# Patient Record
Sex: FEMALE | Race: WHITE | NOT HISPANIC OR LATINO
[De-identification: names, ages, dates, MRNs, and addresses within clinical notes are randomized per-mention and may not be internally consistent; named-entity substitution may affect disease eponyms.]

---

## 2017-01-10 ENCOUNTER — APPOINTMENT (OUTPATIENT)
Dept: PULMONOLOGY | Facility: CLINIC | Age: 77
End: 2017-01-10

## 2017-02-16 ENCOUNTER — APPOINTMENT (OUTPATIENT)
Dept: UROGYNECOLOGY | Facility: CLINIC | Age: 77
End: 2017-02-16

## 2017-02-16 DIAGNOSIS — R30.0 DYSURIA: ICD-10-CM

## 2017-03-15 ENCOUNTER — APPOINTMENT (OUTPATIENT)
Dept: UROGYNECOLOGY | Facility: CLINIC | Age: 77
End: 2017-03-15

## 2017-04-07 DIAGNOSIS — L29.2 PRURITUS VULVAE: ICD-10-CM

## 2017-05-03 ENCOUNTER — APPOINTMENT (OUTPATIENT)
Dept: UROGYNECOLOGY | Facility: CLINIC | Age: 77
End: 2017-05-03

## 2017-05-11 ENCOUNTER — APPOINTMENT (OUTPATIENT)
Dept: GASTROENTEROLOGY | Facility: CLINIC | Age: 77
End: 2017-05-11

## 2017-05-11 VITALS
WEIGHT: 133 LBS | OXYGEN SATURATION: 94 % | TEMPERATURE: 98 F | HEART RATE: 86 BPM | HEIGHT: 60 IN | BODY MASS INDEX: 26.11 KG/M2 | RESPIRATION RATE: 12 BRPM

## 2017-05-11 VITALS — BODY MASS INDEX: 25.98 KG/M2 | WEIGHT: 133 LBS | DIASTOLIC BLOOD PRESSURE: 98 MMHG | SYSTOLIC BLOOD PRESSURE: 160 MMHG

## 2017-05-11 DIAGNOSIS — Z83.79 FAMILY HISTORY OF OTHER DISEASES OF THE DIGESTIVE SYSTEM: ICD-10-CM

## 2017-05-11 DIAGNOSIS — E55.9 VITAMIN D DEFICIENCY, UNSPECIFIED: ICD-10-CM

## 2017-05-11 DIAGNOSIS — Z80.6 FAMILY HISTORY OF LEUKEMIA: ICD-10-CM

## 2017-05-11 DIAGNOSIS — Z80.0 FAMILY HISTORY OF MALIGNANT NEOPLASM OF DIGESTIVE ORGANS: ICD-10-CM

## 2017-05-12 ENCOUNTER — APPOINTMENT (OUTPATIENT)
Dept: UROGYNECOLOGY | Facility: CLINIC | Age: 77
End: 2017-05-12
Payer: MEDICARE

## 2017-05-12 PROCEDURE — 99214 OFFICE O/P EST MOD 30 MIN: CPT

## 2017-05-12 PROCEDURE — A4562: CPT

## 2017-05-16 ENCOUNTER — MESSAGE (OUTPATIENT)
Age: 77
End: 2017-05-16

## 2017-05-16 LAB
ALBUMIN SERPL ELPH-MCNC: 4.1 G/DL
ALP BLD-CCNC: 92 U/L
ALT SERPL-CCNC: 37 U/L
ANA SER IF-ACNC: NEGATIVE
AST SERPL-CCNC: 31 U/L
BILIRUB DIRECT SERPL-MCNC: 0.1 MG/DL
BILIRUB INDIRECT SERPL-MCNC: 0.4 MG/DL
BILIRUB SERPL-MCNC: 0.5 MG/DL
CRP SERPL-MCNC: 0.6 MG/DL
FERRITIN SERPL-MCNC: 133 NG/ML
GGT SERPL-CCNC: 56 U/L
HBV CORE IGG+IGM SER QL: NONREACTIVE
HBV SURFACE AG SER QL: NONREACTIVE
HCV AB SER QL: NONREACTIVE
HCV S/CO RATIO: 0.15 S/CO
PROT SERPL-MCNC: 8 G/DL
SMOOTH MUSCLE AB SER QL IF: NORMAL
TTG IGA SER IA-ACNC: 6.4 UNITS
TTG IGA SER-ACNC: NEGATIVE
TTG IGG SER IA-ACNC: 8.5 UNITS
TTG IGG SER IA-ACNC: NEGATIVE

## 2017-05-17 ENCOUNTER — MESSAGE (OUTPATIENT)
Age: 77
End: 2017-05-17

## 2017-05-17 LAB — HEV AB SER QL: NEGATIVE

## 2017-05-19 ENCOUNTER — MESSAGE (OUTPATIENT)
Age: 77
End: 2017-05-19

## 2017-05-23 ENCOUNTER — MESSAGE (OUTPATIENT)
Age: 77
End: 2017-05-23

## 2017-05-25 ENCOUNTER — APPOINTMENT (OUTPATIENT)
Dept: UROGYNECOLOGY | Facility: CLINIC | Age: 77
End: 2017-05-25

## 2017-06-06 ENCOUNTER — APPOINTMENT (OUTPATIENT)
Dept: UROGYNECOLOGY | Facility: CLINIC | Age: 77
End: 2017-06-06

## 2017-06-07 ENCOUNTER — APPOINTMENT (OUTPATIENT)
Dept: GASTROENTEROLOGY | Facility: HOSPITAL | Age: 77
End: 2017-06-07

## 2017-06-15 ENCOUNTER — APPOINTMENT (OUTPATIENT)
Dept: ULTRASOUND IMAGING | Facility: IMAGING CENTER | Age: 77
End: 2017-06-15

## 2017-06-15 ENCOUNTER — APPOINTMENT (OUTPATIENT)
Dept: MAMMOGRAPHY | Facility: IMAGING CENTER | Age: 77
End: 2017-06-15

## 2017-06-15 ENCOUNTER — OUTPATIENT (OUTPATIENT)
Dept: OUTPATIENT SERVICES | Facility: HOSPITAL | Age: 77
LOS: 1 days | End: 2017-06-15
Payer: MEDICARE

## 2017-06-15 DIAGNOSIS — Z00.8 ENCOUNTER FOR OTHER GENERAL EXAMINATION: ICD-10-CM

## 2017-06-15 DIAGNOSIS — N60.19 DIFFUSE CYSTIC MASTOPATHY OF UNSPECIFIED BREAST: ICD-10-CM

## 2017-06-15 DIAGNOSIS — Z12.31 ENCOUNTER FOR SCREENING MAMMOGRAM FOR MALIGNANT NEOPLASM OF BREAST: ICD-10-CM

## 2017-06-15 PROCEDURE — 77063 BREAST TOMOSYNTHESIS BI: CPT

## 2017-06-15 PROCEDURE — 77067 SCR MAMMO BI INCL CAD: CPT

## 2017-06-15 PROCEDURE — 76641 ULTRASOUND BREAST COMPLETE: CPT

## 2017-06-20 ENCOUNTER — APPOINTMENT (OUTPATIENT)
Dept: UROGYNECOLOGY | Facility: CLINIC | Age: 77
End: 2017-06-20

## 2017-06-26 ENCOUNTER — APPOINTMENT (OUTPATIENT)
Dept: UROGYNECOLOGY | Facility: CLINIC | Age: 77
End: 2017-06-26

## 2017-06-28 ENCOUNTER — MESSAGE (OUTPATIENT)
Age: 77
End: 2017-06-28

## 2017-07-19 ENCOUNTER — RESULT REVIEW (OUTPATIENT)
Age: 77
End: 2017-07-19

## 2017-08-30 ENCOUNTER — APPOINTMENT (OUTPATIENT)
Dept: UROGYNECOLOGY | Facility: CLINIC | Age: 77
End: 2017-08-30
Payer: MEDICARE

## 2017-08-30 PROCEDURE — A4562: CPT

## 2017-08-30 PROCEDURE — 99214 OFFICE O/P EST MOD 30 MIN: CPT

## 2017-09-13 ENCOUNTER — APPOINTMENT (OUTPATIENT)
Dept: UROGYNECOLOGY | Facility: CLINIC | Age: 77
End: 2017-09-13

## 2017-09-27 ENCOUNTER — APPOINTMENT (OUTPATIENT)
Dept: UROGYNECOLOGY | Facility: CLINIC | Age: 77
End: 2017-09-27
Payer: MEDICARE

## 2017-09-27 PROCEDURE — 99214 OFFICE O/P EST MOD 30 MIN: CPT

## 2017-10-25 ENCOUNTER — APPOINTMENT (OUTPATIENT)
Dept: UROGYNECOLOGY | Facility: CLINIC | Age: 77
End: 2017-10-25

## 2017-11-22 ENCOUNTER — RX RENEWAL (OUTPATIENT)
Age: 77
End: 2017-11-22

## 2017-11-22 ENCOUNTER — MEDICATION RENEWAL (OUTPATIENT)
Age: 77
End: 2017-11-22

## 2017-12-05 ENCOUNTER — APPOINTMENT (OUTPATIENT)
Dept: ENDOCRINOLOGY | Facility: CLINIC | Age: 77
End: 2017-12-05
Payer: MEDICARE

## 2017-12-05 ENCOUNTER — MEDICATION RENEWAL (OUTPATIENT)
Age: 77
End: 2017-12-05

## 2017-12-05 VITALS
WEIGHT: 135 LBS | DIASTOLIC BLOOD PRESSURE: 80 MMHG | SYSTOLIC BLOOD PRESSURE: 120 MMHG | BODY MASS INDEX: 26.5 KG/M2 | OXYGEN SATURATION: 98 % | HEIGHT: 60 IN | HEART RATE: 66 BPM

## 2017-12-05 PROCEDURE — 99214 OFFICE O/P EST MOD 30 MIN: CPT

## 2017-12-06 LAB
25(OH)D3 SERPL-MCNC: 68.5 NG/ML
ALBUMIN SERPL ELPH-MCNC: 4.2 G/DL
ALP BLD-CCNC: 99 U/L
ALT SERPL-CCNC: 60 U/L
ANION GAP SERPL CALC-SCNC: 14 MMOL/L
AST SERPL-CCNC: 63 U/L
BILIRUB SERPL-MCNC: 0.3 MG/DL
BUN SERPL-MCNC: 15 MG/DL
CALCIUM SERPL-MCNC: 9.4 MG/DL
CHLORIDE SERPL-SCNC: 90 MMOL/L
CO2 SERPL-SCNC: 27 MMOL/L
CREAT SERPL-MCNC: 0.84 MG/DL
GLUCOSE SERPL-MCNC: 99 MG/DL
HBA1C MFR BLD HPLC: 5.5 %
POTASSIUM SERPL-SCNC: 4.3 MMOL/L
PROT SERPL-MCNC: 7.4 G/DL
SODIUM SERPL-SCNC: 131 MMOL/L
T4 FREE SERPL-MCNC: 2 NG/DL
TSH SERPL-ACNC: 1.61 UIU/ML

## 2017-12-11 ENCOUNTER — LABORATORY RESULT (OUTPATIENT)
Age: 77
End: 2017-12-11

## 2017-12-11 ENCOUNTER — APPOINTMENT (OUTPATIENT)
Dept: GASTROENTEROLOGY | Facility: CLINIC | Age: 77
End: 2017-12-11
Payer: MEDICARE

## 2017-12-11 VITALS
RESPIRATION RATE: 12 BRPM | WEIGHT: 130 LBS | SYSTOLIC BLOOD PRESSURE: 112 MMHG | HEART RATE: 65 BPM | TEMPERATURE: 98.4 F | HEIGHT: 60 IN | OXYGEN SATURATION: 95 % | DIASTOLIC BLOOD PRESSURE: 62 MMHG | BODY MASS INDEX: 25.52 KG/M2

## 2017-12-11 DIAGNOSIS — Z80.0 FAMILY HISTORY OF MALIGNANT NEOPLASM OF DIGESTIVE ORGANS: ICD-10-CM

## 2017-12-11 PROCEDURE — 36415 COLL VENOUS BLD VENIPUNCTURE: CPT

## 2017-12-11 PROCEDURE — 99214 OFFICE O/P EST MOD 30 MIN: CPT | Mod: 25

## 2017-12-11 RX ORDER — LINACLOTIDE 145 UG/1
145 CAPSULE, GELATIN COATED ORAL
Qty: 30 | Refills: 5 | Status: DISCONTINUED | COMMUNITY
Start: 2017-05-11 | End: 2017-12-11

## 2017-12-11 RX ORDER — CLOTRIMAZOLE AND BETAMETHASONE DIPROPIONATE 10; .5 MG/G; MG/G
1-0.05 CREAM TOPICAL TWICE DAILY
Qty: 45 | Refills: 0 | Status: DISCONTINUED | COMMUNITY
Start: 2017-04-07 | End: 2017-12-11

## 2017-12-12 ENCOUNTER — MESSAGE (OUTPATIENT)
Age: 77
End: 2017-12-12

## 2017-12-12 LAB
ALBUMIN SERPL ELPH-MCNC: 4.5 G/DL
ALP BLD-CCNC: 91 U/L
ALT SERPL-CCNC: 20 U/L
AST SERPL-CCNC: 30 U/L
BASOPHILS # BLD AUTO: 0.09 K/UL
BASOPHILS NFR BLD AUTO: 1.4 %
BILIRUB DIRECT SERPL-MCNC: 0.1 MG/DL
BILIRUB INDIRECT SERPL-MCNC: 0.3 MG/DL
BILIRUB SERPL-MCNC: 0.4 MG/DL
EOSINOPHIL # BLD AUTO: 0.09 K/UL
EOSINOPHIL NFR BLD AUTO: 1.4 %
GGT SERPL-CCNC: 58 U/L
HCT VFR BLD CALC: 41.4 %
HGB BLD-MCNC: 13.8 G/DL
IMM GRANULOCYTES NFR BLD AUTO: 0.2 %
LYMPHOCYTES # BLD AUTO: 1.93 K/UL
LYMPHOCYTES NFR BLD AUTO: 30.2 %
MAN DIFF?: NORMAL
MCHC RBC-ENTMCNC: 32 PG
MCHC RBC-ENTMCNC: 33.3 GM/DL
MCV RBC AUTO: 96.1 FL
MONOCYTES # BLD AUTO: 0.58 K/UL
MONOCYTES NFR BLD AUTO: 9.1 %
NEUTROPHILS # BLD AUTO: 3.69 K/UL
NEUTROPHILS NFR BLD AUTO: 57.7 %
PLATELET # BLD AUTO: 313 K/UL
PROT SERPL-MCNC: 8.3 G/DL
RBC # BLD: 4.31 M/UL
RBC # FLD: 13.6 %
WBC # FLD AUTO: 6.39 K/UL

## 2017-12-18 ENCOUNTER — FORM ENCOUNTER (OUTPATIENT)
Age: 77
End: 2017-12-18

## 2017-12-19 ENCOUNTER — OUTPATIENT (OUTPATIENT)
Dept: OUTPATIENT SERVICES | Facility: HOSPITAL | Age: 77
LOS: 1 days | End: 2017-12-19
Payer: MEDICARE

## 2017-12-19 ENCOUNTER — APPOINTMENT (OUTPATIENT)
Dept: ULTRASOUND IMAGING | Facility: CLINIC | Age: 77
End: 2017-12-19
Payer: MEDICARE

## 2017-12-19 DIAGNOSIS — Z00.8 ENCOUNTER FOR OTHER GENERAL EXAMINATION: ICD-10-CM

## 2017-12-19 PROCEDURE — 76536 US EXAM OF HEAD AND NECK: CPT | Mod: 26

## 2017-12-19 PROCEDURE — 76700 US EXAM ABDOM COMPLETE: CPT

## 2017-12-19 PROCEDURE — 76700 US EXAM ABDOM COMPLETE: CPT | Mod: 26

## 2017-12-19 PROCEDURE — 76536 US EXAM OF HEAD AND NECK: CPT

## 2017-12-27 ENCOUNTER — MESSAGE (OUTPATIENT)
Age: 77
End: 2017-12-27

## 2018-01-03 ENCOUNTER — APPOINTMENT (OUTPATIENT)
Dept: GASTROENTEROLOGY | Facility: HOSPITAL | Age: 78
End: 2018-01-03

## 2018-02-13 ENCOUNTER — APPOINTMENT (OUTPATIENT)
Dept: UROGYNECOLOGY | Facility: CLINIC | Age: 78
End: 2018-02-13
Payer: MEDICARE

## 2018-02-13 DIAGNOSIS — R33.9 RETENTION OF URINE, UNSPECIFIED: ICD-10-CM

## 2018-02-13 PROCEDURE — 99214 OFFICE O/P EST MOD 30 MIN: CPT | Mod: 25

## 2018-02-13 PROCEDURE — 51798 US URINE CAPACITY MEASURE: CPT

## 2018-02-19 ENCOUNTER — FORM ENCOUNTER (OUTPATIENT)
Age: 78
End: 2018-02-19

## 2018-02-20 ENCOUNTER — OUTPATIENT (OUTPATIENT)
Dept: OUTPATIENT SERVICES | Facility: HOSPITAL | Age: 78
LOS: 1 days | End: 2018-02-20
Payer: MEDICARE

## 2018-02-20 ENCOUNTER — APPOINTMENT (OUTPATIENT)
Dept: RADIOLOGY | Facility: IMAGING CENTER | Age: 78
End: 2018-02-20
Payer: MEDICARE

## 2018-02-20 DIAGNOSIS — M85.80 OTHER SPECIFIED DISORDERS OF BONE DENSITY AND STRUCTURE, UNSPECIFIED SITE: ICD-10-CM

## 2018-02-20 PROCEDURE — 77080 DXA BONE DENSITY AXIAL: CPT

## 2018-02-20 PROCEDURE — 77080 DXA BONE DENSITY AXIAL: CPT | Mod: 26

## 2018-03-06 ENCOUNTER — APPOINTMENT (OUTPATIENT)
Dept: UROGYNECOLOGY | Facility: CLINIC | Age: 78
End: 2018-03-06

## 2018-03-13 ENCOUNTER — MESSAGE (OUTPATIENT)
Age: 78
End: 2018-03-13

## 2018-03-13 RX ORDER — POLYETHYLENE GLYCOL 3350 AND ELECTROLYTES WITH LEMON FLAVOR 236; 22.74; 6.74; 5.86; 2.97 G/4L; G/4L; G/4L; G/4L; G/4L
236 POWDER, FOR SOLUTION ORAL
Qty: 1 | Refills: 0 | Status: COMPLETED | COMMUNITY
Start: 2017-12-11 | End: 2018-03-13

## 2018-05-17 ENCOUNTER — APPOINTMENT (OUTPATIENT)
Dept: GASTROENTEROLOGY | Facility: CLINIC | Age: 78
End: 2018-05-17

## 2018-05-30 ENCOUNTER — APPOINTMENT (OUTPATIENT)
Dept: UROGYNECOLOGY | Facility: CLINIC | Age: 78
End: 2018-05-30

## 2018-06-20 ENCOUNTER — APPOINTMENT (OUTPATIENT)
Dept: ORTHOPEDIC SURGERY | Facility: CLINIC | Age: 78
End: 2018-06-20
Payer: MEDICARE

## 2018-06-20 VITALS
BODY MASS INDEX: 26.5 KG/M2 | WEIGHT: 135 LBS | SYSTOLIC BLOOD PRESSURE: 133 MMHG | DIASTOLIC BLOOD PRESSURE: 84 MMHG | HEART RATE: 74 BPM | HEIGHT: 60 IN

## 2018-06-20 DIAGNOSIS — Z86.39 PERSONAL HISTORY OF OTHER ENDOCRINE, NUTRITIONAL AND METABOLIC DISEASE: ICD-10-CM

## 2018-06-20 DIAGNOSIS — Z85.850 PERSONAL HISTORY OF MALIGNANT NEOPLASM OF THYROID: ICD-10-CM

## 2018-06-20 PROCEDURE — 72100 X-RAY EXAM L-S SPINE 2/3 VWS: CPT

## 2018-06-20 PROCEDURE — 99214 OFFICE O/P EST MOD 30 MIN: CPT

## 2018-06-27 ENCOUNTER — APPOINTMENT (OUTPATIENT)
Dept: UROGYNECOLOGY | Facility: CLINIC | Age: 78
End: 2018-06-27
Payer: MEDICARE

## 2018-06-27 PROCEDURE — 99214 OFFICE O/P EST MOD 30 MIN: CPT

## 2018-06-28 ENCOUNTER — MESSAGE (OUTPATIENT)
Age: 78
End: 2018-06-28

## 2018-07-03 ENCOUNTER — APPOINTMENT (OUTPATIENT)
Dept: ORTHOPEDIC SURGERY | Facility: CLINIC | Age: 78
End: 2018-07-03

## 2018-07-12 ENCOUNTER — APPOINTMENT (OUTPATIENT)
Dept: MAMMOGRAPHY | Facility: IMAGING CENTER | Age: 78
End: 2018-07-12
Payer: MEDICARE

## 2018-07-12 ENCOUNTER — OUTPATIENT (OUTPATIENT)
Dept: OUTPATIENT SERVICES | Facility: HOSPITAL | Age: 78
LOS: 1 days | End: 2018-07-12
Payer: MEDICARE

## 2018-07-12 ENCOUNTER — APPOINTMENT (OUTPATIENT)
Dept: ULTRASOUND IMAGING | Facility: IMAGING CENTER | Age: 78
End: 2018-07-12
Payer: MEDICARE

## 2018-07-12 DIAGNOSIS — Z00.00 ENCOUNTER FOR GENERAL ADULT MEDICAL EXAMINATION WITHOUT ABNORMAL FINDINGS: ICD-10-CM

## 2018-07-12 PROCEDURE — 77063 BREAST TOMOSYNTHESIS BI: CPT | Mod: 26

## 2018-07-12 PROCEDURE — 77067 SCR MAMMO BI INCL CAD: CPT

## 2018-07-12 PROCEDURE — 77063 BREAST TOMOSYNTHESIS BI: CPT

## 2018-07-12 PROCEDURE — 77067 SCR MAMMO BI INCL CAD: CPT | Mod: 26

## 2018-07-12 PROCEDURE — 76641 ULTRASOUND BREAST COMPLETE: CPT | Mod: 26,50

## 2018-07-12 PROCEDURE — 76641 ULTRASOUND BREAST COMPLETE: CPT

## 2018-07-23 ENCOUNTER — FORM ENCOUNTER (OUTPATIENT)
Age: 78
End: 2018-07-23

## 2018-07-23 ENCOUNTER — MESSAGE (OUTPATIENT)
Age: 78
End: 2018-07-23

## 2018-07-24 ENCOUNTER — APPOINTMENT (OUTPATIENT)
Dept: ULTRASOUND IMAGING | Facility: IMAGING CENTER | Age: 78
End: 2018-07-24
Payer: MEDICARE

## 2018-07-24 ENCOUNTER — OUTPATIENT (OUTPATIENT)
Dept: OUTPATIENT SERVICES | Facility: HOSPITAL | Age: 78
LOS: 1 days | End: 2018-07-24
Payer: MEDICARE

## 2018-07-24 DIAGNOSIS — Z00.8 ENCOUNTER FOR OTHER GENERAL EXAMINATION: ICD-10-CM

## 2018-07-24 PROBLEM — Z80.0 FAMILY HISTORY OF COLON CANCER: Status: INACTIVE | Noted: 2017-05-11

## 2018-07-24 PROCEDURE — 76700 US EXAM ABDOM COMPLETE: CPT

## 2018-07-24 PROCEDURE — 76700 US EXAM ABDOM COMPLETE: CPT | Mod: 26

## 2018-07-26 ENCOUNTER — APPOINTMENT (OUTPATIENT)
Dept: GASTROENTEROLOGY | Facility: CLINIC | Age: 78
End: 2018-07-26
Payer: MEDICARE

## 2018-07-26 VITALS
SYSTOLIC BLOOD PRESSURE: 120 MMHG | DIASTOLIC BLOOD PRESSURE: 80 MMHG | RESPIRATION RATE: 64 BRPM | WEIGHT: 136.38 LBS | TEMPERATURE: 98.2 F | BODY MASS INDEX: 26.77 KG/M2 | HEIGHT: 60 IN | OXYGEN SATURATION: 90 %

## 2018-07-26 DIAGNOSIS — F41.9 ANXIETY DISORDER, UNSPECIFIED: ICD-10-CM

## 2018-07-26 PROCEDURE — 36415 COLL VENOUS BLD VENIPUNCTURE: CPT

## 2018-07-26 PROCEDURE — 99214 OFFICE O/P EST MOD 30 MIN: CPT | Mod: 25

## 2018-07-26 RX ORDER — ALPRAZOLAM 2 MG/1
2 TABLET ORAL
Refills: 0 | Status: ACTIVE | COMMUNITY
Start: 2018-07-26

## 2018-07-27 LAB
ALBUMIN SERPL ELPH-MCNC: 4.3 G/DL
ALP BLD-CCNC: 98 U/L
ALT SERPL-CCNC: 21 U/L
AST SERPL-CCNC: 27 U/L
BILIRUB DIRECT SERPL-MCNC: 0.1 MG/DL
BILIRUB INDIRECT SERPL-MCNC: 0.2 MG/DL
BILIRUB SERPL-MCNC: 0.4 MG/DL
HAV IGM SER QL: NONREACTIVE
HBV CORE IGG+IGM SER QL: NONREACTIVE
HBV CORE IGM SER QL: NONREACTIVE
HBV SURFACE AG SER QL: NONREACTIVE
HCV AB SER QL: NONREACTIVE
HCV S/CO RATIO: 0.24 S/CO
PROT SERPL-MCNC: 8.4 G/DL

## 2018-08-06 LAB — HEV AB SER QL: NEGATIVE

## 2018-08-08 ENCOUNTER — APPOINTMENT (OUTPATIENT)
Dept: UROGYNECOLOGY | Facility: CLINIC | Age: 78
End: 2018-08-08

## 2018-08-15 ENCOUNTER — RESULT REVIEW (OUTPATIENT)
Age: 78
End: 2018-08-15

## 2018-09-06 ENCOUNTER — APPOINTMENT (OUTPATIENT)
Dept: GASTROENTEROLOGY | Facility: CLINIC | Age: 78
End: 2018-09-06

## 2018-09-17 ENCOUNTER — APPOINTMENT (OUTPATIENT)
Dept: NEPHROLOGY | Facility: CLINIC | Age: 78
End: 2018-09-17

## 2018-09-17 ENCOUNTER — LABORATORY RESULT (OUTPATIENT)
Age: 78
End: 2018-09-17

## 2018-09-17 ENCOUNTER — APPOINTMENT (OUTPATIENT)
Dept: NEPHROLOGY | Facility: CLINIC | Age: 78
End: 2018-09-17
Payer: MEDICARE

## 2018-09-17 VITALS
BODY MASS INDEX: 25.91 KG/M2 | HEART RATE: 68 BPM | WEIGHT: 132 LBS | HEIGHT: 60 IN | OXYGEN SATURATION: 98 % | SYSTOLIC BLOOD PRESSURE: 159 MMHG | DIASTOLIC BLOOD PRESSURE: 98 MMHG

## 2018-09-17 PROCEDURE — 99204 OFFICE O/P NEW MOD 45 MIN: CPT

## 2018-09-17 RX ORDER — FUROSEMIDE 20 MG/1
20 TABLET ORAL
Qty: 30 | Refills: 5 | Status: DISCONTINUED | COMMUNITY
Start: 2018-07-26 | End: 2018-09-17

## 2018-09-18 ENCOUNTER — CHART COPY (OUTPATIENT)
Age: 78
End: 2018-09-18

## 2018-09-18 ENCOUNTER — MESSAGE (OUTPATIENT)
Age: 78
End: 2018-09-18

## 2018-09-18 RX ORDER — HYDROCHLOROTHIAZIDE 25 MG/1
25 TABLET ORAL DAILY
Qty: 90 | Refills: 3 | Status: DISCONTINUED | COMMUNITY
Start: 2018-09-17 | End: 2018-09-18

## 2018-09-20 ENCOUNTER — APPOINTMENT (OUTPATIENT)
Dept: UROGYNECOLOGY | Facility: CLINIC | Age: 78
End: 2018-09-20
Payer: MEDICARE

## 2018-09-20 DIAGNOSIS — N81.11 CYSTOCELE, MIDLINE: ICD-10-CM

## 2018-09-20 DIAGNOSIS — N95.2 POSTMENOPAUSAL ATROPHIC VAGINITIS: ICD-10-CM

## 2018-09-20 DIAGNOSIS — R33.9 RETENTION OF URINE, UNSPECIFIED: ICD-10-CM

## 2018-09-20 PROCEDURE — 99214 OFFICE O/P EST MOD 30 MIN: CPT

## 2018-09-20 PROCEDURE — A4562: CPT

## 2018-09-21 ENCOUNTER — EMERGENCY (EMERGENCY)
Facility: HOSPITAL | Age: 78
LOS: 1 days | Discharge: HOME | End: 2018-09-21
Attending: EMERGENCY MEDICINE
Payer: MEDICARE

## 2018-09-21 VITALS
RESPIRATION RATE: 20 BRPM | OXYGEN SATURATION: 98 % | DIASTOLIC BLOOD PRESSURE: 114 MMHG | TEMPERATURE: 98 F | SYSTOLIC BLOOD PRESSURE: 188 MMHG | HEART RATE: 64 BPM

## 2018-09-21 PROCEDURE — 99282 EMERGENCY DEPT VISIT SF MDM: CPT

## 2018-09-21 PROCEDURE — 99282 EMERGENCY DEPT VISIT SF MDM: CPT | Mod: 25

## 2018-09-21 NOTE — ED ADULT TRIAGE NOTE - CHIEF COMPLAINT QUOTE
HTN HTN, on losartan, Metoprolol, HCTZ - took all meds as directed. Patient also states she took "one xanex" tonight and had been told in the past to take this when her pressure is high.

## 2018-09-22 VITALS
RESPIRATION RATE: 18 BRPM | HEART RATE: 61 BPM | DIASTOLIC BLOOD PRESSURE: 92 MMHG | OXYGEN SATURATION: 98 % | SYSTOLIC BLOOD PRESSURE: 180 MMHG | TEMPERATURE: 98 F

## 2018-09-22 NOTE — ED PROVIDER NOTE - MUSCULOSKELETAL, MLM
Spine appears normal, range of motion is not limited, no muscle or joint tenderness **ATTENDING ADDENDUM (Dr. Jason Self): NO cords, soft-tissue swelling, or calf tenderness bilaterally.

## 2018-09-22 NOTE — ED PROVIDER NOTE - RESPIRATORY, MLM
Breath sounds clear and equal bilaterally. **ATTENDING ADDENDUM (Dr. Jason Self): NO wheezing, rales, rhonchi, crackles, stridor, drooling, retractions, nasal flaring, or tripoding.

## 2018-09-22 NOTE — ED PROVIDER NOTE - PHYSICAL EXAMINATION
General: Alert and Orientated x 3. No apparent distress.  Head: Normocephalic and atraumatic.  Eyes: PERRL with EOMI.  Neck: Supple. Trachea midline.   Cardiac: Normal S1 and S2 w/ RRR. No murmurs appreciated. No JVD appreciated.  Pulmonary: Vesicular breath sounds bilaterally. No increased WOB. No wheezes or crackles.  Abdominal: Soft, non-tender. (+) bowel sounds appreciated in all 4 quadrants. No hepatosplenomegaly.   Neuro: Awake, alert and fully oriented x 4. No evidence of cognitive or language dysfunction.  Cranial nerves: Visual fields are full. Extraocular movements full. Pupils equal, round, react to light. No nystagmus noted. Fifth nerve function is normal. There is no facial asymmetry noted. CN XII intact.   Motor exam: good tone and bulk throughout. No pronator drift. Muscle strength is 5/5 throughout. Sensory examination is intact. Normal gait and tandem walk. Finger-to-nose and heel-to-shin moves are normal. Romberg sign negative.  Javier Lama, PGY-1   Musculoskeletal: Strength appropriate in all 4 extremities for age with no limited ROM.  Skin: Color appropriate for race. Intact, warm, and well-perfused.  Lymphatic: No cervical or inguinal adenopathy appreciated.  Psychiatric: Appropriate mood and affect. No apparent risk to self or others.  Javier Lama, PGY-1 General: Alert and Orientated x 3. No apparent distress.  Head: Normocephalic and atraumatic.  Eyes: PERRL with EOMI.  Neck: Supple. Trachea midline.   Cardiac: Normal S1 and S2 w/ RRR. No murmurs appreciated. No JVD appreciated.  Pulmonary: Vesicular breath sounds bilaterally. No increased WOB. No wheezes or crackles.  Abdominal: Soft, non-tender. (+) bowel sounds appreciated in all 4 quadrants. No hepatosplenomegaly.   Neuro: Awake, alert and fully oriented x 4. No evidence of cognitive or language dysfunction.  Cranial nerves: Visual fields are full. Extraocular movements full. Pupils equal, round, react to light. No nystagmus noted. Fifth nerve function is normal. There is no facial asymmetry noted. CN XII intact.   Motor exam: good tone and bulk throughout. No pronator drift. Muscle strength is 5/5 throughout. Sensory examination is intact. Normal gait and tandem walk. Finger-to-nose and heel-to-shin moves are normal. Romberg sign negative.  Javier Lama, PGY-1   Musculoskeletal: Strength appropriate in all 4 extremities for age with no limited ROM.  Skin: Color appropriate for race. Intact, warm, and well-perfused.  Lymphatic: No cervical adenopathy appreciated.  Psychiatric: Appropriate mood and affect. No apparent risk to self or others.  Javier Lama, PGY-1 General: Alert and Orientated x 3. No apparent distress.  Head: Normocephalic and atraumatic.  Eyes: PERRL with EOMI.  Neck: Supple. Trachea midline.   Cardiac: Normal S1 and S2 w/ RRR. No murmurs appreciated. No JVD appreciated.  Pulmonary: Vesicular breath sounds bilaterally. No increased WOB. No wheezes or crackles.  Abdominal: Soft, non-tender. (+) bowel sounds appreciated in all 4 quadrants. No hepatosplenomegaly.   Neuro: Awake, alert and fully oriented x 4. No evidence of cognitive or language dysfunction.  Cranial nerves: Visual fields are full. Extraocular movements full. Pupils equal, round, react to light. No nystagmus noted. Fifth nerve function is normal. There is no facial asymmetry noted. CN XII intact.   Motor exam: good tone and bulk throughout. No pronator drift. Muscle strength is 5/5 throughout. Sensory examination is intact. Normal gait and tandem walk. Finger-to-nose and heel-to-shin moves are normal. Romberg sign negative.  Javier Lama, PGY-1   Musculoskeletal: Strength appropriate in all 4 extremities for age with no limited ROM.  Skin: Color appropriate for race. Intact, warm, and well-perfused.  Lymphatic: No cervical adenopathy appreciated.  Psychiatric: Appropriate mood and affect. No apparent risk to self or others.  Javier Lama, PGY-1  **ATTENDING ADDENDUM (Dr. Jason Self): I have reviewed and substantially contributed to the elements of the PE as documented above. I have directly performed an examination of this patient in conjunction with the other members (EM resident/PA/NP) of the patient care team.

## 2018-09-22 NOTE — ED PROVIDER NOTE - OBJECTIVE STATEMENT
78 F PMH HTN presenting after measuring BP at home to 200s systolic. Pt is in the midst of changing BP meds because her norvasc was giving her too much leg swelling. She has been advised to follow up with renal by her PCP for better BP control, but hasn't been able to make an appointment yet due to insurance problems. She took her medications after her BP was high and came to ER. No headaches, weakness, numbness, nausea, vomiting. back pain, CP or SOB 78 F PMH HTN presenting after measuring BP at home to 200s systolic. Pt is in the midst of changing BP meds because her norvasc was giving her too much leg swelling. She has been advised to follow up with renal by her PCP for better BP control, but hasn't been able to make an appointment yet due to insurance problems. She took her medications after her BP was high and came to ER. No headaches, weakness, numbness, nausea, vomiting. back pain, CP or shortness of breath.  **ATTENDING ADDENDUM (Dr. Jason Self): I attest that I have directly and personally interviewed and examined this patient and elicited a comparable history of present illness and review of systems as documented, along with my EM resident. I attest that I have made significant contributions to the documentation where necessary and as noted in the EMR.

## 2018-09-22 NOTE — ED PROVIDER NOTE - MEDICAL DECISION MAKING DETAILS
78F with HTN presenting after BP was elevated at home. Took home medications, now BP is 170/88. Benign neuro exam. Dispo likely home. 78F with HTN presenting after BP was elevated at home. Took home medications, now BP is 170/88. Benign neuro exam, no s/s of other end organ damage. Dispo likely home. 78F with HTN presenting after BP was elevated at home. Took home medications, now BP is 170/88. Benign neuro exam, no s/s of other end organ damage. Dispo likely home.  **ATTENDING MEDICAL DECISION MAKING/SYNTHESIS (Dr. Jason Self): I have reviewed the Chief Complaint, the HPI, the ROS, and have directly performed and confirmed the findings on the Physical Examination. I have reviewed the medical decision making with all providers, as applicable. The PROBLEM REPRESENTATION at this time is: 77-year-old woman with history of Hypertension now presenting with asymptomatic hypertension without headache, chest pain, shortness of breath, dyspnea on exertion, palpitations, nausea, vomiting, facial droop, numbness, tingling, weakness, paresthesias, syncope, near-syncope, focal or generalized weakness, abdominal pain, back pain, or visual or speech changes. The MOST LIKELY DIAGNOSIS, and the LIST OF DIFFERENTIAL DIAGNOSES, includes (but is not limited to) the following: asymptomatic Hypertension, malignant Hypertension (considered), hypertensive crisis e.g. encephalopathy, dissection, congestive heart failure, acute coronary syndrome, cerebrovascular accident, transient ischemic attack, or equivalent (NO evidence), pain (NO evidence), new onset kidney disease (NO evidence), medication non-adherence (considered, evidence), dehydration (NO evidence), electrolyte-metabolic-endocrine derangements (NO evidence), toxicant exposure (NO evidence). The likelihood of each of these diagnoses has been appropriately considered in the context of this patient's presentation and my evaluation. PLAN: as described in EMR, including diagnostics, therapeutics and consultation as clinically warranted. I will continue to reevaluate the patient, including the results of all testing, and monitor response to therapy throughout the patient's course in the ED.

## 2018-09-22 NOTE — ED ADULT NURSE NOTE - NSIMPLEMENTINTERV_GEN_ALL_ED
Implemented All Universal Safety Interventions:  [place] to call system. Call bell, personal items and telephone within reach. Instruct patient to call for assistance. Room bathroom lighting operational. Non-slip footwear when patient is off stretcher. Physically safe environment: no spills, clutter or unnecessary equipment. Stretcher in lowest position, wheels locked, appropriate side rails in place.

## 2018-09-22 NOTE — ED PROVIDER NOTE - CHPI ED SYMPTOMS NEG
no tingling/no weakness/no vomiting/no chills/no nausea/no fever/no decreased eating/drinking/no dizziness/no numbness/no pain

## 2018-09-22 NOTE — ED PROVIDER NOTE - PROGRESS NOTE DETAILS
**ATTENDING ADDENDUM (Dr. Jason Self): patient serially evaluated throughout ED course. NO acute deterioration up to this time in the ED. NO evidence of hypertensive crisis, malignant Hypertension, encephalopathy, or other worrisome sequela e.g. acute coronary syndrome, cerebrovascular accident, or equivalent, at this time. Regression to the mean noted. Patient without symptoms. Agree with additional BP check in ED. Anticipatory guidance provided. Agree with discharge home with close outpatient followup with primary care physician/provider and with medications (if appropriate, if clinically indicated, and as prescribed, as noted in EMR).

## 2018-09-22 NOTE — ED PROVIDER NOTE - GASTROINTESTINAL, MLM
Abdomen soft, non-tender **ATTENDING ADDENDUM (Dr. Jason Self): non-distended. NO guarding, rebound, or rigidity. NO pulsatile or non-pulsatile masses. NO hernias. NO obvious hepatosplenomegaly. NO costovertebral angle tenderness.

## 2018-09-22 NOTE — ED ADULT NURSE NOTE - OBJECTIVE STATEMENT
77y/o female presented to the ED from home with complaint of HTN. A&Ox3, ambulatory, PMH HTN, taking losartan, metoprolol and hydrochlorothiazide. Patient states she took her BP around 9pm before taking medications and it was 210/109. Patient called PCP who instructed patient to take 1 extra pill of Xanax. Patient took 2 Xanax in total tonight. Patient states she felt headache, heavy breathing, with intermittent nausea which has since resolved upon arrival to ED. /93, HR 58, RR 18, SPO2 100% room air. Denies chest pain, SOB, difficulty breathing, N/V/D, fever, chills. No pedal edema noted. Lung sounds equal/clear bilaterally.

## 2018-09-22 NOTE — ED PROVIDER NOTE - PLAN OF CARE
ATTENDING ADDENDUM (Dr. Jason Self): Goals of care include resolution of emergent/urgent symptoms and concerns, and restoration to baseline level of homeostasis.

## 2018-09-22 NOTE — ED PROVIDER NOTE - AGGRAVATING FACTORS
**ATTENDING ADDENDUM (Dr. Jason Self): NO movement-associated, pleuritic, reproducible, positional, or exertional component to the symptoms.

## 2018-09-22 NOTE — ED PROVIDER NOTE - CARE PLAN
Principal Discharge DX:	Hypertension Principal Discharge DX:	Hypertension  Goal:	ATTENDING ADDENDUM (Dr. Jason Self): Goals of care include resolution of emergent/urgent symptoms and concerns, and restoration to baseline level of homeostasis.

## 2018-09-22 NOTE — ED PROVIDER NOTE - EYES, MLM
**ATTENDING ADDENDUM (Dr. Jason Self): Extraocular muscle movements intact. Clear corneas bilaterally, pupils equal and round. NO nystagmus.

## 2018-09-22 NOTE — ED PROVIDER NOTE - ATTENDING CONTRIBUTION TO CARE
**ATTENDING ADDENDUM (Dr. Jason Self): I attest that I have directly examined this patient and reviewed and formulated the diagnostic and therapeutic management plan in collaboration with the EM resident. Please see MDM note and remainder of EMR for findings from CC, HPI, ROS, and PE. (Lama)

## 2018-09-22 NOTE — ED PROVIDER NOTE - NS ED ROS FT
Constitution: No Fever or chills  Eyes: No visual changes  HEENT: No URI symptoms  Cardio: No Chest pain  Resp: No SOB  GI: No abdominal pain  : No dysuria  MSK: No Back pain  Neuro: No Headache  Skin: No rashes  Javier Lama, PGY-1

## 2018-09-22 NOTE — ED ADULT NURSE NOTE - CHIEF COMPLAINT QUOTE
HTN, on losartan, Metoprolol, HCTZ - took all meds as directed. Patient also states she took "one xanex" tonight and had been told in the past to take this when her pressure is high.

## 2018-09-22 NOTE — ED PROVIDER NOTE - SIGNIFICANT NEGATIVE FINDINGS
**ATTENDING ADDENDUM (Dr. Jason Self): NO chest pain, palpitations, visual changes, speech changes, headache, neck pain, abdominal pain, back pain, numbness, tingling, weakness, paresthesias, focal or generalized weakness, droop, drift, difficulty walking, increased edema in the bilateral lower extremities.

## 2018-09-22 NOTE — ED ADULT NURSE REASSESSMENT NOTE - NS ED NURSE REASSESS COMMENT FT1
Patient resting comfortably in bed, no complaints at this time. Awaiting MD evaluation. VSS. Will continue to monitor.

## 2018-09-22 NOTE — ED ADULT NURSE REASSESSMENT NOTE - NS ED NURSE REASSESS COMMENT FT1
Pt received d/c instructions and verbalizes d/c instructions. AAOX3. Ambulating well. /92.  MD Lama OK to d/c. Pt states " I am ready to go home now".

## 2018-09-24 PROBLEM — I10 ESSENTIAL (PRIMARY) HYPERTENSION: Chronic | Status: ACTIVE | Noted: 2018-09-22

## 2018-09-26 LAB
ALBUMIN SERPL ELPH-MCNC: 4.2 G/DL
ANION GAP SERPL CALC-SCNC: 13 MMOL/L
BASOPHILS # BLD AUTO: 0.05 K/UL
BASOPHILS NFR BLD AUTO: 0.9 %
BUN SERPL-MCNC: 8 MG/DL
CALCIUM SERPL-MCNC: 9.4 MG/DL
CHLORIDE SERPL-SCNC: 85 MMOL/L
CO2 SERPL-SCNC: 28 MMOL/L
CREAT SERPL-MCNC: 0.73 MG/DL
EOSINOPHIL # BLD AUTO: 0.08 K/UL
EOSINOPHIL NFR BLD AUTO: 1.4 %
GLUCOSE SERPL-MCNC: 105 MG/DL
HCT VFR BLD CALC: 38.2 %
HGB BLD-MCNC: 12.7 G/DL
IMM GRANULOCYTES NFR BLD AUTO: 0.2 %
LYMPHOCYTES # BLD AUTO: 1.15 K/UL
LYMPHOCYTES NFR BLD AUTO: 20 %
MAN DIFF?: NORMAL
MCHC RBC-ENTMCNC: 30.2 PG
MCHC RBC-ENTMCNC: 33.2 GM/DL
MCV RBC AUTO: 91 FL
MONOCYTES # BLD AUTO: 0.52 K/UL
MONOCYTES NFR BLD AUTO: 9 %
NEUTROPHILS # BLD AUTO: 3.95 K/UL
NEUTROPHILS NFR BLD AUTO: 68.5 %
PHOSPHATE SERPL-MCNC: 3.3 MG/DL
PLATELET # BLD AUTO: 299 K/UL
POTASSIUM SERPL-SCNC: 3.7 MMOL/L
RBC # BLD: 4.2 M/UL
RBC # FLD: 13.7 %
SODIUM SERPL-SCNC: 126 MMOL/L
WBC # FLD AUTO: 5.76 K/UL

## 2018-09-27 ENCOUNTER — APPOINTMENT (OUTPATIENT)
Dept: NEPHROLOGY | Facility: CLINIC | Age: 78
End: 2018-09-27
Payer: MEDICARE

## 2018-09-27 VITALS
OXYGEN SATURATION: 98 % | SYSTOLIC BLOOD PRESSURE: 163 MMHG | HEART RATE: 75 BPM | DIASTOLIC BLOOD PRESSURE: 101 MMHG | HEIGHT: 60 IN | BODY MASS INDEX: 26.31 KG/M2 | WEIGHT: 134 LBS

## 2018-09-27 PROCEDURE — 99214 OFFICE O/P EST MOD 30 MIN: CPT

## 2018-10-02 LAB
ALBUMIN SERPL ELPH-MCNC: 4.3 G/DL
ANION GAP SERPL CALC-SCNC: 12 MMOL/L
BUN SERPL-MCNC: 12 MG/DL
CALCIUM SERPL-MCNC: 9.3 MG/DL
CHLORIDE SERPL-SCNC: 93 MMOL/L
CO2 SERPL-SCNC: 29 MMOL/L
CREAT SERPL-MCNC: 0.79 MG/DL
GLUCOSE SERPL-MCNC: 107 MG/DL
PHOSPHATE SERPL-MCNC: 3.9 MG/DL
POTASSIUM SERPL-SCNC: 4 MMOL/L
SODIUM SERPL-SCNC: 134 MMOL/L

## 2018-10-05 ENCOUNTER — APPOINTMENT (OUTPATIENT)
Dept: UROGYNECOLOGY | Facility: CLINIC | Age: 78
End: 2018-10-05

## 2018-10-09 ENCOUNTER — APPOINTMENT (OUTPATIENT)
Dept: PULMONOLOGY | Facility: CLINIC | Age: 78
End: 2018-10-09

## 2018-10-16 ENCOUNTER — APPOINTMENT (OUTPATIENT)
Dept: NEPHROLOGY | Facility: CLINIC | Age: 78
End: 2018-10-16

## 2018-10-16 ENCOUNTER — APPOINTMENT (OUTPATIENT)
Dept: NEPHROLOGY | Facility: CLINIC | Age: 78
End: 2018-10-16
Payer: MEDICARE

## 2018-10-16 VITALS
DIASTOLIC BLOOD PRESSURE: 106 MMHG | OXYGEN SATURATION: 97 % | WEIGHT: 132 LBS | BODY MASS INDEX: 25.78 KG/M2 | SYSTOLIC BLOOD PRESSURE: 179 MMHG | HEART RATE: 71 BPM

## 2018-10-16 DIAGNOSIS — N18.2 CHRONIC KIDNEY DISEASE, STAGE 2 (MILD): ICD-10-CM

## 2018-10-16 DIAGNOSIS — I10 ESSENTIAL (PRIMARY) HYPERTENSION: ICD-10-CM

## 2018-10-16 PROCEDURE — 99214 OFFICE O/P EST MOD 30 MIN: CPT

## 2018-10-16 RX ORDER — DILTIAZEM HYDROCHLORIDE 180 MG/1
180 CAPSULE, EXTENDED RELEASE ORAL
Qty: 30 | Refills: 3 | Status: DISCONTINUED | COMMUNITY
Start: 2018-09-27 | End: 2018-10-16

## 2018-10-17 ENCOUNTER — INPATIENT (INPATIENT)
Facility: HOSPITAL | Age: 78
LOS: 1 days | Discharge: HOME | DRG: 305 | End: 2018-10-19
Attending: INTERNAL MEDICINE | Admitting: INTERNAL MEDICINE
Payer: MEDICARE

## 2018-10-17 VITALS
WEIGHT: 132.06 LBS | SYSTOLIC BLOOD PRESSURE: 205 MMHG | DIASTOLIC BLOOD PRESSURE: 106 MMHG | RESPIRATION RATE: 16 BRPM | HEIGHT: 60 IN | OXYGEN SATURATION: 96 % | TEMPERATURE: 98 F | HEART RATE: 77 BPM

## 2018-10-17 DIAGNOSIS — I10 ESSENTIAL (PRIMARY) HYPERTENSION: ICD-10-CM

## 2018-10-17 DIAGNOSIS — E89.0 POSTPROCEDURAL HYPOTHYROIDISM: Chronic | ICD-10-CM

## 2018-10-17 LAB
ALBUMIN SERPL ELPH-MCNC: 3.8 G/DL — SIGNIFICANT CHANGE UP (ref 3.3–5)
ALP SERPL-CCNC: 95 U/L — SIGNIFICANT CHANGE UP (ref 40–120)
ALT FLD-CCNC: 38 U/L — SIGNIFICANT CHANGE UP (ref 10–45)
ANION GAP SERPL CALC-SCNC: 17 MMOL/L — SIGNIFICANT CHANGE UP (ref 5–17)
APPEARANCE UR: CLEAR — SIGNIFICANT CHANGE UP
AST SERPL-CCNC: 35 U/L — SIGNIFICANT CHANGE UP (ref 10–40)
BACTERIA # UR AUTO: NEGATIVE — SIGNIFICANT CHANGE UP
BASOPHILS # BLD AUTO: 0.1 K/UL — SIGNIFICANT CHANGE UP (ref 0–0.2)
BASOPHILS NFR BLD AUTO: 0.7 % — SIGNIFICANT CHANGE UP (ref 0–2)
BILIRUB SERPL-MCNC: 0.3 MG/DL — SIGNIFICANT CHANGE UP (ref 0.2–1.2)
BILIRUB UR-MCNC: NEGATIVE — SIGNIFICANT CHANGE UP
BUN SERPL-MCNC: 13 MG/DL — SIGNIFICANT CHANGE UP (ref 7–23)
CALCIUM SERPL-MCNC: 9.4 MG/DL — SIGNIFICANT CHANGE UP (ref 8.4–10.5)
CHLORIDE SERPL-SCNC: 99 MMOL/L — SIGNIFICANT CHANGE UP (ref 96–108)
CK MB CFR SERPL CALC: <1 NG/ML — SIGNIFICANT CHANGE UP (ref 0–3.8)
CK SERPL-CCNC: 141 U/L — SIGNIFICANT CHANGE UP (ref 25–170)
CO2 SERPL-SCNC: 23 MMOL/L — SIGNIFICANT CHANGE UP (ref 22–31)
COLOR SPEC: COLORLESS — SIGNIFICANT CHANGE UP
CREAT SERPL-MCNC: 0.88 MG/DL — SIGNIFICANT CHANGE UP (ref 0.5–1.3)
DIFF PNL FLD: NEGATIVE — SIGNIFICANT CHANGE UP
EOSINOPHIL # BLD AUTO: 0.1 K/UL — SIGNIFICANT CHANGE UP (ref 0–0.5)
EOSINOPHIL NFR BLD AUTO: 1.4 % — SIGNIFICANT CHANGE UP (ref 0–6)
EPI CELLS # UR: 5 /HPF — SIGNIFICANT CHANGE UP
GLUCOSE SERPL-MCNC: 110 MG/DL — HIGH (ref 70–99)
GLUCOSE UR QL: NEGATIVE — SIGNIFICANT CHANGE UP
HCT VFR BLD CALC: 41.4 % — SIGNIFICANT CHANGE UP (ref 34.5–45)
HGB BLD-MCNC: 13.7 G/DL — SIGNIFICANT CHANGE UP (ref 11.5–15.5)
HYALINE CASTS # UR AUTO: 0 /LPF — SIGNIFICANT CHANGE UP (ref 0–2)
KETONES UR-MCNC: SIGNIFICANT CHANGE UP
LEUKOCYTE ESTERASE UR-ACNC: ABNORMAL
LYMPHOCYTES # BLD AUTO: 1.8 K/UL — SIGNIFICANT CHANGE UP (ref 1–3.3)
LYMPHOCYTES # BLD AUTO: 20.6 % — SIGNIFICANT CHANGE UP (ref 13–44)
MCHC RBC-ENTMCNC: 30.5 PG — SIGNIFICANT CHANGE UP (ref 27–34)
MCHC RBC-ENTMCNC: 33.1 GM/DL — SIGNIFICANT CHANGE UP (ref 32–36)
MCV RBC AUTO: 92.4 FL — SIGNIFICANT CHANGE UP (ref 80–100)
MONOCYTES # BLD AUTO: 0.8 K/UL — SIGNIFICANT CHANGE UP (ref 0–0.9)
MONOCYTES NFR BLD AUTO: 9.6 % — SIGNIFICANT CHANGE UP (ref 2–14)
NEUTROPHILS # BLD AUTO: 5.9 K/UL — SIGNIFICANT CHANGE UP (ref 1.8–7.4)
NEUTROPHILS NFR BLD AUTO: 67.6 % — SIGNIFICANT CHANGE UP (ref 43–77)
NITRITE UR-MCNC: NEGATIVE — SIGNIFICANT CHANGE UP
PH UR: 7.5 — SIGNIFICANT CHANGE UP (ref 5–8)
PLATELET # BLD AUTO: 303 K/UL — SIGNIFICANT CHANGE UP (ref 150–400)
POTASSIUM SERPL-MCNC: 3.4 MMOL/L — LOW (ref 3.5–5.3)
POTASSIUM SERPL-SCNC: 3.4 MMOL/L — LOW (ref 3.5–5.3)
PROT SERPL-MCNC: 7.8 G/DL — SIGNIFICANT CHANGE UP (ref 6–8.3)
PROT UR-MCNC: NEGATIVE — SIGNIFICANT CHANGE UP
RBC # BLD: 4.49 M/UL — SIGNIFICANT CHANGE UP (ref 3.8–5.2)
RBC # FLD: 12.6 % — SIGNIFICANT CHANGE UP (ref 10.3–14.5)
RBC CASTS # UR COMP ASSIST: 371 /HPF — HIGH (ref 0–4)
SODIUM SERPL-SCNC: 139 MMOL/L — SIGNIFICANT CHANGE UP (ref 135–145)
SP GR SPEC: 1.01 — LOW (ref 1.01–1.02)
TROPONIN T, HIGH SENSITIVITY RESULT: 10 NG/L — SIGNIFICANT CHANGE UP (ref 0–51)
TROPONIN T, HIGH SENSITIVITY RESULT: 7 NG/L — SIGNIFICANT CHANGE UP (ref 0–51)
UROBILINOGEN FLD QL: NEGATIVE — SIGNIFICANT CHANGE UP
WBC # BLD: 8.7 K/UL — SIGNIFICANT CHANGE UP (ref 3.8–10.5)
WBC # FLD AUTO: 8.7 K/UL — SIGNIFICANT CHANGE UP (ref 3.8–10.5)
WBC UR QL: 52 /HPF — HIGH (ref 0–5)

## 2018-10-17 PROCEDURE — 93010 ELECTROCARDIOGRAM REPORT: CPT

## 2018-10-17 PROCEDURE — 93306 TTE W/DOPPLER COMPLETE: CPT | Mod: 26

## 2018-10-17 PROCEDURE — 99285 EMERGENCY DEPT VISIT HI MDM: CPT | Mod: 25

## 2018-10-17 PROCEDURE — 99222 1ST HOSP IP/OBS MODERATE 55: CPT

## 2018-10-17 RX ORDER — CEFTRIAXONE 500 MG/1
INJECTION, POWDER, FOR SOLUTION INTRAMUSCULAR; INTRAVENOUS
Qty: 0 | Refills: 0 | Status: DISCONTINUED | OUTPATIENT
Start: 2018-10-17 | End: 2018-10-19

## 2018-10-17 RX ORDER — LABETALOL HCL 100 MG
5 TABLET ORAL ONCE
Qty: 0 | Refills: 0 | Status: COMPLETED | OUTPATIENT
Start: 2018-10-17 | End: 2018-10-17

## 2018-10-17 RX ORDER — LEVOTHYROXINE SODIUM 125 MCG
75 TABLET ORAL DAILY
Qty: 0 | Refills: 0 | Status: DISCONTINUED | OUTPATIENT
Start: 2018-10-17 | End: 2018-10-19

## 2018-10-17 RX ORDER — AMITRIPTYLINE HCL 25 MG
25 TABLET ORAL DAILY
Qty: 0 | Refills: 0 | Status: DISCONTINUED | OUTPATIENT
Start: 2018-10-17 | End: 2018-10-18

## 2018-10-17 RX ORDER — BUPROPION HYDROCHLORIDE 150 MG/1
300 TABLET, EXTENDED RELEASE ORAL DAILY
Qty: 0 | Refills: 0 | Status: DISCONTINUED | OUTPATIENT
Start: 2018-10-17 | End: 2018-10-18

## 2018-10-17 RX ORDER — HYDRALAZINE HCL 50 MG
5 TABLET ORAL ONCE
Qty: 0 | Refills: 0 | Status: COMPLETED | OUTPATIENT
Start: 2018-10-17 | End: 2018-10-17

## 2018-10-17 RX ORDER — SPIRONOLACTONE 25 MG/1
25 TABLET, FILM COATED ORAL DAILY
Qty: 0 | Refills: 0 | Status: DISCONTINUED | OUTPATIENT
Start: 2018-10-17 | End: 2018-10-19

## 2018-10-17 RX ORDER — SODIUM CHLORIDE 9 MG/ML
1000 INJECTION INTRAMUSCULAR; INTRAVENOUS; SUBCUTANEOUS ONCE
Qty: 0 | Refills: 0 | Status: COMPLETED | OUTPATIENT
Start: 2018-10-17 | End: 2018-10-17

## 2018-10-17 RX ORDER — METOPROLOL TARTRATE 50 MG
50 TABLET ORAL
Qty: 0 | Refills: 0 | Status: DISCONTINUED | OUTPATIENT
Start: 2018-10-17 | End: 2018-10-17

## 2018-10-17 RX ORDER — ASPIRIN/CALCIUM CARB/MAGNESIUM 324 MG
81 TABLET ORAL DAILY
Qty: 0 | Refills: 0 | Status: DISCONTINUED | OUTPATIENT
Start: 2018-10-17 | End: 2018-10-17

## 2018-10-17 RX ORDER — METOPROLOL TARTRATE 50 MG
50 TABLET ORAL AT BEDTIME
Qty: 0 | Refills: 0 | Status: DISCONTINUED | OUTPATIENT
Start: 2018-10-18 | End: 2018-10-18

## 2018-10-17 RX ORDER — HEPARIN SODIUM 5000 [USP'U]/ML
5000 INJECTION INTRAVENOUS; SUBCUTANEOUS EVERY 12 HOURS
Qty: 0 | Refills: 0 | Status: DISCONTINUED | OUTPATIENT
Start: 2018-10-17 | End: 2018-10-17

## 2018-10-17 RX ORDER — METOPROLOL TARTRATE 50 MG
50 TABLET ORAL ONCE
Qty: 0 | Refills: 0 | Status: DISCONTINUED | OUTPATIENT
Start: 2018-10-17 | End: 2018-10-17

## 2018-10-17 RX ORDER — METOPROLOL TARTRATE 50 MG
50 TABLET ORAL ONCE
Qty: 0 | Refills: 0 | Status: COMPLETED | OUTPATIENT
Start: 2018-10-17 | End: 2018-10-17

## 2018-10-17 RX ORDER — CARVEDILOL PHOSPHATE 80 MG/1
3.12 CAPSULE, EXTENDED RELEASE ORAL EVERY 12 HOURS
Qty: 0 | Refills: 0 | Status: DISCONTINUED | OUTPATIENT
Start: 2018-10-17 | End: 2018-10-17

## 2018-10-17 RX ORDER — FUROSEMIDE 40 MG
20 TABLET ORAL DAILY
Qty: 0 | Refills: 0 | Status: DISCONTINUED | OUTPATIENT
Start: 2018-10-17 | End: 2018-10-17

## 2018-10-17 RX ORDER — METOPROLOL TARTRATE 50 MG
50 TABLET ORAL AT BEDTIME
Qty: 0 | Refills: 0 | Status: DISCONTINUED | OUTPATIENT
Start: 2018-10-17 | End: 2018-10-17

## 2018-10-17 RX ORDER — ALPRAZOLAM 0.25 MG
2 TABLET ORAL THREE TIMES A DAY
Qty: 0 | Refills: 0 | Status: DISCONTINUED | OUTPATIENT
Start: 2018-10-17 | End: 2018-10-18

## 2018-10-17 RX ORDER — CEFTRIAXONE 500 MG/1
1 INJECTION, POWDER, FOR SOLUTION INTRAMUSCULAR; INTRAVENOUS EVERY 24 HOURS
Qty: 0 | Refills: 0 | Status: DISCONTINUED | OUTPATIENT
Start: 2018-10-18 | End: 2018-10-19

## 2018-10-17 RX ORDER — POTASSIUM BICARBONATE 978 MG/1
25 TABLET, EFFERVESCENT ORAL ONCE
Qty: 0 | Refills: 0 | Status: COMPLETED | OUTPATIENT
Start: 2018-10-17 | End: 2018-10-17

## 2018-10-17 RX ORDER — ATORVASTATIN CALCIUM 80 MG/1
10 TABLET, FILM COATED ORAL AT BEDTIME
Qty: 0 | Refills: 0 | Status: DISCONTINUED | OUTPATIENT
Start: 2018-10-17 | End: 2018-10-19

## 2018-10-17 RX ORDER — CEFTRIAXONE 500 MG/1
1 INJECTION, POWDER, FOR SOLUTION INTRAMUSCULAR; INTRAVENOUS ONCE
Qty: 0 | Refills: 0 | Status: COMPLETED | OUTPATIENT
Start: 2018-10-17 | End: 2018-10-17

## 2018-10-17 RX ORDER — ASPIRIN/CALCIUM CARB/MAGNESIUM 324 MG
81 TABLET ORAL DAILY
Qty: 0 | Refills: 0 | Status: DISCONTINUED | OUTPATIENT
Start: 2018-10-17 | End: 2018-10-19

## 2018-10-17 RX ORDER — METOPROLOL TARTRATE 50 MG
100 TABLET ORAL DAILY
Qty: 0 | Refills: 0 | Status: DISCONTINUED | OUTPATIENT
Start: 2018-10-17 | End: 2018-10-17

## 2018-10-17 RX ORDER — METOPROLOL TARTRATE 50 MG
100 TABLET ORAL DAILY
Qty: 0 | Refills: 0 | Status: DISCONTINUED | OUTPATIENT
Start: 2018-10-17 | End: 2018-10-18

## 2018-10-17 RX ORDER — ONDANSETRON 8 MG/1
4 TABLET, FILM COATED ORAL ONCE
Qty: 0 | Refills: 0 | Status: COMPLETED | OUTPATIENT
Start: 2018-10-17 | End: 2018-10-17

## 2018-10-17 RX ORDER — LOSARTAN POTASSIUM 100 MG/1
50 TABLET, FILM COATED ORAL DAILY
Qty: 0 | Refills: 0 | Status: DISCONTINUED | OUTPATIENT
Start: 2018-10-17 | End: 2018-10-17

## 2018-10-17 RX ADMIN — POTASSIUM BICARBONATE 25 MILLIEQUIVALENT(S): 978 TABLET, EFFERVESCENT ORAL at 11:49

## 2018-10-17 RX ADMIN — Medication 2 MILLIGRAM(S): at 22:15

## 2018-10-17 RX ADMIN — Medication 25 MILLIGRAM(S): at 22:14

## 2018-10-17 RX ADMIN — Medication 50 MILLIGRAM(S): at 10:14

## 2018-10-17 RX ADMIN — Medication 5 MILLIGRAM(S): at 02:02

## 2018-10-17 RX ADMIN — SODIUM CHLORIDE 1000 MILLILITER(S): 9 INJECTION INTRAMUSCULAR; INTRAVENOUS; SUBCUTANEOUS at 03:02

## 2018-10-17 RX ADMIN — Medication 50 MILLIGRAM(S): at 22:15

## 2018-10-17 RX ADMIN — Medication 50 MILLIGRAM(S): at 13:42

## 2018-10-17 RX ADMIN — CEFTRIAXONE 100 GRAM(S): 500 INJECTION, POWDER, FOR SOLUTION INTRAMUSCULAR; INTRAVENOUS at 10:31

## 2018-10-17 RX ADMIN — SPIRONOLACTONE 25 MILLIGRAM(S): 25 TABLET, FILM COATED ORAL at 16:20

## 2018-10-17 RX ADMIN — SODIUM CHLORIDE 1000 MILLILITER(S): 9 INJECTION INTRAMUSCULAR; INTRAVENOUS; SUBCUTANEOUS at 02:02

## 2018-10-17 RX ADMIN — ATORVASTATIN CALCIUM 10 MILLIGRAM(S): 80 TABLET, FILM COATED ORAL at 18:08

## 2018-10-17 RX ADMIN — CARVEDILOL PHOSPHATE 3.12 MILLIGRAM(S): 80 CAPSULE, EXTENDED RELEASE ORAL at 18:08

## 2018-10-17 RX ADMIN — Medication 20 MILLIGRAM(S): at 11:49

## 2018-10-17 RX ADMIN — Medication 81 MILLIGRAM(S): at 22:14

## 2018-10-17 RX ADMIN — Medication 5 MILLIGRAM(S): at 02:55

## 2018-10-17 NOTE — H&P ADULT - NSHPPHYSICALEXAM_GEN_ALL_CORE
PHYSICAL EXAMINATION:  Vital Signs Last 24 Hrs  T(C): 36.5 (17 Oct 2018 06:40), Max: 37.1 (17 Oct 2018 01:39)  T(F): 97.7 (17 Oct 2018 06:40), Max: 98.7 (17 Oct 2018 01:39)  HR: 78 (17 Oct 2018 06:40) (65 - 78)  BP: 145/94 (17 Oct 2018 06:40) (145/94 - 205/106)  BP(mean): --  RR: 18 (17 Oct 2018 06:40) (16 - 18)  SpO2: 100% (17 Oct 2018 06:40) (96% - 100%)  CAPILLARY BLOOD GLUCOSE          GENERAL: NAD, well-groomed, well-developed  HEAD:  atraumatic, normocephalic  EYES: sclera anicteric  ENMT: mucous membranes moist  NECK: supple, No JVD  CHEST/LUNG: clear to auscultation bilaterally; no rales, rhonchi, or wheezing b/l  HEART: normal S1, S2  ABDOMEN: BS+, soft, ND, NT   EXTREMITIES:  1+ edema b/l LEs  NEURO: awake, alert, interactive; moves all extremities  SKIN: no rashes or lesions

## 2018-10-17 NOTE — CONSULT NOTE ADULT - SUBJECTIVE AND OBJECTIVE BOX
[facility] DIVISION OF KIDNEY DISEASES AND HYPERTENSION -- [phone]  -- INITIAL CONSULT NOTE  --------------------------------------------------------------------------------  HPI: 78 female with medical history of HTN, Anxiety, HLP, hypothyroidism (s/p thyroidectomy) admitted for uncontrolled HTN hence nephrology was consulted. Reports having episode of dizziness with /125 mmHg at home 6 hrs before admission. Nephrologist Dr. Gunn. Pt with history long standing HTN Currently on metoprolo 150 mg (50 mg AM and 100 mg PM), candesartan 16 mg and lasix 20 mg however yesterday increase lasix to 40mg.   At admission /106 mmHg received hydralazine 5 mg and labetalol 10 mg IV with BP improved.     Pt reports feeling pounding sensation on neck, denies SOB, chest pain, headache.       PAST HISTORY  --------------------------------------------------------------------------------  PAST MEDICAL & SURGICAL HISTORY:  Macular degeneration  Osteoarthritis  Diverticulitis  GERD (gastroesophageal reflux disease)  Hypertension  H/O thyroidectomy    FAMILY HISTORY:    PAST SOCIAL HISTORY:    ALLERGIES & MEDICATIONS  --------------------------------------------------------------------------------  Allergies    No Known Allergies    Intolerances      Standing Inpatient Medications  ALPRAZolam 2 milliGRAM(s) Oral three times a day  amitriptyline 25 milliGRAM(s) Oral daily  aspirin enteric coated 81 milliGRAM(s) Oral daily  atorvastatin 10 milliGRAM(s) Oral at bedtime  buPROPion XL . 300 milliGRAM(s) Oral daily  carvedilol 3.125 milliGRAM(s) Oral every 12 hours  cefTRIAXone   IVPB      furosemide    Tablet 20 milliGRAM(s) Oral daily  levothyroxine 75 MICROGram(s) Oral daily  metoprolol succinate ER 50 milliGRAM(s) Oral two times a day  spironolactone 25 milliGRAM(s) Oral daily    PRN Inpatient Medications      REVIEW OF SYSTEMS  --------------------------------------------------------------------------------  Gen: No fevers  Skin: No rashes  Respiratory: No dyspnea  CV: No chest pain  GI: No abdominal pain  : No dysuria, hematuria  MSK: No  edema    All other systems were reviewed and are negative, except as noted.    VITALS/PHYSICAL EXAM  --------------------------------------------------------------------------------  T(C): 36.5 (10-17-18 @ 06:40), Max: 37.1 (10-17-18 @ 01:39)  HR: 80 (10-17-18 @ 13:14) (65 - 80)  BP: 166/87 (10-17-18 @ 13:14) (145/94 - 205/106)  RR: 18 (10-17-18 @ 10:06) (16 - 18)  SpO2: 100% (10-17-18 @ 10:06) (96% - 100%)  Wt(kg): --  Height (cm): 152.4 (10-17-18 @ 06:40)  Weight (kg): 59.9 (10-17-18 @ 06:40)  BMI (kg/m2): 25.8 (10-17-18 @ 06:40)  BSA (m2): 1.56 (10-17-18 @ 06:40)      10-17-18 @ 07:01  -  10-17-18 @ 17:13  --------------------------------------------------------  IN: 240 mL / OUT: 0 mL / NET: 240 mL      Physical Exam:  	Gen: NAD, elderly  	HEENT: MMM  	Pulm: CTA B/L  	CV: S1S2  	Abd: Soft, +BS   	Ext: 1+ LE edema B/L  	Neuro: Awake  	Skin: Warm and dry    LABS/STUDIES  --------------------------------------------------------------------------------              13.7   8.7   >-----------<  303      [10-17-18 @ 01:52]              41.4     139  |  99  |  13  ----------------------------<  110      [10-17-18 @ 01:52]  3.4   |  23  |  0.88        Ca     9.4     [10-17-18 @ 01:52]    TPro  7.8  /  Alb  3.8  /  TBili  0.3  /  DBili  x   /  AST  35  /  ALT  38  /  AlkPhos  95  [10-17-18 @ 01:52]              [10-17-18 @ 11:00]    Creatinine Trend:  SCr 0.88 [10-17 @ 01:52]    Urinalysis - [10-17-18 @ 05:13]      Color Colorless / Appearance Clear / SG 1.006 / pH 7.5      Gluc Negative / Ketone Trace  / Bili Negative / Urobili Negative       Blood Negative / Protein Negative / Leuk Est Large / Nitrite Negative       / WBC 52 / Hyaline 0 / Gran  / Sq Epi  / Non Sq Epi 5 / Bacteria Negative

## 2018-10-17 NOTE — ED ADULT NURSE NOTE - OBJECTIVE STATEMENT
79 yo F arrived to the ed hx of htn; pt arrived c/o HTN, denies ha/cp/sob; pt takes Metoprolol 50mg BIG; denies fevers/chills; pt is A&Ox4, ambulatory, gross neuro intact; pt also took 1mg of Xanax PTA; family @ bedside, IV placed, will continue to monitor BP

## 2018-10-17 NOTE — ED ADULT TRIAGE NOTE - CHIEF COMPLAINT QUOTE
high bp at home 190s/120s, nausea, dizziness, whole body tingling  pt took 1mg Xanax and 1g Tylenol PTA

## 2018-10-17 NOTE — ED PROVIDER NOTE - PROGRESS NOTE DETAILS
Elizabeth Goldberger PGY-2: spoke to pmd who requested adm to [facility] Elizabeth Goldberger PGY-2: pt continues to be symptomatic in spite of 2 pushes labetalol, and bp not sign improved. Lowest systolic 160s but now increasing to 190s/110s, so will adm for symptomatic htn   spoke to pmd who requested adm to [facility]

## 2018-10-17 NOTE — CONSULT NOTE ADULT - SUBJECTIVE AND OBJECTIVE BOX
CARDIOLOGY CONSULT - Dr. Calle     CHIEF COMPLAINT: dizziness, HTN     HPI:  78f w hx HTN here c/o dizziness. Pt states that her systolic bp was over 200 at home. Denies headache or visual changes, no cp or SOB, no urinary sx. Pt. bp is controlled by her nephrologist Dr. Gunn. Went to her nephrologist yesterday (whom she has for poorly controlled htn-no primary renal pathology) and was noted to be hypertensive but was told ok to go home and c/w home meds. Current meds include metop 150 (50 and 100), candesartan 16, and lasix 20, however yersterday she was told to increase her lasix to 40mg which she did not do. She states her medications have recently been changed bc she has had some intolerances to multiple medications. She states she cant take norvasc bc it causes her to retain fluid and she was recently taken off of cardizem bc it causes her to be dizzy. BP mildly improved however pt. was refusing bp medications this am. denies cp/sob/cooper, palpitations.       PAST MEDICAL & SURGICAL HISTORY:  Macular degeneration  Osteoarthritis  Diverticulitis  GERD (gastroesophageal reflux disease)  Hypertension  H/O thyroidectomy          PREVIOUS DIAGNOSTIC TESTING:    [ ] Echocardiogram:   [ ]  Catheterization:  [ ] Stress Test:  	    MEDICATIONS:  MEDICATIONS  (STANDING):  ALPRAZolam 2 milliGRAM(s) Oral three times a day  amitriptyline 25 milliGRAM(s) Oral daily  aspirin enteric coated 81 milliGRAM(s) Oral daily  atorvastatin 10 milliGRAM(s) Oral at bedtime  cefTRIAXone   IVPB      furosemide    Tablet 20 milliGRAM(s) Oral daily  heparin  Injectable 5000 Unit(s) SubCutaneous every 12 hours  levothyroxine 75 MICROGram(s) Oral daily  losartan 50 milliGRAM(s) Oral daily  metoprolol succinate ER 50 milliGRAM(s) Oral two times a day      FAMILY HISTORY:      SOCIAL HISTORY:    [x] Non-smoker  [ ] Smoker  [ ] Alcohol    Allergies    No Known Allergies    Intolerances    	    REVIEW OF SYSTEMS:  CONSTITUTIONAL: No fever, weight loss, or fatigue  EYES: No eye pain, visual disturbances, or discharge  ENMT:  No difficulty hearing, tinnitus, vertigo; No sinus or throat pain  NECK: No pain or stiffness  RESPIRATORY: No cough, wheezing, chills or hemoptysis; No Shortness of Breath  CARDIOVASCULAR: No chest pain, palpitations, passing out, +dizziness, or leg swelling  GASTROINTESTINAL: No abdominal or epigastric pain. No nausea, vomiting, or hematemesis; No diarrhea or constipation. No melena or hematochezia.  GENITOURINARY: No dysuria, frequency, hematuria, or incontinence  NEUROLOGICAL: No headaches, memory loss, loss of strength, numbness, or tremors  SKIN: No itching, burning, rashes, or lesions   	    [x] All others negative	  [ ] Unable to obtain    PHYSICAL EXAM:  T(C): 36.5 (10-17-18 @ 06:40), Max: 37.1 (10-17-18 @ 01:39)  HR: 80 (10-17-18 @ 11:53) (65 - 80)  BP: 175/87 (10-17-18 @ 11:53) (145/94 - 205/106)  RR: 18 (10-17-18 @ 10:06) (16 - 18)  SpO2: 100% (10-17-18 @ 10:06) (96% - 100%)  Wt(kg): --  I&O's Summary    17 Oct 2018 07:01  -  17 Oct 2018 12:04  --------------------------------------------------------  IN: 240 mL / OUT: 0 mL / NET: 240 mL        Appearance: Normal	  Psychiatry: A & O x 3, Mood & affect appropriate  HEENT:   Normal oral mucosa, PERRL, EOMI	  Lymphatic: No lymphadenopathy  Cardiovascular: Normal S1 S2,RRR, No JVD, No murmurs  Respiratory: Lungs clear to auscultation	  Gastrointestinal:  Soft, Non-tender, + BS	  Skin: No rashes, No ecchymoses, No cyanosis	  Neurologic: Non-focal  Extremities: Normal range of motion, No clubbing, cyanosis or edema  Vascular: Peripheral pulses palpable 2+ bilaterally    TELEMETRY: nsr, pacs 	    ECG:  NSR 75, pacs no evidence of acute ischemia   RADIOLOGY:   OTHER: 	  	  LABS:	 	    CARDIAC MARKERS:                                  13.7   8.7   )-----------( 303      ( 17 Oct 2018 01:52 )             41.4     10-17    139  |  99  |  13  ----------------------------<  110<H>  3.4<L>   |  23  |  0.88    Ca    9.4      17 Oct 2018 01:52    TPro  7.8  /  Alb  3.8  /  TBili  0.3  /  DBili  x   /  AST  35  /  ALT  38  /  AlkPhos  95  10-17      proBNP:   Lipid Profile:   HgA1c:   TSH:

## 2018-10-17 NOTE — CONSULT NOTE ADULT - ASSESSMENT
78f w hx HTN presenting with dizziness secondary to uncontrolled HTN.     1. Uncontrolled HTN   bp overall improved   cv stable no chest pain or sob. no evidence of acute ischemia/ACS   no evidence of fluid overload on exam   change metoprolol to coreg 25mg BID   continue losartan 50mg daily, lasix 20mg daily   can check echo to evaluate LV function, valvular disease   renal consult with dr. Melton     dvt ppx 78f w hx HTN presenting with dizziness secondary to uncontrolled HTN.     1. Dizziness   likely in setting of uncontrolled HTN   improved s/p IV labetalol and hydralazine   cv stable no chest pain or sob, no evidence of acute ischemia/ACS   no evidence of fluid overload on exam   check echo to evaluate LV function, valvular disease     1. Uncontrolled HTN   Pt given IV labetalol and hydralazine in the ED with improvement  BP currently in the 170s systolic  Recommend DC metoprolol  start carvedilol 12.5mg PO Q12h to improve BP control   continue losartan 50mg daily, continue lasix 20mg daily   echo pending   renal eval with dr. Melton     dvt ppx

## 2018-10-17 NOTE — CONSULT NOTE ADULT - ATTENDING COMMENTS
Patient seen and examined, agree with the above assessment and plan by NGHIA Silva.  78 yr old woman with PMH of labile HTN in the setting of an anxiety presenting with dizziness and uncontrolled hypertension  Pt given IV labetalol and hydralazine in the ED with improvement  BP currently in the 170s systolic  Recommend DC metoprolol  start carvedilol 12.5mg PO Q12h to improve BP control  cont low dose lasix, no gross fluid overload appreciated  ECHO  Renal eval: Dr Melton
I have seen this patient with the fellow and agree with their assessment and plan. 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     For now, can start aldactone and continue beta blockers given her anxiety component  Would check renin,monique and metanephrines and renal dopplers to rule out TEJA  Part of her HTN is related to her chronic psych disease and might benefit from yvonne cavazos, her pyschiatrst just retired.   ECHO To see end organ damage- LVH and so forth    She has 8.8cm kidneys suggesting possible CKD at baseline- unclear cause, has some RBCs in urine but no protein  She has had some workup in past of light chains, and JERE and dsDNA  would repeat SIFE and Free light chains, LDH, haptoglobin and complement levels  check spot urine ratio as well to rule out intrinsic renal disease causing HTN    Cornel Chau MD  Cell   Pager   Office

## 2018-10-17 NOTE — H&P ADULT - NSHPSOCIALHISTORY_GEN_ALL_CORE
Social History:    Marital Status:  (   )    (   ) Single    (   )    ( x )   Occupation:   Lives with: (  ) alone  ( x ) children   (  ) spouse   (  ) parents  (  ) other    Substance Use (street drugs): (x  ) never used  (  ) other:  Tobacco Usage:  (  x ) never smoked   (   ) former smoker   (   ) current smoker  (     ) pack years  (        ) last cigarette date  Alcohol Usage: denies    (     ) Advanced Directives: (     ) None    (      ) DNR    (     ) DNI    (     ) Health Care Proxy:

## 2018-10-17 NOTE — ED ADULT NURSE NOTE - NSIMPLEMENTINTERV_GEN_ALL_ED
Implemented All Universal Safety Interventions:  [place] to call system. Call bell, personal items and telephone within reach. Instruct patient to call for assistance. Room bathroom lighting operational. Non-slip footwear when patient is off stretcher. Physically safe environment: no spills, clutter or unnecessary equipment. Stretcher in lowest position, wheels locked, appropriate side rails in place.

## 2018-10-17 NOTE — H&P ADULT - NSHPLABSRESULTS_GEN_ALL_CORE
13.7   8.7   )-----------( 303      ( 17 Oct 2018 01:52 )             41.4       10    139  |  99  |  13  ----------------------------<  110<H>  3.4<L>   |  23  |  0.88    Ca    9.4      17 Oct 2018 01:52    TPro  7.8  /  Alb  3.8  /  TBili  0.3  /  DBili  x   /  AST  35  /  ALT  38  /  AlkPhos  95  10-17              Urinalysis Basic - ( 17 Oct 2018 05:13 )    Color: Colorless / Appearance: Clear / S.006 / pH: x  Gluc: x / Ketone: Trace  / Bili: Negative / Urobili: Negative   Blood: x / Protein: Negative / Nitrite: Negative   Leuk Esterase: Large / RBC: 371 /hpf / WBC 52 /hpf   Sq Epi: x / Non Sq Epi: 5 /hpf / Bacteria: Negative          EKG SR NSST/T    CAPILLARY BLOOD GLUCOSE

## 2018-10-17 NOTE — ED PROVIDER NOTE - OBJECTIVE STATEMENT
78f w hx HTN here c/o dizziness. 78f w hx HTN here c/o dizziness. Pt has been feeling generally unwell for past 1 day, w "whole body tingling" and intermittent dizziness. Denies headache or visual changes, no cp or SOB, no urinary sx. Went to her nephrologist (whom she has for poorly controlled htn-no primary renal pathology) and was noted to be hypertensive but was told ok to go home and c/w home meds. Current meds include metop 150 (50 and 100), candesartan 16, and lasix 20; no other anti-htn meds currently. States compliant w meds and denies any recent changes.    pmd- Kem Bear

## 2018-10-17 NOTE — CONSULT NOTE ADULT - ASSESSMENT
78 female with medical history of HTN, Anxiety, HLP, hypothyroidism (s/p thyroidectomy) admitted for uncontrolled HTN

## 2018-10-17 NOTE — ED PROVIDER NOTE - ATTENDING CONTRIBUTION TO CARE
78F PMH of HTN p/w high BP. No headache, CP, or SOB. Metoprolol 50 BID, Lasix, Candesartan, stopped Diltiazem. Afebrile. Awake and Alert. Lungs CTA. Heart RRR. Abdomen soft NTND. CN II-XII grossly intact. Moves all extremities without lateralization. Will check CBC/CMP, EKG. Will give additional dose of Metoprolol. 78F PMH of HTN p/w high BP. No headache, CP, or SOB. +palpitations and lightheadedness. Metoprolol 50 BID, Lasix, Candesartan, stopped Diltiazem due to side effects a few weeks ago. Afebrile. Awake and Alert. Lungs CTA. Heart RRR. Abdomen soft NTND. CN II-XII grossly intact. Moves all extremities without lateralization. Will check CBC/CMP, EKG. Will give additional dose of Metoprolol and evaluate for signs of end-organ damage and improvement of symptoms and BP.

## 2018-10-17 NOTE — H&P ADULT - ASSESSMENT
78 f with  HTN- control  Nephrology evaluation called  Cardiac enzymes, Telemetry, Cardiology evaluation called Dr. Calle  Hypothyroidism- follow TSH  UTI-urine culture, Ceftriaxone.  Anxiety control  Further action as per clinical course   Paulie Moore MD pager [phone]

## 2018-10-17 NOTE — CONSULT NOTE ADULT - PROBLEM SELECTOR RECOMMENDATION 9
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 On admission /106 mmHg controlled after IV meds, now /94 mmHg. Recommend start spironolactone 25 mg, continue metoprolol and discontinue lasix and candesartan, if BP still uncontrolled consider adding hydralazine or doxazocin. Check aldosterone and renin level, metanephrine and US renal doppler. Monitor BP.

## 2018-10-17 NOTE — H&P ADULT - NSHPREVIEWOFSYSTEMS_GEN_ALL_CORE
REVIEW OF SYSTEMS:    CONSTITUTIONAL: No weakness, fevers or chills  EYES/ENT: No visual changes;  No vertigo or throat pain   NECK: No pain or stiffness  RESPIRATORY: No cough, wheezing, hemoptysis; No shortness of breath  CARDIOVASCULAR: No chest pain or palpitations  GASTROINTESTINAL: No abdominal or epigastric pain. No nausea, vomiting, or hematemesis; No diarrhea or constipation. No melena or hematochezia.  GENITOURINARY: + dysuria, + frequency no hematuria  NEUROLOGICAL: No numbness or weakness +Dizziness  SKIN: No itching, burning, rashes, or lesions   All other review of systems is negative unless indicated above.

## 2018-10-17 NOTE — H&P ADULT - PMH
Diverticulitis    GERD (gastroesophageal reflux disease)    Hypertension    Macular degeneration    Osteoarthritis

## 2018-10-17 NOTE — H&P ADULT - HISTORY OF PRESENT ILLNESS
78f w hx HTN here c/o dizziness. Pt has been feeling generally unwell for past 1 day, w "whole body tingling" and intermittent dizziness. Denies headache or visual changes, no cp or SOB, no urinary sx. Went to her nephrologist (whom she has for poorly controlled htn-no primary renal pathology) and was noted to be hypertensive but was told ok to go home and c/w home meds. Current meds include metop 150 (50 and 100), candesartan 16, and lasix 20; no other anti-htn meds currently. States compliant w meds and denies any recent changes.

## 2018-10-17 NOTE — CHART NOTE - NSCHARTNOTEFT_GEN_A_CORE
Pt seen and examined with Dr Maher  Pt has severe HTN likely from hyperaldo state with perhaps a pseudopheo component  For now start Aldactone 25mg po qd and metoprolol and add either hydralazine 25mg TID or doxazosin 1mg if needed after that  No real role for lasix here. would dc that and ARB  Checking renin, monique and metanephrines    FULL note to follow    Cornel Chau MD  Cell   Pager   Office

## 2018-10-17 NOTE — ED PROVIDER NOTE - MEDICAL DECISION MAKING DETAILS
78f w htn here c/o dizziness. Hypertensive to 205/106, otherwise benign exam. Labs, ua, bp meds if does not self-improve, reassess

## 2018-10-18 ENCOUNTER — APPOINTMENT (OUTPATIENT)
Dept: UROGYNECOLOGY | Facility: CLINIC | Age: 78
End: 2018-10-18

## 2018-10-18 DIAGNOSIS — F43.20 ADJUSTMENT DISORDER, UNSPECIFIED: ICD-10-CM

## 2018-10-18 LAB
% ALBUMIN: 50 % — SIGNIFICANT CHANGE UP
% ALPHA 1: 4.8 % — SIGNIFICANT CHANGE UP
% ALPHA 2: 11.5 % — SIGNIFICANT CHANGE UP
% BETA: 13.4 % — SIGNIFICANT CHANGE UP
% GAMMA: 20.3 % — SIGNIFICANT CHANGE UP
ALBUMIN SERPL ELPH-MCNC: 3.9 G/DL — SIGNIFICANT CHANGE UP (ref 3.6–5.5)
ALBUMIN/GLOB SERPL ELPH: 1 RATIO — SIGNIFICANT CHANGE UP
ALDOST SERPL-MCNC: 8.1 NG/DL — SIGNIFICANT CHANGE UP
ALPHA1 GLOB SERPL ELPH-MCNC: 0.4 G/DL — SIGNIFICANT CHANGE UP (ref 0.1–0.4)
ALPHA2 GLOB SERPL ELPH-MCNC: 0.9 G/DL — SIGNIFICANT CHANGE UP (ref 0.5–1)
ANION GAP SERPL CALC-SCNC: 16 MMOL/L — SIGNIFICANT CHANGE UP (ref 5–17)
B-GLOBULIN SERPL ELPH-MCNC: 1 G/DL — SIGNIFICANT CHANGE UP (ref 0.5–1)
BUN SERPL-MCNC: 15 MG/DL — SIGNIFICANT CHANGE UP (ref 7–23)
CALCIUM SERPL-MCNC: 8.8 MG/DL — SIGNIFICANT CHANGE UP (ref 8.4–10.5)
CHLORIDE SERPL-SCNC: 100 MMOL/L — SIGNIFICANT CHANGE UP (ref 96–108)
CO2 SERPL-SCNC: 23 MMOL/L — SIGNIFICANT CHANGE UP (ref 22–31)
CREAT ?TM UR-MCNC: 100 MG/DL — SIGNIFICANT CHANGE UP
CREAT SERPL-MCNC: 1 MG/DL — SIGNIFICANT CHANGE UP (ref 0.5–1.3)
CULTURE RESULTS: NO GROWTH — SIGNIFICANT CHANGE UP
GAMMA GLOBULIN: 1.6 G/DL — SIGNIFICANT CHANGE UP (ref 0.6–1.6)
GLUCOSE SERPL-MCNC: 101 MG/DL — HIGH (ref 70–99)
HAPTOGLOB SERPL-MCNC: 204 MG/DL — HIGH (ref 34–200)
HCT VFR BLD CALC: 39.4 % — SIGNIFICANT CHANGE UP (ref 34.5–45)
HGB BLD-MCNC: 13.4 G/DL — SIGNIFICANT CHANGE UP (ref 11.5–15.5)
INTERPRETATION SERPL IFE-IMP: SIGNIFICANT CHANGE UP
KAPPA LC SER QL IFE: 4.77 MG/DL — HIGH (ref 0.33–1.94)
KAPPA/LAMBDA FREE LIGHT CHAIN RATIO, SERUM: 1.66 RATIO — HIGH (ref 0.26–1.65)
LAMBDA LC SER QL IFE: 2.88 MG/DL — HIGH (ref 0.57–2.63)
LDH SERPL L TO P-CCNC: 176 U/L — SIGNIFICANT CHANGE UP (ref 50–242)
MAGNESIUM SERPL-MCNC: 2 MG/DL — SIGNIFICANT CHANGE UP (ref 1.6–2.6)
MCHC RBC-ENTMCNC: 31.5 PG — SIGNIFICANT CHANGE UP (ref 27–34)
MCHC RBC-ENTMCNC: 34.1 GM/DL — SIGNIFICANT CHANGE UP (ref 32–36)
MCV RBC AUTO: 92.5 FL — SIGNIFICANT CHANGE UP (ref 80–100)
PLATELET # BLD AUTO: 257 K/UL — SIGNIFICANT CHANGE UP (ref 150–400)
POTASSIUM SERPL-MCNC: 3 MMOL/L — LOW (ref 3.5–5.3)
POTASSIUM SERPL-MCNC: 4.2 MMOL/L — SIGNIFICANT CHANGE UP (ref 3.5–5.3)
POTASSIUM SERPL-SCNC: 3 MMOL/L — LOW (ref 3.5–5.3)
POTASSIUM SERPL-SCNC: 4.2 MMOL/L — SIGNIFICANT CHANGE UP (ref 3.5–5.3)
PROT ?TM UR-MCNC: 14 MG/DL — HIGH (ref 0–12)
PROT PATTERN SERPL ELPH-IMP: SIGNIFICANT CHANGE UP
PROT SERPL-MCNC: 7.8 G/DL — SIGNIFICANT CHANGE UP (ref 6–8.3)
PROT/CREAT UR-RTO: 0.1 RATIO — SIGNIFICANT CHANGE UP (ref 0–0.2)
RBC # BLD: 4.26 M/UL — SIGNIFICANT CHANGE UP (ref 3.8–5.2)
RBC # FLD: 12.8 % — SIGNIFICANT CHANGE UP (ref 10.3–14.5)
RENIN DIRECT, PLASMA: <2.1 PG/ML — SIGNIFICANT CHANGE UP
SODIUM SERPL-SCNC: 139 MMOL/L — SIGNIFICANT CHANGE UP (ref 135–145)
SPECIMEN SOURCE: SIGNIFICANT CHANGE UP
TSH SERPL-MCNC: 4.13 UIU/ML — SIGNIFICANT CHANGE UP (ref 0.27–4.2)
WBC # BLD: 8.6 K/UL — SIGNIFICANT CHANGE UP (ref 3.8–10.5)
WBC # FLD AUTO: 8.6 K/UL — SIGNIFICANT CHANGE UP (ref 3.8–10.5)

## 2018-10-18 PROCEDURE — 93975 VASCULAR STUDY: CPT | Mod: 26

## 2018-10-18 PROCEDURE — 99223 1ST HOSP IP/OBS HIGH 75: CPT

## 2018-10-18 RX ORDER — AMITRIPTYLINE HCL 25 MG
20 TABLET ORAL DAILY
Qty: 0 | Refills: 0 | Status: DISCONTINUED | OUTPATIENT
Start: 2018-10-18 | End: 2018-10-19

## 2018-10-18 RX ORDER — ALPRAZOLAM 0.25 MG
1 TABLET ORAL AT BEDTIME
Qty: 0 | Refills: 0 | Status: DISCONTINUED | OUTPATIENT
Start: 2018-10-18 | End: 2018-10-19

## 2018-10-18 RX ORDER — CARVEDILOL PHOSPHATE 80 MG/1
12.5 CAPSULE, EXTENDED RELEASE ORAL EVERY 12 HOURS
Qty: 0 | Refills: 0 | Status: DISCONTINUED | OUTPATIENT
Start: 2018-10-18 | End: 2018-10-19

## 2018-10-18 RX ORDER — BUPROPION HYDROCHLORIDE 150 MG/1
200 TABLET, EXTENDED RELEASE ORAL DAILY
Qty: 0 | Refills: 0 | Status: DISCONTINUED | OUTPATIENT
Start: 2018-10-18 | End: 2018-10-18

## 2018-10-18 RX ORDER — POTASSIUM BICARBONATE 978 MG/1
25 TABLET, EFFERVESCENT ORAL
Qty: 0 | Refills: 0 | Status: COMPLETED | OUTPATIENT
Start: 2018-10-18 | End: 2018-10-18

## 2018-10-18 RX ORDER — PSYLLIUM SEED (WITH DEXTROSE)
1 POWDER (GRAM) ORAL DAILY
Qty: 0 | Refills: 0 | Status: DISCONTINUED | OUTPATIENT
Start: 2018-10-18 | End: 2018-10-19

## 2018-10-18 RX ORDER — BUPROPION HYDROCHLORIDE 150 MG/1
200 TABLET, EXTENDED RELEASE ORAL DAILY
Qty: 0 | Refills: 0 | Status: DISCONTINUED | OUTPATIENT
Start: 2018-10-18 | End: 2018-10-19

## 2018-10-18 RX ADMIN — Medication 1 PACKET(S): at 16:21

## 2018-10-18 RX ADMIN — CEFTRIAXONE 100 GRAM(S): 500 INJECTION, POWDER, FOR SOLUTION INTRAMUSCULAR; INTRAVENOUS at 08:12

## 2018-10-18 RX ADMIN — POTASSIUM BICARBONATE 25 MILLIEQUIVALENT(S): 978 TABLET, EFFERVESCENT ORAL at 08:10

## 2018-10-18 RX ADMIN — CARVEDILOL PHOSPHATE 12.5 MILLIGRAM(S): 80 CAPSULE, EXTENDED RELEASE ORAL at 22:09

## 2018-10-18 RX ADMIN — ATORVASTATIN CALCIUM 10 MILLIGRAM(S): 80 TABLET, FILM COATED ORAL at 22:09

## 2018-10-18 RX ADMIN — POTASSIUM BICARBONATE 25 MILLIEQUIVALENT(S): 978 TABLET, EFFERVESCENT ORAL at 06:48

## 2018-10-18 RX ADMIN — Medication 1 MILLIGRAM(S): at 22:09

## 2018-10-18 RX ADMIN — CARVEDILOL PHOSPHATE 12.5 MILLIGRAM(S): 80 CAPSULE, EXTENDED RELEASE ORAL at 12:04

## 2018-10-18 RX ADMIN — Medication 75 MICROGRAM(S): at 05:35

## 2018-10-18 RX ADMIN — Medication 81 MILLIGRAM(S): at 17:36

## 2018-10-18 RX ADMIN — Medication 100 MILLIGRAM(S): at 05:35

## 2018-10-18 RX ADMIN — SPIRONOLACTONE 25 MILLIGRAM(S): 25 TABLET, FILM COATED ORAL at 05:35

## 2018-10-18 RX ADMIN — BUPROPION HYDROCHLORIDE 300 MILLIGRAM(S): 150 TABLET, EXTENDED RELEASE ORAL at 12:04

## 2018-10-18 NOTE — BEHAVIORAL HEALTH ASSESSMENT NOTE - NSBHCHARTREVIEWVS_PSY_A_CORE FT
Vital Signs Last 24 Hrs  T(C): 36.6 (18 Oct 2018 11:23), Max: 36.8 (17 Oct 2018 21:00)  T(F): 97.9 (18 Oct 2018 11:23), Max: 98.2 (17 Oct 2018 21:00)  HR: 71 (18 Oct 2018 11:23) (71 - 112)  BP: 160/67 (18 Oct 2018 11:23) (117/68 - 172/108)  BP(mean): --  RR: 17 (18 Oct 2018 11:23) (17 - 17)  SpO2: 100% (18 Oct 2018 11:23) (96% - 100%)

## 2018-10-18 NOTE — BEHAVIORAL HEALTH ASSESSMENT NOTE - HPI (INCLUDE ILLNESS QUALITY, SEVERITY, DURATION, TIMING, CONTEXT, MODIFYING FACTORS, ASSOCIATED SIGNS AND SYMPTOMS)
Humaira Yost is a 79 yo  female, domiciled, with her son, with a history of MDD and anxiety, no psychiatric hospitalizations, no suicide attempts, with a medical history significant for HTN who presented to the hospital yesterday with uncontrolled HTN. Psychiatry was consulted for medication management.    Ms. Yost states that she's a little concerned about her HTN and how it will be managed in the future. She denies any current anxiety or panic symptoms. She denies feeling depressed. She states that she is able to eat adequately and that her sleep is adequate with 2 mg of Xanax qHS. She does not take Xanax at any other time during the day and has been taking it for "quite a few" years. She denies current suicidal ideation, intent, or plan or a history of suicidality/self harm. She denies a current or past manic symptoms. She denies current or past psychotic symptoms.     Ms. Yost has a long standing history of depression and anxiety with recurrent episodes during times of stress such as when she moved with her late  and when her son and daughter were sick with leukemia and thyroid cancer respectively. She had been seeing a psychiatrist for many     Patient also discussed that she has been taking amitriptyline 25 mg daily and Wellbutrin 300 mg daily. She feels that the Wellbutrin has been helpful for her and is the only thing that has worked after multiple medication trials. This patient states that she feels that the Amitriptyline has been useful for sleep but not for any of her other symptoms. She is reluctant to change any of her medications because she is worried that she'll become depressed again.     The writer attempted to call the patient's daughter, Shae, for collateral who confirmed the above information. Per her daughter the patient hasn't had any worsening anxiety symptoms or depression. She denied that the patient appeared to have any psychotic symptoms. Humaira Yost is a 79 yo  female, domiciled, with her son, with a history of MDD and anxiety, no psychiatric hospitalizations, no suicide attempts, with a medical history significant for HTN who presented to the hospital yesterday with uncontrolled HTN. Psychiatry was consulted for medication management.    Ms. Yost states that she's a little concerned about her HTN and how it will be managed in the future. She denies any current anxiety or panic symptoms. She denies feeling depressed. She states that she is able to eat adequately and that her sleep is adequate with 2 mg of Xanax qHS. She does not take Xanax at any other time during the day and has been taking it for "quite a few" years. She denies current suicidal ideation, intent, or plan or a history of suicidality/self harm. She denies a current or past manic symptoms. She denies current or past psychotic symptoms.     Ms. Yost has a long standing history of depression and anxiety with recurrent episodes during times of stress such as when she moved with her late  and when her son and daughter were sick with leukemia and thyroid cancer respectively. She had been seeing a primary doctor, who prescribed her psychiatric medications, for many years until he passed away. She then transferred her care to his wife who is a psychiatrist.     Patient also discussed that she has been taking amitriptyline 25 mg daily and Wellbutrin 300 mg daily. She feels that the Wellbutrin has been helpful for her and is the only thing that has worked after multiple medication trials. This patient states that she feels that the Amitriptyline has been useful for sleep but not for any of her other symptoms. She is reluctant to change any of her medications because she is worried that she'll become depressed again.     The writer attempted to call the patient's daughter, Shae, for collateral who confirmed the above information. Per her daughter the patient hasn't had any worsening anxiety symptoms or depression. She denied that the patient appeared to have any psychotic symptoms. Humaira Yost is a 77 yo  female, domiciled, with her son, with a history of MDD and anxiety, no psychiatric hospitalizations, no suicide attempts, with a medical history significant for HTN who presented to the hospital yesterday with uncontrolled HTN. Psychiatry was consulted for medication management.    Ms. Yost states that she's a little concerned about her HTN and how it will be managed in the future. She denies any current anxiety or panic symptoms. She denies feeling depressed. She states that she is able to eat adequately and that her sleep is adequate with 2 mg of Xanax qHS. She does not take Xanax at any other time during the day and has been taking it for "quite a few" years. She denies current suicidal ideation, intent, or plan or a history of suicidality/self harm. She denies a current or past manic symptoms. She denies current or past psychotic symptoms.     Ms. Yost has a long standing history of depression and anxiety with recurrent episodes during times of stress such as when she moved with her late  and when her son and daughter were sick with leukemia and thyroid cancer respectively. She had been seeing a primary doctor, who prescribed her psychiatric medications, for many years until he passed away. She then transferred her care to his wife who is a psychiatrist.     Patient also discussed that she has been taking amitriptyline 25 mg daily and Wellbutrin 200 mg daily. She feels that the Wellbutrin has been helpful for her and is the only thing that has worked after multiple medication trials. This patient states that she feels that the Amitriptyline has been useful for sleep but not for any of her other symptoms. She is reluctant to change any of her medications because she is worried that she'll become depressed again.     The writer attempted to call the patient's daughter, Shae, for collateral who confirmed the above information. Per her daughter the patient hasn't had any worsening anxiety symptoms or depression. She denied that the patient appeared to have any psychotic symptoms.

## 2018-10-18 NOTE — BEHAVIORAL HEALTH ASSESSMENT NOTE - NSBHCHARTREVIEWINVESTIGATE_PSY_A_CORE FT
< from: 12 Lead ECG (10.17.18 @ 00:36) >      Ventricular Rate 75 BPM    Atrial Rate 91 BPM    P-R Interval 172 ms    QRS Duration 90 ms    Q-T Interval 434 ms    QTC Calculation(Bezet) 484 ms    P Axis 48 degrees    R Axis 45 degrees    T Axis 45 degrees    Diagnosis Line SINUS RHYTHM WITH MARKED SINUS ARRHYTHMIA WITH PREMATURE SUPRAVENTRICULAR COMPLEXES  OTHERWISE NORMAL ECG    Confirmed by ATTENDING, ED ([id number]),  NATHANIEL HESS ([id number]) on 10/17/2018 7:59:07 AM    < end of copied text >

## 2018-10-18 NOTE — PROGRESS NOTE ADULT - ASSESSMENT
78 f with  HTN- control  Nephrology evaluation noted  Telemetry, Cardiology follow  Hypothyroidism- follow TSH  UTI-urine culture, Ceftriaxone.  Anxiety control  Paulie Moore MD pager [phone]

## 2018-10-18 NOTE — BEHAVIORAL HEALTH ASSESSMENT NOTE - SUMMARY
Humaira Yost is a 79 yo  female, domiciled, with her son, with a history of MDD and anxiety, no psychiatric hospitalizations, no suicide attempts, with a medical history significant for HTN who presented to the hospital yesterday with uncontrolled HTN. Psychiatry was consulted for medication management. Ms. Yost had been taking amitriptyline and Wellbutrin which together have the potential to increased blood pressure. Ms. Yost is reluctant to change the Wellbutrin because she has been psychiatrically stable on in for so long and she is worried about triggering another depressive episode. We would recommend that she taper the amitriptyline.       We have counseled the patient about the potential for HTN with Amitriptyline, particularly  when combined with Wellbutrin. She was agreeable to tapering the Amitriptyline while she was in the hospital. We also discussed this plan with Jacy Patterson, the patient's NP.

## 2018-10-18 NOTE — PROGRESS NOTE ADULT - ASSESSMENT
78f w hx HTN presenting with dizziness secondary to uncontrolled HTN.     1. Dizziness    likely in setting of uncontrolled HTN , now resolved   cv stable no chest pain or sob, no evidence of acute ischemia/ACS   no evidence of fluid overload on exam   pending echo to evaluate LV function, valvular disease     1. Uncontrolled HTN   overall BP improved   continue carvedilol 12.5mg PO Q12h  lasix changed to spironolactone   echo pending   anxiety management   renal f/u     dvt ppx 78f w hx HTN presenting with dizziness secondary to uncontrolled HTN.     1. Dizziness    likely in setting of uncontrolled HTN , now resolved   cv stable no chest pain or sob, no evidence of acute ischemia/ACS   no evidence of fluid overload on exam   pending echo to evaluate LV function, valvular disease     2. Uncontrolled HTN   overall BP improved   continue carvedilol 12.5mg PO Q12h  lasix changed to spironolactone   echo pending   anxiety management   renal f/u     dvt ppx

## 2018-10-18 NOTE — BEHAVIORAL HEALTH ASSESSMENT NOTE - RISK ASSESSMENT
Ms. Yost is at an overall low risk level for self harm. Risk factors include her history of depression, age, acute medical illness, and  status. Protective factors include being domiciled, strong social supports, no history of psychiatric hospitalizations or suicide attempts, no present suicidal ideation, intent or plan.

## 2018-10-18 NOTE — BEHAVIORAL HEALTH ASSESSMENT NOTE - NSBHCONSULTFOLLOWAFTERCARE_PSY_A_CORE FT
Patient should follow up with her outpatient psychiatrist or she may follow up in the [facility] at [phone]

## 2018-10-18 NOTE — BEHAVIORAL HEALTH ASSESSMENT NOTE - NSBHCHARTREVIEWLAB_PSY_A_CORE FT
13.4   8.6   )-----------( 257      ( 18 Oct 2018 05:27 )             39.4   10-18    x   |  x   |  x   ----------------------------<  x   4.2   |  x   |  x     Ca    8.8      18 Oct 2018 05:27  Mg     2.0     10-18    TPro  7.8  /  Alb  3.8  /  TBili  0.3  /  DBili  x   /  AST  35  /  ALT  38  /  AlkPhos  95  10-17

## 2018-10-18 NOTE — BEHAVIORAL HEALTH ASSESSMENT NOTE - NSBHSUICPROTECTFACT_PSY_A_CORE
Positive therapeutic relationships/Responsibility to family and others/Supportive social network or family/Fear of death or dying due to pain/suffering

## 2018-10-19 ENCOUNTER — TRANSCRIPTION ENCOUNTER (OUTPATIENT)
Age: 78
End: 2018-10-19

## 2018-10-19 VITALS
HEART RATE: 78 BPM | RESPIRATION RATE: 18 BRPM | SYSTOLIC BLOOD PRESSURE: 141 MMHG | DIASTOLIC BLOOD PRESSURE: 76 MMHG | OXYGEN SATURATION: 95 % | TEMPERATURE: 98 F

## 2018-10-19 DIAGNOSIS — F43.20 ADJUSTMENT DISORDER, UNSPECIFIED: ICD-10-CM

## 2018-10-19 DIAGNOSIS — F41.9 ANXIETY DISORDER, UNSPECIFIED: ICD-10-CM

## 2018-10-19 DIAGNOSIS — F33.42 MAJOR DEPRESSIVE DISORDER, RECURRENT, IN FULL REMISSION: ICD-10-CM

## 2018-10-19 LAB
ANA TITR SER: NEGATIVE — SIGNIFICANT CHANGE UP
ANION GAP SERPL CALC-SCNC: 12 MMOL/L — SIGNIFICANT CHANGE UP (ref 5–17)
BUN SERPL-MCNC: 23 MG/DL — SIGNIFICANT CHANGE UP (ref 7–23)
C3 SERPL-MCNC: 134 MG/DL — SIGNIFICANT CHANGE UP (ref 81–157)
C3 SERPL-MCNC: 164 MG/DL — HIGH (ref 81–157)
C4 SERPL-MCNC: 28 MG/DL — SIGNIFICANT CHANGE UP (ref 13–39)
C4 SERPL-MCNC: 31 MG/DL — SIGNIFICANT CHANGE UP (ref 13–39)
CALCIUM SERPL-MCNC: 8.8 MG/DL — SIGNIFICANT CHANGE UP (ref 8.4–10.5)
CHLORIDE SERPL-SCNC: 97 MMOL/L — SIGNIFICANT CHANGE UP (ref 96–108)
CO2 SERPL-SCNC: 24 MMOL/L — SIGNIFICANT CHANGE UP (ref 22–31)
CREAT SERPL-MCNC: 1.25 MG/DL — SIGNIFICANT CHANGE UP (ref 0.5–1.3)
GLUCOSE SERPL-MCNC: 114 MG/DL — HIGH (ref 70–99)
HCT VFR BLD CALC: 37.8 % — SIGNIFICANT CHANGE UP (ref 34.5–45)
HGB BLD-MCNC: 12.6 G/DL — SIGNIFICANT CHANGE UP (ref 11.5–15.5)
IGA FLD-MCNC: 407 MG/DL — SIGNIFICANT CHANGE UP (ref 84–499)
IGG FLD-MCNC: 1385 MG/DL — SIGNIFICANT CHANGE UP (ref 610–1660)
IGM SERPL-MCNC: 184 MG/DL — SIGNIFICANT CHANGE UP (ref 35–242)
INTERPRETATION SERPL IFE-IMP: SIGNIFICANT CHANGE UP
KAPPA LC SER QL IFE: 4.98 MG/DL — HIGH (ref 0.33–1.94)
KAPPA/LAMBDA FREE LIGHT CHAIN RATIO, SERUM: 1.79 RATIO — HIGH (ref 0.26–1.65)
LAMBDA LC SER QL IFE: 2.78 MG/DL — HIGH (ref 0.57–2.63)
MCHC RBC-ENTMCNC: 30.9 PG — SIGNIFICANT CHANGE UP (ref 27–34)
MCHC RBC-ENTMCNC: 33.3 GM/DL — SIGNIFICANT CHANGE UP (ref 32–36)
MCV RBC AUTO: 92.7 FL — SIGNIFICANT CHANGE UP (ref 80–100)
PLATELET # BLD AUTO: 276 K/UL — SIGNIFICANT CHANGE UP (ref 150–400)
POTASSIUM SERPL-MCNC: 3.9 MMOL/L — SIGNIFICANT CHANGE UP (ref 3.5–5.3)
POTASSIUM SERPL-SCNC: 3.9 MMOL/L — SIGNIFICANT CHANGE UP (ref 3.5–5.3)
PROT SERPL-MCNC: 7.3 G/DL — SIGNIFICANT CHANGE UP (ref 6–8.3)
RBC # BLD: 4.08 M/UL — SIGNIFICANT CHANGE UP (ref 3.8–5.2)
RBC # FLD: 12.8 % — SIGNIFICANT CHANGE UP (ref 10.3–14.5)
SODIUM SERPL-SCNC: 133 MMOL/L — LOW (ref 135–145)
WBC # BLD: 8.6 K/UL — SIGNIFICANT CHANGE UP (ref 3.8–10.5)
WBC # FLD AUTO: 8.6 K/UL — SIGNIFICANT CHANGE UP (ref 3.8–10.5)

## 2018-10-19 PROCEDURE — 99232 SBSQ HOSP IP/OBS MODERATE 35: CPT | Mod: GC

## 2018-10-19 RX ORDER — METOPROLOL TARTRATE 50 MG
2 TABLET ORAL
Qty: 0 | Refills: 0 | COMMUNITY

## 2018-10-19 RX ORDER — CEFUROXIME AXETIL 250 MG
1 TABLET ORAL
Qty: 10 | Refills: 0 | OUTPATIENT
Start: 2018-10-19 | End: 2018-10-23

## 2018-10-19 RX ORDER — FUROSEMIDE 40 MG
1 TABLET ORAL
Qty: 0 | Refills: 0 | COMMUNITY

## 2018-10-19 RX ORDER — SPIRONOLACTONE 25 MG/1
1 TABLET, FILM COATED ORAL
Qty: 0 | Refills: 0 | COMMUNITY
Start: 2018-10-19

## 2018-10-19 RX ORDER — SPIRONOLACTONE 25 MG/1
1 TABLET, FILM COATED ORAL
Qty: 30 | Refills: 3 | OUTPATIENT
Start: 2018-10-19 | End: 2019-02-15

## 2018-10-19 RX ORDER — CARVEDILOL PHOSPHATE 80 MG/1
1 CAPSULE, EXTENDED RELEASE ORAL
Qty: 0 | Refills: 0 | COMMUNITY
Start: 2018-10-19

## 2018-10-19 RX ORDER — CARVEDILOL PHOSPHATE 80 MG/1
1 CAPSULE, EXTENDED RELEASE ORAL
Qty: 60 | Refills: 3 | OUTPATIENT
Start: 2018-10-19 | End: 2019-02-15

## 2018-10-19 RX ORDER — CEFUROXIME AXETIL 250 MG
250 TABLET ORAL EVERY 12 HOURS
Qty: 0 | Refills: 0 | Status: DISCONTINUED | OUTPATIENT
Start: 2018-10-19 | End: 2018-10-19

## 2018-10-19 RX ORDER — CANDESARTAN CILEXETIL 8 MG/1
1 TABLET ORAL
Qty: 0 | Refills: 0 | COMMUNITY

## 2018-10-19 RX ADMIN — CEFTRIAXONE 100 GRAM(S): 500 INJECTION, POWDER, FOR SOLUTION INTRAMUSCULAR; INTRAVENOUS at 09:22

## 2018-10-19 RX ADMIN — Medication 1 PACKET(S): at 15:32

## 2018-10-19 RX ADMIN — SPIRONOLACTONE 25 MILLIGRAM(S): 25 TABLET, FILM COATED ORAL at 06:27

## 2018-10-19 RX ADMIN — Medication 75 MICROGRAM(S): at 05:44

## 2018-10-19 RX ADMIN — CARVEDILOL PHOSPHATE 12.5 MILLIGRAM(S): 80 CAPSULE, EXTENDED RELEASE ORAL at 17:50

## 2018-10-19 RX ADMIN — CARVEDILOL PHOSPHATE 12.5 MILLIGRAM(S): 80 CAPSULE, EXTENDED RELEASE ORAL at 09:22

## 2018-10-19 RX ADMIN — BUPROPION HYDROCHLORIDE 200 MILLIGRAM(S): 150 TABLET, EXTENDED RELEASE ORAL at 15:32

## 2018-10-19 RX ADMIN — Medication 250 MILLIGRAM(S): at 17:50

## 2018-10-19 NOTE — PROGRESS NOTE ADULT - ATTENDING COMMENTS
Agree with above NP note.  cv stable  d/c planning  cont currentt x
Agree with above NP note.  cv stable  bp improved  cont meds as ordered  await echo
HTN:   BP much improved with aldactone/coreg  Would maintain on the current regimen  Slight bump in the creatinine noted: likely secondary to better BP control  Would follow  Renal doppler with ?FMD: however renin is suppressed  This can be further worked up  as an outpatient  Told patient to f/u with Dr Melton after her discharge ( [phone])    For weekend coverage, please Renal service with the following physicians on call   Either Dr Jarett Nogueira( fellow) or/and Dr. Cornel Chau( Attending)

## 2018-10-19 NOTE — DISCHARGE NOTE ADULT - CARE PROVIDER_API CALL
Kem Bear (MD), Cardiovascular Disease  [address] [place] [address]  Phone: [phone]  Fax: [phone] Kem Bear), Cardiovascular Disease  [address] [place] [address] [address]  [place] [address]  Phone: [phone]  Fax: [phone]    Harry Melton (MD), Internal Medicine; Nephrology  [address] [place] [address]  [address] [place] [address]  Phone: [phone]  Fax: [phone]

## 2018-10-19 NOTE — PROGRESS NOTE BEHAVIORAL HEALTH - NSBHCHARTREVIEWINVESTIGATE_PSY_A_CORE FT
< from: 12 Lead ECG (10.17.18 @ 09:18) >    Ventricular Rate 74 BPM    Atrial Rate 74 BPM    P-R Interval 170 ms    QRS Duration 86 ms    Q-T Interval 438 ms    QTC Calculation(Bezet) 486 ms    P Axis 27 degrees    R Axis 22 degrees    T Axis 23 degrees    Diagnosis Line SINUS RHYTHM WITH PREMATURE ATRIAL COMPLEXES  NONSPECIFIC ST AND T WAVE ABNORMALITY  PROLONGED QT  ABNORMAL ECG    < end of copied text >

## 2018-10-19 NOTE — PROGRESS NOTE ADULT - ASSESSMENT
78f w hx HTN presenting with dizziness secondary to uncontrolled HTN.     1. Dizziness    likely in setting of uncontrolled HTN , now resolved   cv stable no chest pain or sob, no evidence of acute ischemia/ACS   no evidence of fluid overload on exam   echo with normal LV function, EF 60-65%    2. Uncontrolled HTN   overall BP improved   continue carvedilol 12.5mg PO Q12h, sprinolactone   echo with normal LV function, EF 60-65%  anxiety management   renal f/u     dvt ppx   d/c planning per primary team, f/u with Dr. Bera

## 2018-10-19 NOTE — DISCHARGE NOTE ADULT - MEDICATION SUMMARY - MEDICATIONS TO STOP TAKING
I will STOP taking the medications listed below when I get home from the hospital:    Metoprolol Tartrate 50 mg oral tablet  -- 2 tab(s) by mouth once a day in the mornig  1 tablet by mouth once in the evening    Lasix 20 mg oral tablet  -- 1 tab(s) by mouth once a day

## 2018-10-19 NOTE — DISCHARGE NOTE ADULT - CARE PROVIDERS DIRECT ADDRESSES
,DirectAddress_Unknown ,DirectAddress_Unknown,[email or web address]

## 2018-10-19 NOTE — PROGRESS NOTE ADULT - SUBJECTIVE AND OBJECTIVE BOX
CARDIOLOGY FOLLOW UP - Dr. Calle    CC no cp/sob       PHYSICAL EXAM:  T(C): 36.6 (10-18-18 @ 11:23), Max: 36.8 (10-17-18 @ 21:00)  HR: 71 (10-18-18 @ 11:23) (71 - 112)  BP: 160/67 (10-18-18 @ 11:23) (117/68 - 172/108)  RR: 17 (10-18-18 @ 11:23) (17 - 17)  SpO2: 100% (10-18-18 @ 11:23) (96% - 100%)  Wt(kg): --  I&O's Summary    17 Oct 2018 07:01  -  18 Oct 2018 07:00  --------------------------------------------------------  IN: 710 mL / OUT: 0 mL / NET: 710 mL    18 Oct 2018 07:01  -  18 Oct 2018 13:00  --------------------------------------------------------  IN: 120 mL / OUT: 400 mL / NET: -280 mL        Appearance: Normal	  Cardiovascular: Normal S1 S2,RRR, No JVD, No murmurs  Respiratory: Lungs clear to auscultation	  Gastrointestinal:  Soft, Non-tender, + BS	  Extremities: Normal range of motion, No clubbing, cyanosis or edema        MEDICATIONS  (STANDING):  ALPRAZolam 2 milliGRAM(s) Oral three times a day  amitriptyline 25 milliGRAM(s) Oral daily  aspirin enteric coated 81 milliGRAM(s) Oral daily  atorvastatin 10 milliGRAM(s) Oral at bedtime  buPROPion XL . 300 milliGRAM(s) Oral daily  carvedilol 12.5 milliGRAM(s) Oral every 12 hours  cefTRIAXone   IVPB      cefTRIAXone   IVPB 1 Gram(s) IV Intermittent every 24 hours  levothyroxine 75 MICROGram(s) Oral daily  psyllium Powder 1 Packet(s) Oral daily  spironolactone 25 milliGRAM(s) Oral daily      TELEMETRY: 	    ECG:  	  RADIOLOGY:   DIAGNOSTIC TESTING:  [ ] Echocardiogram:  [ ]  Catheterization:  [ ] Stress Test:    OTHER: 	  < from: US Duplex Kidneys (10.18.18 @ 10:33) >  IMPRESSION:   Mild narrowing along the midportion of the right renal artery with   increased flow velocity may represent fibromuscular dysplasia. The   finding does not represent a significant stenosis.    There is no evidence of significant stenosis of either renal artery.    No evidence of hydronephrosis or mass.    < end of copied text >    LABS:	 	                                13.4   8.6   )-----------( 257      ( 18 Oct 2018 05:27 )             39.4     10-18    139  |  100  |  15  ----------------------------<  101<H>  3.0<L>   |  23  |  1.00    Ca    8.8      18 Oct 2018 05:27  Mg     2.0     10-18    TPro  7.8  /  Alb  3.8  /  TBili  0.3  /  DBili  x   /  AST  35  /  ALT  38  /  AlkPhos  95  10-17
CARDIOLOGY FOLLOW UP - Dr. Calle    CC no cp/sob       PHYSICAL EXAM:  T(C): 37.1 (10-19-18 @ 13:41), Max: 37.1 (10-19-18 @ 13:41)  HR: 85 (10-19-18 @ 13:41) (75 - 101)  BP: 112/75 (10-19-18 @ 13:41) (112/75 - 140/56)  RR: 17 (10-19-18 @ 13:41) (16 - 17)  SpO2: 94% (10-19-18 @ 13:41) (94% - 99%)  Wt(kg): --  I&O's Summary    18 Oct 2018 07:01  -  19 Oct 2018 07:00  --------------------------------------------------------  IN: 740 mL / OUT: 400 mL / NET: 340 mL    19 Oct 2018 07:01  -  19 Oct 2018 15:04  --------------------------------------------------------  IN: 420 mL / OUT: 0 mL / NET: 420 mL        Appearance: Normal	  Cardiovascular: Normal S1 S2,RRR, No JVD, No murmurs  Respiratory: Lungs clear to auscultation	  Gastrointestinal:  Soft, Non-tender, + BS	  Extremities: Normal range of motion, No clubbing, cyanosis or edema        MEDICATIONS  (STANDING):  ALPRAZolam 1 milliGRAM(s) Oral at bedtime  amitriptyline 20 milliGRAM(s) Oral daily  aspirin enteric coated 81 milliGRAM(s) Oral daily  atorvastatin 10 milliGRAM(s) Oral at bedtime  buPROPion  milliGRAM(s) Oral daily  carvedilol 12.5 milliGRAM(s) Oral every 12 hours  cefTRIAXone   IVPB      cefTRIAXone   IVPB 1 Gram(s) IV Intermittent every 24 hours  levothyroxine 75 MICROGram(s) Oral daily  psyllium Powder 1 Packet(s) Oral daily  spironolactone 25 milliGRAM(s) Oral daily      TELEMETRY: NSR 	    ECG:  	  RADIOLOGY:   DIAGNOSTIC TESTING:  [ ] Echocardiogram: < from: Transthoracic Echocardiogram (10.17.18 @ 16:24) >  Conclusions:  1. Normal trileaflet aortic valve. Mild aortic  regurgitation.  2. Increased relative wall thickness with normal left  ventricular mass index, consistent with concentric left  ventricular remodeling.  3. Normal left ventricular systolic function. No segmental  wall motion abnormalities.  4. Normal right ventricular size and function.  5. Estimated right ventricular systolic pressure equals 21  mm Hg, assuming right atrial pressure equals 8 mm Hg,  consistent with normal pulmonary pressures.  *** No previous Echo exam.    < end of copied text >    [ ]  Catheterization:  [ ] Stress Test:    OTHER: 	    LABS:	 	                                12.6   8.6   )-----------( 276      ( 19 Oct 2018 01:14 )             37.8     10-19    133<L>  |  97  |  23  ----------------------------<  114<H>  3.9   |  24  |  1.25    Ca    8.8      19 Oct 2018 01:14  Mg     2.0     10-18    TPro  7.3  /  Alb  3.9  /  TBili  x   /  DBili  x   /  AST  x   /  ALT  x   /  AlkPhos  x   10-18
[facility] DIVISION OF KIDNEY DISEASES AND HYPERTENSION -- FOLLOW UP NOTE  --------------------------------------------------------------------------------  Chief Complaint: HTN    24 hour events/subjective: feels well however mild ?cough.     PAST HISTORY  --------------------------------------------------------------------------------  No significant changes to PMH, PSH, FHx, SHx, unless otherwise noted    ALLERGIES & MEDICATIONS  --------------------------------------------------------------------------------  Allergies    No Known Allergies    Intolerances      Standing Inpatient Medications  ALPRAZolam 1 milliGRAM(s) Oral at bedtime  amitriptyline 20 milliGRAM(s) Oral daily  aspirin enteric coated 81 milliGRAM(s) Oral daily  atorvastatin 10 milliGRAM(s) Oral at bedtime  buPROPion  milliGRAM(s) Oral daily  carvedilol 12.5 milliGRAM(s) Oral every 12 hours  cefTRIAXone   IVPB      cefTRIAXone   IVPB 1 Gram(s) IV Intermittent every 24 hours  levothyroxine 75 MICROGram(s) Oral daily  psyllium Powder 1 Packet(s) Oral daily  spironolactone 25 milliGRAM(s) Oral daily    PRN Inpatient Medications      REVIEW OF SYSTEMS  --------------------------------------------------------------------------------  Gen: No fevers  Skin: No rashes  Respiratory: No dyspnea  CV: No chest pain  GI: No abdominal pain  : No dysuria, hematuria  MSK: No  edema    All other systems were reviewed and are negative, except as noted.    VITALS/PHYSICAL EXAM  --------------------------------------------------------------------------------  T(C): 36.7 (10-19-18 @ 09:50), Max: 37 (10-18-18 @ 21:46)  HR: 88 (10-19-18 @ 09:50) (75 - 101)  BP: 121/77 (10-19-18 @ 09:50) (121/77 - 140/56)  RR: 17 (10-19-18 @ 09:50) (16 - 17)  SpO2: 94% (10-19-18 @ 09:50) (94% - 99%)  Wt(kg): --        10-18-18 @ 07:01  -  10-19-18 @ 07:00  --------------------------------------------------------  IN: 740 mL / OUT: 400 mL / NET: 340 mL    10-19-18 @ 07:01  -  10-19-18 @ 12:55  --------------------------------------------------------  IN: 160 mL / OUT: 0 mL / NET: 160 mL      Physical Exam:  	Gen: NAD, elderly  	HEENT: MMM  	Pulm: CTA B/L  	CV: S1S2  	Abd: Soft, +BS   	Ext: No pedal edema  	Neuro: Awake  	Skin: Warm and dry    LABS/STUDIES  --------------------------------------------------------------------------------              12.6   8.6   >-----------<  276      [10-19-18 @ 01:14]              37.8     133  |  97  |  23  ----------------------------<  114      [10-19-18 @ 01:14]  3.9   |  24  |  1.25        Ca     8.8     [10-19-18 @ 01:14]      Mg     2.0     [10-18-18 @ 05:27]    TPro  7.3  /  Alb  3.9  /  TBili  x   /  DBili  x   /  AST  x   /  ALT  x   /  AlkPhos  x   [10-18-18 @ 15:58]          [10-18-18 @ 13:44]    Creatinine Trend:  SCr 1.25 [10-19 @ 01:14]  SCr 1.00 [10-18 @ 05:27]  SCr 0.88 [10-17 @ 01:52]
Patient is a 78y old  Female who presents with a chief complaint of dizziness (18 Oct 2018 12:59)      SUBJECTIVE / OVERNIGHT EVENTS: Comfortable without new complaints.   Review of Systems  chest pain no  palpitations no  sob no  nausea no  headache no    MEDICATIONS  (STANDING):  ALPRAZolam 1 milliGRAM(s) Oral at bedtime  amitriptyline 20 milliGRAM(s) Oral daily  aspirin enteric coated 81 milliGRAM(s) Oral daily  atorvastatin 10 milliGRAM(s) Oral at bedtime  buPROPion  milliGRAM(s) Oral daily  carvedilol 12.5 milliGRAM(s) Oral every 12 hours  cefTRIAXone   IVPB      cefTRIAXone   IVPB 1 Gram(s) IV Intermittent every 24 hours  levothyroxine 75 MICROGram(s) Oral daily  psyllium Powder 1 Packet(s) Oral daily  spironolactone 25 milliGRAM(s) Oral daily    MEDICATIONS  (PRN):      Vital Signs Last 24 Hrs  T(C): 36.6 (18 Oct 2018 11:23), Max: 36.6 (18 Oct 2018 11:23)  T(F): 97.9 (18 Oct 2018 11:23), Max: 97.9 (18 Oct 2018 11:23)  HR: 101 (18 Oct 2018 16:27) (71 - 101)  BP: 140/56 (18 Oct 2018 16:27) (117/68 - 160/67)  BP(mean): --  RR: 16 (18 Oct 2018 16:27) (16 - 17)  SpO2: 99% (18 Oct 2018 16:27) (96% - 100%)    PHYSICAL EXAM:  GENERAL: NAD  HEAD:  Atraumatic, Normocephalic  EYES: EOMI, PERRLA, conjunctiva and sclera clear  NECK: Supple, No JVD  CHEST/LUNG: Clear to auscultation bilaterally; No wheeze  HEART: Regular rate and rhythm; No murmurs, rubs, or gallops  ABDOMEN: Soft, Nontender, Nondistended; Bowel sounds present  EXTREMITIES:  2+ Peripheral Pulses, No clubbing, cyanosis, or edema  PSYCH: Anxious  NEUROLOGY: non-focal  SKIN: No rashes or lesions    LABS:                        13.4   8.6   )-----------( 257      ( 18 Oct 2018 05:27 )             39.4     10-18    x   |  x   |  x   ----------------------------<  x   4.2   |  x   |  x     Ca    8.8      18 Oct 2018 05:27  Mg     2.0     10-18    TPro  x   /  Alb  3.9  /  TBili  x   /  DBili  x   /  AST  x   /  ALT  x   /  AlkPhos  x   10-18      CARDIAC MARKERS ( 17 Oct 2018 11:00 )  x     / x     / 141 U/L / x     / <1.0 ng/mL      Urinalysis Basic - ( 17 Oct 2018 05:13 )    Color: Colorless / Appearance: Clear / S.006 / pH: x  Gluc: x / Ketone: Trace  / Bili: Negative / Urobili: Negative   Blood: x / Protein: Negative / Nitrite: Negative   Leuk Esterase: Large / RBC: 371 /hpf / WBC 52 /hpf   Sq Epi: x / Non Sq Epi: 5 /hpf / Bacteria: Negative        Culture - Urine (collected 17 Oct 2018 14:12)  Source: .Urine Clean Catch (Midstream)  Final Report (18 Oct 2018 13:55):    No growth        RADIOLOGY & ADDITIONAL TESTS:    Imaging Personally Reviewed:    Consultant(s) Notes Reviewed:      Care Discussed with Consultants/Other Providers:
Patient is a 78y old  Female who presents with a chief complaint of dizziness (19 Oct 2018 15:04)      SUBJECTIVE / OVERNIGHT EVENTS: Comfortable without new complaints. Son at bedside. Wants to go home.  Review of Systems  chest pain no  palpitations no  sob no  nausea no  headache no    MEDICATIONS  (STANDING):  ALPRAZolam 1 milliGRAM(s) Oral at bedtime  amitriptyline 20 milliGRAM(s) Oral daily  aspirin enteric coated 81 milliGRAM(s) Oral daily  atorvastatin 10 milliGRAM(s) Oral at bedtime  buPROPion  milliGRAM(s) Oral daily  carvedilol 12.5 milliGRAM(s) Oral every 12 hours  cefTRIAXone   IVPB      cefTRIAXone   IVPB 1 Gram(s) IV Intermittent every 24 hours  cefuroxime   Tablet 250 milliGRAM(s) Oral every 12 hours  levothyroxine 75 MICROGram(s) Oral daily  psyllium Powder 1 Packet(s) Oral daily  spironolactone 25 milliGRAM(s) Oral daily    MEDICATIONS  (PRN):      Vital Signs Last 24 Hrs  T(C): 37.1 (19 Oct 2018 13:41), Max: 37.1 (19 Oct 2018 13:41)  T(F): 98.7 (19 Oct 2018 13:41), Max: 98.7 (19 Oct 2018 13:41)  HR: 85 (19 Oct 2018 13:41) (75 - 101)  BP: 112/75 (19 Oct 2018 13:41) (112/75 - 140/56)  BP(mean): --  RR: 17 (19 Oct 2018 13:41) (16 - 17)  SpO2: 94% (19 Oct 2018 13:41) (94% - 99%)    PHYSICAL EXAM:  GENERAL: NAD, well-developed  HEAD:  Atraumatic, Normocephalic  EYES: EOMI, PERRLA, conjunctiva and sclera clear  NECK: Supple, No JVD  CHEST/LUNG: Clear to auscultation bilaterally; No wheeze  HEART: Regular rate and rhythm; No murmurs, rubs, or gallops  ABDOMEN: Soft, Nontender, Nondistended; Bowel sounds present  EXTREMITIES:  2+ Peripheral Pulses, No clubbing, cyanosis, or edema  PSYCH: AAOx3  NEUROLOGY: non-focal  SKIN: No rashes or lesions    LABS:                        12.6   8.6   )-----------( 276      ( 19 Oct 2018 01:14 )             37.8     10-19    133<L>  |  97  |  23  ----------------------------<  114<H>  3.9   |  24  |  1.25    Ca    8.8      19 Oct 2018 01:14  Mg     2.0     10-18    TPro  7.3  /  Alb  3.9  /  TBili  x   /  DBili  x   /  AST  x   /  ALT  x   /  AlkPhos  x   10-18              Culture - Urine (collected 17 Oct 2018 14:12)  Source: .Urine Clean Catch (Midstream)  Final Report (18 Oct 2018 13:55):    No growth        RADIOLOGY & ADDITIONAL TESTS:    Imaging Personally Reviewed:    Consultant(s) Notes Reviewed:      Care Discussed with Consultants/Other Providers:

## 2018-10-19 NOTE — DISCHARGE NOTE ADULT - HOSPITAL COURSE
1. Dizziness    likely in setting of uncontrolled HTN , now resolved   cv stable no chest pain or sob, no evidence of acute ischemia/ACS   no evidence of fluid overload on exam    echo normal  LV function, no segmental wall  motion abnormalities     2. Uncontrolled HTN   overall BP improved   continue carvedilol 12.5mg PO Q12h  lasix changed to spironolactone   echo pending   anxiety management   renal f/u     dvt ppx

## 2018-10-19 NOTE — PROGRESS NOTE BEHAVIORAL HEALTH - NSBHCONSULTFOLLOWAFTERCARE_PSY_A_CORE FT
Patient should follow up with her outpatient psychiatrist or she may follow up in the [facility] at [phone]

## 2018-10-19 NOTE — PROGRESS NOTE BEHAVIORAL HEALTH - NSBHCONSULTMEDS_PSY_A_CORE FT
Consider Wellbutrin  mg BID   Consider Xanax 1 mg PO qHS  Amitriptyline 20 mg PO daily Change Wellbutrin SR  [id number] PO to BID dosing  C/w Xanax 1 mg PO qHS  Reduce Amitriptyline to 20 mg PO daily

## 2018-10-19 NOTE — PROGRESS NOTE BEHAVIORAL HEALTH - NSBHCHARTREVIEWLAB_PSY_A_CORE FT
12.6   8.6   )-----------( 276      ( 19 Oct 2018 01:14 )             37.8   10-19    133<L>  |  97  |  23  ----------------------------<  114<H>  3.9   |  24  |  1.25    Ca    8.8      19 Oct 2018 01:14  Mg     2.0     10-18    TPro  7.3  /  Alb  3.9  /  TBili  x   /  DBili  x   /  AST  x   /  ALT  x   /  AlkPhos  x   10-18

## 2018-10-19 NOTE — PROGRESS NOTE BEHAVIORAL HEALTH - NSBHCHARTREVIEWVS_PSY_A_CORE FT
Vital Signs Last 24 Hrs  T(C): 36.7 (19 Oct 2018 09:50), Max: 37 (18 Oct 2018 21:46)  T(F): 98 (19 Oct 2018 09:50), Max: 98.6 (18 Oct 2018 21:46)  HR: 88 (19 Oct 2018 09:50) (71 - 101)  BP: 121/77 (19 Oct 2018 09:50) (121/77 - 160/67)  BP(mean): --  RR: 17 (19 Oct 2018 09:50) (16 - 17)  SpO2: 94% (19 Oct 2018 09:50) (94% - 100%)

## 2018-10-19 NOTE — DISCHARGE NOTE ADULT - PLAN OF CARE
pt is free from harm continue current meds Your blood pressure will be controlled. Continue with your blood pressure medications; eat a heart healthy diet with low salt diet; exercise regularly (consult with your physician or cardiologist first); maintain a heart healthy weight; if you smoke - quit (A resource to help you stop smoking is the [facility] – phone number [phone].); include healthy ways to manage stress. Continue to follow with your primary care physician or cardiologist.

## 2018-10-19 NOTE — DISCHARGE NOTE ADULT - MEDICATION SUMMARY - MEDICATIONS TO TAKE
I will START or STAY ON the medications listed below when I get home from the hospital:    spironolactone 25 mg oral tablet  -- 1 tab(s) by mouth once a day  -- Indication: For Diuretics     Aspirin Enteric Coated 81 mg oral delayed release tablet  -- 1 tab(s) by mouth once a day  -- Indication: For cad    amitriptyline 25 mg oral tablet  -- 1 tab(s) by mouth once a day (at bedtime)  -- Indication: For Antidepressants     lovastatin 40 mg oral tablet  -- 1 tab(s) by mouth once a day  -- Indication: For cholestreol     Xanax 2 mg oral tablet  -- 1 tab(s) by mouth 3 times a day  -- Indication: For Anxiety disorder, unspecified type    carvedilol 12.5 mg oral tablet  -- 1 tab(s) by mouth every 12 hours  -- Indication: For car    cefuroxime 250 mg oral tablet  -- 1 tab(s) by mouth every 12 hours  -- Indication: For uti    buPROPion 100 mg/12 hours (SR) oral tablet, extended release  -- 1 tab(s) by mouth 2 times a day  -- Indication: For Anti depressants     Synthroid 75 mcg (0.075 mg) oral tablet  -- 1 tab(s) by mouth once a day  -- Indication: For Hypothyroidsim

## 2018-10-19 NOTE — PROGRESS NOTE BEHAVIORAL HEALTH - SUMMARY
Humaira Yost is a 77 yo  female, domiciled, with her son, with a history of MDD and anxiety, no psychiatric hospitalizations, no suicide attempts, with a medical history significant for HTN who presented to the hospital yesterday with uncontrolled HTN. Psychiatry was consulted for medication management. Ms. Yost had been taking amitriptyline and Wellbutrin which together have the potential to increased blood pressure. Ms. Yost is reluctant to change the Wellbutrin because she has been psychiatrically stable on in for so long and she is worried about triggering another depressive episode. We would recommend that she taper the amitriptyline.       We have counseled the patient about the potential for HTN with Amitriptyline, particularly  when combined with Wellbutrin.

## 2018-10-19 NOTE — DISCHARGE NOTE ADULT - CARE PLAN
Principal Discharge DX:	Dizziness  Goal:	pt is free from harm  Assessment and plan of treatment:	continue current meds  Secondary Diagnosis:	Hypertension  Goal:	Your blood pressure will be controlled.  Assessment and plan of treatment:	Continue with your blood pressure medications; eat a heart healthy diet with low salt diet; exercise regularly (consult with your physician or cardiologist first); maintain a heart healthy weight; if you smoke - quit (A resource to help you stop smoking is the [facility] – phone number [phone].); include healthy ways to manage stress. Continue to follow with your primary care physician or cardiologist.

## 2018-10-19 NOTE — PROGRESS NOTE BEHAVIORAL HEALTH - NSBHFUPINTERVALHXFT_PSY_A_CORE
Ms. Yost expressed her continued reluctance to taper the amitriptyline from 25 mg to 20 mg. She states that she wants to take just one pill (25 mg) rather than two pills (10 mg and 10 mg). She was also worried that if she decrease the dosage of the medication, she was have trouble falling asleep. The interviewer counseled the patient on the potential that her amitriptyline may be contributing to HTN. The interviewer also did some psychoeducation with the patient regarding the importance of taking her 100 mg Wellbutrin SR pills (if that's what she is indeed taking at home) BID and the possible increased risk of seizure if she takes them at the same time. Patient denied worsening symptoms of depression or anxiety. She denied panic symptoms. She denied suicidal ideation, intent or plan.

## 2018-10-19 NOTE — PROGRESS NOTE BEHAVIORAL HEALTH - CASE SUMMARY
79 yo  female, domiciled, with her son, with a history of MDD and anxiety, no psychiatric hospitalizations, no suicide attempts, with a medical history significant for HTN who presented to the hospital yesterday with uncontrolled HTN. Psychiatry was consulted for medication management. Ms. Yost had been taking amitriptyline and Wellbutrin which together have the potential to increased blood pressure. Ms. Yost is reluctant to change the Wellbutrin because she has been psychiatrically stable on in for so long and she is worried about triggering another depressive episode.  Today calm and cooperative, denies depressive sx and has been stable on meds, reluctant to make changes.  Counseled on risks for HTN, pt plans to f/u as outpatient as she is being d/c today.  Dx: MDD in remission, anxiety d/o NOS.  Recs: 1. Change Wellbutrin SR to 100mg PO bid.  2. Decrease Elavil to 20mg PO qhS.  3. C/w Xanax 1mg PO qhS.  [phone]

## 2018-10-20 LAB
METANEPHRINE, PL: 37 PG/ML — SIGNIFICANT CHANGE UP (ref 0–62)
NORMETANEPHRINE, PL: 227 PG/ML — HIGH (ref 0–145)
RENIN PLAS-CCNC: <0.167 NG/ML/HR — LOW (ref 0.17–5.38)

## 2018-10-22 ENCOUNTER — INBOUND DOCUMENT (OUTPATIENT)
Age: 78
End: 2018-10-22

## 2018-10-30 PROBLEM — K21.9 GASTRO-ESOPHAGEAL REFLUX DISEASE WITHOUT ESOPHAGITIS: Chronic | Status: ACTIVE | Noted: 2018-10-17

## 2018-10-30 PROBLEM — H35.30 UNSPECIFIED MACULAR DEGENERATION: Chronic | Status: ACTIVE | Noted: 2018-10-17

## 2018-10-30 PROBLEM — M19.90 UNSPECIFIED OSTEOARTHRITIS, UNSPECIFIED SITE: Chronic | Status: ACTIVE | Noted: 2018-10-17

## 2018-11-02 ENCOUNTER — APPOINTMENT (OUTPATIENT)
Dept: OTOLARYNGOLOGY | Facility: CLINIC | Age: 78
End: 2018-11-02

## 2018-11-09 ENCOUNTER — APPOINTMENT (OUTPATIENT)
Dept: UROGYNECOLOGY | Facility: CLINIC | Age: 78
End: 2018-11-09
Payer: MEDICARE

## 2018-11-09 DIAGNOSIS — N89.8 OTHER SPECIFIED NONINFLAMMATORY DISORDERS OF VAGINA: ICD-10-CM

## 2018-11-09 DIAGNOSIS — N89.9 NONINFLAMMATORY DISORDER OF VAGINA, UNSPECIFIED: ICD-10-CM

## 2018-11-09 PROCEDURE — 99213 OFFICE O/P EST LOW 20 MIN: CPT

## 2018-11-11 PROCEDURE — 83835 ASSAY OF METANEPHRINES: CPT

## 2018-11-11 PROCEDURE — 80048 BASIC METABOLIC PNL TOTAL CA: CPT

## 2018-11-11 PROCEDURE — 82550 ASSAY OF CK (CPK): CPT

## 2018-11-11 PROCEDURE — 86160 COMPLEMENT ANTIGEN: CPT

## 2018-11-11 PROCEDURE — 84156 ASSAY OF PROTEIN URINE: CPT

## 2018-11-11 PROCEDURE — 93005 ELECTROCARDIOGRAM TRACING: CPT

## 2018-11-11 PROCEDURE — 84244 ASSAY OF RENIN: CPT

## 2018-11-11 PROCEDURE — 83010 ASSAY OF HAPTOGLOBIN QUANT: CPT

## 2018-11-11 PROCEDURE — 86334 IMMUNOFIX E-PHORESIS SERUM: CPT

## 2018-11-11 PROCEDURE — 82553 CREATINE MB FRACTION: CPT

## 2018-11-11 PROCEDURE — 93975 VASCULAR STUDY: CPT

## 2018-11-11 PROCEDURE — 85027 COMPLETE CBC AUTOMATED: CPT

## 2018-11-11 PROCEDURE — 81001 URINALYSIS AUTO W/SCOPE: CPT

## 2018-11-11 PROCEDURE — 96376 TX/PRO/DX INJ SAME DRUG ADON: CPT

## 2018-11-11 PROCEDURE — 82088 ASSAY OF ALDOSTERONE: CPT

## 2018-11-11 PROCEDURE — 96374 THER/PROPH/DIAG INJ IV PUSH: CPT

## 2018-11-11 PROCEDURE — 83615 LACTATE (LD) (LDH) ENZYME: CPT

## 2018-11-11 PROCEDURE — 99285 EMERGENCY DEPT VISIT HI MDM: CPT | Mod: 25

## 2018-11-11 PROCEDURE — 84165 PROTEIN E-PHORESIS SERUM: CPT

## 2018-11-11 PROCEDURE — 93306 TTE W/DOPPLER COMPLETE: CPT

## 2018-11-11 PROCEDURE — 84443 ASSAY THYROID STIM HORMONE: CPT

## 2018-11-11 PROCEDURE — 83521 IG LIGHT CHAINS FREE EACH: CPT

## 2018-11-11 PROCEDURE — 83735 ASSAY OF MAGNESIUM: CPT

## 2018-11-11 PROCEDURE — 84484 ASSAY OF TROPONIN QUANT: CPT

## 2018-11-11 PROCEDURE — 86038 ANTINUCLEAR ANTIBODIES: CPT

## 2018-11-11 PROCEDURE — 80053 COMPREHEN METABOLIC PANEL: CPT

## 2018-11-11 PROCEDURE — 96361 HYDRATE IV INFUSION ADD-ON: CPT

## 2018-11-11 PROCEDURE — 84132 ASSAY OF SERUM POTASSIUM: CPT

## 2018-11-11 PROCEDURE — 87086 URINE CULTURE/COLONY COUNT: CPT

## 2018-11-12 ENCOUNTER — TRANSCRIPTION ENCOUNTER (OUTPATIENT)
Age: 78
End: 2018-11-12

## 2018-11-12 ENCOUNTER — INPATIENT (INPATIENT)
Facility: HOSPITAL | Age: 78
LOS: 6 days | Discharge: HOME | DRG: 481 | End: 2018-11-19
Attending: ORTHOPAEDIC SURGERY | Admitting: ORTHOPAEDIC SURGERY
Payer: MEDICARE

## 2018-11-12 VITALS
SYSTOLIC BLOOD PRESSURE: 168 MMHG | TEMPERATURE: 98 F | DIASTOLIC BLOOD PRESSURE: 107 MMHG | WEIGHT: 132.06 LBS | HEART RATE: 84 BPM | HEIGHT: 60 IN | OXYGEN SATURATION: 98 % | RESPIRATION RATE: 18 BRPM

## 2018-11-12 DIAGNOSIS — S72.009A FRACTURE OF UNSPECIFIED PART OF NECK OF UNSPECIFIED FEMUR, INITIAL ENCOUNTER FOR CLOSED FRACTURE: ICD-10-CM

## 2018-11-12 DIAGNOSIS — E89.0 POSTPROCEDURAL HYPOTHYROIDISM: Chronic | ICD-10-CM

## 2018-11-12 LAB
ALBUMIN SERPL ELPH-MCNC: 4.2 G/DL — SIGNIFICANT CHANGE UP (ref 3.3–5)
ALP SERPL-CCNC: 99 U/L — SIGNIFICANT CHANGE UP (ref 40–120)
ALT FLD-CCNC: 26 U/L — SIGNIFICANT CHANGE UP (ref 10–45)
ANION GAP SERPL CALC-SCNC: 15 MMOL/L — SIGNIFICANT CHANGE UP (ref 5–17)
APPEARANCE UR: CLEAR — SIGNIFICANT CHANGE UP
APTT BLD: 32.5 SEC — SIGNIFICANT CHANGE UP (ref 27.5–36.3)
AST SERPL-CCNC: 32 U/L — SIGNIFICANT CHANGE UP (ref 10–40)
BASOPHILS # BLD AUTO: 0.1 K/UL — SIGNIFICANT CHANGE UP (ref 0–0.2)
BASOPHILS NFR BLD AUTO: 0.6 % — SIGNIFICANT CHANGE UP (ref 0–2)
BILIRUB SERPL-MCNC: 0.4 MG/DL — SIGNIFICANT CHANGE UP (ref 0.2–1.2)
BILIRUB UR-MCNC: NEGATIVE — SIGNIFICANT CHANGE UP
BLD GP AB SCN SERPL QL: NEGATIVE — SIGNIFICANT CHANGE UP
BUN SERPL-MCNC: 9 MG/DL — SIGNIFICANT CHANGE UP (ref 7–23)
CALCIUM SERPL-MCNC: 10 MG/DL — SIGNIFICANT CHANGE UP (ref 8.4–10.5)
CHLORIDE SERPL-SCNC: 89 MMOL/L — LOW (ref 96–108)
CO2 SERPL-SCNC: 23 MMOL/L — SIGNIFICANT CHANGE UP (ref 22–31)
COLOR SPEC: SIGNIFICANT CHANGE UP
CREAT SERPL-MCNC: 0.7 MG/DL — SIGNIFICANT CHANGE UP (ref 0.5–1.3)
DIFF PNL FLD: ABNORMAL
EOSINOPHIL # BLD AUTO: 0.2 K/UL — SIGNIFICANT CHANGE UP (ref 0–0.5)
EOSINOPHIL NFR BLD AUTO: 1.7 % — SIGNIFICANT CHANGE UP (ref 0–6)
GLUCOSE SERPL-MCNC: 95 MG/DL — SIGNIFICANT CHANGE UP (ref 70–99)
GLUCOSE UR QL: NEGATIVE — SIGNIFICANT CHANGE UP
HCT VFR BLD CALC: 37.7 % — SIGNIFICANT CHANGE UP (ref 34.5–45)
HGB BLD-MCNC: 13.5 G/DL — SIGNIFICANT CHANGE UP (ref 11.5–15.5)
INR BLD: 1.17 RATIO — HIGH (ref 0.88–1.16)
KETONES UR-MCNC: NEGATIVE — SIGNIFICANT CHANGE UP
LEUKOCYTE ESTERASE UR-ACNC: ABNORMAL
LYMPHOCYTES # BLD AUTO: 1.2 K/UL — SIGNIFICANT CHANGE UP (ref 1–3.3)
LYMPHOCYTES # BLD AUTO: 11.6 % — LOW (ref 13–44)
MCHC RBC-ENTMCNC: 32.5 PG — SIGNIFICANT CHANGE UP (ref 27–34)
MCHC RBC-ENTMCNC: 35.8 GM/DL — SIGNIFICANT CHANGE UP (ref 32–36)
MCV RBC AUTO: 90.9 FL — SIGNIFICANT CHANGE UP (ref 80–100)
MONOCYTES # BLD AUTO: 0.6 K/UL — SIGNIFICANT CHANGE UP (ref 0–0.9)
MONOCYTES NFR BLD AUTO: 5.8 % — SIGNIFICANT CHANGE UP (ref 2–14)
NEUTROPHILS # BLD AUTO: 8.3 K/UL — HIGH (ref 1.8–7.4)
NEUTROPHILS NFR BLD AUTO: 80.4 % — HIGH (ref 43–77)
NITRITE UR-MCNC: NEGATIVE — SIGNIFICANT CHANGE UP
PH UR: 7 — SIGNIFICANT CHANGE UP (ref 5–8)
PLATELET # BLD AUTO: 263 K/UL — SIGNIFICANT CHANGE UP (ref 150–400)
POTASSIUM SERPL-MCNC: 4.4 MMOL/L — SIGNIFICANT CHANGE UP (ref 3.5–5.3)
POTASSIUM SERPL-SCNC: 4.4 MMOL/L — SIGNIFICANT CHANGE UP (ref 3.5–5.3)
PROT SERPL-MCNC: 7.8 G/DL — SIGNIFICANT CHANGE UP (ref 6–8.3)
PROT UR-MCNC: NEGATIVE — SIGNIFICANT CHANGE UP
PROTHROM AB SERPL-ACNC: 13.5 SEC — HIGH (ref 10–12.9)
RBC # BLD: 4.15 M/UL — SIGNIFICANT CHANGE UP (ref 3.8–5.2)
RBC # FLD: 11.9 % — SIGNIFICANT CHANGE UP (ref 10.3–14.5)
RH IG SCN BLD-IMP: POSITIVE — SIGNIFICANT CHANGE UP
SODIUM SERPL-SCNC: 127 MMOL/L — LOW (ref 135–145)
SP GR SPEC: 1.01 — LOW (ref 1.01–1.02)
UROBILINOGEN FLD QL: NEGATIVE — SIGNIFICANT CHANGE UP
WBC # BLD: 10.3 K/UL — SIGNIFICANT CHANGE UP (ref 3.8–10.5)
WBC # FLD AUTO: 10.3 K/UL — SIGNIFICANT CHANGE UP (ref 3.8–10.5)

## 2018-11-12 PROCEDURE — 99285 EMERGENCY DEPT VISIT HI MDM: CPT | Mod: 25

## 2018-11-12 PROCEDURE — 93010 ELECTROCARDIOGRAM REPORT: CPT

## 2018-11-12 PROCEDURE — 73502 X-RAY EXAM HIP UNI 2-3 VIEWS: CPT | Mod: 26,RT

## 2018-11-12 PROCEDURE — 71045 X-RAY EXAM CHEST 1 VIEW: CPT | Mod: 26

## 2018-11-12 PROCEDURE — 73552 X-RAY EXAM OF FEMUR 2/>: CPT | Mod: 26,RT

## 2018-11-12 RX ORDER — ENOXAPARIN SODIUM 100 MG/ML
40 INJECTION SUBCUTANEOUS ONCE
Qty: 0 | Refills: 0 | Status: COMPLETED | OUTPATIENT
Start: 2018-11-12 | End: 2018-11-12

## 2018-11-12 RX ORDER — CEFTRIAXONE 500 MG/1
1 INJECTION, POWDER, FOR SOLUTION INTRAMUSCULAR; INTRAVENOUS ONCE
Qty: 0 | Refills: 0 | Status: COMPLETED | OUTPATIENT
Start: 2018-11-12 | End: 2018-11-12

## 2018-11-12 RX ORDER — ACETAMINOPHEN 500 MG
650 TABLET ORAL ONCE
Qty: 0 | Refills: 0 | Status: COMPLETED | OUTPATIENT
Start: 2018-11-12 | End: 2018-11-12

## 2018-11-12 RX ORDER — SODIUM CHLORIDE 9 MG/ML
1000 INJECTION, SOLUTION INTRAVENOUS
Qty: 0 | Refills: 0 | Status: DISCONTINUED | OUTPATIENT
Start: 2018-11-12 | End: 2018-11-13

## 2018-11-12 RX ADMIN — Medication 650 MILLIGRAM(S): at 18:18

## 2018-11-12 RX ADMIN — Medication 650 MILLIGRAM(S): at 19:30

## 2018-11-12 RX ADMIN — CEFTRIAXONE 100 GRAM(S): 500 INJECTION, POWDER, FOR SOLUTION INTRAMUSCULAR; INTRAVENOUS at 20:53

## 2018-11-12 NOTE — ED PROVIDER NOTE - ATTENDING CONTRIBUTION TO CARE
78y F on ASA here sp trip and fall yesterday unable to ambulate or weight bear since due to severe pain to R hip/groin. No other reported injuries. No neck pain, back pain, head strike or LOC.   Gen: WNWD NAD  HEENT: NCAT PERRL EOMI normal pharynx  Neck: supple  Back: no midline TTP   CV: RRR, no murmur  Lung: CTA BL  Abd: +BS soft NTND  Ext: wwp, palp pulses, dec ROM R hip 2/2 pain, FROM all other joints   Neuro: A&Ox3, CN grossly intact, sensation intact, motor 5/5 throughout  AP: 78y F on ASA here with R hip/groin pain sp trip and fall yesterday unable to ambulate, suspect fracture. Will check Xrays R hip , pelvis, femur, analgesics, labs, EKG. Will require admission as cant ambulate.

## 2018-11-12 NOTE — ED PROVIDER NOTE - CARE PLAN
Principal Discharge DX:	Hip fracture requiring operative repair  Secondary Diagnosis:	Fall  Secondary Diagnosis:	Osteoarthritis Principal Discharge DX:	Closed fracture of neck of right femur, initial encounter  Secondary Diagnosis:	Fall  Secondary Diagnosis:	Osteoarthritis

## 2018-11-12 NOTE — ED PROVIDER NOTE - OBJECTIVE STATEMENT
79 y/o female with PMH of HTN, macular degeneration, diverticulitis, osteoarthritis, on enteric coated aspirin, presents to the ED BIB son by car c/o right hip and leg pain s/p fall last night. Pt states it was a mechanical fall, has unsteady gait at baseline and fell while son was helping her to her car after shopping. Since the fall, pt hasn't been only able to ambulate with assistance due to the pain. Pt's son has been helping her ambulate since the fall. Pt tried Tylenol for the pain with minimal relief. States she can only take Tylenol because pt has problems with her stomach. Patient was recently put on losartan but pt hasn't been feeling more lightheaded since the change. Notes she was admitted to the hospital about 3 weeks ago for HTN. Denies vomiting, nausea, numbness/tingling, dizziness, LOC, head injury or any other complaints. Current medications: losartan, enteric coated aspirin. 79 y/o female with PMH of HTN, macular degeneration, diverticulitis, osteoarthritis, on enteric coated aspirin, presents to the ED BIB son by car c/o right hip and leg pain s/p fall last night. Pt states it was a mechanical fall, has unsteady gait at baseline and fell while son was helping her to her car after shopping. Since the fall, pt hasn't been only able to ambulate with assistance due to the pain. Pt's son has been helping her ambulate since the fall. Pt tried Tylenol for the pain with minimal relief. States she can only take Tylenol because pt has problems with her stomach. Patient was recently put on losartan but pt hasn't been feeling more lightheaded since the change. Notes she was admitted to the hospital about 3 weeks ago for HTN. Denies vomiting, nausea, numbness/tingling, dizziness, LOC, head injury or any other complaints. Current medications: losartan, enteric coated aspirin.    PMD: Dr. Marline Bear

## 2018-11-12 NOTE — ED ADULT NURSE NOTE - INTERVENTIONS DEFINITIONS
[place] to call system/Instruct patient to call for assistance/Call bell, personal items and telephone within reach/Stretcher in lowest position, wheels locked, appropriate side rails in place/Provide visual cue, wrist band, yellow gown, etc./Monitor gait and stability

## 2018-11-12 NOTE — CONSULT NOTE ADULT - PROBLEM SELECTOR RECOMMENDATION 2
Patient with a hx of labile HTN. Currently controlled. if needed maxime consider adding catapres or hydralizine

## 2018-11-12 NOTE — ED STATDOCS - NS_ ATTENDINGSCRIBEDETAILS _ED_A_ED_FT
The scribe's documentation has been prepared under my direction and personally reviewed by me in its entirety. I confirm that the note above accurately reflects all work, treatment, procedures, and medical decision making performed by me (Dr. Parra).

## 2018-11-12 NOTE — ED ADULT NURSE NOTE - OBJECTIVE STATEMENT
77 yo F presents to ED A+OX3 accompanied by her son c/o right hip pain. Pt. reports mechanical fall yesterday. Son witnessed fall, denies patient hitting head, pt. denies LOC. Pt. reports pain to right hip/groin, states she has a "shooting pain" that radiates up her leg when standing on leg. Pt. states she has been unable to ambulate due to pain. PT. denies numbness, tingling, weakness. Pt able to move toes and ankle. Able to move left leg without difficulty. Pedal pulse present, cap refill less than 2 seconds. No redness or bruising noted to right hip. No external rotation or shortening noted. Pt. denies any other obvious injuries, no other obvious injuries noted. Son at bedside.

## 2018-11-12 NOTE — ED STATDOCS - OBJECTIVE STATEMENT
77 y/o F pt with PMHx of HTN c/o right leg pain s/p fall last night. Pt hasn't been able to ambulate due to the pain. Pt's son has been helping her ambulate since the fall. Tried Tylenol for the pain with no relief. Can only take Tylenol because pt has problems with her stomach. Did not take any pain medication today. Wasn't entirely sure why she fell. Pt was getting into the car and she fell. Pt thinks she might have tripped because she's always off balance. Pt has frequent falls. Recently put on losartan but pt hasn't been feeling more lightheaded since the change. Denies vomiting, nausea, numbness/tingling, dizziness, LOC, head injury or any other complaints. Current medications: losartan. Allergic to non-inflammatory steroids.

## 2018-11-12 NOTE — ED PROVIDER NOTE - PHYSICAL EXAMINATION
MSK: right hip mildly tender to palpation but significant tenderness with rotation or compression of RLE; 2+ DP and PT pulses b/l, capillary refill less than 2 seconds b/l LE, sensation grossly intact b/l; no external rotation or shortening of RLE; no knee or ankle effusions appreciated, skin WNL

## 2018-11-13 DIAGNOSIS — I10 ESSENTIAL (PRIMARY) HYPERTENSION: ICD-10-CM

## 2018-11-13 DIAGNOSIS — M19.90 UNSPECIFIED OSTEOARTHRITIS, UNSPECIFIED SITE: ICD-10-CM

## 2018-11-13 DIAGNOSIS — K21.9 GASTRO-ESOPHAGEAL REFLUX DISEASE WITHOUT ESOPHAGITIS: ICD-10-CM

## 2018-11-13 DIAGNOSIS — S72.009A FRACTURE OF UNSPECIFIED PART OF NECK OF UNSPECIFIED FEMUR, INITIAL ENCOUNTER FOR CLOSED FRACTURE: ICD-10-CM

## 2018-11-13 LAB
ANION GAP SERPL CALC-SCNC: 13 MMOL/L — SIGNIFICANT CHANGE UP (ref 5–17)
APTT BLD: 35.4 SEC — SIGNIFICANT CHANGE UP (ref 27.5–36.3)
BLD GP AB SCN SERPL QL: NEGATIVE — SIGNIFICANT CHANGE UP
BUN SERPL-MCNC: 8 MG/DL — SIGNIFICANT CHANGE UP (ref 7–23)
CALCIUM SERPL-MCNC: 9.4 MG/DL — SIGNIFICANT CHANGE UP (ref 8.4–10.5)
CHLORIDE SERPL-SCNC: 96 MMOL/L — SIGNIFICANT CHANGE UP (ref 96–108)
CO2 SERPL-SCNC: 24 MMOL/L — SIGNIFICANT CHANGE UP (ref 22–31)
CREAT SERPL-MCNC: 0.71 MG/DL — SIGNIFICANT CHANGE UP (ref 0.5–1.3)
GLUCOSE SERPL-MCNC: 99 MG/DL — SIGNIFICANT CHANGE UP (ref 70–99)
HCT VFR BLD CALC: 36.3 % — SIGNIFICANT CHANGE UP (ref 34.5–45)
HGB BLD-MCNC: 12.3 G/DL — SIGNIFICANT CHANGE UP (ref 11.5–15.5)
INR BLD: 1.25 RATIO — HIGH (ref 0.88–1.16)
MCHC RBC-ENTMCNC: 31.1 PG — SIGNIFICANT CHANGE UP (ref 27–34)
MCHC RBC-ENTMCNC: 33.9 GM/DL — SIGNIFICANT CHANGE UP (ref 32–36)
MCV RBC AUTO: 91.7 FL — SIGNIFICANT CHANGE UP (ref 80–100)
PLATELET # BLD AUTO: 244 K/UL — SIGNIFICANT CHANGE UP (ref 150–400)
POTASSIUM SERPL-MCNC: 3.9 MMOL/L — SIGNIFICANT CHANGE UP (ref 3.5–5.3)
POTASSIUM SERPL-SCNC: 3.9 MMOL/L — SIGNIFICANT CHANGE UP (ref 3.5–5.3)
PROTHROM AB SERPL-ACNC: 14.3 SEC — HIGH (ref 10–13.1)
RBC # BLD: 3.96 M/UL — SIGNIFICANT CHANGE UP (ref 3.8–5.2)
RBC # FLD: 14 % — SIGNIFICANT CHANGE UP (ref 10.3–14.5)
RH IG SCN BLD-IMP: POSITIVE — SIGNIFICANT CHANGE UP
SODIUM SERPL-SCNC: 133 MMOL/L — LOW (ref 135–145)
WBC # BLD: 9.2 K/UL — SIGNIFICANT CHANGE UP (ref 3.8–10.5)
WBC # FLD AUTO: 9.2 K/UL — SIGNIFICANT CHANGE UP (ref 3.8–10.5)

## 2018-11-13 RX ORDER — HYDROMORPHONE HYDROCHLORIDE 2 MG/ML
0.5 INJECTION INTRAMUSCULAR; INTRAVENOUS; SUBCUTANEOUS EVERY 4 HOURS
Qty: 0 | Refills: 0 | Status: DISCONTINUED | OUTPATIENT
Start: 2018-11-13 | End: 2018-11-13

## 2018-11-13 RX ORDER — SPIRONOLACTONE 25 MG/1
25 TABLET, FILM COATED ORAL DAILY
Qty: 0 | Refills: 0 | Status: DISCONTINUED | OUTPATIENT
Start: 2018-11-14 | End: 2018-11-17

## 2018-11-13 RX ORDER — OXYCODONE HYDROCHLORIDE 5 MG/1
10 TABLET ORAL EVERY 4 HOURS
Qty: 0 | Refills: 0 | Status: DISCONTINUED | OUTPATIENT
Start: 2018-11-14 | End: 2018-11-19

## 2018-11-13 RX ORDER — POLYETHYLENE GLYCOL 3350 17 G/17G
17 POWDER, FOR SOLUTION ORAL DAILY
Qty: 0 | Refills: 0 | Status: DISCONTINUED | OUTPATIENT
Start: 2018-11-14 | End: 2018-11-14

## 2018-11-13 RX ORDER — ALPRAZOLAM 0.25 MG
2 TABLET ORAL THREE TIMES A DAY
Qty: 0 | Refills: 0 | Status: DISCONTINUED | OUTPATIENT
Start: 2018-11-13 | End: 2018-11-13

## 2018-11-13 RX ORDER — ASPIRIN/CALCIUM CARB/MAGNESIUM 324 MG
81 TABLET ORAL DAILY
Qty: 0 | Refills: 0 | Status: DISCONTINUED | OUTPATIENT
Start: 2018-11-13 | End: 2018-11-13

## 2018-11-13 RX ORDER — OXYCODONE HYDROCHLORIDE 5 MG/1
5 TABLET ORAL EVERY 4 HOURS
Qty: 0 | Refills: 0 | Status: DISCONTINUED | OUTPATIENT
Start: 2018-11-14 | End: 2018-11-19

## 2018-11-13 RX ORDER — LEVOTHYROXINE SODIUM 125 MCG
75 TABLET ORAL DAILY
Qty: 0 | Refills: 0 | Status: DISCONTINUED | OUTPATIENT
Start: 2018-11-13 | End: 2018-11-13

## 2018-11-13 RX ORDER — BUPROPION HYDROCHLORIDE 150 MG/1
200 TABLET, EXTENDED RELEASE ORAL DAILY
Qty: 0 | Refills: 0 | Status: DISCONTINUED | OUTPATIENT
Start: 2018-11-13 | End: 2018-11-13

## 2018-11-13 RX ORDER — BUPROPION HYDROCHLORIDE 150 MG/1
150 TABLET, EXTENDED RELEASE ORAL DAILY
Qty: 0 | Refills: 0 | Status: DISCONTINUED | OUTPATIENT
Start: 2018-11-13 | End: 2018-11-13

## 2018-11-13 RX ORDER — RIVAROXABAN 15 MG-20MG
10 KIT ORAL DAILY
Qty: 0 | Refills: 0 | Status: DISCONTINUED | OUTPATIENT
Start: 2018-11-14 | End: 2018-11-19

## 2018-11-13 RX ORDER — CARVEDILOL PHOSPHATE 80 MG/1
12.5 CAPSULE, EXTENDED RELEASE ORAL EVERY 12 HOURS
Qty: 0 | Refills: 0 | Status: DISCONTINUED | OUTPATIENT
Start: 2018-11-13 | End: 2018-11-13

## 2018-11-13 RX ORDER — BUPROPION HYDROCHLORIDE 150 MG/1
100 TABLET, EXTENDED RELEASE ORAL
Qty: 0 | Refills: 0 | Status: DISCONTINUED | OUTPATIENT
Start: 2018-11-13 | End: 2018-11-13

## 2018-11-13 RX ORDER — BUPROPION HYDROCHLORIDE 150 MG/1
150 TABLET, EXTENDED RELEASE ORAL DAILY
Qty: 0 | Refills: 0 | Status: DISCONTINUED | OUTPATIENT
Start: 2018-11-14 | End: 2018-11-15

## 2018-11-13 RX ORDER — SPIRONOLACTONE 25 MG/1
25 TABLET, FILM COATED ORAL DAILY
Qty: 0 | Refills: 0 | Status: DISCONTINUED | OUTPATIENT
Start: 2018-11-13 | End: 2018-11-13

## 2018-11-13 RX ORDER — ACETAMINOPHEN 500 MG
975 TABLET ORAL EVERY 6 HOURS
Qty: 0 | Refills: 0 | Status: DISCONTINUED | OUTPATIENT
Start: 2018-11-13 | End: 2018-11-13

## 2018-11-13 RX ORDER — CEFUROXIME AXETIL 250 MG
250 TABLET ORAL EVERY 12 HOURS
Qty: 0 | Refills: 0 | Status: DISCONTINUED | OUTPATIENT
Start: 2018-11-13 | End: 2018-11-13

## 2018-11-13 RX ORDER — SODIUM CHLORIDE 9 MG/ML
1000 INJECTION, SOLUTION INTRAVENOUS
Qty: 0 | Refills: 0 | Status: DISCONTINUED | OUTPATIENT
Start: 2018-11-13 | End: 2018-11-13

## 2018-11-13 RX ORDER — AMITRIPTYLINE HCL 25 MG
25 TABLET ORAL AT BEDTIME
Qty: 0 | Refills: 0 | Status: DISCONTINUED | OUTPATIENT
Start: 2018-11-13 | End: 2018-11-13

## 2018-11-13 RX ORDER — LEVOTHYROXINE SODIUM 125 MCG
75 TABLET ORAL DAILY
Qty: 0 | Refills: 0 | Status: DISCONTINUED | OUTPATIENT
Start: 2018-11-14 | End: 2018-11-19

## 2018-11-13 RX ORDER — SODIUM CHLORIDE 9 MG/ML
1000 INJECTION, SOLUTION INTRAVENOUS
Qty: 0 | Refills: 0 | Status: DISCONTINUED | OUTPATIENT
Start: 2018-11-14 | End: 2018-11-19

## 2018-11-13 RX ORDER — ONDANSETRON 8 MG/1
4 TABLET, FILM COATED ORAL
Qty: 0 | Refills: 0 | Status: DISCONTINUED | OUTPATIENT
Start: 2018-11-14 | End: 2018-11-14

## 2018-11-13 RX ORDER — DOCUSATE SODIUM 100 MG
100 CAPSULE ORAL THREE TIMES A DAY
Qty: 0 | Refills: 0 | Status: DISCONTINUED | OUTPATIENT
Start: 2018-11-14 | End: 2018-11-14

## 2018-11-13 RX ORDER — HYDROMORPHONE HYDROCHLORIDE 2 MG/ML
0.5 INJECTION INTRAMUSCULAR; INTRAVENOUS; SUBCUTANEOUS
Qty: 0 | Refills: 0 | Status: DISCONTINUED | OUTPATIENT
Start: 2018-11-14 | End: 2018-11-14

## 2018-11-13 RX ORDER — ONDANSETRON 8 MG/1
4 TABLET, FILM COATED ORAL EVERY 6 HOURS
Qty: 0 | Refills: 0 | Status: DISCONTINUED | OUTPATIENT
Start: 2018-11-14 | End: 2018-11-19

## 2018-11-13 RX ORDER — TRAMADOL HYDROCHLORIDE 50 MG/1
50 TABLET ORAL EVERY 4 HOURS
Qty: 0 | Refills: 0 | Status: DISCONTINUED | OUTPATIENT
Start: 2018-11-13 | End: 2018-11-13

## 2018-11-13 RX ORDER — LOSARTAN POTASSIUM 100 MG/1
50 TABLET, FILM COATED ORAL DAILY
Qty: 0 | Refills: 0 | Status: DISCONTINUED | OUTPATIENT
Start: 2018-11-14 | End: 2018-11-17

## 2018-11-13 RX ORDER — LOSARTAN POTASSIUM 100 MG/1
50 TABLET, FILM COATED ORAL DAILY
Qty: 0 | Refills: 0 | Status: DISCONTINUED | OUTPATIENT
Start: 2018-11-13 | End: 2018-11-13

## 2018-11-13 RX ORDER — OXYCODONE HYDROCHLORIDE 5 MG/1
5 TABLET ORAL EVERY 4 HOURS
Qty: 0 | Refills: 0 | Status: DISCONTINUED | OUTPATIENT
Start: 2018-11-13 | End: 2018-11-13

## 2018-11-13 RX ORDER — ACETAMINOPHEN 500 MG
650 TABLET ORAL EVERY 6 HOURS
Qty: 0 | Refills: 0 | Status: DISCONTINUED | OUTPATIENT
Start: 2018-11-13 | End: 2018-11-13

## 2018-11-13 RX ORDER — CHLORHEXIDINE GLUCONATE 213 G/1000ML
1 SOLUTION TOPICAL ONCE
Qty: 0 | Refills: 0 | Status: COMPLETED | OUTPATIENT
Start: 2018-11-13 | End: 2018-11-13

## 2018-11-13 RX ORDER — CARVEDILOL PHOSPHATE 80 MG/1
25 CAPSULE, EXTENDED RELEASE ORAL EVERY 12 HOURS
Qty: 0 | Refills: 0 | Status: DISCONTINUED | OUTPATIENT
Start: 2018-11-13 | End: 2018-11-13

## 2018-11-13 RX ORDER — ALPRAZOLAM 0.25 MG
1 TABLET ORAL ONCE
Qty: 0 | Refills: 0 | Status: DISCONTINUED | OUTPATIENT
Start: 2018-11-13 | End: 2018-11-13

## 2018-11-13 RX ORDER — OXYCODONE HYDROCHLORIDE 5 MG/1
10 TABLET ORAL EVERY 4 HOURS
Qty: 0 | Refills: 0 | Status: DISCONTINUED | OUTPATIENT
Start: 2018-11-13 | End: 2018-11-13

## 2018-11-13 RX ORDER — ALPRAZOLAM 0.25 MG
2 TABLET ORAL THREE TIMES A DAY
Qty: 0 | Refills: 0 | Status: DISCONTINUED | OUTPATIENT
Start: 2018-11-14 | End: 2018-11-19

## 2018-11-13 RX ORDER — ACETAMINOPHEN 500 MG
650 TABLET ORAL EVERY 6 HOURS
Qty: 0 | Refills: 0 | Status: DISCONTINUED | OUTPATIENT
Start: 2018-11-14 | End: 2018-11-19

## 2018-11-13 RX ORDER — SENNA PLUS 8.6 MG/1
2 TABLET ORAL AT BEDTIME
Qty: 0 | Refills: 0 | Status: DISCONTINUED | OUTPATIENT
Start: 2018-11-14 | End: 2018-11-19

## 2018-11-13 RX ORDER — CEFUROXIME AXETIL 250 MG
250 TABLET ORAL EVERY 12 HOURS
Qty: 0 | Refills: 0 | Status: DISCONTINUED | OUTPATIENT
Start: 2018-11-14 | End: 2018-11-14

## 2018-11-13 RX ADMIN — Medication 2 MILLIGRAM(S): at 03:06

## 2018-11-13 RX ADMIN — Medication 650 MILLIGRAM(S): at 02:33

## 2018-11-13 RX ADMIN — CARVEDILOL PHOSPHATE 12.5 MILLIGRAM(S): 80 CAPSULE, EXTENDED RELEASE ORAL at 09:11

## 2018-11-13 RX ADMIN — Medication 650 MILLIGRAM(S): at 02:03

## 2018-11-13 RX ADMIN — SPIRONOLACTONE 25 MILLIGRAM(S): 25 TABLET, FILM COATED ORAL at 14:18

## 2018-11-13 RX ADMIN — CHLORHEXIDINE GLUCONATE 1 APPLICATION(S): 213 SOLUTION TOPICAL at 05:20

## 2018-11-13 RX ADMIN — Medication 1 MILLIGRAM(S): at 17:59

## 2018-11-13 RX ADMIN — Medication 75 MICROGRAM(S): at 08:03

## 2018-11-13 RX ADMIN — SODIUM CHLORIDE 75 MILLILITER(S): 9 INJECTION, SOLUTION INTRAVENOUS at 01:24

## 2018-11-13 RX ADMIN — ENOXAPARIN SODIUM 40 MILLIGRAM(S): 100 INJECTION SUBCUTANEOUS at 00:54

## 2018-11-13 RX ADMIN — LOSARTAN POTASSIUM 50 MILLIGRAM(S): 100 TABLET, FILM COATED ORAL at 18:15

## 2018-11-13 RX ADMIN — Medication 650 MILLIGRAM(S): at 12:06

## 2018-11-13 RX ADMIN — CARVEDILOL PHOSPHATE 25 MILLIGRAM(S): 80 CAPSULE, EXTENDED RELEASE ORAL at 18:16

## 2018-11-13 RX ADMIN — Medication 250 MILLIGRAM(S): at 14:17

## 2018-11-13 RX ADMIN — Medication 650 MILLIGRAM(S): at 12:36

## 2018-11-13 NOTE — H&P ADULT - ASSESSMENT
78F w/ right femoral neck fracture  NPO, IVF  Analgesia  DVT ppx  NWB  d/w patient need for surgical intervention for ambulation  F/U labs  F/U Medicine regarding clearance for OR  Plan for definitive surgical fixation  Will d/w attending any further recommendations

## 2018-11-13 NOTE — PROGRESS NOTE ADULT - ASSESSMENT
Patient is a 78y old  Female who presents with a chief complaint of R hip fracture. Patient underwent CRPP right femur ORIF on 11/13/18. s/p Hip surgery now with . All other VSS, in no acute distress.   The tachycardia began post surgery in the recovery room. She denies any shortness of breath or chest pain. No nausea, vomiting or diaphoresis. She c/o mild pain at surgical site.   On EKG: atrial tachycardia with ST changes in multiple leads.   Her hx includes atrial fibrillation for which she is followed by cardiology. The patient reports a recent negative stress test. Patient to be seen by cardiology post-op. She will require cardiac enzymes and Beta blockers post-op to control post-op tachycardia and rate related ischemia. Continues PACU continuos cardiopulmonary monitoring.

## 2018-11-13 NOTE — PROGRESS NOTE ADULT - ATTENDING COMMENTS
No medical complications post-op to date and to proceed with physical therapy, as tolerated. Continues pulmonary toilet to lessen atelectasis, leg exercises as taught to lessen the risk of DVT and supervised pain medications for post-op pain control.Patient is a 78y old  Female who presents with a chief complaint of R hip fracture. Patient underwent CRPP right femur ORIF on 11/13/18. s/p Hip surgery now with . All other VSS, in no acute distress.   The tachycardia began post surgery in the recovery room. She denies any shortness of breath or chest pain. No nausea, vomiting or diaphoresis. She c/o mild pain at surgical site.   On EKG: atrial tachycardia with ST changes in multiple leads.   Her hx includes atrial fibrillation for which she is followed by cardiology. The patient reports a recent negative stress test. Patient to be seen by cardiology post-op. She will require cardiac enzymes and Beta blockers post-op to control post-op tachycardia and rate related ischemia. Continues PACU continuos cardiopulmonary monitoring. The patient continues to require around the clock cardiopulmonary and neurologic monitoring for critical illness, as cited above.

## 2018-11-13 NOTE — BRIEF OPERATIVE NOTE - PROCEDURE
<<-----Click on this checkbox to enter Procedure Closed reduction and percutaneous pinning (CRPP) of fracture of left femur  11/13/2018    Active  CARMELINA

## 2018-11-13 NOTE — PROGRESS NOTE ADULT - SUBJECTIVE AND OBJECTIVE BOX
Patient is a 78y old  Female who presents with a chief complaint of R hip fracture. Patient underwent CRPP right femur ORIF on 18.         MEDICATIONS  (STANDING):  buPROPion XL . 150 milliGRAM(s) Oral daily  carvedilol 25 milliGRAM(s) Oral every 12 hours  ceFAZolin   IVPB 2000 milliGRAM(s) IV Intermittent every 8 hours  cefuroxime   Tablet 250 milliGRAM(s) Oral every 12 hours  docusate sodium 100 milliGRAM(s) Oral three times a day  lactated ringers. 1000 milliLiter(s) (75 mL/Hr) IV Continuous <Continuous>  levothyroxine 75 MICROGram(s) Oral daily  losartan 50 milliGRAM(s) Oral daily  metoprolol tartrate Injectable 5 milliGRAM(s) IV Push once  metoprolol tartrate Injectable 5 milliGRAM(s) IV Push once  polyethylene glycol 3350 17 Gram(s) Oral daily  rivaroxaban 10 milliGRAM(s) Oral daily  spironolactone 25 milliGRAM(s) Oral daily    MEDICATIONS  (PRN):  acetaminophen   Tablet .. 650 milliGRAM(s) Oral every 6 hours PRN Temp greater or equal to 38C (100.4F), Mild Pain (1 - 3)  ALPRAZolam 2 milliGRAM(s) Oral three times a day PRN anxiety  aluminum hydroxide/magnesium hydroxide/simethicone Suspension 30 milliLiter(s) Oral four times a day PRN Indigestion  HYDROmorphone  Injectable 0.5 milliGRAM(s) IV Push every 10 minutes PRN Moderate Pain (4 - 6)  HYDROmorphone  Injectable 0.25 milliGRAM(s) IV Push every 10 minutes PRN Moderate Pain (4 - 6)  metoprolol tartrate IVPB 5 milliGRAM(s) IV Intermittent every 30 minutes PRN HR greater than 110  ondansetron Injectable 4 milliGRAM(s) IV Push once PRN Nausea and/or Vomiting  ondansetron Injectable 4 milliGRAM(s) IV Push every 6 hours PRN Nausea and/or Vomiting  ondansetron Injectable 4 milliGRAM(s) IV Push every 30 minutes PRN Nausea and/or Vomiting  oxyCODONE    IR 5 milliGRAM(s) Oral every 4 hours PRN Moderate Pain (4 - 6)  oxyCODONE    IR 10 milliGRAM(s) Oral every 4 hours PRN Severe Pain (7 - 10)  senna 2 Tablet(s) Oral at bedtime PRN Constipation      Allergies    No Known Allergies    Intolerances      VITALS:   T(C): 36.1 (18 @ 04:00), Max: 37.1 (18 @ 20:47)  HR: 110 (18 @ 04:00) (82 - 121)  BP: 140/86 (18 @ 04:00) (115/66 - 165/95)  RR: 15 (18 @ 04:00) (15 - 19)  SpO2: 100% (18 @ 04:00) (94% - 100%)  Wt(kg): --    PHYSICAL EXAM:  GENERAL: NAD, well nourished and conversant  HEAD:  Atraumatic  EYES: EOM, PERRLA, conjunctiva pink and sclera white  ENT: No tonsillar erythema, exudates, or enlargement, moist mucous membranes, good dentition, no lesions  NECK: Supple, No JVD, normal thyroid, carotids with normal upstrokes and no bruits  CHEST/LUNG: Clear to auscultation bilaterally, No rales, rhonchi, wheezing, or rubs  HEART: Regular rate and rhythm, No murmurs, rubs, or gallops  ABDOMEN: Soft, nondistended, no masses, guarding, tenderness or rebound, bowel sounds present  EXTREMITIES:  2+ Peripheral Pulses, No clubbing, cyanosis, or edema. No arthritis of shoulders, elbows, hands, hips, knees, ankles, or feet. No DJD C spine, T spine, or L/S spine  LYMPH: No lymphadenopathy noted  SKIN: No rashes or lesions  NERVOUS SYSTEM:  Alert & Oriented X3, normal cognitive function. Motor Strength 5/5 right upper and right lower.  5/5 left upper and left lower extremities, DTRs 2+ intact and symmetric    LABS:    CARDIAC MARKERS ( 2018 01:16 )  x     / x     / 58 U/L / x     / 1.0 ng/mL      CBC Full  -  ( 2018 01:16 )  WBC Count : 20.9 K/uL  Hemoglobin : 13.8 g/dL  Hematocrit : 38.7 %  Platelet Count - Automated : 228 K/uL  Mean Cell Volume : 92.0 fl  Mean Cell Hemoglobin : 32.8 pg  Mean Cell Hemoglobin Concentration : 35.6 gm/dL  Auto Neutrophil # : x  Auto Lymphocyte # : x  Auto Monocyte # : x  Auto Eosinophil # : x  Auto Basophil # : x  Auto Neutrophil % : x  Auto Lymphocyte % : x  Auto Monocyte % : x  Auto Eosinophil % : x  Auto Basophil % : x    11-13    132<L>  |  95<L>  |  8   ----------------------------<  122<H>  4.3   |  23  |  0.69    Ca    9.4      2018 01:16    TPro  7.8  /  Alb  4.2  /  TBili  0.4  /  DBili  x   /  AST  32  /  ALT  26  /  AlkPhos  99  11-12    LIVER FUNCTIONS - ( 2018 19:02 )  Alb: 4.2 g/dL / Pro: 7.8 g/dL / ALK PHOS: 99 U/L / ALT: 26 U/L / AST: 32 U/L / GGT: x           PT/INR - ( 2018 07:57 )   PT: 14.3 sec;   INR: 1.25 ratio         PTT - ( 2018 07:57 )  PTT:35.4 sec  Urinalysis Basic - ( 2018 19:02 )    Color: Light Yellow / Appearance: Clear / S.009 / pH: x  Gluc: x / Ketone: Negative  / Bili: Negative / Urobili: Negative   Blood: x / Protein: Negative / Nitrite: Negative   Leuk Esterase: Large / RBC: 4 /hpf / WBC 98 /hpf   Sq Epi: x / Non Sq Epi: 0 /hpf / Bacteria: Negative      CAPILLARY BLOOD GLUCOSE          RADIOLOGY & ADDITIONAL TESTS:      Consultant(s):    Care Discussed with Consultants/Other Providers [ ] YES  [ ] NO Patient is a 78y old  Female who presents with a chief complaint of R hip fracture. Patient underwent CRPP right femur ORIF on 18. s/p Hip surgery now with . All other VSS, in no acute distress.   The tachycardia began post surgery in the recovery room. She denies any shortness of breath or chest pain. No nausea, vomiting or diaphoresis. She c/o mild pain at surgical site.   On EKG: atrial tachycardia with ST changes in multiple leads.   Her hx includes atrial fibrillation for which she is followed by cardiology. The patient reports a recent negative stress test. Patient to be seen by cardiology post-op. She will require cardiac enzymes and Beta blockers post-op to control post-op tachycardia and rate related ischemia. Continues PACU continuos cardiopulmonary monitoring.        MEDICATIONS  (STANDING):  buPROPion XL . 150 milliGRAM(s) Oral daily  carvedilol 25 milliGRAM(s) Oral every 12 hours  ceFAZolin   IVPB 2000 milliGRAM(s) IV Intermittent every 8 hours  cefuroxime   Tablet 250 milliGRAM(s) Oral every 12 hours  docusate sodium 100 milliGRAM(s) Oral three times a day  lactated ringers. 1000 milliLiter(s) (75 mL/Hr) IV Continuous <Continuous>  levothyroxine 75 MICROGram(s) Oral daily  losartan 50 milliGRAM(s) Oral daily  metoprolol tartrate Injectable 5 milliGRAM(s) IV Push once  metoprolol tartrate Injectable 5 milliGRAM(s) IV Push once  polyethylene glycol 3350 17 Gram(s) Oral daily  rivaroxaban 10 milliGRAM(s) Oral daily  spironolactone 25 milliGRAM(s) Oral daily    MEDICATIONS  (PRN):  acetaminophen   Tablet .. 650 milliGRAM(s) Oral every 6 hours PRN Temp greater or equal to 38C (100.4F), Mild Pain (1 - 3)  ALPRAZolam 2 milliGRAM(s) Oral three times a day PRN anxiety  aluminum hydroxide/magnesium hydroxide/simethicone Suspension 30 milliLiter(s) Oral four times a day PRN Indigestion  HYDROmorphone  Injectable 0.5 milliGRAM(s) IV Push every 10 minutes PRN Moderate Pain (4 - 6)  HYDROmorphone  Injectable 0.25 milliGRAM(s) IV Push every 10 minutes PRN Moderate Pain (4 - 6)  metoprolol tartrate IVPB 5 milliGRAM(s) IV Intermittent every 30 minutes PRN HR greater than 110  ondansetron Injectable 4 milliGRAM(s) IV Push once PRN Nausea and/or Vomiting  ondansetron Injectable 4 milliGRAM(s) IV Push every 6 hours PRN Nausea and/or Vomiting  ondansetron Injectable 4 milliGRAM(s) IV Push every 30 minutes PRN Nausea and/or Vomiting  oxyCODONE    IR 5 milliGRAM(s) Oral every 4 hours PRN Moderate Pain (4 - 6)  oxyCODONE    IR 10 milliGRAM(s) Oral every 4 hours PRN Severe Pain (7 - 10)  senna 2 Tablet(s) Oral at bedtime PRN Constipation      Allergies    No Known Allergies    Intolerances      VITALS:   T(C): 36.1 (18 @ 04:00), Max: 37.1 (18 @ 20:47)  HR: 110 (18 @ 04:00) (82 - 121)  BP: 140/86 (18 @ 04:00) (115/66 - 165/95)  RR: 15 (18 @ 04:00) (15 - 19)  SpO2: 100% (18 @ 04:00) (94% - 100%)  Wt(kg): --    PHYSICAL EXAM:  GENERAL: NAD, well nourished and conversant  HEAD:  Atraumatic  EYES: EOM, PERRLA, conjunctiva pink and sclera white  ENT: No tonsillar erythema, exudates, or enlargement, moist mucous membranes, good dentition, no lesions  NECK: Supple, No JVD, normal thyroid, carotids with normal upstrokes and no bruits  CHEST/LUNG: Clear to auscultation bilaterally, No rales, rhonchi, wheezing, or rubs  HEART: Atrial fibrillation with heart rate 0f110. No murmurs, rubs, or gallops  ABDOMEN: Soft, nondistended, no masses, guarding, tenderness or rebound, bowel sounds present  EXTREMITIES:  4+ R hip swelling and 3+ tenderness to touch. No erythema or incisional drainage.  LYMPH: No lymphadenopathy noted  SKIN: No rashes or lesions  NERVOUS SYSTEM:  Alert & Oriented X3, normal cognitive function. Motor Strength 5/5 right upper and right lower.  5/5 left upper and left lower extremities, DTRs 2+ intact and symmetric    LABS:    CARDIAC MARKERS ( 2018 01:16 )  x     / x     / 58 U/L / x     / 1.0 ng/mL      CBC Full  -  ( 2018 01:16 )  WBC Count : 20.9 K/uL  Hemoglobin : 13.8 g/dL  Hematocrit : 38.7 %  Platelet Count - Automated : 228 K/uL  Mean Cell Volume : 92.0 fl  Mean Cell Hemoglobin : 32.8 pg  Mean Cell Hemoglobin Concentration : 35.6 gm/dL  Auto Neutrophil # : x  Auto Lymphocyte # : x  Auto Monocyte # : x  Auto Eosinophil # : x  Auto Basophil # : x  Auto Neutrophil % : x  Auto Lymphocyte % : x  Auto Monocyte % : x  Auto Eosinophil % : x  Auto Basophil % : x    11-13    132<L>  |  95<L>  |  8   ----------------------------<  122<H>  4.3   |  23  |  0.69    Ca    9.4      2018 01:16    TPro  7.8  /  Alb  4.2  /  TBili  0.4  /  DBili  x   /  AST  32  /  ALT  26  /  AlkPhos  99  11-12    LIVER FUNCTIONS - ( 2018 19:02 )  Alb: 4.2 g/dL / Pro: 7.8 g/dL / ALK PHOS: 99 U/L / ALT: 26 U/L / AST: 32 U/L / GGT: x           PT/INR - ( 2018 07:57 )   PT: 14.3 sec;   INR: 1.25 ratio         PTT - ( 2018 07:57 )  PTT:35.4 sec  Urinalysis Basic - ( 2018 19:02 )    Color: Light Yellow / Appearance: Clear / S.009 / pH: x  Gluc: x / Ketone: Negative  / Bili: Negative / Urobili: Negative   Blood: x / Protein: Negative / Nitrite: Negative   Leuk Esterase: Large / RBC: 4 /hpf / WBC 98 /hpf   Sq Epi: x / Non Sq Epi: 0 /hpf / Bacteria: Negative

## 2018-11-13 NOTE — PROGRESS NOTE ADULT - SUBJECTIVE AND OBJECTIVE BOX
Orthopedic Surgery Progress Note  No acute events overnight.  Pain well controlled.  No chest pain, shortness of breath, light-headedness.    O:  Vital Signs Last 24 Hrs  T(C): 36.3 (13 Nov 2018 04:47), Max: 36.7 (12 Nov 2018 23:26)  T(F): 97.4 (13 Nov 2018 04:47), Max: 98 (12 Nov 2018 23:26)  HR: 65 (13 Nov 2018 04:47) (65 - 84)  BP: 114/55 (13 Nov 2018 04:47) (114/55 - 168/107)  BP(mean): --  RR: 16 (13 Nov 2018 04:47) (16 - 18)  SpO2: 95% (13 Nov 2018 04:47) (95% - 98%)    Gen: NAD  RLE  EHL/FHL/TA/GS intact  SILT DP/SP/MONTOYA/Sa  WWP distally    Labs:                        13.5   10.3  )-----------( 263      ( 12 Nov 2018 19:02 )             37.7       11-12    127<L>  |  89<L>  |  9   ----------------------------<  95  4.4   |  23  |  0.70        PT/INR - ( 12 Nov 2018 19:02 )   PT: 13.5 sec;   INR: 1.17 ratio         PTT - ( 12 Nov 2018 19:02 )  PTT:32.5 sec    A/P 78y year old female s/p   Pain Control  Hold  NPO IVFDVT PPX  PT/OOB  NWB RLE   Strict bedrest    Ashley HEBERT [id number]

## 2018-11-13 NOTE — CHART NOTE - NSCHARTNOTEFT_GEN_A_CORE
Spoke with patient's cardiologist (who she has yet to see in office) per daughter's request. Will continue home medications including losartan 50mg daily and carvedilol 25 mg BID, as well as spironolactone. If patient post-op has uncontrolled blood pressures will start furosemide.

## 2018-11-13 NOTE — H&P ADULT - HISTORY OF PRESENT ILLNESS
78yFemale c/o R hip pain s/p mechanical fall. Patient denies head hit or LOC. Patient denies numbness or tingling in the RLE. Patient denies any other injuries.    PMH:  Macular degeneration  Osteoarthritis  Diverticulitis  GERD (gastroesophageal reflux disease)  Hypertension    PSH:  H/O thyroidectomy  No significant past surgical history    AH:    Meds: See med rec    T(C): 36.6 (11-12-18 @ 23:50)  HR: 75 (11-12-18 @ 23:50)  BP: 146/85 (11-12-18 @ 23:50)  RR: 16 (11-12-18 @ 23:50)  SpO2: 95% (11-12-18 @ 23:50)  Wt(kg): --    PE RLE:  Skin intact, no ecchymosis, SILT L2-S1, +EHL/FHL/TA/Gastroc, +Knee/ankle ROM, hip ROM limited 2/2 pain, DP+, soft compartments, no calf ttp, +log roll.    Secondary:  No TTP over bony landmarks, SILT BL, ROM intact BL, distal pulses palpable.    Imaging:  XR demonstrating R hip fracture

## 2018-11-14 LAB
ANION GAP SERPL CALC-SCNC: 14 MMOL/L — SIGNIFICANT CHANGE UP (ref 5–17)
BUN SERPL-MCNC: 8 MG/DL — SIGNIFICANT CHANGE UP (ref 7–23)
CALCIUM SERPL-MCNC: 9.4 MG/DL — SIGNIFICANT CHANGE UP (ref 8.4–10.5)
CHLORIDE SERPL-SCNC: 95 MMOL/L — LOW (ref 96–108)
CK MB CFR SERPL CALC: 1 NG/ML — SIGNIFICANT CHANGE UP (ref 0–3.8)
CK SERPL-CCNC: 58 U/L — SIGNIFICANT CHANGE UP (ref 25–170)
CO2 SERPL-SCNC: 23 MMOL/L — SIGNIFICANT CHANGE UP (ref 22–31)
CREAT SERPL-MCNC: 0.69 MG/DL — SIGNIFICANT CHANGE UP (ref 0.5–1.3)
CULTURE RESULTS: NO GROWTH — SIGNIFICANT CHANGE UP
GLUCOSE SERPL-MCNC: 122 MG/DL — HIGH (ref 70–99)
HCT VFR BLD CALC: 38.7 % — SIGNIFICANT CHANGE UP (ref 34.5–45)
HGB BLD-MCNC: 13.8 G/DL — SIGNIFICANT CHANGE UP (ref 11.5–15.5)
MCHC RBC-ENTMCNC: 32.8 PG — SIGNIFICANT CHANGE UP (ref 27–34)
MCHC RBC-ENTMCNC: 35.6 GM/DL — SIGNIFICANT CHANGE UP (ref 32–36)
MCV RBC AUTO: 92 FL — SIGNIFICANT CHANGE UP (ref 80–100)
NT-PROBNP SERPL-SCNC: 542 PG/ML — HIGH (ref 0–300)
PLATELET # BLD AUTO: 228 K/UL — SIGNIFICANT CHANGE UP (ref 150–400)
POTASSIUM SERPL-MCNC: 4.3 MMOL/L — SIGNIFICANT CHANGE UP (ref 3.5–5.3)
POTASSIUM SERPL-SCNC: 4.3 MMOL/L — SIGNIFICANT CHANGE UP (ref 3.5–5.3)
RBC # BLD: 4.21 M/UL — SIGNIFICANT CHANGE UP (ref 3.8–5.2)
RBC # FLD: 12.2 % — SIGNIFICANT CHANGE UP (ref 10.3–14.5)
SODIUM SERPL-SCNC: 132 MMOL/L — LOW (ref 135–145)
SPECIMEN SOURCE: SIGNIFICANT CHANGE UP
TROPONIN T, HIGH SENSITIVITY RESULT: 9 NG/L — SIGNIFICANT CHANGE UP (ref 0–51)
WBC # BLD: 20.9 K/UL — HIGH (ref 3.8–10.5)
WBC # FLD AUTO: 20.9 K/UL — HIGH (ref 3.8–10.5)

## 2018-11-14 PROCEDURE — 73502 X-RAY EXAM HIP UNI 2-3 VIEWS: CPT | Mod: 26,RT

## 2018-11-14 PROCEDURE — 93010 ELECTROCARDIOGRAM REPORT: CPT

## 2018-11-14 RX ORDER — CEFAZOLIN SODIUM 1 G
2000 VIAL (EA) INJECTION EVERY 8 HOURS
Qty: 0 | Refills: 0 | Status: COMPLETED | OUTPATIENT
Start: 2018-11-14 | End: 2018-11-14

## 2018-11-14 RX ORDER — METOPROLOL TARTRATE 50 MG
5 TABLET ORAL
Qty: 0 | Refills: 0 | Status: DISCONTINUED | OUTPATIENT
Start: 2018-11-14 | End: 2018-11-18

## 2018-11-14 RX ORDER — PSYLLIUM SEED (WITH DEXTROSE)
1 POWDER (GRAM) ORAL
Qty: 0 | Refills: 0 | Status: DISCONTINUED | OUTPATIENT
Start: 2018-11-14 | End: 2018-11-19

## 2018-11-14 RX ORDER — METOPROLOL TARTRATE 50 MG
5 TABLET ORAL ONCE
Qty: 0 | Refills: 0 | Status: COMPLETED | OUTPATIENT
Start: 2018-11-14 | End: 2018-11-14

## 2018-11-14 RX ORDER — CARVEDILOL PHOSPHATE 80 MG/1
25 CAPSULE, EXTENDED RELEASE ORAL EVERY 12 HOURS
Qty: 0 | Refills: 0 | Status: DISCONTINUED | OUTPATIENT
Start: 2018-11-14 | End: 2018-11-17

## 2018-11-14 RX ORDER — HYDROMORPHONE HYDROCHLORIDE 2 MG/ML
0.25 INJECTION INTRAMUSCULAR; INTRAVENOUS; SUBCUTANEOUS
Qty: 0 | Refills: 0 | Status: DISCONTINUED | OUTPATIENT
Start: 2018-11-14 | End: 2018-11-14

## 2018-11-14 RX ORDER — SODIUM CHLORIDE 9 MG/ML
500 INJECTION, SOLUTION INTRAVENOUS ONCE
Qty: 0 | Refills: 0 | Status: COMPLETED | OUTPATIENT
Start: 2018-11-14 | End: 2018-11-14

## 2018-11-14 RX ORDER — SODIUM CHLORIDE 9 MG/ML
500 INJECTION INTRAMUSCULAR; INTRAVENOUS; SUBCUTANEOUS ONCE
Qty: 0 | Refills: 0 | Status: COMPLETED | OUTPATIENT
Start: 2018-11-14 | End: 2018-11-14

## 2018-11-14 RX ORDER — PANTOPRAZOLE SODIUM 20 MG/1
40 TABLET, DELAYED RELEASE ORAL
Qty: 0 | Refills: 0 | Status: DISCONTINUED | OUTPATIENT
Start: 2018-11-14 | End: 2018-11-19

## 2018-11-14 RX ORDER — AMITRIPTYLINE HCL 25 MG
25 TABLET ORAL AT BEDTIME
Qty: 0 | Refills: 0 | Status: DISCONTINUED | OUTPATIENT
Start: 2018-11-14 | End: 2018-11-19

## 2018-11-14 RX ORDER — ONDANSETRON 8 MG/1
4 TABLET, FILM COATED ORAL ONCE
Qty: 0 | Refills: 0 | Status: DISCONTINUED | OUTPATIENT
Start: 2018-11-14 | End: 2018-11-14

## 2018-11-14 RX ADMIN — HYDROMORPHONE HYDROCHLORIDE 0.5 MILLIGRAM(S): 2 INJECTION INTRAMUSCULAR; INTRAVENOUS; SUBCUTANEOUS at 02:10

## 2018-11-14 RX ADMIN — LOSARTAN POTASSIUM 50 MILLIGRAM(S): 100 TABLET, FILM COATED ORAL at 10:33

## 2018-11-14 RX ADMIN — HYDROMORPHONE HYDROCHLORIDE 0.5 MILLIGRAM(S): 2 INJECTION INTRAMUSCULAR; INTRAVENOUS; SUBCUTANEOUS at 02:25

## 2018-11-14 RX ADMIN — Medication 110 MILLIGRAM(S): at 03:30

## 2018-11-14 RX ADMIN — RIVAROXABAN 10 MILLIGRAM(S): KIT at 12:21

## 2018-11-14 RX ADMIN — Medication 250 MILLIGRAM(S): at 18:37

## 2018-11-14 RX ADMIN — BUPROPION HYDROCHLORIDE 150 MILLIGRAM(S): 150 TABLET, EXTENDED RELEASE ORAL at 13:12

## 2018-11-14 RX ADMIN — Medication 100 MILLIGRAM(S): at 14:25

## 2018-11-14 RX ADMIN — Medication 250 MILLIGRAM(S): at 06:53

## 2018-11-14 RX ADMIN — SPIRONOLACTONE 25 MILLIGRAM(S): 25 TABLET, FILM COATED ORAL at 06:52

## 2018-11-14 RX ADMIN — CARVEDILOL PHOSPHATE 25 MILLIGRAM(S): 80 CAPSULE, EXTENDED RELEASE ORAL at 06:52

## 2018-11-14 RX ADMIN — Medication 2 MILLIGRAM(S): at 04:40

## 2018-11-14 RX ADMIN — CARVEDILOL PHOSPHATE 25 MILLIGRAM(S): 80 CAPSULE, EXTENDED RELEASE ORAL at 18:36

## 2018-11-14 RX ADMIN — Medication 110 MILLIGRAM(S): at 04:00

## 2018-11-14 RX ADMIN — SODIUM CHLORIDE 1000 MILLILITER(S): 9 INJECTION INTRAMUSCULAR; INTRAVENOUS; SUBCUTANEOUS at 04:00

## 2018-11-14 RX ADMIN — SODIUM CHLORIDE 75 MILLILITER(S): 9 INJECTION, SOLUTION INTRAVENOUS at 01:11

## 2018-11-14 RX ADMIN — Medication 100 MILLIGRAM(S): at 06:13

## 2018-11-14 RX ADMIN — Medication 75 MICROGRAM(S): at 06:52

## 2018-11-14 RX ADMIN — Medication 100 MILLIGRAM(S): at 06:14

## 2018-11-14 RX ADMIN — SODIUM CHLORIDE 1000 MILLILITER(S): 9 INJECTION, SOLUTION INTRAVENOUS at 01:35

## 2018-11-14 RX ADMIN — SODIUM CHLORIDE 75 MILLILITER(S): 9 INJECTION, SOLUTION INTRAVENOUS at 06:58

## 2018-11-14 RX ADMIN — Medication 110 MILLIGRAM(S): at 03:00

## 2018-11-14 NOTE — CONSULT NOTE ADULT - ATTENDING COMMENTS
Patient seen and examined, agree with the above assessment and plan by NGHIA Castro.  Patient with PMH as above p/w fall s/p surgical repair of L femoral FX w postop SVT likely atach  Cont  BB  could add cardizem if needed for rate control  pain control  IVF  Recent ECHO revealed normal LVEF  DVT ppx

## 2018-11-14 NOTE — PHYSICAL THERAPY INITIAL EVALUATION ADULT - GAIT DEVIATIONS NOTED, PT EVAL
increased time in double stance/decreased step length/decreased javier/decreased weight-shifting ability

## 2018-11-14 NOTE — PROGRESS NOTE ADULT - SUBJECTIVE AND OBJECTIVE BOX
Pt seen & examined. Pain controlled. Denies CP/SOB/N/V.    Vital Signs Last 24 Hrs  T(C): 36.1 (14 Nov 2018 04:00), Max: 37.1 (13 Nov 2018 20:47)  T(F): 97 (14 Nov 2018 04:00), Max: 98.8 (13 Nov 2018 20:47)  HR: 92 (14 Nov 2018 06:00) (82 - 121)  BP: 107/61 (14 Nov 2018 06:00) (107/61 - 172/99)  BP(mean): 79 (14 Nov 2018 06:00) (79 - 130)  RR: 18 (14 Nov 2018 06:00) (14 - 19)  SpO2: 100% (14 Nov 2018 06:00) (94% - 100%)    Gen: NAD  RLE:  Dressing clean D&I  +sensation L2-S1  +dorsiflexion/plantarflexion of ankle/hallux  +dorsalis pedis pulse  Soft compartments, - calf tenderness

## 2018-11-14 NOTE — CONSULT NOTE ADULT - SUBJECTIVE AND OBJECTIVE BOX
CARDIOLOGY CONSULT - Dr. Calle     CHIEF COMPLAINT:    HPI:  78y Female with Pmed hx as mentioned below presents with c/o R hip pain s/p mechanical fall. Patient is now s/p closed reduction and percutaneous pinning (CRPP) of fracture of left femur. She is being evaluated today  post op for c.o palpitations, possible SVT vs Atach on tele. Patient known from prior admission. Recent echo from 10/17/2018 revealed normal lv fx, EF 60-65%, minimal MR. No hx of chf, mi, cad, or arrhythmias. Denies cp, sob, fever, chills, dizziness, syncope, or exertional symptoms. ROS otherwise negative.         PAST MEDICAL & SURGICAL HISTORY:  Macular degeneration  Osteoarthritis  Diverticulitis  GERD (gastroesophageal reflux disease)  Hypertension  H/O thyroidectomy          PREVIOUS DIAGNOSTIC TESTING:    [ ] Echocardiogram:  < from: Transthoracic Echocardiogram (10.17.18 @ 16:24) >  EF (Visual Estimate): 60-65 %  Doppler Peak Velocity (m/sec): AoV=1.3 TV=1.8  ------------------------------------------------------------------------  Observations:  Mitral Valve: Normal mitral valve. Minimal mitral  regurgitation.  Aortic Valve/Aorta: Normal trileaflet aortic valve. Peak  transaortic valve gradient equals 7 mm Hg. Mild aortic  regurgitation.  Peak left ventricular outflow tract  gradient equals 3 mm Hg.  Aortic Root: 3 cm.  Left Atrium: Normal left atrium.  LA volume index = 31  cc/m2.  Left Ventricle: Normal left ventricular systolic function.  No segmental wall motion abnormalities. Increased relative  wall thickness with normal left ventricular mass index,  consistent with concentric left ventricular remodeling.  Normal diastolic function  Right Heart: Normal right atrium. Normalright ventricular  size and function. Normal tricuspid valve. Minimal  tricuspid regurgitation. Pulmonic valve not well  visualized. Minimal pulmonic regurgitation.  Pericardium/Pleura: Normal pericardium with no pericardial  effusion.  Hemodynamic: Estimated right atrial pressure is 8 mm Hg.  Estimated right ventricular systolic pressure equals 21 mm  Hg, assuming right atrial pressure equals 8 mm Hg,  consistent with normal pulmonary pressures.  ------------------------------------------------------------------------  Conclusions:  1. Normal trileaflet aortic valve. Mild aortic  regurgitation.  2. Increased relative wall thickness with normal left  ventricular mass index, consistent with concentric left  ventricular remodeling.  3. Normal left ventricular systolic function. No segmental  wall motion abnormalities.  4. Normal right ventricular size and function.  5. Estimated right ventricular systolic pressure equals 21  mm Hg, assuming right atrial pressure equals 8 mm Hg,  consistent with normal pulmonary pressures.  *** No previous Echo exam.  ------------------------------------------------------------------------  Confirmed on  10/17/2018 - 19:31:48 by Wesley Bartholomew M.D.  ------------------------------------------------------------------------    < end of copied text >    [ ]  Catheterization:    [ ] Stress Test:  	    MEDICATIONS:  MEDICATIONS  (STANDING):  buPROPion XL . 150 milliGRAM(s) Oral daily  carvedilol 25 milliGRAM(s) Oral every 12 hours  ceFAZolin   IVPB 2000 milliGRAM(s) IV Intermittent every 8 hours  cefuroxime   Tablet 250 milliGRAM(s) Oral every 12 hours  docusate sodium 100 milliGRAM(s) Oral three times a day  lactated ringers. 1000 milliLiter(s) (75 mL/Hr) IV Continuous <Continuous>  levothyroxine 75 MICROGram(s) Oral daily  losartan 50 milliGRAM(s) Oral daily  polyethylene glycol 3350 17 Gram(s) Oral daily  rivaroxaban 10 milliGRAM(s) Oral daily  spironolactone 25 milliGRAM(s) Oral daily      FAMILY HISTORY:  No pertinent family history in first degree relatives      SOCIAL HISTORY:    [x ] Non-smoker  [ ] Smoker  [ ] Alcohol    Allergies    No Known Allergies    Intolerances    	    REVIEW OF SYSTEMS:  CONSTITUTIONAL: No fever, weight loss, or fatigue  EYES: No eye pain, visual disturbances, or discharge  ENMT:  No difficulty hearing, tinnitus, vertigo; No sinus or throat pain  NECK: No pain or stiffness  RESPIRATORY: No cough, wheezing, chills or hemoptysis; No Shortness of Breath  CARDIOVASCULAR: No chest pain, + palpitations, No passing out, dizziness, or leg swelling  GASTROINTESTINAL: No abdominal or epigastric pain. No nausea, vomiting, or hematemesis; No diarrhea or constipation. No melena or hematochezia.  GENITOURINARY: No dysuria, frequency, hematuria, or incontinence  NEUROLOGICAL: No headaches, memory loss, loss of strength, numbness, or tremors  SKIN: No itching, burning, rashes, or lesions   	    [ x] All others negative	  [ ] Unable to obtain    PHYSICAL EXAM:  T(C): 36.4 (11-14-18 @ 09:00), Max: 37.1 (11-13-18 @ 20:47)  HR: 92 (11-14-18 @ 11:44) (83 - 121)  BP: 147/89 (11-14-18 @ 11:44) (107/61 - 172/99)  RR: 16 (11-14-18 @ 09:00) (14 - 19)  SpO2: 99% (11-14-18 @ 11:44) (94% - 100%)  Wt(kg): --  I&O's Summary    13 Nov 2018 07:01  -  14 Nov 2018 07:00  --------------------------------------------------------  IN: 1755 mL / OUT: 1300 mL / NET: 455 mL    14 Nov 2018 07:01  -  14 Nov 2018 11:55  --------------------------------------------------------  IN: 150 mL / OUT: 650 mL / NET: -500 mL        Appearance: Normal	  Psychiatry: A & O x 3, Mood & affect appropriate  HEENT:   Normal oral mucosa, PERRL, EOMI	  Lymphatic: No lymphadenopathy  Cardiovascular: Normal S1 S2,RRR, No JVD, No murmurs  Respiratory: Lungs clear to auscultation	  Gastrointestinal:  Soft, Non-tender, + BS	  Skin: No rashes, No ecchymoses, No cyanosis	  Neurologic: Non-focal  Extremities: Normal range of motion, No clubbing, cyanosis or edema  Vascular: Peripheral pulses palpable 2+ bilaterally    TELEMETRY: 	    ECG:  	  RADIOLOGY:  OTHER: 	  	  LABS:	 	    CARDIAC MARKERS:                                  13.8   20.9  )-----------( 228      ( 14 Nov 2018 01:16 )             38.7     11-14    132<L>  |  95<L>  |  8   ----------------------------<  122<H>  4.3   |  23  |  0.69    Ca    9.4      14 Nov 2018 01:16    TPro  7.8  /  Alb  4.2  /  TBili  0.4  /  DBili  x   /  AST  32  /  ALT  26  /  AlkPhos  99  11-12    PT/INR - ( 13 Nov 2018 07:57 )   PT: 14.3 sec;   INR: 1.25 ratio         PTT - ( 13 Nov 2018 07:57 )  PTT:35.4 sec  proBNP: Serum Pro-Brain Natriuretic Peptide: 542 pg/mL (11-14 @ 01:16)    Lipid Profile:   HgA1c:   TSH:
79 y/o female with PMH of HTN, macular degeneration, diverticulitis, osteoarthritis, on enteric coated aspirin, presents to the ED BIB son by car c/o right hip and leg pain s/p fall last night. Pt states it was a mechanical fall, has unsteady gait at baseline and fell while son was helping her to her car after shopping. Since the fall, pt hasn't been only able to ambulate with assistance due to the pain. Pt's son has been helping her ambulate since the fall. Pt tried Tylenol for the pain with minimal relief. States she can only take Tylenol because pt has problems with her stomach. Patient was recently put on losartan but pt hasn't been feeling more lightheaded since the change. Notes she was admitted to the hospital about 3 weeks ago for HTN. Denies vomiting, nausea, numbness/tingling, dizziness, LOC, head injury or any other complaints. Current medications: losartan, enteric coated aspirin. Patient forund to have a right hip fx and is scheduled for ORIF of the right hip. As per the Patient and her son Patient has a hx of labile HT.      Patient is a 78y old  Female who presents with a chief complaint of R hip fracture (2018 00:28)      PAST MEDICAL & SURGICAL HISTORY:  Macular degeneration  Osteoarthritis  Diverticulitis  GERD (gastroesophageal reflux disease)  Hypertension  H/O thyroidectomy        MEDICATIONS  (STANDING):  amitriptyline 25 milliGRAM(s) Oral at bedtime  aspirin enteric coated 81 milliGRAM(s) Oral daily  buPROPion  milliGRAM(s) Oral two times a day  carvedilol 12.5 milliGRAM(s) Oral every 12 hours  cefuroxime   Tablet 250 milliGRAM(s) Oral every 12 hours  enoxaparin Injectable 40 milliGRAM(s) SubCutaneous Once  lactated ringers. 1000 milliLiter(s) (75 mL/Hr) IV Continuous <Continuous>  levothyroxine 75 MICROGram(s) Oral daily  spironolactone 25 milliGRAM(s) Oral daily    MEDICATIONS  (PRN):  ALPRAZolam 2 milliGRAM(s) Oral three times a day PRN anxiety  HYDROmorphone  Injectable 0.5 milliGRAM(s) IV Push every 4 hours PRN Severe Pain (7 - 10)  oxyCODONE    IR 5 milliGRAM(s) Oral every 4 hours PRN Mild Pain (1 - 3)  oxyCODONE    IR 10 milliGRAM(s) Oral every 4 hours PRN Moderate Pain (4 - 6)      Soc Hx:   Tobacco: neg   ETOH: rarely  Drugs Neg    CONSTITUTIONAL: No weakness, fevers or chills  EYES/ENT: No visual changes;  No vertigo or throat pain   NECK: No pain or stiffness  RESPIRATORY: No cough, wheezing, hemoptysis; No shortness of breath  CARDIOVASCULAR: No chest pain or palpitations  GASTROINTESTINAL: No abdominal or epigastric pain. No nausea, vomiting, or hematemesis; No diarrhea or constipation. No melena or hematochezia.  GENITOURINARY: No dysuria, frequency or hematuria  NEUROLOGICAL: No numbness or weakness  SKIN: No itching, burning, rashes, or lesions   Musculoskeletal right leg pain      INTERVAL HPI/OVERNIGHT EVENTS:  T(C): 36.6 (18 @ 23:50), Max: 36.7 (18 @ 23:26)  HR: 75 (18 @ 23:50) (75 - 84)  BP: 146/85 (18 @ 23:50) (141/88 - 168/107)  RR: 16 (18 @ 23:50) (16 - 18)  SpO2: 95% (18 @ 23:50) (95% - 98%)  Wt(kg): --  I&O's Summary      PHYSICAL EXAM:  GENERAL: NAD, well-groomed, well-developed  HEAD:  Atraumatic, Normocephalic  EYES: EOMI, PERRLA, conjunctiva and sclera clear  ENMT: No tonsillar erythema, exudates, or enlargement; Moist mucous membranes, Good dentition, No lesions  NECK: Supple, No JVD, Normal thyroid  NERVOUS SYSTEM:  Alert & Oriented X3, Good concentration; Motor Strength 5/5 B/L upper and lower extremities; DTRs 2+ intact and symmetric  CHEST/LUNG: Clear to percussion bilaterally; No rales, rhonchi, wheezing, or rubs  HEART: Regular rate and rhythm; No murmurs, rubs, or gallops  ABDOMEN: Soft, Nontender, Nondistended; Bowel sounds present  EXTREMITIES:  2+ Peripheral Pulses, No clubbing, cyanosis, or edema  LYMPH: No lymphadenopathy noted  SKIN: No rashes or lesions        LABS:                        13.5   10.3  )-----------( 263      ( 2018 19:02 )             37.7     11-12    127<L>  |  89<L>  |  9   ----------------------------<  95  4.4   |  23  |  0.70    Ca    10.0      2018 19:02    TPro  7.8  /  Alb  4.2  /  TBili  0.4  /  DBili  x   /  AST  32  /  ALT  26  /  AlkPhos  99  11-12    PT/INR - ( 2018 19:02 )   PT: 13.5 sec;   INR: 1.17 ratio         PTT - ( 2018 19:02 )  PTT:32.5 sec  Urinalysis Basic - ( 2018 19:02 )    Color: Light Yellow / Appearance: Clear / S.009 / pH: x  Gluc: x / Ketone: Negative  / Bili: Negative / Urobili: Negative   Blood: x / Protein: Negative / Nitrite: Negative   Leuk Esterase: Large / RBC: 4 /hpf / WBC 98 /hpf   Sq Epi: x / Non Sq Epi: 0 /hpf / Bacteria: Negative      CAPILLARY BLOOD GLUCOSE            Urinalysis Basic - ( 2018 19:02 )    Color: Light Yellow / Appearance: Clear / S.009 / pH: x  Gluc: x / Ketone: Negative  / Bili: Negative / Urobili: Negative   Blood: x / Protein: Negative / Nitrite: Negative   Leuk Esterase: Large / RBC: 4 /hpf / WBC 98 /hpf   Sq Epi: x / Non Sq Epi: 0 /hpf / Bacteria: Negative        EKG  NSR @ 85 frequent APCs

## 2018-11-14 NOTE — CHART NOTE - NSCHARTNOTEFT_GEN_A_CORE
Patient to be followed by Dr. Calle   Cardiology fellow called for tachycardia in PACU  Telemetry showed graphic trend from 90s to 120 instantly, indicative of an onset of SVT (looking at EKG, p-waves seem abnormal) - likely ATACH  Advised giving fluids and IV metop, patient then continued to spontaneous convert between sinus and Atach. (BPs were elevated and lungs without rales)  Further cardiology care by Dr. Calle. Patient to be followed by Dr. Calle   Cardiology fellow called for tachycardia in PACU  Patient mildly symptomatic (palpitations), but comfortable. Hemodynamically stable.  Telemetry showed graphic trend from 90s to 120 instantly, indicative of an onset of SVT (looking at EKG, p-waves seem abnormal) - likely ATACH  Advised giving fluids and IV metop, patient then continued to spontaneous convert between sinus and Atach. (BPs were elevated and lungs without rales)  Further cardiology care by Dr. Calle.

## 2018-11-14 NOTE — CONSULT NOTE ADULT - ASSESSMENT
78 year old female with htn, macular degeneration, GERD, Diverticulosis admitted with s/p mechanical fall now s/p closed reduction and percutaneous pinning (CRPP) of fracture of left femur, Postop c/o palpiations     1. Atrial tachycardia   likely in the setting of dehydration, post op pain   s/p IVP lopressor   intermittently in NSR with PAC and atach  EKG revealing no evidence of ACS/ HS trop negative   continue with IVF   cv stable with no cp or decomp chf on exam    recent echo revealing normal LV fx   monitor tele   continue with coreg as ordered  if tachycardia persists will consider adding cardizem     2. HTN   continue with current anti-htn meds     3. s/p closed reduction and percutaneous pinning (CRPP) of fracture of left femur  ortho f/u  on rivaroxaban ppx     dvt ppx
77 yo woman with a multiple medical issue presents with right hip fx scheduled for ORIF of the right hip

## 2018-11-14 NOTE — PACU DISCHARGE NOTE - COMMENTS
Hemodynamically stable with no complaints. HR stable at about 100bpm  Denies CP or SOB  CE negative x 1  Cardiology following, case discussed with cards fellow, Dr. Melissa.   Pt to be tx to telemetry floor

## 2018-11-14 NOTE — CHART NOTE - NSCHARTNOTEFT_GEN_A_CORE
as per cardiology:  Pt may be transferred to floor (7-T)  PENDING BED AVAILABILITY  No need for telemetry bed post-op @  this time  Daily EKG's (ordered)      Pt asymptomatic @ this time  128/76- 114R-16-O2 sat: 995 (R/A)    Plan:  Monitor  PT: WBAT  daily labs  daily EKG's (per cards)

## 2018-11-14 NOTE — CHART NOTE - NSCHARTNOTEFT_GEN_A_CORE
ORTHO POST OP CHECK    S: Pt seen and examined. Patient has palpitations and is anxious. No SOB, no chest pain. No n/v.       O:   PE:  Gen: NAD, laying comfortably in bed  Resp: Unlabored breathing  MSK: Dressing c/d/i          +EHL/FHL/TA/Gas/SOl          +DP/SP/Jeannie/Saph           2+DP                          13.8   20.9  )-----------( 228      ( 14 Nov 2018 01:16 )             38.7     11-14    132<L>  |  95<L>  |  8   ----------------------------<  122<H>  4.3   |  23  |  0.69    Ca    9.4      14 Nov 2018 01:16    TPro  7.8  /  Alb  4.2  /  TBili  0.4  /  DBili  x   /  AST  32  /  ALT  26  /  AlkPhos  99  11-12      Vital Signs Last 24 Hrs  T(C): 36.6 (14 Nov 2018 00:55), Max: 37.1 (13 Nov 2018 20:47)  T(F): 97.9 (14 Nov 2018 00:55), Max: 98.8 (13 Nov 2018 20:47)  HR: 121 (14 Nov 2018 03:00) (65 - 121)  BP: 116/68 (14 Nov 2018 03:00) (114/55 - 165/95)  BP(mean): 88 (14 Nov 2018 03:00) (85 - 111)  RR: 16 (14 Nov 2018 03:00) (15 - 19)  SpO2: 94% (14 Nov 2018 03:00) (94% - 100%)  I&O's Summary    12 Nov 2018 07:01  -  13 Nov 2018 07:00  --------------------------------------------------------  IN: 600 mL / OUT: 1200 mL / NET: -600 mL    13 Nov 2018 07:01  -  14 Nov 2018 03:16  --------------------------------------------------------  IN: 725 mL / OUT: 950 mL / NET: -225 mL        A/P: 78F s/p L Hip CRPP POD 1  - Cardiology eval for possible abnormal EKG and tachycardia  - FU Cardiac enzymes and BNP  -Neuro: Multimodal pain control  -Resp: IS  -GI: reg diet  -MSK: OOB, WBAT, PT/OT  -Heme: DVT PPx    Ortho ORTHO POST OP CHECK    S: Pt seen and examined. Patient has palpitations and is anxious. No SOB, no chest pain. No n/v. Cardiology consulted, will see.       O:   PE:  Gen: NAD, laying comfortably in bed  Resp: Unlabored breathing  MSK: Dressing c/d/i          +EHL/FHL/TA/Gas/SOl          +DP/SP/Jeannie/Saph           2+DP                          13.8   20.9  )-----------( 228      ( 14 Nov 2018 01:16 )             38.7     11-14    132<L>  |  95<L>  |  8   ----------------------------<  122<H>  4.3   |  23  |  0.69    Ca    9.4      14 Nov 2018 01:16    TPro  7.8  /  Alb  4.2  /  TBili  0.4  /  DBili  x   /  AST  32  /  ALT  26  /  AlkPhos  99  11-12      Vital Signs Last 24 Hrs  T(C): 36.6 (14 Nov 2018 00:55), Max: 37.1 (13 Nov 2018 20:47)  T(F): 97.9 (14 Nov 2018 00:55), Max: 98.8 (13 Nov 2018 20:47)  HR: 121 (14 Nov 2018 03:00) (65 - 121)  BP: 116/68 (14 Nov 2018 03:00) (114/55 - 165/95)  BP(mean): 88 (14 Nov 2018 03:00) (85 - 111)  RR: 16 (14 Nov 2018 03:00) (15 - 19)  SpO2: 94% (14 Nov 2018 03:00) (94% - 100%)  I&O's Summary    12 Nov 2018 07:01  -  13 Nov 2018 07:00  --------------------------------------------------------  IN: 600 mL / OUT: 1200 mL / NET: -600 mL    13 Nov 2018 07:01  -  14 Nov 2018 03:16  --------------------------------------------------------  IN: 725 mL / OUT: 950 mL / NET: -225 mL        A/P: 78F s/p L Hip CRPP POD 1  - Cardiology eval for possible abnormal EKG and tachycardia  - FU Cardiac enzymes and BNP  -Neuro: Multimodal pain control  -Resp: IS  -GI: reg diet  -MSK: OOB, WBAT, PT/OT  -Heme: DVT PPx    Ortho

## 2018-11-14 NOTE — PHYSICAL THERAPY INITIAL EVALUATION ADULT - PERTINENT HX OF CURRENT PROBLEM, REHAB EVAL
Pt is a 78yoF, presents with R hip fx, now s/p CRPP. Pt with atrial tachycardia with ST changes post surgery.

## 2018-11-14 NOTE — PHYSICAL THERAPY INITIAL EVALUATION ADULT - STRENGTHENING, PT EVAL
GOAL: Pt will improve strength to at least a 4+/5 in order to improve safety with functional mobility in 2 weeks

## 2018-11-14 NOTE — PROGRESS NOTE ADULT - SUBJECTIVE AND OBJECTIVE BOX
Called to evaluate pt with HR of 115-125 and st changes on ekg.    s/p Hip surgery now with . All other VSS, in no acute distress.   The tachycardia began post surgery in the recovery room. She denies any shortness of breath or chest pain. No nausea, vomiting or diaphoresis. She c/o mild pain at surgical site.   On EKG: atrial tachycardia with ST changes in multiple leads.   Her hx includes atrial fibrillation for which she is followed by cardiology. The patient reports a recent negative stress test.     Plan:  Concern for ACS vs. Demand Ischemia in the setting of atrial tachycardia  Cardiac enzymes sent  Metoprolol 5mg IV x 3 given  IV hydration  Pt is currently hemodynamically stable  Will continue tele monitoring and will reassess once cardiac enzymes return.   Cardiology called- case discussed with cardiology fellow who agrees with the above plan

## 2018-11-14 NOTE — PROGRESS NOTE ADULT - SUBJECTIVE AND OBJECTIVE BOX
Patient is a 78y old  Female who presents with a chief complaint of R hip fracture. Patient underwent CRPP right femur ORIF on 11/13/18. s/p Hip surgery now with . All other VSS, in no acute distress.   The tachycardia began post surgery in the recovery room. She denies any shortness of breath or chest pain. No nausea, vomiting or diaphoresis. She c/o mild pain at surgical site.   On EKG: atrial tachycardia with ST changes in multiple leads. Her hx includes atrial fibrillation for which she is followed by cardiology. The patient reports a recent negative stress test. Patient to be seen by cardiology post-op. has been stable. No cardiac intervention at this time      MEDICATIONS  (STANDING):  amitriptyline 25 milliGRAM(s) Oral at bedtime  buPROPion XL . 150 milliGRAM(s) Oral daily  carvedilol 25 milliGRAM(s) Oral every 12 hours  lactated ringers. 1000 milliLiter(s) (75 mL/Hr) IV Continuous <Continuous>  levothyroxine 75 MICROGram(s) Oral daily  losartan 50 milliGRAM(s) Oral daily  multivitamin 1 Tablet(s) Oral daily  pantoprazole    Tablet 40 milliGRAM(s) Oral before breakfast  psyllium Powder 1 Packet(s) Oral two times a day  rivaroxaban 10 milliGRAM(s) Oral daily  spironolactone 25 milliGRAM(s) Oral daily    MEDICATIONS  (PRN):  acetaminophen   Tablet .. 650 milliGRAM(s) Oral every 6 hours PRN Temp greater or equal to 38C (100.4F), Mild Pain (1 - 3)  ALPRAZolam 2 milliGRAM(s) Oral three times a day PRN anxiety  aluminum hydroxide/magnesium hydroxide/simethicone Suspension 30 milliLiter(s) Oral four times a day PRN Indigestion  metoprolol tartrate IVPB 5 milliGRAM(s) IV Intermittent every 30 minutes PRN HR greater than 110  ondansetron Injectable 4 milliGRAM(s) IV Push every 6 hours PRN Nausea and/or Vomiting  oxyCODONE    IR 5 milliGRAM(s) Oral every 4 hours PRN Moderate Pain (4 - 6)  oxyCODONE    IR 10 milliGRAM(s) Oral every 4 hours PRN Severe Pain (7 - 10)  senna 2 Tablet(s) Oral at bedtime PRN Constipation          VITALS:   T(C): 36.6 (11-15-18 @ 00:08), Max: 36.8 (11-14-18 @ 21:10)  HR: 104 (11-15-18 @ 00:08) (74 - 121)  BP: 103/63 (11-15-18 @ 00:08) (101/63 - 172/99)  RR: 18 (11-15-18 @ 00:08) (14 - 19)  SpO2: 95% (11-15-18 @ 00:08) (94% - 100%)  Wt(kg): --    PHYSICAL EXAM:  GENERAL: NAD, well nourished and conversant  HEAD:  Atraumatic  EYES: EOM, PERRLA, conjunctiva pink and sclera white  ENT: No tonsillar erythema, exudates, or enlargement, moist mucous membranes, good dentition, no lesions  NECK: Supple, No JVD, normal thyroid, carotids with normal upstrokes and no bruits  CHEST/LUNG: Clear to auscultation bilaterally, No rales, rhonchi, wheezing, or rubs  HEART: Atrial fibrillation with heart rate 0f110. No murmurs, rubs, or gallops  ABDOMEN: Soft, nondistended, no masses, guarding, tenderness or rebound, bowel sounds present  EXTREMITIES:  4+ R hip swelling and 3+ tenderness to touch. No erythema or incisional drainage.  LYMPH: No lymphadenopathy noted  SKIN: No rashes or lesions  NERVOUS SYSTEM:  Alert & Oriented X3, normal cognitive function. Motor Strength 5/5 right upper and right lower.  5/5 left upper and left lower extremities, DTRs 2+ intact and symmetric      LABS:    CARDIAC MARKERS ( 14 Nov 2018 01:16 )  x     / x     / 58 U/L / x     / 1.0 ng/mL      CBC Full  -  ( 14 Nov 2018 01:16 )  WBC Count : 20.9 K/uL  Hemoglobin : 13.8 g/dL  Hematocrit : 38.7 %  Platelet Count - Automated : 228 K/uL  Mean Cell Volume : 92.0 fl  Mean Cell Hemoglobin : 32.8 pg  Mean Cell Hemoglobin Concentration : 35.6 gm/dL  Auto Neutrophil # : x  Auto Lymphocyte # : x  Auto Monocyte # : x  Auto Eosinophil # : x  Auto Basophil # : x  Auto Neutrophil % : x  Auto Lymphocyte % : x  Auto Monocyte % : x  Auto Eosinophil % : x  Auto Basophil % : x    11-14    132<L>  |  95<L>  |  8   ----------------------------<  122<H>  4.3   |  23  |  0.69    Ca    9.4      14 Nov 2018 01:16        PT/INR - ( 13 Nov 2018 07:57 )   PT: 14.3 sec;   INR: 1.25 ratio         PTT - ( 13 Nov 2018 07:57 )  PTT:35.4 sec    CAPILLARY BLOOD GLUCOSE          RADIOLOGY & ADDITIONAL TESTS:

## 2018-11-14 NOTE — PHYSICAL THERAPY INITIAL EVALUATION ADULT - ADDITIONAL COMMENTS
PTA pt was independent with all ADL's and amb without AD. Pt lives in  with son 1 step to enter, pt does not need to do steps once inside. Pt lives with supportive son.

## 2018-11-15 ENCOUNTER — TRANSCRIPTION ENCOUNTER (OUTPATIENT)
Age: 78
End: 2018-11-15

## 2018-11-15 DIAGNOSIS — S72.001A FRACTURE OF UNSPECIFIED PART OF NECK OF RIGHT FEMUR, INITIAL ENCOUNTER FOR CLOSED FRACTURE: ICD-10-CM

## 2018-11-15 LAB
ANION GAP SERPL CALC-SCNC: 13 MMOL/L — SIGNIFICANT CHANGE UP (ref 5–17)
BUN SERPL-MCNC: 15 MG/DL — SIGNIFICANT CHANGE UP (ref 7–23)
CALCIUM SERPL-MCNC: 9 MG/DL — SIGNIFICANT CHANGE UP (ref 8.4–10.5)
CHLORIDE SERPL-SCNC: 95 MMOL/L — LOW (ref 96–108)
CO2 SERPL-SCNC: 23 MMOL/L — SIGNIFICANT CHANGE UP (ref 22–31)
CREAT SERPL-MCNC: 0.72 MG/DL — SIGNIFICANT CHANGE UP (ref 0.5–1.3)
GLUCOSE SERPL-MCNC: 118 MG/DL — HIGH (ref 70–99)
HCT VFR BLD CALC: 37.7 % — SIGNIFICANT CHANGE UP (ref 34.5–45)
HGB BLD-MCNC: 12.6 G/DL — SIGNIFICANT CHANGE UP (ref 11.5–15.5)
MCHC RBC-ENTMCNC: 31.1 PG — SIGNIFICANT CHANGE UP (ref 27–34)
MCHC RBC-ENTMCNC: 33.4 GM/DL — SIGNIFICANT CHANGE UP (ref 32–36)
MCV RBC AUTO: 93.1 FL — SIGNIFICANT CHANGE UP (ref 80–100)
PLATELET # BLD AUTO: 260 K/UL — SIGNIFICANT CHANGE UP (ref 150–400)
POTASSIUM SERPL-MCNC: 4.9 MMOL/L — SIGNIFICANT CHANGE UP (ref 3.5–5.3)
POTASSIUM SERPL-SCNC: 4.9 MMOL/L — SIGNIFICANT CHANGE UP (ref 3.5–5.3)
RBC # BLD: 4.05 M/UL — SIGNIFICANT CHANGE UP (ref 3.8–5.2)
RBC # FLD: 14.4 % — SIGNIFICANT CHANGE UP (ref 10.3–14.5)
SODIUM SERPL-SCNC: 131 MMOL/L — LOW (ref 135–145)
WBC # BLD: 10.31 K/UL — SIGNIFICANT CHANGE UP (ref 3.8–10.5)
WBC # FLD AUTO: 10.31 K/UL — SIGNIFICANT CHANGE UP (ref 3.8–10.5)

## 2018-11-15 PROCEDURE — 93010 ELECTROCARDIOGRAM REPORT: CPT | Mod: 76

## 2018-11-15 RX ORDER — BUPROPION HYDROCHLORIDE 150 MG/1
200 TABLET, EXTENDED RELEASE ORAL DAILY
Qty: 0 | Refills: 0 | Status: DISCONTINUED | OUTPATIENT
Start: 2018-11-16 | End: 2018-11-19

## 2018-11-15 RX ADMIN — Medication 1 TABLET(S): at 11:36

## 2018-11-15 RX ADMIN — PANTOPRAZOLE SODIUM 40 MILLIGRAM(S): 20 TABLET, DELAYED RELEASE ORAL at 05:41

## 2018-11-15 RX ADMIN — Medication 75 MICROGRAM(S): at 05:40

## 2018-11-15 RX ADMIN — Medication 2 MILLIGRAM(S): at 22:56

## 2018-11-15 RX ADMIN — Medication 1 PACKET(S): at 17:52

## 2018-11-15 RX ADMIN — SPIRONOLACTONE 25 MILLIGRAM(S): 25 TABLET, FILM COATED ORAL at 05:36

## 2018-11-15 RX ADMIN — Medication 1 PACKET(S): at 05:38

## 2018-11-15 RX ADMIN — Medication 25 MILLIGRAM(S): at 22:56

## 2018-11-15 RX ADMIN — CARVEDILOL PHOSPHATE 25 MILLIGRAM(S): 80 CAPSULE, EXTENDED RELEASE ORAL at 05:36

## 2018-11-15 RX ADMIN — LOSARTAN POTASSIUM 50 MILLIGRAM(S): 100 TABLET, FILM COATED ORAL at 05:36

## 2018-11-15 RX ADMIN — CARVEDILOL PHOSPHATE 25 MILLIGRAM(S): 80 CAPSULE, EXTENDED RELEASE ORAL at 17:52

## 2018-11-15 RX ADMIN — Medication 25 MILLIGRAM(S): at 00:02

## 2018-11-15 RX ADMIN — Medication 2 MILLIGRAM(S): at 00:02

## 2018-11-15 RX ADMIN — RIVAROXABAN 10 MILLIGRAM(S): KIT at 11:36

## 2018-11-15 NOTE — DISCHARGE NOTE ADULT - ADDITIONAL INSTRUCTIONS
Please follow up with Dr. Aleman on discharge. Please follow up with Dr. Aleman on discharge.  Please see your PCP within 1 week for UTI.  Urine culture obtained on hospital visit, have Physician obtain records so they may ensure coverage.

## 2018-11-15 NOTE — DISCHARGE NOTE ADULT - CARE PROVIDER_API CALL
Ryan Aleman (MD), Orthopaedic Surgery  [address] [place] [address]  Phone: [phone]  Fax: [phone] Ryan Aleman), Orthopaedic Surgery  [address] [facility]  [address]  [place] [address]  Phone: [phone]  Fax: [phone]    Harley Calle), Cardiology; Internal Medicine  [address]  [address]  [place] [address]  Phone: [phone]  Fax: [phone]

## 2018-11-15 NOTE — DISCHARGE NOTE ADULT - PATIENT PORTAL LINK FT
You can access the [facility] Portal, offered by [facility], by registering with the following website: [email or web address]

## 2018-11-15 NOTE — OCCUPATIONAL THERAPY INITIAL EVALUATION ADULT - ADDITIONAL COMMENTS
right hip xray 11/14- The patient is now status post closed reduction and percutaneous pinning of the previously seen impacted right subcapital femoral neck fracture. There is placement of four partially threaded cancellous screws, which are intact. Postoperative soft tissue changes, including subcutaneous emphysema are noted. There are no acute fractures or dislocations. There are mild degenerative changes of the bilateral hips. 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 Evaluation of the sacrum and coccyx limited secondary to overlying bowel.  Chest xray 11/12- Clear lungs. Age-indeterminate fracture of the right fifth lateral rib. Correlate clinically. No pneumothorax.

## 2018-11-15 NOTE — DISCHARGE NOTE ADULT - PLAN OF CARE
improved ADL's, pain control Please follow up with Dr. Aleman 7-10 days after your discharge from the hospital.  PT-weight bearing as tolerated.  Xarelto daily x 6 weeks total for dvt prevention.  Keep dressing clean, dry, intact, have doctor remove staples post op day 14 and apply steristrips if applicable. Rate control Continue Cardizem, Losartan, and Carvedilol.  Please follow up with cardiology on discharge from the hospital within 2 weeks. Please follow up with Dr. Aleman 7-10 days after your discharge from the hospital.  PT-weight bearing as tolerated.  Xarelto daily x 6 weeks total for dvt prevention, then resume baby aspirin.  Keep dressing clean, dry, intact, have doctor remove staples post op day 14 and apply steristrips if applicable. Continue Cardizem and Carvedilol.  Please follow up with cardiology on discharge from the hospital within 2 weeks.

## 2018-11-15 NOTE — PROGRESS NOTE ADULT - SUBJECTIVE AND OBJECTIVE BOX
Post op Day [ 2]    Patient resting without complaints.  No chest pain, SOB, N/V.  Per RN, HR still labile    T(C): 36.3 (11-15-18 @ 04:12), Max: 36.8 (11-14-18 @ 21:10)  HR: 82 (11-15-18 @ 05:45) (66 - 117)  BP: 121/68 (11-15-18 @ 05:45) (101/63 - 147/89)  RR: 18 (11-15-18 @ 04:12) (15 - 18)  SpO2: 94% (11-15-18 @ 04:12) (94% - 100%)  Wt(kg): --    Exam:  Alert and Oriented, No Acute Distress  R hip dsg c/d/i with soft/compressible compartments  Calves Soft, Non-tender bilaterally  +PF/DF/EHL/FHL  SILT  +DP Pulse                        13.8   20.9  )-----------( 228      ( 14 Nov 2018 01:16 )             38.7    11-14    132<L>  |  95<L>  |  8   ----------------------------<  122<H>  4.3   |  23  |  0.69    Ca    9.4      14 Nov 2018 01:16

## 2018-11-15 NOTE — DISCHARGE NOTE ADULT - CARE PLAN
Principal Discharge DX:	Closed fracture of neck of right femur, initial encounter  Goal:	improved ADL's, pain control  Assessment and plan of treatment:	Please follow up with Dr. Aleman 7-10 days after your discharge from the hospital.  PT-weight bearing as tolerated.  Xarelto daily x 6 weeks total for dvt prevention.  Keep dressing clean, dry, intact, have doctor remove staples post op day 14 and apply steristrips if applicable.  Secondary Diagnosis:	Multifocal atrial tachycardia determined by electrocardiography  Goal:	Rate control  Assessment and plan of treatment:	Continue Cardizem, Losartan, and Carvedilol.  Please follow up with cardiology on discharge from the hospital within 2 weeks. Principal Discharge DX:	Closed fracture of neck of right femur, initial encounter  Goal:	improved ADL's, pain control  Assessment and plan of treatment:	Please follow up with Dr. Aleman 7-10 days after your discharge from the hospital.  PT-weight bearing as tolerated.  Xarelto daily x 6 weeks total for dvt prevention, then resume baby aspirin.  Keep dressing clean, dry, intact, have doctor remove staples post op day 14 and apply steristrips if applicable.  Secondary Diagnosis:	Multifocal atrial tachycardia determined by electrocardiography  Goal:	Rate control  Assessment and plan of treatment:	Continue Cardizem and Carvedilol.  Please follow up with cardiology on discharge from the hospital within 2 weeks.

## 2018-11-15 NOTE — PROGRESS NOTE ADULT - ASSESSMENT
78 year old female with htn, macular degeneration, GERD, Diverticulosis admitted with s/p mechanical fall now s/p closed reduction and percutaneous pinning (CRPP) of fracture of left femur, Postop c/o palpiations     1. Atrial tachycardia   likely in the setting of dehydration, post op sx/ pain   rate better controlled today   EKG revealing no evidence of ACS/ HS trop negative, frequent PAC noted   cv stable with no cp or decomp chf on exam    recent echo revealing normal LV fx   continue with coreg as ordered  if tachycardia persists will consider adding cardizem     2. HTN   continue with current anti-htn meds     3. s/p closed reduction and percutaneous pinning (CRPP) of fracture of left femur  ortho f/u  on rivaroxaban ppx     dvt ppx 78 year old female with htn, macular degeneration, GERD, Diverticulosis admitted with s/p mechanical fall now s/p closed reduction and percutaneous pinning (CRPP) of fracture of left femur, Postop c/o palpiations     1. Atrial tachycardia   likely in the setting of dehydration, post op sx/ pain   rate better controlled today   repeat EKG revealing no evidence of ACS, in NSR, frequent PAC with PAT noted   cv stable with no cp or decomp chf on exam    recent echo revealing normal LV fx   continue with coreg as ordered  if tachycardia persists will consider adding cardizem   daily ekg     2. HTN   continue with current anti-htn meds     3. s/p closed reduction and percutaneous pinning (CRPP) of fracture of left femur  ortho f/u  on rivaroxaban ppx     dvt ppx

## 2018-11-15 NOTE — OCCUPATIONAL THERAPY INITIAL EVALUATION ADULT - PERTINENT HX OF CURRENT PROBLEM, REHAB EVAL
78yFemale c/o R hip pain s/p mechanical fall. Patient denies head hit or LOC. Patient denies numbness or tingling in the RLE. Patient denies any other injuries.

## 2018-11-15 NOTE — OCCUPATIONAL THERAPY INITIAL EVALUATION ADULT - LIVES WITH, PROFILE
children/Pt lives with son in a pvt home with no steps to enter. +Chair lift to 2nd floor. +Jacuzzi tub. No DME

## 2018-11-15 NOTE — DISCHARGE NOTE ADULT - MEDICATION SUMMARY - MEDICATIONS TO TAKE
I will START or STAY ON the medications listed below when I get home from the hospital:    3:1 commode  -- Dx; S/p R hip CRPP  -- Indication: For Commode    Rolling walker  -- Dx; S/p R Hip CRPP  -- Indication: For walker    acetaminophen 325 mg oral tablet  -- 2 tab(s) by mouth every 6 hours, As needed, Temp greater or equal to 38C (100.4F), Mild Pain (1 - 3)  -- Indication: For pain/fever    traMADol 50 mg oral tablet  -- 1 tab(s) by mouth every 6-8 hours, As Needed  for moderate to severe pain. MDD:4  -- Caution federal law prohibits the transfer of this drug to any person other  than the person for whom it was prescribed.  May cause drowsiness.  Alcohol may intensify this effect.  Use care when operating dangerous machinery.  Obtain medical advice before taking any non-prescription drugs as some may affect the action of this medication.    -- Indication: For pain    dilTIAZem 120 mg/24 hours oral capsule, extended release  -- 1 cap(s) by mouth once a day MDD:1  -- Indication: For blood pressure    rivaroxaban 10 mg oral tablet  -- 1 tab(s) by mouth once a day x 6 weeks total for dvt prevention  -- Indication: For dvt prevention    amitriptyline 25 mg oral tablet  -- 1 tab(s) by mouth once a day (at bedtime)  -- Indication: For antidepressant    lovastatin 40 mg oral tablet  -- 1 tab(s) by mouth once a day  -- Indication: For Cholesterol    Xanax 2 mg oral tablet  -- 1 tab(s) by mouth 3 times a day  -- Indication: For anxiety    carvedilol 25 mg oral tablet  -- 1 tab(s) by mouth every 12 hours MDD:2  -- Indication: For blood pressure    senna oral tablet  -- 2 tab(s) by mouth once a day (at bedtime), As needed, Constipation  -- Indication: For laxative    buPROPion 100 mg/12 hours (SR) oral tablet, extended release  -- 1 tab(s) by mouth 2 times a day  -- Indication: For depression    Synthroid 75 mcg (0.075 mg) oral tablet  -- 1 tab(s) by mouth once a day  -- Indication: For Hypothyroidism    Multiple Vitamins oral tablet  -- 1 tab(s) by mouth once a day  -- Indication: For supplement I will START or STAY ON the medications listed below when I get home from the hospital:    3:1 commode  -- Dx; S/p R hip CRPP  -- Indication: For Commode    Rolling walker  -- Dx; S/p R Hip CRPP  -- Indication: For walker    acetaminophen 325 mg oral tablet  -- 2 tab(s) by mouth every 6 hours, As needed, Temp greater or equal to 38C (100.4F), Mild Pain (1 - 3)  -- Indication: For pain/fever    traMADol 50 mg oral tablet  -- 1 tab(s) by mouth every 6-8 hours, As Needed  for moderate to severe pain. MDD:4  -- Caution federal law prohibits the transfer of this drug to any person other  than the person for whom it was prescribed.  May cause drowsiness.  Alcohol may intensify this effect.  Use care when operating dangerous machinery.  Obtain medical advice before taking any non-prescription drugs as some may affect the action of this medication.    -- Indication: For pain    dilTIAZem 120 mg/24 hours oral capsule, extended release  -- 1 cap(s) by mouth once a day MDD:1  -- Indication: For blood pressure    rivaroxaban 10 mg oral tablet  -- 1 tab(s) by mouth once a day x 6 weeks total for dvt prevention  -- Indication: For dvt prevention    amitriptyline 25 mg oral tablet  -- 1 tab(s) by mouth once a day (at bedtime)  -- Indication: For antidepressant    lovastatin 40 mg oral tablet  -- 1 tab(s) by mouth once a day  -- Indication: For Cholesterol    Xanax 2 mg oral tablet  -- 1 tab(s) by mouth 3 times a day  -- Indication: For anxiety    carvedilol 25 mg oral tablet  -- 1 tab(s) by mouth every 12 hours MDD:2  -- Indication: For blood pressure    senna oral tablet  -- 2 tab(s) by mouth once a day (at bedtime), As needed, Constipation  -- Indication: For laxative    ciprofloxacin 500 mg oral tablet  -- 1 tab(s) by mouth every 12 hours MDD:2  -- Indication: For UTI    buPROPion 100 mg/12 hours (SR) oral tablet, extended release  -- 1 tab(s) by mouth 2 times a day  -- Indication: For depression    Synthroid 75 mcg (0.075 mg) oral tablet  -- 1 tab(s) by mouth once a day  -- Indication: For Hypothyroidism    Multiple Vitamins oral tablet  -- 1 tab(s) by mouth once a day  -- Indication: For supplement

## 2018-11-15 NOTE — OCCUPATIONAL THERAPY INITIAL EVALUATION ADULT - PRECAUTIONS/LIMITATIONS, REHAB EVAL
s/pr CRPP right femur 11/13/fall precautions/surgical precautions cardiac precautions/s/pr CRPP right femur 11/13/fall precautions/surgical precautions

## 2018-11-15 NOTE — DISCHARGE NOTE ADULT - NS AS ACTIVITY OBS
Showering allowed/Walking-Indoors allowed/Walking-Outdoors allowed/Stairs allowed/No Heavy lifting/straining/stairs/shower with assistance/Do not drive or operate machinery/Do not make important decisions

## 2018-11-15 NOTE — OCCUPATIONAL THERAPY INITIAL EVALUATION ADULT - PHYSICAL ASSIST/NONPHYSICAL ASSIST:DRESS LOWER BODY, OT EVAL
1 person assist/denies hip kit, states spouse will assist/nonverbal cues (demo/gestures)/verbal cues

## 2018-11-15 NOTE — PROGRESS NOTE ADULT - SUBJECTIVE AND OBJECTIVE BOX
CARDIOLOGY FOLLOW UP - Dr. Calle    CC c.o palpitations this am   no cp or sob       PHYSICAL EXAM:  T(C): 36.3 (11-15-18 @ 04:12), Max: 36.8 (11-14-18 @ 21:10)  HR: 70 (11-15-18 @ 10:04) (66 - 117)  BP: 117/71 (11-15-18 @ 10:04) (101/63 - 147/89)  RR: 18 (11-15-18 @ 04:12) (15 - 18)  SpO2: 99% (11-15-18 @ 10:04) (94% - 99%)  Wt(kg): --  I&O's Summary    14 Nov 2018 07:01  -  15 Nov 2018 07:00  --------------------------------------------------------  IN: 3275 mL / OUT: 1650 mL / NET: 1625 mL        Appearance: Normal	  Cardiovascular: Normal S1 S2,rate reg, irregular   Respiratory: Lungs clear to auscultation	  Gastrointestinal:  Soft, Non-tender, + BS	  Extremities: Normal range of motion, No clubbing, cyanosis or edema        MEDICATIONS  (STANDING):  amitriptyline 25 milliGRAM(s) Oral at bedtime  buPROPion XL . 150 milliGRAM(s) Oral daily  carvedilol 25 milliGRAM(s) Oral every 12 hours  lactated ringers. 1000 milliLiter(s) (75 mL/Hr) IV Continuous <Continuous>  levothyroxine 75 MICROGram(s) Oral daily  losartan 50 milliGRAM(s) Oral daily  multivitamin 1 Tablet(s) Oral daily  pantoprazole    Tablet 40 milliGRAM(s) Oral before breakfast  psyllium Powder 1 Packet(s) Oral two times a day  rivaroxaban 10 milliGRAM(s) Oral daily  spironolactone 25 milliGRAM(s) Oral daily      TELEMETRY: 	    ECG:  sinus tachycardia , PAC/ block PAC   NSR @ 91 with PAC	  RADIOLOGY:   DIAGNOSTIC TESTING:  [ ] Echocardiogram:  [ ]  Catheterization:  [ ] Stress Test:    OTHER: 	    LABS:	 	                                12.6   10.31 )-----------( 260      ( 15 Nov 2018 07:52 )             37.7     11-15    131<L>  |  95<L>  |  15  ----------------------------<  118<H>  4.9   |  23  |  0.72    Ca    9.0      15 Nov 2018 06:51

## 2018-11-15 NOTE — DISCHARGE NOTE ADULT - MEDICATION SUMMARY - MEDICATIONS TO CHANGE
I will SWITCH the dose or number of times a day I take the medications listed below when I get home from the hospital:    carvedilol 12.5 mg oral tablet  -- 1 tab(s) by mouth every 12 hours

## 2018-11-15 NOTE — DISCHARGE NOTE ADULT - HOSPITAL COURSE
This is a 78 year old female admitted to [facility] on 11/12/18 with a right non-displaced hip fracture.  Evaluated and cleared by Medicine for operative procedure.  Surgery performed 11/13 was uncomplicated, postoperatively, patient developed tachycardia and ST changes.  Cardiology was consulted who evaluated and treated the patient.  Evaluated and treated by PT, who recommended home with home physical therapy.  Remain of hospital stay unremarkable and patient discharged home.

## 2018-11-15 NOTE — DISCHARGE NOTE ADULT - DURABLE MEDICAL EQUIPMENT AGENCY
[facility] for RW and 3:1 commode for delivery to patient's bedside.  [phone]

## 2018-11-15 NOTE — DISCHARGE NOTE ADULT - MEDICATION SUMMARY - MEDICATIONS TO STOP TAKING
I will STOP taking the medications listed below when I get home from the hospital:    Aspirin Enteric Coated 81 mg oral delayed release tablet  -- 1 tab(s) by mouth once a day    cefuroxime 250 mg oral tablet  -- 1 tab(s) by mouth every 12 hours    spironolactone 25 mg oral tablet  -- 1 tab(s) by mouth once a day

## 2018-11-16 LAB
ANION GAP SERPL CALC-SCNC: 13 MMOL/L — SIGNIFICANT CHANGE UP (ref 5–17)
BUN SERPL-MCNC: 20 MG/DL — SIGNIFICANT CHANGE UP (ref 7–23)
CALCIUM SERPL-MCNC: 9.4 MG/DL — SIGNIFICANT CHANGE UP (ref 8.4–10.5)
CHLORIDE SERPL-SCNC: 97 MMOL/L — SIGNIFICANT CHANGE UP (ref 96–108)
CO2 SERPL-SCNC: 25 MMOL/L — SIGNIFICANT CHANGE UP (ref 22–31)
CREAT SERPL-MCNC: 0.78 MG/DL — SIGNIFICANT CHANGE UP (ref 0.5–1.3)
GLUCOSE SERPL-MCNC: 117 MG/DL — HIGH (ref 70–99)
HCT VFR BLD CALC: 38.5 % — SIGNIFICANT CHANGE UP (ref 34.5–45)
HGB BLD-MCNC: 12.4 G/DL — SIGNIFICANT CHANGE UP (ref 11.5–15.5)
MCHC RBC-ENTMCNC: 30 PG — SIGNIFICANT CHANGE UP (ref 27–34)
MCHC RBC-ENTMCNC: 32.1 GM/DL — SIGNIFICANT CHANGE UP (ref 32–36)
MCV RBC AUTO: 93.4 FL — SIGNIFICANT CHANGE UP (ref 80–100)
PLATELET # BLD AUTO: 300 K/UL — SIGNIFICANT CHANGE UP (ref 150–400)
POTASSIUM SERPL-MCNC: 4.3 MMOL/L — SIGNIFICANT CHANGE UP (ref 3.5–5.3)
POTASSIUM SERPL-SCNC: 4.3 MMOL/L — SIGNIFICANT CHANGE UP (ref 3.5–5.3)
RBC # BLD: 4.12 M/UL — SIGNIFICANT CHANGE UP (ref 3.8–5.2)
RBC # FLD: 13.1 % — SIGNIFICANT CHANGE UP (ref 10.3–14.5)
SODIUM SERPL-SCNC: 135 MMOL/L — SIGNIFICANT CHANGE UP (ref 135–145)
WBC # BLD: 8.7 K/UL — SIGNIFICANT CHANGE UP (ref 3.8–10.5)
WBC # FLD AUTO: 8.7 K/UL — SIGNIFICANT CHANGE UP (ref 3.8–10.5)

## 2018-11-16 PROCEDURE — 93010 ELECTROCARDIOGRAM REPORT: CPT

## 2018-11-16 RX ORDER — ACETAMINOPHEN 500 MG
2 TABLET ORAL
Qty: 0 | Refills: 0 | DISCHARGE
Start: 2018-11-16

## 2018-11-16 RX ORDER — ASPIRIN/CALCIUM CARB/MAGNESIUM 324 MG
1 TABLET ORAL
Qty: 0 | Refills: 0 | COMMUNITY

## 2018-11-16 RX ORDER — SENNA PLUS 8.6 MG/1
2 TABLET ORAL
Qty: 0 | Refills: 0 | DISCHARGE
Start: 2018-11-16

## 2018-11-16 RX ORDER — DILTIAZEM HCL 120 MG
120 CAPSULE, EXT RELEASE 24 HR ORAL DAILY
Qty: 0 | Refills: 0 | Status: DISCONTINUED | OUTPATIENT
Start: 2018-11-16 | End: 2018-11-17

## 2018-11-16 RX ADMIN — LOSARTAN POTASSIUM 50 MILLIGRAM(S): 100 TABLET, FILM COATED ORAL at 06:45

## 2018-11-16 RX ADMIN — Medication 1 PACKET(S): at 11:17

## 2018-11-16 RX ADMIN — BUPROPION HYDROCHLORIDE 200 MILLIGRAM(S): 150 TABLET, EXTENDED RELEASE ORAL at 12:18

## 2018-11-16 RX ADMIN — CARVEDILOL PHOSPHATE 25 MILLIGRAM(S): 80 CAPSULE, EXTENDED RELEASE ORAL at 17:52

## 2018-11-16 RX ADMIN — Medication 120 MILLIGRAM(S): at 13:01

## 2018-11-16 RX ADMIN — CARVEDILOL PHOSPHATE 25 MILLIGRAM(S): 80 CAPSULE, EXTENDED RELEASE ORAL at 06:45

## 2018-11-16 RX ADMIN — PANTOPRAZOLE SODIUM 40 MILLIGRAM(S): 20 TABLET, DELAYED RELEASE ORAL at 06:45

## 2018-11-16 RX ADMIN — Medication 1 TABLET(S): at 12:18

## 2018-11-16 RX ADMIN — OXYCODONE HYDROCHLORIDE 5 MILLIGRAM(S): 5 TABLET ORAL at 02:50

## 2018-11-16 RX ADMIN — Medication 25 MILLIGRAM(S): at 23:14

## 2018-11-16 RX ADMIN — Medication 75 MICROGRAM(S): at 05:24

## 2018-11-16 RX ADMIN — Medication 2 MILLIGRAM(S): at 23:14

## 2018-11-16 RX ADMIN — OXYCODONE HYDROCHLORIDE 5 MILLIGRAM(S): 5 TABLET ORAL at 02:19

## 2018-11-16 RX ADMIN — RIVAROXABAN 10 MILLIGRAM(S): KIT at 12:18

## 2018-11-16 RX ADMIN — SPIRONOLACTONE 25 MILLIGRAM(S): 25 TABLET, FILM COATED ORAL at 06:45

## 2018-11-16 NOTE — PROGRESS NOTE ADULT - ASSESSMENT
Patient is a 78y old  Female who presents with a chief complaint of R hip fracture. Patient underwent CRPP right femur ORIF on 11/13/18. s/p Hip surgery now with . All other VSS, in no acute distress.   The tachycardia began post surgery in the recovery room. She denies any shortness of breath or chest pain. No nausea, vomiting or diaphoresis. She c/o mild pain at surgical site.   On EKG: atrial tachycardia with ST changes in multiple leads.   Her hx includes atrial fibrillation for which she is followed by cardiology. The patient reports a recent negative stress test. Patient to be seen by cardiology post-op. She will require cardiac enzymes and Beta blockers post-op to control post-op tachycardia and rate related ischemia. Continues PACU continuos cardiopulmonary monitoring.  Now stable and can go to regular floor

## 2018-11-16 NOTE — PROGRESS NOTE ADULT - SUBJECTIVE AND OBJECTIVE BOX
Patient is a 78y old  Female who presents with a chief complaint of R hip fracture. Patient underwent CRPP right femur ORIF on 11/13/18. s/p Hip surgery now with . All other VSS, in no acute distress.   The tachycardia began post surgery in the recovery room. She denies any shortness of breath or chest pain. No nausea, vomiting or diaphoresis. She c/o mild pain at surgical site.   On EKG: atrial tachycardia with ST changes in multiple leads. Her hx includes atrial fibrillation for which she is followed by cardiology. The patient reports a recent negative stress test. Patient to be seen by cardiology post-op. has been stable. No cardiac intervention at this time.  Patien to have surgery ORIF of hip later today.      MEDICATIONS  (STANDING):  amitriptyline 25 milliGRAM(s) Oral at bedtime  buPROPion XL . 150 milliGRAM(s) Oral daily  carvedilol 25 milliGRAM(s) Oral every 12 hours  lactated ringers. 1000 milliLiter(s) (75 mL/Hr) IV Continuous <Continuous>  levothyroxine 75 MICROGram(s) Oral daily  losartan 50 milliGRAM(s) Oral daily  multivitamin 1 Tablet(s) Oral daily  pantoprazole    Tablet 40 milliGRAM(s) Oral before breakfast  psyllium Powder 1 Packet(s) Oral two times a day  rivaroxaban 10 milliGRAM(s) Oral daily  spironolactone 25 milliGRAM(s) Oral daily    MEDICATIONS  (PRN):  acetaminophen   Tablet .. 650 milliGRAM(s) Oral every 6 hours PRN Temp greater or equal to 38C (100.4F), Mild Pain (1 - 3)  ALPRAZolam 2 milliGRAM(s) Oral three times a day PRN anxiety  aluminum hydroxide/magnesium hydroxide/simethicone Suspension 30 milliLiter(s) Oral four times a day PRN Indigestion  metoprolol tartrate IVPB 5 milliGRAM(s) IV Intermittent every 30 minutes PRN HR greater than 110  ondansetron Injectable 4 milliGRAM(s) IV Push every 6 hours PRN Nausea and/or Vomiting  oxyCODONE    IR 5 milliGRAM(s) Oral every 4 hours PRN Moderate Pain (4 - 6)  oxyCODONE    IR 10 milliGRAM(s) Oral every 4 hours PRN Severe Pain (7 - 10)  senna 2 Tablet(s) Oral at bedti      Vital signs stabel    PHYSICAL EXAM:  GENERAL: NAD, well nourished and conversant  HEAD:  Atraumatic  EYES: EOM, PERRLA, conjunctiva pink and sclera white  ENT: No tonsillar erythema, exudates, or enlargement, moist mucous membranes, good dentition, no lesions  NECK: Supple, No JVD, normal thyroid, carotids with normal upstrokes and no bruits  CHEST/LUNG: Clear to auscultation bilaterally, No rales, rhonchi, wheezing, or rubs  HEART: Atrial fibrillation with heart rate 0f110. No murmurs, rubs, or gallops  ABDOMEN: Soft, nondistended, no masses, guarding, tenderness or rebound, bowel sounds present  EXTREMITIES:  4+ R hip swelling and 3+ tenderness to touch. No erythema or incisional drainage.  LYMPH: No lymphadenopathy noted  SKIN: No rashes or lesions  NERVOUS SYSTEM:  Alert & Oriented X3, normal cognitive function. Motor Strength 5/5 right upper and right lower.  5/5 left upper and left lower extremities, DTRs 2+ intact and symmetric      LABS:    CARDIAC MARKERS ( 14 Nov 2018 01:16 )  x     / x     / 58 U/L / x     / 1.0 ng/mL      CBC Full  -  ( 14 Nov 2018 01:16 )  WBC Count : 20.9 K/uL  Hemoglobin : 13.8 g/dL  Hematocrit : 38.7 %  Platelet Count - Automated : 228 K/uL  Mean Cell Volume : 92.0 fl  Mean Cell Hemoglobin : 32.8 pg  Mean Cell Hemoglobin Concentration : 35.6 gm/dL  Auto Neutrophil # : x  Auto Lymphocyte # : x  Auto Monocyte # : x  Auto Eosinophil # : x  Auto Basophil # : x  Auto Neutrophil % : x  Auto Lymphocyte % : x  Auto Monocyte % : x  Auto Eosinophil % : x  Auto Basophil % : x    11-14    132<L>  |  95<L>  |  8   ----------------------------<  122<H>  4.3   |  23  |  0.69    Ca    9.4      14 Nov 2018 01:16        PT/INR - ( 13 Nov 2018 07:57 )   PT: 14.3 sec;   INR: 1.25 ratio         PTT - ( 13 Nov 2018 07:57 )  PTT:35.4 sec    CAPILLARY BLOOD GLUCOSE          RADIOLOGY & ADDITIONAL TESTS:

## 2018-11-16 NOTE — PROGRESS NOTE ADULT - SUBJECTIVE AND OBJECTIVE BOX
CARDIOLOGY FOLLOW UP - Dr. Calle    CC  no cp/sob c/o palpitaitons   EKG reviewed - no AFIB noted,  MAT       PHYSICAL EXAM:  T(C): 36.8 (11-16-18 @ 07:52), Max: 36.9 (11-15-18 @ 21:18)  HR: 89 (11-16-18 @ 09:38) (68 - 89)  BP: 142/87 (11-16-18 @ 09:38) (108/65 - 147/84)  RR: 18 (11-16-18 @ 07:52) (18 - 18)  SpO2: 96% (11-16-18 @ 09:38) (94% - 98%)  Wt(kg): --  I&O's Summary    15 Nov 2018 07:01  -  16 Nov 2018 07:00  --------------------------------------------------------  IN: 1720 mL / OUT: 2100 mL / NET: -380 mL        Appearance: Normal	  Cardiovascular: Normal S1 S2,reg rate, irregular   Respiratory: Lungs clear to auscultation	  Gastrointestinal:  Soft, Non-tender, + BS	  Extremities: Normal range of motion, No clubbing, cyanosis or edema        MEDICATIONS  (STANDING):  amitriptyline 25 milliGRAM(s) Oral at bedtime  buPROPion  milliGRAM(s) Oral daily  carvedilol 25 milliGRAM(s) Oral every 12 hours  lactated ringers. 1000 milliLiter(s) (75 mL/Hr) IV Continuous <Continuous>  levothyroxine 75 MICROGram(s) Oral daily  losartan 50 milliGRAM(s) Oral daily  multivitamin 1 Tablet(s) Oral daily  pantoprazole    Tablet 40 milliGRAM(s) Oral before breakfast  psyllium Powder 1 Packet(s) Oral two times a day  rivaroxaban 10 milliGRAM(s) Oral daily  spironolactone 25 milliGRAM(s) Oral daily      TELEMETRY: 	    ECG:  MAT 	  RADIOLOGY:   DIAGNOSTIC TESTING:  [ ] Echocardiogram:  [ ]  Catheterization:  [ ] Stress Test:    OTHER: 	    LABS:	 	                                12.4   8.7   )-----------( 300      ( 16 Nov 2018 06:46 )             38.5     11-16    135  |  97  |  20  ----------------------------<  117<H>  4.3   |  25  |  0.78    Ca    9.4      16 Nov 2018 06:45

## 2018-11-16 NOTE — PROGRESS NOTE ADULT - SUBJECTIVE AND OBJECTIVE BOX
Patient is a 78y old  Female who presents with a chief complaint of R hip fracture. Patient underwent CRPP right femur ORIF on 11/13/18. s/p Hip surgery now with . All other VSS, in no acute distress.   The tachycardia began post surgery in the recovery room. She denies any shortness of breath or chest pain. No nausea, vomiting or diaphoresis. She c/o mild pain at surgical site.   On EKG: atrial tachycardia with ST changes in multiple leads. Her hx includes atrial fibrillation for which she is followed by cardiology. The patient reports a recent negative stress test. Patient to be seen by cardiology post-op. has been stable. No cardiac intervention at this time      MEDICATIONS  (STANDING):  amitriptyline 25 milliGRAM(s) Oral at bedtime  buPROPion  milliGRAM(s) Oral daily  carvedilol 25 milliGRAM(s) Oral every 12 hours  lactated ringers. 1000 milliLiter(s) (75 mL/Hr) IV Continuous <Continuous>  levothyroxine 75 MICROGram(s) Oral daily  losartan 50 milliGRAM(s) Oral daily  multivitamin 1 Tablet(s) Oral daily  pantoprazole    Tablet 40 milliGRAM(s) Oral before breakfast  psyllium Powder 1 Packet(s) Oral two times a day  rivaroxaban 10 milliGRAM(s) Oral daily  spironolactone 25 milliGRAM(s) Oral daily    MEDICATIONS  (PRN):  acetaminophen   Tablet .. 650 milliGRAM(s) Oral every 6 hours PRN Temp greater or equal to 38C (100.4F), Mild Pain (1 - 3)  ALPRAZolam 2 milliGRAM(s) Oral three times a day PRN anxiety  aluminum hydroxide/magnesium hydroxide/simethicone Suspension 30 milliLiter(s) Oral four times a day PRN Indigestion  bisacodyl Suppository 10 milliGRAM(s) Rectal daily PRN If no bowel movement by POD#2  metoprolol tartrate IVPB 5 milliGRAM(s) IV Intermittent every 30 minutes PRN HR greater than 110  ondansetron Injectable 4 milliGRAM(s) IV Push every 6 hours PRN Nausea and/or Vomiting  oxyCODONE    IR 5 milliGRAM(s) Oral every 4 hours PRN Moderate Pain (4 - 6)  oxyCODONE    IR 10 milliGRAM(s) Oral every 4 hours PRN Severe Pain (7 - 10)  senna 2 Tablet(s) Oral at bedtime PRN Constipation          VITALS:   T(C): 36.8 (11-15-18 @ 23:57), Max: 36.9 (11-15-18 @ 09:00)  HR: 86 (11-15-18 @ 23:57) (66 - 86)  BP: 108/65 (11-15-18 @ 23:57) (108/65 - 136/83)  RR: 18 (11-15-18 @ 23:57) (18 - 18)  SpO2: 95% (11-15-18 @ 23:57) (94% - 99%)  Wt(kg): --      PHYSICAL EXAM:  GENERAL: NAD, well nourished and conversant  HEAD:  Atraumatic  EYES: EOM, PERRLA, conjunctiva pink and sclera white  ENT: No tonsillar erythema, exudates, or enlargement, moist mucous membranes, good dentition, no lesions  NECK: Supple, No JVD, normal thyroid, carotids with normal upstrokes and no bruits  CHEST/LUNG: Clear to auscultation bilaterally, No rales, rhonchi, wheezing, or rubs  HEART: Atrial fibrillation with heart rate 0f110. No murmurs, rubs, or gallops  ABDOMEN: Soft, nondistended, no masses, guarding, tenderness or rebound, bowel sounds present  EXTREMITIES:  4+ R hip swelling and 3+ tenderness to touch. No erythema or incisional drainage.  LYMPH: No lymphadenopathy noted  SKIN: No rashes or lesions  NERVOUS SYSTEM:  Alert & Oriented X3, normal cognitive function. Motor Strength 5/5 right upper and right lower.  5/5 left upper and left lower extremities, DTRs 2+ intact and symmetric  LABS:    CARDIAC MARKERS ( 14 Nov 2018 01:16 )  x     / x     / 58 U/L / x     / 1.0 ng/mL      CBC Full  -  ( 15 Nov 2018 07:52 )  WBC Count : 10.31 K/uL  Hemoglobin : 12.6 g/dL  Hematocrit : 37.7 %  Platelet Count - Automated : 260 K/uL  Mean Cell Volume : 93.1 fl  Mean Cell Hemoglobin : 31.1 pg  Mean Cell Hemoglobin Concentration : 33.4 gm/dL  Auto Neutrophil # : x  Auto Lymphocyte # : x  Auto Monocyte # : x  Auto Eosinophil # : x  Auto Basophil # : x  Auto Neutrophil % : x  Auto Lymphocyte % : x  Auto Monocyte % : x  Auto Eosinophil % : x  Auto Basophil % : x    11-15    131<L>  |  95<L>  |  15  ----------------------------<  118<H>  4.9   |  23  |  0.72    Ca    9.0      15 Nov 2018 06:51            CAPILLARY BLOOD GLUCOSE          RADIOLOGY & ADDITIONAL TESTS:

## 2018-11-16 NOTE — PROGRESS NOTE ADULT - ASSESSMENT
A/p: 78yFemale s/p R hip CRPP.  Daily EKGs requested per cards shows afib.  Currently on Xarelto for dvt ppx.  VSS.  NAD

## 2018-11-16 NOTE — PROGRESS NOTE ADULT - SUBJECTIVE AND OBJECTIVE BOX
Post op Day [3 ]    Patient resting without complaints.  No chest pain, SOB, N/V.    T(C): 36.9 (11-16-18 @ 04:11), Max: 36.9 (11-15-18 @ 09:00)  HR: 83 (11-16-18 @ 04:11) (68 - 86)  BP: 147/84 (11-16-18 @ 04:11) (108/65 - 147/84)  RR: 18 (11-16-18 @ 04:11) (18 - 18)  SpO2: 94% (11-16-18 @ 04:11) (94% - 99%)  Wt(kg): --    Exam:  Alert and Oriented, No Acute Distress  R hip dsg c/d/i, soft/compressible compartments  Calves Soft, Non-tender bilaterally  +PF/DF/EHL/FHL  SILT  +DP Pulse    EKG: Afib w/ RVR 103bpm                           12.6   10.31 )-----------( 260      ( 15 Nov 2018 07:52 )             37.7    11-15    131<L>  |  95<L>  |  15  ----------------------------<  118<H>  4.9   |  23  |  0.72    Ca    9.0      15 Nov 2018 06:51

## 2018-11-16 NOTE — PROGRESS NOTE ADULT - ASSESSMENT
78 year old female with htn, macular degeneration, GERD, Diverticulosis admitted with s/p mechanical fall now s/p closed reduction and percutaneous pinning (CRPP) of fracture of left femur, Postop c/o palpiations     1. Atrial tachycardia   likely in the setting of dehydration, post op sx/ pain   rate better controlled today   repeat EKG revealing no evidence of ACS, in NSR, frequent PAC with PAT noted   pt. c/o palpitations, repeat EKG reviewed, no evidence of AFIB, MAT ntoed  cv stable with no cp or decomp chf on exam    recent echo revealing normal LV fx   continue with coreg as ordered  add cardizem 120mg daily , no indication to increase xarelto     2. HTN   continue with current anti-htn meds     3. s/p closed reduction and percutaneous pinning (CRPP) of fracture of left femur  ortho f/u  on rivaroxaban ppx     dvt ppx

## 2018-11-16 NOTE — PROGRESS NOTE ADULT - ATTENDING COMMENTS
No medical complications post-op to date and to proceed with physical therapy, as tolerated. Continues pulmonary toilet to lessen atelectasis, leg exercises as taught to lessen the risk of DVT and supervised pain medications for post-op pain control. Patient is a 78y old  Female who presents with a chief complaint of R hip fracture. Patient underwent CRPP right femur ORIF on 11/13/18. s/p Hip surgery now with . All other VSS, in no acute distress.   The tachycardia began post surgery in the recovery room. She denies any shortness of breath or chest pain. No nausea, vomiting or diaphoresis. She c/o mild pain at surgical site.   On EKG: atrial tachycardia with ST changes in multiple leads.   Her hx includes atrial fibrillation for which she is followed by cardiology. The patient reports a recent negative stress test. Patient to be seen by cardiology post-op. She will require cardiac enzymes and Beta blockers post-op to control post-op tachycardia and rate related ischemia. Continues PACU continuos cardiopulmonary monitoring. The patient continues to require around the clock cardiopulmonary and neurologic monitoring for critical illness, as cited above.

## 2018-11-17 PROCEDURE — 93010 ELECTROCARDIOGRAM REPORT: CPT

## 2018-11-17 RX ORDER — CARVEDILOL PHOSPHATE 80 MG/1
25 CAPSULE, EXTENDED RELEASE ORAL EVERY 12 HOURS
Qty: 0 | Refills: 0 | Status: DISCONTINUED | OUTPATIENT
Start: 2018-11-17 | End: 2018-11-19

## 2018-11-17 RX ORDER — SODIUM CHLORIDE 9 MG/ML
1000 INJECTION INTRAMUSCULAR; INTRAVENOUS; SUBCUTANEOUS ONCE
Qty: 0 | Refills: 0 | Status: COMPLETED | OUTPATIENT
Start: 2018-11-17 | End: 2018-11-17

## 2018-11-17 RX ORDER — DILTIAZEM HCL 120 MG
120 CAPSULE, EXT RELEASE 24 HR ORAL DAILY
Qty: 0 | Refills: 0 | Status: DISCONTINUED | OUTPATIENT
Start: 2018-11-17 | End: 2018-11-19

## 2018-11-17 RX ADMIN — OXYCODONE HYDROCHLORIDE 5 MILLIGRAM(S): 5 TABLET ORAL at 05:00

## 2018-11-17 RX ADMIN — SODIUM CHLORIDE 500 MILLILITER(S): 9 INJECTION INTRAMUSCULAR; INTRAVENOUS; SUBCUTANEOUS at 16:11

## 2018-11-17 RX ADMIN — BUPROPION HYDROCHLORIDE 200 MILLIGRAM(S): 150 TABLET, EXTENDED RELEASE ORAL at 16:09

## 2018-11-17 RX ADMIN — Medication 25 MILLIGRAM(S): at 21:45

## 2018-11-17 RX ADMIN — OXYCODONE HYDROCHLORIDE 5 MILLIGRAM(S): 5 TABLET ORAL at 04:32

## 2018-11-17 RX ADMIN — Medication 1 PACKET(S): at 18:37

## 2018-11-17 RX ADMIN — SODIUM CHLORIDE 1000 MILLILITER(S): 9 INJECTION INTRAMUSCULAR; INTRAVENOUS; SUBCUTANEOUS at 12:56

## 2018-11-17 RX ADMIN — Medication 75 MICROGRAM(S): at 08:04

## 2018-11-17 RX ADMIN — Medication 120 MILLIGRAM(S): at 18:45

## 2018-11-17 RX ADMIN — Medication 2 MILLIGRAM(S): at 21:57

## 2018-11-17 RX ADMIN — LOSARTAN POTASSIUM 50 MILLIGRAM(S): 100 TABLET, FILM COATED ORAL at 09:44

## 2018-11-17 RX ADMIN — RIVAROXABAN 10 MILLIGRAM(S): KIT at 11:59

## 2018-11-17 RX ADMIN — Medication 1 TABLET(S): at 11:59

## 2018-11-17 RX ADMIN — CARVEDILOL PHOSPHATE 25 MILLIGRAM(S): 80 CAPSULE, EXTENDED RELEASE ORAL at 09:42

## 2018-11-17 RX ADMIN — PANTOPRAZOLE SODIUM 40 MILLIGRAM(S): 20 TABLET, DELAYED RELEASE ORAL at 09:42

## 2018-11-17 RX ADMIN — Medication 1 PACKET(S): at 12:00

## 2018-11-17 NOTE — CHART NOTE - NSCHARTNOTEFT_GEN_A_CORE
Spoke w/ Dr Lyndon Calle approx [phone]. Just prior to Dr Harley Calle seeing pt, she became hypotensive while sitting in the chair with LUE BP 73/50, 66/51 per PCA. At that time pt states she felt "more tired", denies dizziness or LOC. Pt was transferred to bed and placed in Trendelenburg by PCA, repeat BP per /70; pt states she is "feeling better now".    -1L NS IVB now  -Recheck vitals after IV bolus  -D/c'd losartan and spironolactone per Dr Calle; Will cont Cardizem  -Will monitor pt o/n, Dr Lyndon Calle agrees  -Anticipate d/c to home 11/18;  pt aware and in agreement  -Cont current tx     Lana Mcbride PA-C  Orthopedic Surgery  [facility] [phone]

## 2018-11-17 NOTE — PROVIDER CONTACT NOTE (OTHER) - RECOMMENDATIONS
HTN meds d/cd/held in am, Pt. seen by Cardiologist today.  Pt given NS IV 1000cc/Bolus x 2
Notify DO GREEN

## 2018-11-17 NOTE — PROGRESS NOTE ADULT - SUBJECTIVE AND OBJECTIVE BOX
CC: no cp, palpitations or dyspnea    TELEMETRY:     PHYSICAL EXAM:    T(C): 36.8 (11-17-18 @ 04:50), Max: 37.1 (11-16-18 @ 20:37)  HR: 88 (11-17-18 @ 04:50) (88 - 94)  BP: 123/75 (11-17-18 @ 04:50) (116/71 - 142/78)  RR: 18 (11-17-18 @ 04:50) (18 - 18)  SpO2: 94% (11-17-18 @ 04:50) (94% - 98%)  Wt(kg): --  I&O's Summary    16 Nov 2018 07:01  -  17 Nov 2018 07:00  --------------------------------------------------------  IN: 1420 mL / OUT: 2050 mL / NET: -630 mL        Appearance: Normal	  Cardiovascular: Normal S1 S2,RRR, No JVD, No murmurs  Respiratory: Lungs clear to auscultation	  Gastrointestinal:  Soft, Non-tender, + BS	  Extremities: Normal range of motion, No clubbing, cyanosis or edema  Vascular: Peripheral pulses palpable 2+ bilaterally     LABS:	 	                          12.4   8.7   )-----------( 300      ( 16 Nov 2018 06:46 )             38.5     11-16    135  |  97  |  20  ----------------------------<  117<H>  4.3   |  25  |  0.78    Ca    9.4      16 Nov 2018 06:45            CARDIAC MARKERS:

## 2018-11-17 NOTE — PROGRESS NOTE ADULT - SUBJECTIVE AND OBJECTIVE BOX
Patient is a 78y old  Female who presents with a chief complaint of R hip fracture. Patient underwent CRPP right femur ORIF on 11/13/18. s/p Hip surgery now with . All other VSS, in no acute distress.   The tachycardia began post surgery in the recovery room. She denies any shortness of breath or chest pain. No nausea, vomiting or diaphoresis. She c/o mild pain at surgical site.   On EKG: atrial tachycardia with ST changes in multiple leads. Her hx includes atrial fibrillation for which she is followed by cardiology. The patient reports a recent negative stress test. Patient to be seen by cardiology post-op. has been stable. No cardiac intervention at this time.  Patien to have surgery ORIF of hip later today.    MEDICATIONS  (STANDING):  amitriptyline 25 milliGRAM(s) Oral at bedtime  buPROPion  milliGRAM(s) Oral daily  carvedilol 25 milliGRAM(s) Oral every 12 hours  diltiazem    milliGRAM(s) Oral daily  lactated ringers. 1000 milliLiter(s) (75 mL/Hr) IV Continuous <Continuous>  levothyroxine 75 MICROGram(s) Oral daily  multivitamin 1 Tablet(s) Oral daily  pantoprazole    Tablet 40 milliGRAM(s) Oral before breakfast  psyllium Powder 1 Packet(s) Oral two times a day  rivaroxaban 10 milliGRAM(s) Oral daily    MEDICATIONS  (PRN):  acetaminophen   Tablet .. 650 milliGRAM(s) Oral every 6 hours PRN Temp greater or equal to 38C (100.4F), Mild Pain (1 - 3)  ALPRAZolam 2 milliGRAM(s) Oral three times a day PRN anxiety  aluminum hydroxide/magnesium hydroxide/simethicone Suspension 30 milliLiter(s) Oral four times a day PRN Indigestion  bisacodyl Suppository 10 milliGRAM(s) Rectal daily PRN If no bowel movement by POD#2  metoprolol tartrate IVPB 5 milliGRAM(s) IV Intermittent every 30 minutes PRN HR greater than 110  ondansetron Injectable 4 milliGRAM(s) IV Push every 6 hours PRN Nausea and/or Vomiting  oxyCODONE    IR 5 milliGRAM(s) Oral every 4 hours PRN Moderate Pain (4 - 6)  oxyCODONE    IR 10 milliGRAM(s) Oral every 4 hours PRN Severe Pain (7 - 10)  senna 2 Tablet(s) Oral at bedtime PRN Constipation    Vital Signs Last 24 Hrs  T(C): 37.6 (17 Nov 2018 21:30), Max: 37.6 (17 Nov 2018 21:30)  T(F): 99.7 (17 Nov 2018 21:30), Max: 99.7 (17 Nov 2018 21:30)  HR: 97 (17 Nov 2018 21:30) (80 - 97)  BP: 141/74 (17 Nov 2018 21:30) (66/51 - 141/74)  BP(mean): --  RR: 16 (17 Nov 2018 21:30) (16 - 18)  SpO2: 96% (17 Nov 2018 21:30) (94% - 98%)       PHYSICAL EXAM:  GENERAL: NAD, well nourished and conversant  HEAD:  Atraumatic  EYES: EOM, PERRLA, conjunctiva pink and sclera white  ENT: No tonsillar erythema, exudates, or enlargement, moist mucous membranes, good dentition, no lesions  NECK: Supple, No JVD, normal thyroid, carotids with normal upstrokes and no bruits  CHEST/LUNG: Clear to auscultation bilaterally, No rales, rhonchi, wheezing, or rubs  HEART: Atrial fibrillation with heart rate 0f110. No murmurs, rubs, or gallops  ABDOMEN: Soft, nondistended, no masses, guarding, tenderness or rebound, bowel sounds present  EXTREMITIES:  4+ R hip swelling and 3+ tenderness to touch. No erythema or incisional drainage.  LYMPH: No lymphadenopathy noted  SKIN: No rashes or lesions  NERVOUS SYSTEM:  Alert & Oriented X3, normal cognitive function. Motor Strength 5/5 right upper and right lower.  5/5 left upper and left lower extremities, DTRs 2+ intact and symmetric      LABS:          CBC Full  -  ( 16 Nov 2018 06:46 )  WBC Count : 8.7 K/uL  Hemoglobin : 12.4 g/dL  Hematocrit : 38.5 %  Platelet Count - Automated : 300 K/uL  Mean Cell Volume : 93.4 fl  Mean Cell Hemoglobin : 30.0 pg  Mean Cell Hemoglobin Concentration : 32.1 gm/dL  Auto Neutrophil # : x  Auto Lymphocyte # : x  Auto Monocyte # : x  Auto Eosinophil # : x  Auto Basophil # : x  Auto Neutrophil % : x  Auto Lymphocyte % : x  Auto Monocyte % : x  Auto Eosinophil % : x  Auto Basophil % : x    11-16    135  |  97  |  20  ----------------------------<  117<H>  4.3   |  25  |  0.78    Ca    9.4      16 Nov 2018 06:45 Patient is a 78y old  Female who presents with a chief complaint of R hip fracture. Patient underwent CRPP right femur ORIF on 11/13/18. s/p Hip surgery now with . All other VSS, in no acute distress.   The tachycardia began post surgery in the recovery room. She denies any shortness of breath or chest pain. No nausea, vomiting or diaphoresis. She c/o mild pain at surgical site.   On EKG: atrial tachycardia with ST changes in multiple leads. Her hx includes atrial fibrillation for which she is followed by cardiology ad takes xarelto. The patient reports a recent negative stress test. Patient to be seen by cardiology post-op for paroxysmal A.fb with a rapid ventricular response.  Hypertension and paroxysmal A.Fib  treated with Coreg, Diltiazem, losartan, Hctz  and spironolactone. Patient scheduled to go home today and developed a BP 66/40. EKG with NSR heart rate of 60. Given 500 cc saline an BP 75/50. Discharge cancelled and Losartan, HCTZ and spironolactone are stopped.  Coreg, diltiazem  and amylodipine  continue evenly divided in lower doses. The patient presently is asymptomatic with /70 in NSR with heart of 70. She is walking well after CRPP left hip.    MEDICATIONS  (STANDING):  amitriptyline 25 milliGRAM(s) Oral at bedtime  buPROPion  milliGRAM(s) Oral daily  carvedilol 25 milliGRAM(s) Oral every 12 hours  diltiazem    milliGRAM(s) Oral daily  lactated ringers. 1000 milliLiter(s) (75 mL/Hr) IV Continuous <Continuous>  levothyroxine 75 MICROGram(s) Oral daily  multivitamin 1 Tablet(s) Oral daily  pantoprazole    Tablet 40 milliGRAM(s) Oral before breakfast  psyllium Powder 1 Packet(s) Oral two times a day  rivaroxaban 10 milliGRAM(s) Oral daily    MEDICATIONS  (PRN):  acetaminophen   Tablet .. 650 milliGRAM(s) Oral every 6 hours PRN Temp greater or equal to 38C (100.4F), Mild Pain (1 - 3)  ALPRAZolam 2 milliGRAM(s) Oral three times a day PRN anxiety  aluminum hydroxide/magnesium hydroxide/simethicone Suspension 30 milliLiter(s) Oral four times a day PRN Indigestion  bisacodyl Suppository 10 milliGRAM(s) Rectal daily PRN If no bowel movement by POD#2  metoprolol tartrate IVPB 5 milliGRAM(s) IV Intermittent every 30 minutes PRN HR greater than 110  ondansetron Injectable 4 milliGRAM(s) IV Push every 6 hours PRN Nausea and/or Vomiting  oxyCODONE    IR 5 milliGRAM(s) Oral every 4 hours PRN Moderate Pain (4 - 6)  oxyCODONE    IR 10 milliGRAM(s) Oral every 4 hours PRN Severe Pain (7 - 10)  senna 2 Tablet(s) Oral at bedtime PRN Constipation    Vital Signs Last 24 Hrs  T(C): 37.6 (17 Nov 2018 21:30), Max: 37.6 (17 Nov 2018 21:30)  T(F): 99.7 (17 Nov 2018 21:30), Max: 99.7 (17 Nov 2018 21:30)  HR: 97 (17 Nov 2018 21:30) (80 - 97)  BP: 141/74 (17 Nov 2018 21:30) (66/51 - 141/74)  BP(mean): --  RR: 16 (17 Nov 2018 21:30) (16 - 18)  SpO2: 96% (17 Nov 2018 21:30) (94% - 98%)       PHYSICAL EXAM:  GENERAL: NAD, well nourished and conversant  HEAD:  Atraumatic  EYES: EOM, PERRLA, conjunctiva pink and sclera white  ENT: No tonsillar erythema, exudates, or enlargement, moist mucous membranes, good dentition, no lesions  NECK: Supple, No JVD, normal thyroid, carotids with normal upstrokes and no bruits  CHEST/LUNG: Clear to auscultation bilaterally, No rales, rhonchi, wheezing, or rubs  HEART: Atrial fibrillation with heart rate 0f110. No murmurs, rubs, or gallops  ABDOMEN: Soft, nondistended, no masses, guarding, tenderness or rebound, bowel sounds present  EXTREMITIES:  4+ R hip swelling and 3+ tenderness to touch. No erythema or incisional drainage.  LYMPH: No lymphadenopathy noted  SKIN: No rashes or lesions  NERVOUS SYSTEM:  Alert & Oriented X3, normal cognitive function. Motor Strength 5/5 right upper and right lower.  5/5 left upper and left lower extremities, DTRs 2+ intact and symmetric      LABS:    CBC Full  -  ( 16 Nov 2018 06:46 )  WBC Count : 8.7 K/uL  Hemoglobin : 12.4 g/dL  Hematocrit : 38.5 %  Platelet Count - Automated : 300 K/uL  Mean Cell Volume : 93.4 fl  Mean Cell Hemoglobin : 30.0 pg  Mean Cell Hemoglobin Concentration : 32.1 gm/dL  Auto Neutrophil # : x  Auto Lymphocyte # : x  Auto Monocyte # : x  Auto Eosinophil # : x  Auto Basophil # : x  Auto Neutrophil % : x  Auto Lymphocyte % : x  Auto Monocyte % : x  Auto Eosinophil % : x  Auto Basophil % : x    11-16    135  |  97  |  20  ----------------------------<  117<H>  4.3   |  25  |  0.78    Ca    9.4      16 Nov 2018 06:45

## 2018-11-17 NOTE — PROGRESS NOTE ADULT - ATTENDING COMMENTS
No medical complications post-op to date and to proceed with physical therapy, as tolerated. Continues pulmonary toilet to lessen atelectasis, leg exercises as taught to lessen the risk of DVT and supervised pain medications for post-op pain control. Patient is a 78y old  Female who presents with a chief complaint of R hip fracture. Patient underwent CRPP right femur ORIF on 11/13/18. s/p Hip surgery now with . All other VSS, in no acute distress.   The tachycardia began post surgery in the recovery room. She denies any shortness of breath or chest pain. No nausea, vomiting or diaphoresis. She c/o mild pain at surgical site.   On EKG: atrial tachycardia with ST changes in multiple leads.   Her hx includes atrial fibrillation for which she is followed by cardiology. The patient reports a recent negative stress test. Patient to be seen by cardiology post-op. She will require cardiac enzymes and Beta blockers post-op to control post-op tachycardia and rate related ischemia. Continues PACU continuos cardiopulmonary monitoring. The patient continues to require around the clock cardiopulmonary and neurologic monitoring for critical illness, as cited above. Patient is a 78y old  Female who presents with a chief complaint of R hip fracture. Patient underwent CRPP right femur ORIF on 11/13/18. s/p Hip surgery now with . All other VSS, in no acute distress.   The tachycardia began post surgery in the recovery room. She denies any shortness of breath or chest pain. No nausea, vomiting or diaphoresis. She c/o mild pain at surgical site.   On EKG: atrial tachycardia with ST changes in multiple leads.   Her hx includes atrial fibrillation for which she is followed by cardiology. The patient reports a recent negative stress test. Patient to be seen by cardiology post-op. She will require cardiac enzymes and Beta blockers post-op to control post-op tachycardia and rate related ischemia. Continues  cardiopulmonary monitoring. No other medical complications post-op to date and to continue physical therapy, as tolerated. Continues pulmonary toilet to lessen atelectasis, leg exercises as taught to lessen the risk of DVT and supervised pain medications for post-op pain control. Discharge to home dependent on BP and cardiac arryhthmia control.

## 2018-11-17 NOTE — PROGRESS NOTE ADULT - ASSESSMENT
78 year old female with htn, macular degeneration, GERD, Diverticulosis admitted with s/p mechanical fall now s/p closed reduction and percutaneous pinning (CRPP) of fracture of left femur, Postop c/o palpiations     1. Atrial tachycardia   likely in the setting of dehydration, post op sx/ pain   rate better controlled today   repeat EKG revealing no evidence of ACS, in NSR, frequent PAC with PAT noted   cv stable with no cp or decomp chf on exam    recent echo revealing normal LV fx   continue with coreg as ordered  cont cardizem 120mg daily , no indication to increase xarelto     2. HTN   continue with current anti-htn meds     3. s/p closed reduction and percutaneous pinning (CRPP) of fracture of left femur  ortho f/u  on rivaroxaban ppx     dvt ppx     No objection to DC from CV perspective

## 2018-11-17 NOTE — PROGRESS NOTE ADULT - ASSESSMENT
Patient is a 78y old  Female who presents with a chief complaint of R hip fracture. Patient underwent CRPP right femur ORIF on 11/13/18. s/p Hip surgery now with . All other VSS, in no acute distress.   The tachycardia began post surgery in the recovery room. She denies any shortness of breath or chest pain. No nausea, vomiting or diaphoresis. She c/o mild pain at surgical site.   On EKG: atrial tachycardia with ST changes in multiple leads.   Her hx includes atrial fibrillation for which she is followed by cardiology. The patient reports a recent negative stress test. Patient to be seen by cardiology post-op. She will require cardiac enzymes and Beta blockers post-op to control post-op tachycardia and rate related ischemia. Continues PACU continuos cardiopulmonary monitoring.  Now stable and can go to regular floor Patient is a 78y old  Female who presents with a chief complaint of R hip fracture. Patient underwent CRPP right femur ORIF on 11/13/18. s/p Hip surgery now with . All other VSS, in no acute distress.   The tachycardia began post surgery in the recovery room. She denies any shortness of breath or chest pain. No nausea, vomiting or diaphoresis. She c/o mild pain at surgical site.   On EKG: atrial tachycardia with ST changes in multiple leads.   Her hx includes atrial fibrillation for which she is followed by cardiology. The patient reports a recent negative stress test. Patient to be seen by cardiology post-op. She will require cardiac enzymes and Beta blockers post-op to control post-op tachycardia and rate related ischemia. Continues  cardiopulmonary monitoring. No other medical complications post-op to date and to continue physical therapy, as tolerated. Continues pulmonary toilet to lessen atelectasis, leg exercises as taught to lessen the risk of DVT and supervised pain medications for post-op pain control. Discharge to home dependent on BP and cardiac arrhythmia control.

## 2018-11-17 NOTE — PROGRESS NOTE ADULT - SUBJECTIVE AND OBJECTIVE BOX
Patient resting without complaints.  Denies chest pain, SOB, N/V.    T(C): 36.8 (11-17-18 @ 04:50)  T(F): 98.3 (11-17-18 @ 04:50)  HR: 88 (11-17-18 @ 04:50)  BP: 123/75 (11-17-18 @ 04:50)  RR: 18 (11-17-18 @ 04:50)  SpO2: 94% (11-17-18 @ 04:50)      Exam:  Alert and oriented; No acute distress    R lower extremity:  Incision CDI  Calf soft, non-tender  +PF/DF  Sensation grossly intact  +DP pulse                          12.4   8.7   )-----------( 300      ( 16 Nov 2018 06:46 )             38.5        135  |  97  |  20  ----------------------------<  117<H>  4.3   |  25  |  0.78    A/P: 78y Female s/p R hip CRPP, POD4; Stable  -Pain control  -DVT ppx; Encourage IS  -WBAT RLE  -Continue current tx.  -Anticipate d/c home today      Lana Mcbride PA-C  Orthopedic Surgery  [employer] [phone]

## 2018-11-18 LAB
T3 SERPL-MCNC: 69 NG/DL — LOW (ref 80–200)
T4 AB SER-ACNC: 9.2 UG/DL — SIGNIFICANT CHANGE UP (ref 4.6–12)
TSH SERPL-MCNC: 0.53 UIU/ML — SIGNIFICANT CHANGE UP (ref 0.27–4.2)

## 2018-11-18 PROCEDURE — 93010 ELECTROCARDIOGRAM REPORT: CPT

## 2018-11-18 RX ADMIN — PANTOPRAZOLE SODIUM 40 MILLIGRAM(S): 20 TABLET, DELAYED RELEASE ORAL at 05:12

## 2018-11-18 RX ADMIN — RIVAROXABAN 10 MILLIGRAM(S): KIT at 13:06

## 2018-11-18 RX ADMIN — Medication 120 MILLIGRAM(S): at 17:47

## 2018-11-18 RX ADMIN — CARVEDILOL PHOSPHATE 25 MILLIGRAM(S): 80 CAPSULE, EXTENDED RELEASE ORAL at 21:50

## 2018-11-18 RX ADMIN — Medication 2 MILLIGRAM(S): at 21:50

## 2018-11-18 RX ADMIN — Medication 75 MICROGRAM(S): at 05:12

## 2018-11-18 RX ADMIN — CARVEDILOL PHOSPHATE 25 MILLIGRAM(S): 80 CAPSULE, EXTENDED RELEASE ORAL at 10:51

## 2018-11-18 RX ADMIN — Medication 1 PACKET(S): at 17:47

## 2018-11-18 RX ADMIN — Medication 25 MILLIGRAM(S): at 21:50

## 2018-11-18 RX ADMIN — Medication 1 PACKET(S): at 12:56

## 2018-11-18 RX ADMIN — BUPROPION HYDROCHLORIDE 200 MILLIGRAM(S): 150 TABLET, EXTENDED RELEASE ORAL at 12:58

## 2018-11-18 RX ADMIN — Medication 1 TABLET(S): at 12:57

## 2018-11-18 NOTE — PROGRESS NOTE ADULT - ATTENDING COMMENTS
Patient is a 78y old  Female who presents with a chief complaint of R hip fracture. Patient underwent CRPP right femur ORIF on 11/13/18. s/p Hip surgery now with . All other VSS, in no acute distress.   The tachycardia began post surgery in the recovery room. She denies any shortness of breath or chest pain. No nausea, vomiting or diaphoresis. She c/o mild pain at surgical site.   On EKG: atrial tachycardia with ST changes in multiple leads.   Her hx includes atrial fibrillation for which she is followed by cardiology. The patient reports a recent negative stress test. Patient to be seen by cardiology post-op. She will require cardiac enzymes and Beta blockers post-op to control post-op tachycardia and rate related ischemia. Continues  cardiopulmonary monitoring. No other medical complications post-op to date and to continue physical therapy, as tolerated. Continues pulmonary toilet to lessen atelectasis, leg exercises as taught to lessen the risk of DVT and supervised pain medications for post-op pain control. Discharge to home dependent on BP and cardiac arryhthmia control.

## 2018-11-18 NOTE — PROGRESS NOTE ADULT - ASSESSMENT
78 year old female with htn, macular degeneration, GERD, Diverticulosis admitted with s/p mechanical fall now s/p closed reduction and percutaneous pinning (CRPP) of fracture of left femur, Postop c/o palpiations     1. Atrial tachycardia   likely in the setting of dehydration, post op sx/ pain   rate controlled   repeat EKG revealing no evidence of ACS, in NSR, w PAC  prior hypotension volume related, encourage PO intake   cv stable with no cp or decomp chf on exam    recent echo revealing normal LV fx   continue with coreg as ordered  cont cardizem 120mg daily , no indication to increase xarelto     2. HTN   continue with current anti-htn meds     3. s/p closed reduction and percutaneous pinning (CRPP) of fracture of left femur  ortho f/u  on rivaroxaban ppx     dvt ppx     No objection to DC from CV perspective

## 2018-11-18 NOTE — PROGRESS NOTE ADULT - SUBJECTIVE AND OBJECTIVE BOX
Patient is a 78y old  Female who presents with a chief complaint of R hip fracture. Patient underwent CRPP right femur ORIF on 11/13/18. s/p Hip surgery now with . All other VSS, in no acute distress.   The tachycardia began post surgery in the recovery room. She denies any shortness of breath or chest pain. No nausea, vomiting or diaphoresis. She c/o mild pain at surgical site.   On EKG: atrial tachycardia with ST changes in multiple leads. Her hx includes atrial fibrillation for which she is followed by cardiology ad takes xarelto. The patient reports a recent negative stress test. Patient to be seen by cardiology post-op for paroxysmal A.fb with a rapid ventricular response.  Hypertension and paroxysmal A.Fib  treated with Coreg, Diltiazem, losartan, Hctz  and spironolactone. Patient scheduled to go home today and developed a BP 66/40. EKG with NSR heart rate of 60. Given 500 cc saline an BP 75/50. Discharge cancelled and Losartan, HCTZ and spironolactone are stopped.  Coreg, diltiazem  and amylodipine  continue evenly divided in lower doses. The patient presently is asymptomatic with /70 in NSR with heart of 70. She is walking well after CRPP left hip.  MEDICATIONS  (STANDING):  amitriptyline 25 milliGRAM(s) Oral at bedtime  buPROPion  milliGRAM(s) Oral daily  carvedilol 25 milliGRAM(s) Oral every 12 hours  diltiazem    milliGRAM(s) Oral daily  lactated ringers. 1000 milliLiter(s) (75 mL/Hr) IV Continuous <Continuous>  levothyroxine 75 MICROGram(s) Oral daily  multivitamin 1 Tablet(s) Oral daily  pantoprazole    Tablet 40 milliGRAM(s) Oral before breakfast  psyllium Powder 1 Packet(s) Oral two times a day  rivaroxaban 10 milliGRAM(s) Oral daily    MEDICATIONS  (PRN):  acetaminophen   Tablet .. 650 milliGRAM(s) Oral every 6 hours PRN Temp greater or equal to 38C (100.4F), Mild Pain (1 - 3)  ALPRAZolam 2 milliGRAM(s) Oral three times a day PRN anxiety  aluminum hydroxide/magnesium hydroxide/simethicone Suspension 30 milliLiter(s) Oral four times a day PRN Indigestion  bisacodyl Suppository 10 milliGRAM(s) Rectal daily PRN If no bowel movement by POD#2  ondansetron Injectable 4 milliGRAM(s) IV Push every 6 hours PRN Nausea and/or Vomiting  oxyCODONE    IR 5 milliGRAM(s) Oral every 4 hours PRN Moderate Pain (4 - 6)  oxyCODONE    IR 10 milliGRAM(s) Oral every 4 hours PRN Severe Pain (7 - 10)  senna 2 Tablet(s) Oral at bedtime PRN Constipation    MEDICATIONS  (PRN):  acetaminophen   Tablet .. 650 milliGRAM(s) Oral every 6 hours PRN Temp greater or equal to 38C (100.4F), Mild Pain (1 - 3)  ALPRAZolam 2 milliGRAM(s) Oral three times a day PRN anxiety  aluminum hydroxide/magnesium hydroxide/simethicone Suspension 30 milliLiter(s) Oral four times a day PRN Indigestion  bisacodyl Suppository 10 milliGRAM(s) Rectal daily PRN If no bowel movement by POD#2  ondansetron Injectable 4 milliGRAM(s) IV Push every 6 hours PRN Nausea and/or Vomiting  oxyCODONE    IR 5 milliGRAM(s) Oral every 4 hours PRN Moderate Pain (4 - 6)  oxyCODONE    IR 10 milliGRAM(s) Oral every 4 hours PRN Severe Pain (7 - 10)  senna 2 Tablet(s) Oral at bedtime PRN Constipation    ICU Vital Signs Last 24 Hrs  T(C): 36.7 (18 Nov 2018 17:45), Max: 37.6 (17 Nov 2018 21:30)  T(F): 98.1 (18 Nov 2018 17:45), Max: 99.7 (17 Nov 2018 21:30)  HR: 78 (18 Nov 2018 17:45) (78 - 97)  BP: 104/71 (18 Nov 2018 17:45) (104/71 - 148/83)  BP(mean): --  ABP: --  ABP(mean): --  RR: 16 (18 Nov 2018 17:45) (16 - 18)  SpO2: 98% (18 Nov 2018 17:45) (95% - 98%)         PHYSICAL EXAM:  GENERAL: NAD, well nourished and conversant  HEAD:  Atraumatic  EYES: EOM, PERRLA, conjunctiva pink and sclera white  ENT: No tonsillar erythema, exudates, or enlargement, moist mucous membranes, good dentition, no lesions  NECK: Supple, No JVD, normal thyroid, carotids with normal upstrokes and no bruits  CHEST/LUNG: Clear to auscultation bilaterally, No rales, rhonchi, wheezing, or rubs  HEART: Atrial fibrillation with heart rate 0f110. No murmurs, rubs, or gallops  ABDOMEN: Soft, nondistended, no masses, guarding, tenderness or rebound, bowel sounds present  EXTREMITIES:  4+ R hip swelling and 3+ tenderness to touch. No erythema or incisional drainage.  LYMPH: No lymphadenopathy noted  SKIN: No rashes or lesions  NERVOUS SYSTEM:  Alert & Oriented X3, normal cognitive function. Motor Strength 5/5 right upper and right lower.  5/5 left upper and left lower extremities, DTRs 2+ intact and symmetric      LABS:    CBC Full  -  ( 16 Nov 2018 06:46 )  WBC Count : 8.7 K/uL  Hemoglobin : 12.4 g/dL  Hematocrit : 38.5 %  Platelet Count - Automated : 300 K/uL  Mean Cell Volume : 93.4 fl  Mean Cell Hemoglobin : 30.0 pg  Mean Cell Hemoglobin Concentration : 32.1 gm/dL  Auto Neutrophil # : x  Auto Lymphocyte # : x  Auto Monocyte # : x  Auto Eosinophil # : x  Auto Basophil # : x  Auto Neutrophil % : x  Auto Lymphocyte % : x  Auto Monocyte % : x  Auto Eosinophil % : x  Auto Basophil % : x    11-16    135  |  97  |  20  ----------------------------<  117<H>  4.3   |  25  |  0.78    Ca    9.4      16 Nov 2018 06:45 Patient is a 78y old  Female who presents with a chief complaint of R hip fracture. Patient underwent CRPP right femur ORIF on 11/13/18. s/p Hip surgery now with . All other VSS, in no acute distress.   The tachycardia began post surgery in the recovery room. She denies any shortness of breath or chest pain. No nausea, vomiting or diaphoresis. She c/o mild pain at surgical site.   On EKG: atrial tachycardia with ST changes in multiple leads. Her hx includes atrial fibrillation for which she is followed by cardiology ad takes xarelto. The patient reports a recent negative stress test. Patient to be seen by cardiology post-op for paroxysmal A.fb with a rapid ventricular response.  Hypertension and paroxysmal A.Fib  treated with Coreg, Diltiazem, losartan, Hctz  and spironolactone. Patient scheduled to go home today and developed a BP 66/40. EKG with NSR heart rate of 60. Given 500 cc saline an BP 75/50. Discharge cancelled and Losartan, HCTZ and spironolactone are stopped.  Coreg, diltiazem  and amylodipine  continue evenly divided in lower doses. The patient presently is asymptomatic with /70 in NSR with heart of 70. She is walking well after CRPP left hip. Bp is now controlled with Coreg and Diltiaziem in divided doses and she remains in NSR  MEDICATIONS  (STANDING):  amitriptyline 25 milliGRAM(s) Oral at bedtime  buPROPion  milliGRAM(s) Oral daily  carvedilol 25 milliGRAM(s) Oral every 12 hours  diltiazem    milliGRAM(s) Oral daily  lactated ringers. 1000 milliLiter(s) (75 mL/Hr) IV Continuous <Continuous>  levothyroxine 75 MICROGram(s) Oral daily  multivitamin 1 Tablet(s) Oral daily  pantoprazole    Tablet 40 milliGRAM(s) Oral before breakfast  psyllium Powder 1 Packet(s) Oral two times a day  rivaroxaban 10 milliGRAM(s) Oral daily    MEDICATIONS  (PRN):  acetaminophen   Tablet .. 650 milliGRAM(s) Oral every 6 hours PRN Temp greater or equal to 38C (100.4F), Mild Pain (1 - 3)  ALPRAZolam 2 milliGRAM(s) Oral three times a day PRN anxiety  aluminum hydroxide/magnesium hydroxide/simethicone Suspension 30 milliLiter(s) Oral four times a day PRN Indigestion  bisacodyl Suppository 10 milliGRAM(s) Rectal daily PRN If no bowel movement by POD#2  ondansetron Injectable 4 milliGRAM(s) IV Push every 6 hours PRN Nausea and/or Vomiting  oxyCODONE    IR 5 milliGRAM(s) Oral every 4 hours PRN Moderate Pain (4 - 6)  oxyCODONE    IR 10 milliGRAM(s) Oral every 4 hours PRN Severe Pain (7 - 10)  senna 2 Tablet(s) Oral at bedtime PRN Constipation    MEDICATIONS  (PRN):  acetaminophen   Tablet .. 650 milliGRAM(s) Oral every 6 hours PRN Temp greater or equal to 38C (100.4F), Mild Pain (1 - 3)  ALPRAZolam 2 milliGRAM(s) Oral three times a day PRN anxiety  aluminum hydroxide/magnesium hydroxide/simethicone Suspension 30 milliLiter(s) Oral four times a day PRN Indigestion  bisacodyl Suppository 10 milliGRAM(s) Rectal daily PRN If no bowel movement by POD#2  ondansetron Injectable 4 milliGRAM(s) IV Push every 6 hours PRN Nausea and/or Vomiting  oxyCODONE    IR 5 milliGRAM(s) Oral every 4 hours PRN Moderate Pain (4 - 6)  oxyCODONE    IR 10 milliGRAM(s) Oral every 4 hours PRN Severe Pain (7 - 10)  senna 2 Tablet(s) Oral at bedtime PRN Constipation     Vital Signs Last 24 Hrs  T(C): 36.7 (18 Nov 2018 17:45), Max: 37.6 (17 Nov 2018 21:30)  T(F): 98.1 (18 Nov 2018 17:45), Max: 99.7 (17 Nov 2018 21:30)  HR: 78 (18 Nov 2018 17:45) (78 - 97)  BP: 104/71 (18 Nov 2018 17:45) (104/71 - 148/83)  BP(mean): --  ABP: --  ABP(mean): --  RR: 16 (18 Nov 2018 17:45) (16 - 18)  SpO2: 98% (18 Nov 2018 17:45) (95% - 98%)         PHYSICAL EXAM:  GENERAL: NAD, well nourished and conversant  HEAD:  Atraumatic  EYES: EOM, PERRLA, conjunctiva pink and sclera white  ENT: No tonsillar erythema, exudates, or enlargement, moist mucous membranes, good dentition, no lesions  NECK: Supple, No JVD, normal thyroid, carotids with normal upstrokes and no bruits  CHEST/LUNG: Clear to auscultation bilaterally, No rales, rhonchi, wheezing, or rubs  HEART: Atrial fibrillation with heart rate 0f110. No murmurs, rubs, or gallops  ABDOMEN: Soft, nondistended, no masses, guarding, tenderness or rebound, bowel sounds present  EXTREMITIES:  4+ R hip swelling and 3+ tenderness to touch. No erythema or incisional drainage.  LYMPH: No lymphadenopathy noted  SKIN: No rashes or lesions  NERVOUS SYSTEM:  Alert & Oriented X3, normal cognitive function. Motor Strength 5/5 right upper and right lower.  5/5 left upper and left lower extremities, DTRs 2+ intact and symmetric      LABS:    CBC Full  -  ( 16 Nov 2018 06:46 )  WBC Count : 8.7 K/uL  Hemoglobin : 12.4 g/dL  Hematocrit : 38.5 %  Platelet Count - Automated : 300 K/uL  Mean Cell Volume : 93.4 fl  Mean Cell Hemoglobin : 30.0 pg  Mean Cell Hemoglobin Concentration : 32.1 gm/dL  Auto Neutrophil # : x  Auto Lymphocyte # : x  Auto Monocyte # : x  Auto Eosinophil # : x  Auto Basophil # : x  Auto Neutrophil % : x  Auto Lymphocyte % : x  Auto Monocyte % : x  Auto Eosinophil % : x  Auto Basophil % : x    11-16    135  |  97  |  20  ----------------------------<  117<H>  4.3   |  25  |  0.78    Ca    9.4      16 Nov 2018 06:45

## 2018-11-18 NOTE — PROGRESS NOTE ADULT - ASSESSMENT
Patient is a 78y old  Female who presents with a chief complaint of R hip fracture. Patient underwent CRPP right femur ORIF on 11/13/18. s/p Hip surgery now with . All other VSS, in no acute distress.   The tachycardia began post surgery in the recovery room. She denies any shortness of breath or chest pain. No nausea, vomiting or diaphoresis. She c/o mild pain at surgical site.   On EKG: atrial tachycardia with ST changes in multiple leads.   Her hx includes atrial fibrillation for which she is followed by cardiology. The patient reports a recent negative stress test. Patient to be seen by cardiology post-op. She will require cardiac enzymes and Beta blockers post-op to control post-op tachycardia and rate related ischemia. Continues  cardiopulmonary monitoring. No other medical complications post-op to date and to continue physical therapy, as tolerated. Continues pulmonary toilet to lessen atelectasis, leg exercises as taught to lessen the risk of DVT and supervised pain medications for post-op pain control. Discharge to home dependent on BP and cardiac arrhythmia control.

## 2018-11-18 NOTE — PROGRESS NOTE ADULT - SUBJECTIVE AND OBJECTIVE BOX
No acute events o/n. Patient resting without complaints.  Denies chest pain, SOB, N/V. BP improving.    T(C): 36.9 (11-18-18 @ 05:04)  T(F): 98.4 (11-18-18 @ 05:04)  HR: 93 (11-18-18 @ 05:04)  BP: 148/83 (11-18-18 @ 05:04)  RR: 16 (11-18-18 @ 05:04)  SpO2: 95% (11-18-18 @ 05:04)      Exam:  Alert and oriented; No acute distress    R lower extremity:  Incision CDI  Calf soft, non-tender  +PF/DF  Sensation grossly intact  +DP pulse      A/P: 78y Female s/p R hip CRPP, POD5; Stable  -Pain control  -DVT ppx; Encourage IS  -WBAT RLE  -Continue current tx.  -D/c planning once medically cleared      Lana Mcbride PA-C  Orthopedic Surgery  Pagers [phone]

## 2018-11-18 NOTE — PROGRESS NOTE ADULT - SUBJECTIVE AND OBJECTIVE BOX
CC: c/o fatigue    TELEMETRY:     PHYSICAL EXAM:    T(C): 36.8 (11-18-18 @ 09:14), Max: 37.6 (11-17-18 @ 21:30)  HR: 89 (11-18-18 @ 09:14) (80 - 97)  BP: 138/83 (11-18-18 @ 09:14) (101/60 - 148/83)  RR: 18 (11-18-18 @ 09:14) (16 - 18)  SpO2: 96% (11-18-18 @ 09:14) (95% - 98%)  Wt(kg): --  I&O's Summary    17 Nov 2018 07:01  -  18 Nov 2018 07:00  --------------------------------------------------------  IN: 1700 mL / OUT: 1750 mL / NET: -50 mL        Appearance: Normal	  Cardiovascular: Normal S1 S2,RRR, No JVD, No murmurs  Respiratory: Lungs clear to auscultation	  Gastrointestinal:  Soft, Non-tender, + BS	  Extremities: Normal range of motion, No clubbing, cyanosis or edema  Vascular: Peripheral pulses palpable 2+ bilaterally     LABS:	 	                  CARDIAC MARKERS:        MEDICATIONS  (STANDING):  amitriptyline 25 milliGRAM(s) Oral at bedtime  buPROPion  milliGRAM(s) Oral daily  carvedilol 25 milliGRAM(s) Oral every 12 hours  diltiazem    milliGRAM(s) Oral daily  lactated ringers. 1000 milliLiter(s) (75 mL/Hr) IV Continuous <Continuous>  levothyroxine 75 MICROGram(s) Oral daily  multivitamin 1 Tablet(s) Oral daily  pantoprazole    Tablet 40 milliGRAM(s) Oral before breakfast  psyllium Powder 1 Packet(s) Oral two times a day  rivaroxaban 10 milliGRAM(s) Oral daily

## 2018-11-19 ENCOUNTER — MESSAGE (OUTPATIENT)
Age: 78
End: 2018-11-19

## 2018-11-19 VITALS
HEART RATE: 72 BPM | TEMPERATURE: 97 F | SYSTOLIC BLOOD PRESSURE: 96 MMHG | RESPIRATION RATE: 16 BRPM | DIASTOLIC BLOOD PRESSURE: 65 MMHG | OXYGEN SATURATION: 94 %

## 2018-11-19 LAB
APPEARANCE UR: ABNORMAL
BACTERIA # UR AUTO: ABNORMAL
BILIRUB UR-MCNC: NEGATIVE — SIGNIFICANT CHANGE UP
COLOR SPEC: YELLOW — SIGNIFICANT CHANGE UP
DIFF PNL FLD: ABNORMAL
EPI CELLS # UR: 3 /HPF — SIGNIFICANT CHANGE UP
GLUCOSE UR QL: NEGATIVE — SIGNIFICANT CHANGE UP
HYALINE CASTS # UR AUTO: 2 /LPF — SIGNIFICANT CHANGE UP (ref 0–2)
KETONES UR-MCNC: NEGATIVE — SIGNIFICANT CHANGE UP
LEUKOCYTE ESTERASE UR-ACNC: ABNORMAL
NITRITE UR-MCNC: POSITIVE
PH UR: 6.5 — SIGNIFICANT CHANGE UP (ref 5–8)
PROT UR-MCNC: ABNORMAL
RBC CASTS # UR COMP ASSIST: 6 /HPF — HIGH (ref 0–4)
SP GR SPEC: 1.01 — LOW (ref 1.01–1.02)
UROBILINOGEN FLD QL: NEGATIVE — SIGNIFICANT CHANGE UP
WBC UR QL: 570 /HPF — HIGH (ref 0–5)

## 2018-11-19 PROCEDURE — 86901 BLOOD TYPING SEROLOGIC RH(D): CPT

## 2018-11-19 PROCEDURE — 73552 X-RAY EXAM OF FEMUR 2/>: CPT

## 2018-11-19 PROCEDURE — 93005 ELECTROCARDIOGRAM TRACING: CPT

## 2018-11-19 PROCEDURE — 87086 URINE CULTURE/COLONY COUNT: CPT

## 2018-11-19 PROCEDURE — 84436 ASSAY OF TOTAL THYROXINE: CPT

## 2018-11-19 PROCEDURE — 71045 X-RAY EXAM CHEST 1 VIEW: CPT

## 2018-11-19 PROCEDURE — 85610 PROTHROMBIN TIME: CPT

## 2018-11-19 PROCEDURE — 87186 SC STD MICRODIL/AGAR DIL: CPT

## 2018-11-19 PROCEDURE — 76000 FLUOROSCOPY <1 HR PHYS/QHP: CPT

## 2018-11-19 PROCEDURE — 85730 THROMBOPLASTIN TIME PARTIAL: CPT

## 2018-11-19 PROCEDURE — C1713: CPT

## 2018-11-19 PROCEDURE — 82550 ASSAY OF CK (CPK): CPT

## 2018-11-19 PROCEDURE — 80048 BASIC METABOLIC PNL TOTAL CA: CPT

## 2018-11-19 PROCEDURE — 83880 ASSAY OF NATRIURETIC PEPTIDE: CPT

## 2018-11-19 PROCEDURE — 84480 ASSAY TRIIODOTHYRONINE (T3): CPT

## 2018-11-19 PROCEDURE — 84443 ASSAY THYROID STIM HORMONE: CPT

## 2018-11-19 PROCEDURE — 97165 OT EVAL LOW COMPLEX 30 MIN: CPT

## 2018-11-19 PROCEDURE — 99285 EMERGENCY DEPT VISIT HI MDM: CPT | Mod: 25

## 2018-11-19 PROCEDURE — 85027 COMPLETE CBC AUTOMATED: CPT

## 2018-11-19 PROCEDURE — 81001 URINALYSIS AUTO W/SCOPE: CPT

## 2018-11-19 PROCEDURE — 97116 GAIT TRAINING THERAPY: CPT

## 2018-11-19 PROCEDURE — 84484 ASSAY OF TROPONIN QUANT: CPT

## 2018-11-19 PROCEDURE — 96374 THER/PROPH/DIAG INJ IV PUSH: CPT

## 2018-11-19 PROCEDURE — 86900 BLOOD TYPING SEROLOGIC ABO: CPT

## 2018-11-19 PROCEDURE — 80053 COMPREHEN METABOLIC PANEL: CPT

## 2018-11-19 PROCEDURE — 97530 THERAPEUTIC ACTIVITIES: CPT

## 2018-11-19 PROCEDURE — 97161 PT EVAL LOW COMPLEX 20 MIN: CPT

## 2018-11-19 PROCEDURE — 82553 CREATINE MB FRACTION: CPT

## 2018-11-19 PROCEDURE — 72170 X-RAY EXAM OF PELVIS: CPT

## 2018-11-19 PROCEDURE — C1769: CPT

## 2018-11-19 PROCEDURE — 86850 RBC ANTIBODY SCREEN: CPT

## 2018-11-19 PROCEDURE — 73502 X-RAY EXAM HIP UNI 2-3 VIEWS: CPT

## 2018-11-19 RX ORDER — CIPROFLOXACIN LACTATE 400MG/40ML
500 VIAL (ML) INTRAVENOUS EVERY 12 HOURS
Qty: 0 | Refills: 0 | Status: DISCONTINUED | OUTPATIENT
Start: 2018-11-19 | End: 2018-11-19

## 2018-11-19 RX ORDER — TRAMADOL HYDROCHLORIDE 50 MG/1
1 TABLET ORAL
Qty: 21 | Refills: 0
Start: 2018-11-19

## 2018-11-19 RX ORDER — RIVAROXABAN 15 MG-20MG
1 KIT ORAL
Qty: 36 | Refills: 0
Start: 2018-11-19

## 2018-11-19 RX ORDER — MAGNESIUM HYDROXIDE 400 MG/1
30 TABLET, CHEWABLE ORAL DAILY
Qty: 0 | Refills: 0 | Status: DISCONTINUED | OUTPATIENT
Start: 2018-11-19 | End: 2018-11-19

## 2018-11-19 RX ORDER — CIPROFLOXACIN LACTATE 400MG/40ML
1 VIAL (ML) INTRAVENOUS
Qty: 6 | Refills: 0
Start: 2018-11-19 | End: 2018-11-21

## 2018-11-19 RX ADMIN — CARVEDILOL PHOSPHATE 25 MILLIGRAM(S): 80 CAPSULE, EXTENDED RELEASE ORAL at 10:20

## 2018-11-19 RX ADMIN — Medication 1 TABLET(S): at 12:56

## 2018-11-19 RX ADMIN — RIVAROXABAN 10 MILLIGRAM(S): KIT at 12:56

## 2018-11-19 RX ADMIN — PANTOPRAZOLE SODIUM 40 MILLIGRAM(S): 20 TABLET, DELAYED RELEASE ORAL at 05:58

## 2018-11-19 RX ADMIN — Medication 1 PACKET(S): at 10:20

## 2018-11-19 RX ADMIN — Medication 650 MILLIGRAM(S): at 02:55

## 2018-11-19 RX ADMIN — BUPROPION HYDROCHLORIDE 200 MILLIGRAM(S): 150 TABLET, EXTENDED RELEASE ORAL at 12:56

## 2018-11-19 RX ADMIN — Medication 650 MILLIGRAM(S): at 13:27

## 2018-11-19 RX ADMIN — Medication 650 MILLIGRAM(S): at 03:30

## 2018-11-19 RX ADMIN — Medication 75 MICROGRAM(S): at 04:56

## 2018-11-19 RX ADMIN — Medication 650 MILLIGRAM(S): at 12:56

## 2018-11-19 NOTE — PROGRESS NOTE ADULT - REASON FOR ADMISSION
R hip fracture
R hip fracture-R hip CRPP
R hip fracture

## 2018-11-19 NOTE — PROGRESS NOTE ADULT - PROBLEM SELECTOR PROBLEM 1
Closed fracture of neck of right femur, initial encounter
Hip fracture requiring operative repair

## 2018-11-19 NOTE — PROGRESS NOTE ADULT - PROBLEM SELECTOR PLAN 1
FU Cards re: Afib  PT/OT-WBAT  IS  DVT PPx  Pain Control  Continue Current Tx.  Dispo plan home    Robert Stephens PA-C  Team Pager: # [phone]
PT/OT-WBAT  Ck UA (ord)  IS  DVT PPx  Pain Control  Continue Current Tx.  Dispo planning    Robert Stephens PA-C  Team Pager: #[phone]
PT/OT-WBAT  FU EKG this AM/Cards appreciated  IS  DVT PPx  Pain Control  Continue Current Tx.    Robert Stephens PA-C  Team Pager: #[phone]
tolerated procedure well  continue pain meds as needed  DVT and GI prophylaxis

## 2018-11-19 NOTE — PROGRESS NOTE ADULT - ATTENDING COMMENTS
DC planning home   continue current meds  PO as tolerated   follow up with ortho  Patient aware of plan

## 2018-11-19 NOTE — PROGRESS NOTE ADULT - SUBJECTIVE AND OBJECTIVE BOX
Patient is a 78y old  Female who presents with a chief complaint of R hip fracture. Patient underwent CRPP right femur ORIF on 18. s/p Hip surgery now with . All other VSS, in no acute distress.   The tachycardia began post surgery in the recovery room. She denies any shortness of breath or chest pain. No nausea, vomiting or diaphoresis. She c/o mild pain at surgical site.   On EKG: atrial tachycardia with ST changes in multiple leads. Her hx includes atrial fibrillation for which she is followed by cardiology ad takes xarelto. The patient reports a recent negative stress test. Patient to be seen by cardiology post-op for paroxysmal A.fb with a rapid ventricular response.  Hypertension and paroxysmal A.Fib  treated with Coreg, Diltiazem, losartan, Hctz  and spironolactone. Patient scheduled to go home today and developed a BP 66/40. EKG with NSR heart rate of 60. Given 500 cc saline an BP 75/50. Discharge cancelled and Losartan, HCTZ and spironolactone are stopped.  Coreg, diltiazem  and amylodipine  continue evenly divided in lower doses. The patient presently is asymptomatic with /70 in NSR with heart of 70. She is walking well after CRPP left hip. Bp is now controlled with Coreg and Diltiaziem in divided doses and she remains in NSR      MEDICATIONS  (STANDING):  amitriptyline 25 milliGRAM(s) Oral at bedtime  buPROPion  milliGRAM(s) Oral daily  carvedilol 25 milliGRAM(s) Oral every 12 hours  ciprofloxacin     Tablet 500 milliGRAM(s) Oral every 12 hours  diltiazem    milliGRAM(s) Oral daily  lactated ringers. 1000 milliLiter(s) (75 mL/Hr) IV Continuous <Continuous>  levothyroxine 75 MICROGram(s) Oral daily  multivitamin 1 Tablet(s) Oral daily  pantoprazole    Tablet 40 milliGRAM(s) Oral before breakfast  psyllium Powder 1 Packet(s) Oral two times a day  rivaroxaban 10 milliGRAM(s) Oral daily    MEDICATIONS  (PRN):  acetaminophen   Tablet .. 650 milliGRAM(s) Oral every 6 hours PRN Temp greater or equal to 38C (100.4F), Mild Pain (1 - 3)  ALPRAZolam 2 milliGRAM(s) Oral three times a day PRN anxiety  aluminum hydroxide/magnesium hydroxide/simethicone Suspension 30 milliLiter(s) Oral four times a day PRN Indigestion  bisacodyl Suppository 10 milliGRAM(s) Rectal daily PRN If no bowel movement by POD#2  magnesium hydroxide Suspension 30 milliLiter(s) Oral daily PRN Constipation  ondansetron Injectable 4 milliGRAM(s) IV Push every 6 hours PRN Nausea and/or Vomiting  oxyCODONE    IR 5 milliGRAM(s) Oral every 4 hours PRN Moderate Pain (4 - 6)  oxyCODONE    IR 10 milliGRAM(s) Oral every 4 hours PRN Severe Pain (7 - 10)  senna 2 Tablet(s) Oral at bedtime PRN Constipation          VITALS:   T(C): 36.4 (18 @ 08:15), Max: 37 (18 @ 00:22)  HR: 95 (18 @ 08:15) (78 - 95)  BP: 111/73 (18 @ 08:15) (97/58 - 116/74)  RR: 16 (18 @ 08:15) (16 - 16)  SpO2: 95% (18 @ 08:15) (94% - 98%)  Wt(kg): --    PHYSICAL EXAM:  GENERAL: NAD, well nourished and conversant  HEAD:  Atraumatic  EYES: EOM, PERRLA, conjunctiva pink and sclera white  ENT: No tonsillar erythema, exudates, or enlargement, moist mucous membranes, good dentition, no lesions  NECK: Supple, No JVD, normal thyroid, carotids with normal upstrokes and no bruits  CHEST/LUNG: Clear to auscultation bilaterally, No rales, rhonchi, wheezing, or rubs  HEART: Atrial fibrillation with heart rate 0f110. No murmurs, rubs, or gallops  ABDOMEN: Soft, nondistended, no masses, guarding, tenderness or rebound, bowel sounds present  EXTREMITIES:  4+ R hip swelling and 3+ tenderness to touch. No erythema or incisional drainage.  LYMPH: No lymphadenopathy noted  SKIN: No rashes or lesions  NERVOUS SYSTEM:  Alert & Oriented X3, normal cognitive function. Motor Strength 5/5 right upper and right lower.  5/5 left upper and left lower extremities, DTRs 2+ intact and symmetric    LABS:                    Urinalysis Basic - ( 2018 07:19 )    Color: Yellow / Appearance: Slightly Turbid / S.008 / pH: x  Gluc: x / Ketone: Negative  / Bili: Negative / Urobili: Negative   Blood: x / Protein: Trace / Nitrite: Positive   Leuk Esterase: Large / RBC: 6 /hpf /  /hpf   Sq Epi: x / Non Sq Epi: 3 /hpf / Bacteria: Moderate      CAPILLARY BLOOD GLUCOSE          RADIOLOGY & ADDITIONAL TESTS:

## 2018-11-19 NOTE — PROGRESS NOTE ADULT - PROBLEM SELECTOR PLAN 3
PPI as needed  PO as tolerated

## 2018-11-19 NOTE — PROGRESS NOTE ADULT - PROBLEM SELECTOR PLAN 4
pain meds as needed  follow up with pain  meds

## 2018-11-19 NOTE — PROGRESS NOTE ADULT - PROVIDER SPECIALTY LIST ADULT
Anesthesia
Cardiology
Internal Medicine
Orthopedics
Internal Medicine

## 2018-11-19 NOTE — PROGRESS NOTE ADULT - SUBJECTIVE AND OBJECTIVE BOX
Post op Day [6 ]    Patient resting, per RN, patient c/o dysuria.  No chest pain, SOB, N/V.    T(C): 36.9 (11-19-18 @ 04:29), Max: 37 (11-19-18 @ 00:22)  HR: 78 (11-19-18 @ 04:29) (78 - 91)  BP: 108/67 (11-19-18 @ 04:29) (97/58 - 138/83)  RR: 16 (11-19-18 @ 04:29) (16 - 18)  SpO2: 95% (11-19-18 @ 04:29) (94% - 98%)  Wt(kg): --    Exam:  Alert and Oriented, No Acute Distress  R hip site intact, no erythema/drainage, soft/compressible compartments  Calves Soft, Non-tender bilaterally  +PF/DF/EHL/FHL  SILT  +DP Pulse    NNL

## 2018-11-19 NOTE — PROGRESS NOTE ADULT - PROBLEM SELECTOR PROBLEM 3
GERD (gastroesophageal reflux disease)

## 2018-11-19 NOTE — PROGRESS NOTE ADULT - SUBJECTIVE AND OBJECTIVE BOX
CARDIOLOGY FOLLOW UP - Dr. Calle    CC no cp/sob , awaiting discharge       PHYSICAL EXAM:  T(C): 36.4 (11-19-18 @ 08:15), Max: 37 (11-19-18 @ 00:22)  HR: 95 (11-19-18 @ 08:15) (78 - 95)  BP: 111/73 (11-19-18 @ 08:15) (97/58 - 116/74)  RR: 16 (11-19-18 @ 08:15) (16 - 16)  SpO2: 95% (11-19-18 @ 08:15) (94% - 98%)  Wt(kg): --  I&O's Summary    18 Nov 2018 07:01  -  19 Nov 2018 07:00  --------------------------------------------------------  IN: 1020 mL / OUT: 900 mL / NET: 120 mL    19 Nov 2018 07:01  -  19 Nov 2018 11:59  --------------------------------------------------------  IN: 140 mL / OUT: 0 mL / NET: 140 mL        Appearance: Normal	  Cardiovascular: Normal S1 S2,RRR, No JVD, No murmurs  Respiratory: Lungs clear to auscultation	  Gastrointestinal:  Soft, Non-tender, + BS	  Extremities: Normal range of motion, No clubbing, cyanosis or edema        MEDICATIONS  (STANDING):  amitriptyline 25 milliGRAM(s) Oral at bedtime  buPROPion  milliGRAM(s) Oral daily  carvedilol 25 milliGRAM(s) Oral every 12 hours  ciprofloxacin     Tablet 500 milliGRAM(s) Oral every 12 hours  diltiazem    milliGRAM(s) Oral daily  lactated ringers. 1000 milliLiter(s) (75 mL/Hr) IV Continuous <Continuous>  levothyroxine 75 MICROGram(s) Oral daily  multivitamin 1 Tablet(s) Oral daily  pantoprazole    Tablet 40 milliGRAM(s) Oral before breakfast  psyllium Powder 1 Packet(s) Oral two times a day  rivaroxaban 10 milliGRAM(s) Oral daily      TELEMETRY: 	    ECG:  	  RADIOLOGY:   DIAGNOSTIC TESTING:  [ ] Echocardiogram:  [ ]  Catheterization:  [ ] Stress Test:    OTHER: 	    LABS:

## 2018-11-19 NOTE — PROGRESS NOTE ADULT - PROBLEM SELECTOR PLAN 2
currently controlled  will continue current meds
currently controlled  will continue current meds  continue to follow daily EKGS as per cardiology  consider a halter monitor of moving to tele to better evaluate rhythm

## 2018-11-21 LAB
-  AMIKACIN: SIGNIFICANT CHANGE UP
-  AZTREONAM: SIGNIFICANT CHANGE UP
-  CEFEPIME: SIGNIFICANT CHANGE UP
-  CEFTAZIDIME: SIGNIFICANT CHANGE UP
-  CIPROFLOXACIN: SIGNIFICANT CHANGE UP
-  GENTAMICIN: SIGNIFICANT CHANGE UP
-  IMIPENEM: SIGNIFICANT CHANGE UP
-  LEVOFLOXACIN: SIGNIFICANT CHANGE UP
-  MEROPENEM: SIGNIFICANT CHANGE UP
-  PIPERACILLIN/TAZOBACTAM: SIGNIFICANT CHANGE UP
-  TOBRAMYCIN: SIGNIFICANT CHANGE UP
CULTURE RESULTS: SIGNIFICANT CHANGE UP
METHOD TYPE: SIGNIFICANT CHANGE UP
ORGANISM # SPEC MICROSCOPIC CNT: SIGNIFICANT CHANGE UP
ORGANISM # SPEC MICROSCOPIC CNT: SIGNIFICANT CHANGE UP
SPECIMEN SOURCE: SIGNIFICANT CHANGE UP

## 2018-11-29 ENCOUNTER — APPOINTMENT (OUTPATIENT)
Dept: UROGYNECOLOGY | Facility: CLINIC | Age: 78
End: 2018-11-29

## 2018-12-26 ENCOUNTER — MESSAGE (OUTPATIENT)
Age: 78
End: 2018-12-26

## 2018-12-30 ENCOUNTER — EMERGENCY (EMERGENCY)
Facility: HOSPITAL | Age: 78
LOS: 1 days | End: 2018-12-30
Attending: EMERGENCY MEDICINE
Payer: MEDICARE

## 2018-12-30 VITALS
DIASTOLIC BLOOD PRESSURE: 89 MMHG | OXYGEN SATURATION: 100 % | TEMPERATURE: 98 F | HEIGHT: 68 IN | HEART RATE: 88 BPM | WEIGHT: 130.07 LBS | RESPIRATION RATE: 18 BRPM | SYSTOLIC BLOOD PRESSURE: 171 MMHG

## 2018-12-30 DIAGNOSIS — E89.0 POSTPROCEDURAL HYPOTHYROIDISM: Chronic | ICD-10-CM

## 2018-12-30 DIAGNOSIS — S72.001A FRACTURE OF UNSPECIFIED PART OF NECK OF RIGHT FEMUR, INITIAL ENCOUNTER FOR CLOSED FRACTURE: Chronic | ICD-10-CM

## 2018-12-30 LAB
ALBUMIN SERPL ELPH-MCNC: 4.1 G/DL — SIGNIFICANT CHANGE UP (ref 3.3–5)
ALP SERPL-CCNC: 94 U/L — SIGNIFICANT CHANGE UP (ref 40–120)
ALT FLD-CCNC: 18 U/L — SIGNIFICANT CHANGE UP (ref 10–45)
ANION GAP SERPL CALC-SCNC: 15 MMOL/L — SIGNIFICANT CHANGE UP (ref 5–17)
AST SERPL-CCNC: 33 U/L — SIGNIFICANT CHANGE UP (ref 10–40)
BASOPHILS # BLD AUTO: 0 K/UL — SIGNIFICANT CHANGE UP (ref 0–0.2)
BASOPHILS NFR BLD AUTO: 0.5 % — SIGNIFICANT CHANGE UP (ref 0–2)
BILIRUB SERPL-MCNC: 0.4 MG/DL — SIGNIFICANT CHANGE UP (ref 0.2–1.2)
BUN SERPL-MCNC: 9 MG/DL — SIGNIFICANT CHANGE UP (ref 7–23)
CALCIUM SERPL-MCNC: 9.6 MG/DL — SIGNIFICANT CHANGE UP (ref 8.4–10.5)
CHLORIDE SERPL-SCNC: 96 MMOL/L — SIGNIFICANT CHANGE UP (ref 96–108)
CO2 SERPL-SCNC: 21 MMOL/L — LOW (ref 22–31)
CREAT SERPL-MCNC: 0.74 MG/DL — SIGNIFICANT CHANGE UP (ref 0.5–1.3)
EOSINOPHIL # BLD AUTO: 0 K/UL — SIGNIFICANT CHANGE UP (ref 0–0.5)
EOSINOPHIL NFR BLD AUTO: 0.6 % — SIGNIFICANT CHANGE UP (ref 0–6)
GLUCOSE SERPL-MCNC: 111 MG/DL — HIGH (ref 70–99)
HCT VFR BLD CALC: 40 % — SIGNIFICANT CHANGE UP (ref 34.5–45)
HGB BLD-MCNC: 13.9 G/DL — SIGNIFICANT CHANGE UP (ref 11.5–15.5)
LYMPHOCYTES # BLD AUTO: 1.5 K/UL — SIGNIFICANT CHANGE UP (ref 1–3.3)
LYMPHOCYTES # BLD AUTO: 19.1 % — SIGNIFICANT CHANGE UP (ref 13–44)
MCHC RBC-ENTMCNC: 32 PG — SIGNIFICANT CHANGE UP (ref 27–34)
MCHC RBC-ENTMCNC: 34.7 GM/DL — SIGNIFICANT CHANGE UP (ref 32–36)
MCV RBC AUTO: 92.4 FL — SIGNIFICANT CHANGE UP (ref 80–100)
MONOCYTES # BLD AUTO: 0.6 K/UL — SIGNIFICANT CHANGE UP (ref 0–0.9)
MONOCYTES NFR BLD AUTO: 7.2 % — SIGNIFICANT CHANGE UP (ref 2–14)
NEUTROPHILS # BLD AUTO: 5.8 K/UL — SIGNIFICANT CHANGE UP (ref 1.8–7.4)
NEUTROPHILS NFR BLD AUTO: 72.7 % — SIGNIFICANT CHANGE UP (ref 43–77)
PLATELET # BLD AUTO: 303 K/UL — SIGNIFICANT CHANGE UP (ref 150–400)
POTASSIUM SERPL-MCNC: 4.1 MMOL/L — SIGNIFICANT CHANGE UP (ref 3.5–5.3)
POTASSIUM SERPL-SCNC: 4.1 MMOL/L — SIGNIFICANT CHANGE UP (ref 3.5–5.3)
PROT SERPL-MCNC: 8.1 G/DL — SIGNIFICANT CHANGE UP (ref 6–8.3)
RBC # BLD: 4.33 M/UL — SIGNIFICANT CHANGE UP (ref 3.8–5.2)
RBC # FLD: 12.3 % — SIGNIFICANT CHANGE UP (ref 10.3–14.5)
SODIUM SERPL-SCNC: 132 MMOL/L — LOW (ref 135–145)
WBC # BLD: 8 K/UL — SIGNIFICANT CHANGE UP (ref 3.8–10.5)
WBC # FLD AUTO: 8 K/UL — SIGNIFICANT CHANGE UP (ref 3.8–10.5)

## 2018-12-30 PROCEDURE — 99285 EMERGENCY DEPT VISIT HI MDM: CPT

## 2018-12-30 PROCEDURE — 93005 ELECTROCARDIOGRAM TRACING: CPT

## 2018-12-30 PROCEDURE — 80053 COMPREHEN METABOLIC PANEL: CPT

## 2018-12-30 PROCEDURE — 93010 ELECTROCARDIOGRAM REPORT: CPT

## 2018-12-30 PROCEDURE — 99284 EMERGENCY DEPT VISIT MOD MDM: CPT | Mod: 25

## 2018-12-30 PROCEDURE — 80307 DRUG TEST PRSMV CHEM ANLYZR: CPT

## 2018-12-30 PROCEDURE — 90792 PSYCH DIAG EVAL W/MED SRVCS: CPT | Mod: GT

## 2018-12-30 PROCEDURE — 85027 COMPLETE CBC AUTOMATED: CPT

## 2018-12-30 RX ORDER — SODIUM CHLORIDE 9 MG/ML
1000 INJECTION INTRAMUSCULAR; INTRAVENOUS; SUBCUTANEOUS ONCE
Qty: 0 | Refills: 0 | Status: COMPLETED | OUTPATIENT
Start: 2018-12-30 | End: 2018-12-30

## 2018-12-30 RX ORDER — CARVEDILOL PHOSPHATE 80 MG/1
25 CAPSULE, EXTENDED RELEASE ORAL ONCE
Qty: 0 | Refills: 0 | Status: COMPLETED | OUTPATIENT
Start: 2018-12-30 | End: 2018-12-30

## 2018-12-30 RX ORDER — ACETAMINOPHEN 500 MG
975 TABLET ORAL ONCE
Qty: 0 | Refills: 0 | Status: COMPLETED | OUTPATIENT
Start: 2018-12-30 | End: 2018-12-30

## 2018-12-30 RX ORDER — MECLIZINE HCL 12.5 MG
25 TABLET ORAL ONCE
Qty: 0 | Refills: 0 | Status: COMPLETED | OUTPATIENT
Start: 2018-12-30 | End: 2018-12-30

## 2018-12-30 RX ADMIN — SODIUM CHLORIDE 1000 MILLILITER(S): 9 INJECTION INTRAMUSCULAR; INTRAVENOUS; SUBCUTANEOUS at 18:19

## 2018-12-30 RX ADMIN — Medication 975 MILLIGRAM(S): at 18:19

## 2018-12-30 RX ADMIN — Medication 975 MILLIGRAM(S): at 20:00

## 2018-12-30 RX ADMIN — CARVEDILOL PHOSPHATE 25 MILLIGRAM(S): 80 CAPSULE, EXTENDED RELEASE ORAL at 21:18

## 2018-12-30 RX ADMIN — Medication 25 MILLIGRAM(S): at 18:20

## 2018-12-30 NOTE — ED ADULT TRIAGE NOTE - CHIEF COMPLAINT QUOTE
dizziness and nausea starting today. stopped xanax medication 4 days ago, c/o memory loss and unable to sleep

## 2018-12-30 NOTE — ED PROVIDER NOTE - ATTENDING CONTRIBUTION TO CARE
Private Physician Marline Bear PCP/[place]  78y female pmh Diverticulitis, Gerd, HTN,Hiatial Hernia,Osteoarthitis sp rt hip fx on xanax for sleep aid, Had run out of same recently now comes to ed complains of nausea and diff sleeping. No cp/sob/palps/nvdc/cough/loc/SI/HI, PE WDWN female awake alert normocephalic atraumatic neck supple chst clear anterior & posterior abd soft bs neuro no focal defects  Bran Granados MD, Facep

## 2018-12-30 NOTE — ED PROVIDER NOTE - PROGRESS NOTE DETAILS
Pt received at signout, discussed with psychiatry who notes she is second on their list to see. BLANCHE Castelan, EM PGY2 Endorsed to Dr Tessie Granados MD, Facep Telepsych returns phone call, went to speak with patient / her son but they are no longer at [facility] which is where they are listed, asked staff unclear where patient is at this time, will call back tele psych if they return to bed. BLANCHE Castelan, EM PGY2 Telepsych consulted with patient, is cleared for d/c to Follow up their resources, will discharge at this time. BRIDGER Ackerman PGY2

## 2018-12-30 NOTE — ED PROVIDER NOTE - OBJECTIVE STATEMENT
78F p/w dizziness for 1 day. Associated w/ headache, nausea, and difficulty sleeping. Dizziness is worse when she stands up, no hx similar. No vision changes/weakness/numbness/chest pain/difficulty breathing/difficulty ambulating. Patient previously took xanax nightly for difficulty sleeping, last used 4 days. She saw her primary doctor but was referred to psychiatry for a refill.    PMH: HTN, HLD, MVP, GERD, Colitis, diverticulitis  PCP: Marline Lowry

## 2018-12-30 NOTE — ED PROVIDER NOTE - NSFOLLOWUPINSTRUCTIONS_ED_ALL_ED_FT
Call the psychiatry services to arrange follow up as per the telemetry psychiatrist. Follow up with your primary doctor as needed for repeat evaluation. Return here to the emergency department for any new symptoms of acute concern including severe pain, intractable vomiting, or other new worries.

## 2018-12-30 NOTE — ED ADULT NURSE NOTE - OBJECTIVE STATEMENT
79 y/o female patient presents ambulatory to ED with family at the bedside complaining of dizziness associated with headache, nausea and difficulty sleeping progressively worsening over the last 3 day. Patient states her PMD would not refill her prescription for xanax - advised she needs to go see a psychiatrist (last took xanax 4 days ago). Patient states she has been taking xanax to help her relax and sleep for "30 years". Patient denies SOB and CP, V/D ; afebrile in ED.

## 2018-12-30 NOTE — ED PROVIDER NOTE - MEDICAL DECISION MAKING DETAILS
Pt 73 y female with chronically on xanax, now dc past 4d with insomnia requesting psych eval. Check labs consult psych.  Bran Granados MD, Facep

## 2018-12-30 NOTE — ED PROVIDER NOTE - NSFOLLOWUPCLINICS_GEN_ALL_ED_FT
[facility]  Psychiatry  [address] [place] [address]  Phone: [phone]  Fax:   Follow Up Time: Routine

## 2018-12-31 ENCOUNTER — EMERGENCY (EMERGENCY)
Facility: HOSPITAL | Age: 78
LOS: 1 days | Discharge: HOME | End: 2018-12-31
Attending: EMERGENCY MEDICINE
Payer: MEDICARE

## 2018-12-31 VITALS
DIASTOLIC BLOOD PRESSURE: 98 MMHG | HEART RATE: 73 BPM | TEMPERATURE: 99 F | OXYGEN SATURATION: 98 % | RESPIRATION RATE: 18 BRPM | SYSTOLIC BLOOD PRESSURE: 169 MMHG

## 2018-12-31 VITALS
SYSTOLIC BLOOD PRESSURE: 198 MMHG | DIASTOLIC BLOOD PRESSURE: 107 MMHG | OXYGEN SATURATION: 100 % | RESPIRATION RATE: 16 BRPM | TEMPERATURE: 98 F | HEART RATE: 87 BPM

## 2018-12-31 VITALS
OXYGEN SATURATION: 100 % | SYSTOLIC BLOOD PRESSURE: 159 MMHG | RESPIRATION RATE: 16 BRPM | HEART RATE: 84 BPM | DIASTOLIC BLOOD PRESSURE: 79 MMHG

## 2018-12-31 DIAGNOSIS — E89.0 POSTPROCEDURAL HYPOTHYROIDISM: Chronic | ICD-10-CM

## 2018-12-31 DIAGNOSIS — F41.9 ANXIETY DISORDER, UNSPECIFIED: ICD-10-CM

## 2018-12-31 DIAGNOSIS — F51.02 ADJUSTMENT INSOMNIA: ICD-10-CM

## 2018-12-31 DIAGNOSIS — F33.42 MAJOR DEPRESSIVE DISORDER, RECURRENT, IN FULL REMISSION: ICD-10-CM

## 2018-12-31 DIAGNOSIS — S72.001A FRACTURE OF UNSPECIFIED PART OF NECK OF RIGHT FEMUR, INITIAL ENCOUNTER FOR CLOSED FRACTURE: Chronic | ICD-10-CM

## 2018-12-31 PROCEDURE — 93010 ELECTROCARDIOGRAM REPORT: CPT

## 2018-12-31 PROCEDURE — 93005 ELECTROCARDIOGRAM TRACING: CPT

## 2018-12-31 PROCEDURE — 99284 EMERGENCY DEPT VISIT MOD MDM: CPT | Mod: 25

## 2018-12-31 PROCEDURE — 99283 EMERGENCY DEPT VISIT LOW MDM: CPT

## 2018-12-31 NOTE — ED PROVIDER NOTE - OBJECTIVE STATEMENT
78 year old female w Diverticulitis, GERD, HTN, OA, Macular Degeneration taking chronic Xanax 2 mg at bedtime. She came to the ED yesterday and was evaluated by Telepsych and cleared for D/C home for the same reason. Labs were sent less than 24 hours ago and all relatively normal including Utox screen. She explains that she is not sleeping well without the Xanax and her Primary Doctor Marline Bear does not want to refill her medications. She has been given resources for Psychiatry follow up . Cardiology was consulted for MAT on EKG and cleared for D/C for her to continue Cardizem and Carvedilol as previously prescribed.

## 2018-12-31 NOTE — CONSULT NOTE ADULT - ASSESSMENT
78 year old female with htn, macular degeneration, GERD, Diverticulosis, hip fracture s/p closed reduction and percutaneous pinning presenting with     1. 78 year old female with htn, macular degeneration, GERD, Diverticulosis, hip fracture s/p closed reduction and percutaneous pinning presenting with headache, weakness, difficulty sleeping secondary to missed xanax dose, requesting medication refill for xanax.     1. Headache, weakness   likely secondary to insomnia, not taking xanax in 4 days  labs, w/u negative < 24hrs ago, cleared for d/c per telepsych yesterday   no further w/u per ED, pt. given resources for psych f/u     2. Atrial Tachycardia, hx   rate controlled   EKG reviewed - WAP noted, no evidence of ACS or AFIB  cv stable with no cp or decomp chf on exam    recent echo revealing normal LV fx   continue with coreg as ordered  cont cardizem 120mg daily , xarelto     3. HTN   continue with current anti-htn meds     dvt ppx  D/C planning per ED

## 2018-12-31 NOTE — ED PROVIDER NOTE - ATTENDING CONTRIBUTION TO CARE
78F with h/o anxiety/insomnia on xanax which was recently discontinued by her pcp pending her referral (per patient) to outpatient psychiatric provider; presenting today for anxiety and feeling of palpitations a/w anxiety; multiple prior w/u for palpitations and prior evidence of wandering atrial pacemaker vs MAT on ECGs with prior visits to this hospital and evaluations by cardiology for same;  on exam patient is well appearing in NAD, afebrile, not tachycardic or hypotensive.  ECG obtained, initial computer read suggesting AFib but repeat (immediately after) and with tracing showing evidence of p waves, though some variation in p wave morphology with time and lead, suggesting WAP; cardiology consultation was appreciated, patient is medically stable for discharge.  Patient denies SI/HI and declines evaluation by psych; not apparent danger to self or others and is stable for outpatient f/u.

## 2018-12-31 NOTE — ED BEHAVIORAL HEALTH ASSESSMENT NOTE - DIFFERENTIAL
Substance-induced insomnia Substance-induced insomnia  Insomnia 2/2 general medical condition  Anxiety disorder

## 2018-12-31 NOTE — ED BEHAVIORAL HEALTH ASSESSMENT NOTE - HPI (INCLUDE ILLNESS QUALITY, SEVERITY, DURATION, TIMING, CONTEXT, MODIFYING FACTORS, ASSOCIATED SIGNS AND SYMPTOMS)
Alberto has been struggling to get an appointment with a psychiatrist. Denies having a psychiatrist since 1980s who was giving him medication, saw him once every two weeks then once a month and then passed away and then went to his wife who is also a psychiatrist. She was seeing her monthly but eventually said you really don't belong here and did not set her up with another psychiatrist. They moved and just went to a physical to another doctor and asked for medication and she said would it. Last saw the wife 12 years ago Dr. Moon. Was being prescribed wellbutrin 100mg po bid, elavil 25mg po qhs and xanax 2mg po qhs for sleep. Reports has high blood pressure and can't find anything to control it and that if 150/90 above should take 1/2 xanax and two tylenol and relax, so was using it for now too. Was taking three anti hypertensives, one started giving her water in legs, stopped and took another and throat started closing up and went back to him and said didn't know what to do for the pressure so going to a nephrologist this week.    Earlier today came in because saw PCP and asked her for a refill and she didn't want to give a prescription and had been giving it to her for last 12 years. Gave one for a month so had a chance to find a psychiatrist. But could not find an appointment soon enough.     Now complaining that she can't sleep and is having reactions- nauseous and headache and dizziness, was trying to bear it but was starting to get scared, vision was getting darker. Gave her two tylenol and now feeling worse. Headache bad, dizziness and nausea, thinks could have vomited.     Has not taken xanax in four days, prior once a day at bedtime. Has been taking it daily but can't remember for how long.    Denies depressed mood lately, reports the antidepressants. Has not run out of those. Denies hopelessness or thoughts of hurting herself. Never tried to hurt herself in the past. Denies anxiety that past few days. Denies panic attacks. This is a 78 year old  female domiciled with her son with past medical history of hypothyroid, poor controlled HTN, macular degeneration, diverticulitis, osteoarthritis, HLD, recent admission for hip fracture s/p mechanical fall November 2018, prior psychiatric history of anxiety disorder and major depressive disorder, in remission many years treated with amitriptyline, wellbutrin and xanax by her primary care doctor, no hospitalizations, no suicide attempts or violence, no substance abuse, who walked in complaining of dizziness in context of running out of xanax a few days ago, having difficulty     Reports has been struggling to get an appointment with a psychiatrist. Denies having a psychiatrist since 1980s who was giving him medication, saw him once every two weeks then once a month and then passed away and then went to his wife who is also a psychiatrist. She was seeing her monthly but eventually said you really don't belong here and did not set her up with another psychiatrist. They moved and just went to a physical to another doctor and asked for medication and she said would it. Last saw the wife 12 years ago Dr. Moon. Was being prescribed wellbutrin 100mg po bid, elavil 25mg po qhs and xanax 2mg po qhs for sleep. Reports has high blood pressure and can't find anything to control it and that if 150/90 above should take 1/2 xanax and two tylenol and relax, so was using it for now too. Was taking three anti hypertensives, one started giving her water in legs, stopped and took another and throat started closing up and went back to him and said didn't know what to do for the pressure so going to a nephrologist this week.    Earlier today came in because saw PCP and asked her for a refill and she didn't want to give a prescription and had been giving it to her for last 12 years. Gave one for a month so had a chance to find a psychiatrist. But could not find an appointment soon enough.     Now complaining that she can't sleep and is having reactions- nauseous and headache and dizziness, was trying to bear it but was starting to get scared, vision was getting darker. Gave her two tylenol and now feeling worse. Headache bad, dizziness and nausea, thinks could have vomited.     Has not taken xanax in four days, prior once a day at bedtime. Has been taking it daily but can't remember for how long.    Denies depressed mood lately, reports the antidepressants. Has not run out of those. Denies hopelessness or thoughts of hurting herself. Never tried to hurt herself in the past. Denies anxiety that past few days. Denies panic attacks. This is a 78 year old  female domiciled with her son with past medical history of hypothyroid, poor controlled HTN, macular degeneration, diverticulitis, osteoarthritis, HLD, recent admission for hip fracture s/p mechanical fall 2018, prior psychiatric history of anxiety disorder and major depressive disorder, in remission many years treated with amitriptyline, wellbutrin and xanax by her primary care doctor, no hospitalizations, no suicide attempts or violence, no substance abuse, who walked in complaining of dizziness in context of running out of xanax a few days ago and having difficulty finding a new psychiatrist.    Reports came in because she ran out of xanax a few days ago and has not been able to sleep. Today she started to feel  nausea, headache and dizziness. Also reports her vision was getting darker. Son gave her two Tylenol and now feeling worse and decided to come in. She has been struggling to get an appointment with a psychiatrist. Used to see a psychiatrist for many years who  a few years ago and since then her PMD has been giving her medications. After suffering the fall and hip fracture last month, her PMD told her she could no longer prescribe her the psychiatric medications and that she needs to get them from a psychiatrist. She gave her a month's prescription but has not been able to find a psychiatrist who could see her until next month. She reports she ran out of the xanax a few days ago. She uses it for sleep and also if her blood pressure if high. States she was told that if 150/90 above should take 1/2 xanax and two Tylenol and relax, so was using it for that too. Reports there were changes in past few months to her anti-hypertensives and still struggling to control the BP so is going to see a nephrologist this week.    Denies depressed mood lately, reports the antidepressants have kept her stable. Denies hopelessness or thoughts of hurting herself. Never tried to hurt herself in the past. Denies anxiety that past few days. Denies panic attacks. No AVH or homicidal ideation reported. Appetite has been fine. Denies all other psychiatric symptoms on review of systems.    Collateral from son: Son states the HPI begins 3 days ago. At baseline patient has a 40 year history of depression and anxiety that has been managed on an outpatient basis, at baseline patient has low mood and a “negative outlook” generally, minimal social support outside of son. Patient has been seeing psychiatrist for many years until hers  a few years ago, her PMD Dr. Bear has been prescribing psych meds since including Xanax 2mg at night to help her sleep. Patient has no prior suicidality, no self-harm, no homicidal thoughts or violence, no substance abuse issues, no episodes of psychosis or umer, no past admissions. Son states patient ran out of the Xanax about a month ago and they have not been able to find a psychiatrist. Son states for past 3 days patient has been complaining of feeling anxious with her heart racing and headaches, she is having difficulty staying asleep at night waking up frequently. Son states patient has not previously had withdrawal or seizures. Son denies patient has been more depressed, denies patient has been suicidal, no homicidal thoughts or violence, no psychosis, denies any other recent changes with mental health. Patient is at baseline functioning with appetite, ADLs, and self-care. Son does not have any safety concerns with patient being discharged from a mental health standpoint, concerned for potential withdrawal complications from stopping the Xanax.

## 2018-12-31 NOTE — CONSULT NOTE ADULT - ATTENDING COMMENTS
Patient seen and examined, agree with the above assessment and plan by NGHIA Silva  cont medical mgmt as above  dispo per ED

## 2018-12-31 NOTE — ED ADULT NURSE NOTE - OBJECTIVE STATEMENT
77 y/o female BIBA for irregular HR. Pt state she was seen yesterday after she stopped her xanax 3/4 days ago. States she has been taking xanax for the past 15 years and her PCP will no longer prescribe it to her. States she went to see her PCP today and was in Afib and sent in for eval. Pt presets with Afib. Denies chest pain, sob, ha, n/v/d, abdominal pain, f/c, urinary symptoms, hematuria. A&Ox4, vss, skin warm dry and intact, MAEx4, lungs CTA, abd soft nondistended. States she does not feel okay to walk and feels like her gate is altered. Afib on EGK, placed on CM. Denies SI or HI ideation. Pt resting comfortably with VSS, no complaints at this time. Patient's bed in the lowest position, explained plan of care to patient and family members. Will continue to reassess.

## 2018-12-31 NOTE — CONSULT NOTE ADULT - CONSULT REASON
weakness, irreg HR     hx htn, macular degeneration, GERD, Diverticulosis, hip fracture s/p closed reduction and percutaneous pinning

## 2018-12-31 NOTE — ED BEHAVIORAL HEALTH ASSESSMENT NOTE - OTHER PAST PSYCHIATRIC HISTORY (INCLUDE DETAILS REGARDING ONSET, COURSE OF ILLNESS, INPATIENT/OUTPATIENT TREATMENT)
Psychiatric history began with panic attacks when moved to [address] Psychiatric history began with panic attacks when moved to [place] 1970s  Has history of major depressive disorder, no psychiatric hospitalizations or suicide attempts. Recurrent episodes during times of stress such as when moved with her late  and when her son and daughter were sick with leukemia and thyroid cancer respectively. Has been getting her medication from her primary care doctor but after her hospitalization where she had the hip fracture s/p fall, her primary care doctor said she needed to get the medication from a psychiatrist.

## 2018-12-31 NOTE — ED BEHAVIORAL HEALTH ASSESSMENT NOTE - CURRENT MEDICATION
amitryptiline, xanax, wellbutrin amitriptyline 25mg po qhs, xanax 2mg po qhs, wellbutrin SR 100mg po BID, synthroid 75mcg daily, carvedilol 12.5mg po q12h, lovastatin 40mg po daily, asa 81mg po daily, spironolactone 25mg po daily

## 2018-12-31 NOTE — ED BEHAVIORAL HEALTH ASSESSMENT NOTE - SUMMARY
78 year old  female domiciled with her son with past medical history of hypothyroid, poor controlled HTN, macular degeneration, diverticulitis, osteoarthritis, HLD, recent admission for hip fracture s/p mechanical fall November 2018, prior psychiatric history of anxiety disorder and major depressive disorder, in remission many years treated with amitriptyline, wellbutrin and xanax by her primary care doctor, no hospitalizations, no suicide attempts or violence, no substance abuse, who walked in complaining of dizziness in context of running out of xanax a few days ago and having difficulty finding a new psychiatrist.    Patient came in with primary complaint of insomnia, dizziness, headache and nausea in context of poorly controlled HTN and recently running out of xanax. She requests refill for her xanax however unable to provide prescription for xanax in the ED especially to a patient who is so high risk given her recent history of fall and her age. Unclear whether her physical complaints are secondary to withdrawal from xanax or to her poorly controlled HTN for which she has a known history. No other psychiatric symptoms reported at this time and patient's mental status examination is unremarkable. No indication for inpatient hospitalization at this time.

## 2018-12-31 NOTE — ED BEHAVIORAL HEALTH ASSESSMENT NOTE - DESCRIPTION
constipation from anti-depressants Vital Signs Last 24 Hrs  T(C): 37.1 (31 Dec 2018 00:15), Max: 37.1 (31 Dec 2018 00:15)  T(F): 98.8 (31 Dec 2018 00:15), Max: 98.8 (31 Dec 2018 00:15)  HR: 73 (31 Dec 2018 00:15) (73 - 96)  BP: 169/98 (31 Dec 2018 00:15) (157/98 - 175/81)  BP(mean): --  RR: 18 (31 Dec 2018 00:15) (17 - 18)  SpO2: 98% (31 Dec 2018 00:15) (97% - 100%) hypothyroid, HTN, macular degeneration, diverticulitis, osteoarthritis, HLD, recent admission for hip fxr s/p mechanical fall Nov 2018 resides with son,  Patient arrived to the ED approximately 9 hours prior to consultation. Per ED RN and documentation patient arrived alert and oriented x3, good grooming and hygiene, she is cooperative with ED medical and safety protocols, no items of note in property. Patient has euthymic mood and affect, she has linear thought process, normal speech, denies suicidal or homicidal ideation, does not appear psychotic, manic, paranoid or delusional. Patient did not require PRN psychiatric medication in ED at time of consult. Patient had something to eat, not observed to sleep in ED at time of contact. Patient’s son is at bedside and he appears to be supportive.    Vital Signs Last 24 Hrs  T(C): 37.1 (31 Dec 2018 00:15), Max: 37.1 (31 Dec 2018 00:15)  T(F): 98.8 (31 Dec 2018 00:15), Max: 98.8 (31 Dec 2018 00:15)  HR: 73 (31 Dec 2018 00:15) (73 - 96)  BP: 169/98 (31 Dec 2018 00:15) (157/98 - 175/81)  BP(mean): --  RR: 18 (31 Dec 2018 00:15) (17 - 18)  SpO2: 98% (31 Dec 2018 00:15) (97% - 100%)

## 2018-12-31 NOTE — ED BEHAVIORAL HEALTH ASSESSMENT NOTE - AXIS III
hypothyroid, HTN, macular degeneration, diverticulitis, osteoarthritis, HLD, recent admission for hip fxr s/p mechanical fall Nov 2018

## 2018-12-31 NOTE — ED BEHAVIORAL HEALTH ASSESSMENT NOTE - RISK ASSESSMENT
Not an imminent danger to self or others at this time.  Risk factors: medical comorbidities, difficulty accessing care, age, prior psychiatric history, insomnia  Protective: future oriented, help-seeking, not suicidal or homicidal, no hx suicide attempts, no hx violence, no substance abuse, social support, responsibility to family

## 2018-12-31 NOTE — ED PROVIDER NOTE - OTHER FINDINGS
some sinus arrhythmia, similar in appearance to mutliple other prior ECGs in EMR; some changing morphology of p waves suggesting possible wandering atrial pacemaker vs MAT

## 2018-12-31 NOTE — ED BEHAVIORAL HEALTH ASSESSMENT NOTE - SUICIDE PROTECTIVE FACTORS
Responsibility to family and others/Future oriented/Fear of death or dying due to pain/suffering/Supportive social network or family/Identifies reasons for living

## 2018-12-31 NOTE — ED BEHAVIORAL HEALTH ASSESSMENT NOTE - DETAILS
dizziness nausea headache constipation from anti-depressants daughter with borderline personality disorder son and daughter with leukemia and thyroid cancer respectively self-referred

## 2019-01-01 ENCOUNTER — EMERGENCY (EMERGENCY)
Facility: HOSPITAL | Age: 79
LOS: 1 days | Discharge: HOME | End: 2019-01-01
Attending: EMERGENCY MEDICINE
Payer: COMMERCIAL

## 2019-01-01 VITALS
HEART RATE: 77 BPM | TEMPERATURE: 98 F | SYSTOLIC BLOOD PRESSURE: 130 MMHG | OXYGEN SATURATION: 99 % | RESPIRATION RATE: 16 BRPM | DIASTOLIC BLOOD PRESSURE: 80 MMHG

## 2019-01-01 VITALS
SYSTOLIC BLOOD PRESSURE: 136 MMHG | RESPIRATION RATE: 16 BRPM | OXYGEN SATURATION: 100 % | TEMPERATURE: 98 F | DIASTOLIC BLOOD PRESSURE: 65 MMHG | HEIGHT: 60 IN | HEART RATE: 67 BPM | WEIGHT: 126.1 LBS

## 2019-01-01 DIAGNOSIS — S72.001A FRACTURE OF UNSPECIFIED PART OF NECK OF RIGHT FEMUR, INITIAL ENCOUNTER FOR CLOSED FRACTURE: Chronic | ICD-10-CM

## 2019-01-01 DIAGNOSIS — E89.0 POSTPROCEDURAL HYPOTHYROIDISM: Chronic | ICD-10-CM

## 2019-01-01 PROCEDURE — 99284 EMERGENCY DEPT VISIT MOD MDM: CPT

## 2019-01-01 PROCEDURE — 99283 EMERGENCY DEPT VISIT LOW MDM: CPT

## 2019-01-01 NOTE — ED PROVIDER NOTE - OBJECTIVE STATEMENT
79 yo female bib family for concerns for elevated BP and chest pain at home. Pt has been seen in ED multiple times for similar symptoms, follows with cardiologist. Seen yesterday in ED for same complaint. As per son, pt has anxiety and was prescribed benzo with improvement of symptoms, took dose today and currently without any symptoms. Son states that his sister "gets mom worried about a stroke" and calls ambulance to have her brought to ED but in his opinion "she does not need to be here". Currently asymptomatic.

## 2019-01-01 NOTE — ED ADULT NURSE NOTE - OBJECTIVE STATEMENT
78 year old female BIB EMS after daughter called 911 twice for patient. Pt was evaluated yesterday and was discharged at  [facility] ER. Pt denied any suicidal and homicidal ideations, pt claimed to be on xanax for a long time and was discontinued recently and is now requesting to  have it refilled.

## 2019-01-01 NOTE — ED PROVIDER NOTE - MEDICAL DECISION MAKING DETAILS
Pt here in ED multiple visits for same complaint, brought in by daughter for elevated BP and chest pain that resolved after benzo medication. Asymptomatic at this time. Follows closely with cardiologist. Unlikely ACS given multiple works up in the past that were negative. PE unremarkable. Will dc home.

## 2019-01-01 NOTE — ED PROVIDER NOTE - ATTENDING CONTRIBUTION TO CARE
I performed a history and physical exam of the patient and discussed their management with the resident. I reviewed the resident's note and agree with the documented findings and plan of care, except if noted below. My medical decision making and observations can be found be found below.    see MDM

## 2019-01-01 NOTE — ED ADULT NURSE NOTE - NSIMPLEMENTINTERV_GEN_ALL_ED
Implemented All Universal Safety Interventions:  [place] to call system. Call bell, personal items and telephone within reach. Instruct patient to call for assistance. Room bathroom lighting operational. Non-slip footwear when patient is off stretcher. Physically safe environment: no spills, clutter or unnecessary equipment. Stretcher in lowest position, wheels locked, appropriate side rails in place.

## 2019-01-01 NOTE — ED PROVIDER NOTE - CARE PLAN
Principal Discharge DX:	Anxiety Principal Discharge DX:	Anxiety  Secondary Diagnosis:	Chest pain, unspecified type

## 2019-01-01 NOTE — ED PROVIDER NOTE - NSFOLLOWUPINSTRUCTIONS_ED_ALL_ED_FT
Please follow up with your primary doctor. Return for chest pain, persistent neurological symptoms or deficits.

## 2019-01-02 ENCOUNTER — OUTPATIENT (OUTPATIENT)
Dept: OUTPATIENT SERVICES | Facility: HOSPITAL | Age: 79
LOS: 1 days | Discharge: STILL ADMITTED OR ADMINISTRATIVE | End: 2019-01-02

## 2019-01-02 DIAGNOSIS — E89.0 POSTPROCEDURAL HYPOTHYROIDISM: Chronic | ICD-10-CM

## 2019-01-02 DIAGNOSIS — S72.001A FRACTURE OF UNSPECIFIED PART OF NECK OF RIGHT FEMUR, INITIAL ENCOUNTER FOR CLOSED FRACTURE: Chronic | ICD-10-CM

## 2019-01-03 DIAGNOSIS — F41.9 ANXIETY DISORDER, UNSPECIFIED: ICD-10-CM

## 2019-01-03 DIAGNOSIS — F33.9 MAJOR DEPRESSIVE DISORDER, RECURRENT, UNSPECIFIED: ICD-10-CM

## 2019-01-08 ENCOUNTER — MEDICATION RENEWAL (OUTPATIENT)
Age: 79
End: 2019-01-08

## 2019-01-14 ENCOUNTER — OUTPATIENT (OUTPATIENT)
Dept: OUTPATIENT SERVICES | Facility: HOSPITAL | Age: 79
LOS: 1 days | Discharge: HOME | End: 2019-01-14
Payer: COMMERCIAL

## 2019-01-14 DIAGNOSIS — S72.001A FRACTURE OF UNSPECIFIED PART OF NECK OF RIGHT FEMUR, INITIAL ENCOUNTER FOR CLOSED FRACTURE: Chronic | ICD-10-CM

## 2019-01-14 DIAGNOSIS — E89.0 POSTPROCEDURAL HYPOTHYROIDISM: Chronic | ICD-10-CM

## 2019-01-14 PROCEDURE — 90792 PSYCH DIAG EVAL W/MED SRVCS: CPT

## 2019-01-15 DIAGNOSIS — F41.9 ANXIETY DISORDER, UNSPECIFIED: ICD-10-CM

## 2019-01-15 DIAGNOSIS — F33.9 MAJOR DEPRESSIVE DISORDER, RECURRENT, UNSPECIFIED: ICD-10-CM

## 2019-02-13 ENCOUNTER — EMERGENCY (EMERGENCY)
Facility: HOSPITAL | Age: 79
LOS: 1 days | Discharge: HOME | End: 2019-02-13
Attending: EMERGENCY MEDICINE
Payer: COMMERCIAL

## 2019-02-13 ENCOUNTER — APPOINTMENT (OUTPATIENT)
Dept: UROGYNECOLOGY | Facility: CLINIC | Age: 79
End: 2019-02-13

## 2019-02-13 VITALS
RESPIRATION RATE: 18 BRPM | OXYGEN SATURATION: 98 % | TEMPERATURE: 98 F | HEIGHT: 68 IN | SYSTOLIC BLOOD PRESSURE: 165 MMHG | HEART RATE: 78 BPM | DIASTOLIC BLOOD PRESSURE: 94 MMHG | WEIGHT: 128.09 LBS

## 2019-02-13 DIAGNOSIS — E89.0 POSTPROCEDURAL HYPOTHYROIDISM: Chronic | ICD-10-CM

## 2019-02-13 DIAGNOSIS — S72.001A FRACTURE OF UNSPECIFIED PART OF NECK OF RIGHT FEMUR, INITIAL ENCOUNTER FOR CLOSED FRACTURE: Chronic | ICD-10-CM

## 2019-02-13 LAB
ALBUMIN SERPL ELPH-MCNC: 4.3 G/DL — SIGNIFICANT CHANGE UP (ref 3.3–5)
ALP SERPL-CCNC: 100 U/L — SIGNIFICANT CHANGE UP (ref 40–120)
ALT FLD-CCNC: 32 U/L — SIGNIFICANT CHANGE UP (ref 10–45)
ANION GAP SERPL CALC-SCNC: 15 MMOL/L — SIGNIFICANT CHANGE UP (ref 5–17)
AST SERPL-CCNC: 35 U/L — SIGNIFICANT CHANGE UP (ref 10–40)
BASOPHILS # BLD AUTO: 0.1 K/UL — SIGNIFICANT CHANGE UP (ref 0–0.2)
BASOPHILS NFR BLD AUTO: 0.7 % — SIGNIFICANT CHANGE UP (ref 0–2)
BILIRUB SERPL-MCNC: 0.3 MG/DL — SIGNIFICANT CHANGE UP (ref 0.2–1.2)
BUN SERPL-MCNC: 12 MG/DL — SIGNIFICANT CHANGE UP (ref 7–23)
CALCIUM SERPL-MCNC: 9.9 MG/DL — SIGNIFICANT CHANGE UP (ref 8.4–10.5)
CHLORIDE SERPL-SCNC: 100 MMOL/L — SIGNIFICANT CHANGE UP (ref 96–108)
CO2 SERPL-SCNC: 23 MMOL/L — SIGNIFICANT CHANGE UP (ref 22–31)
CREAT SERPL-MCNC: 0.72 MG/DL — SIGNIFICANT CHANGE UP (ref 0.5–1.3)
EOSINOPHIL # BLD AUTO: 0.1 K/UL — SIGNIFICANT CHANGE UP (ref 0–0.5)
EOSINOPHIL NFR BLD AUTO: 1.4 % — SIGNIFICANT CHANGE UP (ref 0–6)
GLUCOSE SERPL-MCNC: 152 MG/DL — HIGH (ref 70–99)
HCT VFR BLD CALC: 40.5 % — SIGNIFICANT CHANGE UP (ref 34.5–45)
HGB BLD-MCNC: 14 G/DL — SIGNIFICANT CHANGE UP (ref 11.5–15.5)
LYMPHOCYTES # BLD AUTO: 2.1 K/UL — SIGNIFICANT CHANGE UP (ref 1–3.3)
LYMPHOCYTES # BLD AUTO: 21.5 % — SIGNIFICANT CHANGE UP (ref 13–44)
MCHC RBC-ENTMCNC: 31.8 PG — SIGNIFICANT CHANGE UP (ref 27–34)
MCHC RBC-ENTMCNC: 34.5 GM/DL — SIGNIFICANT CHANGE UP (ref 32–36)
MCV RBC AUTO: 92 FL — SIGNIFICANT CHANGE UP (ref 80–100)
MONOCYTES # BLD AUTO: 0.8 K/UL — SIGNIFICANT CHANGE UP (ref 0–0.9)
MONOCYTES NFR BLD AUTO: 8.2 % — SIGNIFICANT CHANGE UP (ref 2–14)
NEUTROPHILS # BLD AUTO: 6.7 K/UL — SIGNIFICANT CHANGE UP (ref 1.8–7.4)
NEUTROPHILS NFR BLD AUTO: 68.1 % — SIGNIFICANT CHANGE UP (ref 43–77)
PLATELET # BLD AUTO: 272 K/UL — SIGNIFICANT CHANGE UP (ref 150–400)
POTASSIUM SERPL-MCNC: 3.6 MMOL/L — SIGNIFICANT CHANGE UP (ref 3.5–5.3)
POTASSIUM SERPL-SCNC: 3.6 MMOL/L — SIGNIFICANT CHANGE UP (ref 3.5–5.3)
PROT SERPL-MCNC: 8.2 G/DL — SIGNIFICANT CHANGE UP (ref 6–8.3)
RBC # BLD: 4.4 M/UL — SIGNIFICANT CHANGE UP (ref 3.8–5.2)
RBC # FLD: 12.4 % — SIGNIFICANT CHANGE UP (ref 10.3–14.5)
SODIUM SERPL-SCNC: 138 MMOL/L — SIGNIFICANT CHANGE UP (ref 135–145)
WBC # BLD: 9.8 K/UL — SIGNIFICANT CHANGE UP (ref 3.8–10.5)
WBC # FLD AUTO: 9.8 K/UL — SIGNIFICANT CHANGE UP (ref 3.8–10.5)

## 2019-02-13 PROCEDURE — 72170 X-RAY EXAM OF PELVIS: CPT | Mod: 26

## 2019-02-13 PROCEDURE — 72125 CT NECK SPINE W/O DYE: CPT | Mod: 26

## 2019-02-13 PROCEDURE — 80053 COMPREHEN METABOLIC PANEL: CPT

## 2019-02-13 PROCEDURE — 70450 CT HEAD/BRAIN W/O DYE: CPT | Mod: 26

## 2019-02-13 PROCEDURE — 71045 X-RAY EXAM CHEST 1 VIEW: CPT | Mod: 26

## 2019-02-13 PROCEDURE — 99284 EMERGENCY DEPT VISIT MOD MDM: CPT

## 2019-02-13 PROCEDURE — 99284 EMERGENCY DEPT VISIT MOD MDM: CPT | Mod: 25

## 2019-02-13 PROCEDURE — 70486 CT MAXILLOFACIAL W/O DYE: CPT

## 2019-02-13 PROCEDURE — 70486 CT MAXILLOFACIAL W/O DYE: CPT | Mod: 26

## 2019-02-13 PROCEDURE — 76377 3D RENDER W/INTRP POSTPROCES: CPT | Mod: 26

## 2019-02-13 PROCEDURE — 76377 3D RENDER W/INTRP POSTPROCES: CPT

## 2019-02-13 PROCEDURE — 71045 X-RAY EXAM CHEST 1 VIEW: CPT

## 2019-02-13 PROCEDURE — 85027 COMPLETE CBC AUTOMATED: CPT

## 2019-02-13 PROCEDURE — 73110 X-RAY EXAM OF WRIST: CPT

## 2019-02-13 PROCEDURE — 73110 X-RAY EXAM OF WRIST: CPT | Mod: 26,LT

## 2019-02-13 PROCEDURE — 72125 CT NECK SPINE W/O DYE: CPT

## 2019-02-13 PROCEDURE — 70450 CT HEAD/BRAIN W/O DYE: CPT

## 2019-02-13 PROCEDURE — 72170 X-RAY EXAM OF PELVIS: CPT

## 2019-02-13 RX ORDER — ACETAMINOPHEN 500 MG
650 TABLET ORAL ONCE
Qty: 0 | Refills: 0 | Status: COMPLETED | OUTPATIENT
Start: 2019-02-13 | End: 2019-02-13

## 2019-02-13 RX ORDER — TETANUS TOXOID, REDUCED DIPHTHERIA TOXOID AND ACELLULAR PERTUSSIS VACCINE, ADSORBED 5; 2.5; 8; 8; 2.5 [IU]/.5ML; [IU]/.5ML; UG/.5ML; UG/.5ML; UG/.5ML
0.5 SUSPENSION INTRAMUSCULAR ONCE
Qty: 0 | Refills: 0 | Status: COMPLETED | OUTPATIENT
Start: 2019-02-13 | End: 2019-02-13

## 2019-02-13 RX ADMIN — Medication 650 MILLIGRAM(S): at 20:08

## 2019-02-13 NOTE — ED PROVIDER NOTE - CARE PLAN
Principal Discharge DX:	Fall, initial encounter  Assessment and plan of treatment:	1. Follow up with your PMD within 1-2 days.  2. Rest, Take Tylenol 650mg 1 tab every 4-6 hours as needed for pain. Continue all your current home medications as previously prescribed.  3. If you develop any nausea, vomiting, worsening pain, dizziness, weakness, chest pain, shortness of breath, inability to walk, changes in vision or any other concerning symptoms please return to ER immediately. Principal Discharge DX:	Fall, initial encounter  Assessment and plan of treatment:	1. Follow up with your PMD within 1-2 days. Keep your previously scheduled appointment with your orthopedist Dr. Aleman tomorrow. Please bring a copy of all your results with you to your appointments.  2. Rest, Take Tylenol 650mg 1 tab every 4-6 hours as needed for pain. Continue all your current home medications as previously prescribed.  3. If you develop any nausea, vomiting, worsening pain, dizziness, weakness, chest pain, shortness of breath, inability to walk, changes in vision or any other concerning symptoms please return to ER immediately. Principal Discharge DX:	Fall, initial encounter  Assessment and plan of treatment:	1. Follow up with your PMD within 1-2 days. Keep your previously scheduled appointment with your orthopedist Dr. Aleman tomorrow. Please bring a copy of all your results with you to your appointments.  2. Rest, Take Tylenol 650mg 1 tab every 4-6 hours as needed for pain. Continue all your current home medications as previously prescribed.  3. If you develop any nausea, vomiting, worsening pain, dizziness, weakness, chest pain, shortness of breath, inability to walk, changes in vision or any other concerning symptoms please return to ER immediately.  Secondary Diagnosis:	Nasal fracture Principal Discharge DX:	Fall, initial encounter  Assessment and plan of treatment:	1. Follow up with your PMD within 1-2 days. Keep your previously scheduled appointment with your orthopedist Dr. Aleman tomorrow. Please bring a copy of all your results with you to your appointments.  2. Rest, Take Tylenol 650mg 1 tab every 4-6 hours as needed for pain. Continue all your current home medications as previously prescribed.  3. If you develop any nausea, vomiting, worsening pain, dizziness, weakness, chest pain, shortness of breath, inability to walk, changes in vision or any other concerning symptoms please return to ER immediately.  Secondary Diagnosis:	Nasal fracture  Secondary Diagnosis:	Weakness

## 2019-02-13 NOTE — ED PROVIDER NOTE - PROGRESS NOTE DETAILS
CT showed bilateral nasal bone fracture, otherwise no acute/emergent findings. Pt reported feeling dizzy in ED, attempted to feed since patient hadn't eaten all day and reassess however pt still w/ mild dizziness. although is ambulatory in ED. Further chart review showed hx mild hyponatremia in the past. After discussion w/ patient and attending, will draw CBC/CMP to assess blood work and electrolytes now that pt endorsing dizziness. If non-actionable will d/c. - Thomas Merrill PA-C Pt signed out to attending Dr. Pimentel pending lab results. - Thomas Merrill PA-C Labs stable, pt ambulatory in ED. Discharge w daughter. RGUJRAL

## 2019-02-13 NOTE — ED PROVIDER NOTE - MOUTH
+mild superficial abrasion noted to inner upper lip just anterior to tooth #9, no visible gaping or opening.

## 2019-02-13 NOTE — ED PROVIDER NOTE - UPPER EXTREMITY EXAM, LEFT
+mild ecchymosis noted to thenar eminence of  left hand with +overlying ttp. Pt w/ limited flexion of L hand at wrist 2/2 pain. No underlying crepitus. No scaphoid tenderness.

## 2019-02-13 NOTE — ED PROVIDER NOTE - ATTENDING CONTRIBUTION TO CARE
****ATTENDING**** 77yo f hx listed BIB daughter s/p mechanical fall. As per patient she was using a cane for the first time after her hip surgery and the rubber got stuck and she went forward and hit her face. + trauma to the face and head. No loc. Now co pain over her face. Denies any weakness, lightheadedness. No recent illness. Pt has not walked since fall.  On exam, Patient is awake, alert x 3. GCS15. + nasal swelling and tenderness. no hematoma. PERRL. No Posterior midline cervical spine tenderness. Full ROM and neuro intact. Chest is cleart to auscultation. +S1S2. Abdomen is soft nondistended/nontender +BS. No rebound or guarding.  Pelvis is stable. + mild r hip tenderness. + ROM. Back non tender midline T/L spine.  Check CT and xray to eval for injury, pain control.

## 2019-02-13 NOTE — ED ADULT TRIAGE NOTE - CHIEF COMPLAINT QUOTE
nasal redness and laceration behind the upper lip with swelling s/p fall. No LOC. On Ecotrin 1 tab daily.

## 2019-02-13 NOTE — ED ADULT NURSE NOTE - INTERVENTIONS DEFINITIONS
Monitor for mental status changes and reorient to person, place, and time/Reinforce activity limits and safety measures with patient and family/Call bell, personal items and telephone within reach/Provide visual cue, wrist band, yellow gown, etc./Monitor gait and stability/Instruct patient to call for assistance/Stretcher in lowest position, wheels locked, appropriate side rails in place/[place] to call system/Physically safe environment: no spills, clutter or unnecessary equipment

## 2019-02-13 NOTE — ED PROVIDER NOTE - NOSE FINDINGS
+ diffuse ecchymosis noted to nose with +ttp to b/l nasal bones and septal cartilage and slight deformity of septum. No obvious septal hematoma. +clotted blood on L nare, otherwise nares patent b/l.

## 2019-02-13 NOTE — ED PROVIDER NOTE - PLAN OF CARE
1. Follow up with your PMD within 1-2 days.  2. Rest, Take Tylenol 650mg 1 tab every 4-6 hours as needed for pain. Continue all your current home medications as previously prescribed.  3. If you develop any nausea, vomiting, worsening pain, dizziness, weakness, chest pain, shortness of breath, inability to walk, changes in vision or any other concerning symptoms please return to ER immediately. 1. Follow up with your PMD within 1-2 days. Keep your previously scheduled appointment with your orthopedist Dr. Aleman tomorrow. Please bring a copy of all your results with you to your appointments.  2. Rest, Take Tylenol 650mg 1 tab every 4-6 hours as needed for pain. Continue all your current home medications as previously prescribed.  3. If you develop any nausea, vomiting, worsening pain, dizziness, weakness, chest pain, shortness of breath, inability to walk, changes in vision or any other concerning symptoms please return to ER immediately.

## 2019-02-13 NOTE — ED PROVIDER NOTE - RESPIRATORY, MLM
Breath sounds clear and equal bilaterally. No wheezing, rales or rhonchi noted. No chest wall tenderness. No retractions, no accessory muscle usage.

## 2019-02-13 NOTE — ED PROVIDER NOTE - LOWER EXTREMITY EXAM, RIGHT
No obvious deformity of right leg or hip. No ER/IR or shortening of leg compared to left. +very mild left lateral hip tenderness near greater trochanter. Full AROM with 5/5 strength at all joints of RLE. Sensation intact.

## 2019-02-13 NOTE — ED PROVIDER NOTE - CROS ED CARDIOVAS ALL NEG
Telephone Encounter by Sanjay Barba MD at 02/02/17 04:04 PM     Author:  Sanjay Barba MD Service:  (none) Author Type:  Physician     Filed:  02/02/17 04:05 PM Encounter Date:  2/2/2017 Status:  Signed     :  Sanjay Barba MD (Physician)            Usually should be seen first but ok for augmentin 875-125 1 tab po bid x 10 days     If not improving , should be seen[AD1.1M]       Revision History        User Key Date/Time User Provider Type Action    > AD1.1 02/02/17 04:05 PM Sanjay Barba MD Physician Sign    M - Manual             negative...

## 2019-02-13 NOTE — ED ADULT NURSE NOTE - OBJECTIVE STATEMENT
78 y.o F presents to the ED from MD office s/p fall. Patient is awake, alert and speaking coherently. As per patient "I walk with a cane because I had a right hip fracture surgery in November 2018. I was walking across the street and when the light changed I tried to speed up and the rubber on my cane got caught on the ground and I fell onto my L side and I hit my face." Patient endorses face pain, L head pain, neck pain, L thumb pain. Denies LOC, hip/pelvic pain and after being helped up was ambulatory at the scene. Patient reports "I went to my doctor's office but I was sent to the hospital." Patient presents A&ox3 and afebrile; has L sided head pain but denies dizziness, + perrl 3 mm, denies numbness and tingling, denies chest pain and SOB- denies n/v. Nose swollen with bruising and abrasion, bruising to L lip and L thumb swollen with mild bruising.

## 2019-02-13 NOTE — ED PROVIDER NOTE - NSFOLLOWUPINSTRUCTIONS_ED_ALL_ED_FT
1. Follow up with your PMD within 1-2 days. Keep your previously scheduled appointment with your orthopedist Dr. Aleman tomorrow. Please bring a copy of all your results with you to your appointments.  2. Rest, Take Tylenol 650mg 1 tab every 4-6 hours as needed for pain. Continue all your current home medications as previously prescribed.  3. If you develop any nausea, vomiting, worsening pain, dizziness, weakness, chest pain, shortness of breath, inability to walk, changes in vision or any other concerning symptoms please return to ER immediately.

## 2019-02-13 NOTE — ED PROVIDER NOTE - OBJECTIVE STATEMENT
79 yo female PMHx HTN, HLD, GERD, prolapsed bladder, mitral valve prolapse, osteoarthritis, right hip fracture presents to the ED s/p mechanical fall c/o nasal pain and bruising and L sided head pain. Pt states was ambulating with cane (baseline) across street to her doctors office when bottom of the cane got stuck in pavement and she fell forward onto cement, striking her face and left side of her head. Was down for about 5 min awaiting someone to help her up. Denies any preceding dizziness, weakness, cp, palpitations. No LOC. Additionally endorsing L thumb pain. Feels breathing thru L nare is mildly worse than previous. Denies cp, sob, numbness/tingling, fatigue, fever/chills, speech/visual changes, n/v, abdominal pain, hip pain. On daily ecotrin.

## 2019-02-14 VITALS
DIASTOLIC BLOOD PRESSURE: 89 MMHG | OXYGEN SATURATION: 98 % | SYSTOLIC BLOOD PRESSURE: 162 MMHG | HEART RATE: 86 BPM | RESPIRATION RATE: 16 BRPM

## 2019-02-15 ENCOUNTER — APPOINTMENT (OUTPATIENT)
Dept: UROGYNECOLOGY | Facility: CLINIC | Age: 79
End: 2019-02-15
Payer: MEDICARE

## 2019-02-15 DIAGNOSIS — N81.4 UTEROVAGINAL PROLAPSE, UNSPECIFIED: ICD-10-CM

## 2019-02-15 PROCEDURE — 99214 OFFICE O/P EST MOD 30 MIN: CPT

## 2019-02-17 NOTE — DISCUSSION/SUMMARY
[Discuss Alternatives/Risks/Benefits w/Patient] : All alternatives, risks, and benefits were discussed with the patient/family and all questions were answered.  Patient expressed good understanding and appreciates the importance of follow up as recommended. [Face to Face Time___ Minutes] : with [unfilled] minutes in face to face consultation. [FreeTextEntry1] : 78yo with Stage 3 POP.\par \par Patient concerned today that she had a "hole" forming from reducing her prolapse with a scopette to urinate. On exam, there were no vaginal defects, and the area of concern for the patient was the patient's cervix. Diagrams were shown to the patient and her daughter to explain the anatomy of the cervix and how the cervical os was now visible 2/2 the prolapse. Reassurance was provided to both the patient and her daughter. They both continued to express concern regarding the cervix being visible. I offered placement of a Donut #3 pessary as previously planned. The patient expressed she did not want anything placed that could result in another vaginal erosion. I explained in detail the follow up that would be necessary with a prolapse and that we would monitor for lesions/excoriations. She wishes to defer pessary placement at this time. I recommended applying Aquaphor as a barrier to the prolapsed areas in order to prevent rubbing and erosions. The patient will try this and return if she desires a pessary to be placed. In the meantime, she will continue to manually reduce her prolapse in order to fully empty her bladder. She knows to call if she has any issue emptying her bladder or if she has further issues with bleeding 2/2 the prolapse.

## 2019-02-17 NOTE — HISTORY OF PRESENT ILLNESS
[Cystocele (Obstetric)] : daily [Uterine Prolapse] : daily [Vaginal Wall Prolapse] : frequent [Rectal Prolapse] : daily [Urge Incontinence Of Urine] : none [Unable To Restrain Bowel Movement] : none [Urinary Tract Infection] : rare [FreeTextEntry4] : irritation from prolapse - wears pad with occasional staining  [de-identified] : Reduces prolapse to void and defecate  [FreeTextEntry1] : \par 77yo with known POP, previously managed with pessaries. She has failed multiple pessaries including ring, Gellhorn, and Cube pessaries in the past. Most recently, she was fitted for a Donut #4 pessary in 9/2018. She followed up in 11/2018 for a pessary check and was found to have a bleeding erosion to the right vaginal wall that resolved with silver nitrate. The plan was for the patient to be on "pessary rest" for 2-3 weeks with subsequent f/u at which time a Donut #3 would be tried. \par \par Today, pt presents with her daughter due to concern for a "hole" created by reducing the prolapse with a scopette. She reports occasional staining of a pad due to irritation on her prolapse. She denies continuous leakage of urine or insensible loss of urine. She is reluctant to try another pessary today as she fears erosions. \par \par Of note, patient had recent hip surgery. Today she was examined in a Urodynamics chair. She was not placed in stirrups and exam was done with her sitting with her legs comfortably apart.

## 2019-02-17 NOTE — PHYSICAL EXAM
[No Acute Distress] : in no acute distress [Well developed] : well developed [Well Nourished] : ~L well nourished [Normal Memory] : ~T memory was ~L unimpaired [Normal Mood/Affect] : mood and affect are normal [Warm and Dry] : was warm and dry to touch [Normal Gait] : gait was normal [Vulvar Atrophy] : vulvar atrophy [Labia Majora] : were normal [Labia Minora] : were normal [Atrophy] : atrophy [No Bleeding] : there was no active vaginal bleeding [2] : 2 [Aa ____] : Aa [unfilled] [Ba ____] : Ba [unfilled] [C ____] : C [unfilled] [Ap ____] : Ap [unfilled] [Bp ____] : Bp [unfilled] [D ____] : D [unfilled] [Normal] : normal [Exam Deferred] : was deferred [Anxiety] : patient is not anxious [Tenderness] : ~T no ~M abdominal tenderness observed [Distended] : not distended [H/Smegaly] : no hepatosplenomegaly [Inguinal LAD] : no adenopathy was noted in the inguinal lymph nodes [FreeTextEntry3] : urethral caruncle  [FreeTextEntry4] : small 1x1cm excoriation superior to the cervix, no active bleeding or lesions [de-identified] : p

## 2019-03-07 ENCOUNTER — APPOINTMENT (OUTPATIENT)
Dept: OTOLARYNGOLOGY | Facility: CLINIC | Age: 79
End: 2019-03-07

## 2019-03-19 PROCEDURE — 99214 OFFICE O/P EST MOD 30 MIN: CPT

## 2019-04-02 ENCOUNTER — APPOINTMENT (OUTPATIENT)
Dept: ENDOCRINOLOGY | Facility: CLINIC | Age: 79
End: 2019-04-02
Payer: MEDICARE

## 2019-04-02 VITALS
OXYGEN SATURATION: 97 % | HEART RATE: 81 BPM | BODY MASS INDEX: 26.9 KG/M2 | HEIGHT: 60 IN | SYSTOLIC BLOOD PRESSURE: 130 MMHG | DIASTOLIC BLOOD PRESSURE: 80 MMHG | WEIGHT: 137 LBS

## 2019-04-02 VITALS — WEIGHT: 127 LBS | BODY MASS INDEX: 24.94 KG/M2 | HEIGHT: 59.9 IN

## 2019-04-02 PROCEDURE — 99214 OFFICE O/P EST MOD 30 MIN: CPT | Mod: 25

## 2019-04-02 PROCEDURE — 77080 DXA BONE DENSITY AXIAL: CPT

## 2019-04-02 RX ORDER — CANDESARTAN CILEXETIL 16 MG/1
16 TABLET ORAL DAILY
Qty: 30 | Refills: 3 | Status: DISCONTINUED | COMMUNITY
Start: 2018-09-27 | End: 2019-04-02

## 2019-04-02 RX ORDER — DILTIAZEM HYDROCHLORIDE 120 MG/1
120 CAPSULE, EXTENDED RELEASE ORAL DAILY
Refills: 0 | Status: ACTIVE | COMMUNITY
Start: 2019-04-02

## 2019-04-02 RX ORDER — FUROSEMIDE 20 MG/1
20 TABLET ORAL DAILY
Qty: 30 | Refills: 5 | Status: DISCONTINUED | COMMUNITY
Start: 2018-09-18 | End: 2019-04-02

## 2019-04-02 RX ORDER — HYDRALAZINE HYDROCHLORIDE 50 MG/1
50 TABLET ORAL TWICE DAILY
Refills: 0 | Status: ACTIVE | COMMUNITY
Start: 2019-04-02

## 2019-04-02 RX ORDER — METOPROLOL SUCCINATE 50 MG/1
50 TABLET, EXTENDED RELEASE ORAL DAILY
Qty: 1 | Refills: 3 | Status: DISCONTINUED | COMMUNITY
Start: 2018-09-27 | End: 2019-04-02

## 2019-04-03 ENCOUNTER — MEDICATION RENEWAL (OUTPATIENT)
Age: 79
End: 2019-04-03

## 2019-04-03 LAB
25(OH)D3 SERPL-MCNC: 69.6 NG/ML
ALBUMIN SERPL ELPH-MCNC: 4.4 G/DL
ALP BLD-CCNC: 91 U/L
ALT SERPL-CCNC: 17 U/L
ANION GAP SERPL CALC-SCNC: 12 MMOL/L
AST SERPL-CCNC: 22 U/L
BASOPHILS # BLD AUTO: 0.08 K/UL
BASOPHILS NFR BLD AUTO: 0.7 %
BILIRUB SERPL-MCNC: 0.4 MG/DL
BUN SERPL-MCNC: 12 MG/DL
CALCIUM SERPL-MCNC: 9.7 MG/DL
CALCIUM SERPL-MCNC: 9.7 MG/DL
CHLORIDE SERPL-SCNC: 94 MMOL/L
CO2 SERPL-SCNC: 29 MMOL/L
CREAT SERPL-MCNC: 0.82 MG/DL
EOSINOPHIL # BLD AUTO: 0.07 K/UL
EOSINOPHIL NFR BLD AUTO: 0.7 %
ESTIMATED AVERAGE GLUCOSE: 120 MG/DL
GLUCOSE SERPL-MCNC: 91 MG/DL
HBA1C MFR BLD HPLC: 5.8 %
HCT VFR BLD CALC: 39.4 %
HGB BLD-MCNC: 12.7 G/DL
IMM GRANULOCYTES NFR BLD AUTO: 0.2 %
LYMPHOCYTES # BLD AUTO: 1.57 K/UL
LYMPHOCYTES NFR BLD AUTO: 14.6 %
MAN DIFF?: NORMAL
MCHC RBC-ENTMCNC: 30.5 PG
MCHC RBC-ENTMCNC: 32.2 GM/DL
MCV RBC AUTO: 94.7 FL
MONOCYTES # BLD AUTO: 1.19 K/UL
MONOCYTES NFR BLD AUTO: 11.1 %
NEUTROPHILS # BLD AUTO: 7.79 K/UL
NEUTROPHILS NFR BLD AUTO: 72.7 %
PARATHYROID HORMONE INTACT: 21 PG/ML
PHOSPHATE SERPL-MCNC: 4.1 MG/DL
PLATELET # BLD AUTO: 279 K/UL
POTASSIUM SERPL-SCNC: 4 MMOL/L
PROT SERPL-MCNC: 7.5 G/DL
RBC # BLD: 4.16 M/UL
RBC # FLD: 13.5 %
SODIUM SERPL-SCNC: 135 MMOL/L
T4 FREE SERPL-MCNC: 1.9 NG/DL
TSH SERPL-ACNC: 1.59 UIU/ML
WBC # FLD AUTO: 10.72 K/UL

## 2019-04-03 NOTE — HISTORY OF PRESENT ILLNESS
[FreeTextEntry1] : 78 y.o. female with h/o retrosternal goiter s/p surgery in 9/2003 with hypothyroidism presents for follow up visit. Surgery revealed 4 mm microfocus of papillary thyroid cancer in the setting of Hashimoto's disease. Did have residual right neck tissue and being followed with yearly sonograms. Never had FNA of right neck tissue. No neck complaints. Feeling good. BP has been labile and seeing cardiology and now planning to see new nephrologist. Following with urogyn for uterovaginal prolapse. Dealing with GERD/hiatal hernia and difficulty with swallowing. Seeing GI. Taking Synthroid 75 mcg daily and extra 1/2 tab once weekly.  Sonogram in 2/2017 showed atrophic residual left lobe tissue and right lobe appears heterogenous without discrete nodules. There are small LNs nonspecific. Last sonogram in December 2017 shows residual right thyroid lobe tissue measuring 2.7 cm and left neck bed looks unremarkable. Always constipated. \par \par Also h/o prediabetes, reports weight gain. Reports a big dinner. No exercise. \par \par Also h/o osteopenia, DEXA scan in 2/2016 showed spine -0.5 and femoral neck -1.7. Had right hip fracture in 11/2018 and required surgery. Reports frequents falls. No other fractures. Takes vitamin D 2,500 Iu weekly.  Most recent DEXA scan is 2/20/18 shows left femoral neck -2.0 and total hip -0.8 with spine -0.1 with 1/3 radius -1.7. Reports being treated many years ago with ? oral bisphosphonate briefly but not clear what type reaction she experienced.

## 2019-04-03 NOTE — ASSESSMENT
[FreeTextEntry1] : 78 y.o. female with h/o hypothyroidism s/p surgery with residual thyroid tissue, prediabetes and osteopenia.\par 1. Hypothyroidism s/p surgery with microfocus of papillary thyroid cancer- Patient appears euthyroid. Will check TFTs and adjust Synthroid accordingly. No need for TSH suppression. Goal TSH is below 2.5. Will check thyroid ultrasound now.\par 2. Prediabetes- Encouraged carbohydrate consistent diet and exercise. Will check CMP and Hba1c. Will monitor for now.\par 3. Osteopenia- DEXA scan performed today shows 1/3 radius -1.5, left femoral neck -1.9 and total hip -1.4, spine -0.1 (not accurate given arthritis). GIven h/o recent right hip fracture, patient is at increased risk of fracture. Recommend medical therapy. Given GI history, not a good candidate for oral bisphosphonates. Recommend IV Reclast. Reviewed risks and benefits. Patient will consider. Encouraged weight bearing activity. Discussed appropriate calcium and vitamin D intake.  Will check 25 vitamin D level and adjust supplement if needed. \par \par Follow up in 6 months [Bisphosphonate Therapy] : Risks  and benefits of bisphosphonate therapy were  discussed with the patient including gastroesophageal irritation, osteonecrosis of the jaw, and atypical femur fractures, and acute phase reaction

## 2019-04-03 NOTE — PHYSICAL EXAM
[Alert] : alert [No Acute Distress] : no acute distress [Normal Sclera/Conjunctiva] : normal sclera/conjunctiva [EOMI] : extra ocular movement intact [Supple] : the neck was supple [No LAD] : no lymphadenopathy [Thyroid Not Enlarged] : the thyroid was not enlarged [Well Healed Scar] : well healed scar [No Accessory Muscle Use] : no accessory muscle use [Clear to Auscultation] : lungs were clear to auscultation bilaterally [Normal S1, S2] : normal S1 and S2 [Regular Rhythm] : with a regular rhythm [No Edema] : there was no peripheral edema [Normal Bowel Sounds] : normal bowel sounds [Not Tender] : non-tender [Soft] : abdomen soft [Not Distended] : not distended [Normal] : normal and non tender [No Clubbing, Cyanosis] : no clubbing  or cyanosis of the fingernails [No Rash] : no rash [Normal Reflexes] : deep tendon reflexes were 2+ and symmetric [Normal Affect] : the affect was normal [Normal Mood] : the mood was normal [Kyphosis] : kyphosis present [de-identified] : 2/6 DEMETRIUS

## 2019-04-03 NOTE — PHYSICAL EXAM
[Alert] : alert [No Acute Distress] : no acute distress [Normal Sclera/Conjunctiva] : normal sclera/conjunctiva [EOMI] : extra ocular movement intact [Supple] : the neck was supple [No LAD] : no lymphadenopathy [Thyroid Not Enlarged] : the thyroid was not enlarged [Well Healed Scar] : well healed scar [No Accessory Muscle Use] : no accessory muscle use [Clear to Auscultation] : lungs were clear to auscultation bilaterally [Normal S1, S2] : normal S1 and S2 [Regular Rhythm] : with a regular rhythm [No Edema] : there was no peripheral edema [Normal Bowel Sounds] : normal bowel sounds [Not Tender] : non-tender [Soft] : abdomen soft [Not Distended] : not distended [Normal] : normal and non tender [No Clubbing, Cyanosis] : no clubbing  or cyanosis of the fingernails [No Rash] : no rash [Normal Reflexes] : deep tendon reflexes were 2+ and symmetric [Normal Affect] : the affect was normal [Normal Mood] : the mood was normal [Kyphosis] : kyphosis present [de-identified] : 2/6 DEMETRIUS

## 2019-04-03 NOTE — REVIEW OF SYSTEMS
[Fatigue] : fatigue [Recent Weight Gain (___ Lbs)] : recent [unfilled] ~Ulb weight gain [Heartburn] : heartburn [Joint Pain] : joint pain [Hair Loss] : hair loss [Poor Balance] : poor balance [Negative] : Respiratory [Polydipsia] : no polydipsia [Swelling] : swelling

## 2019-04-22 ENCOUNTER — APPOINTMENT (OUTPATIENT)
Dept: UROGYNECOLOGY | Facility: CLINIC | Age: 79
End: 2019-04-22

## 2019-04-29 ENCOUNTER — FORM ENCOUNTER (OUTPATIENT)
Age: 79
End: 2019-04-29

## 2019-04-30 ENCOUNTER — APPOINTMENT (OUTPATIENT)
Dept: ULTRASOUND IMAGING | Facility: IMAGING CENTER | Age: 79
End: 2019-04-30
Payer: MEDICARE

## 2019-04-30 ENCOUNTER — OUTPATIENT (OUTPATIENT)
Dept: OUTPATIENT SERVICES | Facility: HOSPITAL | Age: 79
LOS: 1 days | End: 2019-04-30
Payer: MEDICARE

## 2019-04-30 DIAGNOSIS — S72.001A FRACTURE OF UNSPECIFIED PART OF NECK OF RIGHT FEMUR, INITIAL ENCOUNTER FOR CLOSED FRACTURE: Chronic | ICD-10-CM

## 2019-04-30 DIAGNOSIS — E89.0 POSTPROCEDURAL HYPOTHYROIDISM: Chronic | ICD-10-CM

## 2019-04-30 DIAGNOSIS — E89.0 POSTPROCEDURAL HYPOTHYROIDISM: ICD-10-CM

## 2019-04-30 PROCEDURE — 76536 US EXAM OF HEAD AND NECK: CPT | Mod: 26

## 2019-04-30 PROCEDURE — 76536 US EXAM OF HEAD AND NECK: CPT

## 2019-05-09 ENCOUNTER — APPOINTMENT (OUTPATIENT)
Dept: GASTROENTEROLOGY | Facility: CLINIC | Age: 79
End: 2019-05-09

## 2019-06-12 ENCOUNTER — APPOINTMENT (OUTPATIENT)
Dept: OTOLARYNGOLOGY | Facility: CLINIC | Age: 79
End: 2019-06-12
Payer: MEDICARE

## 2019-06-12 VITALS
WEIGHT: 128 LBS | BODY MASS INDEX: 25.13 KG/M2 | HEIGHT: 60 IN | DIASTOLIC BLOOD PRESSURE: 81 MMHG | HEART RATE: 67 BPM | SYSTOLIC BLOOD PRESSURE: 146 MMHG

## 2019-06-12 DIAGNOSIS — R68.89 OTHER GENERAL SYMPTOMS AND SIGNS: ICD-10-CM

## 2019-06-12 DIAGNOSIS — R49.0 DYSPHONIA: ICD-10-CM

## 2019-06-12 PROCEDURE — 31575 DIAGNOSTIC LARYNGOSCOPY: CPT

## 2019-06-12 PROCEDURE — 99204 OFFICE O/P NEW MOD 45 MIN: CPT | Mod: 25

## 2019-06-12 NOTE — ASSESSMENT
[FreeTextEntry1] : LPRD:\par - reflux precautions\par \par dysphagia:\par - MBS\par \par HTN\par - F/U with PMD\par \par

## 2019-06-12 NOTE — PROCEDURE
[de-identified] : Afrin 0.05% and lidocaine 4% sprayed into both nasal passages. Flexible scope #2 used. Passed through nasal passage and nasopharynx/oropharynx/hypopharynx clear. Supraglottis normal. Glottis with fully mobile vocal cords without lesions or masses. Visualized subglottis clear. Postcricoid area without erythema or edema. No pooling of secretions.

## 2019-06-12 NOTE — HISTORY OF PRESENT ILLNESS
[de-identified] : 79 y/o F with one year of globus sensation, constant throat clearing, raspy voice, feeling like things are stuck when swallowing.  No pain, no SOB, no coughing or choking sensation with swallowing.  Hx of GERD, however has never taken reflux medication.  No nasal congestion or sinus pain or pressure.

## 2019-06-12 NOTE — CONSULT LETTER
[Dear  ___] : Dear  [unfilled], [Courtesy Letter:] : I had the pleasure of seeing your patient, [unfilled], in my office today. [Consult Closing:] : Thank you very much for allowing me to participate in the care of this patient.  If you have any questions, please do not hesitate to contact me. [Please see my note below.] : Please see my note below. [Sincerely,] : Sincerely, [FreeTextEntry3] : Chapis Birch MD\par Otolaryngology and Cranial Base Surgery\par Attending Physician - Department of Otolaryngology and Head & Neck Surgery\par [facility]\par  - [employer] [facility]\par Office: [phone]\par Fax: [phone]\par

## 2019-06-18 ENCOUNTER — INPATIENT (INPATIENT)
Facility: HOSPITAL | Age: 79
LOS: 2 days | Discharge: HOME | DRG: 394 | End: 2019-06-21
Attending: INTERNAL MEDICINE | Admitting: INTERNAL MEDICINE
Payer: MEDICARE

## 2019-06-18 VITALS
SYSTOLIC BLOOD PRESSURE: 184 MMHG | OXYGEN SATURATION: 99 % | HEART RATE: 75 BPM | WEIGHT: 130.07 LBS | RESPIRATION RATE: 16 BRPM | TEMPERATURE: 98 F | HEIGHT: 60 IN | DIASTOLIC BLOOD PRESSURE: 106 MMHG

## 2019-06-18 DIAGNOSIS — S72.001A FRACTURE OF UNSPECIFIED PART OF NECK OF RIGHT FEMUR, INITIAL ENCOUNTER FOR CLOSED FRACTURE: Chronic | ICD-10-CM

## 2019-06-18 DIAGNOSIS — E89.0 POSTPROCEDURAL HYPOTHYROIDISM: Chronic | ICD-10-CM

## 2019-06-18 LAB
APTT BLD: 32.9 SEC — SIGNIFICANT CHANGE UP (ref 27.5–36.3)
BASOPHILS # BLD AUTO: 0.1 K/UL — SIGNIFICANT CHANGE UP (ref 0–0.2)
BASOPHILS NFR BLD AUTO: 0.7 % — SIGNIFICANT CHANGE UP (ref 0–2)
EOSINOPHIL # BLD AUTO: 0.1 K/UL — SIGNIFICANT CHANGE UP (ref 0–0.5)
EOSINOPHIL NFR BLD AUTO: 1.2 % — SIGNIFICANT CHANGE UP (ref 0–6)
HCT VFR BLD CALC: 37.2 % — SIGNIFICANT CHANGE UP (ref 34.5–45)
HGB BLD-MCNC: 13.4 G/DL — SIGNIFICANT CHANGE UP (ref 11.5–15.5)
INR BLD: 1.05 RATIO — SIGNIFICANT CHANGE UP (ref 0.88–1.16)
LYMPHOCYTES # BLD AUTO: 1.7 K/UL — SIGNIFICANT CHANGE UP (ref 1–3.3)
LYMPHOCYTES # BLD AUTO: 21 % — SIGNIFICANT CHANGE UP (ref 13–44)
MCHC RBC-ENTMCNC: 32.9 PG — SIGNIFICANT CHANGE UP (ref 27–34)
MCHC RBC-ENTMCNC: 36 GM/DL — SIGNIFICANT CHANGE UP (ref 32–36)
MCV RBC AUTO: 91.3 FL — SIGNIFICANT CHANGE UP (ref 80–100)
MONOCYTES # BLD AUTO: 0.6 K/UL — SIGNIFICANT CHANGE UP (ref 0–0.9)
MONOCYTES NFR BLD AUTO: 7.8 % — SIGNIFICANT CHANGE UP (ref 2–14)
NEUTROPHILS # BLD AUTO: 5.6 K/UL — SIGNIFICANT CHANGE UP (ref 1.8–7.4)
NEUTROPHILS NFR BLD AUTO: 69.3 % — SIGNIFICANT CHANGE UP (ref 43–77)
PLATELET # BLD AUTO: 304 K/UL — SIGNIFICANT CHANGE UP (ref 150–400)
PROTHROM AB SERPL-ACNC: 12.1 SEC — SIGNIFICANT CHANGE UP (ref 10–12.9)
RBC # BLD: 4.07 M/UL — SIGNIFICANT CHANGE UP (ref 3.8–5.2)
RBC # FLD: 11.9 % — SIGNIFICANT CHANGE UP (ref 10.3–14.5)
WBC # BLD: 8 K/UL — SIGNIFICANT CHANGE UP (ref 3.8–10.5)
WBC # FLD AUTO: 8 K/UL — SIGNIFICANT CHANGE UP (ref 3.8–10.5)

## 2019-06-18 PROCEDURE — 71250 CT THORAX DX C-: CPT | Mod: 26

## 2019-06-18 PROCEDURE — 99285 EMERGENCY DEPT VISIT HI MDM: CPT

## 2019-06-18 RX ORDER — SODIUM CHLORIDE 9 MG/ML
500 INJECTION INTRAMUSCULAR; INTRAVENOUS; SUBCUTANEOUS ONCE
Refills: 0 | Status: COMPLETED | OUTPATIENT
Start: 2019-06-18 | End: 2019-06-18

## 2019-06-18 RX ORDER — GLUCAGON INJECTION, SOLUTION 0.5 MG/.1ML
1 INJECTION, SOLUTION SUBCUTANEOUS ONCE
Refills: 0 | Status: COMPLETED | OUTPATIENT
Start: 2019-06-18 | End: 2019-06-18

## 2019-06-18 RX ORDER — ONDANSETRON 8 MG/1
4 TABLET, FILM COATED ORAL ONCE
Refills: 0 | Status: COMPLETED | OUTPATIENT
Start: 2019-06-18 | End: 2019-06-18

## 2019-06-18 RX ADMIN — GLUCAGON INJECTION, SOLUTION 1 MILLIGRAM(S): 0.5 INJECTION, SOLUTION SUBCUTANEOUS at 23:25

## 2019-06-18 NOTE — ED PROVIDER NOTE - OBJECTIVE STATEMENT
Private Physician Marline Bear pcp, Alva. Zoraida, Adrian [facility] GI  78y female pmh Rt hip fx sp orif, Diverticulitis, Hiatial hernia, Gerd, Osteoarthritis, . Pt stated feels like something is stuck in my throat approx 6pm. Pt was having dinner of chicken.  Has had similar symptoms in past that resolved over period apporx 20 min. Has now not resolved over 4h with nausea and vomiting stomach contents/bilous nonbloody. Occurred at dinner table.

## 2019-06-18 NOTE — ED ADULT TRIAGE NOTE - CHIEF COMPLAINT QUOTE
abd pain and vomiting after eating dinner, h/o hiatal hernia and concerned for food stuck on her throat.

## 2019-06-18 NOTE — ED ADULT NURSE NOTE - NSIMPLEMENTINTERV_GEN_ALL_ED
Implemented All Universal Safety Interventions:  [place] to call system. Call bell, personal items and telephone within reach. Instruct patient to call for assistance. Room bathroom lighting operational. Non-slip footwear when patient is off stretcher. Physically safe environment: no spills, clutter or unnecessary equipment. Stretcher in lowest position, wheels locked, appropriate side rails in place.

## 2019-06-18 NOTE — ED PROVIDER NOTE - CLINICAL SUMMARY MEDICAL DECISION MAKING FREE TEXT BOX
Adult female with hx of gerd/hiatial hernia pw nausea and vomiting and diff swallowing. concerns for esophageal impaction. ivf check labs ct consult gi as indicated  Bran Granados MD, Facep

## 2019-06-18 NOTE — ED ADULT NURSE NOTE - CHPI ED NUR SYMPTOMS NEG
no dysuria/no blood in stool/no hematuria/no nausea/no abdominal distension/no burning urination/no chills/no fever/no diarrhea

## 2019-06-18 NOTE — ED ADULT NURSE NOTE - OBJECTIVE STATEMENT
79 YO female PMHx HTN, HLD, GERD c/o projectile vomiting and epigastric pain since tonight. Patient reports eating BBQ chicken and after eating, immediately projectile vomited yellow colored vomit. Patient reports that she has had multiple episodes of vomiting, first the food she ate and then yellow colored fluid. Patient also reports epigastric pain stating "it feels like something is stuck there. I have a hitial hernia and sometimes things get stuck and it feels just like this" Patient was the only person to eat stated food item. Patient is A&OX3, abdomen soft, non-tender, non-distended. BS + in 4Q's. denies chest pain, SOB, HA, fever/chills, urinary symptoms, hematuria, weakness, dizziness, numbness, tinging. Peripheral pulses present b/l. Skin warm, dry and pink. Pt placed in position of comfort. Pt educated on call bell system and provided call bell. Bed in lowest position, wheels locked, appropriate side rails raised. Pt denies needs at this time.

## 2019-06-18 NOTE — ED PROVIDER NOTE - PROGRESS NOTE DETAILS
Endorsed to Dr Yovana Granados MD, Facep Spoke with GI, would like glucagon 100mg, keep pt NPO, TBA medicine and will scope in the morning. -Julia Melendez PA-C Spoke with TIANA Chawla. -Julia Melendez PA-C

## 2019-06-19 ENCOUNTER — TRANSCRIPTION ENCOUNTER (OUTPATIENT)
Age: 79
End: 2019-06-19

## 2019-06-19 DIAGNOSIS — T18.108A UNSPECIFIED FOREIGN BODY IN ESOPHAGUS CAUSING OTHER INJURY, INITIAL ENCOUNTER: ICD-10-CM

## 2019-06-19 DIAGNOSIS — E87.1 HYPO-OSMOLALITY AND HYPONATREMIA: ICD-10-CM

## 2019-06-19 LAB
ALBUMIN SERPL ELPH-MCNC: 4.2 G/DL — SIGNIFICANT CHANGE UP (ref 3.3–5)
ALP SERPL-CCNC: 86 U/L — SIGNIFICANT CHANGE UP (ref 40–120)
ALT FLD-CCNC: 30 U/L — SIGNIFICANT CHANGE UP (ref 10–45)
ANION GAP SERPL CALC-SCNC: 12 MMOL/L — SIGNIFICANT CHANGE UP (ref 5–17)
ANION GAP SERPL CALC-SCNC: 13 MMOL/L — SIGNIFICANT CHANGE UP (ref 5–17)
ANION GAP SERPL CALC-SCNC: 14 MMOL/L — SIGNIFICANT CHANGE UP (ref 5–17)
AST SERPL-CCNC: 29 U/L — SIGNIFICANT CHANGE UP (ref 10–40)
BILIRUB SERPL-MCNC: 0.3 MG/DL — SIGNIFICANT CHANGE UP (ref 0.2–1.2)
BUN SERPL-MCNC: 12 MG/DL — SIGNIFICANT CHANGE UP (ref 7–23)
BUN SERPL-MCNC: 13 MG/DL — SIGNIFICANT CHANGE UP (ref 7–23)
BUN SERPL-MCNC: 14 MG/DL — SIGNIFICANT CHANGE UP (ref 7–23)
CALCIUM SERPL-MCNC: 9.7 MG/DL — SIGNIFICANT CHANGE UP (ref 8.4–10.5)
CALCIUM SERPL-MCNC: 9.8 MG/DL — SIGNIFICANT CHANGE UP (ref 8.4–10.5)
CALCIUM SERPL-MCNC: 9.9 MG/DL — SIGNIFICANT CHANGE UP (ref 8.4–10.5)
CHLORIDE SERPL-SCNC: 82 MMOL/L — LOW (ref 96–108)
CHLORIDE SERPL-SCNC: 90 MMOL/L — LOW (ref 96–108)
CHLORIDE SERPL-SCNC: 93 MMOL/L — LOW (ref 96–108)
CO2 SERPL-SCNC: 23 MMOL/L — SIGNIFICANT CHANGE UP (ref 22–31)
CO2 SERPL-SCNC: 24 MMOL/L — SIGNIFICANT CHANGE UP (ref 22–31)
CO2 SERPL-SCNC: 24 MMOL/L — SIGNIFICANT CHANGE UP (ref 22–31)
CREAT SERPL-MCNC: 0.74 MG/DL — SIGNIFICANT CHANGE UP (ref 0.5–1.3)
CREAT SERPL-MCNC: 0.79 MG/DL — SIGNIFICANT CHANGE UP (ref 0.5–1.3)
CREAT SERPL-MCNC: 0.8 MG/DL — SIGNIFICANT CHANGE UP (ref 0.5–1.3)
GLUCOSE BLDC GLUCOMTR-MCNC: 104 MG/DL — HIGH (ref 70–99)
GLUCOSE BLDC GLUCOMTR-MCNC: 118 MG/DL — HIGH (ref 70–99)
GLUCOSE BLDC GLUCOMTR-MCNC: 136 MG/DL — HIGH (ref 70–99)
GLUCOSE SERPL-MCNC: 102 MG/DL — HIGH (ref 70–99)
GLUCOSE SERPL-MCNC: 113 MG/DL — HIGH (ref 70–99)
GLUCOSE SERPL-MCNC: 120 MG/DL — HIGH (ref 70–99)
OSMOLALITY SERPL: 271 MOSM/KG — LOW (ref 289–308)
POTASSIUM SERPL-MCNC: 4.3 MMOL/L — SIGNIFICANT CHANGE UP (ref 3.5–5.3)
POTASSIUM SERPL-MCNC: 4.5 MMOL/L — SIGNIFICANT CHANGE UP (ref 3.5–5.3)
POTASSIUM SERPL-MCNC: 5.1 MMOL/L — SIGNIFICANT CHANGE UP (ref 3.5–5.3)
POTASSIUM SERPL-SCNC: 4.3 MMOL/L — SIGNIFICANT CHANGE UP (ref 3.5–5.3)
POTASSIUM SERPL-SCNC: 4.5 MMOL/L — SIGNIFICANT CHANGE UP (ref 3.5–5.3)
POTASSIUM SERPL-SCNC: 5.1 MMOL/L — SIGNIFICANT CHANGE UP (ref 3.5–5.3)
PROT SERPL-MCNC: 7.5 G/DL — SIGNIFICANT CHANGE UP (ref 6–8.3)
SODIUM SERPL-SCNC: 120 MMOL/L — CRITICAL LOW (ref 135–145)
SODIUM SERPL-SCNC: 126 MMOL/L — LOW (ref 135–145)
SODIUM SERPL-SCNC: 129 MMOL/L — LOW (ref 135–145)

## 2019-06-19 PROCEDURE — 99223 1ST HOSP IP/OBS HIGH 75: CPT | Mod: GC

## 2019-06-19 PROCEDURE — 74220 X-RAY XM ESOPHAGUS 1CNTRST: CPT | Mod: 26

## 2019-06-19 RX ORDER — HEPARIN SODIUM 5000 [USP'U]/ML
5000 INJECTION INTRAVENOUS; SUBCUTANEOUS EVERY 12 HOURS
Refills: 0 | Status: DISCONTINUED | OUTPATIENT
Start: 2019-06-19 | End: 2019-06-21

## 2019-06-19 RX ORDER — HYDRALAZINE HCL 50 MG
25 TABLET ORAL
Refills: 0 | Status: DISCONTINUED | OUTPATIENT
Start: 2019-06-19 | End: 2019-06-21

## 2019-06-19 RX ORDER — CARVEDILOL PHOSPHATE 80 MG/1
25 CAPSULE, EXTENDED RELEASE ORAL EVERY 12 HOURS
Refills: 0 | Status: DISCONTINUED | OUTPATIENT
Start: 2019-06-19 | End: 2019-06-21

## 2019-06-19 RX ORDER — DILTIAZEM HCL 120 MG
120 CAPSULE, EXT RELEASE 24 HR ORAL DAILY
Refills: 0 | Status: DISCONTINUED | OUTPATIENT
Start: 2019-06-19 | End: 2019-06-21

## 2019-06-19 RX ORDER — BUPROPION HYDROCHLORIDE 150 MG/1
150 TABLET, EXTENDED RELEASE ORAL DAILY
Refills: 0 | Status: DISCONTINUED | OUTPATIENT
Start: 2019-06-19 | End: 2019-06-21

## 2019-06-19 RX ORDER — PANTOPRAZOLE SODIUM 20 MG/1
40 TABLET, DELAYED RELEASE ORAL DAILY
Refills: 0 | Status: DISCONTINUED | OUTPATIENT
Start: 2019-06-19 | End: 2019-06-19

## 2019-06-19 RX ORDER — ALPRAZOLAM 0.25 MG
1 TABLET ORAL DAILY
Refills: 0 | Status: DISCONTINUED | OUTPATIENT
Start: 2019-06-19 | End: 2019-06-21

## 2019-06-19 RX ORDER — METOPROLOL TARTRATE 50 MG
5 TABLET ORAL ONCE
Refills: 0 | Status: COMPLETED | OUTPATIENT
Start: 2019-06-19 | End: 2019-06-19

## 2019-06-19 RX ORDER — PANTOPRAZOLE SODIUM 20 MG/1
40 TABLET, DELAYED RELEASE ORAL
Refills: 0 | Status: DISCONTINUED | OUTPATIENT
Start: 2019-06-19 | End: 2019-06-21

## 2019-06-19 RX ORDER — METOPROLOL TARTRATE 50 MG
5 TABLET ORAL EVERY 6 HOURS
Refills: 0 | Status: DISCONTINUED | OUTPATIENT
Start: 2019-06-19 | End: 2019-06-19

## 2019-06-19 RX ORDER — AMITRIPTYLINE HCL 25 MG
25 TABLET ORAL AT BEDTIME
Refills: 0 | Status: DISCONTINUED | OUTPATIENT
Start: 2019-06-19 | End: 2019-06-21

## 2019-06-19 RX ORDER — HYDRALAZINE HCL 50 MG
50 TABLET ORAL
Refills: 0 | Status: DISCONTINUED | OUTPATIENT
Start: 2019-06-19 | End: 2019-06-19

## 2019-06-19 RX ORDER — BUPROPION HYDROCHLORIDE 150 MG/1
300 TABLET, EXTENDED RELEASE ORAL DAILY
Refills: 0 | Status: DISCONTINUED | OUTPATIENT
Start: 2019-06-19 | End: 2019-06-19

## 2019-06-19 RX ORDER — SODIUM CHLORIDE 9 MG/ML
1000 INJECTION INTRAMUSCULAR; INTRAVENOUS; SUBCUTANEOUS
Refills: 0 | Status: DISCONTINUED | OUTPATIENT
Start: 2019-06-19 | End: 2019-06-19

## 2019-06-19 RX ORDER — LEVOTHYROXINE SODIUM 125 MCG
75 TABLET ORAL DAILY
Refills: 0 | Status: DISCONTINUED | OUTPATIENT
Start: 2019-06-19 | End: 2019-06-20

## 2019-06-19 RX ORDER — SODIUM CHLORIDE 9 MG/ML
1000 INJECTION INTRAMUSCULAR; INTRAVENOUS; SUBCUTANEOUS
Refills: 0 | Status: DISCONTINUED | OUTPATIENT
Start: 2019-06-19 | End: 2019-06-20

## 2019-06-19 RX ADMIN — Medication 5 MILLIGRAM(S): at 12:47

## 2019-06-19 RX ADMIN — HEPARIN SODIUM 5000 UNIT(S): 5000 INJECTION INTRAVENOUS; SUBCUTANEOUS at 16:44

## 2019-06-19 RX ADMIN — Medication 25 MILLIGRAM(S): at 22:08

## 2019-06-19 RX ADMIN — PANTOPRAZOLE SODIUM 40 MILLIGRAM(S): 20 TABLET, DELAYED RELEASE ORAL at 12:47

## 2019-06-19 RX ADMIN — SODIUM CHLORIDE 500 MILLILITER(S): 9 INJECTION INTRAMUSCULAR; INTRAVENOUS; SUBCUTANEOUS at 01:01

## 2019-06-19 RX ADMIN — Medication 5 MILLIGRAM(S): at 01:35

## 2019-06-19 RX ADMIN — Medication 50 MILLIGRAM(S): at 16:41

## 2019-06-19 RX ADMIN — SODIUM CHLORIDE 30 MILLILITER(S): 9 INJECTION INTRAMUSCULAR; INTRAVENOUS; SUBCUTANEOUS at 12:47

## 2019-06-19 RX ADMIN — Medication 1 MILLIGRAM(S): at 22:08

## 2019-06-19 NOTE — CONSULT NOTE ADULT - CONSULT REASON
Hyponatremia
dysphagia
cardiac management, known to office   hx htn, hyperlipidemia, thyroid ca, MAT, Diverticulitis, Hiatial hernia, Gerd, Osteoarthritis

## 2019-06-19 NOTE — CONSULT NOTE ADULT - ATTENDING COMMENTS
Thank you for this consult    For any question, call:  Cell # [phone]  Pager # [phone]  Callback # [phone]

## 2019-06-19 NOTE — DISCHARGE NOTE NURSING/CASE MANAGEMENT/SOCIAL WORK - NSDCPEWEB_GEN_ALL_CORE
[place] website --- [email or web address] Tobacco [email or web address] website --- [email or web address]

## 2019-06-19 NOTE — CONSULT NOTE ADULT - SUBJECTIVE AND OBJECTIVE BOX
Chief Complaint:  Patient is a 78y old  Female who presents with a chief complaint of dysphagia    Interval Events: 77 y/o F with h/o hiatal hernia, htn, afib? p/w dysphagia. She was eating chicken for dinner and states she had projectile vomiting and felt sensation of food stuck in her chest. She states she has had this many times before and just passes on its own but it was taking longer this time. She received glucagon this AM and now feels like the bolus has passed. Able to tolerate secretions. She feels well now. She is very concerned about anesthesia since she states her past endoscopy was 12 yrs ago and hasn't had another since due to her rapid heart rate and htn pre-op.    Allergies:  NSAIDs (Rash)      Hospital Medications:      PMHX/PSHX:  Macular degeneration  Osteoarthritis  Diverticulitis  GERD (gastroesophageal reflux disease)  Hypertension  Closed right hip fracture  H/O thyroidectomy  No significant past surgical history      Family history:  no pertinent history in first degree relative    ROS:     General:  No fevers, chills  Eyes:  Good vision  ENT:  No odynophagia, dysphagia  CV:  No pain, palpitations  Resp:  No dyspnea, cough, tachypnea, wheezing  GI:  See HPI  Muscle:  No pain, weakness  Skin:  No rash, edema      PHYSICAL EXAM:     GENERAL:  No distress  HEENT: conjunctivae clear  CHEST:  Full & symmetric excursion, no increased effort  HEART:  Regular rhythm  ABDOMEN:  Soft, non-tender, non-distended  EXTREMITIES:  no edema  SKIN:  Dry/warm  NEURO:  Alert    Vital Signs:  Vital Signs Last 24 Hrs  T(C): 36.4 (19 Jun 2019 03:27), Max: 36.9 (18 Jun 2019 21:56)  T(F): 97.5 (19 Jun 2019 03:27), Max: 98.4 (18 Jun 2019 21:56)  HR: 72 (19 Jun 2019 03:27) (67 - 85)  BP: 158/85 (19 Jun 2019 03:27) (151/90 - 184/106)  BP(mean): --  RR: 18 (19 Jun 2019 03:27) (16 - 18)  SpO2: 98% (19 Jun 2019 03:27) (98% - 99%)  Daily Height in cm: 152.4 (19 Jun 2019 03:27)    Daily     LABS:                        13.4   8.0   )-----------( 304      ( 18 Jun 2019 23:07 )             37.2     06-19    126<L>  |  90<L>  |  13  ----------------------------<  102<H>  4.3   |  24  |  0.74    Ca    9.8      19 Jun 2019 07:17    TPro  7.5  /  Alb  4.2  /  TBili  0.3  /  DBili  x   /  AST  29  /  ALT  30  /  AlkPhos  86  06-18    LIVER FUNCTIONS - ( 18 Jun 2019 23:07 )  Alb: 4.2 g/dL / Pro: 7.5 g/dL / ALK PHOS: 86 U/L / ALT: 30 U/L / AST: 29 U/L / GGT: x           PT/INR - ( 18 Jun 2019 23:07 )   PT: 12.1 sec;   INR: 1.05 ratio         PTT - ( 18 Jun 2019 23:07 )  PTT:32.9 sec        Imaging:    < from: CT Chest No Cont (06.18.19 @ 22:42) >  FINDINGS:    LUNGS AND AIRWAYS: Biapical pleural parenchymal thickening and scarring   with calcification.    PLEURA: No pleural effusion.    MEDIASTINUM AND HALIE: No lymphadenopathy.    VESSELS: Within normal limits.    HEART: Heart size is normal. No pericardial effusion.    CHEST WALL AND LOWER NECK: Heterogeneous intraluminal esophageal density   with foci of gas measures proximate 5.5 x 3.6 cm in maximal axial   dimension. No paraesophageal free air.  Changes from left thyroidectomy with metallic clips are present.     VISUALIZED UPPER ABDOMEN: Within normal limits.    BONES: Multilevel thoracic spondylosis.    IMPRESSION:     Heterogeneous intraluminal distal esophageal density may reflect impacted   food bolus in the appropriate clinical setting.    Dr. Antony discussed these findings with Dr. Altamirano on 6/18/2019 10:48   PM with read back.      < end of copied text > Chief Complaint:  Patient is a 78y old  Female who presents with a chief complaint of dysphagia    Interval Events: 77 y/o F with h/o hiatal hernia, htn, afib? p/w dysphagia. She was eating chicken for dinner and states she had projectile vomiting and felt sensation of food stuck in her chest. She states she has had this many times before many years ago and just passes on its own but it was taking longer this time. She received glucagon this AM and now feels like the bolus has passed. Able to tolerate secretions. She feels well now. She is very concerned about anesthesia since she states her past endoscopy was 12 yrs ago and hasn't had another since due to her rapid heart rate and htn pre-op. Since the time of her last EGD, she has not had any dysphagia until this current episode. She denies odynophagia or abnormal weight loss.    Allergies:  NSAIDs (Rash)      Hospital Medications:      PMHX/PSHX:  Macular degeneration  Osteoarthritis  Diverticulitis  GERD (gastroesophageal reflux disease)  Hypertension  Closed right hip fracture  H/O thyroidectomy  No significant past surgical history      Family history:  no pertinent history in first degree relative    ROS:     General:  No fevers, chills; no weight loss  Eyes:  Good vision  ENT:  No odynophagia, dysphagia  CV:  No pain, palpitations  Resp:  No dyspnea, cough, tachypnea, wheezing  GI:  See HPI  Muscle:  No pain, weakness  Genitourinary: No dysuria  Skin:  No rash, edema  Neuro: No syncope or numbeness        PHYSICAL EXAM:     GENERAL:  No distress  HEENT: conjunctivae clear  CHEST:  Full & symmetric excursion, no increased effort  HEART:  Regular rhythm  ABDOMEN:  Soft, non-tender, non-distended  EXTREMITIES:  no edema  SKIN:  Dry/warm  NEURO:  Alert  PSYCH: Somewhat anxious    Vital Signs:  Vital Signs Last 24 Hrs  T(C): 36.4 (19 Jun 2019 03:27), Max: 36.9 (18 Jun 2019 21:56)  T(F): 97.5 (19 Jun 2019 03:27), Max: 98.4 (18 Jun 2019 21:56)  HR: 72 (19 Jun 2019 03:27) (67 - 85)  BP: 158/85 (19 Jun 2019 03:27) (151/90 - 184/106)  BP(mean): --  RR: 18 (19 Jun 2019 03:27) (16 - 18)  SpO2: 98% (19 Jun 2019 03:27) (98% - 99%)  Daily Height in cm: 152.4 (19 Jun 2019 03:27)    Daily     LABS:                        13.4   8.0   )-----------( 304      ( 18 Jun 2019 23:07 )             37.2     06-19    126<L>  |  90<L>  |  13  ----------------------------<  102<H>  4.3   |  24  |  0.74    Ca    9.8      19 Jun 2019 07:17    TPro  7.5  /  Alb  4.2  /  TBili  0.3  /  DBili  x   /  AST  29  /  ALT  30  /  AlkPhos  86  06-18    LIVER FUNCTIONS - ( 18 Jun 2019 23:07 )  Alb: 4.2 g/dL / Pro: 7.5 g/dL / ALK PHOS: 86 U/L / ALT: 30 U/L / AST: 29 U/L / GGT: x           PT/INR - ( 18 Jun 2019 23:07 )   PT: 12.1 sec;   INR: 1.05 ratio         PTT - ( 18 Jun 2019 23:07 )  PTT:32.9 sec        Imaging:    < from: CT Chest No Cont (06.18.19 @ 22:42) >  FINDINGS:    LUNGS AND AIRWAYS: Biapical pleural parenchymal thickening and scarring   with calcification.    PLEURA: No pleural effusion.    MEDIASTINUM AND HALIE: No lymphadenopathy.    VESSELS: Within normal limits.    HEART: Heart size is normal. No pericardial effusion.    CHEST WALL AND LOWER NECK: Heterogeneous intraluminal esophageal density   with foci of gas measures proximate 5.5 x 3.6 cm in maximal axial   dimension. No paraesophageal free air.  Changes from left thyroidectomy with metallic clips are present.     VISUALIZED UPPER ABDOMEN: Within normal limits.    BONES: Multilevel thoracic spondylosis.    IMPRESSION:     Heterogeneous intraluminal distal esophageal density may reflect impacted   food bolus in the appropriate clinical setting.    Dr. Antony discussed these findings with Dr. Altamirano on 6/18/2019 10:48   PM with read back.      < end of copied text >

## 2019-06-19 NOTE — H&P ADULT - NSHPLABSRESULTS_GEN_ALL_CORE
13.4   8.0   )-----------( 304      ( 18 Jun 2019 23:07 )             37.2       06-19    126<L>  |  90<L>  |  13  ----------------------------<  102<H>  4.3   |  24  |  0.74    Ca    9.8      19 Jun 2019 07:17    TPro  7.5  /  Alb  4.2  /  TBili  0.3  /  DBili  x   /  AST  29  /  ALT  30  /  AlkPhos  86  06-18                  PT/INR - ( 18 Jun 2019 23:07 )   PT: 12.1 sec;   INR: 1.05 ratio         PTT - ( 18 Jun 2019 23:07 )  PTT:32.9 sec    Lactate Trend            CAPILLARY BLOOD GLUCOSE      POCT Blood Glucose.: 104 mg/dL (19 Jun 2019 08:09)  < from: CT Chest No Cont (06.18.19 @ 22:42) >    IMPRESSION:     Heterogeneous intraluminal distal esophageal density may reflect impacted   food bolus in the appropriate clinical setting.    < end of copied text >

## 2019-06-19 NOTE — H&P ADULT - NSHPPHYSICALEXAM_GEN_ALL_CORE
PHYSICAL EXAMINATION:  Vital Signs Last 24 Hrs  T(C): 36.4 (19 Jun 2019 03:27), Max: 36.9 (18 Jun 2019 21:56)  T(F): 97.5 (19 Jun 2019 03:27), Max: 98.4 (18 Jun 2019 21:56)  HR: 70 (19 Jun 2019 08:56) (67 - 85)  BP: 143/76 (19 Jun 2019 08:56) (143/76 - 184/106)  BP(mean): --  RR: 18 (19 Jun 2019 08:56) (16 - 18)  SpO2: 98% (19 Jun 2019 08:56) (98% - 99%)  CAPILLARY BLOOD GLUCOSE      POCT Blood Glucose.: 104 mg/dL (19 Jun 2019 08:09)      GENERAL: NAD, well-groomed, well-developed  HEAD:  atraumatic, normocephalic  EYES: sclera anicteric  ENMT: mucous membranes moist  NECK: supple, No JVD  CHEST/LUNG: clear to auscultation bilaterally; no rales, rhonchi, or wheezing b/l  HEART: normal S1, S2  ABDOMEN: BS+, soft, ND, NT   EXTREMITIES:  pulses palpable; no clubbing, cyanosis, or edema b/l LEs  NEURO: awake, anxious, interactive; moves all extremities  SKIN: no rashes or lesions

## 2019-06-19 NOTE — CHART NOTE - NSCHARTNOTEFT_GEN_A_CORE
Barium swallow esophagram reviewed. There is evidence of large hiatal hernia. No stricture or retained food bolus seen. Recommend soft diet today. Barium swallow esophagram reviewed. There is evidence of large hiatal hernia. No stricture or retained food bolus seen. Recommend soft diet today. No Gi contraindications to discharge home after diet since she would like to go EGD outpatient with Dr. Coto

## 2019-06-19 NOTE — H&P ADULT - HISTORY OF PRESENT ILLNESS
78y female pmh Rt hip fx sp orif, Diverticulitis, Hiatial hernia, Gerd, Osteoarthritis, . Pt stated feels like something is stuck in my throat approx 6pm. Pt was having dinner of chicken.  Has had similar symptoms in past that resolved over period apporx 20 min. Has now not resolved over 4h with nausea and vomiting stomach contents/bilous nonbloody. Occurred at dinner table.

## 2019-06-19 NOTE — CONSULT NOTE ADULT - ATTENDING COMMENTS
agree with above  pt well known to our office p/w dysphagia  optimized for EGD  cont IV BB  resume PO meds when pt can take PO

## 2019-06-19 NOTE — CONSULT NOTE ADULT - SUBJECTIVE AND OBJECTIVE BOX
CARDIOLOGY CONSULT - Dr. Calle     CHIEF COMPLAINT:    HPI:  78y female, known to the practice with pmhx a mentioned below, presenting with c.o dysphagia. She reports she was eating chicken for dinner and states she had projectile vomiting and felt sensation of food stuck in her chest. She also reports intermittent dizziness over the past several weeks with low bp readings at home since starting aldactone. She denies any recent cp, sob, orthopnea, or increase LE edema. Echo from 10/2018 revealed nl lv fx  with no significant valvular disease. Cardiac hx includes MAT , htn and hyperlipidemia. ROS otherwise negative.       PAST MEDICAL & SURGICAL HISTORY:  Hypothyroid  Macular degeneration  Osteoarthritis  Diverticulitis  GERD (gastroesophageal reflux disease)  Hypertension  Closed right hip fracture  H/O thyroidectomy  MAT         PREVIOUS DIAGNOSTIC TESTING:    [ ] Echocardiogram:  < from: Transthoracic Echocardiogram (10.17.18 @ 16:24) >  EF (Visual Estimate): 60-65 %  Doppler Peak Velocity (m/sec): AoV=1.3 TV=1.8  ------------------------------------------------------------------------  Observations:  Mitral Valve: Normal mitral valve. Minimal mitral  regurgitation.  Aortic Valve/Aorta: Normal trileaflet aortic valve. Peak  transaortic valve gradient equals 7 mm Hg. Mild aortic  regurgitation.  Peak left ventricular outflow tract  gradient equals 3 mm Hg.  Aortic Root: 3 cm.  Left Atrium: Normal left atrium.  LA volume index = 31  cc/m2.  Left Ventricle: Normal left ventricular systolic function.  No segmental wall motion abnormalities. Increased relative  wall thickness with normal left ventricular mass index,  consistent with concentric left ventricular remodeling.  Normal diastolic function  Right Heart: Normal right atrium. Normalright ventricular  size and function. Normal tricuspid valve. Minimal  tricuspid regurgitation. Pulmonic valve not well  visualized. Minimal pulmonic regurgitation.  Pericardium/Pleura: Normal pericardium with no pericardial  effusion.  Hemodynamic: Estimated right atrial pressure is 8 mm Hg.  Estimated right ventricular systolic pressure equals 21 mm  Hg, assuming right atrial pressure equals 8 mm Hg,  consistent with normal pulmonary pressures.  ------------------------------------------------------------------------  Conclusions:  1. Normal trileaflet aortic valve. Mild aortic  regurgitation.  2. Increased relative wall thickness with normal left  ventricular mass index, consistent with concentric left  ventricular remodeling.  3. Normal left ventricular systolic function. No segmental  wall motion abnormalities.  4. Normal right ventricular size and function.  5. Estimated right ventricular systolic pressure equals 21  mm Hg, assuming right atrial pressure equals 8 mm Hg,  consistent with normal pulmonary pressures.  *** No previous Echo exam.  ------------------------------------------------------------------------  Confirmed on  10/17/2018 - 19:31:48 by Wesley Bartholomew M.D.  ------------------------------------------------------------------------    < end of copied text >  	  [ ]  Catheterization:      [ ] Stress Test:  	    MEDICATIONS:  Home Medications:  amitriptyline 25 mg oral tablet: 1 tab(s) orally once a day (at bedtime) (18 Jun 2019 19:55)  buPROPion 100 mg/12 hours (SR) oral tablet, extended release: 1 tab(s) orally 2 times a day (18 Jun 2019 19:55)  hydrALAZINE 50 mg oral tablet: 1 tab(s) orally 4 times a day (19 Jun 2019 00:38)  lovastatin 40 mg oral tablet: 1 tab(s) orally once a day (18 Jun 2019 19:55)  Multiple Vitamins oral tablet: 1 tab(s) orally once a day (18 Jun 2019 19:55)  spironolactone 25 mg oral tablet: 1 tab(s) orally 2 times a day (19 Jun 2019 00:38)  Synthroid 75 mcg (0.075 mg) oral tablet: 1 tab(s) orally once a day (18 Jun 2019 19:55)  Wellbutrin  mg/12 hours oral tablet, extended release: 1 tab(s) orally 2 times a day (19 Jun 2019 00:38)  Xanax 2 mg oral tablet: 1 tab(s) orally 3 times a day (18 Jun 2019 19:55)      MEDICATIONS  (STANDING):  ALPRAZolam 1 milliGRAM(s) Oral daily  amitriptyline 25 milliGRAM(s) Oral at bedtime  buPROPion XL . 150 milliGRAM(s) Oral daily  carvedilol 25 milliGRAM(s) Oral every 12 hours  diltiazem    milliGRAM(s) Oral daily  heparin  Injectable 5000 Unit(s) SubCutaneous every 12 hours  hydrALAZINE 50 milliGRAM(s) Oral four times a day  levothyroxine 75 MICROGram(s) Oral daily  metoprolol tartrate Injectable 5 milliGRAM(s) IV Push every 6 hours  pantoprazole  Injectable 40 milliGRAM(s) IV Push daily  sodium chloride 0.9%. 1000 milliLiter(s) (30 mL/Hr) IV Continuous <Continuous>      FAMILY HISTORY:  No pertinent family history in first degree relatives      SOCIAL HISTORY:    [x ] Non-smoker  [ ] Smoker  [ ] Alcohol    Allergies    NSAIDs (Rash)    Intolerances    	    REVIEW OF SYSTEMS:  CONSTITUTIONAL: No fever, weight loss, or fatigue  EYES: No eye pain, visual disturbances, or discharge  ENMT:  No difficulty hearing, tinnitus, vertigo; No sinus or throat pain  NECK: No pain or stiffness  RESPIRATORY: No cough, wheezing, chills or hemoptysis; No Shortness of Breath  CARDIOVASCULAR: No chest pain, palpitations, passing out, dizziness, or leg swelling  GASTROINTESTINAL:  see HPI   GENITOURINARY: No dysuria, frequency, hematuria, or incontinence  NEUROLOGICAL: No headaches, memory loss, loss of strength, numbness, or tremors  SKIN: No itching, burning, rashes, or lesions   	    x ] All others negative	  [ ] Unable to obtain    PHYSICAL EXAM:  T(C): 36.4 (06-19-19 @ 03:27), Max: 36.9 (06-18-19 @ 21:56)  HR: 70 (06-19-19 @ 08:56) (67 - 85)  BP: 143/76 (06-19-19 @ 08:56) (143/76 - 184/106)  RR: 18 (06-19-19 @ 08:56) (16 - 18)  SpO2: 98% (06-19-19 @ 08:56) (98% - 99%)  Wt(kg): --  I&O's Summary      Appearance: Normal	  Psychiatry: A & O x 3, Mood & affect appropriate  HEENT:   Normal oral mucosa, PERRL, EOMI	  Lymphatic: No lymphadenopathy  Cardiovascular: Normal S1 S2,RRR, No JVD, No murmurs  Respiratory: Lungs clear to auscultation	  Gastrointestinal:  Soft, Non-tender, + BS	  Skin: No rashes, No ecchymoses, No cyanosis	  Neurologic: Non-focal  Extremities: Normal range of motion, No clubbing, cyanosis or edema  Vascular: Peripheral pulses palpable 2+ bilaterally    TELEMETRY: 	    ECG:  	  RADIOLOGY:  < from: CT Chest No Cont (06.18.19 @ 22:42) >    IMPRESSION:     Heterogeneous intraluminal distal esophageal density may reflect impacted   food bolus in the appropriate clinical setting.    Dr. Antony discussed these findings with Dr. Altamirano on 6/18/2019 10:48   PM with read back.          < end of copied text >    OTHER: 	  	  LABS:	 	    CARDIAC MARKERS:                                  13.4   8.0   )-----------( 304      ( 18 Jun 2019 23:07 )             37.2     06-19    126<L>  |  90<L>  |  13  ----------------------------<  102<H>  4.3   |  24  |  0.74    Ca    9.8      19 Jun 2019 07:17    TPro  7.5  /  Alb  4.2  /  TBili  0.3  /  DBili  x   /  AST  29  /  ALT  30  /  AlkPhos  86  06-18    PT/INR - ( 18 Jun 2019 23:07 )   PT: 12.1 sec;   INR: 1.05 ratio         PTT - ( 18 Jun 2019 23:07 )  PTT:32.9 sec  proBNP:   Lipid Profile:   HgA1c:   TSH:

## 2019-06-19 NOTE — H&P ADULT - NSICDXPASTMEDICALHX_GEN_ALL_CORE_FT
PAST MEDICAL HISTORY:  Diverticulitis     GERD (gastroesophageal reflux disease)     Hypertension     Hypothyroid     Macular degeneration     Osteoarthritis

## 2019-06-19 NOTE — ED ADULT NURSE REASSESSMENT NOTE - NS ED NURSE REASSESS COMMENT FT1
Patient informed about admission and updated on status. Patient had concerns about endoscopy plan as she has had adverse reactions to general anesthesia in the past and is extremely concerned that no one has taken that into account. Patient states "well how are they supposed to do the endoscopy without putting me out? I have never done that. Last time I had general anesthesia, my blood pressure was too high and they had to get special teams to evaluate me after. That is why my GI doctor does not want me to have endoscopies." Patient made aware that there are other forms of sedation that can be utilized for endoscopies and educated procedure my RN and SAGE Dumont. Patient and family verbalized understand. Pt placed in position of comfort. Pt educated on call bell system and provided call bell. Bed in lowest position, wheels locked, appropriate side rails raised. Pt denies needs at this time.

## 2019-06-19 NOTE — H&P ADULT - NSHPREVIEWOFSYSTEMS_GEN_ALL_CORE
REVIEW OF SYSTEMS:    CONSTITUTIONAL: No weakness, fevers or chills  EYES/ENT: No visual changes;  No vertigo or throat pain   NECK: No pain or stiffness  RESPIRATORY: No cough, wheezing, hemoptysis; No shortness of breath  CARDIOVASCULAR: No chest pain or palpitations  GASTROINTESTINAL: No abdominal or epigastric pain. Feels something stuck in esophagus. No nausea, vomiting, or hematemesis; No diarrhea or constipation. No melena or hematochezia.  GENITOURINARY: No dysuria, frequency or hematuria  NEUROLOGICAL: No numbness or weakness  SKIN: No itching, burning, rashes, or lesions   All other review of systems is negative unless indicated above.

## 2019-06-19 NOTE — CONSULT NOTE ADULT - ATTENDING COMMENTS
Patient seen and examined. Agree with above. Reviewed patient's previous admission from November 2018 for hip fracture - the surgery was uneventful but she had tachycardia likely due to pain/dehydration as per previous cardiology notes. She also reports issues with her blood pressure during previous anesthesia, but I do not find any information regarding this in the old charts.    She should have EGD to evaluate the current episode of dysphagia now, but she wants to avoid any anesthesia. Would start with x-ray esophagram - if there are findings, would obtain cardiology clearance and perform EGD. Patient seen and examined. Agree with above. Reviewed patient's previous admission from November 2018 for hip fracture - the surgery was uneventful but she had tachycardia likely due to pain/dehydration as per previous cardiology notes. She also reports issues with her blood pressure during previous anesthesia, but I do not find any information regarding this in the old charts.    Patient now feels bolus has passed, is tolerating her secretions without any sensation of dysphagia. She should have EGD to evaluate the current episode of dysphagia now, but she wants to avoid any anesthesia. Would start with x-ray esophagram - if there are findings, would obtain cardiology clearance and perform EGD.

## 2019-06-19 NOTE — ED ADULT NURSE REASSESSMENT NOTE - NS ED NURSE REASSESS COMMENT FT1
Patient requesting to take home BP medications and also requesting 1mg xanax to sleep, which is her home dose. SAGE Dumont, made aware. Pt placed in position of comfort. Pt educated on call bell system and provided call bell. Bed in lowest position, wheels locked, appropriate side rails raised. Pt denies needs at this time.

## 2019-06-19 NOTE — CONSULT NOTE ADULT - SUBJECTIVE AND OBJECTIVE BOX
[facility] [place] [facility] - SANGEETHA Wilson / SANGEETHA Damon / MICHAEL Carter/ SANGEETHA Morales/ SANGEETHA Morgan/ BRENDON Ray / VAUGHN Martinez / VINNY Yepez  -------------------------------------------------------------------------------------------------------  The patient seen and examined today.  HPI:  78y female pmh Rt hip fx sp orif, Diverticulitis, Hiatial hernia, Gerd, Osteoarthritis, . Pt stated feels like something is stuck in my throat approx 6pm. Pt was having dinner of chicken.  Has had similar symptoms in past that resolved over period apporx 20 min. Has now not resolved over 4h with nausea and vomiting stomach contents/bilous nonbloody. Occurred at dinner table. (19 Jun 2019 09:24)      PAST MEDICAL & SURGICAL HISTORY:  Hypothyroid  Macular degeneration  Osteoarthritis  Diverticulitis  GERD (gastroesophageal reflux disease)  Hypertension  Closed right hip fracture  H/O thyroidectomy    Allergies :- NSAIDs (Rash)    Home Medications Reviewed  Hospital Medications:   MEDICATIONS  (STANDING):  ALPRAZolam 1 milliGRAM(s) Oral daily  amitriptyline 25 milliGRAM(s) Oral at bedtime  buPROPion XL . 150 milliGRAM(s) Oral daily  carvedilol 25 milliGRAM(s) Oral every 12 hours  diltiazem    milliGRAM(s) Oral daily  heparin  Injectable 5000 Unit(s) SubCutaneous every 12 hours  hydrALAZINE 50 milliGRAM(s) Oral four times a day  levothyroxine 75 MICROGram(s) Oral daily  metoprolol tartrate Injectable 5 milliGRAM(s) IV Push every 6 hours  pantoprazole  Injectable 40 milliGRAM(s) IV Push daily  sodium chloride 0.9%. 1000 milliLiter(s) (30 mL/Hr) IV Continuous <Continuous>    SOCIAL HISTORY:  Denies ETOh,Smoking,   FAMILY HISTORY:  No pertinent family history in first degree relatives      REVIEW OF SYSTEMS:  CONSTITUTIONAL: No weakness, fevers or chills  EYES/ENT: No visual changes;  No vertigo or throat pain   NECK: No pain or stiffness  RESPIRATORY: No cough, wheezing, hemoptysis; No shortness of breath  CARDIOVASCULAR: No chest pain or palpitations.  GASTROINTESTINAL: sensation of foreign body in esophagus improved  GENITOURINARY: No dysuria, frequency, foamy urine, urinary urgency, incontinence or hematuria  NEUROLOGICAL: No numbness or weakness  SKIN: No itching, burning, rashes, or lesions   VASCULAR: No bilateral lower extremity edema.   All other review of systems is negative unless indicated above.    VITALS:  T(F): 97.5 (06-19-19 @ 03:27), Max: 98.4 (06-18-19 @ 21:56)  HR: 70 (06-19-19 @ 08:56)  BP: 143/76 (06-19-19 @ 08:56)  RR: 18 (06-19-19 @ 08:56)  SpO2: 98% (06-19-19 @ 08:56)  Wt(kg): --    Height (cm): 152.4 (06-19 @ 03:27)  Weight (kg): 61.3 (06-19 @ 03:27)  BMI (kg/m2): 26.4 (06-19 @ 03:27)  BSA (m2): 1.58 (06-19 @ 03:27)    PHYSICAL EXAM:  Constitutional: NAD  HEENT: anicteric sclera, oropharynx clear, MMM  Neck: supple.   Respiratory: Bilateral equal breath sounds , no wheezes, no crackles  Cardiovascular: S1, S2, Regular, Murmur present.  Gastrointestinal: Bowel Sound present, soft, NT/ND  Extremities: No cyanosis or clubbing. No peripheral edema  Neurological: Alert and oriented x 3, no focal deficits  Psychiatric: Normal mood, normal affect  : No CVA tenderness. No cano.   Skin: No rashes    Data:  06-19    126<L>  |  90<L>  |  13  ----------------------------<  102<H>  4.3   |  24  |  0.74    Ca    9.8      19 Jun 2019 07:17    TPro  7.5  /  Alb  4.2  /  TBili  0.3  /  DBili      /  AST  29  /  ALT  30  /  AlkPhos  86  06-18    Creatinine Trend: 0.74 <--, 0.79 <--                        13.4   8.0   )-----------( 304      ( 18 Jun 2019 23:07 )             37.2     Urine Studies:

## 2019-06-19 NOTE — H&P ADULT - NSHPSOURCEINFORD_GEN_ALL_CORE
I have reviewed the notes, assessments, and/or procedures performed. I concur with her/his documentation of Albert Hodge.     Luis Lorenzo, DO   Patient

## 2019-06-19 NOTE — DISCHARGE NOTE NURSING/CASE MANAGEMENT/SOCIAL WORK - NSDCDPATPORTLINK_GEN_ALL_CORE
You can access the [facility] Patient Portal, offered by [facility], by registering with the following website: [email or web address]

## 2019-06-19 NOTE — CONSULT NOTE ADULT - ASSESSMENT
echo 10/17/2018: EF 60-65%, nl lv fx, no significant valvular disease     a/p  78 year old female with  htn, hyperlipidemia, thyroid ca, MAT, Diverticulitis, Hiatial hernia, Gerd, Osteoarthritis, anxiety presenting with dysphagia     1. Htn   bp stable, Currently NPO   on metropolol 5 mg IVP noted  when no longer NPO, Stop IVP metropolol,  resume outpt meds, coreg 25mg BID, cardizem  daily, lisinopril 40mg daily and hydal 50 mg BID    2. MAT  cv stable no cp, sob or palps   resume low dose ASA  Coreg, Cardizem as ordered    3. Dysphagia  GI f/u , likely  2/2 food impaction   ct chest noted   NPO pending barium swallow  if egd planned pt can proceed with low cv risk     4. Hyponatremia  renal f/u   remains off Spironolactone, euvolemic on exam  work up per renal, on IVF     dvt ppx

## 2019-06-19 NOTE — H&P ADULT - ASSESSMENT
78 f with    Possible foreign body in esophagus  - NPO  - Gentle IVF  - Gastroenterology evaluation  - barium swallow     HTN control  - will need IV meds while NPO    Anxiety/ Depression  - control    A Fib  - rate control with IV BB while NPO  - cardiology evaluation dr. Calle    Hypothyroidism  - follow TSH    Hyponatremia  - Nephrology evaluation Dr. Maza  Further action as per clinical course   Paulie Moore MD pager [phone]

## 2019-06-19 NOTE — CONSULT NOTE ADULT - ASSESSMENT
78y female pmh Rt hip fx sp orif, Diverticulitis, Hiatial hernia, Gerd, Osteoarthritis, admitted for foreign body lodged in esophagus  Renal service following for hyponatremia

## 2019-06-20 ENCOUNTER — TRANSCRIPTION ENCOUNTER (OUTPATIENT)
Age: 79
End: 2019-06-20

## 2019-06-20 LAB
ANION GAP SERPL CALC-SCNC: 12 MMOL/L — SIGNIFICANT CHANGE UP (ref 5–17)
ANION GAP SERPL CALC-SCNC: 13 MMOL/L — SIGNIFICANT CHANGE UP (ref 5–17)
APPEARANCE UR: CLEAR — SIGNIFICANT CHANGE UP
BACTERIA # UR AUTO: NEGATIVE — SIGNIFICANT CHANGE UP
BILIRUB UR-MCNC: NEGATIVE — SIGNIFICANT CHANGE UP
BUN SERPL-MCNC: 15 MG/DL — SIGNIFICANT CHANGE UP (ref 7–23)
BUN SERPL-MCNC: 16 MG/DL — SIGNIFICANT CHANGE UP (ref 7–23)
CALCIUM SERPL-MCNC: 9 MG/DL — SIGNIFICANT CHANGE UP (ref 8.4–10.5)
CALCIUM SERPL-MCNC: 9 MG/DL — SIGNIFICANT CHANGE UP (ref 8.4–10.5)
CHLORIDE SERPL-SCNC: 93 MMOL/L — LOW (ref 96–108)
CHLORIDE SERPL-SCNC: 94 MMOL/L — LOW (ref 96–108)
CO2 SERPL-SCNC: 22 MMOL/L — SIGNIFICANT CHANGE UP (ref 22–31)
CO2 SERPL-SCNC: 23 MMOL/L — SIGNIFICANT CHANGE UP (ref 22–31)
COLOR SPEC: SIGNIFICANT CHANGE UP
CREAT SERPL-MCNC: 0.77 MG/DL — SIGNIFICANT CHANGE UP (ref 0.5–1.3)
CREAT SERPL-MCNC: 0.94 MG/DL — SIGNIFICANT CHANGE UP (ref 0.5–1.3)
DIFF PNL FLD: NEGATIVE — SIGNIFICANT CHANGE UP
EPI CELLS # UR: 4 /HPF — SIGNIFICANT CHANGE UP (ref 0–5)
GLUCOSE SERPL-MCNC: 132 MG/DL — HIGH (ref 70–99)
GLUCOSE SERPL-MCNC: 81 MG/DL — SIGNIFICANT CHANGE UP (ref 70–99)
GLUCOSE UR QL: NEGATIVE — SIGNIFICANT CHANGE UP
HCT VFR BLD CALC: 34.2 % — LOW (ref 34.5–45)
HGB BLD-MCNC: 11.6 G/DL — SIGNIFICANT CHANGE UP (ref 11.5–15.5)
HYALINE CASTS # UR AUTO: 2 /LPF — SIGNIFICANT CHANGE UP (ref 0–7)
KETONES UR-MCNC: NEGATIVE — SIGNIFICANT CHANGE UP
LEUKOCYTE ESTERASE UR-ACNC: ABNORMAL
MCHC RBC-ENTMCNC: 30.9 PG — SIGNIFICANT CHANGE UP (ref 27–34)
MCHC RBC-ENTMCNC: 33.9 GM/DL — SIGNIFICANT CHANGE UP (ref 32–36)
MCV RBC AUTO: 91 FL — SIGNIFICANT CHANGE UP (ref 80–100)
NITRITE UR-MCNC: NEGATIVE — SIGNIFICANT CHANGE UP
OSMOLALITY UR: 312 MOSM/KG — SIGNIFICANT CHANGE UP (ref 50–1200)
PH UR: 6.5 — SIGNIFICANT CHANGE UP (ref 5–8)
PLATELET # BLD AUTO: 270 K/UL — SIGNIFICANT CHANGE UP (ref 150–400)
POTASSIUM SERPL-MCNC: 4.2 MMOL/L — SIGNIFICANT CHANGE UP (ref 3.5–5.3)
POTASSIUM SERPL-MCNC: 4.4 MMOL/L — SIGNIFICANT CHANGE UP (ref 3.5–5.3)
POTASSIUM SERPL-SCNC: 4.2 MMOL/L — SIGNIFICANT CHANGE UP (ref 3.5–5.3)
POTASSIUM SERPL-SCNC: 4.4 MMOL/L — SIGNIFICANT CHANGE UP (ref 3.5–5.3)
PROT UR-MCNC: NEGATIVE — SIGNIFICANT CHANGE UP
RBC # BLD: 3.76 M/UL — LOW (ref 3.8–5.2)
RBC # FLD: 13.7 % — SIGNIFICANT CHANGE UP (ref 10.3–14.5)
RBC CASTS # UR COMP ASSIST: 1 /HPF — SIGNIFICANT CHANGE UP (ref 0–4)
SODIUM SERPL-SCNC: 128 MMOL/L — LOW (ref 135–145)
SODIUM SERPL-SCNC: 129 MMOL/L — LOW (ref 135–145)
SODIUM UR-SCNC: 61 MMOL/L — SIGNIFICANT CHANGE UP
SP GR SPEC: 1.01 — SIGNIFICANT CHANGE UP (ref 1.01–1.02)
TSH SERPL-MCNC: 5.34 UIU/ML — HIGH (ref 0.27–4.2)
UROBILINOGEN FLD QL: SIGNIFICANT CHANGE UP
WBC # BLD: 6.92 K/UL — SIGNIFICANT CHANGE UP (ref 3.8–10.5)
WBC # FLD AUTO: 6.92 K/UL — SIGNIFICANT CHANGE UP (ref 3.8–10.5)
WBC UR QL: 30 /HPF — HIGH (ref 0–5)

## 2019-06-20 RX ORDER — LEVOTHYROXINE SODIUM 125 MCG
88 TABLET ORAL DAILY
Refills: 0 | Status: DISCONTINUED | OUTPATIENT
Start: 2019-06-20 | End: 2019-06-21

## 2019-06-20 RX ORDER — ATORVASTATIN CALCIUM 80 MG/1
10 TABLET, FILM COATED ORAL AT BEDTIME
Refills: 0 | Status: DISCONTINUED | OUTPATIENT
Start: 2019-06-20 | End: 2019-06-21

## 2019-06-20 RX ORDER — SODIUM CHLORIDE 9 MG/ML
1000 INJECTION INTRAMUSCULAR; INTRAVENOUS; SUBCUTANEOUS
Refills: 0 | Status: DISCONTINUED | OUTPATIENT
Start: 2019-06-20 | End: 2019-06-21

## 2019-06-20 RX ORDER — LISINOPRIL 2.5 MG/1
40 TABLET ORAL DAILY
Refills: 0 | Status: DISCONTINUED | OUTPATIENT
Start: 2019-06-20 | End: 2019-06-21

## 2019-06-20 RX ADMIN — SODIUM CHLORIDE 60 MILLILITER(S): 9 INJECTION INTRAMUSCULAR; INTRAVENOUS; SUBCUTANEOUS at 14:00

## 2019-06-20 RX ADMIN — Medication 75 MICROGRAM(S): at 06:20

## 2019-06-20 RX ADMIN — HEPARIN SODIUM 5000 UNIT(S): 5000 INJECTION INTRAVENOUS; SUBCUTANEOUS at 17:57

## 2019-06-20 RX ADMIN — ATORVASTATIN CALCIUM 10 MILLIGRAM(S): 80 TABLET, FILM COATED ORAL at 21:49

## 2019-06-20 RX ADMIN — LISINOPRIL 40 MILLIGRAM(S): 2.5 TABLET ORAL at 20:31

## 2019-06-20 RX ADMIN — CARVEDILOL PHOSPHATE 25 MILLIGRAM(S): 80 CAPSULE, EXTENDED RELEASE ORAL at 09:40

## 2019-06-20 RX ADMIN — Medication 25 MILLIGRAM(S): at 09:41

## 2019-06-20 RX ADMIN — Medication 1 MILLIGRAM(S): at 23:59

## 2019-06-20 RX ADMIN — HEPARIN SODIUM 5000 UNIT(S): 5000 INJECTION INTRAVENOUS; SUBCUTANEOUS at 06:22

## 2019-06-20 RX ADMIN — Medication 120 MILLIGRAM(S): at 09:40

## 2019-06-20 RX ADMIN — Medication 25 MILLIGRAM(S): at 23:59

## 2019-06-20 RX ADMIN — CARVEDILOL PHOSPHATE 25 MILLIGRAM(S): 80 CAPSULE, EXTENDED RELEASE ORAL at 21:46

## 2019-06-20 RX ADMIN — PANTOPRAZOLE SODIUM 40 MILLIGRAM(S): 20 TABLET, DELAYED RELEASE ORAL at 09:41

## 2019-06-20 RX ADMIN — Medication 25 MILLIGRAM(S): at 23:01

## 2019-06-20 NOTE — PROGRESS NOTE ADULT - ASSESSMENT
78 f with    Possible foreign body in esophagus resolved  - Gastroenterology evaluation  - barium swallow with large hiatal hernia    HTN control    Anxiety/ Depression  - control    A Fib  - rate control   - cardiology evaluation dr. Calle noted     Hypothyroidism  - follow TSH, increase Synthroid to 88 mcg    Hyponatremia improving   - Nephrology follow    DC home with close follow with PMD in 2-3 days. QA    Paulie Moore MD pager [phone] 78 f with    Possible foreign body in esophagus resolved  - Gastroenterology evaluation  - barium swallow with large hiatal hernia    HTN control    Anxiety/ Depression  - control    A Fib  - rate control   - cardiology evaluation dr. Calle noted     Hypothyroidism  - follow TSH, increase Synthroid to 88 mcg    Hyponatremia improving   - Nephrology follow    DCP  home in am if stable with close follow with PMD in 2-3 days. QA    Paulie Moore MD pager [phone]

## 2019-06-20 NOTE — DISCHARGE NOTE PROVIDER - HOSPITAL COURSE
78 year old female with  htn, hyperlipidemia, thyroid ca, MAT, Diverticulitis, Hiatial hernia, Gerd, Osteoarthritis, anxiety presenting with dysphagia         1. Htn     bp stable,     continue continue with coreg 25mg BID, Cardizem  daily, and Hydralazine 25mg BID    resume outpt med lisinopril 40mg daily        2. MAT- cardiology consulted    cv stable no cp, sob or palps     resume low dose ASA    Coreg, Cardizem as ordered        3. Dysphagia- GI eval    ct chest noted     s/p barium swallow per GI evidence of large hiatal hernia. No stricture or retained food bolus seen. Diet advanced and tolerated        4. Hyponatremia    renal f/u     remains off Spironolactone, euvolemic on exam    work up per renal, on IVF , NA improving         dvt ppx      dc planning per primary team 78 year old female with  htn, hyperlipidemia, thyroid ca, MAT, Diverticulitis, Hiatial hernia, Gerd, Osteoarthritis, anxiety presenting with dysphagia         1. Htn     bp stable,     continue continue with coreg 25mg BID, Cardizem  daily, and Hydralazine 25mg BID    resume outpt med lisinopril 40mg daily        2. MAT- cardiology consulted    cv stable no cp, sob or palps     resume low dose ASA    Coreg, Cardizem as ordered        3. Dysphagia- GI eval    ct chest noted     s/p barium swallow per GI evidence of large hiatal hernia. No stricture or retained food bolus seen. Diet advanced and tolerated        4. Hyponatremia    renal f/u     remains off Spironolactone, euvolemic on exam    work up per renal, on IVF , NA improving now sodium 131    I Liter fluid restriction explained to patient     Discharge home

## 2019-06-20 NOTE — DISCHARGE NOTE PROVIDER - CARE PROVIDER_API CALL
Dr bergeron, PCP, jono  Phone: (   )    -  Fax: (   )    -  Follow Up Time: Dr bergeron, PCP, jono  Phone: (   )    -  Fax: (   )    -  Follow Up Time:     Harley Yepez)  Internal Medicine; Nephrology  [address] [place] [address]  Phone: [phone]  Fax: [phone]  Follow Up Time: 1 week

## 2019-06-20 NOTE — DISCHARGE NOTE PROVIDER - NSDCCPCAREPLAN_GEN_ALL_CORE_FT
PRINCIPAL DISCHARGE DIAGNOSIS  Diagnosis: Foreign body in esophagus, initial encounter  Assessment and Plan of Treatment: Test showed no food. Chew softs foods slowly, 32 times as explained. Sit upright for meals, no talking. Follow up with your gastroenterologist  returnt o hospital if worsens, difficulty swalling, breathing.      SECONDARY DISCHARGE DIAGNOSES  Diagnosis: Hyponatremia  Assessment and Plan of Treatment: Your sodium today was 131. Follow up with renal in 1 to 2 weeks. and repeat BMP, labs at that time. See your PCP Dr Bear in 1 week. Return to hospital for dixxiness, feels worse. PRINCIPAL DISCHARGE DIAGNOSIS  Diagnosis: Foreign body in esophagus, initial encounter  Assessment and Plan of Treatment: Test showed no food. Chew softs foods slowly, 32 times as explained. Sit upright for meals, no talking. Follow up with your gastroenterologist  returnt o hospital if worsens, difficulty swalling, breathing.      SECONDARY DISCHARGE DIAGNOSES  Diagnosis: Hyponatremia  Assessment and Plan of Treatment: Your sodium today was 131. Follow up with renal in 1 to 2 weeks. and repeat BMP, labs at that time. See your PCP Dr Bear in 1 week. Return to hospital for dixxiness, feels worse. Continue to hold Spironolactone and follow up with Dr Bear next week.    Diagnosis: Hypothyroid  Assessment and Plan of Treatment: TSH elevated 14. Synthroid was increased to 88 mcg. Follow up Thyroid labs in 6 weeks and discuss with Dr Bear next week    Diagnosis: HTN, age 0-18  Assessment and Plan of Treatment: Medications adjusted due to lowered blood pressure. Follow up with PCP in 1 week. continue other medications for rate control. Follow up with your cardiologist .

## 2019-06-20 NOTE — DISCHARGE NOTE PROVIDER - PROVIDER TOKENS
FREE:[LAST:[Dr bergeron, PCP],FIRST:[jono],PHONE:[(   )    -],FAX:[(   )    -]] FREE:[LAST:[Dr bergeron, PCP],FIRST:[jono],PHONE:[(   )    -],FAX:[(   )    -]],PROVIDER:[TOKEN:[[id number]],FOLLOWUP:[1 week]]

## 2019-06-20 NOTE — PROGRESS NOTE ADULT - ASSESSMENT
echo 10/17/2018: EF 60-65%, nl lv fx, no significant valvular disease     a/p  78 year old female with  htn, hyperlipidemia, thyroid ca, MAT, Diverticulitis, Hiatial hernia, Gerd, Osteoarthritis, anxiety presenting with dysphagia     1. Htn   bp stable,   continue continue with  coreg 25mg BID, cardizem  daily, and hydal 25mg BID  resume outpt med lisinopril 40mg daily    2. MAT  cv stable no cp, sob or palps   resume low dose ASA  Coreg, Cardizem as ordered    3. Dysphagia  ct chest noted   s/p barium swallow per GI evidence of large hiatal hernia. No stricture or retained food bolus seen. Diet advanced     4. Hyponatremia  renal f/u   remains off Spironolactone, euvolemic on exam  work up per renal, on IVF , NA improving     dvt ppx    dc planning per primary team echo 10/17/2018: EF 60-65%, nl lv fx, no significant valvular disease     a/p  78 year old female with  htn, hyperlipidemia, thyroid ca, MAT, Diverticulitis, Hiatial hernia, Gerd, Osteoarthritis, anxiety presenting with dysphagia     1. Htn   bp stable,   continue continue with coreg 25mg BID, cardizem  daily, and hydal 25mg BID  resume outpt med lisinopril 40mg daily    2. MAT  cv stable no cp, sob or palps   resume low dose ASA  Coreg, Cardizem as ordered    3. Dysphagia  ct chest noted   s/p barium swallow per GI evidence of large hiatal hernia. No stricture or retained food bolus seen. Diet advanced     4. Hyponatremia  renal f/u   remains off Spironolactone, euvolemic on exam  work up per renal, on IVF , NA improving     dvt ppx    dc planning per primary team

## 2019-06-21 VITALS
RESPIRATION RATE: 18 BRPM | TEMPERATURE: 98 F | DIASTOLIC BLOOD PRESSURE: 67 MMHG | SYSTOLIC BLOOD PRESSURE: 99 MMHG | HEART RATE: 66 BPM | OXYGEN SATURATION: 97 %

## 2019-06-21 LAB
ANION GAP SERPL CALC-SCNC: 13 MMOL/L — SIGNIFICANT CHANGE UP (ref 5–17)
BUN SERPL-MCNC: 16 MG/DL — SIGNIFICANT CHANGE UP (ref 7–23)
CALCIUM SERPL-MCNC: 8.8 MG/DL — SIGNIFICANT CHANGE UP (ref 8.4–10.5)
CHLORIDE SERPL-SCNC: 94 MMOL/L — LOW (ref 96–108)
CO2 SERPL-SCNC: 24 MMOL/L — SIGNIFICANT CHANGE UP (ref 22–31)
CREAT SERPL-MCNC: 1.01 MG/DL — SIGNIFICANT CHANGE UP (ref 0.5–1.3)
GLUCOSE SERPL-MCNC: 109 MG/DL — HIGH (ref 70–99)
HCT VFR BLD CALC: 33.4 % — LOW (ref 34.5–45)
HGB BLD-MCNC: 10.9 G/DL — LOW (ref 11.5–15.5)
MCHC RBC-ENTMCNC: 30.5 PG — SIGNIFICANT CHANGE UP (ref 27–34)
MCHC RBC-ENTMCNC: 32.6 GM/DL — SIGNIFICANT CHANGE UP (ref 32–36)
MCV RBC AUTO: 93.6 FL — SIGNIFICANT CHANGE UP (ref 80–100)
PLATELET # BLD AUTO: 235 K/UL — SIGNIFICANT CHANGE UP (ref 150–400)
POTASSIUM SERPL-MCNC: 4.8 MMOL/L — SIGNIFICANT CHANGE UP (ref 3.5–5.3)
POTASSIUM SERPL-SCNC: 4.8 MMOL/L — SIGNIFICANT CHANGE UP (ref 3.5–5.3)
RBC # BLD: 3.57 M/UL — LOW (ref 3.8–5.2)
RBC # FLD: 13.9 % — SIGNIFICANT CHANGE UP (ref 10.3–14.5)
SODIUM SERPL-SCNC: 131 MMOL/L — LOW (ref 135–145)
WBC # BLD: 6.95 K/UL — SIGNIFICANT CHANGE UP (ref 3.8–10.5)
WBC # FLD AUTO: 6.95 K/UL — SIGNIFICANT CHANGE UP (ref 3.8–10.5)

## 2019-06-21 PROCEDURE — 80053 COMPREHEN METABOLIC PANEL: CPT

## 2019-06-21 PROCEDURE — 84300 ASSAY OF URINE SODIUM: CPT

## 2019-06-21 PROCEDURE — 81001 URINALYSIS AUTO W/SCOPE: CPT

## 2019-06-21 PROCEDURE — 83935 ASSAY OF URINE OSMOLALITY: CPT

## 2019-06-21 PROCEDURE — 85027 COMPLETE CBC AUTOMATED: CPT

## 2019-06-21 PROCEDURE — 74220 X-RAY XM ESOPHAGUS 1CNTRST: CPT

## 2019-06-21 PROCEDURE — 80048 BASIC METABOLIC PNL TOTAL CA: CPT

## 2019-06-21 PROCEDURE — 85610 PROTHROMBIN TIME: CPT

## 2019-06-21 PROCEDURE — 96374 THER/PROPH/DIAG INJ IV PUSH: CPT

## 2019-06-21 PROCEDURE — 84443 ASSAY THYROID STIM HORMONE: CPT

## 2019-06-21 PROCEDURE — 83930 ASSAY OF BLOOD OSMOLALITY: CPT

## 2019-06-21 PROCEDURE — 85730 THROMBOPLASTIN TIME PARTIAL: CPT

## 2019-06-21 PROCEDURE — 71250 CT THORAX DX C-: CPT

## 2019-06-21 PROCEDURE — 82962 GLUCOSE BLOOD TEST: CPT

## 2019-06-21 PROCEDURE — 99285 EMERGENCY DEPT VISIT HI MDM: CPT | Mod: 25

## 2019-06-21 RX ORDER — HYDRALAZINE HCL 50 MG
1 TABLET ORAL
Qty: 60 | Refills: 0
Start: 2019-06-21 | End: 2019-07-20

## 2019-06-21 RX ORDER — SPIRONOLACTONE 25 MG/1
1 TABLET, FILM COATED ORAL
Qty: 0 | Refills: 0 | DISCHARGE

## 2019-06-21 RX ORDER — HYDRALAZINE HCL 50 MG
1 TABLET ORAL
Qty: 0 | Refills: 0 | DISCHARGE

## 2019-06-21 RX ORDER — BUPROPION HYDROCHLORIDE 150 MG/1
1 TABLET, EXTENDED RELEASE ORAL
Qty: 0 | Refills: 0 | DISCHARGE

## 2019-06-21 RX ORDER — LEVOTHYROXINE SODIUM 125 MCG
1 TABLET ORAL
Qty: 30 | Refills: 0
Start: 2019-06-21 | End: 2019-07-20

## 2019-06-21 RX ORDER — LEVOTHYROXINE SODIUM 125 MCG
1 TABLET ORAL
Qty: 0 | Refills: 0 | DISCHARGE

## 2019-06-21 RX ADMIN — Medication 120 MILLIGRAM(S): at 07:50

## 2019-06-21 RX ADMIN — SODIUM CHLORIDE 60 MILLILITER(S): 9 INJECTION INTRAMUSCULAR; INTRAVENOUS; SUBCUTANEOUS at 11:52

## 2019-06-21 RX ADMIN — HEPARIN SODIUM 5000 UNIT(S): 5000 INJECTION INTRAVENOUS; SUBCUTANEOUS at 06:34

## 2019-06-21 RX ADMIN — PANTOPRAZOLE SODIUM 40 MILLIGRAM(S): 20 TABLET, DELAYED RELEASE ORAL at 08:41

## 2019-06-21 RX ADMIN — BUPROPION HYDROCHLORIDE 150 MILLIGRAM(S): 150 TABLET, EXTENDED RELEASE ORAL at 12:07

## 2019-06-21 RX ADMIN — Medication 88 MICROGRAM(S): at 06:32

## 2019-06-21 RX ADMIN — CARVEDILOL PHOSPHATE 25 MILLIGRAM(S): 80 CAPSULE, EXTENDED RELEASE ORAL at 09:27

## 2019-06-21 NOTE — PROGRESS NOTE ADULT - SUBJECTIVE AND OBJECTIVE BOX
CARDIOLOGY FOLLOW UP - Dr. Calle    CC no cp or sob       PHYSICAL EXAM:  T(C): 36.4 (06-20-19 @ 05:22), Max: 36.6 (06-19-19 @ 15:29)  HR: 80 (06-20-19 @ 09:27) (69 - 80)  BP: 135/80 (06-20-19 @ 09:27) (101/67 - 140/84)  RR: 18 (06-20-19 @ 05:22) (18 - 18)  SpO2: 98% (06-20-19 @ 05:22) (98% - 100%)  Wt(kg): --  I&O's Summary    19 Jun 2019 07:01  -  20 Jun 2019 07:00  --------------------------------------------------------  IN: 240 mL / OUT: 0 mL / NET: 240 mL        Appearance: Normal	  Cardiovascular: Normal S1 S2,RRR, No JVD, No murmurs  Respiratory: Lungs clear to auscultation	  Gastrointestinal:  Soft, Non-tender, + BS	  Extremities: Normal range of motion, No clubbing, cyanosis or edema        MEDICATIONS  (STANDING):  ALPRAZolam 1 milliGRAM(s) Oral daily  amitriptyline 25 milliGRAM(s) Oral at bedtime  buPROPion XL . 150 milliGRAM(s) Oral daily  carvedilol 25 milliGRAM(s) Oral every 12 hours  diltiazem    milliGRAM(s) Oral daily  heparin  Injectable 5000 Unit(s) SubCutaneous every 12 hours  hydrALAZINE 25 milliGRAM(s) Oral two times a day  levothyroxine 75 MICROGram(s) Oral daily  pantoprazole    Tablet 40 milliGRAM(s) Oral before breakfast  sodium chloride 0.9%. 1000 milliLiter(s) (30 mL/Hr) IV Continuous <Continuous>      TELEMETRY: 	    ECG:  	  RADIOLOGY:   DIAGNOSTIC TESTING:  [ ] Echocardiogram:  [ ]  Catheterization:  [ ] Stress Test:    OTHER: 	    LABS:	 	                            11.6   6.92  )-----------( 270      ( 20 Jun 2019 09:25 )             34.2     06-20    129<L>  |  94<L>  |  16  ----------------------------<  81  4.2   |  23  |  0.94    Ca    9.0      20 Jun 2019 06:54    TPro  7.5  /  Alb  4.2  /  TBili  0.3  /  DBili  x   /  AST  29  /  ALT  30  /  AlkPhos  86  06-18    PT/INR - ( 18 Jun 2019 23:07 )   PT: 12.1 sec;   INR: 1.05 ratio         PTT - ( 18 Jun 2019 23:07 )  PTT:32.9 sec
[facility] [place] [facility] - SANGEETHA Wilson / SANGEETHA Damon / MICHAEL Carter/ SANGEETHA Morales/ SANGEETHA Morgan/ BRENDON Ray / VAUGHN Martinez / VINNY Yepez  ---------------------------------------------------------------------------------------------------------------  seen and examined today for hyponatremia  Interval : esophageal obstruction relieved  VITALS:  T(F): 97.9 (06-20-19 @ 12:11), Max: 97.9 (06-19-19 @ 15:29)  HR: 68 (06-20-19 @ 12:11)  BP: 142/75 (06-20-19 @ 12:11)  RR: 18 (06-20-19 @ 12:11)  SpO2: 97% (06-20-19 @ 12:11)  Wt(kg): --    06-19 @ 07:01  -  06-20 @ 07:00  --------------------------------------------------------  IN: 240 mL / OUT: 0 mL / NET: 240 mL    06-20 @ 07:01  -  06-20 @ 13:20  --------------------------------------------------------  IN: 240 mL / OUT: 0 mL / NET: 240 mL      Physical Exam :-  Constitutional: NAD  Neck: Supple.  Respiratory: Bilateral equal breath sounds,  Cardiovascular: S1, S2 normal,  Gastrointestinal: Bowel Sounds present, soft, non tender.  Extremities: No edema  Neurological: Alert and Oriented x 3, no focal deficits  Psychiatric: Normal mood, normal affect  Data:-  Allergies :   NSAIDs (Rash)    Hospital Medications:   MEDICATIONS  (STANDING):  ALPRAZolam 1 milliGRAM(s) Oral daily  amitriptyline 25 milliGRAM(s) Oral at bedtime  buPROPion XL . 150 milliGRAM(s) Oral daily  carvedilol 25 milliGRAM(s) Oral every 12 hours  diltiazem    milliGRAM(s) Oral daily  heparin  Injectable 5000 Unit(s) SubCutaneous every 12 hours  hydrALAZINE 25 milliGRAM(s) Oral two times a day  levothyroxine 88 MICROGram(s) Oral daily  pantoprazole    Tablet 40 milliGRAM(s) Oral before breakfast  sodium chloride 0.9%. 1000 milliLiter(s) (60 mL/Hr) IV Continuous <Continuous>    06-20    129<L>  |  94<L>  |  16  ----------------------------<  81  4.2   |  23  |  0.94    Ca    9.0      20 Jun 2019 06:54    TPro  7.5  /  Alb  4.2  /  TBili  0.3  /  DBili      /  AST  29  /  ALT  30  /  AlkPhos  86  06-18    Creatinine Trend: 0.94 <--, 0.80 <--, 0.74 <--, 0.79 <--                        11.6   6.92  )-----------( 270      ( 20 Jun 2019 09:25 )             34.2
CARDIOLOGY FOLLOW UP - Dr. Calle    CC no cp or sob        PHYSICAL EXAM:  T(C): 36.4 (06-21-19 @ 05:01), Max: 36.6 (06-20-19 @ 12:11)  HR: 77 (06-21-19 @ 10:50) (66 - 82)  BP: 109/64 (06-21-19 @ 10:50) (99/58 - 142/75)  RR: 17 (06-21-19 @ 05:01) (17 - 18)  SpO2: 99% (06-21-19 @ 05:01) (97% - 99%)  Wt(kg): --  I&O's Summary    20 Jun 2019 07:01  -  21 Jun 2019 07:00  --------------------------------------------------------  IN: 990 mL / OUT: 750 mL / NET: 240 mL        Appearance: Normal	  Cardiovascular: Normal S1 S2,RRR, No JVD, No murmurs  Respiratory: Lungs clear to auscultation	  Gastrointestinal:  Soft, Non-tender, + BS	  Extremities: Normal range of motion, No clubbing, cyanosis or edema        MEDICATIONS  (STANDING):  ALPRAZolam 1 milliGRAM(s) Oral daily  amitriptyline 25 milliGRAM(s) Oral at bedtime  atorvastatin 10 milliGRAM(s) Oral at bedtime  buPROPion XL . 150 milliGRAM(s) Oral daily  carvedilol 25 milliGRAM(s) Oral every 12 hours  diltiazem    milliGRAM(s) Oral daily  heparin  Injectable 5000 Unit(s) SubCutaneous every 12 hours  hydrALAZINE 25 milliGRAM(s) Oral two times a day  levothyroxine 88 MICROGram(s) Oral daily  lisinopril 40 milliGRAM(s) Oral daily  pantoprazole    Tablet 40 milliGRAM(s) Oral before breakfast  sodium chloride 0.9%. 1000 milliLiter(s) (60 mL/Hr) IV Continuous <Continuous>      TELEMETRY: 	    ECG:  	  RADIOLOGY:   DIAGNOSTIC TESTING:  [ ] Echocardiogram:  [ ]  Catheterization:  [ ] Stress Test:    OTHER: 	    LABS:	 	                            10.9   6.95  )-----------( 235      ( 21 Jun 2019 09:22 )             33.4     06-21    131<L>  |  94<L>  |  16  ----------------------------<  109<H>  4.8   |  24  |  1.01    Ca    8.8      21 Jun 2019 07:02
Patient is a 78y old  Female who presents with a chief complaint of possible esophageal obstruction (20 Jun 2019 11:06)      SUBJECTIVE / OVERNIGHT EVENTS: feels better  Review of Systems  chest pain no  palpitations no  sob no  nausea no  headache no    MEDICATIONS  (STANDING):  ALPRAZolam 1 milliGRAM(s) Oral daily  amitriptyline 25 milliGRAM(s) Oral at bedtime  buPROPion XL . 150 milliGRAM(s) Oral daily  carvedilol 25 milliGRAM(s) Oral every 12 hours  diltiazem    milliGRAM(s) Oral daily  heparin  Injectable 5000 Unit(s) SubCutaneous every 12 hours  hydrALAZINE 25 milliGRAM(s) Oral two times a day  levothyroxine 88 MICROGram(s) Oral daily  pantoprazole    Tablet 40 milliGRAM(s) Oral before breakfast  sodium chloride 0.9%. 1000 milliLiter(s) (30 mL/Hr) IV Continuous <Continuous>    MEDICATIONS  (PRN):      Vital Signs Last 24 Hrs  T(C): 36.6 (20 Jun 2019 12:11), Max: 36.6 (19 Jun 2019 15:29)  T(F): 97.9 (20 Jun 2019 12:11), Max: 97.9 (19 Jun 2019 15:29)  HR: 68 (20 Jun 2019 12:11) (68 - 80)  BP: 142/75 (20 Jun 2019 12:11) (101/67 - 142/75)  BP(mean): --  RR: 18 (20 Jun 2019 12:11) (18 - 18)  SpO2: 97% (20 Jun 2019 12:11) (97% - 100%)    PHYSICAL EXAM:  GENERAL: NAD, well-developed  HEAD:  Atraumatic, Normocephalic  EYES: EOMI, PERRLA, conjunctiva and sclera clear  NECK: Supple, No JVD  CHEST/LUNG: Clear to auscultation bilaterally; No wheeze  HEART: Regular rate and rhythm; No murmurs, rubs, or gallops  ABDOMEN: Soft, Nontender, Nondistended; Bowel sounds present  EXTREMITIES:  2+ Peripheral Pulses, No clubbing, cyanosis, or edema  PSYCH: Anxious  NEUROLOGY: non-focal  SKIN: No rashes or lesions    LABS:                        11.6   6.92  )-----------( 270      ( 20 Jun 2019 09:25 )             34.2     06-20    129<L>  |  94<L>  |  16  ----------------------------<  81  4.2   |  23  |  0.94    Ca    9.0      20 Jun 2019 06:54    TPro  7.5  /  Alb  4.2  /  TBili  0.3  /  DBili  x   /  AST  29  /  ALT  30  /  AlkPhos  86  06-18    PT/INR - ( 18 Jun 2019 23:07 )   PT: 12.1 sec;   INR: 1.05 ratio         PTT - ( 18 Jun 2019 23:07 )  PTT:32.9 sec            RADIOLOGY & ADDITIONAL TESTS:    Imaging Personally Reviewed:    Consultant(s) Notes Reviewed:      Care Discussed with Consultants/Other Providers:
Patient is a 78y old  Female who presents with a chief complaint of possible esophageal obstruction (2019 11:43)      SUBJECTIVE / OVERNIGHT EVENTS: Comfortable without new complaints.   Review of Systems  chest pain no  palpitations no  sob no  nausea no  headache no    MEDICATIONS  (STANDING):  ALPRAZolam 1 milliGRAM(s) Oral daily  amitriptyline 25 milliGRAM(s) Oral at bedtime  atorvastatin 10 milliGRAM(s) Oral at bedtime  buPROPion XL . 150 milliGRAM(s) Oral daily  carvedilol 25 milliGRAM(s) Oral every 12 hours  diltiazem    milliGRAM(s) Oral daily  heparin  Injectable 5000 Unit(s) SubCutaneous every 12 hours  hydrALAZINE 25 milliGRAM(s) Oral two times a day  levothyroxine 88 MICROGram(s) Oral daily  lisinopril 40 milliGRAM(s) Oral daily  pantoprazole    Tablet 40 milliGRAM(s) Oral before breakfast  sodium chloride 0.9%. 1000 milliLiter(s) (60 mL/Hr) IV Continuous <Continuous>    MEDICATIONS  (PRN):      Vital Signs Last 24 Hrs  T(C): 36.7 (2019 12:57), Max: 36.7 (2019 12:57)  T(F): 98 (2019 12:57), Max: 98 (2019 12:57)  HR: 66 (2019 12:57) (66 - 82)  BP: 99/67 (2019 12:57) (99/58 - 137/85)  BP(mean): --  RR: 18 (2019 12:57) (17 - 18)  SpO2: 97% (2019 12:57) (97% - 99%)    PHYSICAL EXAM:  GENERAL: NAD, well-developed  HEAD:  Atraumatic, Normocephalic  EYES: EOMI, PERRLA, conjunctiva and sclera clear  NECK: Supple, No JVD  CHEST/LUNG: Clear to auscultation bilaterally; No wheeze  HEART: Regular rate and rhythm; No murmurs, rubs, or gallops  ABDOMEN: Soft, Nontender, Nondistended; Bowel sounds present  EXTREMITIES:  2+ Peripheral Pulses, No clubbing, cyanosis, or edema  PSYCH: AAOx3  NEUROLOGY: non-focal  SKIN: No rashes or lesions    LABS:                        10.9   6.95  )-----------( 235      ( 2019 09:22 )             33.4     06-21    131<L>  |  94<L>  |  16  ----------------------------<  109<H>  4.8   |  24  |  1.01    Ca    8.8      2019 07:02            Urinalysis Basic - ( 2019 10:43 )    Color: Light Yellow / Appearance: Clear / S.011 / pH: x  Gluc: x / Ketone: Negative  / Bili: Negative / Urobili: <2 mg/dL   Blood: x / Protein: Negative / Nitrite: Negative   Leuk Esterase: Large / RBC: 1 /HPF / WBC 30 /HPF   Sq Epi: x / Non Sq Epi: 4 /HPF / Bacteria: Negative          RADIOLOGY & ADDITIONAL TESTS:    Imaging Personally Reviewed:    Consultant(s) Notes Reviewed:      Care Discussed with Consultants/Other Providers:
[facility] [place] - SANGEETHA Wilson / SANGEETHA Damon / MICHAEL Carter/ SANGEETHA Morales/ SANGEETHA Morgan/ BRENDON Ray / VAUGHN Martinez / VINNY Yepez  ---------------------------------------------------------------------------------------------------------------  seen and examined today for hyponatremia  Interval : NAD, sodium improved  VITALS:  T(F): 97.6 (06-21-19 @ 05:01), Max: 97.9 (06-20-19 @ 12:11)  HR: 77 (06-21-19 @ 10:50)  BP: 109/64 (06-21-19 @ 10:50)  RR: 17 (06-21-19 @ 05:01)  SpO2: 99% (06-21-19 @ 05:01)  Wt(kg): --    06-20 @ 07:01  -  06-21 @ 07:00  --------------------------------------------------------  IN: 990 mL / OUT: 750 mL / NET: 240 mL      Physical Exam :-  Constitutional: NAD  Neck: Supple.  Respiratory: Bilateral equal breath sounds,  Cardiovascular: S1, S2 normal,  Gastrointestinal: Bowel Sounds present, soft, non tender.  Extremities: No edema  Neurological: Alert and Oriented x 3, no focal deficits  Psychiatric: Normal mood, normal affect  Data:-  Allergies :   NSAIDs (Rash)    Hospital Medications:   MEDICATIONS  (STANDING):  ALPRAZolam 1 milliGRAM(s) Oral daily  amitriptyline 25 milliGRAM(s) Oral at bedtime  atorvastatin 10 milliGRAM(s) Oral at bedtime  buPROPion XL . 150 milliGRAM(s) Oral daily  carvedilol 25 milliGRAM(s) Oral every 12 hours  diltiazem    milliGRAM(s) Oral daily  heparin  Injectable 5000 Unit(s) SubCutaneous every 12 hours  hydrALAZINE 25 milliGRAM(s) Oral two times a day  levothyroxine 88 MICROGram(s) Oral daily  lisinopril 40 milliGRAM(s) Oral daily  pantoprazole    Tablet 40 milliGRAM(s) Oral before breakfast  sodium chloride 0.9%. 1000 milliLiter(s) (60 mL/Hr) IV Continuous <Continuous>    06-21    131<L>  |  94<L>  |  16  ----------------------------<  109<H>  4.8   |  24  |  1.01    Ca    8.8      21 Jun 2019 07:02      Creatinine Trend: 1.01 <--, 0.77 <--, 0.94 <--, 0.80 <--, 0.74 <--, 0.79 <--                        10.9   6.95  )-----------( 235      ( 21 Jun 2019 09:22 )             33.4

## 2019-06-21 NOTE — PROGRESS NOTE ADULT - PROBLEM SELECTOR PLAN 1
Improved  Increase NS to 60cc/hr  continue to hold Spironolactone  Agreed with increasing Levothyroxine dose  Pending urine osmolarity  continue Po free fluid restriction 1L a day  Repeat BMP in evening  avoid sodium over correction by >0.5mEq per hour
Improved, stable for DC home  continue to hold Spironolactone upon DC  Agreed with increasing Levothyroxine dose  Pending urine osmolarity  continue Po free fluid restriction 1L a day upon DC  Regular sodium diet  To follow at my office in 1-2 weeks to trend BMP

## 2019-06-21 NOTE — PROGRESS NOTE ADULT - ASSESSMENT
78 f with    Possible foreign body in esophagus resolved  - Gastroenterology follow  - barium swallow with large hiatal hernia    HTN control    Anxiety/ Depression  - control    A Fib  - rate control   - cardiology evaluation dr. Calle noted     Hypothyroidism  - follow TSH, increase Synthroid to 88 mcg    Hyponatremia improving   - Nephrology follow    DC  home. Follow with PMD in 3-4 days.    Paulie Moore MD pager [phone]

## 2019-06-21 NOTE — PROGRESS NOTE ADULT - REASON FOR ADMISSION
possible esophageal obstruction

## 2019-07-15 PROBLEM — E03.9 HYPOTHYROIDISM, UNSPECIFIED: Chronic | Status: ACTIVE | Noted: 2019-06-19

## 2019-07-25 ENCOUNTER — APPOINTMENT (OUTPATIENT)
Dept: GASTROENTEROLOGY | Facility: CLINIC | Age: 79
End: 2019-07-25

## 2019-08-16 ENCOUNTER — APPOINTMENT (OUTPATIENT)
Dept: MAMMOGRAPHY | Facility: IMAGING CENTER | Age: 79
End: 2019-08-16
Payer: MEDICARE

## 2019-08-16 ENCOUNTER — OUTPATIENT (OUTPATIENT)
Dept: OUTPATIENT SERVICES | Facility: HOSPITAL | Age: 79
LOS: 1 days | End: 2019-08-16
Payer: MEDICARE

## 2019-08-16 ENCOUNTER — APPOINTMENT (OUTPATIENT)
Dept: ULTRASOUND IMAGING | Facility: IMAGING CENTER | Age: 79
End: 2019-08-16
Payer: MEDICARE

## 2019-08-16 DIAGNOSIS — S72.001A FRACTURE OF UNSPECIFIED PART OF NECK OF RIGHT FEMUR, INITIAL ENCOUNTER FOR CLOSED FRACTURE: Chronic | ICD-10-CM

## 2019-08-16 DIAGNOSIS — Z00.8 ENCOUNTER FOR OTHER GENERAL EXAMINATION: ICD-10-CM

## 2019-08-16 DIAGNOSIS — E89.0 POSTPROCEDURAL HYPOTHYROIDISM: Chronic | ICD-10-CM

## 2019-08-16 PROCEDURE — 76641 ULTRASOUND BREAST COMPLETE: CPT | Mod: 26,50

## 2019-08-16 PROCEDURE — 77063 BREAST TOMOSYNTHESIS BI: CPT | Mod: 26

## 2019-08-16 PROCEDURE — 77067 SCR MAMMO BI INCL CAD: CPT

## 2019-08-16 PROCEDURE — 77063 BREAST TOMOSYNTHESIS BI: CPT

## 2019-08-16 PROCEDURE — 76641 ULTRASOUND BREAST COMPLETE: CPT

## 2019-08-16 PROCEDURE — 77067 SCR MAMMO BI INCL CAD: CPT | Mod: 26

## 2019-08-21 ENCOUNTER — EMERGENCY (EMERGENCY)
Facility: HOSPITAL | Age: 79
LOS: 1 days | Discharge: HOME | End: 2019-08-21
Payer: MEDICARE

## 2019-08-21 VITALS
HEART RATE: 93 BPM | DIASTOLIC BLOOD PRESSURE: 97 MMHG | HEIGHT: 60 IN | SYSTOLIC BLOOD PRESSURE: 194 MMHG | WEIGHT: 130.07 LBS | OXYGEN SATURATION: 99 % | TEMPERATURE: 98 F | RESPIRATION RATE: 19 BRPM

## 2019-08-21 DIAGNOSIS — E89.0 POSTPROCEDURAL HYPOTHYROIDISM: Chronic | ICD-10-CM

## 2019-08-21 DIAGNOSIS — S72.001A FRACTURE OF UNSPECIFIED PART OF NECK OF RIGHT FEMUR, INITIAL ENCOUNTER FOR CLOSED FRACTURE: Chronic | ICD-10-CM

## 2019-08-21 PROCEDURE — 99284 EMERGENCY DEPT VISIT MOD MDM: CPT | Mod: 25

## 2019-08-21 PROCEDURE — 93010 ELECTROCARDIOGRAM REPORT: CPT

## 2019-08-21 PROCEDURE — 82962 GLUCOSE BLOOD TEST: CPT

## 2019-08-21 PROCEDURE — 93005 ELECTROCARDIOGRAM TRACING: CPT

## 2019-08-21 PROCEDURE — 99283 EMERGENCY DEPT VISIT LOW MDM: CPT

## 2019-08-21 NOTE — ED PROVIDER NOTE - NS ED ROS FT
GENERAL: No fever or chills, EYES: no change in vision, HEENT: no trouble swallowing or speaking, CARDIAC: no chest pain, PULMONARY: no cough or SOB, GI: no abdominal pain, no nausea, no vomiting, no diarrhea or constipation, : No changes in urination, SKIN: no rashes, NEURO: no headache,  MSK: No joint pain ~Joseph Martinez M.D. Resident

## 2019-08-21 NOTE — ED PROVIDER NOTE - PMH
Diverticulitis    GERD (gastroesophageal reflux disease)    Hypertension    Hypothyroid    Macular degeneration    Osteoarthritis

## 2019-08-21 NOTE — ED ADULT TRIAGE NOTE - CHIEF COMPLAINT QUOTE
Tingling to b/l hands and feet tonight after using arthritis medication. States "My feet swelled up, I felt flushed, my BP was elevated to 190 and I was having difficulty breathing."

## 2019-08-21 NOTE — ED PROVIDER NOTE - ATTENDING CONTRIBUTION TO CARE
MD Zamudio:  patient seen and evaluated with the resident.  I was present for key portions of the History & Physical, and I agree with the Impression & Plan.  MD Zamudio:  79F, c/o "allergic reaction."  Patient applied  diclofenac to her R knee, and an hour later began to feel warmth and anxious.  No hives, no itching, no cough, no wheezing.  Patient began to hyperventilate and then experienced bilateral hand and lip tingling.    No new medications, but takes wellbutrin, amitriptyline, xanax, hydralazine, carvedilol, diltiazem, synthroid, lisinopril.  VS: +HTN.  Physical Exam: adult F, anxious-appearing, NCAT, PERRL, EOMI, neck supple, RRR, CTA B, Abd: s/nd/nt, Ext: trace bipedal edema.  Skin:  zero rash.  zero respiratory distress.   Impression:  possible drug reaction vs: panic attack.  Suspicion for allergic reaction low.    Plan:  ECG, reassess.  I do not feel that her symptoms are c/w stroke, CVA or ACS because the location, quality, context, modifiers, and physical exam are inconsistent with these diagnoses. MD Zamudio:  patient seen and evaluated with the resident.  I was present for key portions of the History & Physical, and I agree with the Impression & Plan.  MD Zamudio:  79F, c/o "allergic reaction."  Patient applied  diclofenac to her R knee, and an hour later began to feel warmth and anxious.  No hives, no itching, no cough, no wheezing.  Patient began to hyperventilate and then experienced bilateral hand and lip tingling.    No new medications, but takes wellbutrin, amitriptyline, xanax, hydralazine, carvedilol, diltiazem, synthroid, lisinopril.  VS: +HTN.  Physical Exam: adult F, anxious-appearing, NCAT, PERRL, EOMI, neck supple, RRR, CTA B, Abd: s/nd/nt, Ext: trace bipedal edema.  Skin:  zero rash.  zero respiratory distress.   Impression:  possible drug reaction vs: panic attack.  Suspicion for allergic reaction low.    Plan:  ECG - similar morphology to old ECG, will reassess.  I do not feel that her symptoms are c/w stroke, CVA or ACS because the location, quality, context, modifiers, and physical exam are inconsistent with these diagnoses.  She is well appearing at this time.  No indication for CT head, advanced imaging or ACS workup at this time.

## 2019-08-21 NOTE — ED ADULT NURSE NOTE - OBJECTIVE STATEMENT
80 y/o Female presenting to the ED ambulatory, A&Ox3, complaining of 78 y/o Female presenting to the ED ambulatory, A&Ox3, complaining of an allergic reaction after applying arthritic cream to her knees. Pt reports the cream was prescribed a long time ago and she uses it intermittently for the pain, pt reports it is expiring this month. Knees appear normal color for race, no edema noted, no erythema noted to the effected areas. Airway patent, pt speaking in full clear sentences. Pt in no respiratory distress, pt sating 100% on room air with 80 y/o Female presenting to the ED ambulatory, A&Ox3, complaining of an allergic reaction after applying arthritic cream to her knees and it got on her hands (pt reports tingling in the effected areas). Pt reports difficulty breathing and swelling of the feet upon application of the cream as well. Pt reports the cream was prescribed a long time ago and she uses it intermittently for the pain, pt reports it is expiring this month. Knees appear normal color for race, no edema noted, no erythema noted to the effected areas. Airway patent, pt speaking in full clear sentences. Pt in no respiratory distress, pt sating 100% on room air with a respiratory rate of 19. Pt appears anxious at this time. Abdomen soft, nontender, nondistended. Pt denies chest pain, headache, N/V/D, syncope, dizziness. Safety and comfort measures provided and maintained. Call bell within reach. Family at bedside.

## 2019-08-21 NOTE — ED PROVIDER NOTE - OBJECTIVE STATEMENT
79yF 79yF HTN, hyperlipidemia, thyroid ca, MAT, Diverticulitis, Hiatial hernia, Gerd, Osteoarthritis, anxiety present with allergic reaction. States she put  diclofenac on her R knee in which she started experiencing warmth 5 hours later which made her anxious. Noticed an increase in her bp which made her more anxious leading her to hyperventilate leading to tingling of fingertips and lips. No fever, chills, hives, rash, chest pain, sob, abd pain, n/v

## 2019-08-21 NOTE — ED PROVIDER NOTE - PHYSICAL EXAMINATION
Gen: AAOx3, non-toxic  Head: NCAT  HEENT: EOMI, oral mucosa moist, normal conjunctiva  Lung: CTAB, no respiratory distress, speaking in full sentences  CV: RRR, no murmurs, rubs or gallops  Abd: soft, NTND, no guarding  MSK: no visible deformities  Neuro: No focal sensory or motor deficits  Skin: Warm, well perfused, no rash  Psych: normal affect.   ~Joseph Martinez M.D. Resident

## 2019-08-22 VITALS
SYSTOLIC BLOOD PRESSURE: 164 MMHG | HEART RATE: 71 BPM | TEMPERATURE: 98 F | OXYGEN SATURATION: 100 % | DIASTOLIC BLOOD PRESSURE: 79 MMHG | RESPIRATION RATE: 18 BRPM

## 2019-09-23 ENCOUNTER — APPOINTMENT (OUTPATIENT)
Dept: GASTROENTEROLOGY | Facility: CLINIC | Age: 79
End: 2019-09-23
Payer: MEDICARE

## 2019-09-23 VITALS
DIASTOLIC BLOOD PRESSURE: 62 MMHG | BODY MASS INDEX: 25.91 KG/M2 | RESPIRATION RATE: 14 BRPM | WEIGHT: 132 LBS | SYSTOLIC BLOOD PRESSURE: 112 MMHG | HEART RATE: 68 BPM | OXYGEN SATURATION: 98 % | HEIGHT: 60 IN

## 2019-09-23 DIAGNOSIS — K92.1 MELENA: ICD-10-CM

## 2019-09-23 DIAGNOSIS — R74.0 NONSPECIFIC ELEVATION OF LEVELS OF TRANSAMINASE AND LACTIC ACID DEHYDROGENASE [LDH]: ICD-10-CM

## 2019-09-23 DIAGNOSIS — Z86.39 PERSONAL HISTORY OF OTHER ENDOCRINE, NUTRITIONAL AND METABOLIC DISEASE: ICD-10-CM

## 2019-09-23 DIAGNOSIS — K76.0 FATTY (CHANGE OF) LIVER, NOT ELSEWHERE CLASSIFIED: ICD-10-CM

## 2019-09-23 DIAGNOSIS — Z86.19 PERSONAL HISTORY OF OTHER INFECTIOUS AND PARASITIC DISEASES: ICD-10-CM

## 2019-09-23 DIAGNOSIS — K82.4 CHOLESTEROLOSIS OF GALLBLADDER: ICD-10-CM

## 2019-09-23 DIAGNOSIS — R60.0 LOCALIZED EDEMA: ICD-10-CM

## 2019-09-23 DIAGNOSIS — R13.12 DYSPHAGIA, OROPHARYNGEAL PHASE: ICD-10-CM

## 2019-09-23 DIAGNOSIS — R09.89 OTHER SPECIFIED SYMPTOMS AND SIGNS INVOLVING THE CIRCULATORY AND RESPIRATORY SYSTEMS: ICD-10-CM

## 2019-09-23 DIAGNOSIS — R10.9 UNSPECIFIED ABDOMINAL PAIN: ICD-10-CM

## 2019-09-23 DIAGNOSIS — K44.9 DIAPHRAGMATIC HERNIA W/OUT OBSTRUCTION OR GANGRENE: ICD-10-CM

## 2019-09-23 PROCEDURE — 99215 OFFICE O/P EST HI 40 MIN: CPT

## 2019-09-23 RX ORDER — TORSEMIDE 5 MG/1
5 TABLET ORAL DAILY
Refills: 0 | Status: COMPLETED | COMMUNITY
Start: 2019-04-02 | End: 2019-09-23

## 2019-09-23 RX ORDER — LACTULOSE 10 G/15ML
20 SOLUTION ORAL
Qty: 1800 | Refills: 11 | Status: COMPLETED | COMMUNITY
Start: 2018-03-13 | End: 2019-09-23

## 2019-10-02 PROBLEM — R10.9 ABDOMINAL CRAMPS: Status: RESOLVED | Noted: 2017-05-11 | Resolved: 2019-10-02

## 2019-10-02 PROBLEM — R13.12 OROPHARYNGEAL DYSPHAGIA: Status: RESOLVED | Noted: 2019-06-12 | Resolved: 2019-10-02

## 2019-10-02 PROBLEM — K92.1 HEMATOCHEZIA: Status: ACTIVE | Noted: 2017-12-11

## 2019-10-02 PROBLEM — K44.9 PARAESOPHAGEAL HERNIA: Status: ACTIVE | Noted: 2019-10-02

## 2019-10-02 PROBLEM — Z86.19 HISTORY OF ACUTE HEPATITIS: Status: RESOLVED | Noted: 2017-05-11 | Resolved: 2019-10-02

## 2019-10-02 PROBLEM — R74.0 ELEVATED TRANSAMINASE LEVEL: Status: RESOLVED | Noted: 2017-12-11 | Resolved: 2019-10-02

## 2019-10-02 PROBLEM — R60.0 PEDAL EDEMA: Status: RESOLVED | Noted: 2018-07-26 | Resolved: 2019-10-02

## 2019-10-02 PROBLEM — R09.89 GLOBUS SENSATION: Status: RESOLVED | Noted: 2019-06-12 | Resolved: 2019-10-02

## 2019-10-02 PROBLEM — K76.0 FATTY LIVER DISEASE, NONALCOHOLIC: Status: ACTIVE | Noted: 2019-10-02

## 2019-10-02 NOTE — HISTORY OF PRESENT ILLNESS
[Heartburn] : heartburn worsened [Nausea] : denies nausea [Vomiting] : denies vomiting [Diarrhea] : denies diarrhea [Constipation] : constipation worsened [Yellow Skin Or Eyes (Jaundice)] : denies jaundice [Abdominal Swelling] : denies abdominal swelling [Rectal Pain] : denies rectal pain [Abdominal Pain] : abdominal pain [Wt Gain ___ Lbs] : no recent weight gain [Wt Loss ___ Lbs] : no recent weight loss [de-identified] : Humaira Jason, a 79 year old lady, is seen for follow-up evaluation of chronic constipation, acid reflux disease, hiatal hernia and a remote past history of "colitis" and a colonic polyp.  \par \par She is concerned about having colonoscopy.  Her last colonoscopy was in 2008 or 2010 and no pathology report is found of colon biopsies which may indicate a negative colonoscopy.  Previously she had had a polyp resected; the pathology of the polyp is not documented.  She had had EGD with biopsies both of those years.  The two previous EGDs did not reveal significant esophagitis.  A barium swallow revealed a hiatal hernia with mild reflux. She now has heartburn rarely without taking Nexium.  Her stools remain hard and she has noted blood in the stool intermittently for the past few months.  She also has a uterine prolapse and is unsure if the blood is from the uterine prolapse or the rectum.\par \par She had been taking Nexium for years for acid reflux symptoms, but stopped in January 2017 because of concern about osteoporosis (patient has osteopenia).  She states that she no longer has heartburn; however, she notes nocturnal acid reflux multiple times per week with pyrosis through the esophagus.  She notes severe dyschezia with chronic constipation.  She can go without a bowel movement for a week.  She needs to manually disimpact the stool.  The stool is scybalous and hard.\par \par In her 20s she was told of an irritable colon with recurrent loose stools.  Colonoscopy in the past revealed small polyps.  Upper endoscopy revealed mild gastritis without H. pylori and a moderate  hiatal hernia was found.  The last EGD was in 2010.   The last colonoscopy was more then 10 years ago.   She denies  melena or hematochezia.  She notes dysphagia to pills.  She cut meat into small pieces because of past problems.  She has occasional sore throat and hoarseness, especially in the morning.  She can awaken with "acid reflux" which she characterizers as burning.  A barium esophagram in June 2019 revealed a large paraesophageal hernia.  Previous endoscopies in 2008 and 2010 described a large hiatal hernia.  An esophagram in 2008 described a 7.0 x 9.0 hiatal hernia which was reported to Baljeet Owens, a gastroenterologist in [place]..

## 2019-10-02 NOTE — PHYSICAL EXAM
[General Appearance - Alert] : alert [General Appearance - In No Acute Distress] : in no acute distress [Examination Of The Oral Cavity] : the lips and gums were normal [Oropharynx] : the oropharynx was normal [Neck Appearance] : the appearance of the neck was normal [Neck Cervical Mass (___cm)] : no neck mass was observed [Jugular Venous Distention Increased] : there was no jugular-venous distention [Thyroid Diffuse Enlargement] : the thyroid was not enlarged [Thyroid Nodule] : there were no palpable thyroid nodules [Auscultation Breath Sounds / Voice Sounds] : lungs were clear to auscultation bilaterally [Heart Rate And Rhythm] : heart rate was normal and rhythm regular [Heart Sounds] : normal S1 and S2 [Heart Sounds Gallop] : no gallops [Murmurs] : no murmurs [Heart Sounds Pericardial Friction Rub] : no pericardial rub [Full Pulse] : the pedal pulses are present [Edema] : there was no peripheral edema [Abdomen Soft] : soft [Bowel Sounds] : normal bowel sounds [Abdomen Tenderness] : non-tender [Abdomen Mass (___ Cm)] : no abdominal mass palpated [Normal Sphincter Tone] : normal sphincter tone [No Rectal Mass] : no rectal mass [Cervical Lymph Nodes Enlarged Posterior Bilaterally] : posterior cervical [Cervical Lymph Nodes Enlarged Anterior Bilaterally] : anterior cervical [Axillary Lymph Nodes Enlarged Bilaterally] : axillary [Supraclavicular Lymph Nodes Enlarged Bilaterally] : supraclavicular [Inguinal Lymph Nodes Enlarged Bilaterally] : inguinal [Femoral Lymph Nodes Enlarged Bilaterally] : femoral [No CVA Tenderness] : no ~M costovertebral angle tenderness [No Spinal Tenderness] : no spinal tenderness [Abnormal Walk] : normal gait [Nail Clubbing] : no clubbing  or cyanosis of the fingernails [Musculoskeletal - Swelling] : no joint swelling seen [Motor Tone] : muscle strength and tone were normal [Skin Color & Pigmentation] : normal skin color and pigmentation [Skin Turgor] : normal skin turgor [] : no rash [Deep Tendon Reflexes (DTR)] : deep tendon reflexes were 2+ and symmetric [Sensation] : the sensory exam was normal to light touch and pinprick [No Focal Deficits] : no focal deficits [Oriented To Time, Place, And Person] : oriented to person, place, and time [Impaired Insight] : insight and judgment were intact [Affect] : the affect was normal [Occult Blood Positive] : stool was negative for occult blood

## 2019-10-02 NOTE — ASSESSMENT
[FreeTextEntry1] : Assessment\par 1) chronic reflux symptoms with dysphagia to solids and globus sensation, rule out stricture, erosive esophagitis, eosinophilic esophagitis, no esophageal biopsies on file from previous two EGDs, would suggest that no significant esophagitis was noted; a large hiatal hernia has been described in the past; however, the most recent esophagram reports a large paraesophageal hernia with residual fluid\par 2) past history of colonic polyp with negative colonoscopy in 2008 or 2010, increased risk for colon cancer), possible history of ulcerative colitis versus irritable bowel syndrome; the occasional red blood on stool may be due to uterine prolapse, patient is now 77 and would usually not be considered for further screening and she does not wish colonoscopy\par 3) chronic constipation with hemorrhoids which may explain red blood in stool upon straining at defecation; hemoglobin is normal without microcytosis\par 4) elevated transaminases noted last fall which resolved with negative work-up, transaminases normal in April 2019\par 5) mild reflux symptoms with negative biopsies for esophagitis in the past\par 6) history of gallbladder polyp; however, sonogram in 2018 revealed only mild fatty liver with normal gallbladder\par Plan\par 1)  use  milk of magnesia and psyllium husk bid ac for constipation\par 2) consider Cologuard for screening one last time\par 3) famotidine 40 bid prn reflux symptoms, may take it hs as needed\par 4)  Dietary strategies discussed with the patient including use of psyllium husk before breakfast and supper and Glucerna in place of lunch; mild exercise also discussed; weight once weekly; think of calory spending \par 5) office follow-up 3 months

## 2019-10-02 NOTE — CONSULT LETTER
[Dear  ___] : Dear  [unfilled], [Consult Letter:] : I had the pleasure of evaluating your patient, [unfilled]. [Please see my note below.] : Please see my note below. [Sincerely,] : Sincerely, [Adrian Coto MD, FACP, FACG, FASCIELO, AGAF] : Adrian Coto MD, FACP, FACG, CANDY, AGAF [Associate Professor of Medicine] : Associate Professor of Medicine [[facility]] : [facility] [FreeTextEntry2] : Marline Bear MD [FreeTextEntry3] : MD CANDY [facility]\par Associate Professor of Medicine\par [facility] [employer] [facility]

## 2019-10-15 ENCOUNTER — RESULT REVIEW (OUTPATIENT)
Age: 79
End: 2019-10-15

## 2019-10-19 ENCOUNTER — EMERGENCY (EMERGENCY)
Facility: HOSPITAL | Age: 79
LOS: 1 days | Discharge: HOME | End: 2019-10-19
Attending: EMERGENCY MEDICINE
Payer: MEDICARE

## 2019-10-19 VITALS
OXYGEN SATURATION: 99 % | RESPIRATION RATE: 18 BRPM | TEMPERATURE: 98 F | HEART RATE: 67 BPM | WEIGHT: 132.94 LBS | SYSTOLIC BLOOD PRESSURE: 162 MMHG | HEIGHT: 68 IN | DIASTOLIC BLOOD PRESSURE: 98 MMHG

## 2019-10-19 VITALS
SYSTOLIC BLOOD PRESSURE: 199 MMHG | DIASTOLIC BLOOD PRESSURE: 102 MMHG | HEART RATE: 81 BPM | RESPIRATION RATE: 18 BRPM | OXYGEN SATURATION: 97 %

## 2019-10-19 DIAGNOSIS — S72.001A FRACTURE OF UNSPECIFIED PART OF NECK OF RIGHT FEMUR, INITIAL ENCOUNTER FOR CLOSED FRACTURE: Chronic | ICD-10-CM

## 2019-10-19 DIAGNOSIS — E89.0 POSTPROCEDURAL HYPOTHYROIDISM: Chronic | ICD-10-CM

## 2019-10-19 PROCEDURE — 99284 EMERGENCY DEPT VISIT MOD MDM: CPT

## 2019-10-19 PROCEDURE — 71046 X-RAY EXAM CHEST 2 VIEWS: CPT | Mod: 26

## 2019-10-19 PROCEDURE — 93010 ELECTROCARDIOGRAM REPORT: CPT

## 2019-10-19 PROCEDURE — 93005 ELECTROCARDIOGRAM TRACING: CPT

## 2019-10-19 PROCEDURE — 70450 CT HEAD/BRAIN W/O DYE: CPT | Mod: 26

## 2019-10-19 PROCEDURE — 99284 EMERGENCY DEPT VISIT MOD MDM: CPT | Mod: 25

## 2019-10-19 PROCEDURE — 71046 X-RAY EXAM CHEST 2 VIEWS: CPT

## 2019-10-19 PROCEDURE — 70450 CT HEAD/BRAIN W/O DYE: CPT

## 2019-10-19 NOTE — ED PROVIDER NOTE - NSFOLLOWUPINSTRUCTIONS_ED_ALL_ED_FT
Your diagnosis: head trauma    Discharge instructions:    - Please follow up with your Primary Care Doctor.    - Be sure to return to the ED if you develop new or worsening symptoms. Specific signs and symptoms to be vigilant of: lightheadedness, dizziness, vertigo, headache, vision changes, slurring of the speech, facial droop, difficulty swallowing, numbness or tingling, muscle weakness, changes in sensation, difficulty walking.

## 2019-10-19 NOTE — ED PROVIDER NOTE - MUSCULOSKELETAL, MLM
Spine appears normal, range of motion is not limited, no muscle or joint tenderness. no midline vertebral ttp. no obvious deformities. 5/5 strength in distal extremities b/l

## 2019-10-19 NOTE — ED PROVIDER NOTE - OBJECTIVE STATEMENT
79yF HTN, hyperlipidemia, thyroid ca, MAT, Diverticulitis, Hiatial hernia, Gerd, Osteoarthritis, anxiety present with head injury. patient was walking in the parking lot when she tripped over the cement divider at the front of her car approximately 1 hr to arrival. Hit L head on cement. Seen by daughter - no loc. +daily baby aspirin. Was helped into wheelchair but has not tried ambulating since. NO vomiting, weakness, sensory/vision changes. No change in health recently. no prodrome prior to fall.

## 2019-10-19 NOTE — ED PROVIDER NOTE - PATIENT PORTAL LINK FT
You can access the FollowMyHealth Patient Portal offered by [facility] by registering at the following website: [email or web address]. By joining [employer]’s FollowMyHealth portal, you will also be able to view your health information using other applications (apps) compatible with our system.

## 2019-10-19 NOTE — ED ADULT NURSE NOTE - OBJECTIVE STATEMENT
79 yrs old female with h/o depression , present to the ER for s/p fall. As per pt she was walking with her daughter when she tripped and fell on the left side of the head. Pt denies LOC. Pt reports pain level of 7/10 on pain scale and aching in quality. Pt denies nausea, vomiting , dizziness or lightheadedness.

## 2019-10-19 NOTE — ED PROVIDER NOTE - SKIN, MLM
Skin normal color for race, warm, dry and intact. No evidence of rash. +grape sized contusion on L temporal scalp.

## 2019-10-19 NOTE — ED PROVIDER NOTE - CLINICAL SUMMARY MEDICAL DECISION MAKING FREE TEXT BOX
story sounds mechanical in nature. trauma eval is unimpressive. Will get head ct, ambulate, likely d/c home with daughter with pmd f/u and strict return precautions. story sounds mechanical in nature. trauma eval is unimpressive. Will get head ct, ambulate, likely d/c home with daughter with pmd f/u and strict return precautions. ZR

## 2019-10-19 NOTE — ED PROVIDER NOTE - ENMT, MLM
Airway patent, Nasal mucosa clear. Mouth with normal mucosa. Throat has no vesicles, no oropharyngeal exudates and uvula is midline. no evidence of basilar skull fracture.

## 2019-10-19 NOTE — ED ADULT NURSE NOTE - NSIMPLEMENTINTERV_GEN_ALL_ED
Implemented All Universal Safety Interventions:  [place] to call system. Call bell, personal items and telephone within reach. Instruct patient to call for assistance. Room bathroom lighting operational. Non-slip footwear when patient is off stretcher. Physically safe environment: no spills, clutter or unnecessary equipment. Stretcher in lowest position, wheels locked, appropriate side rails in place.

## 2019-10-19 NOTE — ED ADULT NURSE NOTE - CHPI ED NUR SYMPTOMS NEG
no vomiting/no fever/no loss of consciousness/no numbness/no abrasion/no weakness/no bleeding/no confusion/no deformity

## 2019-10-19 NOTE — ED ADULT NURSE REASSESSMENT NOTE - NS ED NURSE REASSESS COMMENT FT1
Pt hypertensive at discharge. Denies chest pain/dizziness/blurry vision/numbness/tingling/headache. Pt ambulatory with steady gait. MD Sierra notified and ok with pt being discharged home. Pt states that she did not take her BP meds tonight.

## 2019-10-27 NOTE — PATIENT PROFILE ADULT - NSTRANSFERBELONGINGSDISPO_GEN_A_NUR
Physical Therapy Treatment    Patient Name:  Brandi Camacho   MRN:  [id number]    Recommendations:     Discharge Recommendations:  home health PT, nursing facility, skilled   Discharge Equipment Recommendations: none   Barriers to discharge: None    Assessment:     Brandi Camacho is a 83 y.o. female admitted with a medical diagnosis of Intractable pain.  She presents with the following impairments/functional limitations:  weakness, impaired endurance, impaired functional mobilty, impaired self care skills, impaired balance, gait instability, decreased lower extremity function, decreased upper extremity function, decreased safety awareness, decreased ROM, pain, orthopedic precautions. Patient was limited with supine RLE exercises due to pain but tolerated exercise well in sitting without c/o pain. She ambulated 3 steps to chair and remained up in chair after PT. Continue with PT's POC.    Rehab Prognosis: Fair; patient would benefit from acute skilled PT services to address these deficits and reach maximum level of function.    Recent Surgery: * No surgery found *      Plan:     During this hospitalization, patient to be seen daily to address the identified rehab impairments via gait training, therapeutic activities, therapeutic exercises and progress toward the following goals:    · Plan of Care Expires:  11/25/19    Subjective     Chief Complaint: pt states she is a little uncomfortable but not in pain  Patient/Family Comments/goals: to be able to go home with .  Pain/Comfort:  · Pain Rating 1: 0/10  · Pain Addressed 1: Pre-medicate for activity  · Pain Rating Post-Intervention 1: 0/10      Objective:     Communicated with LEATHA Santiago prior to session.  Patient found HOB elevated with SCD, peripheral IV upon PT entry to room.     General Precautions: Standard, fall   Orthopedic Precautions:(protected weight bearing RLE per MD)   Braces: N/A     Functional Mobility:  · Bed Mobility:     · Scooting: maximal  assistance and of 2 persons  · Supine to Sit: total assistance and of 2 persons  · Transfers:     · Sit to Stand:  moderate assistance and of 2 persons with rolling walker  · Bed to Chair: moderate assistance and of 2 persons with  rolling walker  using  Step Transfer  · Gait: 3 steps fwd and 2 steps side to side to chair.      AM-PAC 6 CLICK MOBILITY          Therapeutic Activities and Exercises:  Supine exercises to increase lower extremity strength to decrease fall risk for return to PLOF: Ankle pumps x 25 B.  Heel slides L, Hip Ab/ADD L, Qs, Gs, SLR L  x 10.  Patient sat on EOB for spinal orientation and to increase proprioception in sitting. Sitting exercises for lower extremity strengthening to increase standing tolerance and decrease fall risk: Ankle pumps, LAQ L, Heel slides, Partial sit to stand x 10 using more LLE than R.    Patient left up in chair with all lines intact, call button in reach, RN Pamela notified and spouse present..    GOALS:   Multidisciplinary Problems     Physical Therapy Goals        Problem: Physical Therapy Goal    Goal Priority Disciplines Outcome Goal Variances Interventions   Physical Therapy Goal     PT, PT/OT Ongoing, Progressing                     Time Tracking:     PT Received On: 10/27/19  PT Start Time: 0920     PT Stop Time: 0945  PT Total Time (min): 25 min     Billable Minutes: Therapeutic Activity 12 and Therapeutic Exercise 13    Treatment Type: Treatment  PT/PTA: PTA     PTA Visit Number: 1     Rachel Ornelas PTA  10/27/2019   given to family

## 2019-12-10 ENCOUNTER — MEDICATION RENEWAL (OUTPATIENT)
Age: 79
End: 2019-12-10

## 2019-12-11 ENCOUNTER — RX RENEWAL (OUTPATIENT)
Age: 79
End: 2019-12-11

## 2019-12-13 ENCOUNTER — APPOINTMENT (OUTPATIENT)
Dept: ENDOCRINOLOGY | Facility: CLINIC | Age: 79
End: 2019-12-13
Payer: MEDICARE

## 2019-12-13 VITALS
BODY MASS INDEX: 25.91 KG/M2 | DIASTOLIC BLOOD PRESSURE: 72 MMHG | WEIGHT: 132 LBS | SYSTOLIC BLOOD PRESSURE: 120 MMHG | HEIGHT: 60 IN | HEART RATE: 72 BPM | OXYGEN SATURATION: 97 %

## 2019-12-13 PROCEDURE — 99214 OFFICE O/P EST MOD 30 MIN: CPT

## 2019-12-15 NOTE — HISTORY OF PRESENT ILLNESS
[FreeTextEntry1] : 79 y.o. female with h/o retrosternal goiter s/p surgery in 9/2003 with hypothyroidism presents for follow up visit. Surgery revealed 4 mm microfocus of papillary thyroid cancer in the setting of Hashimoto's disease. Did have residual right neck tissue and being followed with yearly sonograms. Never had FNA of right neck tissue. No neck complaints. Feeling good. BP has been labile and seeing cardiology. Following with urogyn for uterovaginal prolapse. Dealing with GERD/hiatal hernia and difficulty with swallowing. Seeing GI. Taking Synthroid 75 mcg daily and extra 1/2 tab once weekly.  Sonogram in 2/2017 showed atrophic residual left lobe tissue and right lobe appears heterogenous without discrete nodules. There are small LNs nonspecific. Thyroid sonogram in December 2017 shows residual right thyroid lobe tissue measuring 2.7 cm and left neck bed looks unremarkable. Thyroid sonogram in April 2019 showed residual right thyroid lobe measuring 3 cm and left neck bed 0.6 cm nodule which appears stable.  Always constipated. \par \par Also h/o prediabetes, reports weight is stable. Reports a big dinner but stopped eating potatoes. No exercise. \par \par Also h/o osteopenia, DEXA scan in 2/2016 showed spine -0.5 and femoral neck -1.7. Had right hip fracture in 11/2018 and required surgery. Reports frequents falls. No other fractures. Takes vitamin D 2,500 Iu weekly.  Most recent DEXA scan is 2/20/18 shows left femoral neck -2.0 and total hip -0.8 with spine -0.1 with 1/3 radius -1.7. Reports being treated many years ago with ? oral bisphosphonate briefly but not clear what type reaction she experienced. C/o right knee pain.

## 2019-12-15 NOTE — ASSESSMENT
[Bisphosphonate Therapy] : Risks  and benefits of bisphosphonate therapy were  discussed with the patient including gastroesophageal irritation, osteonecrosis of the jaw, and atypical femur fractures, and acute phase reaction [FreeTextEntry1] : 79 y.o. female with h/o hypothyroidism s/p surgery with residual thyroid tissue, prediabetes and osteopenia.\par 1. Hypothyroidism s/p surgery with microfocus of papillary thyroid cancer- Patient appears euthyroid. Will check TFTs (done by PCP) and adjust Synthroid accordingly. No need for TSH suppression. Goal TSH is below 2.5. Will check thyroid ultrasound in 2020.\par 2. Prediabetes- Encouraged carbohydrate consistent diet and exercise. Stable CMP and Hba1c. Will monitor for now.\par 3. Osteopenia- DEXA scan performed in April 2019 shows 1/3 radius -1.5, left femoral neck -1.9 and total hip -1.4, spine -0.1 (not accurate given arthritis). Given h/o recent right hip fracture, patient is at increased risk of fracture. Recommend medical therapy. Given GI history, not a good candidate for oral bisphosphonates. Recommend IV Reclast. Reviewed risks and benefits. Patient declines therapy at this time. Encouraged weight bearing activity. Discussed appropriate calcium and vitamin D intake. Normal 25 vitamin D level and adjust supplement if needed. \par \par Follow up in 6 months

## 2019-12-15 NOTE — REVIEW OF SYSTEMS
[Fatigue] : fatigue [Heartburn] : heartburn [Joint Pain] : joint pain [Recent Weight Gain (___ Lbs)] : recent [unfilled] ~Ulb weight gain [Poor Balance] : poor balance [Hair Loss] : hair loss [Swelling] : swelling [Negative] : Cardiovascular [Constipation] : constipation [Polydipsia] : no polydipsia

## 2020-02-01 ENCOUNTER — EMERGENCY (EMERGENCY)
Facility: HOSPITAL | Age: 80
LOS: 1 days | Discharge: HOME | End: 2020-02-01
Attending: STUDENT IN AN ORGANIZED HEALTH CARE EDUCATION/TRAINING PROGRAM
Payer: MEDICARE

## 2020-02-01 VITALS
RESPIRATION RATE: 16 BRPM | HEART RATE: 67 BPM | TEMPERATURE: 98 F | DIASTOLIC BLOOD PRESSURE: 87 MMHG | OXYGEN SATURATION: 99 % | SYSTOLIC BLOOD PRESSURE: 146 MMHG | HEIGHT: 63 IN | WEIGHT: 130.07 LBS

## 2020-02-01 VITALS
SYSTOLIC BLOOD PRESSURE: 157 MMHG | HEART RATE: 67 BPM | OXYGEN SATURATION: 98 % | RESPIRATION RATE: 17 BRPM | DIASTOLIC BLOOD PRESSURE: 81 MMHG | TEMPERATURE: 98 F

## 2020-02-01 DIAGNOSIS — E89.0 POSTPROCEDURAL HYPOTHYROIDISM: Chronic | ICD-10-CM

## 2020-02-01 DIAGNOSIS — S72.001A FRACTURE OF UNSPECIFIED PART OF NECK OF RIGHT FEMUR, INITIAL ENCOUNTER FOR CLOSED FRACTURE: Chronic | ICD-10-CM

## 2020-02-01 LAB
ALBUMIN SERPL ELPH-MCNC: 4.1 G/DL — SIGNIFICANT CHANGE UP (ref 3.3–5)
ALP SERPL-CCNC: 80 U/L — SIGNIFICANT CHANGE UP (ref 40–120)
ALT FLD-CCNC: 12 U/L — SIGNIFICANT CHANGE UP (ref 10–45)
ANION GAP SERPL CALC-SCNC: 12 MMOL/L — SIGNIFICANT CHANGE UP (ref 5–17)
AST SERPL-CCNC: 19 U/L — SIGNIFICANT CHANGE UP (ref 10–40)
BASOPHILS # BLD AUTO: 0.09 K/UL — SIGNIFICANT CHANGE UP (ref 0–0.2)
BASOPHILS NFR BLD AUTO: 1.1 % — SIGNIFICANT CHANGE UP (ref 0–2)
BILIRUB SERPL-MCNC: 0.3 MG/DL — SIGNIFICANT CHANGE UP (ref 0.2–1.2)
BUN SERPL-MCNC: 10 MG/DL — SIGNIFICANT CHANGE UP (ref 7–23)
CALCIUM SERPL-MCNC: 9.3 MG/DL — SIGNIFICANT CHANGE UP (ref 8.4–10.5)
CHLORIDE SERPL-SCNC: 99 MMOL/L — SIGNIFICANT CHANGE UP (ref 96–108)
CO2 SERPL-SCNC: 27 MMOL/L — SIGNIFICANT CHANGE UP (ref 22–31)
CREAT SERPL-MCNC: 0.77 MG/DL — SIGNIFICANT CHANGE UP (ref 0.5–1.3)
CRP SERPL-MCNC: 1.81 MG/DL — HIGH (ref 0–0.4)
EOSINOPHIL # BLD AUTO: 0.08 K/UL — SIGNIFICANT CHANGE UP (ref 0–0.5)
EOSINOPHIL NFR BLD AUTO: 0.9 % — SIGNIFICANT CHANGE UP (ref 0–6)
ERYTHROCYTE [SEDIMENTATION RATE] IN BLOOD: 44 MM/HR — HIGH (ref 0–20)
GLUCOSE SERPL-MCNC: 120 MG/DL — HIGH (ref 70–99)
HCT VFR BLD CALC: 36.4 % — SIGNIFICANT CHANGE UP (ref 34.5–45)
HGB BLD-MCNC: 11.9 G/DL — SIGNIFICANT CHANGE UP (ref 11.5–15.5)
IMM GRANULOCYTES NFR BLD AUTO: 0.4 % — SIGNIFICANT CHANGE UP (ref 0–1.5)
LYMPHOCYTES # BLD AUTO: 1.64 K/UL — SIGNIFICANT CHANGE UP (ref 1–3.3)
LYMPHOCYTES # BLD AUTO: 19.3 % — SIGNIFICANT CHANGE UP (ref 13–44)
MCHC RBC-ENTMCNC: 30.7 PG — SIGNIFICANT CHANGE UP (ref 27–34)
MCHC RBC-ENTMCNC: 32.7 GM/DL — SIGNIFICANT CHANGE UP (ref 32–36)
MCV RBC AUTO: 94.1 FL — SIGNIFICANT CHANGE UP (ref 80–100)
MONOCYTES # BLD AUTO: 0.76 K/UL — SIGNIFICANT CHANGE UP (ref 0–0.9)
MONOCYTES NFR BLD AUTO: 9 % — SIGNIFICANT CHANGE UP (ref 2–14)
NEUTROPHILS # BLD AUTO: 5.88 K/UL — SIGNIFICANT CHANGE UP (ref 1.8–7.4)
NEUTROPHILS NFR BLD AUTO: 69.3 % — SIGNIFICANT CHANGE UP (ref 43–77)
NRBC # BLD: 0 /100 WBCS — SIGNIFICANT CHANGE UP (ref 0–0)
PLATELET # BLD AUTO: 267 K/UL — SIGNIFICANT CHANGE UP (ref 150–400)
POTASSIUM SERPL-MCNC: 3.7 MMOL/L — SIGNIFICANT CHANGE UP (ref 3.5–5.3)
POTASSIUM SERPL-SCNC: 3.7 MMOL/L — SIGNIFICANT CHANGE UP (ref 3.5–5.3)
PROT SERPL-MCNC: 7.6 G/DL — SIGNIFICANT CHANGE UP (ref 6–8.3)
RBC # BLD: 3.87 M/UL — SIGNIFICANT CHANGE UP (ref 3.8–5.2)
RBC # FLD: 13.2 % — SIGNIFICANT CHANGE UP (ref 10.3–14.5)
SODIUM SERPL-SCNC: 138 MMOL/L — SIGNIFICANT CHANGE UP (ref 135–145)
TROPONIN T, HIGH SENSITIVITY RESULT: 9 NG/L — SIGNIFICANT CHANGE UP (ref 0–51)
TROPONIN T, HIGH SENSITIVITY RESULT: 9 NG/L — SIGNIFICANT CHANGE UP (ref 0–51)
WBC # BLD: 8.48 K/UL — SIGNIFICANT CHANGE UP (ref 3.8–10.5)
WBC # FLD AUTO: 8.48 K/UL — SIGNIFICANT CHANGE UP (ref 3.8–10.5)

## 2020-02-01 PROCEDURE — 76882 US LMTD JT/FCL EVL NVASC XTR: CPT

## 2020-02-01 PROCEDURE — 80053 COMPREHEN METABOLIC PANEL: CPT

## 2020-02-01 PROCEDURE — 99285 EMERGENCY DEPT VISIT HI MDM: CPT

## 2020-02-01 PROCEDURE — 84484 ASSAY OF TROPONIN QUANT: CPT

## 2020-02-01 PROCEDURE — 85652 RBC SED RATE AUTOMATED: CPT

## 2020-02-01 PROCEDURE — 93010 ELECTROCARDIOGRAM REPORT: CPT

## 2020-02-01 PROCEDURE — 73110 X-RAY EXAM OF WRIST: CPT

## 2020-02-01 PROCEDURE — 93005 ELECTROCARDIOGRAM TRACING: CPT

## 2020-02-01 PROCEDURE — 99284 EMERGENCY DEPT VISIT MOD MDM: CPT

## 2020-02-01 PROCEDURE — 73110 X-RAY EXAM OF WRIST: CPT | Mod: 26,RT

## 2020-02-01 PROCEDURE — 36415 COLL VENOUS BLD VENIPUNCTURE: CPT

## 2020-02-01 PROCEDURE — 87040 BLOOD CULTURE FOR BACTERIA: CPT

## 2020-02-01 PROCEDURE — 86140 C-REACTIVE PROTEIN: CPT

## 2020-02-01 PROCEDURE — 85027 COMPLETE CBC AUTOMATED: CPT

## 2020-02-01 RX ORDER — AZTREONAM 2 G
160 VIAL (EA) INJECTION
Qty: 2240 | Refills: 0
Start: 2020-02-01 | End: 2020-02-07

## 2020-02-01 RX ADMIN — Medication 1 TABLET(S): at 19:10

## 2020-02-01 NOTE — ED PROVIDER NOTE - PATIENT PORTAL LINK FT
You can access the FollowMyHealth Patient Portal offered by [facility] by registering at the following website: [email or web address]. By joining [email or web address]’s FollowMyHealth portal, you will also be able to view your health information using other applications (apps) compatible with our system.

## 2020-02-01 NOTE — ED PROVIDER NOTE - PHYSICAL EXAMINATION
Gen: AAOx3, non-toxic  Head: NCAT  HEENT: EOMI, oral mucosa moist, normal conjunctiva  Lung: CTAB, no respiratory distress, no wheezes/rhonchi/rales B/L, speaking in full sentences  CV: RRR, no murmurs, rubs or gallops  Abd: soft, NTND, no guarding  MSK: no visible deformities, R wrist with mild swelling and erythema over dorsal surface, ROM limited 2/2 pain  Neuro: No focal sensory or motor deficits  Skin: Warm, well perfused, no rash  Psych: normal affect.   ~Xavier Gomez M.D. Resident

## 2020-02-01 NOTE — ED PROVIDER NOTE - NS ED ROS FT
GENERAL: No fever or chills, EYES: no change in vision, HEENT: no trouble swallowing or speaking, CARDIAC: no chest pain, PULMONARY: no cough or SOB, GI: no abdominal pain, no nausea, no vomiting, no diarrhea or constipation, : No changes in urination, SKIN: no rashes, NEURO: no headache,  MSK: R wrist pain ~Xavier Gomez M.D. Resident

## 2020-02-01 NOTE — ED ADULT NURSE NOTE - OBJECTIVE STATEMENT
79 y.o female presents to ed c/o right wrist pain. States the pain began Thursday with swelling, took Tylenol and had relief; denies numbness or tingling. Also c/o Upper back pain that began today. Hx of HTN. Denies chest pain, sob, ha, n/v/d, abdominal pain, f/c, urinary symptoms, hematuria/. A&Ox4, vss, skin warm dry and intact, MAEx4, lungs CTA, abd soft nondistended. Pt resting comfortably with VSS, no complaints at this time. Patient's bed in the lowest position, explained plan of care to patient and family members. Will continue to reassess.

## 2020-02-01 NOTE — ED PROVIDER NOTE - LAB INTERPRETATION
**ATTENDING ADDENDUM (Dr. Jason Self): Labs reviewed. Pertinent findings include: NO leukocytosis. NO profound left shift. Awaiting ESR and other diagnostics at 15:46.

## 2020-02-01 NOTE — ED PROVIDER NOTE - CLINICAL SUMMARY MEDICAL DECISION MAKING FREE TEXT BOX
78 yo F PMHx HTN, hypothyroidism, OA, GERD, p/w R wrist pain x 2 days with associated swelling and erythema, limited ROM, concern for septic joint, other DDx includes gout, cellulitis, will check labs, XR, pain control, reevaluate

## 2020-02-01 NOTE — ED PROVIDER NOTE - ATTENDING CONTRIBUTION TO CARE
I performed a history and physical exam of the patient and discussed their management with the resident.  I reviewed the resident's note and agree with the documented findings and plan of care except as noted below. My medical decision making and observations are as follows:    79F PMH HTN, hypothyroidism, OA, GERD, p/w R wrist pain x 2 days. Pain has gotten worse today with associated redness and swelling. Painful ROM. No fevers/chills, N/V, abd pain. Pt also reports L sided back pain below the scapula. Denies CP but reports yesterday she had some epigastric pain. No SOB, palpitations.  Pt says she was lifting heavy jugs of water with right hand recently.  Pt in NAD, A&O x 3, heart rrr, lungs cta, mild left thoracic paraspinal ttp, right wrist with mild erythema and swelling to dorsal aspect with limited ROM 2/2 pain.  Given pain is atraumatic, must consider septic joint although patient has some ROM and is denying much pain after Tylenol.  Possible OA flare from overuse, possible fx.  Will check labs, esr, crp, xray, possible arthrocentesis.

## 2020-02-01 NOTE — ED PROVIDER NOTE - PROGRESS NOTE DETAILS
Xavier Gomez M.D. Resident: Pt placed in velcro splint, will rx bactrim and keflex for cellulitis with PCP follow up

## 2020-02-01 NOTE — ED PROVIDER NOTE - NSFOLLOWUPINSTRUCTIONS_ED_ALL_ED_FT
You were seen in the Emergency Department for right wrist redness/pain/swelling. You were diagnosed with a skin infection.  1) Advance activity as tolerated.  Keep your splint on for comfort and remove splint several times a day to evaluate for worsening swelling and redness.  2) Continue all previously prescribed medications as directed.   your prescription for antibiotics and take as directed.  3) Follow up with your primary care physician in 24-48 hours - take copies of your results.    4) Return to the Emergency Department for worsening redness, swelling, pain, fevers/chills, or persistent symptoms, and/or ANY NEW OR CONCERNING SYMPTOMS. If you have issues obtaining follow up, please call: [phone] to obtain a doctor or specialist who takes your insurance in your area.

## 2020-02-01 NOTE — ED PROVIDER NOTE - SHIFT CHANGE DETAILS
***ATTENDING ADDENDUM (Dr. Jason Self): I have received handoff from [facility]. Awaiting completion of all diagnostics and response to therapeutics. May require arthrocentesis of wrist joint and/or orthopedic consultation. Favor sprain/strain over septic arthritis or cellulitis at this time. Will continue to observe and monitor closely.

## 2020-02-01 NOTE — ED PROVIDER NOTE - OBJECTIVE STATEMENT
78 yo F PMHx HTN, hypothyroidism, OA, GERD, p/w R wrist pain x 2 days. Pain has gotten worse today with associated redness and swelling. Painful ROM. No fevers/chills, N/V, abd pain. Pt also reports L sided back pain below the scapula. Denies CP but reports yesterday she had some epigastric pain. No SOB, palpitations.

## 2020-02-01 NOTE — ED ADULT NURSE NOTE - NSIMPLEMENTINTERV_GEN_ALL_ED
Implemented All Universal Safety Interventions:  [place] to call system. Call bell, personal items and telephone within reach. Instruct patient to call for assistance. Room bathroom lighting operational. Non-slip footwear when patient is off stretcher. Physically safe environment: no spills, clutter or unnecessary equipment. Stretcher in lowest position, wheels locked, appropriate side rails in place.

## 2020-02-02 NOTE — ED POST DISCHARGE NOTE - DETAILS
patient contacted, has only just taken 2nd dose of abx, states doesn't seem to be improving. recommended taking tonights dose, if worsening should return to ed for reevaluation but needs time to let abx work. advised if spiking  fevers on abx should also return - Annette Stewart PA-C

## 2020-02-06 LAB
CULTURE RESULTS: SIGNIFICANT CHANGE UP
CULTURE RESULTS: SIGNIFICANT CHANGE UP
SPECIMEN SOURCE: SIGNIFICANT CHANGE UP
SPECIMEN SOURCE: SIGNIFICANT CHANGE UP

## 2020-02-07 NOTE — PATIENT PROFILE ADULT - NSPROGENBLOODRESTRICT_GEN_A_NUR
[FreeTextEntry1] : 66-year-old male postop transanal excision rectal polyp and hemorrhoidectomy. All tissues removed on pathology are benign. Patient is doing well. Recommend high fiber diet, Metamucil daily, sitz baths, stool softeners, pain medications p.r.n.
none

## 2020-02-15 NOTE — ED PROVIDER NOTE - PRINCIPAL DIAGNOSIS
OFFICE VISIT      Patient: Piyush Tapia   : 1977 MRN: [id number]    SUBJECTIVE:  Chief Complaint   Patient presents with   • Forms Completion     FLY   • Personal       A 42 year old male is here for follow up of multiple medical conditions.    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    Erectile dysfunction: Complains of having a couple of episodes of difficulty in keeping erection recently. Suspects if it is associated with his blood pressure medications. His testosterone level was low in the past.    Essential (primary) hypertension: On Losartan and Hydrochlorothiazide. His blood pressure is elevated in the office today. Attributes to noncompliance to his medication for the couple of days.    Labs: Has not had his labs since long time. Wishes to get tested for STD.    Additional comments:  He is not fasting today.  Requests for FLY form completion.    PAST MEDICAL HISTORY:  No past medical history on file.    MEDICATIONS:  Current Outpatient Medications   Medication Sig   • amLODIPine (NORVASC) 5 MG tablet Take 1 tablet by mouth daily.   • sildenafil (VIAGRA) 100 MG tablet Take 1 tablet by mouth as needed for Erectile Dysfunction.   • olopatadine (PATANOL) 0.1 % ophthalmic solution Two drops in each eye bid   • PARoxetine (PAXIL) 40 MG tablet Take 1 tablet by mouth every morning.     No current facility-administered medications for this visit.        ALLERGIES:  ALLERGIES:   Allergen Reactions   • Azithromycin Other (See Comments)     Abdominal pain   abdom pain  Adverse Reaction     • Wheat   (Food Or Med) Other (See Comments)     Unknown       PAST SURGICAL HISTORY:  No past surgical history on file.    FAMILY HISTORY:  No family history on file.    SOCIAL HISTORY:  Social History     Tobacco Use   Smoking Status Never Smoker   Smokeless Tobacco Never Used     Social History     Substance and Sexual Activity   Alcohol Use No   • Frequency: Never       Review of  Systems    Constitutional: Negative for activity change, appetite change, chills, diaphoresis, fatigue, fever and unexpected weight change.   HENT: Negative for congestion, ear discharge, ear pain, hearing loss, mouth sores, postnasal drip, sinus pressure, sinus pain, sore throat, trouble swallowing and voice change.   Eyes: Negative for photophobia, pain, discharge, redness, itching and visual disturbance.   Respiratory: Negative for cough, chest tightness, shortness of breath and wheezing.   Cardiovascular: Negative for chest pain, palpitations and leg swelling.   Gastrointestinal: Negative for abdominal pain, blood in stool, constipation, diarrhea, nausea and vomiting.   Endocrine: Negative for cold intolerance, heat intolerance, polydipsia, polyphagia and polyuria.   Genitourinary: Per HPI.  Musculoskeletal: Negative for arthralgias, back pain, gait problem, joint swelling, myalgias, neck pain and neck stiffness.   Skin: Negative for rash and wound.   Allergic/Immunologic: Negative for environmental allergies.   Neurological: Negative for dizziness, tremors, syncope, weakness, light-headedness, numbness and headaches.   Hematological: Negative for adenopathy. Does not bruise/bleed easily.   Psychiatric/Behavioral: Per HPI.    OBJECTIVE:    Vitals:    02/14/20 1500   BP: (!) 150/102   Pulse: 92   Resp: 16   Temp: 97.7 °F (36.5 °C)   TempSrc: Tympanic   SpO2: 95%   Weight: (!) 145 kg (319 lb 8.9 oz)   Height: 6' (1.829 m)         Physical Exam    Constitutional: Alert, in no acute distress and current vital signs reviewed. Obese male.  Head and Face: Atraumatic, no deformities, normocephalic, normal facies.  Eyes: No discharge, normal conjunctiva, no eyelid swelling, no ptosis and the sclerae were normal. Pupils equal, round and reactive to light and accommodation, conjugate gaze and extraocular movements were intact.  ENT: Normal appearing outer ear, normal appearing nose. Examination of the tympanic membrane  showed normal landmarks, normal appearing external canal. Nasal mucosa moist and pink, no nasal discharge. Normal lips. Oral mucosa pink and moist, no oral lesions.  Neck: Normal appearing neck, supple neck and no mass was seen. Thyroid not enlarged and no thyroid nodules.  Pulmonary: No respiratory distress, normal respiratory rate and effort and no accessory muscle use. Breath sounds clear to auscultation bilaterally.  Cardiovascular: Normal rate, no murmurs were heard, regular rhythm, normal S1 and normal S2. Edema was not present in the lower extremities.  Abdomen: Soft, nontender, nondistended, normal bowel sounds and no abdominal mass. No hepatomegaly and no splenomegaly. No umbilical hernia was discovered.  Musculoskeletal: Normal gait. No musculoskeletal erythema was seen, no joint swelling seen and no joint tenderness was elicited. No scoliosis. Normal range of motion. There was no joint instability noted. Muscle strength and tone were normal.  Neurologic: Cranial nerves grossly intact. Normal DTRs. No sensory deficits noted. No coordination deficits. Normal gait. Muscle strength and tone were normal.  Psychiatric: Oriented to person, oriented to place and oriented to time. Alert and awake, interactive and mood/affect were appropriate. Judgement not impaired. Normal attention span. Short term memory intact.  Skin, Hair, Nails: Normal skin color and pigmentation and no rash. No foot ulcers and no skin ulcer was seen. Normal skin turgor. No clubbing or cyanosis of the fingernails.    DIAGNOSTIC STUDIES:  LAB RESULTS:    No visits with results within 1 Month(s) from this visit.   Latest known visit with results is:   Lab Services on 06/11/2018   Component Date Value Ref Range Status   • WHITE BLOOD COUNT 06/11/2018 10.2  4.2 - 11.0 K/mcL Final   • RED CELL COUNT 06/11/2018 6.12* 4.50 - 5.90 mil/mcL Final   • HEMOGLOBIN 06/11/2018 16.3  13.0 - 17.0 g/dl Final   • HEMATOCRIT 06/11/2018 52.4* 39.0 - 51.0 % Final    • MEAN CORPUSCULAR VOLUME 06/11/2018 85.6  78.0 - 100.0 fL Final   • MEAN CORPUSCULAR HEMOGLOBIN 06/11/2018 26.6  26.0 - 34.0 pg Final   • MEAN CORPUSCULAR HGB CONC 06/11/2018 31.1* 32.0 - 36.5 g/dl Final   • RDW-CV 06/11/2018 16.1* 11.0 - 15.0 % Final   • PLATELET COUNT 06/11/2018 287  140 - 450 K/mcL Final   • Neutrophil 06/11/2018 62  % Final   • LYMPH 06/11/2018 28  % Final   • MONO 06/11/2018 6  % Final   • EOSIN 06/11/2018 2  % Final   • BASO 06/11/2018 1  % Final   • Absolute Neutrophil 06/11/2018 6.5  1.8 - 7.7 K/mcL Final   • Absolute Lymph 06/11/2018 2.8  1.0 - 4.8 K/mcL Final   • Absolute Mono 06/11/2018 0.6  0.3 - 0.9 K/mcL Final   • Absolute Eos 06/11/2018 0.2  0.1 - 0.5 K/mcL Final   • Absolute Baso 06/11/2018 0.1  0.0 - 0.3 K/mcL Final   • Sodium 06/11/2018 142  135 - 145 mmol/L Final   • Potassium 06/11/2018 3.6  3.4 - 5.1 mmol/L Final   • Chloride 06/11/2018 100  98 - 107 mmol/L Final   • Carbon Dioxide 06/11/2018 30  21 - 32 mmol/L Final   • Anion Gap 06/11/2018 16  10 - 20 mmol/L Final   • Glucose 06/11/2018 89  65 - 99 mg/dl Final   • BUN 06/11/2018 10  6 - 20 mg/dl Final   • Creatinine 06/11/2018 0.88  0.67 - 1.17 mg/dl Final   • GFR Estimate,  06/11/2018 >90    Final   • GFR Estimate, Non  06/11/2018 >90    Final   • BUN/Creatinine Ratio 06/11/2018 11  7 - 25   Final   • TOTAL BILIRUBIN 06/11/2018 1  0.2 - 1.0 mg/dl Final   • AST/SGOT 06/11/2018 24  <38 Units/L Final   • ALK PHOSPHATASE 06/11/2018 102  45 - 117 Units/L Final   • Albumin 06/11/2018 4.7  3.6 - 5.1 g/dl Final   • TOTAL PROTEIN 06/11/2018 7.8  6.4 - 8.2 g/dl Final   • GLOBULIN 06/11/2018 3.1  2.0 - 4.0 g/dl Final   • A/G Ratio, Serum 06/11/2018 1.5  1.0 - 2.4   Final   • CALCIUM 06/11/2018 10.2  8.4 - 10.2 mg/dl Final   • ALT/SGPT 06/11/2018 60  <79 Units/L Final   • FASTING STATUS 06/11/2018 12  hrs Final   • CHOLESTEROL 06/11/2018 248* <200 mg/dl Final   • HDL 06/11/2018 47  >39 mg/dl Final   •  TRIGLYCERIDE 06/11/2018 122  <150 mg/dl Final   • CALCULATED LDL 06/11/2018 177* <130 mg/dl Final   • CALCULATED NON HDL 06/11/2018 201  mg/dl Final   • CHOL/HDL 06/11/2018 5.3* <4.5   Final   • TSH 06/11/2018 0.647  0.350 - 5.000 mcUnits/mL Final   • DIFF TYPE 06/11/2018 AUTOMATED DIFFERENTIAL    Final   • Fasting Status 06/11/2018 12  hrs Final   • NRBC 06/11/2018 0  0 /100 WBC Final   • Percent Immature Granuloctyes 06/11/2018 1  % Final   • Absolute Immature Granulocytes 06/11/2018 0.1  0 - 0.2 K/mcL Final         ASSESSMENT AND PLAN:  This is a 42 year old male who presents with :    1. Moderate major depression (CMS/HCC)    2. Dyslipidemia    3. Erectile dysfunction, unspecified erectile dysfunction type    4. Essential (primary) hypertension          Orders Placed This Encounter   • Glycohemoglobin   • TESTOSTERONE TOTAL-MALE   • Comprehensive Metabolic Panel   • Thyroid Stimulating Hormone   • PSA   • CBC with Automated Differential   • amLODIPine (NORVASC) 5 MG tablet   • sildenafil (VIAGRA) 100 MG tablet       Plan:    Moderate major depression:  Continue Paxil 40 mg p.o qd.    Dyslipidemia:  Ordered repeat labs. Refer to orders.    Erectile dysfunction, unspecified erectile dysfunction type:  Questionable side effects of medications.  Ordered testosterone as well as normal fasting labs. Refer to orders.  Prescribed Sildenafil for erectile dysfunction as needed.  Patient was counseled.  His blood pressure medications were adjusted. He may require adjustments in his anti depressants.     Essential (primary) hypertension:  Poorly controlled secondary to non-compliance.  Prescribed Amlodipine 5 mg p.o qd.  Educated at length.    Ordered labs. Refer to orders.  He was educated at great length.  Follow up in one month.    Refer to orders.  Medical compliance with plan discussed and risks of non-compliance reviewed.  Patient education completed on disease process, etiology & prognosis.  Proper usage and side  effects of medications reviewed & discussed.  Patient understands and agrees with the plan.  Return to clinic as clinically indicated as discussed with patient who verbalized understanding of the plan and is in agreement with the plan.    Entered by Dr. James Ghosh acting as scribe for Dr. Melvin Ruiz, DO   Closed head injury

## 2020-04-24 ENCOUNTER — APPOINTMENT (OUTPATIENT)
Dept: GASTROENTEROLOGY | Facility: HOSPITAL | Age: 80
End: 2020-04-24

## 2020-05-08 ENCOUNTER — APPOINTMENT (OUTPATIENT)
Dept: GASTROENTEROLOGY | Facility: CLINIC | Age: 80
End: 2020-05-08
Payer: MEDICARE

## 2020-05-08 DIAGNOSIS — K55.9 VASCULAR DISORDER OF INTESTINE, UNSPECIFIED: ICD-10-CM

## 2020-05-08 PROCEDURE — 99443: CPT

## 2020-05-08 NOTE — REASON FOR VISIT
[Post Hospitalization] : a post hospitalization visit [Family Member] : family member [FreeTextEntry1] : hospitalized for ischemic colitis

## 2020-05-08 NOTE — ASSESSMENT
[FreeTextEntry1] : \par Assessment\par 1) drop in hemoglobin with episode of ischemic colitis; patient also had a positive Cologuard test thus colon cancer or advanced adenoma must be considered\par 2) past history of colonic polyp with negative colonoscopy in 2008 or 2010, increased risk for colon cancer), possible history of ulcerative colitis versus irritable bowel syndrome; the occasional red blood on stool may be due to uterine prolapse, patient is now 77 and would usually not be considered for further screening and she does not wish colonoscopy\par 3) chronic constipation with hemorrhoids which may explain red blood in stool upon straining at defecation; hemoglobin is normal without microcytosis\par 4) chronic reflux symptoms with dysphagia to solids and globus sensation, rule out stricture, erosive esophagitis, eosinophilic esophagitis, no esophageal biopsies on file from previous two EGDs, would suggest that no significant esophagitis was noted; a large hiatal hernia has been described in the past; however, the most recent esophagram reports a large paraesophageal hernia with residual fluid\par 5) mild reflux symptoms with negative biopsies for esophagitis in the remote past\par 6) history of gallbladder polyp; however, sonogram in 2018 revealed only mild fatty liver with normal gallbladder\par Plan\par 1) use milk of magnesia and Miralax, use explained to the patient and her son\par 2) colonoscopy to be ordered when Unit is opened\par 3) famotidine 40 bid prn reflux symptoms, may take it hs as needed\par 4) Dietary strategies discussed with the patient including use of psyllium husk before breakfast and supper and Glucerna in place of lunch; mild exercise also discussed; weight once weekly; think of calory spending \par 5) office follow-up

## 2020-05-08 NOTE — HISTORY OF PRESENT ILLNESS
[Verbal consent obtained from patient] : the patient, [unfilled] [de-identified] : This visit was performed via Telehealth realtime 2-way audiovisual technology and lasted  37 minutes.\par \par Humaira Jason, a 79 year old lady, is seen for follow-up evaluation of chronic constipation, acid reflux disease, hiatal hernia and a remote past history of "colitis" and a colonic polyp.   She was recently admitted to [facility] on April 12, 2020 with a change in mental status, diarrhea, and severe abdominal pain and distention after eating ice cream.  I reviewed all 44 pages of the transcript from the hospital and discussed the results of the testing with the patient and her son.  The patient's hemoglobin dropped during the admission from 12.8 grams to 10.5 grams.   She had had blood stools.  The GI PCR test was ordered and resulted, but the results are not in the 44 pages.  She was seen by surgery as well as GI.  The CAT scan was described as "colitis;" however, , further review of the report describes thickening of the colonic wall in the distal transverse and proximal descending colon which is the hallmark of ischemic colitis.  She was treated with broad spectrum antibiotics and was discharged on April 15, 2020.    She is usually constipated which has been a problem for many years.  She is now back to her usual chronic constipation and has been taking milk of magnesia sporadically.  The stool seems to be stuck at the outlet of the left colon.\par \par She is concerned about having colonoscopy; therefore, a Cologuard test was performed and was positive in February 2020.  . Her last colonoscopy was in 2008 or 2010 and no pathology report is found of colon biopsies which may indicate a negative colonoscopy. Previously she had had a polyp resected; the pathology of the polyp is not documented. She had had EGD with biopsies both of those years. The two previous EGDs did not reveal significant esophagitis. A barium swallow revealed a hiatal hernia with mild reflux. She now has heartburn rarely without taking Nexium. Her stools remain hard and she has noted blood in the stool intermittently for the past few months. She also has a uterine prolapse and is unsure if the blood is from the uterine prolapse or the rectum.\par \par She had been taking Nexium for years for acid reflux symptoms, but stopped in January 2017 because of concern about osteoporosis (patient has osteopenia). She states that she no longer has heartburn; however, she notes nocturnal acid reflux multiple times per week with pyrosis through the esophagus. She notes severe dyschezia with chronic constipation. She can go without a bowel movement for a week. She needs to manually disimpact the stool. The stool is scybalous and hard.\par \par In her 20s she was told of an irritable colon with recurrent loose stools. Colonoscopy in the past revealed small polyps. Upper endoscopy revealed mild gastritis without H. pylori and a moderate hiatal hernia was found. The last EGD was in 2010. The last colonoscopy was more then 10 years ago. She denies melena or hematochezia. She notes dysphagia to pills. She cut meat into small pieces because of past problems. She has occasional sore throat and hoarseness, especially in the morning. She can awaken with "acid reflux" which she characterizers as burning. A barium esophagram in June 2019 revealed a large paraesophageal hernia. Previous endoscopies in 2008 and 2010 described a large hiatal hernia. An esophagram in 2008 described a 7.0 x 9.0 hiatal hernia which was reported to Baljeet Owens, a gastroenterologist in [place].. \par \par

## 2020-07-06 ENCOUNTER — APPOINTMENT (OUTPATIENT)
Dept: GASTROENTEROLOGY | Facility: CLINIC | Age: 80
End: 2020-07-06
Payer: MEDICARE

## 2020-07-06 DIAGNOSIS — D12.6 BENIGN NEOPLASM OF COLON, UNSPECIFIED: ICD-10-CM

## 2020-07-06 DIAGNOSIS — K21.9 GASTRO-ESOPHAGEAL REFLUX DISEASE W/OUT ESOPHAGITIS: ICD-10-CM

## 2020-07-06 PROCEDURE — 99443: CPT

## 2020-07-06 RX ORDER — OLMESARTAN MEDOXOMIL 40 MG/1
40 TABLET, FILM COATED ORAL
Qty: 90 | Refills: 0 | Status: ACTIVE | COMMUNITY
Start: 2020-04-20

## 2020-07-06 RX ORDER — DILTIAZEM HYDROCHLORIDE 120 MG/1
120 CAPSULE, EXTENDED RELEASE ORAL
Qty: 90 | Refills: 0 | Status: ACTIVE | COMMUNITY
Start: 2020-06-15

## 2020-07-06 RX ORDER — LISINOPRIL 40 MG/1
40 TABLET ORAL DAILY
Qty: 90 | Refills: 3 | Status: DISCONTINUED | COMMUNITY
Start: 2019-04-02 | End: 2020-07-06

## 2020-07-06 RX ORDER — ALPRAZOLAM 1 MG/1
1 TABLET ORAL
Qty: 30 | Refills: 0 | Status: ACTIVE | COMMUNITY
Start: 2020-06-12

## 2020-07-07 NOTE — ASSESSMENT
[FreeTextEntry1] : Assessment\par 1) drop in hemoglobin with episode of ischemic colitis; patient also had a positive Cologuard test thus colon cancer or advanced adenoma must be considered\par 2) past history of colonic polyp with negative colonoscopy in 2008 or 2010, increased risk for colon cancer), possible history of ulcerative colitis versus irritable bowel syndrome; the occasional red blood on stool may be due to uterine prolapse, patient is now 77 and would usually not be considered for further screening and she does not wish colonoscopy\par 3) chronic constipation with hemorrhoids which may explain red blood in stool upon straining at defecation; hemoglobin is normal without microcytosis\par 4) chronic reflux symptoms with dysphagia to solids and globus sensation, rule out stricture, erosive esophagitis, eosinophilic esophagitis, no esophageal biopsies on file from previous two EGDs, would suggest that no significant esophagitis was noted; a large hiatal hernia has been described in the past; however, the most recent esophagram reports a large paraesophageal hernia with residual fluid\par 5) mild reflux symptoms with negative biopsies for esophagitis in the remote past\par 6) history of gallbladder polyp; however, sonogram in 2018 revealed only mild fatty liver with normal gallbladder\par Plan\par 1) use milk of magnesia and Miralax, use explained to the patient and her son\par 2) EGD and colonoscopy to be ordered when patient is able to come, she has had problems with anesthesia in the past according to her during colonoscopy; therefore, I will send her for presurgical evaluation by anesthesia\par 3) famotidine 40 bid prn reflux symptoms, may take it as as needed\par 4) Dietary strategies discussed with the patient including use of psyllium husk before breakfast and supper and Glucerna in place of lunch; mild exercise also discussed; weight once weekly; think of calory spending \par 5) follow-up. \par

## 2020-07-07 NOTE — HISTORY OF PRESENT ILLNESS
[Home] : at home, [unfilled] , at the time of the visit. [Verbal consent obtained from patient] : the patient, [unfilled] [Other Location: e.g. Home (Enter Location, City,State)___] : at [unfilled] [de-identified] : This visit was performed via Telehealth realtime 2-way audio technology and lasted 30 minutes. \par \par Humaira Jason, a 79 year old lady, is seen for follow-up evaluation of chronic constipation, acid reflux disease, hiatal hernia and a remote past history of "colitis" and a colonic polyp. She was admitted to [facility] on April 12, 2020 with a change in mental status, diarrhea, and severe abdominal pain and distention after eating ice cream. I reviewed all 44 pages of the transcript from the hospital and discussed the results of the testing with the patient and her son. The patient's hemoglobin dropped during the admission from 12.8 grams to 10.5 grams. She had had blood stools. The GI PCR test was ordered and resulted, but the results are not in the 44 pages. She was seen by surgery as well as GI. The CAT scan was described as "colitis;" however, , further review of the report describes thickening of the colonic wall in the distal transverse and proximal descending colon which is the hallmark of ischemic colitis. She was treated with broad spectrum antibiotics and was discharged on April 15, 2020. She is usually constipated which has been a problem for many years. She is now back to her usual chronic constipation and has been taking milk of magnesia sporadically.  The stool seems to be stuck at the outlet of the left colon.  She is also concerned because she has uterine prolapse and the pessary is not working.  She is to see a urogynecologist to address this matter in the next week.\par \par She was admitted to [facility] for elevated blood pressure (200/107) this past month, June 2020.  She had weakness and left eye was painful.  MRI and CAT scan were negative.  Her medications were increased to control the blood pressure.  \par \par \par She is concerned about having colonoscopy; therefore, a Cologuard test was performed and was positive in February 2020.. Her last colonoscopy was in 2008 or 2010 and no pathology report is found of colon biopsies which may indicate a negative colonoscopy. Previously she had had a polyp resected; the pathology of the polyp is not documented. She had had EGD with biopsies both of those years. The two previous EGDs did not reveal significant esophagitis. A barium swallow revealed a hiatal hernia with mild reflux. She now has heartburn rarely without taking Nexium. Her stools remain hard and she has noted blood in the stool intermittently for the past few months. She also has a uterine prolapse and is unsure if the blood is from the uterine prolapse or the rectum.\par \par She had been taking Nexium for years for acid reflux symptoms, but stopped in January 2017 because of concern about osteoporosis (patient has osteopenia). She states that she no longer has heartburn; however, she notes nocturnal acid reflux multiple times per week with pyrosis through the esophagus. She notes severe dyschezia with chronic constipation. She can go without a bowel movement for a week. She needs to manually disimpact the stool. The stool is scybalous and hard.\par \par \par In her 20s she was told of an irritable colon with recurrent loose stools. Colonoscopy in the past revealed small polyps. Upper endoscopy revealed mild gastritis without H. pylori and a moderate hiatal hernia was found. The last EGD was in 2010. The last colonoscopy was more then 10 years ago. She denies melena or hematochezia. She notes dysphagia to pills. She cut meat into small pieces because of past problems. She has occasional sore throat and hoarseness, especially in the morning. She can awaken with "acid reflux" which she characterizers as burning. A barium esophagram in June 2019 revealed a large paraesophageal hernia. Previous endoscopies in 2008 and 2010 described a large hiatal hernia. An esophagram in 2008 described a 7.0 x 9.0 hiatal hernia which was reported to Baljeet Owens, a gastroenterologist in [place].. \par \par  \par \par

## 2020-08-04 NOTE — PROGRESS NOTE ADULT - NSHPATTENDINGPLANDISCUSS_GEN_ALL_CORE
the patient, and orthopedic resident.
the patient and the orthopedic resident.
the patient, and the orthopedic resident.
the patient, son and the orthopedic resident.
none

## 2020-08-21 NOTE — ED PROVIDER NOTE - CARE PLAN
Patient: Krista Wiggins    Pre-op Dx:  Shortness of breath    Post-op Dx:  Ischemic heart disease, elevated left ventricular end-diastolic pressure    Procedure: Left heart cardiac catheterization, left ventriculogram via right femoral artery access, iliac femoral artery g, 6 Sami Angio-Seal closure device with good hemostasis.    Surgeon:Claudia Pleitez MD, MD    Assistants:  Felecia    Anesthesia Staff: No anesthesia staff entered.    Anesthesia Type:  Local with sedation    Findings: Left Main Coronary artery: Normal; Left Anterior descending artery:  Patent stent in the mid LAD ostial diagonal branch has 30% stenosis with ALEX 3 flow; Left Circumflex coronary artery: Normal; Right Coronary artery: Normal ; Left Ventriculogram:  Anterior apical inferoapical hypokinesis.  Ejection fraction 55% left ventricular end-diastolic pressure is 19 mmHg.  No gradient on pullback across the aortic valve.      Estimated Blood Loss:  Less than 30 cc    Complications:  None    Specimens Removed:  None    Antiplatelet recommendations to CV Surgeon    Does the patient have ACS? No      Conclusion:  Stable coronary artery disease  Patent stent in the LAD with ALEX 3 flow  30% ostial 1st diagonal disease  Right-dominant circulation  Ischemic heart disease    Plan  Medical management    Claudia Pleitez MD FACP [facility]  Interventional Cardiology  [facility]  8/21/2020,3:07 PM.     Pager#: [phone]  [facility]: [phone]   [facility]: [phone]  [facility]: [phone]  [email or web address]                          
Principal Discharge DX:	Insomnia  Assessment and plan of treatment:	Follow up with your Primary Care Physician within the next 2-3 days  Bring a copy of your test results with you to your appointment  Continue your current medication regimen  Return to the Emergency Room if you experience new or worsening symptoms

## 2020-10-03 ENCOUNTER — RX RENEWAL (OUTPATIENT)
Age: 80
End: 2020-10-03

## 2020-10-03 RX ORDER — CARVEDILOL 25 MG/1
25 TABLET, FILM COATED ORAL TWICE DAILY
Qty: 180 | Refills: 3 | Status: ACTIVE | COMMUNITY
Start: 2018-11-03 | End: 1900-01-01

## 2020-11-09 NOTE — ED ADULT NURSE REASSESSMENT NOTE - NS ED NURSE REASSESS COMMENT FT1
Pt laying in stretcher with daughter at bedside. Pending results. Pt states wrist is feeling better.
Pt provided more blankets and pillow. Updated on plan of care. Repeat trop drawn and sent to lab.
Pt updated on plan of care.
Report received from HAL Zuniga. Pt laying in stretcher with family at bedside. Pt c/o R wrist swelling and discomfort. Wrist red and warm upon touch. IV inserted as ordered and labs drawn. Call bell within reach.
Plan: Patient had not yet initiated treatment as he was unsure whether he could apply in his mouth. Apply Fluocinonide gel to affected areas in the mouth 2-3 times a day. Will follow up in 6 weeks.
Detail Level: Zone

## 2020-12-15 ENCOUNTER — APPOINTMENT (OUTPATIENT)
Dept: MAMMOGRAPHY | Facility: IMAGING CENTER | Age: 80
End: 2020-12-15

## 2020-12-15 ENCOUNTER — APPOINTMENT (OUTPATIENT)
Dept: ULTRASOUND IMAGING | Facility: IMAGING CENTER | Age: 80
End: 2020-12-15

## 2021-01-04 ENCOUNTER — APPOINTMENT (OUTPATIENT)
Dept: MAMMOGRAPHY | Facility: HOSPITAL | Age: 81
End: 2021-01-04
Payer: MEDICARE

## 2021-01-04 ENCOUNTER — APPOINTMENT (OUTPATIENT)
Dept: ULTRASOUND IMAGING | Facility: HOSPITAL | Age: 81
End: 2021-01-04
Payer: MEDICARE

## 2021-01-04 ENCOUNTER — OUTPATIENT (OUTPATIENT)
Dept: OUTPATIENT SERVICES | Facility: HOSPITAL | Age: 81
LOS: 1 days | End: 2021-01-04
Payer: MEDICARE

## 2021-01-04 DIAGNOSIS — S72.001A FRACTURE OF UNSPECIFIED PART OF NECK OF RIGHT FEMUR, INITIAL ENCOUNTER FOR CLOSED FRACTURE: Chronic | ICD-10-CM

## 2021-01-04 DIAGNOSIS — N64.4 MASTODYNIA: ICD-10-CM

## 2021-01-04 DIAGNOSIS — E89.0 POSTPROCEDURAL HYPOTHYROIDISM: Chronic | ICD-10-CM

## 2021-01-04 PROCEDURE — 76641 ULTRASOUND BREAST COMPLETE: CPT

## 2021-01-04 PROCEDURE — 77066 DX MAMMO INCL CAD BI: CPT

## 2021-01-04 PROCEDURE — 77066 DX MAMMO INCL CAD BI: CPT | Mod: 26

## 2021-01-04 PROCEDURE — G0279: CPT | Mod: 26

## 2021-01-04 PROCEDURE — 76641 ULTRASOUND BREAST COMPLETE: CPT | Mod: 26,50

## 2021-01-04 PROCEDURE — G0279: CPT

## 2021-01-17 ENCOUNTER — APPOINTMENT (OUTPATIENT)
Dept: GASTROENTEROLOGY | Facility: CLINIC | Age: 81
End: 2021-01-17
Payer: MEDICARE

## 2021-01-17 ENCOUNTER — NON-APPOINTMENT (OUTPATIENT)
Age: 81
End: 2021-01-17

## 2021-01-17 PROCEDURE — ZZZZZ: CPT

## 2021-02-03 ENCOUNTER — APPOINTMENT (OUTPATIENT)
Dept: GASTROENTEROLOGY | Facility: CLINIC | Age: 81
End: 2021-02-03

## 2021-02-09 ENCOUNTER — APPOINTMENT (OUTPATIENT)
Dept: GASTROENTEROLOGY | Facility: CLINIC | Age: 81
End: 2021-02-09
Payer: MEDICARE

## 2021-02-09 DIAGNOSIS — R71.0 PRECIPITOUS DROP IN HEMATOCRIT: ICD-10-CM

## 2021-02-09 DIAGNOSIS — R13.10 DYSPHAGIA, UNSPECIFIED: ICD-10-CM

## 2021-02-09 PROCEDURE — 99443: CPT

## 2021-02-11 NOTE — HISTORY OF PRESENT ILLNESS
[Home] : at home, [unfilled] , at the time of the visit. [Medical Office: ([facility])___] : at the medical office located in  [Verbal consent obtained from patient] : the patient, [unfilled] [de-identified] : Humaira Jason, a 80 year old lady, is seen for follow-up evaluation of dysphagia, chronic constipation, acid reflux disease, hiatal hernia and a remote past history of "colitis" and a colonic polyp.  She is also concerned about receiving the COVID-19 vaccination because of her problems with hypertension and the accounts of mortality after receiving the vaccine (2 people).  I explained to her at length the importance of receiving the vaccine at her age.\par \par She had been admitted to [facility] on April 12, 2020 with a change in mental status, diarrhea, and severe abdominal pain and distention after eating ice cream. I reviewed all 44 pages of the transcript from the hospital and discussed the results of the testing with the patient and her son. The patient's hemoglobin dropped during the admission from 12.8 grams to 10.5 grams. She had had blood stools. The GI PCR test was ordered and resulted, but the results are not in the 44 pages. She was seen by surgery as well as GI. The CAT scan was described as "colitis;" however, further review of the report describes thickening of the colonic wall in the distal transverse and proximal descending colon which is the hallmark of ischemic colitis. She was treated with broad spectrum antibiotics and was discharged on April 15, 2020. She is usually constipated which has been a problem for many years. She is now back to her usual chronic constipation and has been taking milk of magnesia sporadically.   She has been advised Miralax by me but does not take it regularly nor in large enough quantity  to have bowel movements; i.e., more than three capfuls 3 or more times per day.   The stool seems to be stuck at the outlet of the left colon. She has tried Fleet enemas occasionally with some response.  Recently she was given samples of Trulance for constipation:  she had two bowel movements in one week, but noted abdominal cramping with each movement.  \par \par She is also concerned because she has uterine prolapse and the pessary is not working. She is to see a urogynecologist to address this matter in the next week.\par \par She was admitted to [facility] for elevated blood pressure (200/107) in June 2020. She had weakness and left eye was painful. MRI and CAT scan were negative. Her medications were increased to control the blood pressure. \par \par She had been concerned about having colonoscopy; therefore, a Cologuard test was performed and was positive in February 2020.. Her last colonoscopy was in 2008 or 2010 and no pathology report is found of colon biopsies which may indicate a negative colonoscopy. Previously she had had a polyp resected; the pathology of the polyp is not documented. She had had EGD with biopsies both of those years. The two previous EGDs did not reveal significant esophagitis. A barium swallow revealed a hiatal hernia with mild reflux. She now has heartburn rarely without taking Nexium. Her stools remain hard and she has noted blood in the stool intermittently for the past few months. She also has a uterine prolapse and is unsure if the blood is from the uterine prolapse or the rectum.\par \par She had been taking Nexium for years for acid reflux symptoms, but stopped in January 2017 because of concern about osteoporosis (patient has osteopenia). She states that she no longer has heartburn; however, she notes nocturnal acid reflux multiple times per week with pyrosis through the esophagus. She notes severe dyschezia with chronic constipation. She can go without a bowel movement for a week. She needs to manually disimpact the stool. The stool is scybalous and hard.\par \par \par In her 20s she was told of an irritable colon with recurrent loose stools. Colonoscopy in the past revealed small polyps. Upper endoscopy revealed mild gastritis without H. pylori and a moderate hiatal hernia was found. The last EGD was in 2010. The last colonoscopy was more then 10 years ago. She denies melena or hematochezia. She notes dysphagia to pills. She cut meat into small pieces because of past problems. She has occasional sore throat and hoarseness, especially in the morning. She can awaken with "acid reflux" which she characterizers as burning. A barium esophagram in June 2019 revealed a large paraesophageal hernia. Previous endoscopies in 2008 and 2010 described a large hiatal hernia. An esophagram in 2008 described a 7.0 x 9.0 hiatal hernia which was reported to Baljeet Owens, a gastroenterologist in [place].. \par

## 2021-02-11 NOTE — ASSESSMENT
[FreeTextEntry1] : Assessment\par 1) drop in hemoglobin with episode of ischemic colitis; patient also had a positive Cologuard test thus colon cancer or advanced adenoma must be considered\par 2) past history of colonic polyp with negative colonoscopy in 2008 or 2010, increased risk for colon cancer), possible history of ulcerative colitis versus irritable bowel syndrome with recent episode of ischemic colitis in 2020; the occasional red blood on stool may be due to uterine prolapse, \par 3) chronic constipation with hemorrhoids which may explain red blood in stool upon straining at defecation; hemoglobin is normal without microcytosis\par 4) chronic reflux symptoms with dysphagia to solids and globus sensation, rule out stricture, erosive esophagitis, eosinophilic esophagitis, no esophageal biopsies on file from previous two EGDs, would suggest that no significant esophagitis was noted; a large hiatal hernia has been described in the past; however, the most recent esophagram reports a large paraesophageal hernia with residual fluid\par 5) mild reflux symptoms with negative biopsies for esophagitis in the remote past\par 6) history of gallbladder polyp; however, sonogram in 2018 revealed only mild fatty liver with normal gallbladder\par Plan\par 1) try using a Fleet enema every other night\par 2) EGD ordered, hold colonoscopy\par 3) famotidine 40 bid prn reflux symptoms, may take it as as needed\par 4) Dietary strategies discussed with the patient including use of psyllium husk before breakfast and supper and Glucerna in place of lunch; mild exercise also discussed; weight once weekly; think of calory spending \par 5) follow-up. \par

## 2021-04-28 ENCOUNTER — EMERGENCY (EMERGENCY)
Facility: HOSPITAL | Age: 81
LOS: 1 days | Discharge: HOME | End: 2021-04-28
Attending: EMERGENCY MEDICINE
Payer: MEDICARE

## 2021-04-28 VITALS
TEMPERATURE: 99 F | SYSTOLIC BLOOD PRESSURE: 189 MMHG | WEIGHT: 130.07 LBS | HEIGHT: 63 IN | DIASTOLIC BLOOD PRESSURE: 117 MMHG | RESPIRATION RATE: 16 BRPM | HEART RATE: 77 BPM | OXYGEN SATURATION: 98 %

## 2021-04-28 DIAGNOSIS — S72.001A FRACTURE OF UNSPECIFIED PART OF NECK OF RIGHT FEMUR, INITIAL ENCOUNTER FOR CLOSED FRACTURE: Chronic | ICD-10-CM

## 2021-04-28 DIAGNOSIS — E89.0 POSTPROCEDURAL HYPOTHYROIDISM: Chronic | ICD-10-CM

## 2021-04-28 LAB
ALBUMIN SERPL ELPH-MCNC: 4.1 G/DL — SIGNIFICANT CHANGE UP (ref 3.3–5)
ALP SERPL-CCNC: 113 U/L — SIGNIFICANT CHANGE UP (ref 40–120)
ALT FLD-CCNC: 19 U/L — SIGNIFICANT CHANGE UP (ref 10–45)
ANION GAP SERPL CALC-SCNC: 13 MMOL/L — SIGNIFICANT CHANGE UP (ref 5–17)
AST SERPL-CCNC: 23 U/L — SIGNIFICANT CHANGE UP (ref 10–40)
BASOPHILS # BLD AUTO: 0.07 K/UL — SIGNIFICANT CHANGE UP (ref 0–0.2)
BASOPHILS NFR BLD AUTO: 0.8 % — SIGNIFICANT CHANGE UP (ref 0–2)
BILIRUB SERPL-MCNC: 0.3 MG/DL — SIGNIFICANT CHANGE UP (ref 0.2–1.2)
BUN SERPL-MCNC: 14 MG/DL — SIGNIFICANT CHANGE UP (ref 7–23)
CALCIUM SERPL-MCNC: 10.1 MG/DL — SIGNIFICANT CHANGE UP (ref 8.4–10.5)
CHLORIDE SERPL-SCNC: 102 MMOL/L — SIGNIFICANT CHANGE UP (ref 96–108)
CO2 SERPL-SCNC: 25 MMOL/L — SIGNIFICANT CHANGE UP (ref 22–31)
CREAT SERPL-MCNC: 0.88 MG/DL — SIGNIFICANT CHANGE UP (ref 0.5–1.3)
D DIMER BLD IA.RAPID-MCNC: 274 NG/ML DDU — HIGH
EOSINOPHIL # BLD AUTO: 0.12 K/UL — SIGNIFICANT CHANGE UP (ref 0–0.5)
EOSINOPHIL NFR BLD AUTO: 1.4 % — SIGNIFICANT CHANGE UP (ref 0–6)
GLUCOSE SERPL-MCNC: 104 MG/DL — HIGH (ref 70–99)
HCT VFR BLD CALC: 40.5 % — SIGNIFICANT CHANGE UP (ref 34.5–45)
HGB BLD-MCNC: 13.3 G/DL — SIGNIFICANT CHANGE UP (ref 11.5–15.5)
IMM GRANULOCYTES NFR BLD AUTO: 0.6 % — SIGNIFICANT CHANGE UP (ref 0–1.5)
LYMPHOCYTES # BLD AUTO: 1.77 K/UL — SIGNIFICANT CHANGE UP (ref 1–3.3)
LYMPHOCYTES # BLD AUTO: 20.5 % — SIGNIFICANT CHANGE UP (ref 13–44)
MCHC RBC-ENTMCNC: 31.1 PG — SIGNIFICANT CHANGE UP (ref 27–34)
MCHC RBC-ENTMCNC: 32.8 GM/DL — SIGNIFICANT CHANGE UP (ref 32–36)
MCV RBC AUTO: 94.6 FL — SIGNIFICANT CHANGE UP (ref 80–100)
MONOCYTES # BLD AUTO: 0.71 K/UL — SIGNIFICANT CHANGE UP (ref 0–0.9)
MONOCYTES NFR BLD AUTO: 8.2 % — SIGNIFICANT CHANGE UP (ref 2–14)
NEUTROPHILS # BLD AUTO: 5.92 K/UL — SIGNIFICANT CHANGE UP (ref 1.8–7.4)
NEUTROPHILS NFR BLD AUTO: 68.5 % — SIGNIFICANT CHANGE UP (ref 43–77)
NRBC # BLD: 0 /100 WBCS — SIGNIFICANT CHANGE UP (ref 0–0)
PLATELET # BLD AUTO: 292 K/UL — SIGNIFICANT CHANGE UP (ref 150–400)
POTASSIUM SERPL-MCNC: 3.9 MMOL/L — SIGNIFICANT CHANGE UP (ref 3.5–5.3)
POTASSIUM SERPL-SCNC: 3.9 MMOL/L — SIGNIFICANT CHANGE UP (ref 3.5–5.3)
PROT SERPL-MCNC: 7.5 G/DL — SIGNIFICANT CHANGE UP (ref 6–8.3)
RBC # BLD: 4.28 M/UL — SIGNIFICANT CHANGE UP (ref 3.8–5.2)
RBC # FLD: 13.5 % — SIGNIFICANT CHANGE UP (ref 10.3–14.5)
SARS-COV-2 RNA SPEC QL NAA+PROBE: SIGNIFICANT CHANGE UP
SODIUM SERPL-SCNC: 140 MMOL/L — SIGNIFICANT CHANGE UP (ref 135–145)
TROPONIN T, HIGH SENSITIVITY RESULT: 10 NG/L — SIGNIFICANT CHANGE UP (ref 0–51)
TROPONIN T, HIGH SENSITIVITY RESULT: 10 NG/L — SIGNIFICANT CHANGE UP (ref 0–51)
WBC # BLD: 8.64 K/UL — SIGNIFICANT CHANGE UP (ref 3.8–10.5)
WBC # FLD AUTO: 8.64 K/UL — SIGNIFICANT CHANGE UP (ref 3.8–10.5)

## 2021-04-28 PROCEDURE — 93010 ELECTROCARDIOGRAM REPORT: CPT

## 2021-04-28 PROCEDURE — 99285 EMERGENCY DEPT VISIT HI MDM: CPT

## 2021-04-28 PROCEDURE — 93308 TTE F-UP OR LMTD: CPT | Mod: 26

## 2021-04-28 PROCEDURE — 71045 X-RAY EXAM CHEST 1 VIEW: CPT | Mod: 26

## 2021-04-28 RX ORDER — HYDRALAZINE HCL 50 MG
50 TABLET ORAL ONCE
Refills: 0 | Status: COMPLETED | OUTPATIENT
Start: 2021-04-28 | End: 2021-04-28

## 2021-04-28 NOTE — ED PROVIDER NOTE - OBJECTIVE STATEMENT
80 Yr old female k/c DM HTN HLD MVP GERD Hiatal hernia Right Hip fracture s/p surgical fixation now with SOB last night.   The patient had lunch today after which noticed that taste was a bland.   Got worried in concern of Covid infection so presented to ED.   Has been following with her PMD who says due to her allergies is unfit for Pfizer, moderna, and on Friday will review her again regarding J and J  vaccine suitability. 80 Yr old female k/c DM HTN HLD MVP GERD Hiatal hernia Right Hip fracture s/p surgical fixation now with SOB last night.   The patient had lunch today after which noticed that taste was a bland.   Got worried in concern of Covid infection so presented to ED.   Has been following with her PMD who says due to her allergies is unfit for Pfizer, moderna, and on Friday will review her again regarding J and J  vaccine suitability.  Patient reports occasional sob and chest pain, not realted to exertion, no pressure like symptoms, no pain with eating, no nausea/vomiting/sweating.

## 2021-04-28 NOTE — ED ADULT NURSE NOTE - OBJECTIVE STATEMENT
Pt 81 y/o female, AxOx3, presents to ED from home complaining of SOB since last night. PMH of HTN, known DVT behind left knee. Pt is well appearing, speaking full sentences without difficulty. Breathing spontaneous and unlabored with pulse ox >95% on room air. Upon assessment, abdomen soft and nontender, +strong peripheral pulses, moving all extremities without difficulty, lungs clear. Pt denies CP, SOB, HA, vision changes, n/v/d, fevers chills, abdominal pain. Safety and comfort measures initiated- bed placed in lowest position and side rails raised. Pt oriented to call bell system.

## 2021-04-28 NOTE — ED PROVIDER NOTE - ATTENDING CONTRIBUTION TO CARE
Daughter educated on patient's course by Dr. EMILY Schwarz at the verbal permission of the patient Ms. Yost.  Bran Fernandes MD, FACEP

## 2021-04-28 NOTE — ED ADULT NURSE REASSESSMENT NOTE - NS ED NURSE REASSESS COMMENT FT1
Handoff report received from HAL Harris. Pt resting comfortably in purple 17. Pt A&O x 4, ambulatory. BP elevated. MD Gudino aware of BP. Pt placed on cardiac monitor. Pt denies CP and SOB at this time. Pt denies pain and needs at this time. Bed in lowest position, side rails up and call bell in reach. Pt instructed to ring call bell if anything is needed.

## 2021-04-28 NOTE — ED PROVIDER NOTE - CLINICAL SUMMARY MEDICAL DECISION MAKING FREE TEXT BOX
80 Yr old female k/c DM HTN HLD MVP GERD Hiatal hernia Right Hip fracture s/p surgical fixation now with SOB last night.   The patient had lunch today after which noticed that taste was a bland. On exam /90 mmHg otherwise wnl. PE wnl. Labs, CXR, observe. 80 Yr old female k/c DM HTN HLD MVP GERD Hiatal hernia Right Hip fracture s/p surgical fixation now with SOB last night.   The patient had lunch today after which noticed that taste was a bland. On exam /90 mmHg otherwise wnl. PE wnl. Labs, CXR, observe.  Patient is anxious about health and COVID-19 pandemic concerns. Cp and shortness of breath is not exertional or typical for anginal chest pain.  Historical features, symptoms, and clinical exam not consistent with unstable angina, ACS, CHF.  will get iv, cbc, cmp, ekg, tte, cxr, d-dimer for low risk pe, < 15% likelihood of PE by gestalt, tele monitor and reassess  Will follow up on labs, analgesia, imaging, reassess and disposition as clinically indicated.   Dimer + and CTA within normal limits   no events on telemetry  RVP pending  Patient safe for discharge without emergent findings but will need primary medical doctor and possible cardiology follow up and this was explained to the patient.  The patient was serially evaluated throughout emergency department course. There was no acute deterioration up to this time in the department. Patient has demonstrated clinical improvement and is stable, feels better at this time according to emergency department team. Agree with goals/plan of emergency department care as described in this physician's electronic medical record, including diagnostics, therapeutics and consultation as clinically warranted. Will discharge home with close outpatient follow up with primary care physician/provider and specialist if necessary. The patient and/or family was educated on concerning signs and features to return to the emergency department, in layman terms, including but not limited to: nausea, vomiting, fever, chills, persistent/worsening symptoms or any concerns at all. No immediate life threatening issues present on history, clinical exam, or any diagnostic evaluation. The patient is a safe disposition home, has capacity and insight into their condition, is ambulatory in the Emergency Department with no further questions and will follow up with their doctor(s) this week. The patient and/or family were given the opportunity to ask questions and have them answered in full. The patient and/or family are with capacity and insight into the situation, treatment, risks, benefits, alternative therapies, and understand that they can ask any further questions if needed. Patient and/or family/guardian understand anticipatory guidance were given strict return and follow up precautions.  The patient and/or family/guardian have been informed of all concerning signs and symptoms to return to Emergency Department, the necessity to follow up with the PMD/Clinic/follow up provided within 2-3 days was explained, and the patient and/or family reports understanding of above with capacity and insight. The patient and/or family/guardian were informed of any results of their tests and are were encouraged to follow up on the findings with their doctor as well as the need to inform their doctor of any results. The patient and/or family/guardian are aware of the need to follow up with repeat testing as applicable and report understanding of the above with capacity and insight. The patient and/or family/guardian was made aware of any pending test results at the time of discharge and of the need to call back for the final results a well as the need to inform their doctor of the results.

## 2021-04-28 NOTE — ED PROVIDER NOTE - CARE PLAN
Principal Discharge DX:	Viral illness   Principal Discharge DX:	Chest pain  Secondary Diagnosis:	Shortness of breath  Secondary Diagnosis:	Decreased sense of smell

## 2021-04-28 NOTE — ED PROVIDER NOTE - NSFOLLOWUPCLINICS_GEN_ALL_ED_FT
[facility] - Primary Care  Primary Care  [address] [place] [address]  Phone: [phone]  Fax:   Follow Up Time: Urgent

## 2021-04-28 NOTE — ED PROVIDER NOTE - PHYSICAL EXAMINATION
PHYSICAL EXAM:    GENERAL: Lying in bed comfortably  HEAD:  Normocephalic  EYES: EOMI, PERRLA, conjunctiva and sclera clear  ENT: No erythema/pallor/petechiae/lesions; TMs clear b/l  NECK: Supple, No JVD  LUNG: CTA b/l; no r/r/w  HEART: RRR, +S1/S2; No m/r/g  ABDOMEN: soft, NT/ND; BS audible   EXTREMITIES:  2+ Peripheral Pulses, brisk cap refill. No clubbing, cyanosis, or edema  NERVOUS SYSTEM:  AAOx3, speech clear. No sensory/motor deficits   SKIN: No rashes or lesions PHYSICAL EXAM:    GENERAL: Lying in bed comfortably  HEAD:  Normocephalic  EYES: EOMI, PERRLA, conjunctiva and sclera clear  ENT: No erythema/pallor/petechiae/lesions; TMs clear b/l  NECK: Supple, No JVD  LUNG: CTA b/l; no r/r/w  HEART: RRR, +S1/S2; No m/r/g  ABDOMEN: soft, NT/ND; BS audible   EXTREMITIES:  2+ Peripheral Pulses, brisk cap refill. No clubbing, cyanosis, or edema  NERVOUS SYSTEM:  AAOx3, speech clear. No sensory/motor deficits   SKIN: No rashes or lesions  PSY: anxious about health, with capacity and insight, pleasant

## 2021-04-28 NOTE — ED PROVIDER NOTE - PATIENT PORTAL LINK FT
You can access the FollowMyHealth Patient Portal offered by [facility] by registering at the following website: [email or web address]. By joining [employer]’s FollowMyHealth portal, you will also be able to view your health information using other applications (apps) compatible with our system.

## 2021-04-28 NOTE — ED PROVIDER NOTE - NS ED ROS FT
CONSTITUTIONAL: No weakness, fevers or chills  EYES/ENT: No visual changes;  No vertigo or throat pain   NECK: No pain or stiffness  RESPIRATORY: c/o SOB. No cough  CARDIOVASCULAR: No chest pain or palpitations  GASTROINTESTINAL: No abdominal pain, nausea, vomiting, diarrhea or constipation.   GENITOURINARY: No dysuria, frequency  NEUROLOGICAL: No numbness or weakness  SKIN: No rashes, or lesions     All other review of systems is negative unless indicated above. CONSTITUTIONAL: No weakness, fevers or chills  EYES/ENT: No visual changes;  No vertigo or throat pain   NECK: No pain or stiffness  RESPIRATORY: c/o SOB. No cough  CARDIOVASCULAR: No chest pain or palpitations  GASTROINTESTINAL: No abdominal pain, nausea, vomiting, diarrhea or constipation.   GENITOURINARY: No dysuria, frequency  NEUROLOGICAL: No numbness or weakness  SKIN: No rashes, or lesions     All other review of systems is negative unless indicated above.  Bran Fernandes MD, FACEP ROS as per HPI, attending statement, or otherwise negative.

## 2021-04-28 NOTE — ED PROVIDER NOTE - NSFOLLOWUPINSTRUCTIONS_ED_ALL_ED_FT
You presented to the Emergency Room with shortness of breath and chest pain that has been ongoing for 1 month duration. We performed blood work on you which looked normal. You D-dimer was elevated likely in the setting of a known blood clot in your leg. We performed a cat scan of your chest to make sure there are no clots or infection. The scan showed everything was normal. Your COVID test was negative. Your symptoms are likely viral in nature. Please follow up with your primary care doctor within a few days to ensure that you have continuity of care, your symptoms are resolving and further worked up.   If you have worsening symptoms including shortness of breath, chest pain, nausea, vomiting, abdominal pain, or urinary frequency please return to the Emergency Department immediately.    No signs of emergency medical condition on today's workup.  Presumptive diagnosis made, but further evaluation may be required by your primary care doctor or specialist for a definitive diagnosis.  Therefore, follow up as directed and if symptoms change/worsen or any emergency conditions, please return to the ER    (1) Follow up with your primary care physician as discussed. In addition, we did not find evidence of a life threatening illness on your testing here today, but listed below are the specialists that will be necessary to see as an outpatient to continue the workup.  Please call the numbers listed below or [phone] to set up the necessary appointments.  (2) Immediately seek care at your nearest emergency room if your worsen, persist, or do not resolve   (3) Take Tylenol (up to 1000mg or 1 g)  and/or Motrin (up to 600mg) up to every 6 hours as needed for pain.   (4) Take the prescribed medication as instructed: Tylenol

## 2021-04-28 NOTE — ED PROVIDER NOTE - PROGRESS NOTE DETAILS
Klein: Pt d-dimer retuned as elevated likely in the setting of known thrombus below the left knee not requiring tx. CTA was performed as precaution to r/o PE. Exam returned as negative    I have given the pt strict return and follow up precautions. The patient has been provided with a copy of all pertinent results. The patient has been informed of all concerning signs and symptoms to return to Emergency Department, the necessity to follow up with PMD/Clinic/follow up provided within 2-3 days was explained, and the patient reports understanding of above with capacity and insight.

## 2021-04-29 VITALS
TEMPERATURE: 98 F | SYSTOLIC BLOOD PRESSURE: 163 MMHG | HEART RATE: 63 BPM | DIASTOLIC BLOOD PRESSURE: 84 MMHG | OXYGEN SATURATION: 96 % | RESPIRATION RATE: 17 BRPM

## 2021-04-29 PROCEDURE — 84484 ASSAY OF TROPONIN QUANT: CPT

## 2021-04-29 PROCEDURE — U0003: CPT

## 2021-04-29 PROCEDURE — 93308 TTE F-UP OR LMTD: CPT

## 2021-04-29 PROCEDURE — 71275 CT ANGIOGRAPHY CHEST: CPT

## 2021-04-29 PROCEDURE — 85379 FIBRIN DEGRADATION QUANT: CPT

## 2021-04-29 PROCEDURE — 99284 EMERGENCY DEPT VISIT MOD MDM: CPT | Mod: 25

## 2021-04-29 PROCEDURE — 71045 X-RAY EXAM CHEST 1 VIEW: CPT

## 2021-04-29 PROCEDURE — 80053 COMPREHEN METABOLIC PANEL: CPT

## 2021-04-29 PROCEDURE — 93005 ELECTROCARDIOGRAM TRACING: CPT | Mod: 76

## 2021-04-29 PROCEDURE — U0005: CPT

## 2021-04-29 PROCEDURE — 71275 CT ANGIOGRAPHY CHEST: CPT | Mod: 26,MA

## 2021-04-29 PROCEDURE — 85025 COMPLETE CBC W/AUTO DIFF WBC: CPT

## 2021-04-29 NOTE — ED ADULT NURSE REASSESSMENT NOTE - NS ED NURSE REASSESS COMMENT FT1
Pt discharged. Pt A&O x 4, ambulatory. VSS. Pt denies SOB and CP. Pt ambulated independently to waiting room.

## 2021-04-29 NOTE — ED ADULT NURSE REASSESSMENT NOTE - NS ED NURSE REASSESS COMMENT FT1
Pt called family to pick patient up. RN at bedside during phone call. Son is on the way to emergency department.

## 2021-05-26 ENCOUNTER — APPOINTMENT (OUTPATIENT)
Dept: GASTROENTEROLOGY | Facility: CLINIC | Age: 81
End: 2021-05-26
Payer: MEDICARE

## 2021-05-26 VITALS
RESPIRATION RATE: 14 BRPM | WEIGHT: 130 LBS | HEIGHT: 60 IN | BODY MASS INDEX: 25.52 KG/M2 | HEART RATE: 79 BPM | OXYGEN SATURATION: 97 % | DIASTOLIC BLOOD PRESSURE: 70 MMHG | TEMPERATURE: 97.3 F | SYSTOLIC BLOOD PRESSURE: 120 MMHG

## 2021-05-26 DIAGNOSIS — Z12.11 ENCOUNTER FOR SCREENING FOR MALIGNANT NEOPLASM OF COLON: ICD-10-CM

## 2021-05-26 DIAGNOSIS — K59.09 OTHER CONSTIPATION: ICD-10-CM

## 2021-05-26 DIAGNOSIS — K21.9 GASTRO-ESOPHAGEAL REFLUX DISEASE W/OUT ESOPHAGITIS: ICD-10-CM

## 2021-05-26 DIAGNOSIS — K44.9 DIAPHRAGMATIC HERNIA W/OUT OBSTRUCTION OR GANGRENE: ICD-10-CM

## 2021-05-26 DIAGNOSIS — R19.5 OTHER FECAL ABNORMALITIES: ICD-10-CM

## 2021-05-26 PROCEDURE — 99214 OFFICE O/P EST MOD 30 MIN: CPT

## 2021-05-26 RX ORDER — POLYETHYLENE GLYCOL 3350 AND ELECTROLYTES WITH LEMON FLAVOR 236; 22.74; 6.74; 5.86; 2.97 G/4L; G/4L; G/4L; G/4L; G/4L
236 POWDER, FOR SOLUTION ORAL
Qty: 1 | Refills: 0 | Status: DISCONTINUED | COMMUNITY
Start: 2020-03-10 | End: 2021-05-26

## 2021-05-26 NOTE — REASON FOR VISIT
[Follow-Up: _____] : a [unfilled] follow-up visit [FreeTextEntry1] : Chronic GERD, paraesophageal hernia, abdominal pain, constipation, IBS, positive cologuard

## 2021-05-26 NOTE — HISTORY OF PRESENT ILLNESS
[FreeTextEntry1] : This is a 72-year-old woman with history of anxiety, hypertension, atrial fibrillation, hyperlipidemia, hypothyroidism presents to see me for the first time transitioning from Dr. Coto for GI care.  She is accompanied by her daughter.  She was last seen by Dr. Coto in February of this year.  She has a history of chronic GERD and was found to have a large paraesophageal hernia on upper GI series dated June 19, 2019.  She relates that she no longer has acid reflux but has epigastric abdominal pain.  This is chronic.  She was recommended by Dr. Coto to undergo an upper endoscopy as well as a colonoscopy but she had been refusing.  She states that every time she tries to have an endoscopy and a colonoscopy her blood pressure would be very high or very low to the point that her procedures are canceled.  She follows up with a cardiologist outside of [facility].  She states that she was seen recently by him.  She has not had a colonoscopy for over 10 years.  She had a positive Cologuard February 2020.  She relates to have chronic epigastric abdominal pain and chronic constipation.  She relates that she tried everything for the constipation including fiber supplements, MiraLAX and Trulance without response.  She relates that she does not have a bowel movement for "1 month "at a time.  She states that her last bowel movement was 2 weeks ago.  She denies rectal bleeding or melena.  She denies weight loss.

## 2021-06-01 DIAGNOSIS — Z20.822 CONTACT WITH AND (SUSPECTED) EXPOSURE TO COVID-19: ICD-10-CM

## 2021-06-04 ENCOUNTER — APPOINTMENT (OUTPATIENT)
Dept: ENDOCRINOLOGY | Facility: CLINIC | Age: 81
End: 2021-06-04

## 2021-06-05 ENCOUNTER — APPOINTMENT (OUTPATIENT)
Dept: DISASTER EMERGENCY | Facility: CLINIC | Age: 81
End: 2021-06-05

## 2021-07-02 ENCOUNTER — APPOINTMENT (OUTPATIENT)
Dept: PULMONOLOGY | Facility: CLINIC | Age: 81
End: 2021-07-02

## 2021-07-03 LAB — SARS-COV-2 N GENE NPH QL NAA+PROBE: NOT DETECTED

## 2021-07-07 ENCOUNTER — APPOINTMENT (OUTPATIENT)
Dept: PULMONOLOGY | Facility: CLINIC | Age: 81
End: 2021-07-07
Payer: MEDICARE

## 2021-07-07 VITALS
BODY MASS INDEX: 25.91 KG/M2 | DIASTOLIC BLOOD PRESSURE: 87 MMHG | TEMPERATURE: 97.7 F | OXYGEN SATURATION: 97 % | SYSTOLIC BLOOD PRESSURE: 137 MMHG | HEIGHT: 60 IN | HEART RATE: 72 BPM | WEIGHT: 132 LBS

## 2021-07-07 DIAGNOSIS — R06.02 SHORTNESS OF BREATH: ICD-10-CM

## 2021-07-07 PROCEDURE — 94727 GAS DIL/WSHOT DETER LNG VOL: CPT

## 2021-07-07 PROCEDURE — 99205 OFFICE O/P NEW HI 60 MIN: CPT | Mod: 25

## 2021-07-07 PROCEDURE — ZZZZZ: CPT

## 2021-07-07 PROCEDURE — 94729 DIFFUSING CAPACITY: CPT

## 2021-07-07 PROCEDURE — 94010 BREATHING CAPACITY TEST: CPT

## 2021-07-07 NOTE — PHYSICAL EXAM
[No Acute Distress] : no acute distress [Supple] : supple [No JVD] : no jvd [Normal S1, S2] : normal s1, s2 [No Murmurs] : no murmurs [Clear to Auscultation Bilaterally] : clear to auscultation bilaterally [Normal to Percussion] : normal to percussion [No Abnormalities] : no abnormalities [Benign] : benign [Not Tender] : not tender [No HSM] : no hsm [No Clubbing] : no clubbing [No Cyanosis] : no cyanosis [No Edema] : no edema [No Focal Deficits] : no focal deficits

## 2021-07-08 PROBLEM — R06.02 SOB (SHORTNESS OF BREATH): Status: ACTIVE | Noted: 2021-07-08

## 2021-07-08 NOTE — PROCEDURE
[FreeTextEntry1] : 07/07/2021\par Pulmonary function testing\par Normal Flow Rates Normal Lung Volumes. There is a moderate diffusion impairment. Corrects to mild with lung volume correction \par   \par  \par  \par Ana Tee   \par  \par \par \par \par Report date: 4/29/2021 \par  \par  View Order\par \par \par (Report matches study selected on Patient History pane)\par \par \par   \par \par \par \par \par EXAM: CT ANGIO CHEST (W)AW IC\par \par \par PROCEDURE DATE: 04/29/2021\par \par \par \par \par INTERPRETATION: CLINICAL INFORMATION: Shortness of breath with elevated d-dimer.\par \par COMPARISON: CT chest 6/18/2019.\par \par CONTRAST/COMPLICATIONS:\par IV Contrast: Omnipaque 350 65 mL administered 35 mL discarded\par Oral Contrast: None\par Complications: None reported at time of study\par \par PROCEDURE:\par CT Angiography of the Chest.\par Sagittal and coronal reformats were performed as well as 3D (MIP) reconstructions.\par \par FINDINGS:\par \par LUNGS AND AIRWAYS: Patent central airways. Suggestion of several tiny tracheal polyps. Biapical scarring. No focal consolidation.\par PLEURA: No pleural effusion. Negative for pneumothorax. Bilateral dependent atelectasis\par MEDIASTINUM AND HALIE: No lymphadenopathy.\par VESSELS: Main pulmonary arterial segment is not dilated.. No evidence of pulmonary embolism. No thoracic aortic aneurysm.\par HEART: Heart size is normal. No pericardial effusion.\par CHEST WALL AND LOWER NECK: Status post left thyroidectomy with metallic clips.\par VISUALIZED UPPER ABDOMEN: Large intrathoracic hiatal hernia.\par BONES: Degenerative changes of the spine.\par \par IMPRESSION:\par No evidence of pulmonary embolism.\par \par Medium-sized hiatal hernia.\par \par \par \par BRITTANY LEON MD; Resident Interventional Radiology\par This document has been electronically signed.\par ANA TEE MD; Attending Radiologist\par This document has been electronically signed. Apr 29 2021 2:44AM  \par  \par \par \par \par \par  1 / 1 Ana Tee   \par  \par \par \par \par Report date: 4/29/2021 \par  \par  View Order\par \par \par (Report matches study selected on Patient History pane)\par \par \par   \par \par \par \par \par EXAM: CT ANGIO CHEST (W)AW IC\par \par \par PROCEDURE DATE: 04/29/2021\par \par \par \par \par INTERPRETATION: CLINICAL INFORMATION: Shortness of breath with elevated d-dimer.\par \par COMPARISON: CT chest 6/18/2019.\par \par CONTRAST/COMPLICATIONS:\par IV Contrast: Omnipaque 350 65 mL administered 35 mL discarded\par Oral Contrast: None\par Complications: None reported at time of study\par \par PROCEDURE:\par CT Angiography of the Chest.\par Sagittal and coronal reformats were performed as well as 3D (MIP) reconstructions.\par \par FINDINGS:\par \par LUNGS AND AIRWAYS: Patent central airways. Suggestion of several tiny tracheal polyps. Biapical scarring. No focal consolidation.\par PLEURA: No pleural effusion. Negative for pneumothorax. Bilateral dependent atelectasis\par MEDIASTINUM AND HALIE: No lymphadenopathy.\par VESSELS: Main pulmonary arterial segment is not dilated.. No evidence of pulmonary embolism. No thoracic aortic aneurysm.\par HEART: Heart size is normal. No pericardial effusion.\par CHEST WALL AND LOWER NECK: Status post left thyroidectomy with metallic clips.\par VISUALIZED UPPER ABDOMEN: Large intrathoracic hiatal hernia.\par BONES: Degenerative changes of the spine.\par \par IMPRESSION:\par No evidence of pulmonary embolism.\par \par Medium-sized hiatal hernia.\par \par \par \par BRITTANY LEON MD; Resident Interventional Radiology\par This document has been electronically signed.\par ANA TEE MD; Attending Radiologist\par This document has been electronically signed. Apr 29 2021 2:44AM

## 2021-07-08 NOTE — ASSESSMENT
[FreeTextEntry1] : Increase exercise as tolerated.\par F/U CT 6 months from prior. \par \par Thank you for let me to take in the care of this very pleasant woman.

## 2021-07-08 NOTE — DISCUSSION/SUMMARY
[FreeTextEntry1] : Exertional dyspnea.  No evidence of significant parenchymal lung or airways or pulmonary vascular disease to explain her symptom complex.  Possibly simply related to conditioning.  Seeing cardiologist and naturally cardiac etiology should be excluded.\par ? Tracheal polyps

## 2021-07-08 NOTE — HISTORY OF PRESENT ILLNESS
[Never] : never [TextBox_4] : JOE URIOSTEGUI is a 80 year old  F referred for pulmonary evaluation for MARTINEZ\par \par Had raul was reduced.\par Past 18 months doing very little.\par Positive MARTINEZ but not doing a lot of activity to quantify.\par No cough, wheezing, chest pain. No Congestion\par \par Past pulmonary history. Told abnormal CXR many years prior. \par Occupational Exposure. N\par Family history of pulmonary disease. N\par Recent travel N\par Pets N\par \par In ER had CTA in April. Went for neurologic symptoms and HTN. Described severe weakness. \par Dx likely viral. \par Believes had CTA because she told them of a blood clot under knee but that was false. \par \par Had mold in house a few weeks ago. In kitchen. Had abatement.  [TextBox_29] : Second hand tobacco.

## 2021-07-08 NOTE — CONSULT LETTER
[Dear  ___] : Dear ~ALYCE, [Consult Letter:] : I had the pleasure of evaluating your patient, [unfilled]. [Consult Closing:] : Thank you very much for allowing me to participate in the care of this patient.  If you have any questions, please do not hesitate to contact me. [Sincerely,] : Sincerely, [FreeTextEntry2] : Marline Bear DO\par  [FreeTextEntry3] : Coy Hurd MD FCCP\par

## 2021-07-13 ENCOUNTER — APPOINTMENT (OUTPATIENT)
Dept: PULMONOLOGY | Facility: CLINIC | Age: 81
End: 2021-07-13

## 2021-08-19 ENCOUNTER — NON-APPOINTMENT (OUTPATIENT)
Age: 81
End: 2021-08-19

## 2021-08-25 ENCOUNTER — APPOINTMENT (OUTPATIENT)
Dept: GASTROENTEROLOGY | Facility: CLINIC | Age: 81
End: 2021-08-25

## 2021-09-20 ENCOUNTER — RESULT REVIEW (OUTPATIENT)
Age: 81
End: 2021-09-20

## 2021-09-30 ENCOUNTER — OUTPATIENT (OUTPATIENT)
Dept: OUTPATIENT SERVICES | Facility: HOSPITAL | Age: 81
LOS: 1 days | End: 2021-09-30
Payer: MEDICARE

## 2021-09-30 VITALS
SYSTOLIC BLOOD PRESSURE: 136 MMHG | RESPIRATION RATE: 16 BRPM | HEIGHT: 60 IN | OXYGEN SATURATION: 99 % | HEART RATE: 65 BPM | TEMPERATURE: 98 F | WEIGHT: 130.07 LBS | DIASTOLIC BLOOD PRESSURE: 83 MMHG

## 2021-09-30 DIAGNOSIS — Z98.890 OTHER SPECIFIED POSTPROCEDURAL STATES: Chronic | ICD-10-CM

## 2021-09-30 DIAGNOSIS — E89.0 POSTPROCEDURAL HYPOTHYROIDISM: Chronic | ICD-10-CM

## 2021-09-30 DIAGNOSIS — D64.9 ANEMIA, UNSPECIFIED: ICD-10-CM

## 2021-09-30 DIAGNOSIS — E03.9 HYPOTHYROIDISM, UNSPECIFIED: ICD-10-CM

## 2021-09-30 DIAGNOSIS — I10 ESSENTIAL (PRIMARY) HYPERTENSION: ICD-10-CM

## 2021-09-30 DIAGNOSIS — S72.001A FRACTURE OF UNSPECIFIED PART OF NECK OF RIGHT FEMUR, INITIAL ENCOUNTER FOR CLOSED FRACTURE: Chronic | ICD-10-CM

## 2021-09-30 DIAGNOSIS — K21.9 GASTRO-ESOPHAGEAL REFLUX DISEASE WITHOUT ESOPHAGITIS: ICD-10-CM

## 2021-09-30 DIAGNOSIS — I25.10 ATHEROSCLEROTIC HEART DISEASE OF NATIVE CORONARY ARTERY WITHOUT ANGINA PECTORIS: ICD-10-CM

## 2021-09-30 LAB
ALBUMIN SERPL ELPH-MCNC: 4.2 G/DL — SIGNIFICANT CHANGE UP (ref 3.3–5)
ALP SERPL-CCNC: 92 U/L — SIGNIFICANT CHANGE UP (ref 40–120)
ALT FLD-CCNC: 15 U/L — SIGNIFICANT CHANGE UP (ref 10–45)
ANION GAP SERPL CALC-SCNC: 13 MMOL/L — SIGNIFICANT CHANGE UP (ref 5–17)
AST SERPL-CCNC: 21 U/L — SIGNIFICANT CHANGE UP (ref 10–40)
BILIRUB SERPL-MCNC: 0.3 MG/DL — SIGNIFICANT CHANGE UP (ref 0.2–1.2)
BUN SERPL-MCNC: 12 MG/DL — SIGNIFICANT CHANGE UP (ref 7–23)
CALCIUM SERPL-MCNC: 9.7 MG/DL — SIGNIFICANT CHANGE UP (ref 8.4–10.5)
CHLORIDE SERPL-SCNC: 101 MMOL/L — SIGNIFICANT CHANGE UP (ref 96–108)
CO2 SERPL-SCNC: 24 MMOL/L — SIGNIFICANT CHANGE UP (ref 22–31)
CREAT SERPL-MCNC: 0.86 MG/DL — SIGNIFICANT CHANGE UP (ref 0.5–1.3)
GLUCOSE SERPL-MCNC: 104 MG/DL — HIGH (ref 70–99)
HCT VFR BLD CALC: 39.4 % — SIGNIFICANT CHANGE UP (ref 34.5–45)
HGB BLD-MCNC: 12.6 G/DL — SIGNIFICANT CHANGE UP (ref 11.5–15.5)
MCHC RBC-ENTMCNC: 30.1 PG — SIGNIFICANT CHANGE UP (ref 27–34)
MCHC RBC-ENTMCNC: 32 GM/DL — SIGNIFICANT CHANGE UP (ref 32–36)
MCV RBC AUTO: 94 FL — SIGNIFICANT CHANGE UP (ref 80–100)
NRBC # BLD: 0 /100 WBCS — SIGNIFICANT CHANGE UP (ref 0–0)
PLATELET # BLD AUTO: 301 K/UL — SIGNIFICANT CHANGE UP (ref 150–400)
POTASSIUM SERPL-MCNC: 4.3 MMOL/L — SIGNIFICANT CHANGE UP (ref 3.5–5.3)
POTASSIUM SERPL-SCNC: 4.3 MMOL/L — SIGNIFICANT CHANGE UP (ref 3.5–5.3)
PROT SERPL-MCNC: 7.6 G/DL — SIGNIFICANT CHANGE UP (ref 6–8.3)
RBC # BLD: 4.19 M/UL — SIGNIFICANT CHANGE UP (ref 3.8–5.2)
RBC # FLD: 13.8 % — SIGNIFICANT CHANGE UP (ref 10.3–14.5)
SODIUM SERPL-SCNC: 138 MMOL/L — SIGNIFICANT CHANGE UP (ref 135–145)
WBC # BLD: 6.7 K/UL — SIGNIFICANT CHANGE UP (ref 3.8–10.5)
WBC # FLD AUTO: 6.7 K/UL — SIGNIFICANT CHANGE UP (ref 3.8–10.5)

## 2021-09-30 PROCEDURE — G0463: CPT

## 2021-09-30 PROCEDURE — 85027 COMPLETE CBC AUTOMATED: CPT

## 2021-09-30 PROCEDURE — 80053 COMPREHEN METABOLIC PANEL: CPT

## 2021-09-30 RX ORDER — LOVASTATIN 20 MG
1 TABLET ORAL
Qty: 0 | Refills: 0 | DISCHARGE

## 2021-09-30 RX ORDER — LISINOPRIL 2.5 MG/1
1 TABLET ORAL
Qty: 0 | Refills: 0 | DISCHARGE

## 2021-09-30 RX ORDER — ALPRAZOLAM 0.25 MG
1 TABLET ORAL
Qty: 0 | Refills: 0 | DISCHARGE

## 2021-09-30 NOTE — H&P PST ADULT - NSICDXPASTSURGICALHX_GEN_ALL_CORE_FT
PAST SURGICAL HISTORY:  Closed right hip fracture     H/O ovarian cystectomy     H/O thyroidectomy

## 2021-09-30 NOTE — H&P PST ADULT - GENITOURINARY
Kareen Moran discharged to home accompanied by .   Patient provided with the following educational materials upon discharge:  AVS, medication education.   Valuables and belongings sent with patient.   discharge summary, discharge instructions, medications and follow up appointments reviewed with patient and .  Patient and  verbalized understanding   details…

## 2021-09-30 NOTE — H&P PST ADULT - OTHER CARE PROVIDERS
Dr.Fuschetto Haney (card) [phone] Last cvisit 9/2021 ; Dr. Downs ( hem)[phone] Dr.Fuschetto Haney (card) [phone] Last visit 9/2021 ; Dr. Downs ( hem)[phone]

## 2021-09-30 NOTE — H&P PST ADULT - NSICDXPASTMEDICALHX_GEN_ALL_CORE_FT
PAST MEDICAL HISTORY:  Diverticulitis     Fe deficiency anemia     Female bladder prolapse     GERD (gastroesophageal reflux disease)     H/O mitral valve prolapse     Hiatal hernia with GERD     Hypertension     Hypothyroid     Macular degeneration     Osteoarthritis     Stage 2 chronic kidney disease     Tracheal nodule      PAST MEDICAL HISTORY:  Arteriosclerotic heart disease (ASHD)     Diverticulitis     Fe deficiency anemia     Female bladder prolapse     GERD (gastroesophageal reflux disease)     H/O mitral valve prolapse     Hiatal hernia with GERD     Hypertension     Hypothyroid     Macular degeneration     Osteoarthritis     Stage 2 chronic kidney disease     Tracheal nodule

## 2021-09-30 NOTE — H&P PST ADULT - HISTORY OF PRESENT ILLNESS
82 yo female pmh HTN, GERD, chronic constipation, anemia on Fe infusion( last infusion on 9/24/21), CKD, anxiety, HLD, MVP, Diverticulitis, Hiatal hernia, Osteoarthritis, tracheal nodule c/o SOB/ fatigue, rectal bleed - s/p ED admission on 4/28/21. Pt had GI /pulmonary consult- scheduled for EGD/Colonoscopy on 10/5/21.  Pt denies any fever, chills, chest pain/SOB, recent travel, or exposure to Covid 19    *Covid 19 PCR on 10/2/21   82 yo female with h/o HTN, GERD, chronic constipation, anemia on Fe infusion( last infusion on 9/24/21), CKD, anxiety, HLD, MVP, Diverticulitis, Hiatal hernia, Osteoarthritis, tracheal nodule c/o SOB/ fatigue, rectal bleed - s/p ED admission on 4/28/21. Pt had GI /pulmonary consult- scheduled for EGD/Colonoscopy on 10/5/21.  Pt denies any fever, chills, chest pain/SOB, recent travel, or exposure to Covid 19    *Covid 19 PCR on 10/2/21  **Last hematology eval on 9/24/21

## 2021-09-30 NOTE — H&P PST ADULT - NSANTHOSAYNRD_GEN_A_CORE
No. JEB screening performed.  STOP BANG Legend: 0-2 = LOW Risk; 3-4 = INTERMEDIATE Risk; 5-8 = HIGH Risk

## 2021-09-30 NOTE — H&P PST ADULT - NSICDXFAMILYHX_GEN_ALL_CORE_FT
FAMILY HISTORY:  Father  Still living? Unknown  FH: HTN (hypertension), Age at diagnosis: Age Unknown    Mother  Still living? No  FH: HTN (hypertension), Age at diagnosis: Age Unknown

## 2021-10-02 ENCOUNTER — APPOINTMENT (OUTPATIENT)
Dept: DISASTER EMERGENCY | Facility: CLINIC | Age: 81
End: 2021-10-02

## 2021-10-02 DIAGNOSIS — Z01.818 ENCOUNTER FOR OTHER PREPROCEDURAL EXAMINATION: ICD-10-CM

## 2021-10-04 LAB — SARS-COV-2 N GENE NPH QL NAA+PROBE: NOT DETECTED

## 2021-10-04 NOTE — BEHAVIORAL HEALTH ASSESSMENT NOTE - NSBHREFERLPTEAMNAME_PSY_A_CORE_FT
Subjective   Chief Complaint   Patient presents with   • Diabetes     f/u   • Hyperlipidemia       History of Present Illness     Pt is here today for fup. She is tolerating the Glipizide well with no side effects. Has not had any further mycotic infections. Saw hand surgery had xrays has bone spur base of left thumb, bone on bone arthritis and carpal tunnel. They gave her a cortisone injection this AM and are scheduling an EMG of her left upper extremity. Her right carpal sx have improved with the brace. She has no CP, SOA or palpitations. She had her eye exam and it was normal.      Patient Active Problem List   Diagnosis   • Asthma   • Mixed anxiety depressive disorder   • Hyperlipidemia   • Hypertension   • Hypothyroidism   • Lung nodule, solitary   • Psoriasis   • Vitamin D deficiency   • Type 2 diabetes mellitus without complication (HCC)   • Chronic pain of left knee   • CAMEJO (nonalcoholic steatohepatitis)   • Allergic rhinitis   • Bilateral carpal tunnel syndrome       Allergies   Allergen Reactions   • Morphine And Related Hives   • Penicillins Hives   • Morphine Hives       Current Outpatient Medications on File Prior to Visit   Medication Sig Dispense Refill   • Accu-Chek Tavia Plus test strip USE AS DIRECTED TO TEST BLOOD SUGAR once DAILY 100 each 1   • Accu-Chek Softclix Lancets lancets USE AS DIRECTED TO TEST BLOOD SUGAR DAILY 100 each 1   • albuterol sulfate  (90 Base) MCG/ACT inhaler Inhale 2 puffs Every 4 (Four) Hours As Needed for Wheezing. 1 inhaler 0   • atorvastatin (LIPITOR) 40 MG tablet TAKE 1 TABLET DAILY AS DIRECTED 90 tablet 2   • clobetasol (TEMOVATE) 0.05 % external solution Apply 1 application topically to the appropriate area as directed As Needed (for psoriasis). 50 mL 2   • cyclobenzaprine (FLEXERIL) 10 MG tablet Take 1 tablet by mouth 3 (Three) Times a Day As Needed for Muscle Spasms. 90 tablet 0   • empagliflozin (Jardiance) 25 MG tablet tablet Take 1 tablet by mouth Daily.  30 tablet 3   • fenofibrate (TRICOR) 145 MG tablet Take 1 tablet by mouth once daily 90 tablet 0   • fluocinolone (SYNALAR) 0.01 % external solution Apply  topically to the appropriate area as directed 2 (Two) Times a Day. 60 mL 2   • FLUoxetine (PROzac) 20 MG capsule Take 1 capsule by mouth Daily. 90 capsule 1   • fluticasone (FLONASE) 50 MCG/ACT nasal spray 2 sprays into the nostril(s) as directed by provider Daily. 1 bottle 0   • glipizide (glipiZIDE XL) 2.5 MG 24 hr tablet Take 1 tablet by mouth Daily. 30 tablet 3   • glucose monitor monitoring kit 1 each As Needed (test as directed). accu check avia plus meter 1 each 0   • levothyroxine (SYNTHROID, LEVOTHROID) 88 MCG tablet Take 1 tablet by mouth once daily 90 tablet 0   • lisinopril (PRINIVIL,ZESTRIL) 5 MG tablet Take 1 tablet by mouth once daily 90 tablet 0   • metFORMIN (GLUCOPHAGE) 1000 MG tablet Take 1 tablet by mouth twice daily 180 tablet 0   • metoprolol succinate XL (TOPROL-XL) 100 MG 24 hr tablet TAKE 1 TABLET BY MOUTH IN THE MORNING 90 tablet 0   • montelukast (Singulair) 10 MG tablet Take 1 tablet by mouth Every Night. 90 tablet 3   • nystatin (MYCOSTATIN) 666571 UNIT/GM powder Apply  topically to the appropriate area as directed 4 (Four) Times a Day. 45 g 1   • [DISCONTINUED] FLUoxetine (PROzac) 10 MG capsule Take 1 capsule by mouth once daily 90 capsule 0   • [DISCONTINUED] levothyroxine (SYNTHROID, LEVOTHROID) 88 MCG tablet Take 1 tablet by mouth once daily 90 tablet 0     Current Facility-Administered Medications on File Prior to Visit   Medication Dose Route Frequency Provider Last Rate Last Admin   • Chlorhexidine Gluconate 2 % pads 2 each  2 pad Apply externally BID Emanuel Jack MD           Past Medical History:   Diagnosis Date   • Allergic rhinitis    • Asthma    • Bilateral carpal tunnel syndrome    • Endometriosis    • Fractures 2007   • Hyperlipidemia    • Hypertension     ON TOPROL for a diagnosis of MVP but was later determined no  MVp and MD decided not to stop since she has been on a while. lisinopril is prescribed for diabetes   • CAMEJO (nonalcoholic steatohepatitis)    • Primary osteoarthritis of right knee 10/6/2020   • Psoriasis    • Renal insufficiency    • Sleep apnea     USES C-PAP   • Vitamin D deficiency        Family History   Problem Relation Age of Onset   • Vaginal cancer Mother    • Lung cancer Mother    • Arthritis Mother    • Diabetes Mother            • Cancer Mother         Lung   • Coronary artery disease Father    • Arthritis Father    • Kidney cancer Father    • Cancer Father         Kidney   • Coronary artery disease Brother    • Asthma Sister    • Osteoporosis Sister    • Osteoporosis Maternal Grandmother            • Osteoporosis Sister         Being treated   • Malig Hyperthermia Neg Hx        Social History     Socioeconomic History   • Marital status:      Spouse name: Not on file   • Number of children: Not on file   • Years of education: Not on file   • Highest education level: Not on file   Tobacco Use   • Smoking status: Never Smoker   • Smokeless tobacco: Never Used   • Tobacco comment: lots of second hand smoke during childhood   Vaping Use   • Vaping Use: Never used   Substance and Sexual Activity   • Alcohol use: Yes     Alcohol/week: 0.0 standard drinks     Comment: maybe have 1 drink every couple of months   • Drug use: Never   • Sexual activity: Defer       Past Surgical History:   Procedure Laterality Date   • APPENDECTOMY     • BACK SURGERY     • CATARACT EXTRACTION, BILATERAL Bilateral    • CHOLECYSTECTOMY  'S   • COLONOSCOPY     • EYE SURGERY     • HYSTERECTOMY     • JOINT REPLACEMENT     • KNEE ARTHROPLASTY UNICOMPARTMENTAL Left 2018    Procedure: KNEE ARTHROPLASTY UNICOMPARTMENTAL;  Surgeon: Emanuel Jack MD;  Location: [facility];  Service: Orthopedics   • LUMBAR DISCECTOMY     • TIBIA FASCIOTOMY Left    • TOTAL KNEE ARTHROPLASTY Right  "12/18/2020    Procedure: TOTAL KNEE ARTHROPLASTY;  Surgeon: Emanuel Jack MD;  Location: [facility];  Service: Orthopedics;  Laterality: Right;   • TUBAL ABDOMINAL LIGATION Bilateral 1981         The following portions of the patient's history were reviewed and updated as appropriate: problem list, allergies, current medications, past medical history, past family history, past social history and past surgical history.    Review of Systems   Eyes: Negative for blurred vision and double vision.   Cardiovascular: Negative for chest pain, dyspnea on exertion and palpitations.   Musculoskeletal: Positive for arthritis.       Immunization History   Administered Date(s) Administered   • COVID-19 (PFIZER) 03/20/2021, 04/10/2021   • Flu Vaccine Quad PF >18YRS 09/22/2016, 12/12/2017   • FluMist 2-49yrs 10/02/2013, 10/01/2014, 10/07/2015   • Hepatitis A 05/12/2018, 12/04/2018   • Influenza, Unspecified 12/04/2018   • Pneumococcal Conjugate 13-Valent (PCV13) 06/04/2021   • Pneumococcal Polysaccharide (PPSV23) 04/17/2014   • Td 10/02/2005   • Tdap 04/17/2014, 09/15/2019   • Zostavax 08/24/2012       Objective   Vitals:    10/04/21 1532 10/04/21 1617   BP:  112/60   Temp: 96.9 °F (36.1 °C)    Weight: 93.4 kg (206 lb)    Height: 172.7 cm (68\")      Body mass index is 31.32 kg/m².  Physical Exam  Vitals reviewed.   Constitutional:       Appearance: She is well-developed.   HENT:      Head: Normocephalic and atraumatic.   Cardiovascular:      Rate and Rhythm: Normal rate and regular rhythm.      Heart sounds: Normal heart sounds, S1 normal and S2 normal.   Pulmonary:      Effort: Pulmonary effort is normal.      Breath sounds: Normal breath sounds.   Skin:     General: Skin is warm.   Neurological:      Mental Status: She is alert.   Psychiatric:         Behavior: Behavior normal.       Assessment/Plan   Diagnoses and all orders for this visit:    1. Yeast infection (Primary)  Comments:  Will send in Diflucan in case she has " another mycotic infection from her SGLT2  Orders:  -     fluconazole (Diflucan) 150 MG tablet; Take one tablet and repeat in 72 hours  Dispense: 2 tablet; Refill: 5    2. Type 2 diabetes mellitus without complication, without long-term current use of insulin ([facility])  Comments:  Will have fasting lab next week, tolerating the Glipizide well. Eye exam utd will request report  Orders:  -     Hemoglobin A1c; Future  -     Lipid Panel With / Chol / HDL Ratio; Future  -     Microalbumin / Creatinine Urine Ratio - Urine, Clean Catch; Future    3. Mixed anxiety depressive disorder    4. Acquired hypothyroidism  Comments:  TSH from June therapeutic  Orders:  -     TSH; Future    5. Primary hypertension  Comments:  Well controlled  Orders:  -     Comprehensive Metabolic Panel; Future    6. Mixed hyperlipidemia  Comments:  Fasting lipid panel next week    7. Lung nodule, solitary  Comments:  Followed by Dr. Alegria, re request records    8. Asthma, unspecified asthma severity, unspecified whether complicated, unspecified whether persistent  Comments:  Controlled    9. Vitamin D deficiency  -     Vitamin D 25 Hydroxy; Future    10. Bilateral carpal tunnel syndrome  Comments:  Request note from hand surgery        Return in about 5 months (around 3/4/2022) for Lab Today, Lab Appt Before FUP.            Jacy

## 2021-10-05 PROBLEM — I25.10 ATHEROSCLEROTIC HEART DISEASE OF NATIVE CORONARY ARTERY WITHOUT ANGINA PECTORIS: Chronic | Status: ACTIVE | Noted: 2021-09-30

## 2021-10-05 PROBLEM — D50.9 IRON DEFICIENCY ANEMIA, UNSPECIFIED: Chronic | Status: ACTIVE | Noted: 2021-09-30

## 2021-10-05 PROBLEM — K21.9 GASTRO-ESOPHAGEAL REFLUX DISEASE WITHOUT ESOPHAGITIS: Chronic | Status: ACTIVE | Noted: 2021-09-30

## 2021-10-05 PROBLEM — Z86.79 PERSONAL HISTORY OF OTHER DISEASES OF THE CIRCULATORY SYSTEM: Chronic | Status: ACTIVE | Noted: 2021-09-30

## 2021-10-05 PROBLEM — N81.10 CYSTOCELE, UNSPECIFIED: Chronic | Status: ACTIVE | Noted: 2021-09-30

## 2021-10-05 PROBLEM — J39.8 OTHER SPECIFIED DISEASES OF UPPER RESPIRATORY TRACT: Chronic | Status: ACTIVE | Noted: 2021-09-30

## 2021-10-07 ENCOUNTER — APPOINTMENT (OUTPATIENT)
Dept: CT IMAGING | Facility: IMAGING CENTER | Age: 81
End: 2021-10-07
Payer: MEDICARE

## 2021-10-07 ENCOUNTER — OUTPATIENT (OUTPATIENT)
Dept: OUTPATIENT SERVICES | Facility: HOSPITAL | Age: 81
LOS: 1 days | End: 2021-10-07
Payer: MEDICARE

## 2021-10-07 DIAGNOSIS — J39.8 OTHER SPECIFIED DISEASES OF UPPER RESPIRATORY TRACT: ICD-10-CM

## 2021-10-07 DIAGNOSIS — Z98.890 OTHER SPECIFIED POSTPROCEDURAL STATES: Chronic | ICD-10-CM

## 2021-10-07 DIAGNOSIS — E89.0 POSTPROCEDURAL HYPOTHYROIDISM: Chronic | ICD-10-CM

## 2021-10-07 DIAGNOSIS — S72.001A FRACTURE OF UNSPECIFIED PART OF NECK OF RIGHT FEMUR, INITIAL ENCOUNTER FOR CLOSED FRACTURE: Chronic | ICD-10-CM

## 2021-10-07 PROCEDURE — 71250 CT THORAX DX C-: CPT | Mod: 26

## 2021-10-07 PROCEDURE — 71250 CT THORAX DX C-: CPT

## 2021-10-13 ENCOUNTER — APPOINTMENT (OUTPATIENT)
Dept: PULMONOLOGY | Facility: CLINIC | Age: 81
End: 2021-10-13
Payer: MEDICARE

## 2021-10-13 VITALS
TEMPERATURE: 97.5 F | HEART RATE: 68 BPM | SYSTOLIC BLOOD PRESSURE: 143 MMHG | DIASTOLIC BLOOD PRESSURE: 84 MMHG | OXYGEN SATURATION: 95 %

## 2021-10-13 PROCEDURE — 99213 OFFICE O/P EST LOW 20 MIN: CPT

## 2021-10-13 NOTE — CONSULT LETTER
[Dear  ___] : Dear ~ALYCE, [Consult Letter:] : I had the pleasure of evaluating your patient, [unfilled]. [Consult Closing:] : Thank you very much for allowing me to participate in the care of this patient.  If you have any questions, please do not hesitate to contact me. [Sincerely,] : Sincerely, [FreeTextEntry3] : Coy Hurd MD FCCP\par  [FreeTextEntry2] : Marline Bear DO\par

## 2021-10-13 NOTE — HISTORY OF PRESENT ILLNESS
[Never] : never [TextBox_4] : \par \par No significant cough, wheezing, chest pain or SOB.\par Occ weak voice. \par On Iron infusions.\par  [TextBox_29] : Second hand tobacco.

## 2021-10-13 NOTE — PROCEDURE
[FreeTextEntry1] : 07/07/2021\par Pulmonary function testing\par Normal Flow Rates Normal Lung Volumes. There is a moderate diffusion impairment. Corrects to mild with lung volume correction \par   \par \par CT chest reviewed and discussed.\par  \par  \par

## 2021-10-13 NOTE — CONSULT LETTER
[Dear  ___] : Dear ~ALYCE, [Consult Letter:] : I had the pleasure of evaluating your patient, [unfilled]. [Consult Closing:] : Thank you very much for allowing me to participate in the care of this patient.  If you have any questions, please do not hesitate to contact me. [Sincerely,] : Sincerely, [FreeTextEntry2] : Marline Bear DO\par  [FreeTextEntry3] : Cyo Hurd MD FCCP\par

## 2021-10-29 ENCOUNTER — APPOINTMENT (OUTPATIENT)
Dept: ENDOCRINOLOGY | Facility: CLINIC | Age: 81
End: 2021-10-29
Payer: MEDICARE

## 2021-10-29 VITALS — WEIGHT: 128 LBS | TEMPERATURE: 97.5 F | BODY MASS INDEX: 26.87 KG/M2 | HEIGHT: 58 IN

## 2021-10-29 VITALS — DIASTOLIC BLOOD PRESSURE: 80 MMHG | HEART RATE: 74 BPM | OXYGEN SATURATION: 98 % | SYSTOLIC BLOOD PRESSURE: 120 MMHG

## 2021-10-29 DIAGNOSIS — E78.00 PURE HYPERCHOLESTEROLEMIA, UNSPECIFIED: ICD-10-CM

## 2021-10-29 PROCEDURE — 77080 DXA BONE DENSITY AXIAL: CPT

## 2021-10-29 PROCEDURE — 99214 OFFICE O/P EST MOD 30 MIN: CPT | Mod: 25

## 2021-10-29 PROCEDURE — ZZZZZ: CPT

## 2021-10-29 RX ORDER — FAMOTIDINE 40 MG/1
40 TABLET, FILM COATED ORAL TWICE DAILY
Qty: 60 | Refills: 11 | Status: DISCONTINUED | COMMUNITY
Start: 2019-10-02 | End: 2021-10-29

## 2021-10-29 RX ORDER — BUPROPION HYDROCHLORIDE 100 MG/1
100 TABLET, FILM COATED ORAL
Qty: 180 | Refills: 0 | Status: DISCONTINUED | COMMUNITY
Start: 2020-06-01 | End: 2021-10-29

## 2021-10-29 NOTE — PHYSICAL EXAM
[Alert] : alert [No Acute Distress] : no acute distress [Normal Sclera/Conjunctiva] : normal sclera/conjunctiva [EOMI] : extra ocular movement intact [No LAD] : no lymphadenopathy [Well Healed Scar] : well healed scar [No Respiratory Distress] : no respiratory distress [Clear to Auscultation] : lungs were clear to auscultation bilaterally [Normal S1, S2] : normal S1 and S2 [Regular Rhythm] : with a regular rhythm [No Edema] : no peripheral edema [Normal Bowel Sounds] : normal bowel sounds [Not Tender] : non-tender [Not Distended] : not distended [Soft] : abdomen soft [Normal Anterior Cervical Nodes] : no anterior cervical lymphadenopathy [No Spinal Tenderness] : no spinal tenderness [Kyphosis] : kyphosis present [No Clubbing, Cyanosis] : no clubbing  or cyanosis of the fingernails [No Rash] : no rash [Normal Reflexes] : deep tendon reflexes were 2+ and symmetric [Normal Affect] : the affect was normal [Normal Mood] : the mood was normal

## 2021-10-29 NOTE — REASON FOR VISIT
[Follow - Up] : a follow-up visit [Hypothyroidism] : hypothyroidism [Other___] : [unfilled] [Family Member] : family member

## 2021-11-01 NOTE — HISTORY OF PRESENT ILLNESS
[FreeTextEntry1] : 81 y.o. female with h/o retrosternal goiter s/p surgery in 9/2003 with hypothyroidism presents for follow up visit. Surgery revealed 4 mm microfocus of papillary thyroid cancer in the setting of Hashimoto's disease. Did have residual right neck tissue and being followed with yearly sonograms. Never had FNA of right neck tissue. No neck complaints. Feeling good. BP has been labile and seeing cardiology. Following with urogyn for uterovaginal prolapse. Dealing with GERD/hiatal hernia and difficulty with swallowing. Seeing GI. Taking Synthroid 75 mcg daily and extra 1/2 tab once weekly.  Sonogram in 2/2017 showed atrophic residual left lobe tissue and right lobe appears heterogenous without discrete nodules. There are small LNs nonspecific. Thyroid sonogram in December 2017 shows residual right thyroid lobe tissue measuring 2.7 cm and left neck bed looks unremarkable. Thyroid sonogram in April 2019 showed residual right thyroid lobe measuring 3 cm and left neck bed 0.6 cm nodule which appears stable.  Always constipated. \par \par Also h/o prediabetes, reports weight is stable. Reports a big dinner but stopped eating potatoes. No exercise. \par \par Also h/o osteopenia, DEXA scan in 2/2016 showed spine -0.5 and femoral neck -1.7. Had right hip fracture in 11/2018 and required surgery. Reports frequents falls. No other fractures. Takes vitamin D 2,500 Iu weekly. DEXA scan is 2/20/18 shows left femoral neck -2.0 and total hip -0.8 with spine -0.1 with 1/3 radius -1.7. DEXA scan performed in April 2019 shows 1/3 radius -1.5, left femoral neck -1.9 and total hip -1.4, spine -0.1 (not accurate given arthritis). Reports being treated many years ago with ? oral bisphosphonate briefly but not clear what type reaction she experienced. C/o right knee pain. \par \par Follows with GI for ischemic colitis. Also sees hematology and getting iron infusions every week.

## 2021-11-01 NOTE — REVIEW OF SYSTEMS
[Fatigue] : fatigue [Recent Weight Gain (___ Lbs)] : recent weight gain: [unfilled] lbs [Dysphagia] : dysphagia [Dysphonia] : dysphonia [Constipation] : constipation [Joint Pain] : joint pain [Hair Loss] : hair loss [Negative] : Respiratory [Neck Pain] : no neck pain [Polyuria] : no polyuria [Anxiety] : no anxiety [Cold Intolerance] : no cold intolerance [Heat Intolerance] : no heat intolerance

## 2021-11-01 NOTE — ASSESSMENT
[Bisphosphonate Therapy] : Risks  and benefits of bisphosphonate therapy were  discussed with the patient including gastroesophageal irritation, osteonecrosis of the jaw, and atypical femur fractures, and acute phase reaction [FreeTextEntry1] : 81 y.o. female with h/o hypothyroidism s/p surgery with residual thyroid tissue, prediabetes and osteopenia.\par \par 1. Hypothyroidism s/p surgery with microfocus of papillary thyroid cancer- Patient is euthyroid. Will continue current dose of Synthroid. No need for TSH suppression. Goal TSH is below 2.5. Will check thyroid ultrasound now.\par \par 2. Prediabetes- Encouraged carbohydrate consistent diet and exercise. Stable CMP and Hba1c. Will monitor for now.\par \par 3. Osteopenia- DEXA scan performed today shows 1/3 radius -2.0 with a 4.6% decrease, left femoral neck -2.3 with 7.1% decrease and total hip -1.6 with 2.7% decrease. Given h/o right hip fracture, patient is at increased risk of fracture. Recommend medical therapy. Given GI history, not a good candidate for oral bisphosphonates. Recommend IV Reclast or Denosumab. Reviewed risks and benefits. Patient declines therapy at this time. Encouraged weight bearing activity. Discussed appropriate calcium and vitamin D intake. \par \par Follow up in 1 year if stable [Denosumab Therapy] : Risks  and benefits of denosumab therapy were discussed with the patient including eczema, cellulitis, osteonecrosis of the jaw and atypical femur fractures

## 2021-11-08 NOTE — PROGRESS NOTE ADULT - PROBLEM SELECTOR PLAN 2
Resolved
[Dear  ___] : Dear  [unfilled],
[Consult Letter:] : I had the pleasure of evaluating your patient, [unfilled].
[Please see my note below.] : Please see my note below.
Resolved
[Consult Closing:] : Thank you very much for allowing me to participate in the care of this patient.  If you have any questions, please do not hesitate to contact me.
[Sincerely,] : Sincerely,
[FreeTextEntry3] : Joie Chen MD \par Pediatric Otolaryngology/ Head & Neck Surgery\par [facility]\par [facility] \par \par [address]\[place] [address]\par Tel [phone]\par Fax [phone]\par

## 2021-12-15 ENCOUNTER — EMERGENCY (EMERGENCY)
Facility: HOSPITAL | Age: 81
LOS: 1 days | Discharge: HOME | End: 2021-12-15
Attending: EMERGENCY MEDICINE
Payer: MEDICARE

## 2021-12-15 VITALS
SYSTOLIC BLOOD PRESSURE: 173 MMHG | RESPIRATION RATE: 16 BRPM | DIASTOLIC BLOOD PRESSURE: 87 MMHG | OXYGEN SATURATION: 97 % | HEART RATE: 73 BPM | WEIGHT: 130.07 LBS | TEMPERATURE: 98 F | HEIGHT: 59 IN

## 2021-12-15 VITALS
SYSTOLIC BLOOD PRESSURE: 171 MMHG | RESPIRATION RATE: 17 BRPM | DIASTOLIC BLOOD PRESSURE: 92 MMHG | OXYGEN SATURATION: 97 % | HEART RATE: 71 BPM

## 2021-12-15 DIAGNOSIS — Z98.890 OTHER SPECIFIED POSTPROCEDURAL STATES: Chronic | ICD-10-CM

## 2021-12-15 DIAGNOSIS — E89.0 POSTPROCEDURAL HYPOTHYROIDISM: Chronic | ICD-10-CM

## 2021-12-15 DIAGNOSIS — S72.001A FRACTURE OF UNSPECIFIED PART OF NECK OF RIGHT FEMUR, INITIAL ENCOUNTER FOR CLOSED FRACTURE: Chronic | ICD-10-CM

## 2021-12-15 PROCEDURE — 99284 EMERGENCY DEPT VISIT MOD MDM: CPT | Mod: 25

## 2021-12-15 PROCEDURE — 72125 CT NECK SPINE W/O DYE: CPT | Mod: 26,MA

## 2021-12-15 PROCEDURE — 99285 EMERGENCY DEPT VISIT HI MDM: CPT

## 2021-12-15 PROCEDURE — 72125 CT NECK SPINE W/O DYE: CPT | Mod: MA

## 2021-12-15 PROCEDURE — 70450 CT HEAD/BRAIN W/O DYE: CPT | Mod: 26,MA

## 2021-12-15 PROCEDURE — 70450 CT HEAD/BRAIN W/O DYE: CPT | Mod: MA

## 2021-12-15 NOTE — ED PROVIDER NOTE - NSFOLLOWUPINSTRUCTIONS_ED_ALL_ED_FT
- stay hydrated.   - take tylenol 975mg every 6 hours as needed for pain-take with meals.  - return if symptoms worsen, fever, weakness, numbness/tingling, blurred vision, difficulty ambulating and all other concerns.

## 2021-12-15 NOTE — ED PROVIDER NOTE - ATTENDING CONTRIBUTION TO CARE
RGUJRAL 80yo f hx HTN, HLD, CKD, OA presents sp mechanical fall yesterday. States she was in the bathroom yesterday when she slipped off the seat and hit her head onto the fall. Denies LOC, complains of mild pain at site. No neck/chest/back pain. Pt has been ambulatory tolerating PO. No n/v.   Pt spoke to her GI doctor who recommended coming in for CT head, she has a colonoscopy planned next week.   On exam, Patient is awake, alert x 3.  GCS15. R posterior mild erythema. PERRL.   Neck: No Posterior midline cervical spine tenderness. Full ROM and neuro intact.  Chest is clear to auscultation. +S1S2.  Abdomen is soft nondistended/nontender +BS. No rebound or guarding.   Pelvis is stable. Full ROM B/L hips.   Back non tender midline T/L spine.  Skin intact   Pt well appearing, pt was q doc pending CT and declined pain meds.

## 2021-12-15 NOTE — ED PROVIDER NOTE - RAPID ASSESSMENT
81 F w/ PMHx of HTN, GERD, anemia, CKD, HLD, MVP, Diverticulitis, Hiatal hernia, Osteoarthritis, chronic hip pain c/o right-sided head pain x 1 day s/p mechanical fall. Pt was in the bathroom putting on her jeans when she slipped and fell. WAs referred to ER by PMD as pt has scheduled colonoscopy next week. Pt denies LOC, CP, SOB, neck pain, new hip pain. She endorses ASA.    Mild pallor for ethnicity, mild distress secondary to situation, speaking in appropriate sentences.    **Pt seen in the waiting room via teletriage by Bran Fernandes), documentation completed by Saima Nevarez. Pt to be sent to main ED for further evaluation - all orders placed to be followed by MD in the main ED**    Scribe Statement: Juan SILVA Tiffany, attest that this documentation has been prepared under the direction and in the presence of 81 F w/ PMHx of HTN, GERD, anemia, CKD, HLD, MVP, Diverticulitis, Hiatal hernia, Osteoarthritis, chronic hip pain c/o right-sided head pain x 1 day s/p mechanical fall. Pt was in the bathroom putting on her jeans when she slipped and fell. WAs referred to ER by PMD as pt has scheduled colonoscopy next week. Pt denies LOC, CP, SOB, neck pain, new hip pain. She endorses ASA.    Mild pallor for ethnicity, mild distress secondary to situation, speaking in appropriate sentences.    **Pt seen in the waiting room via teletriage by Bran Fernandes (MD), documentation completed by Saima Nevarez. Pt to be sent to main ED for further evaluation - all orders placed to be followed by MD in the main ED**    Scribe Statement: IJuan Tiffany, attest that this documentation has been prepared under the direction and in the presence of    Bran Fernandes MD note: The scribe's documentation has been prepared under my direction and personally reviewed by me.  Patient was seen as a tele QDOC patient, the patient will be seen and further worked up in the main emergency department and their care will be completed by the main emergency department team along with a thorough physical exam. Receiving team will follow up on labs, analgesia, any clinical imaging, reassess and disposition as clinically indicated, all decisions regarding the progression of care will be made at their discretion.

## 2021-12-15 NOTE — ED PROVIDER NOTE - PATIENT PORTAL LINK FT
You can access the FollowMyHealth Patient Portal offered by [facility] by registering at the following website: [email or web address]. By joining [employer]’s FollowMyHealth portal, you will also be able to view your health information using other applications (apps) compatible with our system.

## 2021-12-15 NOTE — ED ADULT NURSE NOTE - NSIMPLEMENTINTERV_GEN_ALL_ED
Implemented All Fall Risk Interventions:  [place] to call system. Call bell, personal items and telephone within reach. Instruct patient to call for assistance. Room bathroom lighting operational. Non-slip footwear when patient is off stretcher. Physically safe environment: no spills, clutter or unnecessary equipment. Stretcher in lowest position, wheels locked, appropriate side rails in place. Provide visual cue, wrist band, yellow gown, etc. Monitor gait and stability. Monitor for mental status changes and reorient to person, place, and time. Review medications for side effects contributing to fall risk. Reinforce activity limits and safety measures with patient and family.

## 2021-12-15 NOTE — ED PROVIDER NOTE - PROGRESS NOTE DETAILS
pt declining tylenol, discussed CT results, will take tylenol at home all questions answered pt ambulatory with steady gait -Julia Melendez PA-C

## 2021-12-15 NOTE — ED ADULT NURSE NOTE - OBJECTIVE STATEMENT
81 female via triage  PMHx of HTN, GERD, anemia, CKD, HLD, MVP, Diverticulitis, Hiatal hernia, Osteoarthritis, chronic hip pain c/o right-sided head pain x 1 day s/p mechanical fall. Pt was in the bathroom putting on her jeans when she slipped and fell. No LOC  Pt uses a cane for walking Pt denies LOC, CP, SOB, neck pain, new hip pain. Pt Q doc [place]

## 2021-12-15 NOTE — ED ADULT NURSE NOTE - NSICDXPASTMEDICALHX_GEN_ALL_CORE_FT
PAST MEDICAL HISTORY:  Arteriosclerotic heart disease (ASHD)     Diverticulitis     Fe deficiency anemia     Female bladder prolapse     GERD (gastroesophageal reflux disease)     H/O mitral valve prolapse     Hiatal hernia with GERD     Hypertension     Hypothyroid     Macular degeneration     Osteoarthritis     Stage 2 chronic kidney disease     Tracheal nodule

## 2022-02-10 ENCOUNTER — APPOINTMENT (OUTPATIENT)
Dept: ULTRASOUND IMAGING | Facility: CLINIC | Age: 82
End: 2022-02-10

## 2022-02-10 ENCOUNTER — APPOINTMENT (OUTPATIENT)
Dept: MAMMOGRAPHY | Facility: CLINIC | Age: 82
End: 2022-02-10

## 2022-02-12 ENCOUNTER — EMERGENCY (EMERGENCY)
Facility: HOSPITAL | Age: 82
LOS: 1 days | Discharge: HOME | End: 2022-02-12
Attending: STUDENT IN AN ORGANIZED HEALTH CARE EDUCATION/TRAINING PROGRAM
Payer: MEDICARE

## 2022-02-12 VITALS
RESPIRATION RATE: 18 BRPM | TEMPERATURE: 98 F | DIASTOLIC BLOOD PRESSURE: 92 MMHG | SYSTOLIC BLOOD PRESSURE: 178 MMHG | HEIGHT: 59 IN | OXYGEN SATURATION: 99 % | HEART RATE: 69 BPM | WEIGHT: 130.07 LBS

## 2022-02-12 DIAGNOSIS — E89.0 POSTPROCEDURAL HYPOTHYROIDISM: Chronic | ICD-10-CM

## 2022-02-12 DIAGNOSIS — Z98.890 OTHER SPECIFIED POSTPROCEDURAL STATES: Chronic | ICD-10-CM

## 2022-02-12 DIAGNOSIS — S72.001A FRACTURE OF UNSPECIFIED PART OF NECK OF RIGHT FEMUR, INITIAL ENCOUNTER FOR CLOSED FRACTURE: Chronic | ICD-10-CM

## 2022-02-12 PROCEDURE — 93010 ELECTROCARDIOGRAM REPORT: CPT

## 2022-02-12 PROCEDURE — 99285 EMERGENCY DEPT VISIT HI MDM: CPT

## 2022-02-12 RX ORDER — ACETAMINOPHEN 500 MG
650 TABLET ORAL ONCE
Refills: 0 | Status: DISCONTINUED | OUTPATIENT
Start: 2022-02-12 | End: 2022-02-12

## 2022-02-13 VITALS
DIASTOLIC BLOOD PRESSURE: 81 MMHG | SYSTOLIC BLOOD PRESSURE: 159 MMHG | TEMPERATURE: 98 F | RESPIRATION RATE: 16 BRPM | OXYGEN SATURATION: 97 % | HEART RATE: 62 BPM

## 2022-02-13 PROCEDURE — 76377 3D RENDER W/INTRP POSTPROCES: CPT

## 2022-02-13 PROCEDURE — 70450 CT HEAD/BRAIN W/O DYE: CPT | Mod: 26,QQ

## 2022-02-13 PROCEDURE — 70486 CT MAXILLOFACIAL W/O DYE: CPT | Mod: QQ

## 2022-02-13 PROCEDURE — 93005 ELECTROCARDIOGRAM TRACING: CPT

## 2022-02-13 PROCEDURE — 70450 CT HEAD/BRAIN W/O DYE: CPT | Mod: QQ

## 2022-02-13 PROCEDURE — 76377 3D RENDER W/INTRP POSTPROCES: CPT | Mod: 26

## 2022-02-13 PROCEDURE — 99284 EMERGENCY DEPT VISIT MOD MDM: CPT | Mod: 25

## 2022-02-13 PROCEDURE — 70486 CT MAXILLOFACIAL W/O DYE: CPT | Mod: 26,QQ

## 2022-02-13 NOTE — ED PROVIDER NOTE - ATTENDING CONTRIBUTION TO CARE
I, Donte Montiel, performed a history and physical exam of the patient and discussed their management with the resident and/or advanced care provider. I reviewed the resident and/or advanced care provider's note and agree with the documented findings and plan of care. I was present and available for all procedures.    mrs renteria is a healthy 82yo f pw fall and +hs. reports guising getting worse over the last 3 days since fall so came to ed for eval. had normal hri head but requesting max face r/o fracture    Well appearing and in NAD, eomi, peerla,, no pain with eomi, no proptosis, va grossly intact. head normal appearing atraumatic, trachea midline, no respiratory distress, lungs cta bilaterally, rrr no murmurs, soft NT ND abdomen, no visible extremity deformities, Alert and oriented, non focal neuro exam, skin warm and dry, normal affect and mood    concern for facial fracture but without clincial entrapment will eval for ich cth, ct max face declined analgesia, no cspine or rest of body deformities or pain to suggest further imaging and especially as fall was witness I, Donte Montiel, performed a history and physical exam of the patient and discussed their management with the resident and/or advanced care provider. I reviewed the resident and/or advanced care provider's note and agree with the documented findings and plan of care. I was present and available for all procedures.    mrs renteria is a healthy 80yo f pw fall and +hs. reports guising getting worse over the last 3 days since fall so came to ed for eval. had normal hri head but requesting max face r/o fracture    Well appearing and in NAD, eomi, peerla,, no pain with eomi, no proptosis, va grossly intact. head normal appearing atraumatic, trachea midline, no respiratory distress, lungs cta bilaterally, rrr no murmurs, soft NT ND abdomen, no visible extremity deformities, Alert and oriented, non focal neuro exam, skin warm and dry, normal affect and mood no c/t/l spine midline ttp, ambulatory, FROM bilateral ue's and bilateral le's neurovasc intact distally    concern for facial fracture but without clincial entrapment will eval for ich cth, ct max face declined analgesia, no cspine or rest of body deformities or pain to suggest further imaging and especially as fall was witness

## 2022-02-13 NOTE — ED PROVIDER NOTE - NSFOLLOWUPINSTRUCTIONS_ED_ALL_ED_FT
Please followup with your primary care physician.    The CT scan showed an "old appearing tiny avulsion fracture of the maxillary spine without any   soft tissue swelling."    Do not blow your nose  ? Rather gently wipe nasal secretions away.    Do not lift heavy objects  ? Do not lift objects weighing more than 15-20 pounds.    Try not to sneeze  ? Keep your mouth open.  ? Do not pinch your nose    Try not to cough  ? Keep your mouth open.    Prevent sucking  ? Do not drink through a straw.  ? Do not smoke.    Prevent blowing  ? Do not play a wind instrument    Prevent bending over  ? Keep your head above the level of your heart.  ? Sleep with your head slightly raised. Please followup with your primary care physician.    You can followup with a plastic surgeon if your bruising gets worse. I have attached their contact information here. Please call to schedule an appointment.    The CT scan showed an "old appearing tiny avulsion fracture of the maxillary spine without any   soft tissue swelling."    Do not blow your nose  ? Rather gently wipe nasal secretions away.    Do not lift heavy objects  ? Do not lift objects weighing more than 15-20 pounds.    Try not to sneeze  ? Keep your mouth open.  ? Do not pinch your nose    Try not to cough  ? Keep your mouth open.    Prevent sucking  ? Do not drink through a straw.  ? Do not smoke.    Prevent blowing  ? Do not play a wind instrument    Prevent bending over  ? Keep your head above the level of your heart.  ? Sleep with your head slightly raised.

## 2022-02-13 NOTE — ED PROVIDER NOTE - CLINICAL SUMMARY MEDICAL DECISION MAKING FREE TEXT BOX
Sky Brown MD (PGY-2): The patient is a 81y Female w/ pmhx of HTN, GERD, anemia, CKD, HLD, MVP, Diverticulitis, Hiatal hernia, Osteoarthritis, chronic hip pain c/o worsening R periorbital bruising for the last week. Will evaluate for facial fracture and intracranial hemorrhage. CT head and CT maxillofacial. Pt not endorsing any pain right now. Will most likely be discharged home.

## 2022-02-13 NOTE — ED PROVIDER NOTE - PHYSICAL EXAMINATION
Gen: NAD. A&Ox4. Non-toxic appearing.  HEENT: R periorbital bruising (red/purple discoloration). R eye not proptotic. Mildly TTP at medial aspect of R periorbital area near nasal bridge. PERRL, EOMI, no nasal discharge, mucous membranes moist, no scleral icterus.  CV: Regular rate and rhythm, +S1/S2, no M/R/G. No significant lower extremity edema. Radial and DP pulses present and symmetrical. Capillary refill less than 2 seconds.  Resp: Normal effort and rate. CTAB, no rales, rhonchi, or wheezes.  GI: Abdomen soft, non-distended, non tender to palpation. No masses appreciated. Bowel sounds present.  MSK: No midline spinal tenderness. No CVAT bilaterally. No pelvic tenderness b/l. Normal passive/active ROM of b/l LE.  Neuro: Following commands, speaking in full sentences, moving extremities spontaneously. CN II-XII intact. Strength and sensation symmetric and intact throughout. Reflexes 2+ throughout. Cerebellar testing normal. Ambulating normally.  Psych: Appropriate mood, cooperative

## 2022-02-13 NOTE — ED ADULT NURSE NOTE - NSIMPLEMENTINTERV_GEN_ALL_ED
Implemented All Fall Risk Interventions:  [place] to call system. Call bell, personal items and telephone within reach. Instruct patient to call for assistance. Room bathroom lighting operational. Non-slip footwear when patient is off stretcher. Physically safe environment: no spills, clutter or unnecessary equipment. Stretcher in lowest position, wheels locked, appropriate side rails in place. Provide visual cue, wrist band, yellow gown, etc. Monitor gait and stability. Monitor for mental status changes and reorient to person, place, and time. Review medications for side effects contributing to fall risk. Reinforce activity limits and safety measures with patient and family.

## 2022-02-13 NOTE — ED PROVIDER NOTE - PROGRESS NOTE DETAILS
pettet attending- called radiology resident concern for delay in reading cth, reported [facility] reading study and will not be able to help, they also do not know the number for [facility]. discussed with  transferred to [facility], report images received but has not received validation so they cannot read study. discussed with ct tech report they did validate scan but will revalidate scan. now [facility] has received validation and will read study emergently

## 2022-02-13 NOTE — ED ADULT NURSE NOTE - OBJECTIVE STATEMENT
81 y old female with PMH of HTN, GERD, mitral valve prolapse, HLD and diverticulitis presents to the ED from home c/o facial bruising. Pt reports she had a trip and fall 1 week ago with head trauma but no LOC. Pt states "the bruising has gotten much worse." Pt denies HA, vision changes and pain to area. Denies fevers, chills, CP, SOB, abd pain, n/v/d, weakness, urinary s/s. Pt A&O x 4, ambulatory. Green/purple bruising noted to R eye. Respirations spontaneous and unlabored. Abdomen soft, nontender and nondistended. Peripheral pulse strong. Skin warm, dry and intact. Bed in lowest position, side rails up and call bell in reach.

## 2022-02-13 NOTE — ED PROVIDER NOTE - PATIENT PORTAL LINK FT
You can access the FollowMyHealth Patient Portal offered by [facility] by registering at the following website: [email or web address]. By joining [employer]’s FollowMyHealth portal, you will also be able to view your health information using other applications (apps) compatible with our system.

## 2022-02-13 NOTE — ED PROVIDER NOTE - OBJECTIVE STATEMENT
The patient is a 81y Female w/ pmhx of HTN, GERD, anemia, CKD, HLD, MVP, Diverticulitis, Hiatal hernia, Osteoarthritis, chronic hip pain c/o worsening R periorbital bruising for the last week. Pt says she suffered mechanical fall at home last week, tripped over cane in bedroom, R side of face hit furniture. Denies any HA, vision loss, or neurological deficits. Says she saw her orthopedic surgeon this week for check-up (had hip replacement). He ordered MRI brain, which was negative for bleed. Pt says that bruising has gotten worse, but denies any pain in R periorbital area. Pt able to ambulate normally. Pt denies LOC, CP, SOB, neck pain, new hip pain. Takes ASA, but no other AC. Allergic to amoxicillin, PCN, NSAIDs.

## 2022-02-13 NOTE — ED PROVIDER NOTE - NSFOLLOWUPCLINICS_GEN_ALL_ED_FT
[facility]  Plastic & Reconstructive Surgery  [address] [place] [address]  Phone: [phone]  Fax:     [facility]  Plastic Surgery  [address] [place] [address]  Phone: [phone]  Fax:     [facility] [place]  Plastic Surgery  [address] [address]  Phone: [phone]  Fax:

## 2022-03-03 ENCOUNTER — APPOINTMENT (OUTPATIENT)
Dept: ULTRASOUND IMAGING | Facility: CLINIC | Age: 82
End: 2022-03-03
Payer: MEDICARE

## 2022-03-03 ENCOUNTER — APPOINTMENT (OUTPATIENT)
Dept: MAMMOGRAPHY | Facility: CLINIC | Age: 82
End: 2022-03-03
Payer: MEDICARE

## 2022-03-03 ENCOUNTER — OUTPATIENT (OUTPATIENT)
Dept: OUTPATIENT SERVICES | Facility: HOSPITAL | Age: 82
LOS: 1 days | End: 2022-03-03
Payer: MEDICARE

## 2022-03-03 DIAGNOSIS — S72.001A FRACTURE OF UNSPECIFIED PART OF NECK OF RIGHT FEMUR, INITIAL ENCOUNTER FOR CLOSED FRACTURE: Chronic | ICD-10-CM

## 2022-03-03 DIAGNOSIS — Z00.8 ENCOUNTER FOR OTHER GENERAL EXAMINATION: ICD-10-CM

## 2022-03-03 DIAGNOSIS — Z98.890 OTHER SPECIFIED POSTPROCEDURAL STATES: Chronic | ICD-10-CM

## 2022-03-03 DIAGNOSIS — E89.0 POSTPROCEDURAL HYPOTHYROIDISM: Chronic | ICD-10-CM

## 2022-03-03 PROCEDURE — 76641 ULTRASOUND BREAST COMPLETE: CPT | Mod: 26,50

## 2022-03-03 PROCEDURE — 77063 BREAST TOMOSYNTHESIS BI: CPT

## 2022-03-03 PROCEDURE — 77067 SCR MAMMO BI INCL CAD: CPT | Mod: 26

## 2022-03-03 PROCEDURE — 77063 BREAST TOMOSYNTHESIS BI: CPT | Mod: 26

## 2022-03-03 PROCEDURE — 76641 ULTRASOUND BREAST COMPLETE: CPT

## 2022-03-03 PROCEDURE — 77067 SCR MAMMO BI INCL CAD: CPT

## 2022-06-02 ENCOUNTER — EMERGENCY (EMERGENCY)
Facility: HOSPITAL | Age: 82
LOS: 1 days | Discharge: HOME | End: 2022-06-02
Attending: EMERGENCY MEDICINE
Payer: MEDICARE

## 2022-06-02 VITALS
RESPIRATION RATE: 18 BRPM | HEART RATE: 64 BPM | SYSTOLIC BLOOD PRESSURE: 179 MMHG | HEIGHT: 59 IN | TEMPERATURE: 99 F | WEIGHT: 119.93 LBS | OXYGEN SATURATION: 98 % | DIASTOLIC BLOOD PRESSURE: 91 MMHG

## 2022-06-02 VITALS
DIASTOLIC BLOOD PRESSURE: 103 MMHG | OXYGEN SATURATION: 99 % | RESPIRATION RATE: 18 BRPM | TEMPERATURE: 99 F | HEART RATE: 94 BPM | SYSTOLIC BLOOD PRESSURE: 157 MMHG

## 2022-06-02 DIAGNOSIS — E89.0 POSTPROCEDURAL HYPOTHYROIDISM: Chronic | ICD-10-CM

## 2022-06-02 DIAGNOSIS — Z98.890 OTHER SPECIFIED POSTPROCEDURAL STATES: Chronic | ICD-10-CM

## 2022-06-02 DIAGNOSIS — S72.001A FRACTURE OF UNSPECIFIED PART OF NECK OF RIGHT FEMUR, INITIAL ENCOUNTER FOR CLOSED FRACTURE: Chronic | ICD-10-CM

## 2022-06-02 LAB
ALBUMIN SERPL ELPH-MCNC: 3.9 G/DL — SIGNIFICANT CHANGE UP (ref 3.3–5)
ALBUMIN SERPL ELPH-MCNC: 4 G/DL — SIGNIFICANT CHANGE UP (ref 3.3–5)
ALP SERPL-CCNC: 81 U/L — SIGNIFICANT CHANGE UP (ref 40–120)
ALP SERPL-CCNC: 87 U/L — SIGNIFICANT CHANGE UP (ref 40–120)
ALT FLD-CCNC: 17 U/L — SIGNIFICANT CHANGE UP (ref 10–45)
ALT FLD-CCNC: 22 U/L — SIGNIFICANT CHANGE UP (ref 10–45)
ANION GAP SERPL CALC-SCNC: 14 MMOL/L — SIGNIFICANT CHANGE UP (ref 5–17)
ANION GAP SERPL CALC-SCNC: 15 MMOL/L — SIGNIFICANT CHANGE UP (ref 5–17)
APPEARANCE UR: CLEAR — SIGNIFICANT CHANGE UP
APTT BLD: 31.8 SEC — SIGNIFICANT CHANGE UP (ref 27.5–35.5)
AST SERPL-CCNC: 23 U/L — SIGNIFICANT CHANGE UP (ref 10–40)
AST SERPL-CCNC: 65 U/L — HIGH (ref 10–40)
BACTERIA # UR AUTO: NEGATIVE — SIGNIFICANT CHANGE UP
BASOPHILS # BLD AUTO: 0.06 K/UL — SIGNIFICANT CHANGE UP (ref 0–0.2)
BASOPHILS NFR BLD AUTO: 0.6 % — SIGNIFICANT CHANGE UP (ref 0–2)
BILIRUB SERPL-MCNC: 0.3 MG/DL — SIGNIFICANT CHANGE UP (ref 0.2–1.2)
BILIRUB SERPL-MCNC: 0.4 MG/DL — SIGNIFICANT CHANGE UP (ref 0.2–1.2)
BILIRUB UR-MCNC: NEGATIVE — SIGNIFICANT CHANGE UP
BUN SERPL-MCNC: 14 MG/DL — SIGNIFICANT CHANGE UP (ref 7–23)
BUN SERPL-MCNC: 14 MG/DL — SIGNIFICANT CHANGE UP (ref 7–23)
CALCIUM SERPL-MCNC: 9.3 MG/DL — SIGNIFICANT CHANGE UP (ref 8.4–10.5)
CALCIUM SERPL-MCNC: 9.5 MG/DL — SIGNIFICANT CHANGE UP (ref 8.4–10.5)
CHLORIDE SERPL-SCNC: 101 MMOL/L — SIGNIFICANT CHANGE UP (ref 96–108)
CHLORIDE SERPL-SCNC: 96 MMOL/L — SIGNIFICANT CHANGE UP (ref 96–108)
CK MB CFR SERPL CALC: 1.3 NG/ML — SIGNIFICANT CHANGE UP (ref 0–3.8)
CO2 SERPL-SCNC: 23 MMOL/L — SIGNIFICANT CHANGE UP (ref 22–31)
CO2 SERPL-SCNC: 24 MMOL/L — SIGNIFICANT CHANGE UP (ref 22–31)
COLOR SPEC: COLORLESS — SIGNIFICANT CHANGE UP
CREAT SERPL-MCNC: 0.68 MG/DL — SIGNIFICANT CHANGE UP (ref 0.5–1.3)
CREAT SERPL-MCNC: 0.71 MG/DL — SIGNIFICANT CHANGE UP (ref 0.5–1.3)
DIFF PNL FLD: NEGATIVE — SIGNIFICANT CHANGE UP
EGFR: 86 ML/MIN/1.73M2 — SIGNIFICANT CHANGE UP
EGFR: 89 ML/MIN/1.73M2 — SIGNIFICANT CHANGE UP
EOSINOPHIL # BLD AUTO: 0.09 K/UL — SIGNIFICANT CHANGE UP (ref 0–0.5)
EOSINOPHIL NFR BLD AUTO: 0.9 % — SIGNIFICANT CHANGE UP (ref 0–6)
EPI CELLS # UR: 1 /HPF — SIGNIFICANT CHANGE UP
GLUCOSE SERPL-MCNC: 111 MG/DL — HIGH (ref 70–99)
GLUCOSE SERPL-MCNC: 112 MG/DL — HIGH (ref 70–99)
GLUCOSE UR QL: NEGATIVE — SIGNIFICANT CHANGE UP
HCT VFR BLD CALC: 39.5 % — SIGNIFICANT CHANGE UP (ref 34.5–45)
HGB BLD-MCNC: 13.2 G/DL — SIGNIFICANT CHANGE UP (ref 11.5–15.5)
HYALINE CASTS # UR AUTO: 0 /LPF — SIGNIFICANT CHANGE UP (ref 0–2)
IMM GRANULOCYTES NFR BLD AUTO: 0.4 % — SIGNIFICANT CHANGE UP (ref 0–1.5)
INR BLD: 1.11 RATIO — SIGNIFICANT CHANGE UP (ref 0.88–1.16)
KETONES UR-MCNC: NEGATIVE — SIGNIFICANT CHANGE UP
LEUKOCYTE ESTERASE UR-ACNC: ABNORMAL
LYMPHOCYTES # BLD AUTO: 1.56 K/UL — SIGNIFICANT CHANGE UP (ref 1–3.3)
LYMPHOCYTES # BLD AUTO: 15.2 % — SIGNIFICANT CHANGE UP (ref 13–44)
MCHC RBC-ENTMCNC: 31.2 PG — SIGNIFICANT CHANGE UP (ref 27–34)
MCHC RBC-ENTMCNC: 33.4 GM/DL — SIGNIFICANT CHANGE UP (ref 32–36)
MCV RBC AUTO: 93.4 FL — SIGNIFICANT CHANGE UP (ref 80–100)
MONOCYTES # BLD AUTO: 0.64 K/UL — SIGNIFICANT CHANGE UP (ref 0–0.9)
MONOCYTES NFR BLD AUTO: 6.3 % — SIGNIFICANT CHANGE UP (ref 2–14)
NEUTROPHILS # BLD AUTO: 7.85 K/UL — HIGH (ref 1.8–7.4)
NEUTROPHILS NFR BLD AUTO: 76.6 % — SIGNIFICANT CHANGE UP (ref 43–77)
NITRITE UR-MCNC: NEGATIVE — SIGNIFICANT CHANGE UP
NRBC # BLD: 0 /100 WBCS — SIGNIFICANT CHANGE UP (ref 0–0)
PH UR: 7.5 — SIGNIFICANT CHANGE UP (ref 5–8)
PLATELET # BLD AUTO: 253 K/UL — SIGNIFICANT CHANGE UP (ref 150–400)
POTASSIUM SERPL-MCNC: 3.6 MMOL/L — SIGNIFICANT CHANGE UP (ref 3.5–5.3)
POTASSIUM SERPL-MCNC: 5.9 MMOL/L — HIGH (ref 3.5–5.3)
POTASSIUM SERPL-SCNC: 3.6 MMOL/L — SIGNIFICANT CHANGE UP (ref 3.5–5.3)
POTASSIUM SERPL-SCNC: 5.9 MMOL/L — HIGH (ref 3.5–5.3)
PROT SERPL-MCNC: 7.1 G/DL — SIGNIFICANT CHANGE UP (ref 6–8.3)
PROT SERPL-MCNC: 7.7 G/DL — SIGNIFICANT CHANGE UP (ref 6–8.3)
PROT UR-MCNC: NEGATIVE — SIGNIFICANT CHANGE UP
PROTHROM AB SERPL-ACNC: 12.9 SEC — SIGNIFICANT CHANGE UP (ref 10.5–13.4)
RBC # BLD: 4.23 M/UL — SIGNIFICANT CHANGE UP (ref 3.8–5.2)
RBC # FLD: 13.2 % — SIGNIFICANT CHANGE UP (ref 10.3–14.5)
RBC CASTS # UR COMP ASSIST: 1 /HPF — SIGNIFICANT CHANGE UP (ref 0–4)
SODIUM SERPL-SCNC: 133 MMOL/L — LOW (ref 135–145)
SODIUM SERPL-SCNC: 140 MMOL/L — SIGNIFICANT CHANGE UP (ref 135–145)
SP GR SPEC: 1 — LOW (ref 1.01–1.02)
TROPONIN T, HIGH SENSITIVITY RESULT: 8 NG/L — SIGNIFICANT CHANGE UP (ref 0–51)
UROBILINOGEN FLD QL: NEGATIVE — SIGNIFICANT CHANGE UP
WBC # BLD: 10.24 K/UL — SIGNIFICANT CHANGE UP (ref 3.8–10.5)
WBC # FLD AUTO: 10.24 K/UL — SIGNIFICANT CHANGE UP (ref 3.8–10.5)
WBC UR QL: 14 /HPF — HIGH (ref 0–5)

## 2022-06-02 PROCEDURE — G1004: CPT

## 2022-06-02 PROCEDURE — 93010 ELECTROCARDIOGRAM REPORT: CPT

## 2022-06-02 PROCEDURE — 87086 URINE CULTURE/COLONY COUNT: CPT

## 2022-06-02 PROCEDURE — 70450 CT HEAD/BRAIN W/O DYE: CPT | Mod: 26,ME

## 2022-06-02 PROCEDURE — 85730 THROMBOPLASTIN TIME PARTIAL: CPT

## 2022-06-02 PROCEDURE — 85025 COMPLETE CBC W/AUTO DIFF WBC: CPT

## 2022-06-02 PROCEDURE — 99285 EMERGENCY DEPT VISIT HI MDM: CPT | Mod: 25

## 2022-06-02 PROCEDURE — 96374 THER/PROPH/DIAG INJ IV PUSH: CPT

## 2022-06-02 PROCEDURE — 85610 PROTHROMBIN TIME: CPT

## 2022-06-02 PROCEDURE — 81001 URINALYSIS AUTO W/SCOPE: CPT

## 2022-06-02 PROCEDURE — 82962 GLUCOSE BLOOD TEST: CPT

## 2022-06-02 PROCEDURE — 80053 COMPREHEN METABOLIC PANEL: CPT

## 2022-06-02 PROCEDURE — 70450 CT HEAD/BRAIN W/O DYE: CPT | Mod: ME

## 2022-06-02 PROCEDURE — 82553 CREATINE MB FRACTION: CPT

## 2022-06-02 PROCEDURE — 84484 ASSAY OF TROPONIN QUANT: CPT

## 2022-06-02 PROCEDURE — 99053 MED SERV 10PM-8AM 24 HR FAC: CPT

## 2022-06-02 RX ORDER — NITROFURANTOIN MACROCRYSTAL 50 MG
1 CAPSULE ORAL
Qty: 10 | Refills: 0
Start: 2022-06-02 | End: 2022-06-06

## 2022-06-02 RX ORDER — CARVEDILOL PHOSPHATE 80 MG/1
25 CAPSULE, EXTENDED RELEASE ORAL ONCE
Refills: 0 | Status: COMPLETED | OUTPATIENT
Start: 2022-06-02 | End: 2022-06-02

## 2022-06-02 RX ORDER — CEFTRIAXONE 500 MG/1
1000 INJECTION, POWDER, FOR SOLUTION INTRAMUSCULAR; INTRAVENOUS ONCE
Refills: 0 | Status: COMPLETED | OUTPATIENT
Start: 2022-06-02 | End: 2022-06-02

## 2022-06-02 RX ORDER — HYDRALAZINE HCL 50 MG
50 TABLET ORAL ONCE
Refills: 0 | Status: COMPLETED | OUTPATIENT
Start: 2022-06-02 | End: 2022-06-02

## 2022-06-02 RX ADMIN — Medication 50 MILLIGRAM(S): at 06:26

## 2022-06-02 RX ADMIN — CEFTRIAXONE 100 MILLIGRAM(S): 500 INJECTION, POWDER, FOR SOLUTION INTRAMUSCULAR; INTRAVENOUS at 06:04

## 2022-06-02 NOTE — ED ADULT NURSE NOTE - NSIMPLEMENTINTERV_GEN_ALL_ED
Implemented All Fall Risk Interventions:  [place] to call system. Call bell, personal items and telephone within reach. Instruct patient to call for assistance. Room bathroom lighting operational. Non-slip footwear when patient is off stretcher. Physically safe environment: no spills, clutter or unnecessary equipment. Stretcher in lowest position, wheels locked, appropriate side rails in place. Provide visual cue, wrist band, yellow gown, etc. Monitor gait and stability. Monitor for mental status changes and reorient to person, place, and time. Review medications for side effects contributing to fall risk. Reinforce activity limits and safety measures with patient and family.

## 2022-06-02 NOTE — ED ADULT NURSE NOTE - OBJECTIVE STATEMENT
81 year old female BIBEMS from home, c/o increased urination and headache. Pt A+Ox3, reports polyuria and polydipsia x1 week and worsening tonight. Pt endorses she has a prolapsed bladder and is pre-diabetic. Denies increased hunger, burning or frequency with urination. Upon assessment, pt presents well-appearing and denies dizziness, chest pain, SOB, N/V, abdominal pain, bowel symptoms, fevers or chills.

## 2022-06-02 NOTE — ED ADULT NURSE NOTE - PAIN RATING/NUMBER SCALE (0-10): ACTIVITY
03/26/19       WORK NOTE      RE:  Mary MILNER Nakitayeimy   [address] [address]      This is to certify that Mary Hooper has been under my care and was seen at my office today, 03/26/19, for a doctor's appointment.  Please excuse her.          SIGNATURE:______________________________________,03/26/19                                            LAM Buchanan       [facility]  Jihan Schmid MD  [address]  [address]st [place] [address]  Phone: [phone]  Fax: [phone]     0

## 2022-06-02 NOTE — ED PROVIDER NOTE - CLINICAL SUMMARY MEDICAL DECISION MAKING FREE TEXT BOX
Patient is a 81y Female w/ pmhx of HTN, GERD, anemia, CKD, HLD, MVP, Diverticulitis, Hiatal hernia, Osteoarthritis, chronic hip pain and MAT who presents from home with 1 day of urinary symptoms and severe hypertension. PE w/o significant findings neurologically. R lower back pain TTP, but not CVA.   DDx: UTI vs. Hypertensive emergency  1. Recheck BP   2. Labs for end organ damage  3. CT rule out infarct   4. UA and urine culture for urinary symptoms

## 2022-06-02 NOTE — ED PROVIDER NOTE - OBJECTIVE STATEMENT
Patient is a 81y Female w/ pmhx of HTN, GERD, anemia, CKD, HLD, MVP, Diverticulitis, Hiatal hernia, Osteoarthritis, chronic hip pain and MAT who presents from home with 1 day of urinary symptoms and severe hypertension. The patient was in her normal state of health 5/30 she developed decreased urinary frequency and bladder discomfort. She subsequently on 6/1 developed severe polyuria and was urinating "non-stop". She was distressed by her urinary frequency and discomfort and was concerned she may have a UTI. While home her daughter took her blood pressure and found it to be 250/75. She notes that when her mothers pressure gets above 200 she has episodes of forgetfulness and mild confusion. However, her mental status normalizes after hypertension improves. The patient notes during this episode she has had headaches since being so hypertensive and initially had bilateral numbness and tingling which has since resolved before coming to the hospital.     Medications; Synthroid 75 mcg, Carvedilol 25 mg BID, WEllbutrin SR 100mg BID, Hydralazine 50mg TID, Olmesartan 40mg qdaily, atorvastatin 20mg daily, amitriptyline 25mg qdaily, alprazolam 1mg qdaily, Ecotrin 81mg daily, Psyllium fiber, Womens centrum vitamin, Vitamin C 500mg daily, Vitamin 2500mg per week

## 2022-06-02 NOTE — ED PROVIDER NOTE - PATIENT PORTAL LINK FT
You can access the FollowMyHealth Patient Portal offered by [facility] by registering at the following website: [email or web address]. By joining [employer]’s FollowMyHealth portal, you will also be able to view your health information using other applications (apps) compatible with our system.

## 2022-06-02 NOTE — ED PROVIDER NOTE - NSFOLLOWUPINSTRUCTIONS_ED_ALL_ED_FT
You came to the hospital with high blood pressure and urinary symptoms. You underwent lab testing and were found to have inflammation in your urine. You were given a dose of antibiotics for a UTI. You were monitored for your blood pressure. You underwent CT scan of the head which showed no neurological abnormalities, stroke or bleeding.       Please follow up with your primary care physician within 2 week of leaving the ED     Please follow up with your gyn urologist for your urinary symptoms.     Please take your antibiotics (augmentin) as prescribed to treat your UTI.     Please make sure to take all of your blood pressure medicine and notify your primary care physician if your home readings are becoming very elevated > 180 systolic blood pressure. You came to the hospital with high blood pressure and urinary symptoms. You underwent lab testing and were found to have inflammation in your urine. You were given a dose of antibiotics for a UTI. You were monitored for your blood pressure. You underwent CT scan of the head which showed no neurological abnormalities, stroke or bleeding.     Please follow up with your primary care physician within 2 week of leaving the ED     Please follow up with your gyn urologist for your urinary symptoms.     Please take your antibiotics (Nitrofurantoin) for 5 days starting tomorrow as prescribed to treat your UTI.     Please make sure to take all of your blood pressure medicine and notify your primary care physician if your home readings are becoming very elevated > 180 systolic blood pressure.    Please follow up with your Cardiologist for management of your blood pressure as an outpatient.

## 2022-06-02 NOTE — ED PROVIDER NOTE - CCCP TRG CHIEF CMPLNT
-- DO NOT REPLY / DO NOT REPLY ALL --  -- Message is from the [facility]--    COVID-19 Universal Screening: N/A - Not about scheduling    General Patient Message      Reason for Call: MOTHER IS CALLING TO INFORM PROVIDER THAT PATIENT HAD BLOOD TEST DONE IN [place]; TO WHICH THE TEST RESULTS ARE IN [place]; MOTHER NEEDS FOR PROVIDER AND OR NURSING STAFF TO CALL BACK AND TRY TO TRANSLATE THE TEST RESULTS.    Caller Information       Type Contact Phone    03/05/2021 04:55 PM CST Phone (Incoming) JUAN HAJI (Mother) [phone] (M)          Alternative phone number: NONE        Did the caller agree that this message can wait until the office reopens on Monday (or after the holiday)? YES - The Message Can Wait      Send a message to the provider's clinical support pool.     Turnaround time given to caller:   \"This message will be sent to [state Provider's name]. The clinical team will fulfill your request as soon as they review your message when the office opens on Monday (or after the holiday).\"      
medical eval

## 2022-06-02 NOTE — ED PROVIDER NOTE - NS ED ROS FT
REVIEW OF SYSTEMS:  CONSTITUTIONAL: No weakness, fevers or chills  EYES/ENT: No visual changes;  No vertigo or throat pain, + facial pain   NECK: No pain or stiffness  RESPIRATORY: No cough, wheezing, hemoptysis; No shortness of breath  CARDIOVASCULAR: No chest pain or palpitations  GASTROINTESTINAL: No abdominal or epigastric pain. No nausea, vomiting, or hematemesis; No diarrhea or constipation. No melena or hematochezia.  GENITOURINARY: No dysuria, + frequency No hematuria  NEUROLOGICAL: + numbness No weakness, + headaches   SKIN: No itching, burning, rashes, or lesions   All other review of systems is negative unless indicated above.

## 2022-06-02 NOTE — ED PROVIDER NOTE - PROGRESS NOTE DETAILS
UA with sterile pyuria, CMP with hemolysis repeat ordered.   EKG w/ NSR, Twave flattening nonspecific   CBC Unremarkable  CT non negative for stroke   Ordered 1 gram of ceftriaxone UA with sterile pyuria, CMP with hemolysis repeat ordered.   EKG w/ NSR, Twave flattening nonspecific   CBC Unremarkable  CT non negative for stroke   Ordered 1 gram of ceftriaxone  Will dc on nitrofurantoin

## 2022-06-02 NOTE — ED ADULT NURSE NOTE - ED CARDIAC RATE
Message left on patient's voicemail inquiring how he is doing.  Per Epic notes from 9/22/21 Dr Patient and staff had been in contact with patient regarding his pain medications.  Will wait to hear back from patient of how he is doing.   normal

## 2022-06-02 NOTE — ED ADULT NURSE NOTE - PAIN RATING/NUMBER SCALE (0-10): REST
ANTICOAGULATION FOLLOW-UP CLINIC VISIT    Patient Name:  Maurice Jon  Date:  9/25/2018  Contact Type:  Face to Face    SUBJECTIVE:     Patient Findings     Positives Initiation of therapy    Comments INR therapeutic patient has had 16.25 mg of warfarin in the previous 7 days. He denies any changes over the weekend. He states he is tired but will work overtime today. Pt needs to have a repeat INR Friday as he is working towards cardioversion and will need to go to [place] for the appt time he needs. Writer will have him repeat 16.25mg going into his next INR Friday he will remain study with his diet and take his medication as directed the next 3 days. Patient verbalizes understanding and agrees to plan. No further questions or concerns.             OBJECTIVE    INR Protime   Date Value Ref Range Status   09/25/2018 2.5 (A) 0.86 - 1.14 Final       ASSESSMENT / PLAN  INR assessment THER    Recheck INR In: 3 DAYS    INR Location Clinic      Anticoagulation Summary as of 9/25/2018     INR goal 2.0-3.0   Today's INR 2.5   Warfarin maintenance plan No maintenance plan   Full warfarin instructions 9/25: 2.5 mg; 9/26: 3.75 mg; 9/27: 1.25 mg   Plan last modified Kathy Conn, RN (9/18/2018)   Next INR check 9/28/2018   Target end date 10/4/2018    Indications   Atrial fibrillation (H) [I48.91]  Atrial fibrillation with rapid ventricular response (H) [I48.91]  Long-term (current) use of anticoagulants [Z79.01] [Z79.01]         Anticoagulation Episode Summary     INR check location     Preferred lab     Send INR reminders to CL ANTICOAG POOL    Comments * anticoagulation short period surrounding cardioversion/ablation only per Dr. Evans's note via Melany Ewing RN (Portal hypertension) SEND INR RESULTS TO CARDIOLOGY. has well-compensated cirrhosis      Anticoagulation Care Providers     Provider Role Specialty Phone number    Alon Pineda MD Referring [facility] [phone]            See the  Encounter Report to view Anticoagulation Flowsheet and Dosing Calendar (Go to Encounters tab in chart review, and find the Anticoagulation Therapy Visit)        Kathy Conn RN                0

## 2022-06-02 NOTE — ED PROVIDER NOTE - ATTENDING CONTRIBUTION TO CARE
Pt well appearing on exam, neuro intact, no septic vital signs. Workup done to rule out acute infectious, metabolic, cardiac condition or end organ damage from hypertension. ua with pyuria, given her symptoms, will go ahead and treat as uti. Patient is safe for d/c with supportive care, return precautions, and outpt f/u as needed.

## 2022-06-02 NOTE — ED PROVIDER NOTE - CARE PROVIDER_API CALL
STEPAN PETERSON  [facility]  [address] [place] [address]  Phone: [phone]  Fax: [phone]  Established Patient  Follow Up Time: 7-10 Days

## 2022-06-02 NOTE — ED PROVIDER NOTE - PHYSICAL EXAMINATION
PHYSICAL EXAM:  GENERAL: NAD, lying in bed comfortably  HEAD:  Atraumatic, Normocephalic  EYES: EOMI, PERRLA, conjunctiva and sclera clear  ENT: Moist mucous membranes  NECK: Supple, No JVD  CHEST/LUNG: Clear to auscultation bilaterally; No rales, rhonchi, wheezing, or rubs. Unlabored respirations  HEART: Regular rate and rhythm; No murmurs, rubs, or gallops  ABDOMEN: Bowel sounds present; Soft, Nontender, Nondistended. No hepatomegaly  EXTREMITIES:  2+ Peripheral Pulses, brisk capillary refill. No clubbing, cyanosis, or edema, Small hematoma on the right antecubital fossa   GENITOURINARY: No suprapubic tenderness   NERVOUS SYSTEM:  Alert & Oriented X3, speech clear. No deficits, Sensation and motor function intact bilaterally   MSK: FROM all 4 extremities, full and equal strength  SKIN: No rashes or lesions

## 2022-06-03 LAB
CULTURE RESULTS: SIGNIFICANT CHANGE UP
SPECIMEN SOURCE: SIGNIFICANT CHANGE UP

## 2022-06-28 ENCOUNTER — EMERGENCY (EMERGENCY)
Facility: HOSPITAL | Age: 82
LOS: 1 days | Discharge: HOME | End: 2022-06-28
Attending: EMERGENCY MEDICINE
Payer: MEDICARE

## 2022-06-28 VITALS
SYSTOLIC BLOOD PRESSURE: 169 MMHG | TEMPERATURE: 98 F | OXYGEN SATURATION: 99 % | HEIGHT: 59 IN | WEIGHT: 130.07 LBS | HEART RATE: 76 BPM | RESPIRATION RATE: 20 BRPM | DIASTOLIC BLOOD PRESSURE: 85 MMHG

## 2022-06-28 VITALS
HEART RATE: 70 BPM | TEMPERATURE: 98 F | SYSTOLIC BLOOD PRESSURE: 147 MMHG | OXYGEN SATURATION: 99 % | RESPIRATION RATE: 16 BRPM | DIASTOLIC BLOOD PRESSURE: 80 MMHG

## 2022-06-28 DIAGNOSIS — E89.0 POSTPROCEDURAL HYPOTHYROIDISM: Chronic | ICD-10-CM

## 2022-06-28 DIAGNOSIS — S72.001A FRACTURE OF UNSPECIFIED PART OF NECK OF RIGHT FEMUR, INITIAL ENCOUNTER FOR CLOSED FRACTURE: Chronic | ICD-10-CM

## 2022-06-28 DIAGNOSIS — Z98.890 OTHER SPECIFIED POSTPROCEDURAL STATES: Chronic | ICD-10-CM

## 2022-06-28 LAB
ALBUMIN SERPL ELPH-MCNC: 4 G/DL — SIGNIFICANT CHANGE UP (ref 3.3–5)
ALP SERPL-CCNC: 82 U/L — SIGNIFICANT CHANGE UP (ref 40–120)
ALT FLD-CCNC: 18 U/L — SIGNIFICANT CHANGE UP (ref 10–45)
ANION GAP SERPL CALC-SCNC: 13 MMOL/L — SIGNIFICANT CHANGE UP (ref 5–17)
APPEARANCE UR: CLEAR — SIGNIFICANT CHANGE UP
APTT BLD: 28.3 SEC — SIGNIFICANT CHANGE UP (ref 27.5–35.5)
AST SERPL-CCNC: 28 U/L — SIGNIFICANT CHANGE UP (ref 10–40)
BACTERIA # UR AUTO: NEGATIVE — SIGNIFICANT CHANGE UP
BASOPHILS # BLD AUTO: 0.06 K/UL — SIGNIFICANT CHANGE UP (ref 0–0.2)
BASOPHILS NFR BLD AUTO: 0.6 % — SIGNIFICANT CHANGE UP (ref 0–2)
BILIRUB SERPL-MCNC: 0.2 MG/DL — SIGNIFICANT CHANGE UP (ref 0.2–1.2)
BILIRUB UR-MCNC: NEGATIVE — SIGNIFICANT CHANGE UP
BLD GP AB SCN SERPL QL: NEGATIVE — SIGNIFICANT CHANGE UP
BUN SERPL-MCNC: 22 MG/DL — SIGNIFICANT CHANGE UP (ref 7–23)
CALCIUM SERPL-MCNC: 9.3 MG/DL — SIGNIFICANT CHANGE UP (ref 8.4–10.5)
CHLORIDE SERPL-SCNC: 101 MMOL/L — SIGNIFICANT CHANGE UP (ref 96–108)
CO2 SERPL-SCNC: 24 MMOL/L — SIGNIFICANT CHANGE UP (ref 22–31)
COLOR SPEC: COLORLESS — SIGNIFICANT CHANGE UP
CREAT SERPL-MCNC: 0.87 MG/DL — SIGNIFICANT CHANGE UP (ref 0.5–1.3)
DIFF PNL FLD: NEGATIVE — SIGNIFICANT CHANGE UP
EGFR: 68 ML/MIN/1.73M2 — SIGNIFICANT CHANGE UP
EOSINOPHIL # BLD AUTO: 0.14 K/UL — SIGNIFICANT CHANGE UP (ref 0–0.5)
EOSINOPHIL NFR BLD AUTO: 1.4 % — SIGNIFICANT CHANGE UP (ref 0–6)
EPI CELLS # UR: 1 /HPF — SIGNIFICANT CHANGE UP
GLUCOSE SERPL-MCNC: 126 MG/DL — HIGH (ref 70–99)
GLUCOSE UR QL: NEGATIVE — SIGNIFICANT CHANGE UP
HCT VFR BLD CALC: 30.5 % — LOW (ref 34.5–45)
HGB BLD-MCNC: 10 G/DL — LOW (ref 11.5–15.5)
HYALINE CASTS # UR AUTO: 0 /LPF — SIGNIFICANT CHANGE UP (ref 0–2)
IMM GRANULOCYTES NFR BLD AUTO: 0.5 % — SIGNIFICANT CHANGE UP (ref 0–1.5)
INR BLD: 1.12 RATIO — SIGNIFICANT CHANGE UP (ref 0.88–1.16)
KETONES UR-MCNC: NEGATIVE — SIGNIFICANT CHANGE UP
LEUKOCYTE ESTERASE UR-ACNC: ABNORMAL
LYMPHOCYTES # BLD AUTO: 1.91 K/UL — SIGNIFICANT CHANGE UP (ref 1–3.3)
LYMPHOCYTES # BLD AUTO: 19.1 % — SIGNIFICANT CHANGE UP (ref 13–44)
MCHC RBC-ENTMCNC: 31.1 PG — SIGNIFICANT CHANGE UP (ref 27–34)
MCHC RBC-ENTMCNC: 32.8 GM/DL — SIGNIFICANT CHANGE UP (ref 32–36)
MCV RBC AUTO: 94.7 FL — SIGNIFICANT CHANGE UP (ref 80–100)
MONOCYTES # BLD AUTO: 0.75 K/UL — SIGNIFICANT CHANGE UP (ref 0–0.9)
MONOCYTES NFR BLD AUTO: 7.5 % — SIGNIFICANT CHANGE UP (ref 2–14)
NEUTROPHILS # BLD AUTO: 7.1 K/UL — SIGNIFICANT CHANGE UP (ref 1.8–7.4)
NEUTROPHILS NFR BLD AUTO: 70.9 % — SIGNIFICANT CHANGE UP (ref 43–77)
NITRITE UR-MCNC: NEGATIVE — SIGNIFICANT CHANGE UP
NRBC # BLD: 0 /100 WBCS — SIGNIFICANT CHANGE UP (ref 0–0)
PH UR: 6.5 — SIGNIFICANT CHANGE UP (ref 5–8)
PLATELET # BLD AUTO: 293 K/UL — SIGNIFICANT CHANGE UP (ref 150–400)
POTASSIUM SERPL-MCNC: 4.1 MMOL/L — SIGNIFICANT CHANGE UP (ref 3.5–5.3)
POTASSIUM SERPL-SCNC: 4.1 MMOL/L — SIGNIFICANT CHANGE UP (ref 3.5–5.3)
PROT SERPL-MCNC: 7.2 G/DL — SIGNIFICANT CHANGE UP (ref 6–8.3)
PROT UR-MCNC: NEGATIVE — SIGNIFICANT CHANGE UP
PROTHROM AB SERPL-ACNC: 12.9 SEC — SIGNIFICANT CHANGE UP (ref 10.5–13.4)
RBC # BLD: 3.22 M/UL — LOW (ref 3.8–5.2)
RBC # FLD: 13.8 % — SIGNIFICANT CHANGE UP (ref 10.3–14.5)
RBC CASTS # UR COMP ASSIST: 0 /HPF — SIGNIFICANT CHANGE UP (ref 0–4)
RH IG SCN BLD-IMP: POSITIVE — SIGNIFICANT CHANGE UP
SARS-COV-2 RNA SPEC QL NAA+PROBE: SIGNIFICANT CHANGE UP
SODIUM SERPL-SCNC: 138 MMOL/L — SIGNIFICANT CHANGE UP (ref 135–145)
SP GR SPEC: 1.01 — LOW (ref 1.01–1.02)
TROPONIN T, HIGH SENSITIVITY RESULT: 10 NG/L — SIGNIFICANT CHANGE UP (ref 0–51)
TROPONIN T, HIGH SENSITIVITY RESULT: 12 NG/L — SIGNIFICANT CHANGE UP (ref 0–51)
UROBILINOGEN FLD QL: NEGATIVE — SIGNIFICANT CHANGE UP
WBC # BLD: 10.01 K/UL — SIGNIFICANT CHANGE UP (ref 3.8–10.5)
WBC # FLD AUTO: 10.01 K/UL — SIGNIFICANT CHANGE UP (ref 3.8–10.5)
WBC UR QL: 3 /HPF — SIGNIFICANT CHANGE UP (ref 0–5)

## 2022-06-28 PROCEDURE — 93005 ELECTROCARDIOGRAM TRACING: CPT

## 2022-06-28 PROCEDURE — 70450 CT HEAD/BRAIN W/O DYE: CPT | Mod: 26,MA

## 2022-06-28 PROCEDURE — 84484 ASSAY OF TROPONIN QUANT: CPT

## 2022-06-28 PROCEDURE — 86900 BLOOD TYPING SEROLOGIC ABO: CPT

## 2022-06-28 PROCEDURE — 81001 URINALYSIS AUTO W/SCOPE: CPT

## 2022-06-28 PROCEDURE — 87086 URINE CULTURE/COLONY COUNT: CPT

## 2022-06-28 PROCEDURE — 86850 RBC ANTIBODY SCREEN: CPT

## 2022-06-28 PROCEDURE — 99285 EMERGENCY DEPT VISIT HI MDM: CPT | Mod: 25

## 2022-06-28 PROCEDURE — 71045 X-RAY EXAM CHEST 1 VIEW: CPT | Mod: 26

## 2022-06-28 PROCEDURE — 71045 X-RAY EXAM CHEST 1 VIEW: CPT

## 2022-06-28 PROCEDURE — 86901 BLOOD TYPING SEROLOGIC RH(D): CPT

## 2022-06-28 PROCEDURE — 93010 ELECTROCARDIOGRAM REPORT: CPT

## 2022-06-28 PROCEDURE — 85730 THROMBOPLASTIN TIME PARTIAL: CPT

## 2022-06-28 PROCEDURE — 99053 MED SERV 10PM-8AM 24 HR FAC: CPT

## 2022-06-28 PROCEDURE — 80053 COMPREHEN METABOLIC PANEL: CPT

## 2022-06-28 PROCEDURE — 70450 CT HEAD/BRAIN W/O DYE: CPT | Mod: MA

## 2022-06-28 PROCEDURE — U0005: CPT

## 2022-06-28 PROCEDURE — 85025 COMPLETE CBC W/AUTO DIFF WBC: CPT

## 2022-06-28 PROCEDURE — 85610 PROTHROMBIN TIME: CPT

## 2022-06-28 PROCEDURE — U0003: CPT

## 2022-06-28 RX ORDER — SODIUM CHLORIDE 9 MG/ML
1000 INJECTION, SOLUTION INTRAVENOUS ONCE
Refills: 0 | Status: COMPLETED | OUTPATIENT
Start: 2022-06-28 | End: 2022-06-28

## 2022-06-28 RX ADMIN — SODIUM CHLORIDE 1000 MILLILITER(S): 9 INJECTION, SOLUTION INTRAVENOUS at 05:03

## 2022-06-28 NOTE — ED PROVIDER NOTE - NSFOLLOWUPINSTRUCTIONS_ED_ALL_ED_FT
Today you were seen in the ED for rectal bleeding     It was found that you have no signs of a medical emergency     A copy of the results is attached below.     Please follow up with your gastroenterologists / PCP for continued care and management.     [address] [place] [address]  [phone]    Please return to the ED if you have an increase in abdominal pain, nausea, vomiting or pain that is uncontrolled with your home pain relief. Increase in weakness / lethargy.

## 2022-06-28 NOTE — ED PROVIDER NOTE - OBJECTIVE STATEMENT
81F PMH HTN depression, HLD CC weakness and rectal bleeding. PT notes that she fell on saturday, went to [facility] and had blood work which was non significant (no images) Pt felt weak and said she collapsed, unwitnessed. PT son at bedside noted pt appeared "cataonic" when he found her, son lives with pt.  Pt notes that she felt better today and noted that she had a blood tinged toilet paper 81F PMH HTN depression, HLD CC weakness and rectal bleeding. PT notes that she fell on saturday, went to [facility] and had blood work which was non significant (no images) Pt felt weak and said she collapsed, unwitnessed. PT son at bedside noted pt appeared "catatonic" when he found her, son lives with pt.  Pt notes that she felt better today and noted that she had a blood tinged toilet paper 81F PMH HTN depression, HLD CC weakness and rectal bleeding. PT notes that she fell on saturday, went to [facility] and had blood work which was non significant (no images) Pt felt weak and said she collapsed, unwitnessed. PT son at bedside noted pt appeared "catatonic" when he found her, son lives with pt.  Pt notes that she felt better today and noted that she had a blood tinged toilet paper  Had colonoscopy and endoscopy in december w/ normal results

## 2022-06-28 NOTE — ED PROVIDER NOTE - ATTENDING CONTRIBUTION TO CARE
Afebrile. Awake and Alert. Head atraumatic. No mid-line CS TTP. Lungs CTA. Heart RRR. Abdomen soft NTND. CN II-XII grossly intact. Moves all extremities without lateralization.    Syncope evaluated at [facility] on Saturday presents for bloody in stool  HD monitoring

## 2022-06-28 NOTE — ED ADULT TRIAGE NOTE - ADDITIONAL SAFETY/BANDS...
Additional Safety/Bands: O-L Flap Text: The defect edges were debeveled with a #15 scalpel blade.  Given the location of the defect, shape of the defect and the proximity to free margins an O-L flap was deemed most appropriate.  Using a sterile surgical marker, an appropriate advancement flap was drawn incorporating the defect and placing the expected incisions within the relaxed skin tension lines where possible.    The area thus outlined was incised deep to adipose tissue with a #15 scalpel blade.  The skin margins were undermined to an appropriate distance in all directions utilizing iris scissors.

## 2022-06-28 NOTE — ED PROVIDER NOTE - PROGRESS NOTE DETAILS
Kelly PGY 1 discussed w/ pt and son in regards to staying or going   pt and son agree that decision to go home and follow up out pt is best current decision   will follow up with ou pt gastro Kelly PGY 1  pt notes she manually disimpacts her self

## 2022-06-28 NOTE — ED ADULT NURSE NOTE - CHIEF COMPLAINT QUOTE
rectal bleeding beginning Saturday- was seen at [place] for w/u; reports to "tingling in hands and feet."

## 2022-06-28 NOTE — ED PROVIDER NOTE - CLINICAL SUMMARY MEDICAL DECISION MAKING FREE TEXT BOX
81F w/ pmh htn depression cc weakness rectal bleeding, given hx and physical Differential diagnosis includes but is not limited to, brain bleed, acs, hypokalemia / natremia, will get labs and imaging

## 2022-06-28 NOTE — ED PROVIDER NOTE - PHYSICAL EXAMINATION
GENERAL: Awake, alert, NAD  LUNGS: CTAB, no wheezes or crackles   CARDIAC: RRR, no m/r/g  ABDOMEN: Soft, non tender, non distended, no rebound, no guarding  Rectal Chaperone Felipa: Dried blood w/o active bleed, no fissures or hemorrhoids noted   BACK: No midline spinal tenderness, no CVA tenderness  EXT: No edema, 2+ DP pulses bilaterally, no deformities.  NEURO: A&Ox3. Moving all extremities.  SKIN: Warm and dry. No rash.  PSYCH: Normal affect.

## 2022-06-28 NOTE — ED ADULT NURSE NOTE - NSIMPLEMENTINTERV_GEN_ALL_ED
Implemented All Fall with Harm Risk Interventions:  [place] to call system. Call bell, personal items and telephone within reach. Instruct patient to call for assistance. Room bathroom lighting operational. Non-slip footwear when patient is off stretcher. Physically safe environment: no spills, clutter or unnecessary equipment. Stretcher in lowest position, wheels locked, appropriate side rails in place. Provide visual cue, wrist band, yellow gown, etc. Monitor gait and stability. Monitor for mental status changes and reorient to person, place, and time. Review medications for side effects contributing to fall risk. Reinforce activity limits and safety measures with patient and family. Provide visual clues: red socks.

## 2022-06-28 NOTE — ED PROVIDER NOTE - PATIENT PORTAL LINK FT
You can access the FollowMyHealth Patient Portal offered by [facility] by registering at the following website: [email or web address]. By joining [facility]’s FollowMyHealth portal, you will also be able to view your health information using other applications (apps) compatible with our system.

## 2022-06-28 NOTE — ED ADULT NURSE NOTE - OBJECTIVE STATEMENT
80 y/o female presenting to ED ambulatory, A&Ox3, complaining of rectal bleed. as per patient, she was recently seen In [place] ed s/p a fall after unwitnessed syncopal episode. pt states she was feeling weak prior to fall and as of yesterday started to feel improvement. pt unknown if she hit her head. pt on ecotrin. pt reports feeling gradually better today, and then she had an a bowel movement and reported visualizing blood in stool. pt reports numbness/tingling in upper extremities. pt denies fevers, chills, n/v/d, urinary symptoms, abd pain, chest pain, shortness of breath. pt able to follow commands, +ROM in all extremities. breathing spontaneous and unlabored. abd soft nontender, nondistended. Safety and comfort measures provided, bed locked and in lowest position, side rails up for safety. Call bell within reach. Awaiting results.

## 2022-06-28 NOTE — ED ADULT TRIAGE NOTE - CHIEF COMPLAINT QUOTE
rectal bleeding beginning Saturday- was seen at [place] for w/u; reports to "tingling in hands and feet."

## 2022-06-28 NOTE — ED ADULT NURSE REASSESSMENT NOTE - NS ED NURSE REASSESS COMMENT FT1
received report from Kyung HONG. pt resting comfortably in stretcher. son at bedside. A&Ox4. VSS. NAD noted. pt pending lab results and dispo. plan of care discussed. safety and comfort measures maintained.

## 2022-06-29 ENCOUNTER — INPATIENT (INPATIENT)
Facility: HOSPITAL | Age: 82
LOS: 7 days | Discharge: HOME | DRG: 378 | End: 2022-07-07
Attending: INTERNAL MEDICINE | Admitting: INTERNAL MEDICINE
Payer: MEDICARE

## 2022-06-29 VITALS
RESPIRATION RATE: 18 BRPM | HEIGHT: 59 IN | SYSTOLIC BLOOD PRESSURE: 148 MMHG | OXYGEN SATURATION: 97 % | TEMPERATURE: 98 F | HEART RATE: 78 BPM | WEIGHT: 130.07 LBS | DIASTOLIC BLOOD PRESSURE: 75 MMHG

## 2022-06-29 DIAGNOSIS — K92.1 MELENA: ICD-10-CM

## 2022-06-29 DIAGNOSIS — Z98.890 OTHER SPECIFIED POSTPROCEDURAL STATES: Chronic | ICD-10-CM

## 2022-06-29 DIAGNOSIS — E89.0 POSTPROCEDURAL HYPOTHYROIDISM: Chronic | ICD-10-CM

## 2022-06-29 DIAGNOSIS — S72.001A FRACTURE OF UNSPECIFIED PART OF NECK OF RIGHT FEMUR, INITIAL ENCOUNTER FOR CLOSED FRACTURE: Chronic | ICD-10-CM

## 2022-06-29 LAB
ALBUMIN SERPL ELPH-MCNC: 3.6 G/DL — SIGNIFICANT CHANGE UP (ref 3.3–5)
ALP SERPL-CCNC: 73 U/L — SIGNIFICANT CHANGE UP (ref 40–120)
ALT FLD-CCNC: 13 U/L — SIGNIFICANT CHANGE UP (ref 10–45)
ANION GAP SERPL CALC-SCNC: 10 MMOL/L — SIGNIFICANT CHANGE UP (ref 5–17)
APTT BLD: 30.1 SEC — SIGNIFICANT CHANGE UP (ref 27.5–35.5)
AST SERPL-CCNC: 17 U/L — SIGNIFICANT CHANGE UP (ref 10–40)
BASOPHILS # BLD AUTO: 0.06 K/UL — SIGNIFICANT CHANGE UP (ref 0–0.2)
BASOPHILS NFR BLD AUTO: 0.9 % — SIGNIFICANT CHANGE UP (ref 0–2)
BILIRUB SERPL-MCNC: 0.3 MG/DL — SIGNIFICANT CHANGE UP (ref 0.2–1.2)
BLD GP AB SCN SERPL QL: NEGATIVE — SIGNIFICANT CHANGE UP
BUN SERPL-MCNC: 14 MG/DL — SIGNIFICANT CHANGE UP (ref 7–23)
CALCIUM SERPL-MCNC: 9.3 MG/DL — SIGNIFICANT CHANGE UP (ref 8.4–10.5)
CHLORIDE SERPL-SCNC: 103 MMOL/L — SIGNIFICANT CHANGE UP (ref 96–108)
CO2 SERPL-SCNC: 25 MMOL/L — SIGNIFICANT CHANGE UP (ref 22–31)
CREAT SERPL-MCNC: 0.88 MG/DL — SIGNIFICANT CHANGE UP (ref 0.5–1.3)
CULTURE RESULTS: SIGNIFICANT CHANGE UP
EGFR: 67 ML/MIN/1.73M2 — SIGNIFICANT CHANGE UP
EOSINOPHIL # BLD AUTO: 0.14 K/UL — SIGNIFICANT CHANGE UP (ref 0–0.5)
EOSINOPHIL NFR BLD AUTO: 2.1 % — SIGNIFICANT CHANGE UP (ref 0–6)
FLUAV AG NPH QL: SIGNIFICANT CHANGE UP
FLUBV AG NPH QL: SIGNIFICANT CHANGE UP
GLUCOSE SERPL-MCNC: 89 MG/DL — SIGNIFICANT CHANGE UP (ref 70–99)
HCT VFR BLD CALC: 27.5 % — LOW (ref 34.5–45)
HGB BLD-MCNC: 9.1 G/DL — LOW (ref 11.5–15.5)
IMM GRANULOCYTES NFR BLD AUTO: 0.2 % — SIGNIFICANT CHANGE UP (ref 0–1.5)
INR BLD: 1.14 RATIO — SIGNIFICANT CHANGE UP (ref 0.88–1.16)
LYMPHOCYTES # BLD AUTO: 2.35 K/UL — SIGNIFICANT CHANGE UP (ref 1–3.3)
LYMPHOCYTES # BLD AUTO: 35.6 % — SIGNIFICANT CHANGE UP (ref 13–44)
MCHC RBC-ENTMCNC: 31.9 PG — SIGNIFICANT CHANGE UP (ref 27–34)
MCHC RBC-ENTMCNC: 33.1 GM/DL — SIGNIFICANT CHANGE UP (ref 32–36)
MCV RBC AUTO: 96.5 FL — SIGNIFICANT CHANGE UP (ref 80–100)
MONOCYTES # BLD AUTO: 0.69 K/UL — SIGNIFICANT CHANGE UP (ref 0–0.9)
MONOCYTES NFR BLD AUTO: 10.5 % — SIGNIFICANT CHANGE UP (ref 2–14)
NEUTROPHILS # BLD AUTO: 3.35 K/UL — SIGNIFICANT CHANGE UP (ref 1.8–7.4)
NEUTROPHILS NFR BLD AUTO: 50.7 % — SIGNIFICANT CHANGE UP (ref 43–77)
NRBC # BLD: 0 /100 WBCS — SIGNIFICANT CHANGE UP (ref 0–0)
PLATELET # BLD AUTO: 253 K/UL — SIGNIFICANT CHANGE UP (ref 150–400)
POTASSIUM SERPL-MCNC: 3.8 MMOL/L — SIGNIFICANT CHANGE UP (ref 3.5–5.3)
POTASSIUM SERPL-SCNC: 3.8 MMOL/L — SIGNIFICANT CHANGE UP (ref 3.5–5.3)
PROT SERPL-MCNC: 6.3 G/DL — SIGNIFICANT CHANGE UP (ref 6–8.3)
PROTHROM AB SERPL-ACNC: 13.3 SEC — SIGNIFICANT CHANGE UP (ref 10.5–13.4)
RBC # BLD: 2.85 M/UL — LOW (ref 3.8–5.2)
RBC # FLD: 14.1 % — SIGNIFICANT CHANGE UP (ref 10.3–14.5)
RH IG SCN BLD-IMP: POSITIVE — SIGNIFICANT CHANGE UP
RSV RNA NPH QL NAA+NON-PROBE: SIGNIFICANT CHANGE UP
SARS-COV-2 RNA SPEC QL NAA+PROBE: SIGNIFICANT CHANGE UP
SODIUM SERPL-SCNC: 138 MMOL/L — SIGNIFICANT CHANGE UP (ref 135–145)
SPECIMEN SOURCE: SIGNIFICANT CHANGE UP
WBC # BLD: 6.6 K/UL — SIGNIFICANT CHANGE UP (ref 3.8–10.5)
WBC # FLD AUTO: 6.6 K/UL — SIGNIFICANT CHANGE UP (ref 3.8–10.5)

## 2022-06-29 PROCEDURE — 99223 1ST HOSP IP/OBS HIGH 75: CPT

## 2022-06-29 PROCEDURE — 99285 EMERGENCY DEPT VISIT HI MDM: CPT

## 2022-06-29 RX ORDER — HYDRALAZINE HCL 50 MG
50 TABLET ORAL
Refills: 0 | Status: DISCONTINUED | OUTPATIENT
Start: 2022-06-29 | End: 2022-07-02

## 2022-06-29 RX ORDER — CHOLECALCIFEROL (VITAMIN D3) 125 MCG
1 CAPSULE ORAL
Qty: 0 | Refills: 0 | DISCHARGE

## 2022-06-29 RX ORDER — ALPRAZOLAM 0.25 MG
1 TABLET ORAL
Qty: 0 | Refills: 0 | DISCHARGE

## 2022-06-29 RX ORDER — ALPRAZOLAM 0.25 MG
1 TABLET ORAL AT BEDTIME
Refills: 0 | Status: DISCONTINUED | OUTPATIENT
Start: 2022-06-29 | End: 2022-07-05

## 2022-06-29 RX ORDER — LEVOTHYROXINE SODIUM 125 MCG
75 TABLET ORAL DAILY
Refills: 0 | Status: DISCONTINUED | OUTPATIENT
Start: 2022-06-29 | End: 2022-07-03

## 2022-06-29 RX ORDER — ASCORBIC ACID 60 MG
1 TABLET,CHEWABLE ORAL
Qty: 0 | Refills: 0 | DISCHARGE

## 2022-06-29 RX ORDER — LOSARTAN POTASSIUM 100 MG/1
100 TABLET, FILM COATED ORAL DAILY
Refills: 0 | Status: DISCONTINUED | OUTPATIENT
Start: 2022-06-29 | End: 2022-07-02

## 2022-06-29 RX ORDER — DILTIAZEM HCL 120 MG
120 CAPSULE, EXT RELEASE 24 HR ORAL DAILY
Refills: 0 | Status: DISCONTINUED | OUTPATIENT
Start: 2022-06-29 | End: 2022-07-02

## 2022-06-29 RX ORDER — CHOLECALCIFEROL (VITAMIN D3) 125 MCG
2 CAPSULE ORAL
Qty: 0 | Refills: 0 | DISCHARGE

## 2022-06-29 RX ORDER — BUPROPION HYDROCHLORIDE 150 MG/1
100 TABLET, EXTENDED RELEASE ORAL
Refills: 0 | Status: DISCONTINUED | OUTPATIENT
Start: 2022-06-30 | End: 2022-07-07

## 2022-06-29 RX ORDER — LOVASTATIN 20 MG
1 TABLET ORAL
Qty: 0 | Refills: 0 | DISCHARGE

## 2022-06-29 RX ORDER — CARVEDILOL PHOSPHATE 80 MG/1
25 CAPSULE, EXTENDED RELEASE ORAL EVERY 12 HOURS
Refills: 0 | Status: DISCONTINUED | OUTPATIENT
Start: 2022-06-29 | End: 2022-07-07

## 2022-06-29 RX ORDER — LEVOTHYROXINE SODIUM 125 MCG
1.5 TABLET ORAL
Qty: 0 | Refills: 0 | DISCHARGE

## 2022-06-29 RX ORDER — ATORVASTATIN CALCIUM 80 MG/1
10 TABLET, FILM COATED ORAL AT BEDTIME
Refills: 0 | Status: DISCONTINUED | OUTPATIENT
Start: 2022-06-29 | End: 2022-07-07

## 2022-06-29 RX ORDER — AMITRIPTYLINE HCL 25 MG
25 TABLET ORAL AT BEDTIME
Refills: 0 | Status: DISCONTINUED | OUTPATIENT
Start: 2022-06-29 | End: 2022-07-07

## 2022-06-29 RX ORDER — PANTOPRAZOLE SODIUM 20 MG/1
8 TABLET, DELAYED RELEASE ORAL
Qty: 80 | Refills: 0 | Status: DISCONTINUED | OUTPATIENT
Start: 2022-06-29 | End: 2022-07-01

## 2022-06-29 RX ORDER — HYDRALAZINE HCL 50 MG
1 TABLET ORAL
Qty: 0 | Refills: 0 | DISCHARGE

## 2022-06-29 NOTE — H&P ADULT - NSHPADDITIONALINFOADULT_GEN_ALL_CORE
NIGHT HOSPITALIST:    Patient/ family in attendance aware of course and agree with plan/care as above.  Given patient's comorbidities, patient's long term prognosis is guarded.   Emotional support provided to patient/family.   Care reviewed with covering NP/PA for endorsement to Dr. Moore.    Felix Fitch MD NIGHT HOSPITALIST:    Patient/ family in attendance aware of course and agree with plan/care as above.  Given patient's comorbidities, patient's long term prognosis is guarded.   Emotional support provided to patient/family.   Care reviewed with covering NPJovan for endorsement to Dr. Moore.    Felix Fitch MD

## 2022-06-29 NOTE — ED PROVIDER NOTE - OBJECTIVE STATEMENT
81yof pmhx of HTN GERD MVP Diverticulosis sent in from GI For melena. pt s/p syncopal episode over the weekend seen at [facility] and here yesterday. pt reports feeling fatigue and mild dizziness. pt offered admission yesterday for possible GI bleed but wanted to see GI outpt today. pt reports few days ago with normal stool but blood on TP but since yesterday with dark/tarry stools on ASA; GI today with rectal exam with black tarry stool mixed with burgundy stool.   Dr. Haroon Franklin [phone] 81yof pmhx of HTN GERD MVP Diverticulosis sent in from GI For melena. pt s/p syncopal episode over the weekend seen at [facility] and here yesterday. pt reports feeling fatigue and mild dizziness. pt offered admission yesterday for possible GI bleed but wanted to see GI outpt today. pt reports few days ago with normal stool but blood on TP but since yesterday with dark/tarry stools on ASA; GI today with rectal exam with black tarry stool mixed with burgundy stool.   Dr. Haroon Franklin [phone]    Attendinyo female presents with bloody/dark stools on and off since Saturday.  no fever or chills.  no pain.  had syncope x2 this week.  seen in the ED yesterday and had hemoglobin of 10.  followed up with GI today who sent her for admission.

## 2022-06-29 NOTE — H&P ADULT - HISTORY OF PRESENT ILLNESS
CARING FOR YOUR WOUND AFTER BIOPSY    - biopsy results typically return within 7-10 days, we will call you with your results. If you have not heard from us and it has been more than 2 weeks (rare) please give the office a call at [phone]    Follow these steps to promote healing and prevent infection    For 24 hours:   * Keep band-aid/dressing in place  * Keep the surgery area clean and dry    After 24 hours:  * Remove the band-aid and follow the steps below twice a day until healed:    1) Wash your hands    2) Gently cleanse the area with soap and water.    3) Gently pat the area dry    4) Apply vaseline and a band-aid if needed (will prevent vaseline from getting on clothing if it is in an area normally covered by clothing)    Until the wound is healed:  * Avoid shaving near the area  * Use a bandaid if needed to keep clothes or eyeglasses from rubbing the area    When the wound is healed:  * The surgery site will be more sensitive to sun exposure  * Be sure to use sunscreen (SPF 35 or higher) when outdoors    Problems to report:  * Fever or chills  * Red, hot, or swollen wound (a little bit of redness is expected)  * Pus or foul smelling drainage  * Bleeding: If bleeding occurs, hold firm pressure to the area with your hand for 15 minutes without releasing pressure or looking to see if bleeding has stopped. Repeat if bleeding continues. In the rare event you are unable to stop bleeding please call the office or alternatively seek medical attention (emergency room or urgent care)  if after hours/weekend    Return for follow up in: pending results    Please call with any questions or concerns     Shelia Graham MD  [facility]  [phone]    In the next few weeks you may receive a link to an online survey, or a Press Ganey survey by mail, regarding your most recent clinic visit with us.  Please take a few moments to accurately evaluate your visit. We strive to provide you with the best medical care.  Your feedback will assist us in achieving this. Again, thank you for your time and we look forward to your next visit.       Caring for Dry Skin    Bathing Instructions:  * Use lukewarm water--avoid hot or cold water    * Do not use soap, or if you must use soap, limit use to underwear area/armpits/feet (shampoo/conditioner also okay)    * Mild soaps (recommended): Dove sensitive skin, Cetaphil gentle cleansing bar, Vanicream cleansing chemical-free bar soap, Aveeno fragrance free bar soap    * Avoid harsh, drying soaps such as Ivory, Dial, [place] Spring, Zest    * Be gentle: Do not scrub with a washcloth, sponge, or brush    * Reduce bathing time to less than 15 minutes. Showers are better than baths.    * After bathing, pat skin dry with a towel and within 5 minutes apply a moisturizer    * Recommended moisturizer: - moisturize twice daily with something with a keratalytic such as salicylic acid, lactic acid, or urea (ie. Gold bond for psoriasis, Cerave for Psoriasis (with Salicylic acid, not all cerave products contain salicylic acid), Amlactin cream)    * Avoid colognes, perfumes, sprays, and powders on your skin    * Do not wear tight or rough clothing. Wool clothes and new clothes may be irritating    Laundry Care:    * Use only a small amount of fragrance-free laundry products such as Cheer Free and Gentle, All Free and Clear, or Purex Free and Clear    * For persistent dry skin or inflammation, double rinse clothes after washing    * Do not use fabric softeners or if you must use them, choose a fragrance-free product     Prescription Medicines:    * Apply prescription creams and ointments to the affected areas as directed    * In general if using a prescription cream/ointment and a moisturizer, apply the prescription product first          NIGHT HOSPITALIST:    Patient UNKNOWN to me previously, assigned to me at this point via the ER and by Dr. Moore to admit this 82 y/o F--patient seen with adult daughter and adult son in attendance--followed by her office physicians above--patient with a history of retrosternal goiter resection in 9/2003 with postsurgical hypothyroidism and 4 mm microfocus of papillary thyroid CA with annual thyroid sonograms (followed by Dr. Hope above at Endocrinology), apparent prediabetes, presumed GERD, hiatal hernia, anxiety disorder on longstanding Xanax, essential HTN on multiple BP medications, arteriosclerosis, history of MAT, with apparently an evaluation this past Saturday at [facility] with an undifferentiated syncopal episode with generalized fatigue and dizziness, with patient apparently seen 6/28/22 with recurrence of symptoms with possible GI bleed but apparently a decision was made at the time with the patient seen by her GI above in the office, but with assessment in the office with melena with burgundy BM on ASA.  Patient denies dizziness or LOC.  Denies weakness at present.  NO chest pain/pressure.  No palpitations.  NO tearing back pain.  No abdominal pain, no red blood per rectum.  No hematuria, no vaginal bleeding.  No hemoptysis.  No fever, no chills, no rigors.    Patient is UNVACCINATED against COVID-19 due to vaccine hesitancy.

## 2022-06-29 NOTE — H&P ADULT - NSHPSOURCEINFOTX_GEN_ALL_CORE
Reviewed Medex with patient/ son/ daughter in attendance with patient's permission.   Copy of Medex in the chart. Reviewed Medex with patient/ son/ daughter in attendance with patient's permission.   Copy of Medex in the chart.  [facility] STOP # [id number]

## 2022-06-29 NOTE — H&P ADULT - NSHPOUTPATIENTPROVIDERS_GEN_ALL_CORE
Marline Bear MD (PCP)[phone]  Lyndon Calle MD (cardiology) [phone] Marline Bear MD (PCP)[phone]  Lyndon Calle MD (cardiology) [phone]  Haroon Franklin MD (GI ) [place] [phone]  Sarah Hope DO (endocrinology) [phone]

## 2022-06-29 NOTE — ED PROVIDER NOTE - PHYSICAL EXAMINATION
Gen: AAO x 3, NAD  Skin: No rashes or lesions  HEENT: NC/AT, PERRLA, EOMI, MMM  Resp: unlabored CTAB  Cardiac: rrr s1s2  GI: ND, +BS, Soft, NT  Ext: no pedal edema, FROM in all extremities  Neuro: no focal deficits

## 2022-06-29 NOTE — ED ADULT NURSE NOTE - OBJECTIVE STATEMENT
81 yr old female with son from home, sent in by GI to ED for melena, +syncope over weekend - seen at [facility] yesterday, c/o chronic fatigue, and mild dizziness, declined admission to hospital yesterday, opted to see outpt GI - seen today, today noticed improvement of melena, at present denies pain/weakness, +takes ecotrin, (pmhx of HTN GERD MVP Diverticulosis), clear lungs, skin wdi, abd soft nontender, vitals stable, afebrile, lives with son, independent with ADLs

## 2022-06-29 NOTE — H&P ADULT - NSICDXPASTMEDICALHX_GEN_ALL_CORE_FT
PAST MEDICAL HISTORY:  Arteriosclerotic heart disease (ASHD)     COVID-19 vaccination declined     Diverticulitis     Fe deficiency anemia     Female bladder prolapse     Gastrointestinal bleed     GERD (gastroesophageal reflux disease)     H/O mitral valve prolapse     Hiatal hernia with GERD     Hypertension     Hypothyroid     Macular degeneration     Osteoarthritis     Stage 2 chronic kidney disease     Tracheal nodule

## 2022-06-29 NOTE — H&P ADULT - PROBLEM SELECTOR PLAN 1
See above.   Follow H/H.   Orthostatics.   ASA stopped and PPI gtt initiated.  Would consider formal GI evaluation in the AM.

## 2022-06-29 NOTE — ED PROVIDER NOTE - NS ED ROS FT
Constitutional: No fever or chills  Eyes: No visual changes, eye pain or redness  HEENT: No throat pain, ear pain, nasal pain. No nose bleeding.  CV: No chest pain or lower extremity edema  Resp: No SOB no cough  GI: No abd pain. No nausea or vomiting. No diarrhea. No constipation. +dark stools  : No dysuria, hematuria.   MSK: No musculoskeletal pain  Skin: No rash  Neuro: No headache. No numbness or tingling. No weakness.

## 2022-06-29 NOTE — H&P ADULT - ASSESSMENT
NIGHT HOSPITALIST:   NIGHT HOSPITALIST:    Presentation of patient with suspected upper, although possible component of lower GI bleed with melena and burgundy BM with undifferentiated syncopal episode from [facility] and with second ER evaluation at [place] sent from the patient's GI physician.   Upgraded to telemetry due to history of atherosclerotic heart disease to exclude cardiac equivalent.   Will follow H/H and orthostatics.    PPI gtt initiated.   Will discontinue the ASA for now.   Clears for now, but would consider formal GI and cardiology evaluation in the AM.       Patient apparently with difficult to control essential HTN, and will cautiously continue with Diltiazem, Coreg, hydralazine, ARB.    Anxiety disorder with patient on Wellbutrin, Xanax.    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 Hope of endocrinology with annual thyroid US>>would consider contacting physician in the AM on current status.    Pharmacologic DVT prophylaxis precluded with GI bleed.    Unclear to the etiology of the apparent intermittent neuropathy with glove and stocking>>will check HgbA1C and SPEP/IPEP/UPEP.   Would consider contacting PCP in the AM on recent outpatient workup. NIGHT HOSPITALIST:    Presentation of patient with suspected upper, although possible component of lower GI bleed with melena and burgundy BM with undifferentiated syncopal episode from [facility] and with second ER evaluation at [place] sent from the patient's GI physician.   Upgraded to telemetry due to history of multifocal atrial tachycardia to exclude cardiac equivalent.   Will follow H/H and orthostatics.    PPI gtt initiated.   Will discontinue the ASA for now.   Clears for now, but would consider formal GI and cardiology evaluation in the AM.       Patient apparently with difficult to control essential HTN, and will cautiously continue with Diltiazem, Coreg, hydralazine, ARB.    Anxiety disorder with patient on Wellbutrin, Xanax.    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 Hope of endocrinology with annual thyroid US>>would consider contacting physician in the AM on current status.    Pharmacologic DVT prophylaxis precluded with GI bleed.    Unclear to the etiology of the apparent intermittent neuropathy with glove and stocking>>will check HgbA1C and SPEP/IPEP/UPEP.   Would consider contacting PCP in the AM on recent outpatient workup.

## 2022-06-29 NOTE — ED ADULT NURSE REASSESSMENT NOTE - NS ED NURSE REASSESS COMMENT FT1
Report received from HAL garrett. Pt reports no pain at this time. Pt is resting comfortably in bed w/daughter at bedside. All needs were met. Pt pending bed assignment.

## 2022-06-29 NOTE — H&P ADULT - NSHPLABSRESULTS_GEN_ALL_CORE
6/28/22  H/H 10.0/ 30.5  >>    6/29/22 with Hgb 9.1/27.5      6/2/22 H/H 13.2/39.5    Platelets of 253K.    INR 1.1    Random glucose of 89.    Cr 0.8    Alb 3.6    RVP/ COVID-19 PCR<<negative.    6/28/22 chest radiograph reviewed with no infiltrate or effusion.    CTT head from 6/28/22 negative.    EKG tracing reviewed with sinus at 73 with inverted T waves V4/-V6.

## 2022-06-29 NOTE — ED PROVIDER NOTE - NS ED ATTENDING STATEMENT MOD
This was a shared visit with the MAGDA. I reviewed and verified the documentation and independently performed the documented:

## 2022-06-29 NOTE — H&P ADULT - PROBLEM SELECTOR PLAN 8
Transitions of Care Status:  1.  Name of PCP:     Marline Bear MD (PCP)[phone]  2.  PCP Contacted on Admission: [ ] Y    [ x] N    3.  PCP contacted at Discharge: [ ] Y    [ ] N    [ ] N/A  4.  Post-Discharge Appointment Date and Location:  5.  Summary of Handoff given to PCP:

## 2022-06-29 NOTE — H&P ADULT - NSHPREVIEWOFSYSTEMS_GEN_ALL_CORE
NO HA, no focal weakness.  No chest pain/pressure.  No palpitations.  NO cough, no dyspnoea.  No hemoptysis.  NO tearing back pain.  NO breast symptoms.    NO vaginal bleeding.  NO SI/HI.  NO thyroid symptoms.  No dysuria, no hematuria.  NO weight loss or anorexia.  Notes occasional hand and glove distribution tingling.

## 2022-06-29 NOTE — H&P ADULT - PROBLEM SELECTOR PLAN 6
See above.   SPEP / IPEP / UPEP and HgbA1C in the AM.   Would consider contacting PCP in the AM on recent outpatient workup.

## 2022-06-30 DIAGNOSIS — Z02.9 ENCOUNTER FOR ADMINISTRATIVE EXAMINATIONS, UNSPECIFIED: ICD-10-CM

## 2022-06-30 DIAGNOSIS — Z29.9 ENCOUNTER FOR PROPHYLACTIC MEASURES, UNSPECIFIED: ICD-10-CM

## 2022-06-30 DIAGNOSIS — I47.1 SUPRAVENTRICULAR TACHYCARDIA: ICD-10-CM

## 2022-06-30 DIAGNOSIS — G62.9 POLYNEUROPATHY, UNSPECIFIED: ICD-10-CM

## 2022-06-30 DIAGNOSIS — E89.0 POSTPROCEDURAL HYPOTHYROIDISM: ICD-10-CM

## 2022-06-30 DIAGNOSIS — K92.2 GASTROINTESTINAL HEMORRHAGE, UNSPECIFIED: ICD-10-CM

## 2022-06-30 DIAGNOSIS — I10 ESSENTIAL (PRIMARY) HYPERTENSION: ICD-10-CM

## 2022-06-30 DIAGNOSIS — F41.9 ANXIETY DISORDER, UNSPECIFIED: ICD-10-CM

## 2022-06-30 LAB
A1C WITH ESTIMATED AVERAGE GLUCOSE RESULT: 5.5 % — SIGNIFICANT CHANGE UP (ref 4–5.6)
ANION GAP SERPL CALC-SCNC: 9 MMOL/L — SIGNIFICANT CHANGE UP (ref 5–17)
BASOPHILS # BLD AUTO: 0.06 K/UL — SIGNIFICANT CHANGE UP (ref 0–0.2)
BASOPHILS # BLD AUTO: 0.07 K/UL — SIGNIFICANT CHANGE UP (ref 0–0.2)
BASOPHILS NFR BLD AUTO: 0.8 % — SIGNIFICANT CHANGE UP (ref 0–2)
BASOPHILS NFR BLD AUTO: 1 % — SIGNIFICANT CHANGE UP (ref 0–2)
BUN SERPL-MCNC: 15 MG/DL — SIGNIFICANT CHANGE UP (ref 7–23)
CALCIUM SERPL-MCNC: 9 MG/DL — SIGNIFICANT CHANGE UP (ref 8.4–10.5)
CHLORIDE SERPL-SCNC: 104 MMOL/L — SIGNIFICANT CHANGE UP (ref 96–108)
CO2 SERPL-SCNC: 26 MMOL/L — SIGNIFICANT CHANGE UP (ref 22–31)
CREAT SERPL-MCNC: 0.97 MG/DL — SIGNIFICANT CHANGE UP (ref 0.5–1.3)
EGFR: 59 ML/MIN/1.73M2 — LOW
EOSINOPHIL # BLD AUTO: 0.14 K/UL — SIGNIFICANT CHANGE UP (ref 0–0.5)
EOSINOPHIL # BLD AUTO: 0.15 K/UL — SIGNIFICANT CHANGE UP (ref 0–0.5)
EOSINOPHIL NFR BLD AUTO: 1.9 % — SIGNIFICANT CHANGE UP (ref 0–6)
EOSINOPHIL NFR BLD AUTO: 2 % — SIGNIFICANT CHANGE UP (ref 0–6)
ESTIMATED AVERAGE GLUCOSE: 111 MG/DL — SIGNIFICANT CHANGE UP (ref 68–114)
GLUCOSE SERPL-MCNC: 104 MG/DL — HIGH (ref 70–99)
HCT VFR BLD CALC: 25.1 % — LOW (ref 34.5–45)
HCT VFR BLD CALC: 25.9 % — LOW (ref 34.5–45)
HCT VFR BLD CALC: 27.1 % — LOW (ref 34.5–45)
HGB BLD-MCNC: 8.3 G/DL — LOW (ref 11.5–15.5)
HGB BLD-MCNC: 8.5 G/DL — LOW (ref 11.5–15.5)
HGB BLD-MCNC: 8.8 G/DL — LOW (ref 11.5–15.5)
IGA FLD-MCNC: 241 MG/DL — SIGNIFICANT CHANGE UP (ref 84–499)
IGG FLD-MCNC: 870 MG/DL — SIGNIFICANT CHANGE UP (ref 610–1660)
IGM SERPL-MCNC: 101 MG/DL — SIGNIFICANT CHANGE UP (ref 35–242)
IMM GRANULOCYTES NFR BLD AUTO: 0.3 % — SIGNIFICANT CHANGE UP (ref 0–1.5)
IMM GRANULOCYTES NFR BLD AUTO: 0.4 % — SIGNIFICANT CHANGE UP (ref 0–1.5)
KAPPA LC SER QL IFE: 4.08 MG/DL — HIGH (ref 0.33–1.94)
KAPPA/LAMBDA FREE LIGHT CHAIN RATIO, SERUM: 1.61 RATIO — SIGNIFICANT CHANGE UP (ref 0.26–1.65)
LAMBDA LC SER QL IFE: 2.54 MG/DL — SIGNIFICANT CHANGE UP (ref 0.57–2.63)
LYMPHOCYTES # BLD AUTO: 2.23 K/UL — SIGNIFICANT CHANGE UP (ref 1–3.3)
LYMPHOCYTES # BLD AUTO: 2.53 K/UL — SIGNIFICANT CHANGE UP (ref 1–3.3)
LYMPHOCYTES # BLD AUTO: 31.5 % — SIGNIFICANT CHANGE UP (ref 13–44)
LYMPHOCYTES # BLD AUTO: 32.1 % — SIGNIFICANT CHANGE UP (ref 13–44)
MCHC RBC-ENTMCNC: 31.1 PG — SIGNIFICANT CHANGE UP (ref 27–34)
MCHC RBC-ENTMCNC: 31.4 PG — SIGNIFICANT CHANGE UP (ref 27–34)
MCHC RBC-ENTMCNC: 31.4 PG — SIGNIFICANT CHANGE UP (ref 27–34)
MCHC RBC-ENTMCNC: 32.5 GM/DL — SIGNIFICANT CHANGE UP (ref 32–36)
MCHC RBC-ENTMCNC: 32.8 GM/DL — SIGNIFICANT CHANGE UP (ref 32–36)
MCHC RBC-ENTMCNC: 33.1 GM/DL — SIGNIFICANT CHANGE UP (ref 32–36)
MCV RBC AUTO: 95.1 FL — SIGNIFICANT CHANGE UP (ref 80–100)
MCV RBC AUTO: 95.6 FL — SIGNIFICANT CHANGE UP (ref 80–100)
MCV RBC AUTO: 95.8 FL — SIGNIFICANT CHANGE UP (ref 80–100)
MONOCYTES # BLD AUTO: 0.67 K/UL — SIGNIFICANT CHANGE UP (ref 0–0.9)
MONOCYTES # BLD AUTO: 0.7 K/UL — SIGNIFICANT CHANGE UP (ref 0–0.9)
MONOCYTES NFR BLD AUTO: 8.9 % — SIGNIFICANT CHANGE UP (ref 2–14)
MONOCYTES NFR BLD AUTO: 9.4 % — SIGNIFICANT CHANGE UP (ref 2–14)
NEUTROPHILS # BLD AUTO: 3.96 K/UL — SIGNIFICANT CHANGE UP (ref 1.8–7.4)
NEUTROPHILS # BLD AUTO: 4.42 K/UL — SIGNIFICANT CHANGE UP (ref 1.8–7.4)
NEUTROPHILS NFR BLD AUTO: 55.8 % — SIGNIFICANT CHANGE UP (ref 43–77)
NEUTROPHILS NFR BLD AUTO: 55.9 % — SIGNIFICANT CHANGE UP (ref 43–77)
NRBC # BLD: 0 /100 WBCS — SIGNIFICANT CHANGE UP (ref 0–0)
PLATELET # BLD AUTO: 244 K/UL — SIGNIFICANT CHANGE UP (ref 150–400)
PLATELET # BLD AUTO: 248 K/UL — SIGNIFICANT CHANGE UP (ref 150–400)
PLATELET # BLD AUTO: 250 K/UL — SIGNIFICANT CHANGE UP (ref 150–400)
POTASSIUM SERPL-MCNC: 3.7 MMOL/L — SIGNIFICANT CHANGE UP (ref 3.5–5.3)
POTASSIUM SERPL-SCNC: 3.7 MMOL/L — SIGNIFICANT CHANGE UP (ref 3.5–5.3)
RBC # BLD: 2.64 M/UL — LOW (ref 3.8–5.2)
RBC # BLD: 2.71 M/UL — LOW (ref 3.8–5.2)
RBC # BLD: 2.83 M/UL — LOW (ref 3.8–5.2)
RBC # FLD: 14.3 % — SIGNIFICANT CHANGE UP (ref 10.3–14.5)
RBC # FLD: 14.4 % — SIGNIFICANT CHANGE UP (ref 10.3–14.5)
RBC # FLD: 14.5 % — SIGNIFICANT CHANGE UP (ref 10.3–14.5)
SODIUM SERPL-SCNC: 139 MMOL/L — SIGNIFICANT CHANGE UP (ref 135–145)
TROPONIN T, HIGH SENSITIVITY RESULT: 12 NG/L — SIGNIFICANT CHANGE UP (ref 0–51)
TROPONIN T, HIGH SENSITIVITY RESULT: 14 NG/L — SIGNIFICANT CHANGE UP (ref 0–51)
TSH SERPL-MCNC: 4.84 UIU/ML — HIGH (ref 0.27–4.2)
WBC # BLD: 6.73 K/UL — SIGNIFICANT CHANGE UP (ref 3.8–10.5)
WBC # BLD: 7.09 K/UL — SIGNIFICANT CHANGE UP (ref 3.8–10.5)
WBC # BLD: 7.89 K/UL — SIGNIFICANT CHANGE UP (ref 3.8–10.5)
WBC # FLD AUTO: 6.73 K/UL — SIGNIFICANT CHANGE UP (ref 3.8–10.5)
WBC # FLD AUTO: 7.09 K/UL — SIGNIFICANT CHANGE UP (ref 3.8–10.5)
WBC # FLD AUTO: 7.89 K/UL — SIGNIFICANT CHANGE UP (ref 3.8–10.5)

## 2022-06-30 PROCEDURE — 93306 TTE W/DOPPLER COMPLETE: CPT | Mod: 26

## 2022-06-30 RX ORDER — ACETAMINOPHEN 500 MG
650 TABLET ORAL ONCE
Refills: 0 | Status: COMPLETED | OUTPATIENT
Start: 2022-06-30 | End: 2022-06-30

## 2022-06-30 RX ADMIN — Medication 50 MILLIGRAM(S): at 12:56

## 2022-06-30 RX ADMIN — Medication 75 MICROGRAM(S): at 08:46

## 2022-06-30 RX ADMIN — Medication 650 MILLIGRAM(S): at 22:51

## 2022-06-30 RX ADMIN — BUPROPION HYDROCHLORIDE 100 MILLIGRAM(S): 150 TABLET, EXTENDED RELEASE ORAL at 10:23

## 2022-06-30 RX ADMIN — Medication 120 MILLIGRAM(S): at 09:18

## 2022-06-30 RX ADMIN — PANTOPRAZOLE SODIUM 10 MG/HR: 20 TABLET, DELAYED RELEASE ORAL at 12:56

## 2022-06-30 RX ADMIN — Medication 25 MILLIGRAM(S): at 22:20

## 2022-06-30 RX ADMIN — Medication 50 MILLIGRAM(S): at 22:20

## 2022-06-30 RX ADMIN — BUPROPION HYDROCHLORIDE 100 MILLIGRAM(S): 150 TABLET, EXTENDED RELEASE ORAL at 22:51

## 2022-06-30 RX ADMIN — PANTOPRAZOLE SODIUM 10 MG/HR: 20 TABLET, DELAYED RELEASE ORAL at 00:05

## 2022-06-30 RX ADMIN — CARVEDILOL PHOSPHATE 25 MILLIGRAM(S): 80 CAPSULE, EXTENDED RELEASE ORAL at 17:59

## 2022-06-30 RX ADMIN — PANTOPRAZOLE SODIUM 10 MG/HR: 20 TABLET, DELAYED RELEASE ORAL at 20:36

## 2022-06-30 NOTE — PROGRESS NOTE ADULT - ASSESSMENT
Presentation of patient with suspected upper, although possible component of lower GI bleed with melena and burgundy BM with undifferentiated syncopal episode from [facility] and with second ER evaluation at [place] sent from the patient's GI physician.   Upgraded to telemetry due to history of multifocal atrial tachycardia to exclude cardiac equivalent.   Will follow H/H and orthostatics.    PPI gtt initiated.   Will discontinue the ASA for now.     Patient apparently with difficult to control essential HTN, and will cautiously continue with Diltiazem, Coreg, hydralazine, ARB.    Anxiety disorder with patient on Wellbutrin, Xanax.    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 Hope    Pharmacologic DVT prophylaxis precluded with GI bleed.    Unclear to the etiology of the apparent intermittent neuropathy with glove and stocking>>will check HgbA1C and SPEP/IPEP/UPEP.     Upper gastrointestinal bleeding.   - Follow H/H.   Orthostatics.   ASA stopped and PPI gtt initiated.   - GI evaluation Dr. Villegas  - EGD pending     Multifocal atrial tachycardia.   - telemetry.  - cardiology evaluation Dr. Gaitan     Essential hypertension.   -  control    Anxiety disorder.   - Wellbutrin and Xanax.     Postoperative hypothyroidism.   - TSH.  Followed by her endocrinologist as above.    Neuropathy.   - SPEP / IPEP / UPEP and HgbA1C  pending     Need for prophylactic measure.   - GI bleeding precludes pharmacologic DVT prophylaxis.   MIGUEL A for now.    Paulie Moore MD phone [phone]

## 2022-06-30 NOTE — PATIENT PROFILE ADULT - FALL HARM RISK - HARM RISK INTERVENTIONS

## 2022-07-01 ENCOUNTER — RESULT REVIEW (OUTPATIENT)
Age: 82
End: 2022-07-01

## 2022-07-01 LAB
HCT VFR BLD CALC: 25 % — LOW (ref 34.5–45)
HCT VFR BLD CALC: 26 % — LOW (ref 34.5–45)
HGB BLD-MCNC: 8 G/DL — LOW (ref 11.5–15.5)
HGB BLD-MCNC: 8.7 G/DL — LOW (ref 11.5–15.5)
MCHC RBC-ENTMCNC: 30.3 PG — SIGNIFICANT CHANGE UP (ref 27–34)
MCHC RBC-ENTMCNC: 31.2 PG — SIGNIFICANT CHANGE UP (ref 27–34)
MCHC RBC-ENTMCNC: 32 GM/DL — SIGNIFICANT CHANGE UP (ref 32–36)
MCHC RBC-ENTMCNC: 33.5 GM/DL — SIGNIFICANT CHANGE UP (ref 32–36)
MCV RBC AUTO: 93.2 FL — SIGNIFICANT CHANGE UP (ref 80–100)
MCV RBC AUTO: 94.7 FL — SIGNIFICANT CHANGE UP (ref 80–100)
NRBC # BLD: 0 /100 WBCS — SIGNIFICANT CHANGE UP (ref 0–0)
NRBC # BLD: 0 /100 WBCS — SIGNIFICANT CHANGE UP (ref 0–0)
PLATELET # BLD AUTO: 227 K/UL — SIGNIFICANT CHANGE UP (ref 150–400)
PLATELET # BLD AUTO: 249 K/UL — SIGNIFICANT CHANGE UP (ref 150–400)
RBC # BLD: 2.64 M/UL — LOW (ref 3.8–5.2)
RBC # BLD: 2.79 M/UL — LOW (ref 3.8–5.2)
RBC # FLD: 14.4 % — SIGNIFICANT CHANGE UP (ref 10.3–14.5)
RBC # FLD: 14.6 % — HIGH (ref 10.3–14.5)
WBC # BLD: 6.02 K/UL — SIGNIFICANT CHANGE UP (ref 3.8–10.5)
WBC # BLD: 6.89 K/UL — SIGNIFICANT CHANGE UP (ref 3.8–10.5)
WBC # FLD AUTO: 6.02 K/UL — SIGNIFICANT CHANGE UP (ref 3.8–10.5)
WBC # FLD AUTO: 6.89 K/UL — SIGNIFICANT CHANGE UP (ref 3.8–10.5)

## 2022-07-01 PROCEDURE — 88305 TISSUE EXAM BY PATHOLOGIST: CPT | Mod: 26

## 2022-07-01 DEVICE — CLIP RESOLUTION 360 235CM: Type: IMPLANTABLE DEVICE | Status: FUNCTIONAL

## 2022-07-01 RX ORDER — SUCRALFATE 1 G
1 TABLET ORAL
Refills: 0 | Status: DISCONTINUED | OUTPATIENT
Start: 2022-07-01 | End: 2022-07-05

## 2022-07-01 RX ORDER — PANTOPRAZOLE SODIUM 20 MG/1
40 TABLET, DELAYED RELEASE ORAL
Refills: 0 | Status: DISCONTINUED | OUTPATIENT
Start: 2022-07-01 | End: 2022-07-05

## 2022-07-01 RX ORDER — CHLORHEXIDINE GLUCONATE 213 G/1000ML
1 SOLUTION TOPICAL DAILY
Refills: 0 | Status: DISCONTINUED | OUTPATIENT
Start: 2022-07-01 | End: 2022-07-07

## 2022-07-01 RX ADMIN — Medication 50 MILLIGRAM(S): at 10:38

## 2022-07-01 RX ADMIN — BUPROPION HYDROCHLORIDE 100 MILLIGRAM(S): 150 TABLET, EXTENDED RELEASE ORAL at 10:39

## 2022-07-01 RX ADMIN — Medication 1 MILLIGRAM(S): at 02:08

## 2022-07-01 RX ADMIN — Medication 25 MILLIGRAM(S): at 23:38

## 2022-07-01 RX ADMIN — Medication 75 MICROGRAM(S): at 06:17

## 2022-07-01 RX ADMIN — LOSARTAN POTASSIUM 100 MILLIGRAM(S): 100 TABLET, FILM COATED ORAL at 12:40

## 2022-07-01 RX ADMIN — Medication 1 MILLIGRAM(S): at 23:38

## 2022-07-01 RX ADMIN — CHLORHEXIDINE GLUCONATE 1 APPLICATION(S): 213 SOLUTION TOPICAL at 11:12

## 2022-07-01 RX ADMIN — Medication 50 MILLIGRAM(S): at 19:16

## 2022-07-01 RX ADMIN — CARVEDILOL PHOSPHATE 25 MILLIGRAM(S): 80 CAPSULE, EXTENDED RELEASE ORAL at 06:17

## 2022-07-01 RX ADMIN — CARVEDILOL PHOSPHATE 25 MILLIGRAM(S): 80 CAPSULE, EXTENDED RELEASE ORAL at 19:17

## 2022-07-01 RX ADMIN — Medication 120 MILLIGRAM(S): at 12:41

## 2022-07-01 RX ADMIN — Medication 1 GRAM(S): at 23:50

## 2022-07-01 NOTE — PROGRESS NOTE ADULT - ASSESSMENT
A/P  81 year old female with  htn, hyperlipidemia, thyroid ca, MAT, Diverticulitis, Hiatial hernia, Gerd, Osteoarthritis, anxiety presenting with syncope, dark stools, gi bleed    #GI bleed  -s/p egd, results noted, s/p duodenal clip  -cont ppi, off asa  -trend heme    #syncope   -secondary to volume status, gi bleed  -echo normal     #hx of Multifocal atrial tachycardia.   -continue bb, no indication for a/c  -asa on hold     #hypertension.   -cont current tx      DVT ppx    35 minutes spent on total encounter; more than 50% of the visit was spent counseling and/or coordinating care by the attending physician.

## 2022-07-01 NOTE — PROGRESS NOTE ADULT - ASSESSMENT
Presentation of patient with suspected upper, although possible component of lower GI bleed with melena and burgundy BM with undifferentiated syncopal episode from [facility] and with second ER evaluation at [place] sent from the patient's GI physician.   Upgraded to telemetry due to history of multifocal atrial tachycardia to exclude cardiac equivalent.   Will follow H/H and orthostatics.    PPI gtt initiated.   Will discontinue the ASA for now.     Patient apparently with difficult to control essential HTN, and will cautiously continue with Diltiazem, Coreg, hydralazine, ARB.    Anxiety disorder with patient on Wellbutrin, Xanax.    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 Hope    Pharmacologic DVT prophylaxis precluded with GI bleed.    Unclear to the etiology of the apparent intermittent neuropathy with glove and stocking>>will check HgbA1C and SPEP/IPEP/UPEP.     Upper gastrointestinal bleeding.   - Follow H/H.   Orthostatics.   ASA stopped.  - PPI    - GI follow  - s/p EGD with large hiatal hernia, Dany ulcers, duodenal angiodysplastic bleeding lesion clipped   - regular diet    Multifocal atrial tachycardia.   - telemetry.  - cardiology evaluation Dr. Gaitan     Essential hypertension.   -  control    Anxiety disorder.   - Wellbutrin and Xanax.     Postoperative hypothyroidism.   - TSH.  Followed by her endocrinologist as above.    Neuropathy.   - SPEP / IPEP / UPEP and HgbA1C  pending     Need for prophylactic measure.   - GI bleeding precludes pharmacologic DVT prophylaxis.   MIGUEL A for now.    Paulie Moore MD phone [phone]  Presentation of patient with suspected upper, although possible component of lower GI bleed with melena and burgundy BM with undifferentiated syncopal episode from [facility] and with second ER evaluation at [place] sent from the patient's GI physician.   Upgraded to telemetry due to history of multifocal atrial tachycardia to exclude cardiac equivalent.   Will follow H/H and orthostatics.    PPI gtt initiated.   Will discontinue the ASA for now.     Patient apparently with difficult to control essential HTN, and will cautiously continue with Diltiazem, Coreg, hydralazine, ARB.    Anxiety disorder with patient on Wellbutrin, Xanax.    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 Hope    Pharmacologic DVT prophylaxis precluded with GI bleed.    Unclear to the etiology of the apparent intermittent neuropathy with glove and stocking>>will check HgbA1C and SPEP/IPEP/UPEP.     Upper gastrointestinal bleeding.   - Follow H/H.   Orthostatics.   ASA stopped.  - PPI    - GI follow  - s/p EGD with large hiatal hernia, Dany ulcers, duodenal angiodysplastic bleeding lesion clipped   - regular diet    Multifocal atrial tachycardia.   - telemetry.  - cardiology evaluation Dr. Gaitan     Essential hypertension.   -  control    Anxiety disorder.   - Wellbutrin and Xanax.     Postoperative hypothyroidism.   - TSH.  Followed by her endocrinologist as above.    Neuropathy.   - SPEP / IPEP / UPEP and HgbA1C  pending     Need for prophylactic measure.   - GI bleeding precludes pharmacologic DVT prophylaxis.   MIGUEL A for now.    d/w patient and daughter    Paulie Moore MD phone [phone]

## 2022-07-01 NOTE — PACU DISCHARGE NOTE - AIRWAY PATENCY:
I have reviewed the history, physical exam, assessment and management plans.  I concur with or have edited all elements of her note. Satisfactory

## 2022-07-02 LAB
ANION GAP SERPL CALC-SCNC: 12 MMOL/L — SIGNIFICANT CHANGE UP (ref 5–17)
BUN SERPL-MCNC: 26 MG/DL — HIGH (ref 7–23)
CALCIUM SERPL-MCNC: 8.6 MG/DL — SIGNIFICANT CHANGE UP (ref 8.4–10.5)
CHLORIDE SERPL-SCNC: 102 MMOL/L — SIGNIFICANT CHANGE UP (ref 96–108)
CO2 SERPL-SCNC: 22 MMOL/L — SIGNIFICANT CHANGE UP (ref 22–31)
CREAT SERPL-MCNC: 1.24 MG/DL — SIGNIFICANT CHANGE UP (ref 0.5–1.3)
CREATININE, URINE RESULT: 44 MG/DL — SIGNIFICANT CHANGE UP
EGFR: 44 ML/MIN/1.73M2 — LOW
GLUCOSE SERPL-MCNC: 107 MG/DL — HIGH (ref 70–99)
HCT VFR BLD CALC: 23.7 % — LOW (ref 34.5–45)
HCT VFR BLD CALC: 24.4 % — LOW (ref 34.5–45)
HGB BLD-MCNC: 7.9 G/DL — LOW (ref 11.5–15.5)
HGB BLD-MCNC: 8.1 G/DL — LOW (ref 11.5–15.5)
MCHC RBC-ENTMCNC: 31.4 PG — SIGNIFICANT CHANGE UP (ref 27–34)
MCHC RBC-ENTMCNC: 31.6 PG — SIGNIFICANT CHANGE UP (ref 27–34)
MCHC RBC-ENTMCNC: 33.2 GM/DL — SIGNIFICANT CHANGE UP (ref 32–36)
MCHC RBC-ENTMCNC: 33.3 GM/DL — SIGNIFICANT CHANGE UP (ref 32–36)
MCV RBC AUTO: 94.6 FL — SIGNIFICANT CHANGE UP (ref 80–100)
MCV RBC AUTO: 94.8 FL — SIGNIFICANT CHANGE UP (ref 80–100)
MRSA PCR RESULT.: SIGNIFICANT CHANGE UP
NRBC # BLD: 0 /100 WBCS — SIGNIFICANT CHANGE UP (ref 0–0)
NRBC # BLD: 0 /100 WBCS — SIGNIFICANT CHANGE UP (ref 0–0)
PLATELET # BLD AUTO: 253 K/UL — SIGNIFICANT CHANGE UP (ref 150–400)
PLATELET # BLD AUTO: 260 K/UL — SIGNIFICANT CHANGE UP (ref 150–400)
POTASSIUM SERPL-MCNC: 3.5 MMOL/L — SIGNIFICANT CHANGE UP (ref 3.5–5.3)
POTASSIUM SERPL-SCNC: 3.5 MMOL/L — SIGNIFICANT CHANGE UP (ref 3.5–5.3)
PROT ?TM UR-MCNC: 6 MG/DL — SIGNIFICANT CHANGE UP (ref 0–12)
RBC # BLD: 2.5 M/UL — LOW (ref 3.8–5.2)
RBC # BLD: 2.58 M/UL — LOW (ref 3.8–5.2)
RBC # FLD: 14.5 % — SIGNIFICANT CHANGE UP (ref 10.3–14.5)
RBC # FLD: 14.6 % — HIGH (ref 10.3–14.5)
S AUREUS DNA NOSE QL NAA+PROBE: SIGNIFICANT CHANGE UP
SODIUM SERPL-SCNC: 136 MMOL/L — SIGNIFICANT CHANGE UP (ref 135–145)
WBC # BLD: 6.81 K/UL — SIGNIFICANT CHANGE UP (ref 3.8–10.5)
WBC # BLD: 7.21 K/UL — SIGNIFICANT CHANGE UP (ref 3.8–10.5)
WBC # FLD AUTO: 6.81 K/UL — SIGNIFICANT CHANGE UP (ref 3.8–10.5)
WBC # FLD AUTO: 7.21 K/UL — SIGNIFICANT CHANGE UP (ref 3.8–10.5)

## 2022-07-02 RX ORDER — IRON SUCROSE 20 MG/ML
100 INJECTION, SOLUTION INTRAVENOUS EVERY 24 HOURS
Refills: 0 | Status: COMPLETED | OUTPATIENT
Start: 2022-07-02 | End: 2022-07-04

## 2022-07-02 RX ADMIN — CARVEDILOL PHOSPHATE 25 MILLIGRAM(S): 80 CAPSULE, EXTENDED RELEASE ORAL at 09:16

## 2022-07-02 RX ADMIN — Medication 120 MILLIGRAM(S): at 09:16

## 2022-07-02 RX ADMIN — ATORVASTATIN CALCIUM 10 MILLIGRAM(S): 80 TABLET, FILM COATED ORAL at 21:23

## 2022-07-02 RX ADMIN — IRON SUCROSE 210 MILLIGRAM(S): 20 INJECTION, SOLUTION INTRAVENOUS at 13:13

## 2022-07-02 RX ADMIN — Medication 50 MILLIGRAM(S): at 09:16

## 2022-07-02 RX ADMIN — CARVEDILOL PHOSPHATE 25 MILLIGRAM(S): 80 CAPSULE, EXTENDED RELEASE ORAL at 22:06

## 2022-07-02 RX ADMIN — Medication 1 GRAM(S): at 21:23

## 2022-07-02 RX ADMIN — Medication 75 MICROGRAM(S): at 05:33

## 2022-07-02 RX ADMIN — PANTOPRAZOLE SODIUM 40 MILLIGRAM(S): 20 TABLET, DELAYED RELEASE ORAL at 05:33

## 2022-07-02 RX ADMIN — Medication 1 GRAM(S): at 09:16

## 2022-07-02 RX ADMIN — CHLORHEXIDINE GLUCONATE 1 APPLICATION(S): 213 SOLUTION TOPICAL at 12:15

## 2022-07-02 NOTE — PROGRESS NOTE ADULT - ASSESSMENT
Presentation of patient with suspected upper, although possible component of lower GI bleed with melena and burgundy BM with undifferentiated syncopal episode from [facility] and with second ER evaluation at [place] sent from the patient's GI physician.   Upgraded to telemetry due to history of multifocal atrial tachycardia to exclude cardiac equivalent.   Will follow H/H and orthostatics.    PPI gtt initiated.   Will discontinue the ASA for now.     Patient apparently with difficult to control essential HTN, and will cautiously continue with Diltiazem, Coreg, hydralazine, ARB.    Anxiety disorder with patient on Wellbutrin, Xanax.    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 Hope    Pharmacologic DVT prophylaxis precluded with GI bleed.    Unclear to the etiology of the apparent intermittent neuropathy with glove and stocking>>will check HgbA1C and SPEP/IPEP/UPEP.     Upper gastrointestinal bleeding.   - Follow H/H.   Orthostatics.   ASA stopped.  - PPI    - GI follow  - s/p EGD with large hiatal hernia, Dany ulcers, duodenal angiodysplastic bleeding lesion clipped   - regular diet    Anemia posthemorrhagic  - Iron  - follow Hct  - refuses transfusion PRBC      Multifocal atrial tachycardia.   - telemetry.  - continue Coreg with parameters  - cardiology evaluation Dr. Gaitan     Essential hypertension.   -  control  - hold Hydralazine for hypotension    Anxiety disorder.   - Wellbutrin and Xanax.     Postoperative hypothyroidism.   - TSH.  Followed by her endocrinologist as above.    Neuropathy.   - SPEP / IPEP / UPEP and HgbA1C  pending     Need for prophylactic measure.   - GI bleeding precludes pharmacologic DVT prophylaxis.   MIGUEL A for now.    d/w patient     Paulie Moore MD phone [phone]

## 2022-07-02 NOTE — PROGRESS NOTE ADULT - ASSESSMENT
A/P  81 year old female with  htn, hyperlipidemia, thyroid ca, MAT, Diverticulitis, Hiatial hernia, Gerd, Osteoarthritis, anxiety presenting with syncope, dark stools, gi bleed    #GI bleed  -s/p egd, results noted, s/p duodenal clip  -cont ppi, off asa  -trend heme    #syncope   -secondary to volume status, gi bleed  -echo normal     #hx of Multifocal atrial tachycardia.   -continue bb, no indication for a/c  -asa on hold     #hypertension.   -lower bp yesterday in setting of npo, bleed  -now normal   -cont current meds  -if sbp continues to remain low, d/c hydral for now     DVT ppx    35 minutes spent on total encounter; more than 50% of the visit was spent counseling and/or coordinating care by the attending physician.

## 2022-07-02 NOTE — PROGRESS NOTE ADULT - ASSESSMENT
melena  hiatal hernia  pud  camerons ulcers    plan  ppi buid  carafate  q 6 hours  may need hiatal hernia repair  f./u bx    Advanced care planning was discussed with patient and family.  Advanced care planning forms were reviewed and discussed.  Risks, benefits and alternatives of gastroenterologic procedures were discussed in detail and all questions were answered.    30 minutes spent.

## 2022-07-03 ENCOUNTER — TRANSCRIPTION ENCOUNTER (OUTPATIENT)
Age: 82
End: 2022-07-03

## 2022-07-03 DIAGNOSIS — K13.79 OTHER LESIONS OF ORAL MUCOSA: ICD-10-CM

## 2022-07-03 LAB
BLD GP AB SCN SERPL QL: NEGATIVE — SIGNIFICANT CHANGE UP
HCT VFR BLD CALC: 23.7 % — LOW (ref 34.5–45)
HCT VFR BLD CALC: 24.4 % — LOW (ref 34.5–45)
HGB BLD-MCNC: 7.7 G/DL — LOW (ref 11.5–15.5)
HGB BLD-MCNC: 8 G/DL — LOW (ref 11.5–15.5)
MCHC RBC-ENTMCNC: 31.4 PG — SIGNIFICANT CHANGE UP (ref 27–34)
MCHC RBC-ENTMCNC: 31.7 PG — SIGNIFICANT CHANGE UP (ref 27–34)
MCHC RBC-ENTMCNC: 32.5 GM/DL — SIGNIFICANT CHANGE UP (ref 32–36)
MCHC RBC-ENTMCNC: 32.8 GM/DL — SIGNIFICANT CHANGE UP (ref 32–36)
MCV RBC AUTO: 96.7 FL — SIGNIFICANT CHANGE UP (ref 80–100)
MCV RBC AUTO: 96.8 FL — SIGNIFICANT CHANGE UP (ref 80–100)
NRBC # BLD: 0 /100 WBCS — SIGNIFICANT CHANGE UP (ref 0–0)
NRBC # BLD: 0 /100 WBCS — SIGNIFICANT CHANGE UP (ref 0–0)
PLATELET # BLD AUTO: 249 K/UL — SIGNIFICANT CHANGE UP (ref 150–400)
PLATELET # BLD AUTO: 261 K/UL — SIGNIFICANT CHANGE UP (ref 150–400)
RBC # BLD: 2.45 M/UL — LOW (ref 3.8–5.2)
RBC # BLD: 2.52 M/UL — LOW (ref 3.8–5.2)
RBC # FLD: 14.6 % — HIGH (ref 10.3–14.5)
RBC # FLD: 14.6 % — HIGH (ref 10.3–14.5)
RH IG SCN BLD-IMP: POSITIVE — SIGNIFICANT CHANGE UP
WBC # BLD: 5.77 K/UL — SIGNIFICANT CHANGE UP (ref 3.8–10.5)
WBC # BLD: 6.84 K/UL — SIGNIFICANT CHANGE UP (ref 3.8–10.5)
WBC # FLD AUTO: 5.77 K/UL — SIGNIFICANT CHANGE UP (ref 3.8–10.5)
WBC # FLD AUTO: 6.84 K/UL — SIGNIFICANT CHANGE UP (ref 3.8–10.5)

## 2022-07-03 PROCEDURE — 99233 SBSQ HOSP IP/OBS HIGH 50: CPT | Mod: 25

## 2022-07-03 RX ORDER — LEVOTHYROXINE SODIUM 125 MCG
112.5 TABLET ORAL
Refills: 0 | Status: DISCONTINUED | OUTPATIENT
Start: 2022-07-03 | End: 2022-07-07

## 2022-07-03 RX ORDER — LEVOTHYROXINE SODIUM 125 MCG
37.5 TABLET ORAL ONCE
Refills: 0 | Status: COMPLETED | OUTPATIENT
Start: 2022-07-03 | End: 2022-07-03

## 2022-07-03 RX ORDER — ASCORBIC ACID 60 MG
500 TABLET,CHEWABLE ORAL
Refills: 0 | Status: DISCONTINUED | OUTPATIENT
Start: 2022-07-03 | End: 2022-07-07

## 2022-07-03 RX ORDER — LEVOTHYROXINE SODIUM 125 MCG
75 TABLET ORAL
Refills: 0 | Status: DISCONTINUED | OUTPATIENT
Start: 2022-07-03 | End: 2022-07-07

## 2022-07-03 RX ORDER — POLYETHYLENE GLYCOL 3350 17 G/17G
17 POWDER, FOR SOLUTION ORAL DAILY
Refills: 0 | Status: DISCONTINUED | OUTPATIENT
Start: 2022-07-03 | End: 2022-07-07

## 2022-07-03 RX ORDER — BENZOCAINE AND MENTHOL 5; 1 G/100ML; G/100ML
1 LIQUID ORAL
Refills: 0 | Status: DISCONTINUED | OUTPATIENT
Start: 2022-07-03 | End: 2022-07-07

## 2022-07-03 RX ORDER — SIMETHICONE 80 MG/1
80 TABLET, CHEWABLE ORAL ONCE
Refills: 0 | Status: COMPLETED | OUTPATIENT
Start: 2022-07-03 | End: 2022-07-04

## 2022-07-03 RX ADMIN — BUPROPION HYDROCHLORIDE 100 MILLIGRAM(S): 150 TABLET, EXTENDED RELEASE ORAL at 11:26

## 2022-07-03 RX ADMIN — ATORVASTATIN CALCIUM 10 MILLIGRAM(S): 80 TABLET, FILM COATED ORAL at 22:16

## 2022-07-03 RX ADMIN — Medication 1 GRAM(S): at 17:27

## 2022-07-03 RX ADMIN — IRON SUCROSE 210 MILLIGRAM(S): 20 INJECTION, SOLUTION INTRAVENOUS at 12:41

## 2022-07-03 RX ADMIN — PANTOPRAZOLE SODIUM 40 MILLIGRAM(S): 20 TABLET, DELAYED RELEASE ORAL at 05:07

## 2022-07-03 RX ADMIN — CARVEDILOL PHOSPHATE 25 MILLIGRAM(S): 80 CAPSULE, EXTENDED RELEASE ORAL at 05:08

## 2022-07-03 RX ADMIN — CARVEDILOL PHOSPHATE 25 MILLIGRAM(S): 80 CAPSULE, EXTENDED RELEASE ORAL at 17:26

## 2022-07-03 RX ADMIN — Medication 75 MICROGRAM(S): at 05:07

## 2022-07-03 RX ADMIN — Medication 500 MILLIGRAM(S): at 17:26

## 2022-07-03 RX ADMIN — BENZOCAINE AND MENTHOL 1 LOZENGE: 5; 1 LIQUID ORAL at 11:26

## 2022-07-03 RX ADMIN — Medication 1 GRAM(S): at 05:08

## 2022-07-03 NOTE — DISCHARGE NOTE PROVIDER - PROVIDER TOKENS
PROVIDER:[TOKEN:[[id number]],FOLLOWUP:[1 week]],PROVIDER:[TOKEN:[[id number]],FOLLOWUP:[2 weeks]] PROVIDER:[TOKEN:[[id number]],FOLLOWUP:[1 week]],PROVIDER:[TOKEN:[[id number]],FOLLOWUP:[2 weeks]],PROVIDER:[TOKEN:[[id number]],FOLLOWUP:[1 week]]

## 2022-07-03 NOTE — DISCHARGE NOTE PROVIDER - NSDCFUSCHEDAPPT_GEN_ALL_CORE_FT
Sarah Hope  [facility]  Med Endocr [address] [facility]  Scheduled Appointment: 10/05/2022

## 2022-07-03 NOTE — DISCHARGE NOTE PROVIDER - HOSPITAL COURSE
Presentation of patient with suspected upper, although possible component of lower GI bleed with melena and burgundy BM with undifferentiated syncopal episode from [facility] and with second ER evaluation at [place] sent from the patient's GI physician.   Upgraded to telemetry due to history of multifocal atrial tachycardia to exclude cardiac equivalent.   Will follow H/H and orthostatics.    PPI gtt initiated.   Will discontinue the ASA for now.     Patient apparently with difficult to control essential HTN, and will cautiously continue with Diltiazem, Coreg, hydralazine, ARB.    Anxiety disorder with patient on Wellbutrin, Xanax.    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 Hope    Pharmacologic DVT prophylaxis precluded with GI bleed.    Unclear to the etiology of the apparent intermittent neuropathy with glove and stocking>>will check HgbA1C and SPEP/IPEP/UPEP.     Upper gastrointestinal bleeding.   - Follow H/H.   Orthostatics.   ASA stopped.  - PPI    - GI follow  - s/p EGD with large hiatal hernia, Dany ulcers, duodenal angiodysplastic bleeding lesion clipped   - regular diet    Anemia posthemorrhagic  - Iron  - follow Hct  - refuses transfusion PRBC      Multifocal atrial tachycardia.   - telemetry.  - continue Coreg with parameters  - cardiology evaluation Dr. Gaitan     Essential hypertension.   -  control  - hold Hydralazine for hypotension    Anxiety disorder.   - Wellbutrin and Xanax.     Postoperative hypothyroidism.   - TSH.  Followed by her endocrinologist as above.    Neuropathy.   - SPEP / IPEP / UPEP and HgbA1C  pending     Need for prophylactic measure.   - GI bleeding precludes pharmacologic DVT prophylaxis.   MIGUEL A for now.     Presentation of patient with suspected upper, although possible component of lower GI bleed with melena and burgundy BM with undifferentiated syncopal episode from [facility] and with second ER evaluation at [place] sent from the patient's GI physician.   Upgraded to telemetry due to history of multifocal atrial tachycardia to exclude cardiac equivalent.   Will follow H/H and orthostatics.    PPI gtt initiated.  ASA held, s/p EGD with large hiatal hernia, Dany ulcers, duodenal angiodysplastic bleeding lesion clipped. H & H monitored, stable, PPI Bid and carafate X4/day continued   For anemia, s/p Iron IV X3 days   For Multifocal atrial tachycardia, monitored on tele, now on SR 60s-70s with PACs, Coreg continued   Patient apparently with difficult to control essential HTN, and will cautiously continue with Coreg. Hydralazine and ARB were on hold  For Anxiety disorder with patient on Wellbutrin, Xanax.  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 Hope) , levothyroxine continued  For Neuropathy, SPEP / IPEP / UPEP and HgbA1C checked  Right arm swelling and redness r/o skin infection - s/p ID evaluation, Doxy 100mg PO BID for X 5 days   For hiatal hernia, seen by surgery, pt wants to have surgical evaluation as outpatient     H & H stable, right arm redness improving   Medically cleared for discharge by Dr. Moore

## 2022-07-03 NOTE — DISCHARGE NOTE PROVIDER - NSDCCPCAREPLAN_GEN_ALL_CORE_FT
PRINCIPAL DISCHARGE DIAGNOSIS  Diagnosis: Upper gastrointestinal bleeding  Assessment and Plan of Treatment: s/p EGD on 7/1  Impression:          - Z-line regular, 28 cm from the incisors. Large hiatal hernia. 10 cm hiatal hernia with multiple Dany ulcers. Chronic gastritis. Biopsied. A single bleeding angiodysplastic lesion in the duodenum. Clip (MR conditional) was placed.  Please follow up with your gastroenterologist in one to two weeks afor biopsy results          SECONDARY DISCHARGE DIAGNOSES  Diagnosis: Multifocal atrial tachycardia  Assessment and Plan of Treatment:     Diagnosis: Essential hypertension  Assessment and Plan of Treatment:     Diagnosis: Anxiety disorder  Assessment and Plan of Treatment:     Diagnosis: Neuropathy  Assessment and Plan of Treatment:     Diagnosis: Postoperative hypothyroidism  Assessment and Plan of Treatment:      PRINCIPAL DISCHARGE DIAGNOSIS  Diagnosis: Upper gastrointestinal bleeding  Assessment and Plan of Treatment: s/p EGD on 7/1  Impression:          - Z-line regular, 28 cm from the incisors. Large hiatal hernia. 10 cm hiatal hernia with multiple Dany ulcers. Chronic gastritis. Biopsied. A single bleeding angiodysplastic lesion in the duodenum. Clip (MR conditional) was placed.  Please follow up with your gastroenterologist in one to two weeks afor biopsy results          SECONDARY DISCHARGE DIAGNOSES  Diagnosis: Multifocal atrial tachycardia  Assessment and Plan of Treatment: Monitored on tele  s/p cardiology evaluation   Coreg continued   now sinus rhythm 60-70 with PACs      Diagnosis: Essential hypertension  Assessment and Plan of Treatment:     Diagnosis: Anxiety disorder  Assessment and Plan of Treatment:     Diagnosis: Neuropathy  Assessment and Plan of Treatment:     Diagnosis: Postoperative hypothyroidism  Assessment and Plan of Treatment:     Diagnosis: Hiatal hernia  Assessment and Plan of Treatment:      PRINCIPAL DISCHARGE DIAGNOSIS  Diagnosis: Upper gastrointestinal bleeding  Assessment and Plan of Treatment: s/p EGD on 7/1   Large hiatal hernia. 10 cm hiatal hernia with multiple Dany ulcers. Chronic gastritis. Biopsied. A single bleeding angiodysplastic lesion in the duodenum. Clip (MR conditional) was placed.  Please follow up with your gastroenterologist in one to two weeks for biopsy results    please continue protonix X2/day and carafate X4/day as directed until you follow up with your GI doctor  Please follow up with your primray care physician          SECONDARY DISCHARGE DIAGNOSES  Diagnosis: Multifocal atrial tachycardia  Assessment and Plan of Treatment: Monitored on tele  s/p cardiology evaluation   Coreg continued   now sinus rhythm 60-70 with PACs      Diagnosis: Essential hypertension  Assessment and Plan of Treatment: Hydralazine, Olmesartan and Cardizem on hold   BPs stable on Coreg   Low salt diet recommended  Please follow up with your primaryc are physician    Diagnosis: Anxiety disorder  Assessment and Plan of Treatment: Continue Wellbutrin and Xanax home medications as directed   Please follow up with your primary care physician       Diagnosis: Neuropathy  Assessment and Plan of Treatment:     Diagnosis: Postoperative hypothyroidism  Assessment and Plan of Treatment: Continue synthroid current dose  Please follow up wtih your endocrinologist ( Dr. Hope)       Diagnosis: Hiatal hernia  Assessment and Plan of Treatment: You wanted to have surgical evaluation as outpatient   please follow up with your primary care physician and surgeon     PRINCIPAL DISCHARGE DIAGNOSIS  Diagnosis: Upper gastrointestinal bleeding  Assessment and Plan of Treatment: s/p EGD on 7/1   Large hiatal hernia. 10 cm hiatal hernia with multiple Dany ulcers. Chronic gastritis. Biopsied. A single bleeding angiodysplastic lesion in the duodenum. Clip (MR conditional) was placed.  **Please follow up with your gastroenterologist in one to two weeks for biopsy results    please continue protonix X2/day and carafate X4/day as directed until you follow up with your GI doctor  ** Hold ASA 81mg one more week then resume if no further bleeding  as per GI Dr. Marques  Please follow up with your primray care physician          SECONDARY DISCHARGE DIAGNOSES  Diagnosis: Multifocal atrial tachycardia  Assessment and Plan of Treatment: Monitored on tele  s/p cardiology evaluation   Coreg continued   now sinus rhythm 60-70 with PACs      Diagnosis: Essential hypertension  Assessment and Plan of Treatment: Hydralazine, Olmesartan and Cardizem on hold   BPs stable on Coreg   Low salt diet recommended  Please follow up with your primaryc are physician    Diagnosis: Anxiety disorder  Assessment and Plan of Treatment: Continue Wellbutrin and Xanax home medications as directed   Please follow up with your primary care physician       Diagnosis: Postoperative hypothyroidism  Assessment and Plan of Treatment: Continue synthroid current dose  Please follow up wtih your endocrinologist ( Dr. Hope)       Diagnosis: Hiatal hernia  Assessment and Plan of Treatment: You wanted to have surgical evaluation as outpatient   please follow up with your primary care physician and surgeon    Diagnosis: Phlebitis  Assessment and Plan of Treatment: Right arm swelling and redness +  please complete the course of antibiotic Doxycycline 100mg BID X5 days   Monitor for worsening signs of infection such as redness, swelling and discharge

## 2022-07-03 NOTE — PROGRESS NOTE ADULT - ASSESSMENT
Presentation of patient with suspected upper, although possible component of lower GI bleed with melena and burgundy BM with undifferentiated syncopal episode from [facility] and with second ER evaluation at [place] sent from the patient's GI physician.   Upgraded to telemetry due to history of multifocal atrial tachycardia to exclude cardiac equivalent.   Will follow H/H and orthostatics.    PPI gtt initiated.   Will discontinue the ASA for now.     Patient apparently with difficult to control essential HTN, and will cautiously continue with Diltiazem, Coreg, hydralazine, ARB.    Anxiety disorder with patient on Wellbutrin, Xanax.    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 Hope    Pharmacologic DVT prophylaxis precluded with GI bleed.    Unclear to the etiology of the apparent intermittent neuropathy with glove and stocking>>will check HgbA1C and SPEP/IPEP/UPEP.     Upper gastrointestinal bleeding.   - Follow H/H.   Orthostatics.   ASA stopped.  - PPI    - GI follow  - s/p EGD with large hiatal hernia, Dany ulcers, duodenal angiodysplastic bleeding lesion clipped   - regular diet  - if bleeding continues will need capsule endoscopy    Anemia posthemorrhagic  - Iron  - follow Hct  - refuses transfusion PRBC      Hiatal hernia  - may need surgical evaluation if bleeding continues    Multifocal atrial tachycardia.   - telemetry.  - continue Coreg with parameters   - cardiology follow Dr. Gaitan     Essential hypertension.   - control  - hold Hydralazine for hypotension    Anxiety disorder.   - Wellbutrin and Xanax.     Postoperative hypothyroidism.   - TSH.  Followed by her endocrinologist as above.    Neuropathy.   - SPEP / IPEP / UPEP and HgbA1C  pending     Need for prophylactic measure.   - GI bleeding precludes pharmacologic DVT prophylaxis.   MIGUEL A for now.    d/w patient     Paulie Moore MD phone [phone]

## 2022-07-03 NOTE — DISCHARGE NOTE PROVIDER - NSDCMRMEDTOKEN_GEN_ALL_CORE_FT
ALPRAZolam 1 mg oral tablet: 1 tab(s) orally once a day (at bedtime)  NOTE: pt uses to sleep  amitriptyline 25 mg oral tablet: 1 tab(s) orally once a day (at bedtime)  aspirin 81 mg oral delayed release tablet: 1 tab(s) orally once a day (in the evening)  Caltrate 600 + D oral tablet: 1 tab(s) orally 2 times a day  carvedilol 25 mg oral tablet: 1 tab(s) orally every 12 hours  dilTIAZem 120 mg/24 hours oral capsule, extended release: 1 cap(s) orally once a day  hydrALAZINE 50 mg oral tablet: 1 tab(s) orally 2 times a day (lunch, dinner)  lidocaine 5% patch: Apply 3 patches topically to affected area once a day for 12 hours, then remove  NOTE: apply to spine, right knee and back of right leg  lovastatin 20 mg oral tablet: 1 tab(s) orally once a day (in the evening) - with dinner  magnesium oxide 500 mg oral tablet: 1 to 2 tab(s) orally once a day (at bedtime)  NOTE: pt takes as laxative   Multiple Vitamins oral tablet: 1 tab(s) orally once a day (lunch)  olmesartan 40 mg oral tablet: 1 tab(s) orally once a day (at bedtime)  omeprazole 40 mg oral delayed release capsule: 1 cap(s) orally once a day  Synthroid 75 mcg (0.075 mg) oral tablet: 1 tab(s) orally once a day (Mon - Sat)  Systane ophthalmic solution: 1 drop(s) to each affected eye , As Needed  Vitamin C 500 mg oral tablet: 1 tab(s) orally 2 times a day  Wellbutrin  mg/12 hours oral tablet, extended release: 1 tab(s) orally 2 times a day   ALPRAZolam 1 mg oral tablet: 1 tab(s) orally once a day (at bedtime)  NOTE: pt uses to sleep  amitriptyline 25 mg oral tablet: 1 tab(s) orally once a day (at bedtime)  aspirin 81 mg oral delayed release tablet: 1 tab(s) orally once a day (in the evening)  Caltrate 600 + D oral tablet: 1 tab(s) orally 2 times a day  carvedilol 25 mg oral tablet: 1 tab(s) orally every 12 hours  dilTIAZem 120 mg/24 hours oral capsule, extended release: 1 cap(s) orally once a day  doxycycline monohydrate 50 mg oral capsule: 2 cap(s) orally every 12 hours total 5 days till 7/10  hydrALAZINE 50 mg oral tablet: 1 tab(s) orally 2 times a day (lunch, dinner)  lidocaine 5% patch: Apply 3 patches topically to affected area once a day for 12 hours, then remove  NOTE: apply to spine, right knee and back of right leg  lovastatin 20 mg oral tablet: 1 tab(s) orally once a day (in the evening) - with dinner  magnesium oxide 500 mg oral tablet: 1 to 2 tab(s) orally once a day (at bedtime)  NOTE: pt takes as laxative   Multiple Vitamins oral tablet: 1 tab(s) orally once a day (lunch)  olmesartan 40 mg oral tablet: 1 tab(s) orally once a day (at bedtime)  pantoprazole 40 mg oral delayed release tablet: 1 tab(s) orally 2 times a day  sucralfate 1 g/10 mL oral suspension: 10 milliliter(s) orally every 6 hours  Synthroid 75 mcg (0.075 mg) oral tablet: 1 tab(s) orally once a day (Mon - Sat) and 1.5 tab on Sundays   Systane ophthalmic solution: 1 drop(s) to each affected eye , As Needed  Vitamin C 500 mg oral tablet: 1 tab(s) orally 2 times a day  Wellbutrin  mg/12 hours oral tablet, extended release: 1 tab(s) orally 2 times a day   ALPRAZolam 1 mg oral tablet: 1 tab(s) orally once a day (at bedtime)  NOTE: pt uses to sleep  amitriptyline 25 mg oral tablet: 1 tab(s) orally once a day (at bedtime)  Caltrate 600 + D oral tablet: 1 tab(s) orally 2 times a day  carvedilol 25 mg oral tablet: 1 tab(s) orally every 12 hours  doxycycline monohydrate 50 mg oral capsule: 2 cap(s) orally every 12 hours total 5 days till 7/10  lidocaine 5% patch: Apply 3 patches topically to affected area once a day for 12 hours, then remove  NOTE: apply to spine, right knee and back of right leg  lovastatin 20 mg oral tablet: 1 tab(s) orally once a day (in the evening) - with dinner  magnesium oxide 500 mg oral tablet: 1 to 2 tab(s) orally once a day (at bedtime)  NOTE: pt takes as laxative   Multiple Vitamins oral tablet: 1 tab(s) orally once a day (lunch)  pantoprazole 40 mg oral delayed release tablet: 1 tab(s) orally 2 times a day  sucralfate 1 g/10 mL oral suspension: 10 milliliter(s) orally every 6 hours  Synthroid 75 mcg (0.075 mg) oral tablet: 1 tab(s) orally once a day (Mon - Sat) and 1.5 tab on Sundays   Systane ophthalmic solution: 1 drop(s) to each affected eye , As Needed  Vitamin C 500 mg oral tablet: 1 tab(s) orally 2 times a day  Wellbutrin  mg/12 hours oral tablet, extended release: 1 tab(s) orally 2 times a day

## 2022-07-03 NOTE — DISCHARGE NOTE PROVIDER - NSDCFUADDAPPT_GEN_ALL_CORE_FT
APPTS ARE READY TO BE MADE: [X ] YES    Best Family or Patient Contact (if needed):    Additional Information about above appointments (if needed):    1:   2:   3:     Other comments or requests:    APPTS ARE READY TO BE MADE: [X ] YES    Best Family or Patient Contact (if needed):    Additional Information about above appointments (if needed):    1: Patient was provided with follow up request details and was advised to call to schedule follow up within specified time frame.   2: Patient was provided with follow up request details and was advised to call to schedule follow up within specified time frame.   3: Patient was provided with follow up request details and was advised to call to schedule follow up within specified time frame.     Other comments or requests:

## 2022-07-03 NOTE — DISCHARGE NOTE PROVIDER - CARE PROVIDER_API CALL
STEPAN PETERSON  [facility]  [address] [place] [address]  Phone: [phone]  Fax: [phone]  Follow Up Time: 1 week    Tyrone Marques (DO)  Gastroenterology; Internal Medicine  [address] [place] [address]  Phone: [phone]  Fax: [phone]  Follow Up Time: 2 weeks   NICHOLAS, STEPAN  [facility]  [address] [place] [address]  Phone: [phone]  Fax: [phone]  Follow Up Time: 1 week    Tyrone Marques ()  Gastroenterology; Internal Medicine  [address] [place] [address]  Phone: [phone]  Fax: [phone]  Follow Up Time: 2 weeks    Harley Calle)  Cardiology  [address] [facility], [address]  [place] [address]  Phone: [phone]  Fax: [phone]  Follow Up Time: 1 week

## 2022-07-03 NOTE — PROGRESS NOTE ADULT - ASSESSMENT
melena  hiatal hernia  pud  camerons ulcers    plan  ppi buid  carafate  q 6 hours  may need hiatal hernia repair  f/u bx  trending down hgb  may need capsule watch trend if lower consider it  bowle regimen some miralax    Advanced care planning was discussed with patient and family.  Advanced care planning forms were reviewed and discussed.  Risks, benefits and alternatives of gastroenterologic procedures were discussed in detail and all questions were answered.    30 minutes spent.

## 2022-07-04 LAB
ANION GAP SERPL CALC-SCNC: 10 MMOL/L — SIGNIFICANT CHANGE UP (ref 5–17)
BUN SERPL-MCNC: 26 MG/DL — HIGH (ref 7–23)
CALCIUM SERPL-MCNC: 8.5 MG/DL — SIGNIFICANT CHANGE UP (ref 8.4–10.5)
CHLORIDE SERPL-SCNC: 106 MMOL/L — SIGNIFICANT CHANGE UP (ref 96–108)
CO2 SERPL-SCNC: 24 MMOL/L — SIGNIFICANT CHANGE UP (ref 22–31)
CREAT SERPL-MCNC: 1.08 MG/DL — SIGNIFICANT CHANGE UP (ref 0.5–1.3)
EGFR: 52 ML/MIN/1.73M2 — LOW
GLUCOSE SERPL-MCNC: 107 MG/DL — HIGH (ref 70–99)
HAPTOGLOB SERPL-MCNC: 203 MG/DL — HIGH (ref 34–200)
HCT VFR BLD CALC: 23.6 % — LOW (ref 34.5–45)
HCT VFR BLD CALC: 25.2 % — LOW (ref 34.5–45)
HGB BLD-MCNC: 7.6 G/DL — LOW (ref 11.5–15.5)
HGB BLD-MCNC: 8.1 G/DL — LOW (ref 11.5–15.5)
LDH SERPL L TO P-CCNC: 128 U/L — SIGNIFICANT CHANGE UP (ref 50–242)
MCHC RBC-ENTMCNC: 30.9 PG — SIGNIFICANT CHANGE UP (ref 27–34)
MCHC RBC-ENTMCNC: 31 PG — SIGNIFICANT CHANGE UP (ref 27–34)
MCHC RBC-ENTMCNC: 32.1 GM/DL — SIGNIFICANT CHANGE UP (ref 32–36)
MCHC RBC-ENTMCNC: 32.2 GM/DL — SIGNIFICANT CHANGE UP (ref 32–36)
MCV RBC AUTO: 95.9 FL — SIGNIFICANT CHANGE UP (ref 80–100)
MCV RBC AUTO: 96.6 FL — SIGNIFICANT CHANGE UP (ref 80–100)
NRBC # BLD: 0 /100 WBCS — SIGNIFICANT CHANGE UP (ref 0–0)
NRBC # BLD: 0 /100 WBCS — SIGNIFICANT CHANGE UP (ref 0–0)
PLATELET # BLD AUTO: 254 K/UL — SIGNIFICANT CHANGE UP (ref 150–400)
PLATELET # BLD AUTO: 266 K/UL — SIGNIFICANT CHANGE UP (ref 150–400)
POTASSIUM SERPL-MCNC: 3.7 MMOL/L — SIGNIFICANT CHANGE UP (ref 3.5–5.3)
POTASSIUM SERPL-SCNC: 3.7 MMOL/L — SIGNIFICANT CHANGE UP (ref 3.5–5.3)
RBC # BLD: 2.46 M/UL — LOW (ref 3.8–5.2)
RBC # BLD: 2.46 M/UL — LOW (ref 3.8–5.2)
RBC # BLD: 2.61 M/UL — LOW (ref 3.8–5.2)
RBC # FLD: 14.7 % — HIGH (ref 10.3–14.5)
RBC # FLD: 14.8 % — HIGH (ref 10.3–14.5)
RETICS #: 91.8 K/UL — SIGNIFICANT CHANGE UP (ref 25–125)
RETICS/RBC NFR: 3.7 % — HIGH (ref 0.5–2.5)
SODIUM SERPL-SCNC: 140 MMOL/L — SIGNIFICANT CHANGE UP (ref 135–145)
WBC # BLD: 7.75 K/UL — SIGNIFICANT CHANGE UP (ref 3.8–10.5)
WBC # BLD: 8.27 K/UL — SIGNIFICANT CHANGE UP (ref 3.8–10.5)
WBC # FLD AUTO: 7.75 K/UL — SIGNIFICANT CHANGE UP (ref 3.8–10.5)
WBC # FLD AUTO: 8.27 K/UL — SIGNIFICANT CHANGE UP (ref 3.8–10.5)

## 2022-07-04 RX ORDER — LIDOCAINE 4 G/100G
1 CREAM TOPICAL DAILY
Refills: 0 | Status: DISCONTINUED | OUTPATIENT
Start: 2022-07-04 | End: 2022-07-07

## 2022-07-04 RX ADMIN — CARVEDILOL PHOSPHATE 25 MILLIGRAM(S): 80 CAPSULE, EXTENDED RELEASE ORAL at 06:52

## 2022-07-04 RX ADMIN — ATORVASTATIN CALCIUM 10 MILLIGRAM(S): 80 TABLET, FILM COATED ORAL at 21:57

## 2022-07-04 RX ADMIN — BUPROPION HYDROCHLORIDE 100 MILLIGRAM(S): 150 TABLET, EXTENDED RELEASE ORAL at 10:34

## 2022-07-04 RX ADMIN — CARVEDILOL PHOSPHATE 25 MILLIGRAM(S): 80 CAPSULE, EXTENDED RELEASE ORAL at 17:05

## 2022-07-04 RX ADMIN — Medication 25 MILLIGRAM(S): at 00:13

## 2022-07-04 RX ADMIN — SIMETHICONE 80 MILLIGRAM(S): 80 TABLET, CHEWABLE ORAL at 00:13

## 2022-07-04 RX ADMIN — Medication 1 MILLIGRAM(S): at 00:13

## 2022-07-04 RX ADMIN — Medication 75 MICROGRAM(S): at 05:05

## 2022-07-04 RX ADMIN — Medication 1 GRAM(S): at 17:05

## 2022-07-04 RX ADMIN — LIDOCAINE 1 PATCH: 4 CREAM TOPICAL at 12:33

## 2022-07-04 RX ADMIN — Medication 500 MILLIGRAM(S): at 06:50

## 2022-07-04 RX ADMIN — LIDOCAINE 1 PATCH: 4 CREAM TOPICAL at 12:34

## 2022-07-04 RX ADMIN — Medication 1 GRAM(S): at 06:50

## 2022-07-04 RX ADMIN — PANTOPRAZOLE SODIUM 40 MILLIGRAM(S): 20 TABLET, DELAYED RELEASE ORAL at 06:52

## 2022-07-04 RX ADMIN — IRON SUCROSE 210 MILLIGRAM(S): 20 INJECTION, SOLUTION INTRAVENOUS at 12:33

## 2022-07-04 NOTE — PROGRESS NOTE ADULT - ASSESSMENT
A/P  81 year old female with  htn, hyperlipidemia, thyroid ca, MAT, Diverticulitis, Hiatial hernia, Gerd, Osteoarthritis, anxiety presenting with syncope, dark stools, gi bleed    #GI bleed  -s/p egd, results noted, s/p duodenal clip  -cont ppi, off asa  -trend heme    #syncope   -secondary to volume status, gi bleed  -echo normal     #hx of Multifocal atrial tachycardia.   -continue bb, no indication for a/c  -asa on hold     #hypertension.   -lower bp yesterday in setting of npo, bleed  -now normal   -cont current meds      DVT ppx    35 minutes spent on total encounter; more than 50% of the visit was spent counseling and/or coordinating care by the attending physician.

## 2022-07-04 NOTE — PROGRESS NOTE ADULT - ASSESSMENT
Presentation of patient with suspected upper, although possible component of lower GI bleed with melena and burgundy BM with undifferentiated syncopal episode from [facility] and with second ER evaluation at [place] sent from the patient's GI physician.   Upgraded to telemetry due to history of multifocal atrial tachycardia to exclude cardiac equivalent.   Will follow H/H and orthostatics.    PPI gtt initiated.   Will discontinue the ASA for now.     Patient apparently with difficult to control essential HTN, and will cautiously continue with Diltiazem, Coreg, hydralazine, ARB.    Anxiety disorder with patient on Wellbutrin, Xanax.    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 Hope    Pharmacologic DVT prophylaxis precluded with GI bleed.    Upper gastrointestinal bleeding.   - Follow H/H.   Orthostatics.   ASA stopped.  - PPI    - GI follow  - s/p EGD with large hiatal hernia, Dany ulcers, duodenal angiodysplastic bleeding lesion clipped   - regular diet  - if bleeding continues will need capsule endoscopy    Anemia posthemorrhagic  - Iron infusion   - follow Hct  - refuses transfusion PRBC      Hiatal hernia  - may need surgical evaluation if bleeding continues    Multifocal atrial tachycardia.   - telemetry.  - continue Coreg with parameters   - cardiology follow Dr. Gaitan     Essential hypertension.   - control  - hold Hydralazine for hypotension    Anxiety disorder.   - Wellbutrin and Xanax.     Postoperative hypothyroidism.   - TSH.  Followed by her endocrinologist as above.    Neuropathy.   - SPEP / IPEP / UPEP and HgbA1C  pending     Need for prophylactic measure.   - GI bleeding precludes pharmacologic DVT prophylaxis.   MIGUEL A for now.    d/w patient     Paulie Moore MD phone [phone]

## 2022-07-05 LAB
% ALBUMIN: 56 % — SIGNIFICANT CHANGE UP
% ALPHA 1: 5.2 % — SIGNIFICANT CHANGE UP
% ALPHA 2: 10.8 % — SIGNIFICANT CHANGE UP
% BETA: 12.6 % — SIGNIFICANT CHANGE UP
% GAMMA, URINE: 14.7 % — SIGNIFICANT CHANGE UP
% GAMMA: 15.4 % — SIGNIFICANT CHANGE UP
ALBUMIN 24H MFR UR ELPH: 22.4 % — SIGNIFICANT CHANGE UP
ALBUMIN SERPL ELPH-MCNC: 3.5 G/DL — LOW (ref 3.6–5.5)
ALBUMIN/GLOB SERPL ELPH: 1.3 RATIO — SIGNIFICANT CHANGE UP
ALPHA1 GLOB 24H MFR UR ELPH: 27.6 % — SIGNIFICANT CHANGE UP
ALPHA1 GLOB SERPL ELPH-MCNC: 0.3 G/DL — SIGNIFICANT CHANGE UP (ref 0.1–0.4)
ALPHA2 GLOB 24H MFR UR ELPH: 19.1 % — SIGNIFICANT CHANGE UP
ALPHA2 GLOB SERPL ELPH-MCNC: 0.7 G/DL — SIGNIFICANT CHANGE UP (ref 0.5–1)
B-GLOBULIN 24H MFR UR ELPH: 16.2 % — SIGNIFICANT CHANGE UP
B-GLOBULIN SERPL ELPH-MCNC: 0.8 G/DL — SIGNIFICANT CHANGE UP (ref 0.5–1)
COLLECT DURATION TIME UR: 24 HR — SIGNIFICANT CHANGE UP
GAMMA GLOBULIN: 1 G/DL — SIGNIFICANT CHANGE UP (ref 0.6–1.6)
HCT VFR BLD CALC: 23.7 % — LOW (ref 34.5–45)
HGB BLD-MCNC: 7.7 G/DL — LOW (ref 11.5–15.5)
INTERPRETATION 24H UR IFE-IMP: SIGNIFICANT CHANGE UP
INTERPRETATION 24H UR IFE-IMP: SIGNIFICANT CHANGE UP
INTERPRETATION SERPL IFE-IMP: SIGNIFICANT CHANGE UP
M PROTEIN 24H UR ELPH-MRATE: 0 MG/24HR — SIGNIFICANT CHANGE UP (ref 0–0)
M PROTEIN 24H UR ELPH-MRATE: 0 MG/DL — SIGNIFICANT CHANGE UP
MCHC RBC-ENTMCNC: 32 PG — SIGNIFICANT CHANGE UP (ref 27–34)
MCHC RBC-ENTMCNC: 32.5 GM/DL — SIGNIFICANT CHANGE UP (ref 32–36)
MCV RBC AUTO: 98.3 FL — SIGNIFICANT CHANGE UP (ref 80–100)
NRBC # BLD: 0 /100 WBCS — SIGNIFICANT CHANGE UP (ref 0–0)
PLATELET # BLD AUTO: 262 K/UL — SIGNIFICANT CHANGE UP (ref 150–400)
PROT ?TM UR-MCNC: 6 MG/DL — SIGNIFICANT CHANGE UP (ref 0–12)
PROT PATTERN 24H UR ELPH-IMP: SIGNIFICANT CHANGE UP
PROT PATTERN SERPL ELPH-IMP: SIGNIFICANT CHANGE UP
PROT SERPL-MCNC: 6.2 G/DL — SIGNIFICANT CHANGE UP (ref 6–8.3)
PROT SERPL-MCNC: 6.2 G/DL — SIGNIFICANT CHANGE UP (ref 6–8.3)
PROTEIN QUANT CALC, URINE: 108 MG/24 H — HIGH (ref 50–100)
RBC # BLD: 2.41 M/UL — LOW (ref 3.8–5.2)
RBC # FLD: 14.8 % — HIGH (ref 10.3–14.5)
TOTAL VOLUME - 24 HOUR: 1800 ML — SIGNIFICANT CHANGE UP
URINE CREATININE CALCULATION: 0.8 G/24 H — SIGNIFICANT CHANGE UP (ref 0.8–1.8)
WBC # BLD: 7.62 K/UL — SIGNIFICANT CHANGE UP (ref 3.8–10.5)
WBC # FLD AUTO: 7.62 K/UL — SIGNIFICANT CHANGE UP (ref 3.8–10.5)

## 2022-07-05 RX ORDER — PANTOPRAZOLE SODIUM 20 MG/1
40 TABLET, DELAYED RELEASE ORAL
Refills: 0 | Status: DISCONTINUED | OUTPATIENT
Start: 2022-07-05 | End: 2022-07-07

## 2022-07-05 RX ORDER — SUCRALFATE 1 G
1 TABLET ORAL EVERY 6 HOURS
Refills: 0 | Status: DISCONTINUED | OUTPATIENT
Start: 2022-07-05 | End: 2022-07-07

## 2022-07-05 RX ORDER — ALPRAZOLAM 0.25 MG
1 TABLET ORAL AT BEDTIME
Refills: 0 | Status: DISCONTINUED | OUTPATIENT
Start: 2022-07-05 | End: 2022-07-07

## 2022-07-05 RX ADMIN — LIDOCAINE 1 PATCH: 4 CREAM TOPICAL at 13:53

## 2022-07-05 RX ADMIN — PANTOPRAZOLE SODIUM 40 MILLIGRAM(S): 20 TABLET, DELAYED RELEASE ORAL at 18:33

## 2022-07-05 RX ADMIN — Medication 75 MICROGRAM(S): at 06:09

## 2022-07-05 RX ADMIN — CARVEDILOL PHOSPHATE 25 MILLIGRAM(S): 80 CAPSULE, EXTENDED RELEASE ORAL at 06:51

## 2022-07-05 RX ADMIN — Medication 500 MILLIGRAM(S): at 09:18

## 2022-07-05 RX ADMIN — Medication 1 GRAM(S): at 06:51

## 2022-07-05 RX ADMIN — CHLORHEXIDINE GLUCONATE 1 APPLICATION(S): 213 SOLUTION TOPICAL at 13:54

## 2022-07-05 RX ADMIN — Medication 25 MILLIGRAM(S): at 00:21

## 2022-07-05 RX ADMIN — BUPROPION HYDROCHLORIDE 100 MILLIGRAM(S): 150 TABLET, EXTENDED RELEASE ORAL at 09:18

## 2022-07-05 RX ADMIN — Medication 500 MILLIGRAM(S): at 18:33

## 2022-07-05 RX ADMIN — Medication 1 GRAM(S): at 18:33

## 2022-07-05 RX ADMIN — CARVEDILOL PHOSPHATE 25 MILLIGRAM(S): 80 CAPSULE, EXTENDED RELEASE ORAL at 18:33

## 2022-07-05 RX ADMIN — ATORVASTATIN CALCIUM 10 MILLIGRAM(S): 80 TABLET, FILM COATED ORAL at 21:42

## 2022-07-05 RX ADMIN — BUPROPION HYDROCHLORIDE 100 MILLIGRAM(S): 150 TABLET, EXTENDED RELEASE ORAL at 18:33

## 2022-07-05 RX ADMIN — Medication 1 MILLIGRAM(S): at 00:20

## 2022-07-05 RX ADMIN — PANTOPRAZOLE SODIUM 40 MILLIGRAM(S): 20 TABLET, DELAYED RELEASE ORAL at 06:51

## 2022-07-05 NOTE — PROGRESS NOTE ADULT - ASSESSMENT
Presentation of patient with suspected upper, although possible component of lower GI bleed with melena and burgundy BM with undifferentiated syncopal episode from [facility] and with second ER evaluation at [place] sent from the patient's GI physician.   Upgraded to telemetry due to history of multifocal atrial tachycardia to exclude cardiac equivalent.   Will follow H/H and orthostatics.    PPI gtt initiated.   Will discontinue the ASA for now.     Patient apparently with difficult to control essential HTN, and will cautiously continue with Diltiazem, Coreg, hydralazine, ARB.    Anxiety disorder with patient on Wellbutrin, Xanax.    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 Hope    Pharmacologic DVT prophylaxis precluded with GI bleed.    Upper gastrointestinal bleeding.   - Follow H/H.   Orthostatics.   ASA stopped.  - PPI    - GI follow  - s/p EGD with large hiatal hernia, Dany ulcers, duodenal angiodysplastic bleeding lesion clipped   - regular diet  - if bleeding continues will need capsule endoscopy    Anemia posthemorrhagic  - Iron infusion   - follow Hct  - refuses transfusion PRBC      Hiatal hernia  - surgical evaluation     Multifocal atrial tachycardia.   - telemetry.  - continue Coreg with parameters   - cardiology follow Dr. Gaitan     Essential hypertension.   - control  - hold Hydralazine for hypotension    Anxiety disorder.   - Wellbutrin and Xanax.     Postoperative hypothyroidism.   - TSH.  Followed by her endocrinologist as above.    Neuropathy.   - SPEP / IPEP / UPEP and HgbA1C  pending     Need for prophylactic measure.   - GI bleeding precludes pharmacologic DVT prophylaxis.   MIGUEL A for now.    d/w patient     Paulie Moore MD phone [phone]

## 2022-07-05 NOTE — PROGRESS NOTE ADULT - ASSESSMENT
melena  hiatal hernia  pud  camerons ulcers    plan  ppi bid  carafate  q 6 hours  may need hiatal hernia repair  f/u bx  trending down hgb  may need capsule watch trend if lower consider it, pt stating she wants to do capsule inpatient   bowle regimen some miralax    Advanced care planning was discussed with patient and family.  Advanced care planning forms were reviewed and discussed.  Risks, benefits and alternatives of gastroenterologic procedures were discussed in detail and all questions were answered.    30 minutes spent.   melena  hiatal hernia  pud  camerons ulcers    plan  ppi bid  carafate  q 6 hours  may need hiatal hernia repair  f/u bx  trending down hgb  H/H overall stable   no need for inpatient capsule, follow up with primary GI as an outpatient   bowle regimen some miralax    Advanced care planning was discussed with patient and family.  Advanced care planning forms were reviewed and discussed.  Risks, benefits and alternatives of gastroenterologic procedures were discussed in detail and all questions were answered.    30 minutes spent.

## 2022-07-05 NOTE — PROGRESS NOTE ADULT - ASSESSMENT
A/P  81 year old female with  htn, hyperlipidemia, thyroid ca, MAT, Diverticulitis, Hiatial hernia, Gerd, Osteoarthritis, anxiety presenting with syncope, dark stools, gi bleed    #GI bleed  -s/p egd, results noted, s/p duodenal clip  -cont ppi, off asa  -trend heme- down trending- GI fu ? may need capsule per gi     #syncope   -secondary to volume status, gi bleed  -echo normal     #hx of Multifocal atrial tachycardia.   -continue bb, no indication for a/c  -asa on hold     #hypertension.   -lower bp in setting of npo, bleed  -now normal   -cont current meds      DVT ppx

## 2022-07-06 LAB
HCT VFR BLD CALC: 25.2 % — LOW (ref 34.5–45)
HGB BLD-MCNC: 8 G/DL — LOW (ref 11.5–15.5)
MCHC RBC-ENTMCNC: 31 PG — SIGNIFICANT CHANGE UP (ref 27–34)
MCHC RBC-ENTMCNC: 31.7 GM/DL — LOW (ref 32–36)
MCV RBC AUTO: 97.7 FL — SIGNIFICANT CHANGE UP (ref 80–100)
NRBC # BLD: 0 /100 WBCS — SIGNIFICANT CHANGE UP (ref 0–0)
PLATELET # BLD AUTO: 278 K/UL — SIGNIFICANT CHANGE UP (ref 150–400)
RBC # BLD: 2.58 M/UL — LOW (ref 3.8–5.2)
RBC # FLD: 15.3 % — HIGH (ref 10.3–14.5)
SARS-COV-2 RNA SPEC QL NAA+PROBE: SIGNIFICANT CHANGE UP
WBC # BLD: 8.69 K/UL — SIGNIFICANT CHANGE UP (ref 3.8–10.5)
WBC # FLD AUTO: 8.69 K/UL — SIGNIFICANT CHANGE UP (ref 3.8–10.5)

## 2022-07-06 RX ADMIN — BUPROPION HYDROCHLORIDE 100 MILLIGRAM(S): 150 TABLET, EXTENDED RELEASE ORAL at 23:16

## 2022-07-06 RX ADMIN — Medication 1 GRAM(S): at 08:35

## 2022-07-06 RX ADMIN — CARVEDILOL PHOSPHATE 25 MILLIGRAM(S): 80 CAPSULE, EXTENDED RELEASE ORAL at 21:30

## 2022-07-06 RX ADMIN — LIDOCAINE 1 PATCH: 4 CREAM TOPICAL at 03:19

## 2022-07-06 RX ADMIN — BENZOCAINE AND MENTHOL 1 LOZENGE: 5; 1 LIQUID ORAL at 08:40

## 2022-07-06 RX ADMIN — Medication 25 MILLIGRAM(S): at 00:33

## 2022-07-06 RX ADMIN — ATORVASTATIN CALCIUM 10 MILLIGRAM(S): 80 TABLET, FILM COATED ORAL at 21:29

## 2022-07-06 RX ADMIN — Medication 100 MILLIGRAM(S): at 20:22

## 2022-07-06 RX ADMIN — Medication 1 MILLIGRAM(S): at 00:33

## 2022-07-06 RX ADMIN — Medication 500 MILLIGRAM(S): at 21:29

## 2022-07-06 RX ADMIN — PANTOPRAZOLE SODIUM 40 MILLIGRAM(S): 20 TABLET, DELAYED RELEASE ORAL at 07:55

## 2022-07-06 RX ADMIN — Medication 500 MILLIGRAM(S): at 08:36

## 2022-07-06 RX ADMIN — Medication 75 MICROGRAM(S): at 05:32

## 2022-07-06 RX ADMIN — Medication 1 GRAM(S): at 12:32

## 2022-07-06 RX ADMIN — Medication 1 GRAM(S): at 18:04

## 2022-07-06 RX ADMIN — PANTOPRAZOLE SODIUM 40 MILLIGRAM(S): 20 TABLET, DELAYED RELEASE ORAL at 18:04

## 2022-07-06 RX ADMIN — Medication 1 GRAM(S): at 00:20

## 2022-07-06 RX ADMIN — CARVEDILOL PHOSPHATE 25 MILLIGRAM(S): 80 CAPSULE, EXTENDED RELEASE ORAL at 08:36

## 2022-07-06 RX ADMIN — BUPROPION HYDROCHLORIDE 100 MILLIGRAM(S): 150 TABLET, EXTENDED RELEASE ORAL at 08:36

## 2022-07-06 RX ADMIN — CHLORHEXIDINE GLUCONATE 1 APPLICATION(S): 213 SOLUTION TOPICAL at 12:33

## 2022-07-06 NOTE — PROGRESS NOTE ADULT - ASSESSMENT
A/P  81 year old female with  htn, hyperlipidemia, thyroid ca, MAT, Diverticulitis, Hiatial hernia, Gerd, Osteoarthritis, anxiety presenting with syncope, dark stools, gi bleed    #GI bleed  -s/p egd, results noted, s/p duodenal clip  -cont ppi, off asa  -trend heme- down trending- GI fu ? may need capsule per gi as outpt   -possible hiatal hernia repair, surgery to consult  per GI    #syncope   -secondary to volume status, gi bleed  -echo normal     #hx of Multifocal atrial tachycardia.   -continue bb, no indication for a/c  -asa on hold     #hypertension.   -lower bp in setting of npo, bleed  -bp now stable    -cont current meds      DVT ppx

## 2022-07-06 NOTE — CONSULT NOTE ADULT - SUBJECTIVE AND OBJECTIVE BOX
CARDIOLOGY CONSULT NOTE - DR. STREET    HPI:  NIGHT HOSPITALIST:    Patient UNKNOWN to me previously, assigned to me at this point via the ER and by Dr. Moore to admit this 82 y/o F--patient seen with adult daughter and adult son in attendance--followed by her office physicians above--patient with a history of retrosternal goiter resection in 9/2003 with postsurgical hypothyroidism and 4 mm microfocus of papillary thyroid CA with annual thyroid sonograms (followed by Dr. Hope above at Endocrinology), apparent prediabetes, presumed GERD, hiatal hernia, anxiety disorder on longstanding Xanax, essential HTN on multiple BP medications, arteriosclerosis, history of MAT, with apparently an evaluation this past Saturday at [facility] with an undifferentiated syncopal episode with generalized fatigue and dizziness, with patient apparently seen 6/28/22 with recurrence of symptoms with possible GI bleed but apparently a decision was made at the time with the patient seen by her GI above in the office, but with assessment in the office with melena with burgundy BM on ASA.  Patient denies dizziness or LOC.  Denies weakness at present.  NO chest pain/pressure.  No palpitations.  NO tearing back pain.  No abdominal pain, no red blood per rectum.  No hematuria, no vaginal bleeding.  No hemoptysis.  No fever, no chills, no rigors.    Patient is UNVACCINATED against COVID-19 due to vaccine hesitancy. (29 Jun 2022 23:30)    no chest pain, sob, palps      PAST MEDICAL & SURGICAL HISTORY:  Hypertension      GERD (gastroesophageal reflux disease)      Diverticulitis      Osteoarthritis      Macular degeneration      Hypothyroid      Stage 2 chronic kidney disease      Female bladder prolapse      H/O mitral valve prolapse      Hiatal hernia with GERD      Fe deficiency anemia      Tracheal nodule      Arteriosclerotic heart disease (ASHD)      Gastrointestinal bleed      COVID-19 vaccination declined      H/O thyroidectomy      Closed right hip fracture      H/O ovarian cystectomy            PREVIOUS DIAGNOSTIC TESTING:    [ ] Echocardiogram:  [ ]  Catheterization:  [ ] Stress Test:  	    MEDICATIONS:    Home Medications:  ALPRAZolam 1 mg oral tablet: 1 tab(s) orally once a day (at bedtime)  NOTE: pt uses to sleep (29 Jun 2022 23:42)  amitriptyline 25 mg oral tablet: 1 tab(s) orally once a day (at bedtime) (29 Jun 2022 23:42)  aspirin 81 mg oral delayed release tablet: 1 tab(s) orally once a day (in the evening) (29 Jun 2022 23:42)  Caltrate 600 + D oral tablet: 1 tab(s) orally 2 times a day (29 Jun 2022 23:42)  hydrALAZINE 50 mg oral tablet: 1 tab(s) orally 2 times a day (lunch, dinner) (29 Jun 2022 23:42)  lidocaine 5% patch: Apply 3 patches topically to affected area once a day for 12 hours, then remove  NOTE: apply to spine, right knee and back of right leg (29 Jun 2022 23:42)  lovastatin 20 mg oral tablet: 1 tab(s) orally once a day (in the evening) - with dinner (29 Jun 2022 23:42)  magnesium oxide 500 mg oral tablet: 1 to 2 tab(s) orally once a day (at bedtime)  NOTE: pt takes as laxative  (29 Jun 2022 23:42)  Multiple Vitamins oral tablet: 1 tab(s) orally once a day (lunch) (29 Jun 2022 23:42)  olmesartan 40 mg oral tablet: 1 tab(s) orally once a day (at bedtime) (29 Jun 2022 23:42)  omeprazole 40 mg oral delayed release capsule: 1 cap(s) orally once a day (29 Jun 2022 23:42)  Synthroid 75 mcg (0.075 mg) oral tablet: 1 tab(s) orally once a day (Mon - Sat) (29 Jun 2022 23:42)  Systane ophthalmic solution: 1 drop(s) to each affected eye , As Needed (29 Jun 2022 23:42)  Vitamin C 500 mg oral tablet: 1 tab(s) orally 2 times a day (29 Jun 2022 23:42)  Wellbutrin  mg/12 hours oral tablet, extended release: 1 tab(s) orally 2 times a day (29 Jun 2022 23:42)      MEDICATIONS  (STANDING):  amitriptyline 25 milliGRAM(s) Oral at bedtime  atorvastatin 10 milliGRAM(s) Oral at bedtime  buPROPion SR (12-Hour) 100 milliGRAM(s) Oral two times a day  carvedilol 25 milliGRAM(s) Oral every 12 hours  diltiazem    milliGRAM(s) Oral daily  hydrALAZINE 50 milliGRAM(s) Oral two times a day  levothyroxine 75 MICROGram(s) Oral daily  losartan 100 milliGRAM(s) Oral daily  pantoprazole Infusion 8 mG/Hr (10 mL/Hr) IV Continuous <Continuous>      FAMILY HISTORY:  FH: HTN (hypertension) (Father, Mother)        SOCIAL HISTORY:    [ ] Non-smoker  [ ] Smoker  [ ] Alcohol    Allergies    amoxicillin (Other)  hydrocortisone (Unknown)  NSAIDs (Rash)  penicillin (Other)    Intolerances    	    REVIEW OF SYSTEMS:  CONSTITUTIONAL: No fever, weight loss, or fatigue  EYES: No eye pain, visual disturbances, or discharge  ENMT:  No difficulty hearing, tinnitus, vertigo; No sinus or throat pain  NECK: No pain or stiffness  RESPIRATORY: No cough, wheezing, chills or hemoptysis; No Shortness of Breath  CARDIOVASCULAR: as HPI  GASTROINTESTINAL: No abdominal or epigastric pain. No nausea, vomiting, or hematemesis; No diarrhea or constipation. ++ melena or hematochezia.  GENITOURINARY: No dysuria, frequency, hematuria, or incontinence  NEUROLOGICAL: No headaches, memory loss, loss of strength, numbness, or tremors  SKIN: No itching, burning, rashes, or lesions   	  [ ] All others negative	  [ ] Unable to obtain    PHYSICAL EXAM:    T(C): 37 (06-30-22 @ 08:58), Max: 37.1 (06-29-22 @ 22:12)  HR: 73 (06-30-22 @ 08:58) (56 - 80)  BP: 123/69 (06-30-22 @ 08:58) (116/64 - 156/80)  RR: 18 (06-30-22 @ 08:58) (16 - 20)  SpO2: 97% (06-30-22 @ 08:58) (94% - 100%)  Wt(kg): --  I&O's Summary    Daily Height in cm: 149.86 (29 Jun 2022 15:41)    Daily     Appearance: Normal	  Psychiatry: A & O x 3, Mood & affect appropriate  HEENT:   Normal oral mucosa, PERRL, EOMI	  Lymphatic: No lymphadenopathy  Cardiovascular: Normal S1 S2,RRR, No JVD, No murmurs  Respiratory: Lungs clear to auscultation	  Gastrointestinal:  Soft, Non-tender, + BS	  Skin: No rashes, No ecchymoses, No cyanosis	  Neurologic: Non-focal  Extremities: Normal range of motion, No clubbing, cyanosis or edema  Vascular: Peripheral pulses palpable 2+ bilaterally    TELEMETRY: 	    ECG:  	no acute ischemic abnl   RADIOLOGY:  OTHER: 	  	  LABS:	 	    CARDIAC MARKERS:        proBNP:     Lipid Profile:   HgA1c:   TSH: Thyroid Stimulating Hormone, Serum: 4.84 uIU/mL (06-30-22 @ 05:31)                            8.5    7.89  )-----------( 250      ( 30 Jun 2022 05:31 )             25.9     06-30    139  |  104  |  15  ----------------------------<  104<H>  3.7   |  26  |  0.97    Ca    9.0      30 Jun 2022 05:31    TPro  6.3  /  Alb  3.6  /  TBili  0.3  /  DBili  x   /  AST  17  /  ALT  13  /  AlkPhos  73  06-29    PT/INR - ( 29 Jun 2022 17:20 )   PT: 13.3 sec;   INR: 1.14 ratio         PTT - ( 29 Jun 2022 17:20 )  PTT:30.1 sec    Creatinine, Serum: 0.97 mg/dL (06-30-22 @ 05:31)  Creatinine, Serum: 0.88 mg/dL (06-29-22 @ 17:20)  Creatinine, Serum: 0.87 mg/dL (06-28-22 @ 05:21)        ASSESSMENT/PLAN: 	              
HPI:   Patient is a 81y female with a past history of goiter resection, CAD, GERD,MAT, and hypothyroidism, admitted 6/29 with melena, s/p EGD on 7/1 with findings including hiatal hernia, camerons ulcerations, and agiodysplastic lesion with bleeding requiring clip, who in context of recovery has noted a rt forearm phlebitis that has been bothering her.  She is without fever or chills.She noted an uncomfortable IV , removed a few days ago. The area remains tender and swollen,. There is no purulence visible.    REVIEW OF SYSTEMS:  All other review of systems negative (Comprehensive ROS)    PAST MEDICAL & SURGICAL HISTORY:  Hypertension      GERD (gastroesophageal reflux disease)      Diverticulitis      Osteoarthritis      Macular degeneration      Hypothyroid      Stage 2 chronic kidney disease      Female bladder prolapse      H/O mitral valve prolapse      Hiatal hernia with GERD      Fe deficiency anemia      Tracheal nodule      Arteriosclerotic heart disease (ASHD)      Gastrointestinal bleed      COVID-19 vaccination declined      H/O thyroidectomy      Closed right hip fracture      H/O ovarian cystectomy          Allergies    amoxicillin (Other)  hydrocortisone (Unknown)  NSAIDs (Rash)  penicillin (Other)    Intolerances        Antimicrobials Day #  :    Other Medications:  ALPRAZolam 1 milliGRAM(s) Oral at bedtime PRN  amitriptyline 25 milliGRAM(s) Oral at bedtime  ascorbic acid 500 milliGRAM(s) Oral two times a day  atorvastatin 10 milliGRAM(s) Oral at bedtime  benzocaine 15 mG/menthol 3.6 mG Lozenge 1 Lozenge Oral every 3 hours PRN  buPROPion SR (12-Hour) 100 milliGRAM(s) Oral two times a day  carvedilol 25 milliGRAM(s) Oral every 12 hours  chlorhexidine 2% Cloths 1 Application(s) Topical daily  levothyroxine 75 MICROGram(s) Oral <User Schedule>  levothyroxine 112.5 MICROGram(s) Oral <User Schedule>  lidocaine   4% Patch 1 Patch Transdermal daily  lidocaine   4% Patch 1 Patch Transdermal daily  pantoprazole    Tablet 40 milliGRAM(s) Oral two times a day  CRYSTAL LAXATIVE CAPLETS 2 Tablet(s) 2 Tablet(s) Oral at bedtime  polyethylene glycol 3350 17 Gram(s) Oral daily PRN  sucralfate suspension 1 Gram(s) Oral every 6 hours      FAMILY HISTORY:  FH: HTN (hypertension) (Father, Mother)        SOCIAL HISTORY:  Smoking:  x   ETOH:    x Drug Use: x       T(F): 98.4 (07-06-22 @ 11:07), Max: 98.8 (07-06-22 @ 04:58)  HR: 67 (07-06-22 @ 11:07)  BP: 100/65 (07-06-22 @ 11:07)  RR: 18 (07-06-22 @ 11:07)  SpO2: 97% (07-06-22 @ 11:07)  Wt(kg): --    PHYSICAL EXAM:  General: alert, no acute distress  Eyes:  anicteric, no conjunctival injection, no discharge  Oropharynx: no lesions or injection 	  Neck: supple, without adenopathy  Lungs: clear to auscultation  Heart: regular rate and rhythm; no murmur, rubs or gallops  Abdomen: soft, nondistended, nontender, without mass or organomegaly  Skin: no rash  Extremities: no clubbing, cyanosis, or edema  Neurologic: alert, oriented, moves all extremities  Rt foerarm with a tender area of swelling, no palpable cord, no fluctuance, minimal erythema  LAB RESULTS:                        8.0    8.69  )-----------( 278      ( 06 Jul 2022 07:34 )             25.2                 MICROBIOLOGY:  RECENT CULTURES:        RADIOLOGY REVIEWED:  < from: Xray Chest 1 View- PORTABLE-Urgent (Xray Chest 1 View- PORTABLE-Urgent .) (06.28.22 @ 06:07) >  IMPRESSION:  No focal consolidation.    --- End of Report ---    < end of copied text >  
[facility]  Roc Ziegler PA-C  [facility] [place] [address]  [phone]      Chief Complaint:  Patient is a 81y old  Female who presents with a chief complaint of Second ER evaluation, sent in by GI physician for melena with burgundy BM on exam (30 Jun 2022 12:11)      HPI: 82 y/o F--patient seen with adult daughter and adult son in attendance--followed by her office physicians above--patient with a history of retrosternal goiter resection in 9/2003 with postsurgical hypothyroidism and 4 mm microfocus of papillary thyroid CA with annual thyroid sonograms (followed by Dr. Hope above at Endocrinology), apparent prediabetes, presumed GERD, hiatal hernia, anxiety disorder on longstanding Xanax, essential HTN on multiple BP medications, arteriosclerosis, history of MAT, with apparently an evaluation this past Saturday at [facility] with an undifferentiated syncopal episode with generalized fatigue and dizziness, with patient apparently seen 6/28/22 with recurrence of symptoms with possible GI bleed but apparently a decision was made at the time with the patient seen by her GI above in the office, but with assessment in the office with melena with burgundy BM on ASA.  Patient denies dizziness or LOC.  Denies weakness at present.  NO chest pain/pressure.  No palpitations.  NO tearing back pain.  No abdominal pain, no red blood per rectum.  No hematuria, no vaginal bleeding.  No hemoptysis.  No fever, no chills, no rigors.    Patient is UNVACCINATED against COVID-19 due to vaccine hesitancy.    Allergies:  amoxicillin (Other)  hydrocortisone (Unknown)  NSAIDs (Rash)  penicillin (Other)      Medications:  ALPRAZolam 1 milliGRAM(s) Oral at bedtime PRN  amitriptyline 25 milliGRAM(s) Oral at bedtime  atorvastatin 10 milliGRAM(s) Oral at bedtime  buPROPion SR (12-Hour) 100 milliGRAM(s) Oral two times a day  carvedilol 25 milliGRAM(s) Oral every 12 hours  diltiazem    milliGRAM(s) Oral daily  hydrALAZINE 50 milliGRAM(s) Oral two times a day  levothyroxine 75 MICROGram(s) Oral daily  losartan 100 milliGRAM(s) Oral daily  pantoprazole Infusion 8 mG/Hr IV Continuous <Continuous>      PMHX/PSHX:  Hypertension    GERD (gastroesophageal reflux disease)    Diverticulitis    Osteoarthritis    Macular degeneration    Hypothyroid    Stage 2 chronic kidney disease    Female bladder prolapse    H/O mitral valve prolapse    Hiatal hernia with GERD    Fe deficiency anemia    Tracheal nodule    Arteriosclerotic heart disease (ASHD)    Gastrointestinal bleed    COVID-19 vaccination declined    No significant past surgical history    H/O thyroidectomy    Closed right hip fracture    H/O ovarian cystectomy        Family history:  No pertinent family history in first degree relatives    No pertinent family history in first degree relatives    No pertinent family history in first degree relatives    FH: HTN (hypertension) (Father, Mother)        Social History:     ROS:     General:  No wt loss, fevers, chills, night sweats, fatigue,   Eyes:  Good vision, no reported pain  ENT:  No sore throat, pain, runny nose, dysphagia, +dizzy  CV:  No pain, palpitations, hypo/hypertension  Resp:  No dyspnea, cough, tachypnea, wheezing  GI:  No pain, No nausea, No vomiting, No diarrhea, No constipation, No weight loss, No fever, No pruritis, No rectal bleeding, + tarry stools, No dysphagia,  :  No pain, bleeding, incontinence, nocturia  Muscle:  No pain, weakness  Neuro:  No weakness, tingling, memory problems  Psych:  No fatigue, insomnia, mood problems, depression  Endocrine:  No polyuria, polydipsia, cold/heat intolerance  Heme:  No petechiae, ecchymosis, easy bruisability  Skin:  No rash, tattoos, scars, edema      PHYSICAL EXAM:   Vital Signs:  Vital Signs Last 24 Hrs  T(C): 36.3 (30 Jun 2022 12:30), Max: 37.1 (29 Jun 2022 22:12)  T(F): 97.4 (30 Jun 2022 12:30), Max: 98.8 (29 Jun 2022 22:12)  HR: 87 (30 Jun 2022 12:30) (56 - 87)  BP: 128/71 (30 Jun 2022 12:30) (116/64 - 156/80)  BP(mean): 97 (30 Jun 2022 02:13) (97 - 97)  RR: 18 (30 Jun 2022 12:30) (16 - 20)  SpO2: 99% (30 Jun 2022 12:30) (94% - 100%)  Daily Height in cm: 149.86 (29 Jun 2022 15:41)    Daily     GENERAL:  Appears stated age,   HEENT:  NC/AT,    CHEST:  Full & symmetric excursion,   HEART:  Regular rhythm  ABDOMEN:  Soft, non-tender, non-distended,   EXTEREMITIES:  no cyanosis,clubbing or edema  SKIN:  No rash  NEURO:  Alert,    LABS:                        8.5    7.89  )-----------( 250      ( 30 Jun 2022 05:31 )             25.9     06-30    139  |  104  |  15  ----------------------------<  104<H>  3.7   |  26  |  0.97    Ca    9.0      30 Jun 2022 05:31    TPro  6.3  /  Alb  3.6  /  TBili  0.3  /  DBili  x   /  AST  17  /  ALT  13  /  AlkPhos  73  06-29    LIVER FUNCTIONS - ( 29 Jun 2022 17:20 )  Alb: 3.6 g/dL / Pro: 6.3 g/dL / ALK PHOS: 73 U/L / ALT: 13 U/L / AST: 17 U/L / GGT: x           PT/INR - ( 29 Jun 2022 17:20 )   PT: 13.3 sec;   INR: 1.14 ratio         PTT - ( 29 Jun 2022 17:20 )  PTT:30.1 sec        Imaging:          
CC: Oral pain    HPI: 82 y/o F--patient seen with adult daughter and adult son in attendance--followed by her office physicians above--patient with a history of retrosternal goiter resection in 9/2003 with postsurgical hypothyroidism and 4 mm microfocus of papillary thyroid CA with annual thyroid sonograms (followed by Dr. Herminia paredes at Endocrinology), apparent prediabetes, presumed GERD, hiatal hernia, anxiety disorder on longstanding Xanax, essential HTN on multiple BP medications, arteriosclerosis, history of MAT, with apparently an evaluation this past Saturday at [facility] with an undifferentiated syncopal episode with generalized fatigue and dizziness, with patient apparently seen 6/28/22 with recurrence of symptoms with possible GI bleed but apparently a decision was made at the time with the patient seen by her GI above in the office, but with assessment in the office with melena with mann BM on ASA. ENT was called to see pt for sore throat. Pt reports having soreness on the left side of her mouth yesterday, but improved today. Pt states she has been having that pain since she was a child relieved with Vit C. Pt is S/P EGD on 7/1. Pt denies dysphagia, sore throat, cough, SOB.        PAST MEDICAL & SURGICAL HISTORY:  Hypertension      GERD (gastroesophageal reflux disease)      Diverticulitis      Osteoarthritis      Macular degeneration      Hypothyroid      Stage 2 chronic kidney disease      Female bladder prolapse      H/O mitral valve prolapse      Hiatal hernia with GERD      Fe deficiency anemia      Tracheal nodule      Arteriosclerotic heart disease (ASHD)      Gastrointestinal bleed      COVID-19 vaccination declined      H/O thyroidectomy      Closed right hip fracture      H/O ovarian cystectomy        Allergies    amoxicillin (Other)  hydrocortisone (Unknown)  NSAIDs (Rash)  penicillin (Other)    Intolerances      MEDICATIONS  (STANDING):  amitriptyline 25 milliGRAM(s) Oral at bedtime  ascorbic acid 500 milliGRAM(s) Oral two times a day  atorvastatin 10 milliGRAM(s) Oral at bedtime  buPROPion SR (12-Hour) 100 milliGRAM(s) Oral two times a day  carvedilol 25 milliGRAM(s) Oral every 12 hours  chlorhexidine 2% Cloths 1 Application(s) Topical daily  iron sucrose IVPB 100 milliGRAM(s) IV Intermittent every 24 hours  levothyroxine 75 MICROGram(s) Oral <User Schedule>  levothyroxine 112.5 MICROGram(s) Oral <User Schedule>  pantoprazole    Tablet 40 milliGRAM(s) Oral before breakfast  sucralfate suspension 1 Gram(s) Oral two times a day    MEDICATIONS  (PRN):  ALPRAZolam 1 milliGRAM(s) Oral at bedtime PRN SLEEP  benzocaine 15 mG/menthol 3.6 mG Lozenge 1 Lozenge Oral every 3 hours PRN Sore Throat  polyethylene glycol 3350 17 Gram(s) Oral daily PRN Constipation      Social History: No tobacco use    Family history: no pertinent FHx    ROS:   ENT: all negative except as noted in HPI   CV: denies palpitations  Pulm: denies SOB, cough, hemoptysis  GI: denies change in apetite, indigestion, n/v  : denies pertinent urinary symptoms, urgency  Neuro: denies numbness/tingling, loss of sensation  Psych: denies anxiety  MS: denies muscle weakness, instability  Heme: denies easy bruising or bleeding  Endo: denies heat/cold intolerance, excessive sweating  Vascular: denies LE edema    Vital Signs Last 24 Hrs  T(C): 37.1 (03 Jul 2022 11:33), Max: 37.1 (03 Jul 2022 11:33)  T(F): 98.8 (03 Jul 2022 11:33), Max: 98.8 (03 Jul 2022 11:33)  HR: 68 (03 Jul 2022 11:33) (67 - 72)  BP: 130/79 (03 Jul 2022 11:33) (106/54 - 130/79)  BP(mean): --  RR: 18 (03 Jul 2022 11:33) (17 - 18)  SpO2: 98% (03 Jul 2022 11:33) (94% - 98%)                          7.7    5.77  )-----------( 249      ( 03 Jul 2022 06:47 )             23.7    07-02    136  |  102  |  26<H>  ----------------------------<  107<H>  3.5   |  22  |  1.24    Ca    8.6      02 Jul 2022 07:06         PHYSICAL EXAM:  Gen: NAD  Skin: No rashes, bruises, or lesions  Head: Normocephalic, Atraumatic  Face: no edema, erythema, or fluctuance. Parotid glands soft without mass  Eyes: no scleral injection  Nose: Nares bilaterally patent, no discharge  Mouth: No Stridor / Drooling / Trismus.  Mucosa moist, tongue/uvula midline, oropharynx clear  Neck: NTTP, Flat, supple, no lymphadenopathy, trachea midline, no masses  Lymphatic: No lymphadenopathy  Resp: breathing easily, no stridor  CV: no peripheral edema/cyanosis  GI: nondistended   Peripheral vascular: no JVD or edema  Neuro: facial nerve intact, no facial droop

## 2022-07-06 NOTE — PHYSICAL THERAPY INITIAL EVALUATION ADULT - PERTINENT HX OF CURRENT PROBLEM, REHAB EVAL
81 year old female with  htn, hyperlipidemia, thyroid ca, MAT, Diverticulitis, Hiatial hernia, Gerd, Osteoarthritis, anxiety presenting with syncope, dark stools, gi bleed; s/p EGD on 7/1 with findings including hiatal hernia, camerons ulcerations, and agiodysplastic lesion with bleeding requiring clip, who in context of recovery has noted a rt forearm phlebitis that has been bothering her.

## 2022-07-06 NOTE — PROVIDER CONTACT NOTE (OTHER) - SITUATION
pt daughter states that she believes her mom just hallucinated?
/65, HR 68
patient right forearm noted to have swelling, hot, and painful to touch

## 2022-07-06 NOTE — PROVIDER CONTACT NOTE (OTHER) - RECOMMENDATIONS
notify provider, pt was just woken up, forgetful at times
clarify with provider whether to give all AM meds
warm packs given as per order, provider made aware

## 2022-07-06 NOTE — CONSULT NOTE ADULT - REASON FOR ADMISSION
Second ER evaluation, sent in by GI physician for melena with burgundy BM on exam

## 2022-07-06 NOTE — PROVIDER CONTACT NOTE (OTHER) - ASSESSMENT
PT AOx3, VSS. Denies cp, SOB, palpitations, lightheadedness, or discomfort. Pt /65, HR 68, due for losartan, hydralazine, cardizem, and coreg.
Pt A&O X4, c/o of pain in the right forearm, swelling, and warmth. Noted to have swelling, skin red and warm to touch.
RN assessed pt, AOx3, forgetful, VSS. q4 neuro checks WDL.

## 2022-07-06 NOTE — PROVIDER CONTACT NOTE (OTHER) - ACTION/TREATMENT ORDERED:
provider made aware, continue to monitor pt, pt was just woken up
provider made aware, ok to give coreg and synthroid, reschedule losartan and cardizem for 12pm, recheck BP at 6:30AM and call back with BP
will f/u with ID, no new orders at this time

## 2022-07-06 NOTE — CONSULT NOTE ADULT - ASSESSMENT
Patient is a 81y female with a past history of goiter resection, CAD, GERD,MAT, and hypothyroidism, admitted 6/29 with melena, s/p EGD on 7/1 with findings including hiatal hernia, camerons ulcerations, and agiodysplastic lesion with bleeding requiring clip, who in context of recovery has noted a rt forearm phlebitis that has been bothering her.  She is without fever or chills.She noted an uncomfortable IV , removed a few days ago. The area remains tender and swollen,. There is no purulence visible.  I think this is likely a chemical phlebitis which should get better with warm compresses and time.No purulent drainage.  Suggest:  1. Monitor off antibiotics  2.Warm compresses prn  3.If no improvement will have a low threshold for addition of po doxycycline x 5 days
80 yo female admitted for GIB and anemia C/O of left sided oral pain. Pt is S/P EGD on 7/1. Exam was unremarkable . Pt is tolerating a regular diet.
A/P  81 year old female with  htn, hyperlipidemia, thyroid ca, MAT, Diverticulitis, Hiatial hernia, Gerd, Osteoarthritis, anxiety presenting with syncope, dark stools, gi bleed    #GI bleed  -await EGD  -hd stable, heme trending lower, s/p ivf  -ppi  -hold asa  -remains cv stable to proceed with acceptable cardiac risk for EGD  -hx of paroxysmal PAT with anesthesia  -iv bb as needed per asnesthesia     #syncope   -secondary to volume status, gi bleed  -check orthostatics, echo   -tele   -no ACS    #hx of Multifocal atrial tachycardia.   -continue bb, no indication for a/c  -asa on hold     #hypertension.   -cont current tx      DVT ppx    d/w dtr at bedside      ACP- Advanced Care Planning  -Advanced care planning discussed with patient. Advanced care planning forms discussed with patient and/or family.  Risks, benefits, and alternatives of medical/cardiac procedures were discussed in detail with all questions answered.  30 minutes were spent addressing advance care planning.        70 minutes spent on total encounter; more than 50% of the visit was spent counseling and/or coordinating care by the attending physician.  
acute blood loss anemia  Melena   UGIB    pt with melena   CBC daily  PPI drip   CLD  plan for upper gastrointestinal endoscopy in am with Dr. Villegas   cardiac clearance appreciated   NPO after MN  dw patient and daughter at bedside     Advanced care planning was discussed with patient and family.  Advanced care planning forms were reviewed and discussed.  Risks, benefits and alternatives of gastroenterologic procedures were discussed in detail and all questions were answered.    30 minutes spent.

## 2022-07-06 NOTE — PROGRESS NOTE ADULT - ASSESSMENT
melena  hiatal hernia  pud  camerons ulcers    plan  ppi bid  carafate qH6 hours  may need hiatal hernia repair, surgery to consult   f/u bx  H/H overall stable   no need for inpatient capsule, follow up with primary GI as an outpatient   bowel regimen some miralax  dw patient and daughter in detail     Advanced care planning was discussed with patient and family.  Advanced care planning forms were reviewed and discussed.  Risks, benefits and alternatives of gastroenterologic procedures were discussed in detail and all questions were answered.    30 minutes spent.

## 2022-07-06 NOTE — PHYSICAL THERAPY INITIAL EVALUATION ADULT - ADDITIONAL COMMENTS
CT head: No acute intracranial hemorrhage, vasogenic edema, extra-axial collection   or calvarial fracture. Stable exam.  CXR: Surgical staples are noted overlying the lower neck and upper chest. Heart size is within normal limits. Retrocardiac density may represent a hiatal hernia. The lungs are otherwise clear.No pleural effusion or pneumothorax. No acute osseous abnormalities.

## 2022-07-06 NOTE — CHART NOTE - NSCHARTNOTEFT_GEN_A_CORE
Surgery consulted on 7/6/22 by primary team regarding surgical interventional evaluation for patient's chronic hiatal hernia. Patient has known hiatal hernia and has been admitted for GIB of which EGD was performed and duondenal clip placed.   Surgery consultant at bedside and had discussion with patient and family member regarding surgical evaluation. Patient and family wish to reject the inpatient surgical consult and would like to follow up on their own as outpatient with their own physicians regarding surgical intervention for the chronic hiatal hernia.     Patient is clinically stable with regards to the chronic hiatal hernia.   We are happy to re-evaluate the patient for intervention of hiatal hernia as needed by primary team.   Please feel free to reach out with any questions or concerns.     Discussed with ACP of primary team    La Martinez MD PGY-2  Consult Surgery Resident  [id number]
pt reports worsening pain and swelling in right arm in pm   Will start Doxycycline 100mg BID for 5 days as recommended by ID   Dr. Moore aware     Tasha neves NP-C  #[id number]
seen and evaluated for sore throat , Stated she have sore throat chronically but today its worst   Pt s/p EGD yesterday , no c/o fever or chills , no redness, swelling , lesions  or exudates noted ,  offered lozenges   Monitor for fevers , Pt is non vaccinated for covid .  BP  soft , no c/o  dizziness  will hold Losartan , Hydralazine  and Cardizem .  c/w Tele monitoring , f/u CBC in AM .  DR Moore aware of above   Shinkrystal Haywood NP-C [id number]

## 2022-07-07 ENCOUNTER — TRANSCRIPTION ENCOUNTER (OUTPATIENT)
Age: 82
End: 2022-07-07

## 2022-07-07 VITALS
SYSTOLIC BLOOD PRESSURE: 136 MMHG | OXYGEN SATURATION: 94 % | TEMPERATURE: 98 F | RESPIRATION RATE: 18 BRPM | DIASTOLIC BLOOD PRESSURE: 87 MMHG | HEART RATE: 58 BPM

## 2022-07-07 LAB
HCT VFR BLD CALC: 26.9 % — LOW (ref 34.5–45)
HGB BLD-MCNC: 8.5 G/DL — LOW (ref 11.5–15.5)
MCHC RBC-ENTMCNC: 31.1 PG — SIGNIFICANT CHANGE UP (ref 27–34)
MCHC RBC-ENTMCNC: 31.6 GM/DL — LOW (ref 32–36)
MCV RBC AUTO: 98.5 FL — SIGNIFICANT CHANGE UP (ref 80–100)
NRBC # BLD: 0 /100 WBCS — SIGNIFICANT CHANGE UP (ref 0–0)
PLATELET # BLD AUTO: 280 K/UL — SIGNIFICANT CHANGE UP (ref 150–400)
RBC # BLD: 2.73 M/UL — LOW (ref 3.8–5.2)
RBC # FLD: 15.7 % — HIGH (ref 10.3–14.5)
WBC # BLD: 6.93 K/UL — SIGNIFICANT CHANGE UP (ref 3.8–10.5)
WBC # FLD AUTO: 6.93 K/UL — SIGNIFICANT CHANGE UP (ref 3.8–10.5)

## 2022-07-07 PROCEDURE — 84165 PROTEIN E-PHORESIS SERUM: CPT

## 2022-07-07 PROCEDURE — 88305 TISSUE EXAM BY PATHOLOGIST: CPT

## 2022-07-07 PROCEDURE — 83036 HEMOGLOBIN GLYCOSYLATED A1C: CPT

## 2022-07-07 PROCEDURE — 85610 PROTHROMBIN TIME: CPT

## 2022-07-07 PROCEDURE — 87640 STAPH A DNA AMP PROBE: CPT

## 2022-07-07 PROCEDURE — 86850 RBC ANTIBODY SCREEN: CPT

## 2022-07-07 PROCEDURE — 85025 COMPLETE CBC W/AUTO DIFF WBC: CPT

## 2022-07-07 PROCEDURE — 93306 TTE W/DOPPLER COMPLETE: CPT

## 2022-07-07 PROCEDURE — 86900 BLOOD TYPING SEROLOGIC ABO: CPT

## 2022-07-07 PROCEDURE — 86901 BLOOD TYPING SEROLOGIC RH(D): CPT

## 2022-07-07 PROCEDURE — U0003: CPT

## 2022-07-07 PROCEDURE — 84166 PROTEIN E-PHORESIS/URINE/CSF: CPT

## 2022-07-07 PROCEDURE — 99285 EMERGENCY DEPT VISIT HI MDM: CPT | Mod: 25

## 2022-07-07 PROCEDURE — 80053 COMPREHEN METABOLIC PANEL: CPT

## 2022-07-07 PROCEDURE — 85730 THROMBOPLASTIN TIME PARTIAL: CPT

## 2022-07-07 PROCEDURE — 85027 COMPLETE CBC AUTOMATED: CPT

## 2022-07-07 PROCEDURE — 97161 PT EVAL LOW COMPLEX 20 MIN: CPT

## 2022-07-07 PROCEDURE — 85045 AUTOMATED RETICULOCYTE COUNT: CPT

## 2022-07-07 PROCEDURE — 87637 SARSCOV2&INF A&B&RSV AMP PRB: CPT

## 2022-07-07 PROCEDURE — 84443 ASSAY THYROID STIM HORMONE: CPT

## 2022-07-07 PROCEDURE — U0005: CPT

## 2022-07-07 PROCEDURE — 36415 COLL VENOUS BLD VENIPUNCTURE: CPT

## 2022-07-07 PROCEDURE — 87641 MR-STAPH DNA AMP PROBE: CPT

## 2022-07-07 PROCEDURE — 83615 LACTATE (LD) (LDH) ENZYME: CPT

## 2022-07-07 PROCEDURE — C1889: CPT

## 2022-07-07 PROCEDURE — 84484 ASSAY OF TROPONIN QUANT: CPT

## 2022-07-07 PROCEDURE — 80048 BASIC METABOLIC PNL TOTAL CA: CPT

## 2022-07-07 PROCEDURE — 83010 ASSAY OF HAPTOGLOBIN QUANT: CPT

## 2022-07-07 RX ORDER — OMEPRAZOLE 10 MG/1
1 CAPSULE, DELAYED RELEASE ORAL
Qty: 0 | Refills: 0 | DISCHARGE

## 2022-07-07 RX ORDER — HYDRALAZINE HCL 50 MG
1 TABLET ORAL
Qty: 0 | Refills: 0 | DISCHARGE

## 2022-07-07 RX ORDER — LEVOTHYROXINE SODIUM 125 MCG
1 TABLET ORAL
Qty: 0 | Refills: 0 | DISCHARGE

## 2022-07-07 RX ORDER — SUCRALFATE 1 G
10 TABLET ORAL
Qty: 1200 | Refills: 0
Start: 2022-07-07 | End: 2022-08-05

## 2022-07-07 RX ORDER — PANTOPRAZOLE SODIUM 20 MG/1
1 TABLET, DELAYED RELEASE ORAL
Qty: 60 | Refills: 0
Start: 2022-07-07 | End: 2022-08-05

## 2022-07-07 RX ORDER — ASPIRIN/CALCIUM CARB/MAGNESIUM 324 MG
1 TABLET ORAL
Qty: 0 | Refills: 0 | DISCHARGE

## 2022-07-07 RX ORDER — OLMESARTAN MEDOXOMIL 5 MG/1
1 TABLET, FILM COATED ORAL
Qty: 0 | Refills: 0 | DISCHARGE

## 2022-07-07 RX ORDER — DILTIAZEM HCL 120 MG
1 CAPSULE, EXT RELEASE 24 HR ORAL
Qty: 30 | Refills: 0

## 2022-07-07 RX ADMIN — Medication 500 MILLIGRAM(S): at 09:04

## 2022-07-07 RX ADMIN — Medication 1 GRAM(S): at 11:44

## 2022-07-07 RX ADMIN — PANTOPRAZOLE SODIUM 40 MILLIGRAM(S): 20 TABLET, DELAYED RELEASE ORAL at 08:00

## 2022-07-07 RX ADMIN — Medication 1 MILLIGRAM(S): at 00:51

## 2022-07-07 RX ADMIN — CHLORHEXIDINE GLUCONATE 1 APPLICATION(S): 213 SOLUTION TOPICAL at 11:04

## 2022-07-07 RX ADMIN — Medication 1 GRAM(S): at 00:51

## 2022-07-07 RX ADMIN — BUPROPION HYDROCHLORIDE 100 MILLIGRAM(S): 150 TABLET, EXTENDED RELEASE ORAL at 09:05

## 2022-07-07 RX ADMIN — CARVEDILOL PHOSPHATE 25 MILLIGRAM(S): 80 CAPSULE, EXTENDED RELEASE ORAL at 09:04

## 2022-07-07 RX ADMIN — Medication 25 MILLIGRAM(S): at 00:51

## 2022-07-07 RX ADMIN — Medication 100 MILLIGRAM(S): at 09:04

## 2022-07-07 RX ADMIN — Medication 1 GRAM(S): at 09:03

## 2022-07-07 RX ADMIN — Medication 75 MICROGRAM(S): at 06:25

## 2022-07-07 NOTE — PROGRESS NOTE ADULT - TIME BILLING
Agree with above NP note.  cv stable  s/p egd  results noted  cont to trend heme  ppi  off asa
agree with above  cv stable  d/c planning  continue bb, no indication for a/c  asa on hold   remainder of bp meds on hold       DVT ppx
agree with above  cv stable  trend heme-   continue bb, no indication for a/c  asa on hold         DVT ppx
agree with above  cv stable  trend heme- down trending- GI fu ? may need capsule per gi   continue bb, no indication for a/c  asa on hold         DVT ppx

## 2022-07-07 NOTE — PROGRESS NOTE ADULT - REASON FOR ADMISSION
Second ER evaluation, sent in by GI physician for melena with burgundy BM on exam

## 2022-07-07 NOTE — DISCHARGE NOTE NURSING/CASE MANAGEMENT/SOCIAL WORK - PATIENT PORTAL LINK FT
You can access the FollowMyHealth Patient Portal offered by [facility] by registering at the following website: [email or web address]. By joining [facility]’s FollowMyHealth portal, you will also be able to view your health information using other applications (apps) compatible with our system.

## 2022-07-07 NOTE — PROGRESS NOTE ADULT - ASSESSMENT
Patient is a 81y female with a past history of goiter resection, CAD, GERD,MAT, and hypothyroidism, admitted 6/29 with melena, s/p EGD on 7/1 with findings including hiatal hernia, camerons ulcerations, and agiodysplastic lesion with bleeding requiring clip, who in context of recovery has noted a rt forearm phlebitis that has been bothering her.  She is without fever or chills.She noted an uncomfortable IV , removed a few days ago. The area remains tender and swollen,. There is no purulence visible.  pt was diagnosed with chemical phlebitis for old IV   VS reviewed she is afebrile and wbc wnl     Suggest:  reviewed orders medicine started patient on doxycycline 100 mg po bid  x 5 days  continue supportive care per medicine team

## 2022-07-07 NOTE — PROGRESS NOTE ADULT - SUBJECTIVE AND OBJECTIVE BOX
CARDIOLOGY FOLLOW UP - Dr. Calle  DATE OF SERVICE: 7/5/22     CC no cp or sob       REVIEW OF SYSTEMS:  CONSTITUTIONAL: No fever, weight loss, or fatigue  RESPIRATORY: No cough, wheezing, chills or hemoptysis; No Shortness of Breath  CARDIOVASCULAR: No chest pain, palpitations, passing out, dizziness, or leg swelling  GASTROINTESTINAL: No abdominal or epigastric pain. No nausea, vomiting, or hematemesis; No diarrhea or constipation. No melena or hematochezia.  VASCULAR: No edema     PHYSICAL EXAM:  T(C): 36.8 (07-05-22 @ 11:03), Max: 36.9 (07-05-22 @ 00:40)  HR: 79 (07-05-22 @ 11:03) (69 - 79)  BP: 111/64 (07-05-22 @ 11:03) (111/64 - 143/80)  RR: 18 (07-05-22 @ 11:03) (18 - 18)  SpO2: 98% (07-05-22 @ 11:03) (96% - 98%)  Wt(kg): --  I&O's Summary    04 Jul 2022 07:01  -  05 Jul 2022 07:00  --------------------------------------------------------  IN: 480 mL / OUT: 0 mL / NET: 480 mL        Appearance: Normal	  Cardiovascular: Normal S1 S2,RRR, No JVD, No murmurs  Respiratory: Lungs clear to auscultation	  Gastrointestinal:  Soft, Non-tender, + BS	  Extremities: Normal range of motion, No clubbing, cyanosis or edema      Home Medications:  ALPRAZolam 1 mg oral tablet: 1 tab(s) orally once a day (at bedtime)  NOTE: pt uses to sleep (01 Jul 2022 10:43)  amitriptyline 25 mg oral tablet: 1 tab(s) orally once a day (at bedtime) (01 Jul 2022 10:43)  aspirin 81 mg oral delayed release tablet: 1 tab(s) orally once a day (in the evening) (01 Jul 2022 10:43)  Caltrate 600 + D oral tablet: 1 tab(s) orally 2 times a day (01 Jul 2022 10:43)  hydrALAZINE 50 mg oral tablet: 1 tab(s) orally 2 times a day (lunch, dinner) (01 Jul 2022 10:43)  lidocaine 5% patch: Apply 3 patches topically to affected area once a day for 12 hours, then remove  NOTE: apply to spine, right knee and back of right leg (01 Jul 2022 10:43)  lovastatin 20 mg oral tablet: 1 tab(s) orally once a day (in the evening) - with dinner (01 Jul 2022 10:43)  magnesium oxide 500 mg oral tablet: 1 to 2 tab(s) orally once a day (at bedtime)  NOTE: pt takes as laxative  (29 Jun 2022 23:42)  Multiple Vitamins oral tablet: 1 tab(s) orally once a day (lunch) (01 Jul 2022 10:43)  olmesartan 40 mg oral tablet: 1 tab(s) orally once a day (at bedtime) (01 Jul 2022 10:43)  omeprazole 40 mg oral delayed release capsule: 1 cap(s) orally once a day (01 Jul 2022 10:43)  Synthroid 75 mcg (0.075 mg) oral tablet: 1 tab(s) orally once a day (Mon - Sat) (01 Jul 2022 10:43)  Systane ophthalmic solution: 1 drop(s) to each affected eye , As Needed (01 Jul 2022 10:43)  Vitamin C 500 mg oral tablet: 1 tab(s) orally 2 times a day (01 Jul 2022 10:43)  Wellbutrin  mg/12 hours oral tablet, extended release: 1 tab(s) orally 2 times a day (01 Jul 2022 10:43)      MEDICATIONS  (STANDING):  amitriptyline 25 milliGRAM(s) Oral at bedtime  ascorbic acid 500 milliGRAM(s) Oral two times a day  atorvastatin 10 milliGRAM(s) Oral at bedtime  buPROPion SR (12-Hour) 100 milliGRAM(s) Oral two times a day  carvedilol 25 milliGRAM(s) Oral every 12 hours  chlorhexidine 2% Cloths 1 Application(s) Topical daily  levothyroxine 75 MICROGram(s) Oral <User Schedule>  levothyroxine 112.5 MICROGram(s) Oral <User Schedule>  lidocaine   4% Patch 1 Patch Transdermal daily  lidocaine   4% Patch 1 Patch Transdermal daily  pantoprazole    Tablet 40 milliGRAM(s) Oral before breakfast  CRYSTAL LAXATIVE CAPLETS 2 Tablet(s) 2 Tablet(s) Oral at bedtime  sucralfate suspension 1 Gram(s) Oral two times a day      TELEMETRY: 	    ECG:  	  RADIOLOGY:   DIAGNOSTIC TESTING:  [ ] Echocardiogram:  [ ]  Catheterization:  [ ] Stress Test:    OTHER: 	    LABS:	 	    Troponin T, High Sensitivity Result: 14 ng/L [0 - 51] (06-30 @ 05:31)  Troponin T, High Sensitivity Result: 12 ng/L [0 - 51] (06-30 @ 01:41)                          7.7    7.62  )-----------( 262      ( 05 Jul 2022 06:13 )             23.7     07-04    140  |  106  |  26<H>  ----------------------------<  107<H>  3.7   |  24  |  1.08    Ca    8.5      04 Jul 2022 05:50              
CARDIOLOGY FOLLOW UP - Dr. Calle  DATE OF SERVICE: 7/6/22     CC no cp or sob       REVIEW OF SYSTEMS:  CONSTITUTIONAL: No fever, weight loss, or fatigue  RESPIRATORY: No cough, wheezing, chills or hemoptysis; No Shortness of Breath  CARDIOVASCULAR: No chest pain, palpitations, passing out, dizziness, or leg swelling  GASTROINTESTINAL: No abdominal or epigastric pain. No nausea, vomiting, or hematemesis; No diarrhea or constipation. No melena or hematochezia.  VASCULAR: No edema     PHYSICAL EXAM:  T(C): 36.9 (07-06-22 @ 11:07), Max: 37.1 (07-05-22 @ 21:11)  HR: 67 (07-06-22 @ 11:07) (67 - 77)  BP: 100/65 (07-06-22 @ 11:07) (100/65 - 130/82)  RR: 18 (07-06-22 @ 11:07) (18 - 19)  SpO2: 97% (07-06-22 @ 11:07) (96% - 99%)  Wt(kg): --  I&O's Summary      Appearance: Normal	  Cardiovascular: Normal S1 S2,RRR, No JVD, No murmurs  Respiratory: Lungs clear to auscultation	  Gastrointestinal:  Soft, Non-tender, + BS	  Extremities: Normal range of motion, No clubbing, cyanosis or edema      Home Medications:  ALPRAZolam 1 mg oral tablet: 1 tab(s) orally once a day (at bedtime)  NOTE: pt uses to sleep (01 Jul 2022 10:43)  amitriptyline 25 mg oral tablet: 1 tab(s) orally once a day (at bedtime) (01 Jul 2022 10:43)  aspirin 81 mg oral delayed release tablet: 1 tab(s) orally once a day (in the evening) (01 Jul 2022 10:43)  Caltrate 600 + D oral tablet: 1 tab(s) orally 2 times a day (01 Jul 2022 10:43)  hydrALAZINE 50 mg oral tablet: 1 tab(s) orally 2 times a day (lunch, dinner) (01 Jul 2022 10:43)  lidocaine 5% patch: Apply 3 patches topically to affected area once a day for 12 hours, then remove  NOTE: apply to spine, right knee and back of right leg (01 Jul 2022 10:43)  lovastatin 20 mg oral tablet: 1 tab(s) orally once a day (in the evening) - with dinner (01 Jul 2022 10:43)  magnesium oxide 500 mg oral tablet: 1 to 2 tab(s) orally once a day (at bedtime)  NOTE: pt takes as laxative  (29 Jun 2022 23:42)  Multiple Vitamins oral tablet: 1 tab(s) orally once a day (lunch) (01 Jul 2022 10:43)  olmesartan 40 mg oral tablet: 1 tab(s) orally once a day (at bedtime) (01 Jul 2022 10:43)  omeprazole 40 mg oral delayed release capsule: 1 cap(s) orally once a day (01 Jul 2022 10:43)  Synthroid 75 mcg (0.075 mg) oral tablet: 1 tab(s) orally once a day (Mon - Sat) (01 Jul 2022 10:43)  Systane ophthalmic solution: 1 drop(s) to each affected eye , As Needed (01 Jul 2022 10:43)  Vitamin C 500 mg oral tablet: 1 tab(s) orally 2 times a day (01 Jul 2022 10:43)  Wellbutrin  mg/12 hours oral tablet, extended release: 1 tab(s) orally 2 times a day (01 Jul 2022 10:43)      MEDICATIONS  (STANDING):  amitriptyline 25 milliGRAM(s) Oral at bedtime  ascorbic acid 500 milliGRAM(s) Oral two times a day  atorvastatin 10 milliGRAM(s) Oral at bedtime  buPROPion SR (12-Hour) 100 milliGRAM(s) Oral two times a day  carvedilol 25 milliGRAM(s) Oral every 12 hours  chlorhexidine 2% Cloths 1 Application(s) Topical daily  levothyroxine 75 MICROGram(s) Oral <User Schedule>  levothyroxine 112.5 MICROGram(s) Oral <User Schedule>  lidocaine   4% Patch 1 Patch Transdermal daily  lidocaine   4% Patch 1 Patch Transdermal daily  pantoprazole    Tablet 40 milliGRAM(s) Oral two times a day  CRYSTAL LAXATIVE CAPLETS 2 Tablet(s) 2 Tablet(s) Oral at bedtime  sucralfate suspension 1 Gram(s) Oral every 6 hours      TELEMETRY: 	    ECG:  	  RADIOLOGY:   DIAGNOSTIC TESTING:  [ ] Echocardiogram:  [ ]  Catheterization:  [ ] Stress Test:    OTHER: 	    LABS:	 	    Troponin T, High Sensitivity Result: 14 ng/L [0 - 51] (06-30 @ 05:31)  Troponin T, High Sensitivity Result: 12 ng/L [0 - 51] (06-30 @ 01:41)                          8.0    8.69  )-----------( 278      ( 06 Jul 2022 07:34 )             25.2                   
CARDIOLOGY FOLLOW UP NOTE - DR. STREET    Patient Name: JOE URIOSTEGUI  Date of Service: 07-02-22     Patient seen and examined    Subjective:    cv: denies chest pain, dyspnea, palpitations, dizziness  pulmonary: denies cough  GI: denies abdominal pain, nausea, vomiting  vascular/legs: no edema   skin: no rash  ROS: otherwise negative   overnight events:      PHYSICAL EXAM:  T(C): 36.7 (07-02-22 @ 04:33), Max: 37 (07-01-22 @ 13:13)  HR: 87 (07-02-22 @ 09:14) (68 - 90)  BP: 113/72 (07-02-22 @ 09:14) (87/57 - 131/63)  RR: 18 (07-02-22 @ 09:14) (18 - 20)  SpO2: 96% (07-02-22 @ 09:14) (94% - 99%)  Wt(kg): --  I&O's Summary    01 Jul 2022 07:01  -  02 Jul 2022 07:00  --------------------------------------------------------  IN: 480 mL / OUT: 200 mL / NET: 280 mL      Daily Height in cm: 160.02 (01 Jul 2022 13:50)    Daily     Appearance: Normal	  Cardiovascular: Normal S1 S2,RRR, No JVD, No murmurs  Respiratory: Lungs clear to auscultation	  Gastrointestinal:  Soft, Non-tender, + BS	  Extremities: Normal range of motion, No clubbing, cyanosis or edema      Home Medications:  ALPRAZolam 1 mg oral tablet: 1 tab(s) orally once a day (at bedtime)  NOTE: pt uses to sleep (01 Jul 2022 10:43)  amitriptyline 25 mg oral tablet: 1 tab(s) orally once a day (at bedtime) (01 Jul 2022 10:43)  aspirin 81 mg oral delayed release tablet: 1 tab(s) orally once a day (in the evening) (01 Jul 2022 10:43)  Caltrate 600 + D oral tablet: 1 tab(s) orally 2 times a day (01 Jul 2022 10:43)  hydrALAZINE 50 mg oral tablet: 1 tab(s) orally 2 times a day (lunch, dinner) (01 Jul 2022 10:43)  lidocaine 5% patch: Apply 3 patches topically to affected area once a day for 12 hours, then remove  NOTE: apply to spine, right knee and back of right leg (01 Jul 2022 10:43)  lovastatin 20 mg oral tablet: 1 tab(s) orally once a day (in the evening) - with dinner (01 Jul 2022 10:43)  magnesium oxide 500 mg oral tablet: 1 to 2 tab(s) orally once a day (at bedtime)  NOTE: pt takes as laxative  (29 Jun 2022 23:42)  Multiple Vitamins oral tablet: 1 tab(s) orally once a day (lunch) (01 Jul 2022 10:43)  olmesartan 40 mg oral tablet: 1 tab(s) orally once a day (at bedtime) (01 Jul 2022 10:43)  omeprazole 40 mg oral delayed release capsule: 1 cap(s) orally once a day (01 Jul 2022 10:43)  Synthroid 75 mcg (0.075 mg) oral tablet: 1 tab(s) orally once a day (Mon - Sat) (01 Jul 2022 10:43)  Systane ophthalmic solution: 1 drop(s) to each affected eye , As Needed (01 Jul 2022 10:43)  Vitamin C 500 mg oral tablet: 1 tab(s) orally 2 times a day (01 Jul 2022 10:43)  Wellbutrin  mg/12 hours oral tablet, extended release: 1 tab(s) orally 2 times a day (01 Jul 2022 10:43)      MEDICATIONS  (STANDING):  amitriptyline 25 milliGRAM(s) Oral at bedtime  atorvastatin 10 milliGRAM(s) Oral at bedtime  buPROPion SR (12-Hour) 100 milliGRAM(s) Oral two times a day  carvedilol 25 milliGRAM(s) Oral every 12 hours  chlorhexidine 2% Cloths 1 Application(s) Topical daily  diltiazem    milliGRAM(s) Oral daily  hydrALAZINE 50 milliGRAM(s) Oral two times a day  levothyroxine 75 MICROGram(s) Oral daily  losartan 100 milliGRAM(s) Oral daily  pantoprazole    Tablet 40 milliGRAM(s) Oral before breakfast  sucralfate suspension 1 Gram(s) Oral two times a day      TELEMETRY: 	    ECG:  	  RADIOLOGY:   DIAGNOSTIC TESTING:  [ ] Echocardiogram:  [ ] Catheterization:  [ ] Stress Test:    OTHER: 	    LABS:	 	    CARDIAC MARKERS:        Troponin T, High Sensitivity Result: 14 ng/L (06-30 @ 05:31)  Troponin T, High Sensitivity Result: 12 ng/L (06-30 @ 01:41)  Troponin T, High Sensitivity Result: 10 ng/L (06-28 @ 07:28)  Troponin T, High Sensitivity Result: 12 ng/L (06-28 @ 05:21)                                8.1    6.81  )-----------( 260      ( 02 Jul 2022 07:19 )             24.4     07-02    136  |  102  |  26<H>  ----------------------------<  107<H>  3.5   |  22  |  1.24    Ca    8.6      02 Jul 2022 07:06      proBNP:     Lipid Profile:   HgA1c:     Creatinine, Serum: 1.24 mg/dL (07-02-22 @ 07:06)  Creatinine, Serum: 0.97 mg/dL (06-30-22 @ 05:31)  Creatinine, Serum: 0.88 mg/dL (06-29-22 @ 17:20)            
INTERVAL HPI/OVERNIGHT EVENTS:  No new overnight event.  No N/V/D.  Tolerating diet.  no melena    Allergies    amoxicillin (Other)  hydrocortisone (Unknown)  NSAIDs (Rash)  penicillin (Other)    Intolerances          General:  No wt loss, fevers, chills, night sweats, fatigue,   Eyes:  Good vision, no reported pain  ENT:  No sore throat, pain, runny nose, dysphagia  CV:  No pain, palpitations, hypo/hypertension  Resp:  No dyspnea, cough, tachypnea, wheezing  GI:  No pain, No nausea, No vomiting, No diarrhea, No constipation, No weight loss, No fever, No pruritis, No rectal bleeding, No tarry stools, No dysphagia,  :  No pain, bleeding, incontinence, nocturia  Muscle:  No pain, weakness  Neuro:  No weakness, tingling, memory problems  Psych:  No fatigue, insomnia, mood problems, depression  Endocrine:  No polyuria, polydipsia, cold/heat intolerance  Heme:  No petechiae, ecchymosis, easy bruisability  Skin:  No rash, tattoos, scars, edema      PHYSICAL EXAM:   Vital Signs:  Vital Signs Last 24 Hrs  T(C): 36.8 (02 Jul 2022 11:02), Max: 36.8 (02 Jul 2022 11:02)  T(F): 98.2 (02 Jul 2022 11:02), Max: 98.2 (02 Jul 2022 11:02)  HR: 72 (02 Jul 2022 11:02) (72 - 90)  BP: 94/65 (02 Jul 2022 11:02) (87/57 - 127/61)  BP(mean): --  RR: 18 (02 Jul 2022 11:02) (18 - 20)  SpO2: 97% (02 Jul 2022 11:02) (94% - 97%)  Daily     Daily I&O's Summary    01 Jul 2022 07:01  -  02 Jul 2022 07:00  --------------------------------------------------------  IN: 480 mL / OUT: 200 mL / NET: 280 mL    02 Jul 2022 07:01  -  02 Jul 2022 16:14  --------------------------------------------------------  IN: 480 mL / OUT: 0 mL / NET: 480 mL        GENERAL:  Appears stated age, well-groomed, well-nourished, no distress  HEENT:  NC/AT,  conjunctivae clear and pink, no thyromegaly, nodules, adenopathy, no JVD, sclera -anicteric  CHEST:  Full & symmetric excursion, no increased effort, breath sounds clear  HEART:  Regular rhythm, S1, S2, no murmur/rub/S3/S4, no abdominal bruit, no edema  ABDOMEN:  Soft, non-tender, non-distended, normoactive bowel sounds,  no masses ,no hepato-splenomegaly, no signs of chronic liver disease  EXTEREMITIES:  no cyanosis,clubbing or edema  SKIN:  No rash/erythema/ecchymoses/petechiae/wounds/abscess/warm/dry  NEURO:  Alert, oriented, no asterixis, no tremor, no encephalopathy      LABS:                        8.1    6.81  )-----------( 260      ( 02 Jul 2022 07:19 )             24.4     07-02    136  |  102  |  26<H>  ----------------------------<  107<H>  3.5   |  22  |  1.24    Ca    8.6      02 Jul 2022 07:06          amylase   lipase  RADIOLOGY & ADDITIONAL TESTS:  
Patient is a 81y old  Female who presents with a chief complaint of Second ER evaluation, sent in by GI physician for melena with burgundy BM on exam (04 Jul 2022 07:12)      SUBJECTIVE / OVERNIGHT EVENTS: No new complaints. Weak.   Review of Systems  chest pain no  palpitations no  sob no  nausea no  headache no    MEDICATIONS  (STANDING):  amitriptyline 25 milliGRAM(s) Oral at bedtime  ascorbic acid 500 milliGRAM(s) Oral two times a day  atorvastatin 10 milliGRAM(s) Oral at bedtime  buPROPion SR (12-Hour) 100 milliGRAM(s) Oral two times a day  carvedilol 25 milliGRAM(s) Oral every 12 hours  chlorhexidine 2% Cloths 1 Application(s) Topical daily  levothyroxine 75 MICROGram(s) Oral <User Schedule>  levothyroxine 112.5 MICROGram(s) Oral <User Schedule>  lidocaine   4% Patch 1 Patch Transdermal daily  lidocaine   4% Patch 1 Patch Transdermal daily  pantoprazole    Tablet 40 milliGRAM(s) Oral before breakfast  CRYSTAL LAXATIVE CAPLETS 2 Tablet(s) 2 Tablet(s) Oral at bedtime  sucralfate suspension 1 Gram(s) Oral two times a day    MEDICATIONS  (PRN):  ALPRAZolam 1 milliGRAM(s) Oral at bedtime PRN SLEEP  benzocaine 15 mG/menthol 3.6 mG Lozenge 1 Lozenge Oral every 3 hours PRN Sore Throat  polyethylene glycol 3350 17 Gram(s) Oral daily PRN Constipation      Vital Signs Last 24 Hrs  T(C): 36.8 (04 Jul 2022 12:01), Max: 37.2 (03 Jul 2022 22:19)  T(F): 98.2 (04 Jul 2022 12:01), Max: 98.9 (03 Jul 2022 22:19)  HR: 71 (04 Jul 2022 12:01) (71 - 81)  BP: 106/65 (04 Jul 2022 12:01) (106/65 - 140/85)  BP(mean): --  RR: 18 (04 Jul 2022 12:01) (18 - 18)  SpO2: 96% (04 Jul 2022 12:01) (95% - 99%)    PHYSICAL EXAM:  GENERAL: NAD, well-developed  HEAD:  Atraumatic, Normocephalic  EYES: EOMI, PERRLA, conjunctiva and sclera clear  NECK: Supple, No JVD  CHEST/LUNG: Clear to auscultation bilaterally; No wheeze  HEART: Regular rate and rhythm; No murmurs, rubs, or gallops  ABDOMEN: Soft, Nontender, Nondistended; Bowel sounds present  EXTREMITIES:  2+ Peripheral Pulses, No clubbing, cyanosis, or edema  PSYCH: AAOx3  NEUROLOGY: non-focal  SKIN: No rashes or lesions    LABS:                        7.6    7.75  )-----------( 254      ( 04 Jul 2022 05:50 )             23.6     07-04    140  |  106  |  26<H>  ----------------------------<  107<H>  3.7   |  24  |  1.08    Ca    8.5      04 Jul 2022 05:50                  RADIOLOGY & ADDITIONAL TESTS:    Imaging Personally Reviewed:    Consultant(s) Notes Reviewed:      Care Discussed with Consultants/Other Providers:  
INTERVAL HPI/OVERNIGHT EVENTS:  No new overnight event.  No N/V/D.  Tolerating diet.  H/H stable    Allergies    amoxicillin (Other)  hydrocortisone (Unknown)  NSAIDs (Rash)  penicillin (Other)    Intolerances    General:  No wt loss, fevers, chills, night sweats, fatigue,   Eyes:  Good vision, no reported pain  ENT:  No sore throat, pain, runny nose, dysphagia  CV:  No pain, palpitations, hypo/hypertension  Resp:  No dyspnea, cough, tachypnea, wheezing  GI:  No pain, No nausea, No vomiting, No diarrhea, No constipation, No weight loss, No fever, No pruritis, No rectal bleeding, No tarry stools, No dysphagia,  :  No pain, bleeding, incontinence, nocturia  Muscle:  No pain, weakness  Neuro:  No weakness, tingling, memory problems  Psych:  No fatigue, insomnia, mood problems, depression  Endocrine:  No polyuria, polydipsia, cold/heat intolerance  Heme:  No petechiae, ecchymosis, easy bruisability  Skin:  No rash, tattoos, scars, edema      PHYSICAL EXAM:   Vital Signs Last 24 Hrs  T(C): 37.1 (06 Jul 2022 04:58), Max: 37.1 (05 Jul 2022 21:11)  T(F): 98.8 (06 Jul 2022 04:58), Max: 98.8 (06 Jul 2022 04:58)  HR: 71 (06 Jul 2022 04:58) (71 - 79)  BP: 117/68 (06 Jul 2022 04:58) (111/64 - 130/82)  BP(mean): --  RR: 18 (06 Jul 2022 04:58) (18 - 18)  SpO2: 96% (06 Jul 2022 04:58) (96% - 99%)  Daily     DailyI  I&O's Summary        GENERAL:  Appears stated age, well-groomed, well-nourished, no distress  HEENT:  NC/AT,  conjunctivae clear and pink, no thyromegaly, nodules, adenopathy, no JVD, sclera -anicteric  CHEST:  Full & symmetric excursion, no increased effort, breath sounds clear  HEART:  Regular rhythm, S1, S2, no murmur/rub/S3/S4, no abdominal bruit, no edema  ABDOMEN:  Soft, non-tender, non-distended, normoactive bowel sounds,  no masses ,no hepato-splenomegaly, no signs of chronic liver disease  EXTEREMITIES:  no cyanosis,clubbing or edema  SKIN:  No rash/erythema/ecchymoses/petechiae/wounds/abscess/warm/dry  NEURO:  Alert, oriented, no asterixis, no tremor, no encephalopathy      LABS:                                   8.0    8.69  )-----------( 278      ( 06 Jul 2022 07:34 )             25.2         amylase   lipase  RADIOLOGY & ADDITIONAL TESTS:  
Patient is a 81y old  Female who presents with a chief complaint of Second ER evaluation, sent in by GI physician for melena with burgundy BM on exam (30 Jun 2022 12:33)      SUBJECTIVE / OVERNIGHT EVENTS: Comfortable   Review of Systems  chest pain no  palpitations no  sob no  nausea no  headache no    MEDICATIONS  (STANDING):  amitriptyline 25 milliGRAM(s) Oral at bedtime  atorvastatin 10 milliGRAM(s) Oral at bedtime  buPROPion SR (12-Hour) 100 milliGRAM(s) Oral two times a day  carvedilol 25 milliGRAM(s) Oral every 12 hours  diltiazem    milliGRAM(s) Oral daily  hydrALAZINE 50 milliGRAM(s) Oral two times a day  levothyroxine 75 MICROGram(s) Oral daily  losartan 100 milliGRAM(s) Oral daily  pantoprazole Infusion 8 mG/Hr (10 mL/Hr) IV Continuous <Continuous>    MEDICATIONS  (PRN):  ALPRAZolam 1 milliGRAM(s) Oral at bedtime PRN SLEEP      Vital Signs Last 24 Hrs  T(C): 37.1 (30 Jun 2022 16:52), Max: 37.1 (29 Jun 2022 22:12)  T(F): 98.7 (30 Jun 2022 16:52), Max: 98.8 (29 Jun 2022 22:12)  HR: 74 (30 Jun 2022 16:52) (56 - 87)  BP: 123/67 (30 Jun 2022 16:52) (116/64 - 156/80)  BP(mean): 97 (30 Jun 2022 02:13) (97 - 97)  RR: 18 (30 Jun 2022 16:52) (16 - 20)  SpO2: 96% (30 Jun 2022 16:52) (94% - 100%)    PHYSICAL EXAM:  GENERAL: NAD, well-developed  HEAD:  Atraumatic, Normocephalic  EYES: EOMI, PERRLA, conjunctiva and sclera clear  NECK: Supple, No JVD  CHEST/LUNG: Clear to auscultation bilaterally; No wheeze  HEART: Regular rate and rhythm; No murmurs, rubs, or gallops  ABDOMEN: Soft, Nontender, Nondistended; Bowel sounds present  EXTREMITIES:  2+ Peripheral Pulses, No clubbing, cyanosis, or edema  PSYCH: AAOx3  NEUROLOGY: non-focal  SKIN: No rashes or lesions    LABS:                        8.5    7.89  )-----------( 250      ( 30 Jun 2022 05:31 )             25.9     06-30    139  |  104  |  15  ----------------------------<  104<H>  3.7   |  26  |  0.97    Ca    9.0      30 Jun 2022 05:31    TPro  6.3  /  Alb  3.6  /  TBili  0.3  /  DBili  x   /  AST  17  /  ALT  13  /  AlkPhos  73  06-29    PT/INR - ( 29 Jun 2022 17:20 )   PT: 13.3 sec;   INR: 1.14 ratio         PTT - ( 29 Jun 2022 17:20 )  PTT:30.1 sec          Culture - Urine (collected 28 Jun 2022 05:18)  Source: Clean Catch Clean Catch (Midstream)  Final Report (29 Jun 2022 11:18):    10,000 - 49,000 CFU/mL Streptococcus agalactiae (Group B) isolated    Group B streptococci are susceptible to ampicillin,    penicillin and cefazolin, but may be resistant to    erythromycin and clindamycin.    Recommendations for intrapartum prophylaxis for Group B    streptococci are penicillin or ampicillin.    <10,000 CFU/ml Normal Urogenital janiya present        RADIOLOGY & ADDITIONAL TESTS:    Imaging Personally Reviewed:    Consultant(s) Notes Reviewed:      Care Discussed with Consultants/Other Providers:  
CARDIOLOGY FOLLOW UP - Dr. Calle  DATE OF SERVICE: 7/7/22     CC no cp or sob      REVIEW OF SYSTEMS:  CONSTITUTIONAL: No fever, weight loss, or fatigue  RESPIRATORY: No cough, wheezing, chills or hemoptysis; No Shortness of Breath  CARDIOVASCULAR: No chest pain, palpitations, passing out, dizziness, or leg swelling  GASTROINTESTINAL: No abdominal or epigastric pain. No nausea, vomiting, or hematemesis; No diarrhea or constipation. No melena or hematochezia.  VASCULAR: No edema     PHYSICAL EXAM:  T(C): 36.6 (07-07-22 @ 08:12), Max: 37 (07-06-22 @ 17:55)  HR: 62 (07-07-22 @ 08:12) (62 - 85)  BP: 117/68 (07-07-22 @ 08:12) (116/67 - 145/79)  RR: 17 (07-07-22 @ 08:12) (17 - 18)  SpO2: 97% (07-07-22 @ 08:12) (96% - 98%)  Wt(kg): --  I&O's Summary    06 Jul 2022 07:01  -  07 Jul 2022 07:00  --------------------------------------------------------  IN: 200 mL / OUT: 0 mL / NET: 200 mL    07 Jul 2022 07:01  -  07 Jul 2022 11:45  --------------------------------------------------------  IN: 240 mL / OUT: 0 mL / NET: 240 mL        Appearance: Normal	  Cardiovascular: Normal S1 S2,RRR, No JVD, No murmurs  Respiratory: Lungs clear to auscultation	  Gastrointestinal:  Soft, Non-tender, + BS	  Extremities: Normal range of motion, No clubbing, cyanosis or edema      Home Medications:  ALPRAZolam 1 mg oral tablet: 1 tab(s) orally once a day (at bedtime)  NOTE: pt uses to sleep (01 Jul 2022 10:43)  amitriptyline 25 mg oral tablet: 1 tab(s) orally once a day (at bedtime) (01 Jul 2022 10:43)  Caltrate 600 + D oral tablet: 1 tab(s) orally 2 times a day (01 Jul 2022 10:43)  lidocaine 5% patch: Apply 3 patches topically to affected area once a day for 12 hours, then remove  NOTE: apply to spine, right knee and back of right leg (01 Jul 2022 10:43)  lovastatin 20 mg oral tablet: 1 tab(s) orally once a day (in the evening) - with dinner (01 Jul 2022 10:43)  magnesium oxide 500 mg oral tablet: 1 to 2 tab(s) orally once a day (at bedtime)  NOTE: pt takes as laxative  (29 Jun 2022 23:42)  Multiple Vitamins oral tablet: 1 tab(s) orally once a day (lunch) (01 Jul 2022 10:43)  Synthroid 75 mcg (0.075 mg) oral tablet: 1 tab(s) orally once a day (Mon - Sat) and 1.5 tab on Sundays  (07 Jul 2022 11:06)  Systane ophthalmic solution: 1 drop(s) to each affected eye , As Needed (01 Jul 2022 10:43)  Vitamin C 500 mg oral tablet: 1 tab(s) orally 2 times a day (01 Jul 2022 10:43)  Wellbutrin  mg/12 hours oral tablet, extended release: 1 tab(s) orally 2 times a day (01 Jul 2022 10:43)      MEDICATIONS  (STANDING):  amitriptyline 25 milliGRAM(s) Oral at bedtime  ascorbic acid 500 milliGRAM(s) Oral two times a day  atorvastatin 10 milliGRAM(s) Oral at bedtime  buPROPion SR (12-Hour) 100 milliGRAM(s) Oral two times a day  carvedilol 25 milliGRAM(s) Oral every 12 hours  chlorhexidine 2% Cloths 1 Application(s) Topical daily  doxycycline monohydrate Capsule 100 milliGRAM(s) Oral every 12 hours  levothyroxine 75 MICROGram(s) Oral <User Schedule>  levothyroxine 112.5 MICROGram(s) Oral <User Schedule>  lidocaine   4% Patch 1 Patch Transdermal daily  lidocaine   4% Patch 1 Patch Transdermal daily  pantoprazole    Tablet 40 milliGRAM(s) Oral two times a day  CRYSTAL LAXATIVE CAPLETS 2 Tablet(s) 2 Tablet(s) Oral at bedtime  sucralfate suspension 1 Gram(s) Oral every 6 hours      TELEMETRY: 	    ECG:  	  RADIOLOGY:   DIAGNOSTIC TESTING:  [ ] Echocardiogram:  [ ]  Catheterization:  [ ] Stress Test:    OTHER: 	    LABS:	 	                            8.5    6.93  )-----------( 280      ( 07 Jul 2022 10:22 )             26.9                   
CARDIOLOGY FOLLOW UP NOTE - DR. STREET    Patient Name: JOE URIOSTEGUI  Date of Service: 07-01-22 @ 17:26    Patient seen and examined    Subjective:    cv: denies chest pain, dyspnea, palpitations, dizziness  pulmonary: denies cough  GI: denies abdominal pain, nausea, vomiting  vascular/legs: no edema   skin: no rash  ROS: otherwise negative   overnight events:      PHYSICAL EXAM:  T(C): 36.8 (07-01-22 @ 13:50), Max: 37 (07-01-22 @ 13:13)  HR: 90 (07-01-22 @ 16:55) (63 - 90)  BP: 127/61 (07-01-22 @ 16:55) (101/65 - 142/83)  RR: 18 (07-01-22 @ 16:55) (18 - 20)  SpO2: 95% (07-01-22 @ 16:55) (95% - 99%)  Wt(kg): --  I&O's Summary    30 Jun 2022 07:01  -  01 Jul 2022 07:00  --------------------------------------------------------  IN: 480 mL / OUT: 502 mL / NET: -22 mL      Daily Height in cm: 160.02 (01 Jul 2022 13:50)    Daily     Appearance: Normal	  Cardiovascular: Normal S1 S2,RRR, No JVD, No murmurs  Respiratory: Lungs clear to auscultation	  Gastrointestinal:  Soft, Non-tender, + BS	  Extremities: Normal range of motion, No clubbing, cyanosis or edema      Home Medications:  ALPRAZolam 1 mg oral tablet: 1 tab(s) orally once a day (at bedtime)  NOTE: pt uses to sleep (01 Jul 2022 10:43)  amitriptyline 25 mg oral tablet: 1 tab(s) orally once a day (at bedtime) (01 Jul 2022 10:43)  aspirin 81 mg oral delayed release tablet: 1 tab(s) orally once a day (in the evening) (01 Jul 2022 10:43)  Caltrate 600 + D oral tablet: 1 tab(s) orally 2 times a day (01 Jul 2022 10:43)  hydrALAZINE 50 mg oral tablet: 1 tab(s) orally 2 times a day (lunch, dinner) (01 Jul 2022 10:43)  lidocaine 5% patch: Apply 3 patches topically to affected area once a day for 12 hours, then remove  NOTE: apply to spine, right knee and back of right leg (01 Jul 2022 10:43)  lovastatin 20 mg oral tablet: 1 tab(s) orally once a day (in the evening) - with dinner (01 Jul 2022 10:43)  magnesium oxide 500 mg oral tablet: 1 to 2 tab(s) orally once a day (at bedtime)  NOTE: pt takes as laxative  (29 Jun 2022 23:42)  Multiple Vitamins oral tablet: 1 tab(s) orally once a day (lunch) (01 Jul 2022 10:43)  olmesartan 40 mg oral tablet: 1 tab(s) orally once a day (at bedtime) (01 Jul 2022 10:43)  omeprazole 40 mg oral delayed release capsule: 1 cap(s) orally once a day (01 Jul 2022 10:43)  Synthroid 75 mcg (0.075 mg) oral tablet: 1 tab(s) orally once a day (Mon - Sat) (01 Jul 2022 10:43)  Systane ophthalmic solution: 1 drop(s) to each affected eye , As Needed (01 Jul 2022 10:43)  Vitamin C 500 mg oral tablet: 1 tab(s) orally 2 times a day (01 Jul 2022 10:43)  Wellbutrin  mg/12 hours oral tablet, extended release: 1 tab(s) orally 2 times a day (01 Jul 2022 10:43)      MEDICATIONS  (STANDING):  amitriptyline 25 milliGRAM(s) Oral at bedtime  atorvastatin 10 milliGRAM(s) Oral at bedtime  buPROPion SR (12-Hour) 100 milliGRAM(s) Oral two times a day  carvedilol 25 milliGRAM(s) Oral every 12 hours  chlorhexidine 2% Cloths 1 Application(s) Topical daily  diltiazem    milliGRAM(s) Oral daily  hydrALAZINE 50 milliGRAM(s) Oral two times a day  levothyroxine 75 MICROGram(s) Oral daily  losartan 100 milliGRAM(s) Oral daily  pantoprazole    Tablet 40 milliGRAM(s) Oral before breakfast  sucralfate suspension 1 Gram(s) Oral two times a day      TELEMETRY: 	    ECG:  	  RADIOLOGY:   DIAGNOSTIC TESTING:  [ ] Echocardiogram:  [ ] Catheterization:  [ ] Stress Test:    OTHER: 	    LABS:	 	    CARDIAC MARKERS:        Troponin T, High Sensitivity Result: 14 ng/L (06-30 @ 05:31)  Troponin T, High Sensitivity Result: 12 ng/L (06-30 @ 01:41)  Troponin T, High Sensitivity Result: 10 ng/L (06-28 @ 07:28)  Troponin T, High Sensitivity Result: 12 ng/L (06-28 @ 05:21)                                8.0    6.02  )-----------( 227      ( 01 Jul 2022 11:18 )             25.0     06-30    139  |  104  |  15  ----------------------------<  104<H>  3.7   |  26  |  0.97    Ca    9.0      30 Jun 2022 05:31      proBNP:     Lipid Profile:   HgA1c:     Creatinine, Serum: 0.97 mg/dL (06-30-22 @ 05:31)  Creatinine, Serum: 0.88 mg/dL (06-29-22 @ 17:20)            
  CC: f/u for right forearm phlebitis     Patient reports she has tenderness to right forearm where is phlebitis is    REVIEW OF SYSTEMS:  All other review of systems negative (Comprehensive ROS)      T(F): 97.8 (07-07-22 @ 08:12), Max: 98.6 (07-06-22 @ 17:55)  HR: 62 (07-07-22 @ 08:12)  BP: 117/68 (07-07-22 @ 08:12)  RR: 17 (07-07-22 @ 08:12)  SpO2: 97% (07-07-22 @ 08:12)  Wt(kg): --    PHYSICAL EXAM:  General: alert, no acute distress  Eyes:  anicteric, no conjunctival injection, no discharge  Oropharynx: no lesions or injection 	  Neck: supple, without adenopathy  Lungs: clear to auscultation  Heart: regular rate and rhythm; no murmur, rubs or gallops  Abdomen: soft, nondistended, nontender, without mass or organomegaly  Skin: no lesions  Extremities: right forerarm area of swelling , slight tender to touch, minimal erythema noted   Neurologic: alert, oriented, moves all extremities    LAB RESULTS:                        8.5    6.93  )-----------( 280      ( 07 Jul 2022 10:22 )             26.9               MICROBIOLOGY:  RECENT CULTURES:      RADIOLOGY REVIEWED:        Antimicrobials Day #    doxycycline monohydrate Capsule 100 milliGRAM(s) Oral every 12 hours    Other Medications Reviewed    
CARDIOLOGY FOLLOW UP - Dr. Calle  Date of Service: 7/3/22  CC: no new complaints    Review of Systems:  Constitutional: No fever, weight loss, or fatigue  Respiratory: No cough, wheezing, or hemoptysis, no shortness of breath  Cardiovascular: No chest pain, palpitations, passing out, dizziness, or leg swelling  Gastrointestinal: No abd or epigastric pain. No nausea, vomiting, or hematemesis; no diarrhea or consiptaiton, no melena or hematochezia  Vascular: No edema     TELEMETRY:    PHYSICAL EXAM:  T(C): 36.9 (07-03-22 @ 04:30), Max: 36.9 (07-02-22 @ 22:02)  HR: 67 (07-03-22 @ 04:30) (67 - 87)  BP: 106/54 (07-03-22 @ 04:30) (94/65 - 113/72)  RR: 18 (07-03-22 @ 04:30) (17 - 18)  SpO2: 94% (07-03-22 @ 04:30) (94% - 97%)  Wt(kg): --  I&O's Summary    02 Jul 2022 07:01  -  03 Jul 2022 07:00  --------------------------------------------------------  IN: 480 mL / OUT: 0 mL / NET: 480 mL        Appearance: Normal	  Cardiovascular: Normal S1 S2,RRR, No JVD, No murmurs  Respiratory: Lungs clear to auscultation	  Gastrointestinal:  Soft, Non-tender, + BS	  Extremities: Normal range of motion, No clubbing, cyanosis or edema  Vascular: Peripheral pulses palpable 2+ bilaterally       Home Medications:  ALPRAZolam 1 mg oral tablet: 1 tab(s) orally once a day (at bedtime)  NOTE: pt uses to sleep (01 Jul 2022 10:43)  amitriptyline 25 mg oral tablet: 1 tab(s) orally once a day (at bedtime) (01 Jul 2022 10:43)  aspirin 81 mg oral delayed release tablet: 1 tab(s) orally once a day (in the evening) (01 Jul 2022 10:43)  Caltrate 600 + D oral tablet: 1 tab(s) orally 2 times a day (01 Jul 2022 10:43)  hydrALAZINE 50 mg oral tablet: 1 tab(s) orally 2 times a day (lunch, dinner) (01 Jul 2022 10:43)  lidocaine 5% patch: Apply 3 patches topically to affected area once a day for 12 hours, then remove  NOTE: apply to spine, right knee and back of right leg (01 Jul 2022 10:43)  lovastatin 20 mg oral tablet: 1 tab(s) orally once a day (in the evening) - with dinner (01 Jul 2022 10:43)  magnesium oxide 500 mg oral tablet: 1 to 2 tab(s) orally once a day (at bedtime)  NOTE: pt takes as laxative  (29 Jun 2022 23:42)  Multiple Vitamins oral tablet: 1 tab(s) orally once a day (lunch) (01 Jul 2022 10:43)  olmesartan 40 mg oral tablet: 1 tab(s) orally once a day (at bedtime) (01 Jul 2022 10:43)  omeprazole 40 mg oral delayed release capsule: 1 cap(s) orally once a day (01 Jul 2022 10:43)  Synthroid 75 mcg (0.075 mg) oral tablet: 1 tab(s) orally once a day (Mon - Sat) (01 Jul 2022 10:43)  Systane ophthalmic solution: 1 drop(s) to each affected eye , As Needed (01 Jul 2022 10:43)  Vitamin C 500 mg oral tablet: 1 tab(s) orally 2 times a day (01 Jul 2022 10:43)  Wellbutrin  mg/12 hours oral tablet, extended release: 1 tab(s) orally 2 times a day (01 Jul 2022 10:43)        MEDICATIONS  (STANDING):  amitriptyline 25 milliGRAM(s) Oral at bedtime  atorvastatin 10 milliGRAM(s) Oral at bedtime  buPROPion SR (12-Hour) 100 milliGRAM(s) Oral two times a day  carvedilol 25 milliGRAM(s) Oral every 12 hours  chlorhexidine 2% Cloths 1 Application(s) Topical daily  iron sucrose IVPB 100 milliGRAM(s) IV Intermittent every 24 hours  levothyroxine 75 MICROGram(s) Oral daily  pantoprazole    Tablet 40 milliGRAM(s) Oral before breakfast  sucralfate suspension 1 Gram(s) Oral two times a day        EKG:  RADIOLOGY:  DIAGNOSTIC TESTING:  [ ] Echocardiogram:  [ ] Catherterization:  [ ] Stress Test:  OTHER:     LABS:	 	                          7.7    5.77  )-----------( 249      ( 03 Jul 2022 06:47 )             23.7     07-02    136  |  102  |  26<H>  ----------------------------<  107<H>  3.5   |  22  |  1.24    Ca    8.6      02 Jul 2022 07:06            CARDIAC MARKERS:                  
CARDIOLOGY FOLLOW UP - Dr. Calle  Date of Service: 7/4/22  CC: no events, H/H stable    Review of Systems:  Constitutional: No fever, weight loss, or fatigue  Respiratory: No cough, wheezing, or hemoptysis, no shortness of breath  Cardiovascular: No chest pain, palpitations, passing out, dizziness, or leg swelling  Gastrointestinal: No abd or epigastric pain. No nausea, vomiting, or hematemesis; no diarrhea or consiptaiton, no melena or hematochezia  Vascular: No edema     TELEMETRY:    PHYSICAL EXAM:  T(C): 36.3 (07-04-22 @ 05:05), Max: 37.2 (07-03-22 @ 22:19)  HR: 74 (07-04-22 @ 06:48) (68 - 81)  BP: 125/78 (07-04-22 @ 06:48) (125/78 - 140/85)  RR: 18 (07-04-22 @ 06:48) (18 - 18)  SpO2: 95% (07-04-22 @ 06:48) (95% - 99%)  Wt(kg): --  I&O's Summary    03 Jul 2022 07:01  -  04 Jul 2022 07:00  --------------------------------------------------------  IN: 480 mL / OUT: 0 mL / NET: 480 mL        Appearance: Normal	  Cardiovascular: Normal S1 S2,RRR, No JVD, No murmurs  Respiratory: Lungs clear to auscultation	  Gastrointestinal:  Soft, Non-tender, + BS	  Extremities: Normal range of motion, No clubbing, cyanosis or edema  Vascular: Peripheral pulses palpable 2+ bilaterally       Home Medications:  ALPRAZolam 1 mg oral tablet: 1 tab(s) orally once a day (at bedtime)  NOTE: pt uses to sleep (01 Jul 2022 10:43)  amitriptyline 25 mg oral tablet: 1 tab(s) orally once a day (at bedtime) (01 Jul 2022 10:43)  aspirin 81 mg oral delayed release tablet: 1 tab(s) orally once a day (in the evening) (01 Jul 2022 10:43)  Caltrate 600 + D oral tablet: 1 tab(s) orally 2 times a day (01 Jul 2022 10:43)  hydrALAZINE 50 mg oral tablet: 1 tab(s) orally 2 times a day (lunch, dinner) (01 Jul 2022 10:43)  lidocaine 5% patch: Apply 3 patches topically to affected area once a day for 12 hours, then remove  NOTE: apply to spine, right knee and back of right leg (01 Jul 2022 10:43)  lovastatin 20 mg oral tablet: 1 tab(s) orally once a day (in the evening) - with dinner (01 Jul 2022 10:43)  magnesium oxide 500 mg oral tablet: 1 to 2 tab(s) orally once a day (at bedtime)  NOTE: pt takes as laxative  (29 Jun 2022 23:42)  Multiple Vitamins oral tablet: 1 tab(s) orally once a day (lunch) (01 Jul 2022 10:43)  olmesartan 40 mg oral tablet: 1 tab(s) orally once a day (at bedtime) (01 Jul 2022 10:43)  omeprazole 40 mg oral delayed release capsule: 1 cap(s) orally once a day (01 Jul 2022 10:43)  Synthroid 75 mcg (0.075 mg) oral tablet: 1 tab(s) orally once a day (Mon - Sat) (01 Jul 2022 10:43)  Systane ophthalmic solution: 1 drop(s) to each affected eye , As Needed (01 Jul 2022 10:43)  Vitamin C 500 mg oral tablet: 1 tab(s) orally 2 times a day (01 Jul 2022 10:43)  Wellbutrin  mg/12 hours oral tablet, extended release: 1 tab(s) orally 2 times a day (01 Jul 2022 10:43)        MEDICATIONS  (STANDING):  amitriptyline 25 milliGRAM(s) Oral at bedtime  ascorbic acid 500 milliGRAM(s) Oral two times a day  atorvastatin 10 milliGRAM(s) Oral at bedtime  buPROPion SR (12-Hour) 100 milliGRAM(s) Oral two times a day  carvedilol 25 milliGRAM(s) Oral every 12 hours  chlorhexidine 2% Cloths 1 Application(s) Topical daily  iron sucrose IVPB 100 milliGRAM(s) IV Intermittent every 24 hours  levothyroxine 75 MICROGram(s) Oral <User Schedule>  levothyroxine 112.5 MICROGram(s) Oral <User Schedule>  pantoprazole    Tablet 40 milliGRAM(s) Oral before breakfast  CRYSTAL LAXATIVE CAPLETS 2 Tablet(s) 2 Tablet(s) Oral at bedtime  sucralfate suspension 1 Gram(s) Oral two times a day        EKG:  RADIOLOGY:  DIAGNOSTIC TESTING:  [ ] Echocardiogram:  [ ] Catherterization:  [ ] Stress Test:  OTHER:     LABS:	 	                          7.6    7.75  )-----------( 254      ( 04 Jul 2022 05:50 )             23.6     07-04    140  |  106  |  26<H>  ----------------------------<  107<H>  3.7   |  24  |  1.08    Ca    8.5      04 Jul 2022 05:50            CARDIAC MARKERS:                  
INTERVAL HPI/OVERNIGHT EVENTS:  No new overnight event.  No N/V/D.  Tolerating diet.      Allergies    amoxicillin (Other)  hydrocortisone (Unknown)  NSAIDs (Rash)  penicillin (Other)    Intolerances    General:  No wt loss, fevers, chills, night sweats, fatigue,   Eyes:  Good vision, no reported pain  ENT:  No sore throat, pain, runny nose, dysphagia  CV:  No pain, palpitations, hypo/hypertension  Resp:  No dyspnea, cough, tachypnea, wheezing  GI:  No pain, No nausea, No vomiting, No diarrhea, No constipation, No weight loss, No fever, No pruritis, No rectal bleeding, No tarry stools, No dysphagia,  :  No pain, bleeding, incontinence, nocturia  Muscle:  No pain, weakness  Neuro:  No weakness, tingling, memory problems  Psych:  No fatigue, insomnia, mood problems, depression  Endocrine:  No polyuria, polydipsia, cold/heat intolerance  Heme:  No petechiae, ecchymosis, easy bruisability  Skin:  No rash, tattoos, scars, edema      PHYSICAL EXAM:   Vital Signs Last 24 Hrs  T(C): 36.6 (07 Jul 2022 08:12), Max: 37 (06 Jul 2022 17:55)  T(F): 97.8 (07 Jul 2022 08:12), Max: 98.6 (06 Jul 2022 17:55)  HR: 62 (07 Jul 2022 08:12) (62 - 85)  BP: 117/68 (07 Jul 2022 08:12) (116/67 - 145/79)  BP(mean): --  RR: 17 (07 Jul 2022 08:12) (17 - 18)  SpO2: 97% (07 Jul 2022 08:12) (96% - 98%)  Daily     DailyI  I&O's Summary        GENERAL:  Appears stated age, well-groomed, well-nourished, no distress  HEENT:  NC/AT,  conjunctivae clear and pink, no thyromegaly, nodules, adenopathy, no JVD, sclera -anicteric  CHEST:  Full & symmetric excursion, no increased effort, breath sounds clear  HEART:  Regular rhythm, S1, S2, no murmur/rub/S3/S4, no abdominal bruit, no edema  ABDOMEN:  Soft, non-tender, non-distended, normoactive bowel sounds,  no masses ,no hepato-splenomegaly, no signs of chronic liver disease  EXTEREMITIES:  no cyanosis,clubbing or edema  SKIN:  No rash/erythema/ecchymoses/petechiae/wounds/abscess/warm/dry  NEURO:  Alert, oriented, no asterixis, no tremor, no encephalopathy      LABS:                                   8.0    8.69  )-----------( 278      ( 06 Jul 2022 07:34 )             25.2         amylase   lipase  RADIOLOGY & ADDITIONAL TESTS:  
INTERVAL HPI/OVERNIGHT EVENTS:  No new overnight event.  No N/V/D.  Tolerating diet.    Allergies    amoxicillin (Other)  hydrocortisone (Unknown)  NSAIDs (Rash)  penicillin (Other)    Intolerances    General:  No wt loss, fevers, chills, night sweats, fatigue,   Eyes:  Good vision, no reported pain  ENT:  No sore throat, pain, runny nose, dysphagia  CV:  No pain, palpitations, hypo/hypertension  Resp:  No dyspnea, cough, tachypnea, wheezing  GI:  No pain, No nausea, No vomiting, No diarrhea, No constipation, No weight loss, No fever, No pruritis, No rectal bleeding, No tarry stools, No dysphagia,  :  No pain, bleeding, incontinence, nocturia  Muscle:  No pain, weakness  Neuro:  No weakness, tingling, memory problems  Psych:  No fatigue, insomnia, mood problems, depression  Endocrine:  No polyuria, polydipsia, cold/heat intolerance  Heme:  No petechiae, ecchymosis, easy bruisability  Skin:  No rash, tattoos, scars, edema      PHYSICAL EXAM:   Vital Signs Last 24 Hrs  T(C): 36.8 (05 Jul 2022 11:03), Max: 36.9 (05 Jul 2022 00:40)  T(F): 98.3 (05 Jul 2022 11:03), Max: 98.4 (05 Jul 2022 00:40)  HR: 79 (05 Jul 2022 11:03) (69 - 79)  BP: 111/64 (05 Jul 2022 11:03) (111/64 - 143/80)  BP(mean): --  RR: 18 (05 Jul 2022 11:03) (18 - 18)  SpO2: 98% (05 Jul 2022 11:03) (96% - 98%)  Daily     DailyI&O's Summary    04 Jul 2022 07:01  -  05 Jul 2022 07:00  --------------------------------------------------------  IN: 480 mL / OUT: 0 mL / NET: 480 mL      GENERAL:  Appears stated age, well-groomed, well-nourished, no distress  HEENT:  NC/AT,  conjunctivae clear and pink, no thyromegaly, nodules, adenopathy, no JVD, sclera -anicteric  CHEST:  Full & symmetric excursion, no increased effort, breath sounds clear  HEART:  Regular rhythm, S1, S2, no murmur/rub/S3/S4, no abdominal bruit, no edema  ABDOMEN:  Soft, non-tender, non-distended, normoactive bowel sounds,  no masses ,no hepato-splenomegaly, no signs of chronic liver disease  EXTEREMITIES:  no cyanosis,clubbing or edema  SKIN:  No rash/erythema/ecchymoses/petechiae/wounds/abscess/warm/dry  NEURO:  Alert, oriented, no asterixis, no tremor, no encephalopathy      LABS:                        7.7    7.62  )-----------( 262      ( 05 Jul 2022 06:13 )             23.7   07-04    140  |  106  |  26<H>  ----------------------------<  107<H>  3.7   |  24  |  1.08    Ca    8.5      04 Jul 2022 05:50        amylase   lipase  RADIOLOGY & ADDITIONAL TESTS:  
INTERVAL HPI/OVERNIGHT EVENTS:  No new overnight event.  No N/V/D.  Tolerating diet.  no melena    Allergies    amoxicillin (Other)  hydrocortisone (Unknown)  NSAIDs (Rash)  penicillin (Other)    Intolerances    General:  No wt loss, fevers, chills, night sweats, fatigue,   Eyes:  Good vision, no reported pain  ENT:  No sore throat, pain, runny nose, dysphagia  CV:  No pain, palpitations, hypo/hypertension  Resp:  No dyspnea, cough, tachypnea, wheezing  GI:  No pain, No nausea, No vomiting, No diarrhea, No constipation, No weight loss, No fever, No pruritis, No rectal bleeding, No tarry stools, No dysphagia,  :  No pain, bleeding, incontinence, nocturia  Muscle:  No pain, weakness  Neuro:  No weakness, tingling, memory problems  Psych:  No fatigue, insomnia, mood problems, depression  Endocrine:  No polyuria, polydipsia, cold/heat intolerance  Heme:  No petechiae, ecchymosis, easy bruisability  Skin:  No rash, tattoos, scars, edema      PHYSICAL EXAM:   Vital Signs:  Vital Signs Last 24 Hrs  T(C): 37.1 (03 Jul 2022 11:33), Max: 37.1 (03 Jul 2022 11:33)  T(F): 98.8 (03 Jul 2022 11:33), Max: 98.8 (03 Jul 2022 11:33)  HR: 68 (03 Jul 2022 11:33) (67 - 72)  BP: 130/79 (03 Jul 2022 11:33) (106/54 - 130/79)  BP(mean): --  RR: 18 (03 Jul 2022 11:33) (17 - 18)  SpO2: 98% (03 Jul 2022 11:33) (94% - 98%)  Daily     Daily I&O's Summary    02 Jul 2022 07:01  -  03 Jul 2022 07:00  --------------------------------------------------------  IN: 480 mL / OUT: 0 mL / NET: 480 mL        GENERAL:  Appears stated age, well-groomed, well-nourished, no distress  HEENT:  NC/AT,  conjunctivae clear and pink, no thyromegaly, nodules, adenopathy, no JVD, sclera -anicteric  CHEST:  Full & symmetric excursion, no increased effort, breath sounds clear  HEART:  Regular rhythm, S1, S2, no murmur/rub/S3/S4, no abdominal bruit, no edema  ABDOMEN:  Soft, non-tender, non-distended, normoactive bowel sounds,  no masses ,no hepato-splenomegaly, no signs of chronic liver disease  EXTEREMITIES:  no cyanosis,clubbing or edema  SKIN:  No rash/erythema/ecchymoses/petechiae/wounds/abscess/warm/dry  NEURO:  Alert, oriented, no asterixis, no tremor, no encephalopathy      LABS:                        7.7    5.77  )-----------( 249      ( 03 Jul 2022 06:47 )             23.7     07-02    136  |  102  |  26<H>  ----------------------------<  107<H>  3.5   |  22  |  1.24    Ca    8.6      02 Jul 2022 07:06          amylase   lipase  RADIOLOGY & ADDITIONAL TESTS:  
INTERVAL HPI/OVERNIGHT EVENTS:  No new overnight event.  No N/V/D.  Tolerating diet.  no melena   slight drop in hgb    Allergies    amoxicillin (Other)  hydrocortisone (Unknown)  NSAIDs (Rash)  penicillin (Other)    Intolerances    General:  No wt loss, fevers, chills, night sweats, fatigue,   Eyes:  Good vision, no reported pain  ENT:  No sore throat, pain, runny nose, dysphagia  CV:  No pain, palpitations, hypo/hypertension  Resp:  No dyspnea, cough, tachypnea, wheezing  GI:  No pain, No nausea, No vomiting, No diarrhea, No constipation, No weight loss, No fever, No pruritis, No rectal bleeding, No tarry stools, No dysphagia,  :  No pain, bleeding, incontinence, nocturia  Muscle:  No pain, weakness  Neuro:  No weakness, tingling, memory problems  Psych:  No fatigue, insomnia, mood problems, depression  Endocrine:  No polyuria, polydipsia, cold/heat intolerance  Heme:  No petechiae, ecchymosis, easy bruisability  Skin:  No rash, tattoos, scars, edema      PHYSICAL EXAM:   Vital Signs:  Vital Signs Last 24 Hrs  T(C): 36.8 (04 Jul 2022 12:01), Max: 37.2 (03 Jul 2022 22:19)  T(F): 98.2 (04 Jul 2022 12:01), Max: 98.9 (03 Jul 2022 22:19)  HR: 71 (04 Jul 2022 12:01) (71 - 81)  BP: 106/65 (04 Jul 2022 12:01) (106/65 - 140/85)  BP(mean): --  RR: 18 (04 Jul 2022 12:01) (18 - 18)  SpO2: 96% (04 Jul 2022 12:01) (95% - 99%)  Daily     Daily I&O's Summary    03 Jul 2022 07:01  -  04 Jul 2022 07:00  --------------------------------------------------------  IN: 480 mL / OUT: 0 mL / NET: 480 mL        GENERAL:  Appears stated age, well-groomed, well-nourished, no distress  HEENT:  NC/AT,  conjunctivae clear and pink, no thyromegaly, nodules, adenopathy, no JVD, sclera -anicteric  CHEST:  Full & symmetric excursion, no increased effort, breath sounds clear  HEART:  Regular rhythm, S1, S2, no murmur/rub/S3/S4, no abdominal bruit, no edema  ABDOMEN:  Soft, non-tender, non-distended, normoactive bowel sounds,  no masses ,no hepato-splenomegaly, no signs of chronic liver disease  EXTEREMITIES:  no cyanosis,clubbing or edema  SKIN:  No rash/erythema/ecchymoses/petechiae/wounds/abscess/warm/dry  NEURO:  Alert, oriented, no asterixis, no tremor, no encephalopathy      LABS:                        7.6    7.75  )-----------( 254      ( 04 Jul 2022 05:50 )             23.6     07-04    140  |  106  |  26<H>  ----------------------------<  107<H>  3.7   |  24  |  1.08    Ca    8.5      04 Jul 2022 05:50          amylase   lipase  RADIOLOGY & ADDITIONAL TESTS:  
Patient is a 81y old  Female who presents with a chief complaint of Second ER evaluation, sent in by GI physician for melena with burgundy BM on exam (02 Jul 2022 10:36)      SUBJECTIVE / OVERNIGHT EVENTS: feels weak, tired.   Review of Systems  chest pain no  palpitations no  sob no  nausea no  headache no    MEDICATIONS  (STANDING):  amitriptyline 25 milliGRAM(s) Oral at bedtime  atorvastatin 10 milliGRAM(s) Oral at bedtime  buPROPion SR (12-Hour) 100 milliGRAM(s) Oral two times a day  carvedilol 25 milliGRAM(s) Oral every 12 hours  chlorhexidine 2% Cloths 1 Application(s) Topical daily  diltiazem    milliGRAM(s) Oral daily  hydrALAZINE 50 milliGRAM(s) Oral two times a day  iron sucrose IVPB 100 milliGRAM(s) IV Intermittent every 24 hours  levothyroxine 75 MICROGram(s) Oral daily  losartan 100 milliGRAM(s) Oral daily  pantoprazole    Tablet 40 milliGRAM(s) Oral before breakfast  sucralfate suspension 1 Gram(s) Oral two times a day    MEDICATIONS  (PRN):  ALPRAZolam 1 milliGRAM(s) Oral at bedtime PRN SLEEP      Vital Signs Last 24 Hrs  T(C): 36.8 (02 Jul 2022 11:02), Max: 36.8 (02 Jul 2022 11:02)  T(F): 98.2 (02 Jul 2022 11:02), Max: 98.2 (02 Jul 2022 11:02)  HR: 72 (02 Jul 2022 11:02) (68 - 90)  BP: 94/65 (02 Jul 2022 11:02) (87/57 - 131/63)  BP(mean): --  RR: 18 (02 Jul 2022 11:02) (18 - 20)  SpO2: 97% (02 Jul 2022 11:02) (94% - 98%)    PHYSICAL EXAM:  GENERAL: NAD  HEAD:  Atraumatic, Normocephalic  EYES: EOMI, PERRLA, conjunctiva and sclera clear  NECK: Supple, No JVD  CHEST/LUNG: Clear to auscultation bilaterally; No wheeze  HEART: Regular rate and rhythm; No murmurs, rubs, or gallops  ABDOMEN: Soft, Nontender, Nondistended; Bowel sounds present  EXTREMITIES:  2+ Peripheral Pulses, No clubbing, cyanosis, or edema  PSYCH: AAOx3, anxious  NEUROLOGY: non-focal  SKIN: No rashes or lesions    LABS:                        8.1    6.81  )-----------( 260      ( 02 Jul 2022 07:19 )             24.4     07-02    136  |  102  |  26<H>  ----------------------------<  107<H>  3.5   |  22  |  1.24    Ca    8.6      02 Jul 2022 07:06                  RADIOLOGY & ADDITIONAL TESTS:    Imaging Personally Reviewed:    Consultant(s) Notes Reviewed:      Care Discussed with Consultants/Other Providers:  
Patient is a 81y old  Female who presents with a chief complaint of Second ER evaluation, sent in by GI physician for melena with burgundy BM on exam (03 Jul 2022 12:48)      SUBJECTIVE / OVERNIGHT EVENTS: Comfortable. Feels better but still weak.   Review of Systems  chest pain no  palpitations no  sob no  nausea no  headache no    MEDICATIONS  (STANDING):  amitriptyline 25 milliGRAM(s) Oral at bedtime  ascorbic acid 500 milliGRAM(s) Oral two times a day  atorvastatin 10 milliGRAM(s) Oral at bedtime  buPROPion SR (12-Hour) 100 milliGRAM(s) Oral two times a day  carvedilol 25 milliGRAM(s) Oral every 12 hours  chlorhexidine 2% Cloths 1 Application(s) Topical daily  iron sucrose IVPB 100 milliGRAM(s) IV Intermittent every 24 hours  levothyroxine 75 MICROGram(s) Oral <User Schedule>  levothyroxine 112.5 MICROGram(s) Oral <User Schedule>  pantoprazole    Tablet 40 milliGRAM(s) Oral before breakfast  sucralfate suspension 1 Gram(s) Oral two times a day    MEDICATIONS  (PRN):  ALPRAZolam 1 milliGRAM(s) Oral at bedtime PRN SLEEP  benzocaine 15 mG/menthol 3.6 mG Lozenge 1 Lozenge Oral every 3 hours PRN Sore Throat  polyethylene glycol 3350 17 Gram(s) Oral daily PRN Constipation      Vital Signs Last 24 Hrs  T(C): 37.1 (03 Jul 2022 11:33), Max: 37.1 (03 Jul 2022 11:33)  T(F): 98.8 (03 Jul 2022 11:33), Max: 98.8 (03 Jul 2022 11:33)  HR: 68 (03 Jul 2022 11:33) (67 - 72)  BP: 130/79 (03 Jul 2022 11:33) (106/54 - 130/79)  BP(mean): --  RR: 18 (03 Jul 2022 11:33) (17 - 18)  SpO2: 98% (03 Jul 2022 11:33) (94% - 98%)    PHYSICAL EXAM:  GENERAL: NAD  HEAD:  Atraumatic, Normocephalic  EYES: EOMI, PERRLA, conjunctiva and sclera clear  NECK: Supple, No JVD  CHEST/LUNG: Clear to auscultation bilaterally; No wheeze  HEART: Regular rate and rhythm; No murmurs, rubs, or gallops  ABDOMEN: Soft, Nontender, Nondistended; Bowel sounds present  EXTREMITIES:  2+ Peripheral Pulses, No clubbing, cyanosis, or edema  PSYCH: AAOx3 , anxious  NEUROLOGY: non-focal  SKIN: No rashes or lesions    LABS:                        7.7    5.77  )-----------( 249      ( 03 Jul 2022 06:47 )             23.7     07-02    136  |  102  |  26<H>  ----------------------------<  107<H>  3.5   |  22  |  1.24    Ca    8.6      02 Jul 2022 07:06                  RADIOLOGY & ADDITIONAL TESTS:    Imaging Personally Reviewed:    Consultant(s) Notes Reviewed:      Care Discussed with Consultants/Other Providers:  
Patient is a 81y old  Female who presents with a chief complaint of Second ER evaluation, sent in by GI physician for melena with burgundy BM on exam (05 Jul 2022 12:45)      SUBJECTIVE / OVERNIGHT EVENTS: Comfortable without new complaints.   Review of Systems  chest pain no  palpitations no  sob no  nausea no  headache no    MEDICATIONS  (STANDING):  amitriptyline 25 milliGRAM(s) Oral at bedtime  ascorbic acid 500 milliGRAM(s) Oral two times a day  atorvastatin 10 milliGRAM(s) Oral at bedtime  buPROPion SR (12-Hour) 100 milliGRAM(s) Oral two times a day  carvedilol 25 milliGRAM(s) Oral every 12 hours  chlorhexidine 2% Cloths 1 Application(s) Topical daily  levothyroxine 75 MICROGram(s) Oral <User Schedule>  levothyroxine 112.5 MICROGram(s) Oral <User Schedule>  lidocaine   4% Patch 1 Patch Transdermal daily  lidocaine   4% Patch 1 Patch Transdermal daily  pantoprazole    Tablet 40 milliGRAM(s) Oral two times a day  CRYSTAL LAXATIVE CAPLETS 2 Tablet(s) 2 Tablet(s) Oral at bedtime  sucralfate suspension 1 Gram(s) Oral every 6 hours    MEDICATIONS  (PRN):  ALPRAZolam 1 milliGRAM(s) Oral at bedtime PRN SLEEP  benzocaine 15 mG/menthol 3.6 mG Lozenge 1 Lozenge Oral every 3 hours PRN Sore Throat  polyethylene glycol 3350 17 Gram(s) Oral daily PRN Constipation      Vital Signs Last 24 Hrs  T(C): 36.8 (05 Jul 2022 11:03), Max: 36.9 (05 Jul 2022 00:40)  T(F): 98.3 (05 Jul 2022 11:03), Max: 98.4 (05 Jul 2022 00:40)  HR: 79 (05 Jul 2022 11:03) (69 - 79)  BP: 111/64 (05 Jul 2022 11:03) (111/64 - 143/80)  BP(mean): --  RR: 18 (05 Jul 2022 11:03) (18 - 18)  SpO2: 98% (05 Jul 2022 11:03) (96% - 98%)    PHYSICAL EXAM:  GENERAL: NAD  HEAD:  Atraumatic, Normocephalic  EYES: EOMI, PERRLA, conjunctiva and sclera clear  NECK: Supple, No JVD  CHEST/LUNG: Clear to auscultation bilaterally; No wheeze  HEART: Regular rate and rhythm; No murmurs, rubs, or gallops  ABDOMEN: Soft, Nontender, Nondistended; Bowel sounds present  EXTREMITIES:  2+ Peripheral Pulses, No clubbing, cyanosis, or edema  PSYCH: AAOx3  NEUROLOGY: non-focal  SKIN: No rashes or lesions    LABS:                        7.7    7.62  )-----------( 262      ( 05 Jul 2022 06:13 )             23.7     07-04    140  |  106  |  26<H>  ----------------------------<  107<H>  3.7   |  24  |  1.08    Ca    8.5      04 Jul 2022 05:50                  RADIOLOGY & ADDITIONAL TESTS:    Imaging Personally Reviewed:    Consultant(s) Notes Reviewed:      Care Discussed with Consultants/Other Providers:  
Patient is a 81y old  Female who presents with a chief complaint of Second ER evaluation, sent in by GI physician for melena with burgundy BM on exam (06 Jul 2022 12:02)      SUBJECTIVE / OVERNIGHT EVENTS: c/o R forearm erythema and swelling.   Review of Systems  chest pain no  palpitations no  sob no  nausea no  headache no    MEDICATIONS  (STANDING):  amitriptyline 25 milliGRAM(s) Oral at bedtime  ascorbic acid 500 milliGRAM(s) Oral two times a day  atorvastatin 10 milliGRAM(s) Oral at bedtime  buPROPion SR (12-Hour) 100 milliGRAM(s) Oral two times a day  carvedilol 25 milliGRAM(s) Oral every 12 hours  chlorhexidine 2% Cloths 1 Application(s) Topical daily  doxycycline monohydrate Capsule 100 milliGRAM(s) Oral every 12 hours  levothyroxine 75 MICROGram(s) Oral <User Schedule>  levothyroxine 112.5 MICROGram(s) Oral <User Schedule>  lidocaine   4% Patch 1 Patch Transdermal daily  lidocaine   4% Patch 1 Patch Transdermal daily  pantoprazole    Tablet 40 milliGRAM(s) Oral two times a day  CRYSTAL LAXATIVE CAPLETS 2 Tablet(s) 2 Tablet(s) Oral at bedtime  sucralfate suspension 1 Gram(s) Oral every 6 hours    MEDICATIONS  (PRN):  ALPRAZolam 1 milliGRAM(s) Oral at bedtime PRN SLEEP  benzocaine 15 mG/menthol 3.6 mG Lozenge 1 Lozenge Oral every 3 hours PRN Sore Throat  polyethylene glycol 3350 17 Gram(s) Oral daily PRN Constipation      Vital Signs Last 24 Hrs  T(C): 37 (06 Jul 2022 17:55), Max: 37.1 (05 Jul 2022 21:11)  T(F): 98.6 (06 Jul 2022 17:55), Max: 98.8 (06 Jul 2022 04:58)  HR: 73 (06 Jul 2022 17:55) (67 - 77)  BP: 124/90 (06 Jul 2022 17:55) (100/65 - 145/79)  BP(mean): --  RR: 18 (06 Jul 2022 17:55) (18 - 19)  SpO2: 96% (06 Jul 2022 17:55) (96% - 99%)    PHYSICAL EXAM:  GENERAL: NAD, well-developed  HEAD:  Atraumatic, Normocephalic  EYES: EOMI, PERRLA, conjunctiva and sclera clear  NECK: Supple, No JVD  CHEST/LUNG: Clear to auscultation bilaterally; No wheeze  HEART: Regular rate and rhythm; No murmurs, rubs, or gallops  ABDOMEN: Soft, Nontender, Nondistended; Bowel sounds present  EXTREMITIES:  2+ Peripheral Pulses, No clubbing, cyanosis, or edema R arm area of erythema and induration  PSYCH: AAOx3  NEUROLOGY: non-focal  SKIN: No rashes or lesions    LABS:                        8.0    8.69  )-----------( 278      ( 06 Jul 2022 07:34 )             25.2                       RADIOLOGY & ADDITIONAL TESTS:    Imaging Personally Reviewed:    Consultant(s) Notes Reviewed:      Care Discussed with Consultants/Other Providers:  
Patient is a 81y old  Female who presents with a chief complaint of Second ER evaluation, sent in by GI physician for melena with burgundy BM on exam (07 Jul 2022 12:29)      SUBJECTIVE / OVERNIGHT EVENTS: Comfortable without new complaints.   Review of Systems  chest pain no  palpitations no  sob no  nausea no  headache no    MEDICATIONS  (STANDING):  amitriptyline 25 milliGRAM(s) Oral at bedtime  ascorbic acid 500 milliGRAM(s) Oral two times a day  atorvastatin 10 milliGRAM(s) Oral at bedtime  buPROPion SR (12-Hour) 100 milliGRAM(s) Oral two times a day  carvedilol 25 milliGRAM(s) Oral every 12 hours  chlorhexidine 2% Cloths 1 Application(s) Topical daily  doxycycline monohydrate Capsule 100 milliGRAM(s) Oral every 12 hours  levothyroxine 75 MICROGram(s) Oral <User Schedule>  levothyroxine 112.5 MICROGram(s) Oral <User Schedule>  lidocaine   4% Patch 1 Patch Transdermal daily  lidocaine   4% Patch 1 Patch Transdermal daily  pantoprazole    Tablet 40 milliGRAM(s) Oral two times a day  CRYSTAL LAXATIVE CAPLETS 2 Tablet(s) 2 Tablet(s) Oral at bedtime  sucralfate suspension 1 Gram(s) Oral every 6 hours    MEDICATIONS  (PRN):  ALPRAZolam 1 milliGRAM(s) Oral at bedtime PRN SLEEP  benzocaine 15 mG/menthol 3.6 mG Lozenge 1 Lozenge Oral every 3 hours PRN Sore Throat  polyethylene glycol 3350 17 Gram(s) Oral daily PRN Constipation      Vital Signs Last 24 Hrs  T(C): 36.6 (07 Jul 2022 08:12), Max: 37 (06 Jul 2022 17:55)  T(F): 97.8 (07 Jul 2022 08:12), Max: 98.6 (06 Jul 2022 17:55)  HR: 62 (07 Jul 2022 08:12) (62 - 85)  BP: 117/68 (07 Jul 2022 08:12) (116/67 - 145/79)  BP(mean): --  RR: 17 (07 Jul 2022 08:12) (17 - 18)  SpO2: 97% (07 Jul 2022 08:12) (96% - 98%)    PHYSICAL EXAM:  GENERAL: NAD, well-developed  HEAD:  Atraumatic, Normocephalic  EYES: EOMI, PERRLA, conjunctiva and sclera clear  NECK: Supple, No JVD  CHEST/LUNG: Clear to auscultation bilaterally; No wheeze  HEART: Regular rate and rhythm; No murmurs, rubs, or gallops  ABDOMEN: Soft, Nontender, Nondistended; Bowel sounds present  EXTREMITIES:  2+ Peripheral Pulses, No clubbing, cyanosis, or edema R arm erythema improving   PSYCH: AAOx3  NEUROLOGY: non-focal  SKIN: No rashes or lesions    LABS:                        8.5    6.93  )-----------( 280      ( 07 Jul 2022 10:22 )             26.9                       RADIOLOGY & ADDITIONAL TESTS:    Imaging Personally Reviewed:    Consultant(s) Notes Reviewed:      Care Discussed with Consultants/Other Providers:  
Patient is a 81y old  Female who presents with a chief complaint of Second ER evaluation, sent in by GI physician for melena with burgundy BM on exam (30 Jun 2022 17:23)      SUBJECTIVE / OVERNIGHT EVENTS: Comfortable without new complaints. Daughter at bedside.   Review of Systems  chest pain no  palpitations no  sob no  nausea no  headache no    MEDICATIONS  (STANDING):  amitriptyline 25 milliGRAM(s) Oral at bedtime  atorvastatin 10 milliGRAM(s) Oral at bedtime  buPROPion SR (12-Hour) 100 milliGRAM(s) Oral two times a day  carvedilol 25 milliGRAM(s) Oral every 12 hours  chlorhexidine 2% Cloths 1 Application(s) Topical daily  diltiazem    milliGRAM(s) Oral daily  hydrALAZINE 50 milliGRAM(s) Oral two times a day  levothyroxine 75 MICROGram(s) Oral daily  losartan 100 milliGRAM(s) Oral daily  pantoprazole    Tablet 40 milliGRAM(s) Oral before breakfast  sucralfate suspension 1 Gram(s) Oral two times a day    MEDICATIONS  (PRN):  ALPRAZolam 1 milliGRAM(s) Oral at bedtime PRN SLEEP      Vital Signs Last 24 Hrs  T(C): 36.8 (01 Jul 2022 13:50), Max: 37.1 (30 Jun 2022 16:52)  T(F): 98.2 (01 Jul 2022 13:30), Max: 98.7 (30 Jun 2022 16:52)  HR: 68 (01 Jul 2022 16:00) (63 - 88)  BP: 131/63 (01 Jul 2022 16:00) (101/65 - 142/83)  BP(mean): --  RR: 20 (01 Jul 2022 16:00) (18 - 20)  SpO2: 98% (01 Jul 2022 16:00) (96% - 99%)    PHYSICAL EXAM:  GENERAL: NAD   HEAD:  Atraumatic, Normocephalic  EYES: EOMI, PERRLA, conjunctiva and sclera clear  NECK: Supple, No JVD  CHEST/LUNG: Clear to auscultation bilaterally; No wheeze  HEART: Regular rate and rhythm; No murmurs, rubs, or gallops  ABDOMEN: Soft, Nontender, Nondistended; Bowel sounds present  EXTREMITIES:  2+ Peripheral Pulses, No clubbing, cyanosis, or edema  PSYCH: AAOx3  NEUROLOGY: non-focal  SKIN: No rashes or lesions    LABS:                        8.0    6.02  )-----------( 227      ( 01 Jul 2022 11:18 )             25.0     06-30    139  |  104  |  15  ----------------------------<  104<H>  3.7   |  26  |  0.97    Ca    9.0      30 Jun 2022 05:31    TPro  6.3  /  Alb  3.6  /  TBili  0.3  /  DBili  x   /  AST  17  /  ALT  13  /  AlkPhos  73  06-29    PT/INR - ( 29 Jun 2022 17:20 )   PT: 13.3 sec;   INR: 1.14 ratio         PTT - ( 29 Jun 2022 17:20 )  PTT:30.1 sec      < from: Upper Endoscopy (07.01.22 @ 12:37) >                                                                                                        Impression:          - Z-line regular, 28 cm from the incisors.                  - Large hiatal hernia.                       - 10 cm hiatal hernia with multiple Dany ulcers.                       - Chronic gastritis. Biopsied.                       - A single bleeding angiodysplastic lesion in the duodenum. Clip (MR                        conditional) was placed.  Recommendation:      - Resume regular diet.                       - Continue present medications.                       - Await pathology results.    < end of copied text >        RADIOLOGY & ADDITIONAL TESTS:    Imaging Personally Reviewed:    Consultant(s) Notes Reviewed:      Care Discussed with Consultants/Other Providers:

## 2022-07-07 NOTE — PROGRESS NOTE ADULT - ASSESSMENT
melena  hiatal hernia  pud  camerons ulcers    plan  ppi bid  carafate qH6 hours  may need hiatal hernia repair, surgery consult  noted  f/u bx  H/H overall stable   no need for inpatient capsule, follow up with primary GI as an outpatient, this was discussed with patient multiple times  bowel regimen some miralax  no GI objection to dc planning   dw patient and daughter in detail     Advanced care planning was discussed with patient and family.  Advanced care planning forms were reviewed and discussed.  Risks, benefits and alternatives of gastroenterologic procedures were discussed in detail and all questions were answered.    30 minutes spent.

## 2022-07-07 NOTE — PROGRESS NOTE ADULT - ASSESSMENT
A/P  81 year old female with  htn, hyperlipidemia, thyroid ca, MAT, Diverticulitis, Hiatial hernia, Gerd, Osteoarthritis, anxiety presenting with syncope, dark stools, gi bleed    #GI bleed  -s/p egd, results noted, s/p duodenal clip  -cont ppi, off asa  -trend heme- down trending- GI fu ? may need capsule per gi as outpt   -possible hiatal hernia repair, surgery to consult  per GI    #syncope   -secondary to volume status, gi bleed  -echo normal     #hx of Multifocal atrial tachycardia.   -continue bb, no indication for a/c  -asa on hold     #hypertension.   -lower bp in setting of npo, bleed  -bp now stable  -- c/w BB     DCP per primary team       DVT ppx

## 2022-07-07 NOTE — PROGRESS NOTE ADULT - PROVIDER SPECIALTY LIST ADULT
Cardiology
Gastroenterology
Internal Medicine
Internal Medicine
Cardiology
Gastroenterology
Infectious Disease
Internal Medicine
Cardiology
Gastroenterology

## 2022-07-07 NOTE — PROGRESS NOTE ADULT - ASSESSMENT
Presentation of patient with suspected upper, although possible component of lower GI bleed with melena and burgundy BM with undifferentiated syncopal episode from [facility] and with second ER evaluation at [place] sent from the patient's GI physician.   Upgraded to telemetry due to history of multifocal atrial tachycardia to exclude cardiac equivalent.   Will follow H/H and orthostatics.    discontinue the ASA for now.     Patient apparently with difficult to control essential HTN, and will cautiously continue with Diltiazem, Coreg, hydralazine, ARB.    Anxiety disorder with patient on Wellbutrin, Xanax.    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 Hope    Pharmacologic DVT prophylaxis precluded with GI bleed.    Upper gastrointestinal bleeding stable.   - Follow H/H.   Orthostatics.   ASA stopped.  - PPI    - GI follow  - s/p EGD with large hiatal hernia, Dany ulcers, duodenal angiodysplastic bleeding lesion clipped   - regular diet  - if bleeding continues will need capsule endoscopy    Anemia posthemorrhagic  - Iron infusion   - follow Hct  - refuses transfusion PRBC      Hiatal hernia  - surgical evaluation as OTP    Multifocal atrial tachycardia.   - telemetry.  - continue Coreg with parameters   - cardiology follow Dr. Gaitan     Essential hypertension.   - control  - hold Hydralazine for hypotension    Anxiety disorder.   - Wellbutrin and Xanax.     Postoperative hypothyroidism.   - TSH.  Followed by her endocrinologist as above.    Neuropathy.   - SPEP / IPEP / UPEP and HgbA1C  pending     Cellulitis R arm  - Doxycicline for 5 days  - ID evaluation noted    Need for prophylactic measure.   - GI bleeding precludes pharmacologic DVT prophylaxis.   MIGUEL A for now.    DC home. Follow with PMD/ Gastroenterology/ Surgery in 3-4 days.     d/w patient QA    Paulie Moore MD phone [phone]

## 2022-07-15 ENCOUNTER — EMERGENCY (EMERGENCY)
Facility: HOSPITAL | Age: 82
LOS: 1 days | Discharge: HOME | End: 2022-07-15
Attending: EMERGENCY MEDICINE
Payer: MEDICARE

## 2022-07-15 VITALS
TEMPERATURE: 98 F | HEART RATE: 66 BPM | SYSTOLIC BLOOD PRESSURE: 159 MMHG | DIASTOLIC BLOOD PRESSURE: 107 MMHG | HEIGHT: 63 IN | RESPIRATION RATE: 17 BRPM | OXYGEN SATURATION: 99 % | WEIGHT: 126.1 LBS

## 2022-07-15 DIAGNOSIS — Z98.890 OTHER SPECIFIED POSTPROCEDURAL STATES: Chronic | ICD-10-CM

## 2022-07-15 DIAGNOSIS — E89.0 POSTPROCEDURAL HYPOTHYROIDISM: Chronic | ICD-10-CM

## 2022-07-15 DIAGNOSIS — S72.001A FRACTURE OF UNSPECIFIED PART OF NECK OF RIGHT FEMUR, INITIAL ENCOUNTER FOR CLOSED FRACTURE: Chronic | ICD-10-CM

## 2022-07-15 PROCEDURE — 93010 ELECTROCARDIOGRAM REPORT: CPT

## 2022-07-15 PROCEDURE — 99285 EMERGENCY DEPT VISIT HI MDM: CPT | Mod: 25

## 2022-07-15 NOTE — ED ADULT TRIAGE NOTE - CHIEF COMPLAINT QUOTE
recent dc from [facility] on July 7, 2022 after 9 days due to anemia; only received iron transfusions; comes in today for feeling weak; wants cbc checked; only seen by cardiologist since dc; hx gastric ulcers; instructed not to get up unassisted.

## 2022-07-16 VITALS
RESPIRATION RATE: 13 BRPM | DIASTOLIC BLOOD PRESSURE: 99 MMHG | TEMPERATURE: 98 F | SYSTOLIC BLOOD PRESSURE: 149 MMHG | HEART RATE: 60 BPM | OXYGEN SATURATION: 99 %

## 2022-07-16 PROBLEM — Z28.21 IMMUNIZATION NOT CARRIED OUT BECAUSE OF PATIENT REFUSAL: Chronic | Status: ACTIVE | Noted: 2022-06-30

## 2022-07-16 LAB
ALBUMIN SERPL ELPH-MCNC: 3.6 G/DL — SIGNIFICANT CHANGE UP (ref 3.3–5)
ALP SERPL-CCNC: 84 U/L — SIGNIFICANT CHANGE UP (ref 40–120)
ALT FLD-CCNC: 10 U/L — SIGNIFICANT CHANGE UP (ref 10–45)
ANION GAP SERPL CALC-SCNC: 10 MMOL/L — SIGNIFICANT CHANGE UP (ref 5–17)
APPEARANCE UR: CLEAR — SIGNIFICANT CHANGE UP
APTT BLD: 32.3 SEC — SIGNIFICANT CHANGE UP (ref 27.5–35.5)
AST SERPL-CCNC: 21 U/L — SIGNIFICANT CHANGE UP (ref 10–40)
BACTERIA # UR AUTO: NEGATIVE — SIGNIFICANT CHANGE UP
BASE EXCESS BLDV CALC-SCNC: 4.1 MMOL/L — HIGH (ref -2–2)
BASOPHILS # BLD AUTO: 0.05 K/UL — SIGNIFICANT CHANGE UP (ref 0–0.2)
BASOPHILS NFR BLD AUTO: 0.8 % — SIGNIFICANT CHANGE UP (ref 0–2)
BILIRUB SERPL-MCNC: <0.1 MG/DL — LOW (ref 0.2–1.2)
BILIRUB UR-MCNC: NEGATIVE — SIGNIFICANT CHANGE UP
BLOOD GAS VENOUS - CREATININE: SIGNIFICANT CHANGE UP MG/DL (ref 0.5–1.3)
BUN SERPL-MCNC: 19 MG/DL — SIGNIFICANT CHANGE UP (ref 7–23)
CA-I SERPL-SCNC: 1.21 MMOL/L — SIGNIFICANT CHANGE UP (ref 1.15–1.33)
CALCIUM SERPL-MCNC: 8.8 MG/DL — SIGNIFICANT CHANGE UP (ref 8.4–10.5)
CHLORIDE BLDV-SCNC: 103 MMOL/L — SIGNIFICANT CHANGE UP (ref 96–108)
CHLORIDE SERPL-SCNC: 104 MMOL/L — SIGNIFICANT CHANGE UP (ref 96–108)
CO2 BLDV-SCNC: 31 MMOL/L — HIGH (ref 22–26)
CO2 SERPL-SCNC: 26 MMOL/L — SIGNIFICANT CHANGE UP (ref 22–31)
COLOR SPEC: SIGNIFICANT CHANGE UP
CREAT SERPL-MCNC: 0.88 MG/DL — SIGNIFICANT CHANGE UP (ref 0.5–1.3)
DIFF PNL FLD: NEGATIVE — SIGNIFICANT CHANGE UP
EGFR: 67 ML/MIN/1.73M2 — SIGNIFICANT CHANGE UP
EOSINOPHIL # BLD AUTO: 0.2 K/UL — SIGNIFICANT CHANGE UP (ref 0–0.5)
EOSINOPHIL NFR BLD AUTO: 3.3 % — SIGNIFICANT CHANGE UP (ref 0–6)
EPI CELLS # UR: 2 /HPF — SIGNIFICANT CHANGE UP
FERRITIN SERPL-MCNC: 109 NG/ML — SIGNIFICANT CHANGE UP (ref 15–150)
GAS PNL BLDV: 138 MMOL/L — SIGNIFICANT CHANGE UP (ref 136–145)
GAS PNL BLDV: SIGNIFICANT CHANGE UP
GAS PNL BLDV: SIGNIFICANT CHANGE UP
GLUCOSE BLDV-MCNC: 88 MG/DL — SIGNIFICANT CHANGE UP (ref 70–99)
GLUCOSE SERPL-MCNC: 98 MG/DL — SIGNIFICANT CHANGE UP (ref 70–99)
GLUCOSE UR QL: NEGATIVE — SIGNIFICANT CHANGE UP
HCO3 BLDV-SCNC: 30 MMOL/L — HIGH (ref 22–29)
HCT VFR BLD CALC: 29.6 % — LOW (ref 34.5–45)
HCT VFR BLDA CALC: 29 % — LOW (ref 34.5–46.5)
HGB BLD CALC-MCNC: 9.6 G/DL — LOW (ref 11.7–16.1)
HGB BLD-MCNC: 9.2 G/DL — LOW (ref 11.5–15.5)
HYALINE CASTS # UR AUTO: 0 /LPF — SIGNIFICANT CHANGE UP (ref 0–2)
IMM GRANULOCYTES NFR BLD AUTO: 0.3 % — SIGNIFICANT CHANGE UP (ref 0–1.5)
INR BLD: 1.1 RATIO — SIGNIFICANT CHANGE UP (ref 0.88–1.16)
IRON SATN MFR SERPL: 12 % — LOW (ref 14–50)
IRON SATN MFR SERPL: 29 UG/DL — LOW (ref 30–160)
KETONES UR-MCNC: NEGATIVE — SIGNIFICANT CHANGE UP
LACTATE BLDV-MCNC: 0.8 MMOL/L — SIGNIFICANT CHANGE UP (ref 0.7–2)
LEUKOCYTE ESTERASE UR-ACNC: ABNORMAL
LYMPHOCYTES # BLD AUTO: 2.14 K/UL — SIGNIFICANT CHANGE UP (ref 1–3.3)
LYMPHOCYTES # BLD AUTO: 34.8 % — SIGNIFICANT CHANGE UP (ref 13–44)
MAGNESIUM SERPL-MCNC: 2.2 MG/DL — SIGNIFICANT CHANGE UP (ref 1.6–2.6)
MCHC RBC-ENTMCNC: 31.1 GM/DL — LOW (ref 32–36)
MCHC RBC-ENTMCNC: 31.5 PG — SIGNIFICANT CHANGE UP (ref 27–34)
MCV RBC AUTO: 101.4 FL — HIGH (ref 80–100)
MONOCYTES # BLD AUTO: 0.58 K/UL — SIGNIFICANT CHANGE UP (ref 0–0.9)
MONOCYTES NFR BLD AUTO: 9.4 % — SIGNIFICANT CHANGE UP (ref 2–14)
NEUTROPHILS # BLD AUTO: 3.16 K/UL — SIGNIFICANT CHANGE UP (ref 1.8–7.4)
NEUTROPHILS NFR BLD AUTO: 51.4 % — SIGNIFICANT CHANGE UP (ref 43–77)
NITRITE UR-MCNC: NEGATIVE — SIGNIFICANT CHANGE UP
NRBC # BLD: 0 /100 WBCS — SIGNIFICANT CHANGE UP (ref 0–0)
PCO2 BLDV: 49 MMHG — HIGH (ref 39–42)
PH BLDV: 7.39 — SIGNIFICANT CHANGE UP (ref 7.32–7.43)
PH UR: 7.5 — SIGNIFICANT CHANGE UP (ref 5–8)
PLATELET # BLD AUTO: 266 K/UL — SIGNIFICANT CHANGE UP (ref 150–400)
PO2 BLDV: 25 MMHG — SIGNIFICANT CHANGE UP (ref 25–45)
POTASSIUM BLDV-SCNC: 3.4 MMOL/L — LOW (ref 3.5–5.1)
POTASSIUM SERPL-MCNC: 3.7 MMOL/L — SIGNIFICANT CHANGE UP (ref 3.5–5.3)
POTASSIUM SERPL-SCNC: 3.7 MMOL/L — SIGNIFICANT CHANGE UP (ref 3.5–5.3)
PROT SERPL-MCNC: 6.6 G/DL — SIGNIFICANT CHANGE UP (ref 6–8.3)
PROT UR-MCNC: NEGATIVE — SIGNIFICANT CHANGE UP
PROTHROM AB SERPL-ACNC: 12.8 SEC — SIGNIFICANT CHANGE UP (ref 10.5–13.4)
RAPID RVP RESULT: SIGNIFICANT CHANGE UP
RBC # BLD: 2.92 M/UL — LOW (ref 3.8–5.2)
RBC # FLD: 15.1 % — HIGH (ref 10.3–14.5)
RBC CASTS # UR COMP ASSIST: 1 /HPF — SIGNIFICANT CHANGE UP (ref 0–4)
SAO2 % BLDV: 31.3 % — LOW (ref 67–88)
SARS-COV-2 RNA SPEC QL NAA+PROBE: SIGNIFICANT CHANGE UP
SODIUM SERPL-SCNC: 140 MMOL/L — SIGNIFICANT CHANGE UP (ref 135–145)
SP GR SPEC: 1.01 — SIGNIFICANT CHANGE UP (ref 1.01–1.02)
TIBC SERPL-MCNC: 242 UG/DL — SIGNIFICANT CHANGE UP (ref 220–430)
TRANSFERRIN SERPL-MCNC: 199 MG/DL — LOW (ref 200–360)
UIBC SERPL-MCNC: 213 UG/DL — SIGNIFICANT CHANGE UP (ref 110–370)
UROBILINOGEN FLD QL: NEGATIVE — SIGNIFICANT CHANGE UP
WBC # BLD: 6.15 K/UL — SIGNIFICANT CHANGE UP (ref 3.8–10.5)
WBC # FLD AUTO: 6.15 K/UL — SIGNIFICANT CHANGE UP (ref 3.8–10.5)
WBC UR QL: 14 /HPF — HIGH (ref 0–5)

## 2022-07-16 PROCEDURE — 84466 ASSAY OF TRANSFERRIN: CPT

## 2022-07-16 PROCEDURE — 71045 X-RAY EXAM CHEST 1 VIEW: CPT | Mod: 26

## 2022-07-16 PROCEDURE — 83540 ASSAY OF IRON: CPT

## 2022-07-16 PROCEDURE — 83605 ASSAY OF LACTIC ACID: CPT

## 2022-07-16 PROCEDURE — 36415 COLL VENOUS BLD VENIPUNCTURE: CPT

## 2022-07-16 PROCEDURE — 82947 ASSAY GLUCOSE BLOOD QUANT: CPT

## 2022-07-16 PROCEDURE — 82330 ASSAY OF CALCIUM: CPT

## 2022-07-16 PROCEDURE — 0225U NFCT DS DNA&RNA 21 SARSCOV2: CPT

## 2022-07-16 PROCEDURE — 84484 ASSAY OF TROPONIN QUANT: CPT

## 2022-07-16 PROCEDURE — 85018 HEMOGLOBIN: CPT

## 2022-07-16 PROCEDURE — 82565 ASSAY OF CREATININE: CPT

## 2022-07-16 PROCEDURE — 71045 X-RAY EXAM CHEST 1 VIEW: CPT

## 2022-07-16 PROCEDURE — 84295 ASSAY OF SERUM SODIUM: CPT

## 2022-07-16 PROCEDURE — 87086 URINE CULTURE/COLONY COUNT: CPT

## 2022-07-16 PROCEDURE — 85610 PROTHROMBIN TIME: CPT

## 2022-07-16 PROCEDURE — 82728 ASSAY OF FERRITIN: CPT

## 2022-07-16 PROCEDURE — 83735 ASSAY OF MAGNESIUM: CPT

## 2022-07-16 PROCEDURE — 85730 THROMBOPLASTIN TIME PARTIAL: CPT

## 2022-07-16 PROCEDURE — 93005 ELECTROCARDIOGRAM TRACING: CPT

## 2022-07-16 PROCEDURE — 81001 URINALYSIS AUTO W/SCOPE: CPT

## 2022-07-16 PROCEDURE — 85014 HEMATOCRIT: CPT

## 2022-07-16 PROCEDURE — 84132 ASSAY OF SERUM POTASSIUM: CPT

## 2022-07-16 PROCEDURE — 80053 COMPREHEN METABOLIC PANEL: CPT

## 2022-07-16 PROCEDURE — 85025 COMPLETE CBC W/AUTO DIFF WBC: CPT

## 2022-07-16 PROCEDURE — 99285 EMERGENCY DEPT VISIT HI MDM: CPT | Mod: 25

## 2022-07-16 PROCEDURE — 83550 IRON BINDING TEST: CPT

## 2022-07-16 PROCEDURE — 82435 ASSAY OF BLOOD CHLORIDE: CPT

## 2022-07-16 PROCEDURE — 82803 BLOOD GASES ANY COMBINATION: CPT

## 2022-07-16 NOTE — ED PROVIDER NOTE - OBJECTIVE STATEMENT
81-year-old female with reported past medical history of GI bleed, CAD, iron deficiency anemia, hiatal hernia with GERD, stage II CKD, osteoarthritis, hypertension presented to the ED with generalized weakness over the last 2 weeks. Patient was recently discharged from the hospital with a GI bleed. She is concerned that she may be anemic. She denies any exacerbating or alleviating factors. No fevers at home. No urinary symptoms. She denies any chest pain, shortness of breath, abdominal pain, focal neurologic deficit, numbness, or tingling. She denies any other symptoms at bedside.

## 2022-07-16 NOTE — ED PROVIDER NOTE - PHYSICAL EXAMINATION
GENERAL: no acute distress, non-toxic appearing  HEAD: normocephalic, atraumatic  HEENT: normal conjunctiva, oral mucosa moist, neck supple  CARDIAC: regular rate and rhythm, normal S1 and S2,  no appreciable murmurs  PULM: clear to ascultation bilaterally, no crackles, rales, rhonchi, or wheezing  GI: abdomen nondistended, soft, nontender, no guarding or rebound tenderness  : no CVA tenderness, no suprapubic tenderness  NEURO: alert and oriented x 3, normal speech, PERRLA, EOMI, no focal motor or sensory deficits  MSK: no visible deformities, no peripheral edema, calf tenderness/redness/swelling  SKIN: no visible rashes, dry, well-perfused  PSYCH: appropriate mood and affect

## 2022-07-16 NOTE — ED PROVIDER NOTE - ATTENDING CONTRIBUTION TO CARE
81yr F hx of GERD, CKD, OA, HTN not on AC p/w fatigue for 2 wks. went to her GI and was told to present immediately to ER. patient reports seeing GI to check her Iron level because she believes it is the culprit for her symptoms. currently denies melena, but had in the past. had recent EGD and colonoscopy notable for hiatal hernias per herself. she reports no cp, sob, dizziness palpitations now.   exam as above.  normal vitals, labs no acute need for interventions, ekg non ischemic and trop added.   explained to her that no need for inpatient management and can follow up with GI given no evidence of acute ongoing bleeding. she requests iron studies. told her that we will send it but won't have results right away and that she can call. she is upset and says she would not have come to the ED had she known she won't have the iron studies results. I sympathized with her and reassured herno need for emergent intervention. she will follow up with her primary.

## 2022-07-16 NOTE — ED PROVIDER NOTE - ADDITIONAL NOTES AND INSTRUCTIONS:
Please excuse from work/school as he/she was being evaluated in the Emergency Room at [facility].

## 2022-07-16 NOTE — ED PROVIDER NOTE - CLINICAL SUMMARY MEDICAL DECISION MAKING FREE TEXT BOX
81-year-old female with reported past medical history of GI bleed, CAD, iron deficiency anemia, hiatal hernia with GERD, stage II CKD, osteoarthritis, hypertension presented to the ED with generalized weakness over the last 2 weeks. vitals non actionable. PE as noted above. otherwise well appearing. no dark stools. no sign of bleeding. the differential diagnoses includes, but not limited to: anemia vs metabolic derangements vs other endocrine disorder. will order labs, imaging, meds, reassess

## 2022-07-16 NOTE — ED PROVIDER NOTE - PATIENT PORTAL LINK FT
You can access the FollowMyHealth Patient Portal offered by [facility] by registering at the following website: [email or web address]. By joining [facility]’s FollowMyHealth portal, you will also be able to view your health information using other applications (apps) compatible with our system.

## 2022-07-16 NOTE — ED ADULT NURSE REASSESSMENT NOTE - NS ED NURSE REASSESS COMMENT FT1
Report received from Marybeth HONG. Patient seen resting comfortably in bed. Patient states she feels weak but otherwise unchanged. Safety and comfort provided.

## 2022-07-16 NOTE — ED ADULT NURSE NOTE - OBJECTIVE STATEMENT
Pt is a 80 y/o female presenting to the ED c/o weakness. Pt endorses increased in generalized weakness for the past few days. Pt was recently discharged from [facility], received endoscopy noted to have hiatal hernia, duodenal ulcers, and low iron levels. Pt states she was supposed to follow up with GI outpatient, but has not yet. PMH HTN. Pt is A&Ox3, follows commands, speaks coherently, sensory/motor function intact, VSS, NAD noted. Pt denies chest pain, SOB, abdominal pain, n/v/d, fever, chills, back pain, lightheadedness, dizziness, numbness/tingling at this time.

## 2022-07-16 NOTE — ED ADULT NURSE NOTE - CHIEF COMPLAINT QUOTE
recent dc from [facility] on July 7, 2022 after 9 days due to anemia; only received iron transfusions; comes in today for feeling weak; wants cbc checked; only seen by cardiologist since dc; hx gastric ulcers; instructed not to get up unassisted.

## 2022-07-17 LAB
CULTURE RESULTS: SIGNIFICANT CHANGE UP
SPECIMEN SOURCE: SIGNIFICANT CHANGE UP

## 2022-07-17 NOTE — ED POST DISCHARGE NOTE - DETAILS
7/17: m to call back admin line - Annette Dietz PA-C 7/17: spoke with patient, made aware of results - Annette Dietz PA-C 7/18: spoke to patient, no urinary symptoms, recommend that we not treat at this time, encouraged PCP follow up- Annette Dietz PA-C

## 2022-09-06 NOTE — ED PROVIDER NOTE - CONSTITUTIONAL NEGATIVE STATEMENT, MLM
Daily protein intake : 65 gm  Daily fluid intake :  64 oz  Current exercise: walking  Urine: yellow  Stool: miralax, no liquid stools  Supplements: beginning vitamins, multi with iron and vitamin D         no fever and no chills.

## 2022-09-21 ENCOUNTER — APPOINTMENT (OUTPATIENT)
Dept: PULMONOLOGY | Facility: CLINIC | Age: 82
End: 2022-09-21

## 2022-09-21 VITALS
TEMPERATURE: 97.6 F | HEART RATE: 63 BPM | DIASTOLIC BLOOD PRESSURE: 74 MMHG | BODY MASS INDEX: 26.87 KG/M2 | OXYGEN SATURATION: 95 % | SYSTOLIC BLOOD PRESSURE: 134 MMHG | HEIGHT: 58 IN | WEIGHT: 128 LBS

## 2022-09-21 PROCEDURE — 99213 OFFICE O/P EST LOW 20 MIN: CPT

## 2022-09-21 NOTE — DISCUSSION/SUMMARY
[FreeTextEntry1] : Respiratory status stable.\par ? Tracheal polyps.  No significant change on prior CT.

## 2022-09-21 NOTE — HISTORY OF PRESENT ILLNESS
[Never] : never [TextBox_4] : had recent syncope and went to Er , found bleeding ulcers and had erosion of duodenum,had clip placed, from Ecot[facility], needed iron but better now, did get 3 blood transfusions. Also has hiatal hernia\par \par No significant cough, wheezing, chest pain or SOB.\par Occ weak voice. \par \par Due for ct chest had 1 year ago.\par \par \par never takes flu vaccine [TextBox_29] : Second hand tobacco.

## 2022-09-21 NOTE — ASSESSMENT
[FreeTextEntry1] : Continue observation.\par Follow-up CAT scan\par Reassess need for follow-up CT if stable.

## 2022-09-28 ENCOUNTER — APPOINTMENT (OUTPATIENT)
Dept: CT IMAGING | Facility: IMAGING CENTER | Age: 82
End: 2022-09-28

## 2022-09-28 ENCOUNTER — OUTPATIENT (OUTPATIENT)
Dept: OUTPATIENT SERVICES | Facility: HOSPITAL | Age: 82
LOS: 1 days | End: 2022-09-28
Payer: MEDICARE

## 2022-09-28 DIAGNOSIS — E89.0 POSTPROCEDURAL HYPOTHYROIDISM: Chronic | ICD-10-CM

## 2022-09-28 DIAGNOSIS — J39.8 OTHER SPECIFIED DISEASES OF UPPER RESPIRATORY TRACT: ICD-10-CM

## 2022-09-28 DIAGNOSIS — S72.001A FRACTURE OF UNSPECIFIED PART OF NECK OF RIGHT FEMUR, INITIAL ENCOUNTER FOR CLOSED FRACTURE: Chronic | ICD-10-CM

## 2022-09-28 DIAGNOSIS — Z98.890 OTHER SPECIFIED POSTPROCEDURAL STATES: Chronic | ICD-10-CM

## 2022-09-28 PROCEDURE — 71250 CT THORAX DX C-: CPT

## 2022-09-28 PROCEDURE — 71250 CT THORAX DX C-: CPT | Mod: 26

## 2022-09-28 NOTE — ED PROVIDER NOTE - RESPIRATORY NEGATIVE STATEMENT, MLM
----- Message from BAUTISTA Reeder NP sent at 9/28/2022 12:10 PM EDT -----  Bone density has decreased slightly since her previous DEXA scan. Are you still getting the Prolia injections? no chest pain, no cough, and no shortness of breath.

## 2022-10-05 ENCOUNTER — APPOINTMENT (OUTPATIENT)
Dept: ENDOCRINOLOGY | Facility: CLINIC | Age: 82
End: 2022-10-05

## 2022-10-13 NOTE — ED ADULT NURSE NOTE - NS_SISCREENINGSR_GEN_ALL_ED
Post-Care Instructions: I reviewed with the patient in detail post-care instructions. Patient is to wear sunprotection, and avoid picking at any of the treated lesions. Pt may apply Vaseline to crusted or scabbing areas. Show Applicator Variable?: Yes Include Z78.9 (Other Specified Conditions Influencing Health Status) As An Associated Diagnosis?: No Medical Necessity Clause: This procedure was medically necessary because the lesions that were treated were: Detail Level: Detailed Consent: The patient's consent was obtained including but not limited to risks of crusting, scabbing, blistering, scarring, darker or lighter pigmentary change, recurrence, incomplete removal and infection. Medical Necessity Information: It is in your best interest to select a reason for this procedure from the list below. All of these items fulfill various CMS LCD requirements except the new and changing color options. Spray Paint Text: The liquid nitrogen was applied to the skin utilizing a spray paint frosting technique. Negative

## 2022-10-14 ENCOUNTER — APPOINTMENT (OUTPATIENT)
Dept: ENDOCRINOLOGY | Facility: CLINIC | Age: 82
End: 2022-10-14

## 2022-10-21 ENCOUNTER — OUTPATIENT (OUTPATIENT)
Dept: OUTPATIENT SERVICES | Facility: HOSPITAL | Age: 82
LOS: 1 days | End: 2022-10-21
Payer: MEDICARE

## 2022-10-21 ENCOUNTER — APPOINTMENT (OUTPATIENT)
Dept: ULTRASOUND IMAGING | Facility: IMAGING CENTER | Age: 82
End: 2022-10-21

## 2022-10-21 DIAGNOSIS — Z98.890 OTHER SPECIFIED POSTPROCEDURAL STATES: Chronic | ICD-10-CM

## 2022-10-21 DIAGNOSIS — S72.001A FRACTURE OF UNSPECIFIED PART OF NECK OF RIGHT FEMUR, INITIAL ENCOUNTER FOR CLOSED FRACTURE: Chronic | ICD-10-CM

## 2022-10-21 DIAGNOSIS — Z85.850 PERSONAL HISTORY OF MALIGNANT NEOPLASM OF THYROID: ICD-10-CM

## 2022-10-21 DIAGNOSIS — E89.0 POSTPROCEDURAL HYPOTHYROIDISM: Chronic | ICD-10-CM

## 2022-10-21 PROCEDURE — 76536 US EXAM OF HEAD AND NECK: CPT

## 2022-10-21 PROCEDURE — 76536 US EXAM OF HEAD AND NECK: CPT | Mod: 26

## 2022-11-09 ENCOUNTER — APPOINTMENT (OUTPATIENT)
Dept: ENDOCRINOLOGY | Facility: CLINIC | Age: 82
End: 2022-11-09

## 2022-11-09 VITALS
OXYGEN SATURATION: 98 % | TEMPERATURE: 97.6 F | HEART RATE: 73 BPM | BODY MASS INDEX: 30.09 KG/M2 | HEIGHT: 55 IN | DIASTOLIC BLOOD PRESSURE: 88 MMHG | WEIGHT: 130 LBS | SYSTOLIC BLOOD PRESSURE: 122 MMHG

## 2022-11-09 DIAGNOSIS — M85.80 OTHER SPECIFIED DISORDERS OF BONE DENSITY AND STRUCTURE, UNSPECIFIED SITE: ICD-10-CM

## 2022-11-09 PROCEDURE — 99215 OFFICE O/P EST HI 40 MIN: CPT

## 2022-11-10 NOTE — REVIEW OF SYSTEMS
[Fatigue] : fatigue [Recent Weight Gain (___ Lbs)] : recent weight gain: [unfilled] lbs [Dysphagia] : dysphagia [Dysphonia] : dysphonia [Constipation] : constipation [Joint Pain] : joint pain [Hair Loss] : hair loss [Negative] : Respiratory [Neck Pain] : no neck pain [Polyuria] : no polyuria [Poor Balance] : poor balance [Anxiety] : no anxiety [Cold Intolerance] : no cold intolerance [Heat Intolerance] : no heat intolerance

## 2022-11-10 NOTE — DATA REVIEWED
[FreeTextEntry1] : Labs\par \par 7/24/19\par TSH 0.92 Free T4 1.7\par \par 7/7/21\par Reviewed\par \par 11/8/22\par H/H 13.7/41.1\par

## 2022-11-10 NOTE — REASON FOR VISIT
[Follow - Up] : a follow-up visit [Hypothyroidism] : hypothyroidism [Family Member] : family member [Osteoporosis] : osteoporosis

## 2022-11-10 NOTE — HISTORY OF PRESENT ILLNESS
[FreeTextEntry1] : 82 y.o. female with h/o retrosternal goiter s/p surgery in 9/2003 with hypothyroidism presents for follow up visit. Surgery revealed 4 mm microfocus of papillary thyroid cancer in the setting of Hashimoto's disease. Did have residual right neck tissue and being followed with yearly sonograms. Never had FNA of right neck tissue. No neck complaints. Feeling good. BP has been labile and seeing cardiology. Following with urogyn for uterovaginal prolapse. Dealing with GERD/hiatal hernia and difficulty with swallowing. Always constipated. Seeing GI. Taking Synthroid 75 mcg daily and extra 1/2 tab once weekly.  Sonogram in 2/2017 showed atrophic residual left lobe tissue and right lobe appears heterogenous without discrete nodules. There are small LNs nonspecific. Thyroid sonogram in December 2017 shows residual right thyroid lobe tissue measuring 2.7 cm and left neck bed looks unremarkable. Thyroid sonogram in April 2019 showed residual right thyroid lobe measuring 3 cm and left neck bed 0.6 cm nodule which appears stable. Thyroid sonogram on 10/21/22 shows right neck bed tissue which heterogenous without discrete nodule and on left 0.3 cm hypoechoic area which previously measured  0.6 cm. There is 0.8 cm left level 3 LN which is slightly atypical in its radiographic appearance. \par \par Also h/o prediabetes, reports weight is stable. Reports diet could be better. No exercise. \par \par Also h/o osteoporosis, DEXA scan in 2/2016 showed spine -0.5 and femoral neck -1.7. Had right hip fracture in 11/2018 and required surgery. Reports frequents falls. Using walker. Reports decrease in height. No other fractures. Takes calcium 600 mg with vitamin D BID. DEXA scan is 2/20/18 shows left femoral neck -2.0 and total hip -0.8 with spine -0.1 with 1/3 radius -1.7. DEXA scan performed in April 2019 shows 1/3 radius -1.5, left femoral neck -1.9 and total hip -1.4, spine -0.1 (not accurate given arthritis). Reports being treated many years ago with ? oral bisphosphonate briefly but not clear what type reaction she experienced. C/o right knee pain. DEXA scan performed in October 2021 shows 1/3 radius -2.0 with a 4.6% decrease, left femoral neck -2.3 with 7.1% decrease and total hip -1.6 with 2.7% decrease. \par \par Follows with GI for ischemic colitis. Also sees hematology and was getting iron infusions. \par \par Also takes Centrum silver MVI daily and vitamin C 500 mg daily.

## 2022-11-10 NOTE — ASSESSMENT
[Bisphosphonate Therapy] : Risks  and benefits of bisphosphonate therapy were  discussed with the patient including gastroesophageal irritation, osteonecrosis of the jaw, and atypical femur fractures, and acute phase reaction [Denosumab Therapy] : Risks  and benefits of denosumab therapy were discussed with the patient including eczema, cellulitis, osteonecrosis of the jaw and atypical femur fractures [FreeTextEntry1] : 82 y.o. female with h/o hypothyroidism s/p surgery with residual thyroid tissue, prediabetes and osteoporosis.\par \par 1. Hypothyroidism s/p surgery with microfocus of papillary thyroid cancer- Patient appears euthyroid. Will check TFTs and will adjust Synthroid if needed. No need for TSH suppression. Goal TSH is below 2.5. Thyroid ultrasound done in October 2022 was reviewed and will monitor for now. Recommend repeat ultrasound in 3 to 6 months.\par \par 2. Prediabetes- Encouraged a carbohydrate consistent diet and exercise. Will check CMP and Hba1c. Will monitor for now.\par \par 3. Osteoporosis- DEXA scan performed in October 2021 shows 1/3 radius -2.0 with a 4.6% decrease, left femoral neck -2.3 with 7.1% decrease and total hip -1.6 with 2.7% decrease. Given h/o right hip fracture, patient is at increased risk of fracture. Recommend medical therapy. Given GI history, not a good candidate for oral bisphosphonates. Recommend IV Reclast or Denosumab. Reviewed risks and benefits. Patient is interested in Denosumab and will look into the cost. Encouraged weight bearing activity. Discussed appropriate calcium and vitamin D intake. Will check serum calcium and 25 vitamin D levels. \par \par Follow up in 2 to 3 weeks for Denosumab with our RN

## 2022-11-11 LAB
25(OH)D3 SERPL-MCNC: 65.9 NG/ML
ALBUMIN SERPL ELPH-MCNC: 4.3 G/DL
ALP BLD-CCNC: 89 U/L
ALT SERPL-CCNC: 11 U/L
ANION GAP SERPL CALC-SCNC: 12 MMOL/L
AST SERPL-CCNC: 22 U/L
BILIRUB SERPL-MCNC: 0.2 MG/DL
BUN SERPL-MCNC: 16 MG/DL
CALCIUM SERPL-MCNC: 9.5 MG/DL
CHLORIDE SERPL-SCNC: 99 MMOL/L
CO2 SERPL-SCNC: 27 MMOL/L
CREAT SERPL-MCNC: 0.95 MG/DL
EGFR: 61 ML/MIN/1.73M2
ESTIMATED AVERAGE GLUCOSE: 131 MG/DL
GLUCOSE SERPL-MCNC: 100 MG/DL
HBA1C MFR BLD HPLC: 6.2 %
POTASSIUM SERPL-SCNC: 4.1 MMOL/L
PROT SERPL-MCNC: 7 G/DL
SODIUM SERPL-SCNC: 138 MMOL/L
T4 FREE SERPL-MCNC: 1.4 NG/DL
TSH SERPL-ACNC: 4.61 UIU/ML

## 2022-11-21 ENCOUNTER — APPOINTMENT (OUTPATIENT)
Dept: ENDOCRINOLOGY | Facility: CLINIC | Age: 82
End: 2022-11-21

## 2022-11-24 ENCOUNTER — EMERGENCY (EMERGENCY)
Facility: HOSPITAL | Age: 82
LOS: 1 days | Discharge: HOME | End: 2022-11-24
Attending: STUDENT IN AN ORGANIZED HEALTH CARE EDUCATION/TRAINING PROGRAM
Payer: MEDICARE

## 2022-11-24 VITALS
OXYGEN SATURATION: 98 % | HEART RATE: 62 BPM | SYSTOLIC BLOOD PRESSURE: 170 MMHG | RESPIRATION RATE: 18 BRPM | DIASTOLIC BLOOD PRESSURE: 80 MMHG | TEMPERATURE: 98 F

## 2022-11-24 VITALS
OXYGEN SATURATION: 98 % | RESPIRATION RATE: 18 BRPM | TEMPERATURE: 98 F | HEART RATE: 61 BPM | DIASTOLIC BLOOD PRESSURE: 86 MMHG | HEIGHT: 63 IN | SYSTOLIC BLOOD PRESSURE: 147 MMHG | WEIGHT: 128.09 LBS

## 2022-11-24 DIAGNOSIS — Z98.890 OTHER SPECIFIED POSTPROCEDURAL STATES: Chronic | ICD-10-CM

## 2022-11-24 DIAGNOSIS — E89.0 POSTPROCEDURAL HYPOTHYROIDISM: Chronic | ICD-10-CM

## 2022-11-24 DIAGNOSIS — S72.001A FRACTURE OF UNSPECIFIED PART OF NECK OF RIGHT FEMUR, INITIAL ENCOUNTER FOR CLOSED FRACTURE: Chronic | ICD-10-CM

## 2022-11-24 LAB
ALBUMIN SERPL ELPH-MCNC: 3.8 G/DL — SIGNIFICANT CHANGE UP (ref 3.3–5)
ALP SERPL-CCNC: 76 U/L — SIGNIFICANT CHANGE UP (ref 40–120)
ALT FLD-CCNC: 9 U/L — LOW (ref 10–45)
ANION GAP SERPL CALC-SCNC: 10 MMOL/L — SIGNIFICANT CHANGE UP (ref 5–17)
AST SERPL-CCNC: 21 U/L — SIGNIFICANT CHANGE UP (ref 10–40)
BASOPHILS # BLD AUTO: 0.03 K/UL — SIGNIFICANT CHANGE UP (ref 0–0.2)
BASOPHILS NFR BLD AUTO: 0.5 % — SIGNIFICANT CHANGE UP (ref 0–2)
BILIRUB SERPL-MCNC: 0.2 MG/DL — SIGNIFICANT CHANGE UP (ref 0.2–1.2)
BUN SERPL-MCNC: 11 MG/DL — SIGNIFICANT CHANGE UP (ref 7–23)
CALCIUM SERPL-MCNC: 9.2 MG/DL — SIGNIFICANT CHANGE UP (ref 8.4–10.5)
CHLORIDE SERPL-SCNC: 101 MMOL/L — SIGNIFICANT CHANGE UP (ref 96–108)
CO2 SERPL-SCNC: 26 MMOL/L — SIGNIFICANT CHANGE UP (ref 22–31)
CREAT SERPL-MCNC: 0.85 MG/DL — SIGNIFICANT CHANGE UP (ref 0.5–1.3)
EGFR: 69 ML/MIN/1.73M2 — SIGNIFICANT CHANGE UP
EOSINOPHIL # BLD AUTO: 0.11 K/UL — SIGNIFICANT CHANGE UP (ref 0–0.5)
EOSINOPHIL NFR BLD AUTO: 1.9 % — SIGNIFICANT CHANGE UP (ref 0–6)
FLUAV AG NPH QL: SIGNIFICANT CHANGE UP
FLUBV AG NPH QL: SIGNIFICANT CHANGE UP
GLUCOSE SERPL-MCNC: 93 MG/DL — SIGNIFICANT CHANGE UP (ref 70–99)
HCT VFR BLD CALC: 38.8 % — SIGNIFICANT CHANGE UP (ref 34.5–45)
HGB BLD-MCNC: 12.8 G/DL — SIGNIFICANT CHANGE UP (ref 11.5–15.5)
IMM GRANULOCYTES NFR BLD AUTO: 0.3 % — SIGNIFICANT CHANGE UP (ref 0–0.9)
LYMPHOCYTES # BLD AUTO: 1.48 K/UL — SIGNIFICANT CHANGE UP (ref 1–3.3)
LYMPHOCYTES # BLD AUTO: 25.5 % — SIGNIFICANT CHANGE UP (ref 13–44)
MCHC RBC-ENTMCNC: 29.9 PG — SIGNIFICANT CHANGE UP (ref 27–34)
MCHC RBC-ENTMCNC: 33 GM/DL — SIGNIFICANT CHANGE UP (ref 32–36)
MCV RBC AUTO: 90.7 FL — SIGNIFICANT CHANGE UP (ref 80–100)
MONOCYTES # BLD AUTO: 0.65 K/UL — SIGNIFICANT CHANGE UP (ref 0–0.9)
MONOCYTES NFR BLD AUTO: 11.2 % — SIGNIFICANT CHANGE UP (ref 2–14)
NEUTROPHILS # BLD AUTO: 3.51 K/UL — SIGNIFICANT CHANGE UP (ref 1.8–7.4)
NEUTROPHILS NFR BLD AUTO: 60.6 % — SIGNIFICANT CHANGE UP (ref 43–77)
NRBC # BLD: 0 /100 WBCS — SIGNIFICANT CHANGE UP (ref 0–0)
PLATELET # BLD AUTO: 261 K/UL — SIGNIFICANT CHANGE UP (ref 150–400)
POTASSIUM SERPL-MCNC: 4 MMOL/L — SIGNIFICANT CHANGE UP (ref 3.5–5.3)
POTASSIUM SERPL-SCNC: 4 MMOL/L — SIGNIFICANT CHANGE UP (ref 3.5–5.3)
PROT SERPL-MCNC: 7.1 G/DL — SIGNIFICANT CHANGE UP (ref 6–8.3)
RBC # BLD: 4.28 M/UL — SIGNIFICANT CHANGE UP (ref 3.8–5.2)
RBC # FLD: 15.8 % — HIGH (ref 10.3–14.5)
RSV RNA NPH QL NAA+NON-PROBE: SIGNIFICANT CHANGE UP
SARS-COV-2 RNA SPEC QL NAA+PROBE: SIGNIFICANT CHANGE UP
SODIUM SERPL-SCNC: 137 MMOL/L — SIGNIFICANT CHANGE UP (ref 135–145)
WBC # BLD: 5.8 K/UL — SIGNIFICANT CHANGE UP (ref 3.8–10.5)
WBC # FLD AUTO: 5.8 K/UL — SIGNIFICANT CHANGE UP (ref 3.8–10.5)

## 2022-11-24 PROCEDURE — 85025 COMPLETE CBC W/AUTO DIFF WBC: CPT

## 2022-11-24 PROCEDURE — 36415 COLL VENOUS BLD VENIPUNCTURE: CPT

## 2022-11-24 PROCEDURE — 99284 EMERGENCY DEPT VISIT MOD MDM: CPT

## 2022-11-24 PROCEDURE — 99283 EMERGENCY DEPT VISIT LOW MDM: CPT

## 2022-11-24 PROCEDURE — 87637 SARSCOV2&INF A&B&RSV AMP PRB: CPT

## 2022-11-24 PROCEDURE — 80053 COMPREHEN METABOLIC PANEL: CPT

## 2022-11-24 RX ORDER — SODIUM CHLORIDE 9 MG/ML
1000 INJECTION INTRAMUSCULAR; INTRAVENOUS; SUBCUTANEOUS ONCE
Refills: 0 | Status: COMPLETED | OUTPATIENT
Start: 2022-11-24 | End: 2022-11-24

## 2022-11-24 RX ADMIN — SODIUM CHLORIDE 1000 MILLILITER(S): 9 INJECTION INTRAMUSCULAR; INTRAVENOUS; SUBCUTANEOUS at 17:50

## 2022-11-24 NOTE — ED PROVIDER NOTE - OBJECTIVE STATEMENT
Patient is a 82 year-old-female with history of HTN and HLD presents with sore throat. Patient states that she has been having sore throat for the last few days and also body aches; however denies fever, chills, nausea, vomiting, coughing, chest pain, shortness of breath, urinary/bowel complaints.

## 2022-11-24 NOTE — ED PROVIDER NOTE - ATTENDING CONTRIBUTION TO CARE
Attending (Graciela Tovar M.D.):  I have personally seen and examined this patient. I have performed a substantive portion of the visit including all aspects of the medical decision making. Resident and/or ACP note reviewed. I agree on the plan of care except where noted.

## 2022-11-24 NOTE — ED ADULT NURSE NOTE - OBJECTIVE STATEMENT
81 y/o female with PMH HTN , HLD presenting to ED for sore throat x today, fevers/ chills for the past few days and "dehydration." pt requesting covid swab, denies other complaints at present. initially declined blood work but now accepts. Upon exam pt A&Ox3 gross neuro intact, lungs cta bilaterally, no difficulty speaking in complete sentences, s1s2 heart sounds heard, pulses x 4, myles x4, abdomen soft nontender nondistended, skin intact. pt denies chest pain, sob, ha, n/v/d, abdominal pain,  urinary symptoms, hematuria. 20 gauge IV placed to right a/c.

## 2022-11-24 NOTE — ED PROVIDER NOTE - NSFOLLOWUPINSTRUCTIONS_ED_ALL_ED_FT
You were seen in the emergency department for sore throat. Your result will be sent to your daughter's email address.   For pain, you may take Tylenol (acetaminophen) and/or ibuprofen (advil or motrin). Please follow the instructions on the label/container.     Please follow up with your primary care doctor within 48 hours for continuation of care.     Return to the emergency department if you experience any new/concerning/worsening symptoms such as but not limited to: fever (>100.3F), intractable nausea, vomiting, chest pain, shortness of breath, abdominal pain, pain/burning with urination.

## 2022-11-24 NOTE — ED PROVIDER NOTE - PHYSICAL EXAMINATION
General: non-toxic appearing, in no respiratory distress, not active coughing  HEENT: atraumatic, normocephalic; pupils are equal, round and react to light, extraocular movements intact bilaterally without deficits, no conjunctival pallor, mucous membranes moist  Neck: no jugular venous distension, full range of motion  Chest/Lung: clear to auscultation bilaterally, no wheezes/rhonchi/rales  Heart: regular rate and rhythm, no murmur/gallops/rubs  Abdomen: normal bowel sounds, soft, non-tender, non-distended  Extremities: no lower extremity edema, +2 radial pulses bilaterally, +2 dorsalis pedis pulses bilaterally  Musculoskeletal: full range of motion of all 4 extremities  Nervous System: alert and oriented, no motor deficits or sensory deficits; CNII-XII grossly intact; no focal neurologic deficits  Skin: no rashes/lacerations noted

## 2022-11-24 NOTE — ED PROVIDER NOTE - PROGRESS NOTE DETAILS
Landry PGY2   Ordered flu with covid and put in patient's daughter's email address. Landry PGY2  Per nursing, patient is now requesting bloodwork and fluids. Will obtain basic labs and give 1L fluids. Landry PGY2  Bloodwork unremarkable.

## 2022-11-24 NOTE — ED PROVIDER NOTE - CLINICAL SUMMARY MEDICAL DECISION MAKING FREE TEXT BOX
Patient is a 82 year-old-female with history of HTN and HLD presents with sore throat. Vital signs normal and hemodynamically stable. Patient is very well appearing with no active signs/symptoms of viral URIs. Offered bloodwork, fluids and symptomatic treatment; however patient declined and would prefer to just have COVID test performed. Will obtain COVID and flu swab.

## 2022-11-24 NOTE — ED PROVIDER NOTE - PATIENT PORTAL LINK FT
You can access the FollowMyHealth Patient Portal offered by [facility] by registering at the following website: [email or web address]. By joining [employer]’s FollowMyHealth portal, you will also be able to view your health information using other applications (apps) compatible with our system. You can access the FollowMyHealth Patient Portal offered by [facility] by registering at the following website: [email or web address]. By joining [employer]’s FollowMyHealth portal, you will also be able to view your health information using other applications (apps) compatible with our system.

## 2022-11-28 ENCOUNTER — APPOINTMENT (OUTPATIENT)
Dept: ENDOCRINOLOGY | Facility: CLINIC | Age: 82
End: 2022-11-28

## 2022-11-30 NOTE — ED ADULT NURSE NOTE - GASTROINTESTINAL ASSESSMENT
Lipid abnormalities are newly identified.  Nutritional counseling was provided.  Lipids will be reassessed in 6 months.  
Newly identified, we discussed diet changes and increasing physical activity and we will try to see if Aria is covered under her insurance to assist with weight loss  
Patient's (Body mass index is 38.36 kg/m².) indicates that they are morbidly obese (BMI > 40 or > 35 with obesity - related health condition) with health conditions that include diabetes mellitus and dyslipidemias . Weight is unchanged. BMI is is above average; BMI management plan is completed. We discussed portion control, increasing exercise and pharmacologic options including Saxenda.   
Uncontrolled, will add Singulair 10 mg at bedtime to current regimen.  If no improvements I would like her to see allergy and asthma.  
WDL

## 2022-12-05 NOTE — ED PROVIDER NOTE - NS ED ROS FT
[FreeTextEntry1] :  D/W caregiver viral URI with wheezing- start orapred as below, albuterol nebulizer Q4-6hrs X2days; then wean out to prn use; recommend supportive care including antipyretics, fluids, and nasal saline followed by nasal suction. Return if symptoms worsen or persist.\par CXR due to decreased LLL aeration\par Otitis media resolved, finish all amoxicillin\par recheck lungs 3days\par 
GENERAL: No fever or chills  EYES: No change in vision  HEENT: +periorbital bruising  CARDIAC: No chest pain  PULMONARY: No cough or SOB  GI: No abdominal pain, no nausea or no vomiting, no diarrhea or constipation  : No changes in urination  SKIN: No rashes  NEURO: No headache, no numbness  MSK: No joint pain  Otherwise as HPI or negative.

## 2022-12-24 ENCOUNTER — EMERGENCY (EMERGENCY)
Facility: HOSPITAL | Age: 82
LOS: 1 days | Discharge: HOME | End: 2022-12-24
Attending: STUDENT IN AN ORGANIZED HEALTH CARE EDUCATION/TRAINING PROGRAM
Payer: MEDICARE

## 2022-12-24 VITALS
SYSTOLIC BLOOD PRESSURE: 141 MMHG | HEART RATE: 72 BPM | RESPIRATION RATE: 17 BRPM | DIASTOLIC BLOOD PRESSURE: 74 MMHG | TEMPERATURE: 99 F | OXYGEN SATURATION: 96 %

## 2022-12-24 VITALS
TEMPERATURE: 100 F | OXYGEN SATURATION: 93 % | DIASTOLIC BLOOD PRESSURE: 78 MMHG | HEIGHT: 63 IN | HEART RATE: 80 BPM | WEIGHT: 128.09 LBS | RESPIRATION RATE: 18 BRPM | SYSTOLIC BLOOD PRESSURE: 162 MMHG

## 2022-12-24 DIAGNOSIS — S72.001A FRACTURE OF UNSPECIFIED PART OF NECK OF RIGHT FEMUR, INITIAL ENCOUNTER FOR CLOSED FRACTURE: Chronic | ICD-10-CM

## 2022-12-24 DIAGNOSIS — E89.0 POSTPROCEDURAL HYPOTHYROIDISM: Chronic | ICD-10-CM

## 2022-12-24 DIAGNOSIS — Z98.890 OTHER SPECIFIED POSTPROCEDURAL STATES: Chronic | ICD-10-CM

## 2022-12-24 LAB
ALBUMIN SERPL ELPH-MCNC: 4 G/DL — SIGNIFICANT CHANGE UP (ref 3.3–5)
ALP SERPL-CCNC: 81 U/L — SIGNIFICANT CHANGE UP (ref 40–120)
ALT FLD-CCNC: 23 U/L — SIGNIFICANT CHANGE UP (ref 10–45)
ANION GAP SERPL CALC-SCNC: 13 MMOL/L — SIGNIFICANT CHANGE UP (ref 5–17)
AST SERPL-CCNC: 40 U/L — SIGNIFICANT CHANGE UP (ref 10–40)
B PERT DNA SPEC QL NAA+PROBE: SIGNIFICANT CHANGE UP
BASOPHILS # BLD AUTO: 0 K/UL — SIGNIFICANT CHANGE UP (ref 0–0.2)
BASOPHILS NFR BLD AUTO: 0 % — SIGNIFICANT CHANGE UP (ref 0–2)
BILIRUB SERPL-MCNC: 0.3 MG/DL — SIGNIFICANT CHANGE UP (ref 0.2–1.2)
BUN SERPL-MCNC: 9 MG/DL — SIGNIFICANT CHANGE UP (ref 7–23)
C PNEUM DNA SPEC QL NAA+PROBE: SIGNIFICANT CHANGE UP
CALCIUM SERPL-MCNC: 9.3 MG/DL — SIGNIFICANT CHANGE UP (ref 8.4–10.5)
CHLORIDE SERPL-SCNC: 95 MMOL/L — LOW (ref 96–108)
CO2 SERPL-SCNC: 23 MMOL/L — SIGNIFICANT CHANGE UP (ref 22–31)
CREAT SERPL-MCNC: 0.81 MG/DL — SIGNIFICANT CHANGE UP (ref 0.5–1.3)
EGFR: 73 ML/MIN/1.73M2 — SIGNIFICANT CHANGE UP
EOSINOPHIL # BLD AUTO: 0 K/UL — SIGNIFICANT CHANGE UP (ref 0–0.5)
EOSINOPHIL NFR BLD AUTO: 0 % — SIGNIFICANT CHANGE UP (ref 0–6)
FLUAV H1 2009 PAND RNA SPEC QL NAA+PROBE: SIGNIFICANT CHANGE UP
FLUAV H1 RNA SPEC QL NAA+PROBE: SIGNIFICANT CHANGE UP
FLUAV H3 RNA SPEC QL NAA+PROBE: SIGNIFICANT CHANGE UP
FLUAV SUBTYP SPEC NAA+PROBE: SIGNIFICANT CHANGE UP
FLUBV RNA SPEC QL NAA+PROBE: SIGNIFICANT CHANGE UP
GLUCOSE SERPL-MCNC: 107 MG/DL — HIGH (ref 70–99)
HADV DNA SPEC QL NAA+PROBE: SIGNIFICANT CHANGE UP
HCOV PNL SPEC NAA+PROBE: SIGNIFICANT CHANGE UP
HCT VFR BLD CALC: 37.1 % — SIGNIFICANT CHANGE UP (ref 34.5–45)
HGB BLD-MCNC: 12.2 G/DL — SIGNIFICANT CHANGE UP (ref 11.5–15.5)
HMPV RNA SPEC QL NAA+PROBE: SIGNIFICANT CHANGE UP
HPIV1 RNA SPEC QL NAA+PROBE: SIGNIFICANT CHANGE UP
HPIV2 RNA SPEC QL NAA+PROBE: SIGNIFICANT CHANGE UP
HPIV3 RNA SPEC QL NAA+PROBE: SIGNIFICANT CHANGE UP
HPIV4 RNA SPEC QL NAA+PROBE: SIGNIFICANT CHANGE UP
LYMPHOCYTES # BLD AUTO: 0.37 K/UL — LOW (ref 1–3.3)
LYMPHOCYTES # BLD AUTO: 10.3 % — LOW (ref 13–44)
MANUAL SMEAR VERIFICATION: SIGNIFICANT CHANGE UP
MCHC RBC-ENTMCNC: 30.4 PG — SIGNIFICANT CHANGE UP (ref 27–34)
MCHC RBC-ENTMCNC: 32.9 GM/DL — SIGNIFICANT CHANGE UP (ref 32–36)
MCV RBC AUTO: 92.5 FL — SIGNIFICANT CHANGE UP (ref 80–100)
MONOCYTES # BLD AUTO: 0.3 K/UL — SIGNIFICANT CHANGE UP (ref 0–0.9)
MONOCYTES NFR BLD AUTO: 8.5 % — SIGNIFICANT CHANGE UP (ref 2–14)
NEUTROPHILS # BLD AUTO: 2.9 K/UL — SIGNIFICANT CHANGE UP (ref 1.8–7.4)
NEUTROPHILS NFR BLD AUTO: 81.2 % — HIGH (ref 43–77)
PLAT MORPH BLD: NORMAL — SIGNIFICANT CHANGE UP
PLATELET # BLD AUTO: 187 K/UL — SIGNIFICANT CHANGE UP (ref 150–400)
POTASSIUM SERPL-MCNC: 3.4 MMOL/L — LOW (ref 3.5–5.3)
POTASSIUM SERPL-SCNC: 3.4 MMOL/L — LOW (ref 3.5–5.3)
PROT SERPL-MCNC: 7.1 G/DL — SIGNIFICANT CHANGE UP (ref 6–8.3)
RAPID RVP RESULT: DETECTED
RBC # BLD: 4.01 M/UL — SIGNIFICANT CHANGE UP (ref 3.8–5.2)
RBC # FLD: 15.2 % — HIGH (ref 10.3–14.5)
RBC BLD AUTO: SIGNIFICANT CHANGE UP
RV+EV RNA SPEC QL NAA+PROBE: SIGNIFICANT CHANGE UP
SARS-COV-2 RNA SPEC QL NAA+PROBE: DETECTED
SODIUM SERPL-SCNC: 131 MMOL/L — LOW (ref 135–145)
WBC # BLD: 3.57 K/UL — LOW (ref 3.8–10.5)
WBC # FLD AUTO: 3.57 K/UL — LOW (ref 3.8–10.5)

## 2022-12-24 PROCEDURE — 0225U NFCT DS DNA&RNA 21 SARSCOV2: CPT

## 2022-12-24 PROCEDURE — 84295 ASSAY OF SERUM SODIUM: CPT

## 2022-12-24 PROCEDURE — 85025 COMPLETE CBC W/AUTO DIFF WBC: CPT

## 2022-12-24 PROCEDURE — 82962 GLUCOSE BLOOD TEST: CPT

## 2022-12-24 PROCEDURE — 71045 X-RAY EXAM CHEST 1 VIEW: CPT

## 2022-12-24 PROCEDURE — 99285 EMERGENCY DEPT VISIT HI MDM: CPT | Mod: 25

## 2022-12-24 PROCEDURE — 84132 ASSAY OF SERUM POTASSIUM: CPT

## 2022-12-24 PROCEDURE — 80053 COMPREHEN METABOLIC PANEL: CPT

## 2022-12-24 PROCEDURE — 82947 ASSAY GLUCOSE BLOOD QUANT: CPT

## 2022-12-24 PROCEDURE — 82803 BLOOD GASES ANY COMBINATION: CPT

## 2022-12-24 PROCEDURE — 93005 ELECTROCARDIOGRAM TRACING: CPT

## 2022-12-24 PROCEDURE — 84484 ASSAY OF TROPONIN QUANT: CPT

## 2022-12-24 PROCEDURE — 83690 ASSAY OF LIPASE: CPT

## 2022-12-24 PROCEDURE — 93010 ELECTROCARDIOGRAM REPORT: CPT

## 2022-12-24 PROCEDURE — 85018 HEMOGLOBIN: CPT

## 2022-12-24 PROCEDURE — 82330 ASSAY OF CALCIUM: CPT

## 2022-12-24 PROCEDURE — 85014 HEMATOCRIT: CPT

## 2022-12-24 PROCEDURE — 99285 EMERGENCY DEPT VISIT HI MDM: CPT

## 2022-12-24 PROCEDURE — 83605 ASSAY OF LACTIC ACID: CPT

## 2022-12-24 PROCEDURE — 82435 ASSAY OF BLOOD CHLORIDE: CPT

## 2022-12-24 PROCEDURE — 71045 X-RAY EXAM CHEST 1 VIEW: CPT | Mod: 26

## 2022-12-24 NOTE — ED PROVIDER NOTE - NSFOLLOWUPINSTRUCTIONS_ED_ALL_ED_FT
You were seen in the Emergency Department for fever, post nasal-drip, and cough. You likely have a viral upper respiratory illness. You also received a respiratory viral panel, which has not resulted at the time of discharge. Please call [phone] to follow-up on your results.     1) Advance activity as tolerated.   2) Continue all previously prescribed medications as directed.    3) Follow up with your primary care physician if your COVID results come back positive for possible paxlovid treatment. Take copies of your results.    4) Return to the Emergency Department for worsening or persistent symptoms, and/or ANY NEW OR CONCERNING SYMPTOMS.

## 2022-12-24 NOTE — ED ADULT NURSE NOTE - OBJECTIVE STATEMENT
First encounter with the pt, pt received from triage with complaints of fever and body aches. Pt is a/ox4 and verbal. Speech is clear and able to speak in full sentences without distress. Airway is patent with no obstruction nor blocking secretions. Breathing is even and unlabored on room air. Pt has strong pulses palpated bilaterally. Pt is SR on the cardiac monitor. Neuro check is wnl. Full rom. Pt denies chest pain, n/v and sob. No acute distress noted. Pt has call light within the reach of the pt at the bedside. Will continue to monitor the pt.

## 2022-12-24 NOTE — ED PROVIDER NOTE - PATIENT PORTAL LINK FT
You can access the FollowMyHealth Patient Portal offered by [facility] by registering at the following website: [email or web address]. By joining [employer]’s FollowMyHealth portal, you will also be able to view your health information using other applications (apps) compatible with our system.

## 2022-12-24 NOTE — ED PROVIDER NOTE - ATTENDING CONTRIBUTION TO CARE
Presents with fever, post nasal drip, cough since yesterday.  TMax 101F.  Took tylenol later after noon. not vaccinated for covid. Also complains of epigastric pain. No n/v, chest pain, shortness of breath, diarrhea. Well appearing, CTAB, RRR, mild epigastric ttp. Will eval for viral illness, pneumonia. No concern for ACS.  Had an extensive discussion about Paxilovid, MAB and Tamiflu.  Patient was informed RVP will take few hours to results.  States she does not want to wait for the results.  We will call the hospital tomorrow in the morning and if she test positive for COVID or influenza will call her PCP to discuss if she should be on antiviral medications.    pmhx htn hld, pily dm

## 2022-12-24 NOTE — ED PROVIDER NOTE - PROGRESS NOTE DETAILS
MD Marisol (PGY-1) Patient reassessed. Vitals stable. CXR with clear lungs. Patient able to ambulate at baseline, does have a walker, but does not use it. Patient understands plan to contact PCP if COVID positive for paxlovid.

## 2022-12-24 NOTE — ED PROVIDER NOTE - NSDCPRINTRESULTS_ED_ALL_ED
PAST MEDICAL HISTORY:  Abdominal pain     Constipation       Eczema     
Patient requests all Lab, Cardiology, and Radiology Results on their Discharge Instructions

## 2022-12-27 ENCOUNTER — APPOINTMENT (OUTPATIENT)
Dept: RADIOLOGY | Facility: IMAGING CENTER | Age: 82
End: 2022-12-27

## 2022-12-29 NOTE — ED PROVIDER NOTE - NS ED MD TWO NIGHTS YN
Lizy Hull (:  1971) is a 54 y.o. male,Established patient, here for evaluation of the following chief complaint(s): Other (Sores in mouth- entire mouth is sore) and Cough         ASSESSMENT/PLAN:  1. Xanthelasma  2. Bronchitis  3. Wheezing    Get cholesterol checked ASAP    Stop doxy  Start levaquin  Albuterol inhaler  Tessalon  fluids    Return in about 4 weeks (around 2023). Subjective   SUBJECTIVE/OBJECTIVE:  HPI    Started antibotics last week. Cough will not go away. Sores in the mouth and lips feel funny. Spots on upper eye lids. Noticed them over the past several months getting worse. Review of Systems   HENT:  Positive for congestion and mouth sores. Respiratory:  Positive for cough. Objective   Physical Exam  Vitals reviewed. Constitutional:       General: He is not in acute distress. Appearance: Normal appearance. He is well-developed. HENT:      Head: Normocephalic. Right Ear: External ear normal.      Left Ear: External ear normal.      Nose: Nose normal.      Right Sinus: No maxillary sinus tenderness. Left Sinus: No maxillary sinus tenderness. Mouth/Throat:      Pharynx: Uvula midline. Neck:      Trachea: Trachea normal.   Cardiovascular:      Rate and Rhythm: Normal rate and regular rhythm. Heart sounds: Normal heart sounds. No murmur heard. Pulmonary:      Effort: Pulmonary effort is normal. No respiratory distress. Breath sounds: Normal breath sounds. No decreased breath sounds, wheezing, rhonchi or rales. Chest:      Chest wall: No tenderness. Abdominal:      General: There is no distension. Palpations: Abdomen is soft. There is no mass. Tenderness: There is no abdominal tenderness. Musculoskeletal:         General: No tenderness or deformity. Normal range of motion. Cervical back: Normal range of motion and neck supple. Lymphadenopathy:      Cervical: No cervical adenopathy. Skin:     General: Skin is warm and dry. Comments: Yellow lesions on bilateral upper eye lid   Neurological:      Mental Status: He is alert and oriented to person, place, and time. Motor: No abnormal muscle tone. Coordination: Coordination normal.      Gait: Gait normal.   Psychiatric:         Mood and Affect: Mood normal.         Behavior: Behavior normal.         Thought Content: Thought content normal.         Judgment: Judgment normal.          On this date 12/29/2022 I have spent 25 minutes reviewing previous notes, test results and face to face with the patient discussing the diagnosis and importance of compliance with the treatment plan as well as documenting on the day of the visit. An electronic signature was used to authenticate this note.     --CHYNA Emmanuel - CNP Yes

## 2023-01-03 NOTE — OCCUPATIONAL THERAPY INITIAL EVALUATION ADULT - HEALTH SCREEN CRITERIA
yes Upper Respiratory Infection in Children    AMBULATORY CARE:    An upper respiratory infection is also called a common cold. It can affect your child's nose, throat, ears, and sinuses. Most children get about 5 to 8 colds each year.     Common signs and symptoms include the following: Your child's cold symptoms will be worst for the first 3 to 5 days. Your child may have any of the following:     Runny or stuffy nose      Sneezing and coughing    Sore throat or hoarseness    Red, watery, and sore eyes    Tiredness or fussiness    Chills and a fever that usually lasts 1 to 3 days    Headache, body aches, or sore muscles    Seek care immediately if:     Your child's temperature reaches 105°F (40.6°C).      Your child has trouble breathing or is breathing faster than usual.       Your child's lips or nails turn blue.       Your child's nostrils flare when he or she takes a breath.       The skin above or below your child's ribs is sucked in with each breath.       Your child's heart is beating much faster than usual.       You see pinpoint or larger reddish-purple dots on your child's skin.       Your child stops urinating or urinates less than usual.       Your baby's soft spot on his or her head is bulging outward or sunken inward.       Your child has a severe headache or stiff neck.       Your child has chest or stomach pain.       Your baby is too weak to eat.     Contact your child's healthcare provider if:     Your child has a rectal, ear, or forehead temperature higher than 100.4°F (38°C).       Your child has an oral or pacifier temperature higher than 100°F (37.8°C).      Your child has an armpit temperature higher than 99°F (37.2°C).      Your child is younger than 2 years and has a fever for more than 24 hours.       Your child is 2 years or older and has a fever for more than 72 hours.       Your child has had thick nasal drainage for more than 2 days.       Your child has ear pain.       Your child has white spots on his or her tonsils.       Your child coughs up a lot of thick, yellow, or green mucus.       Your child is unable to eat, has nausea, or is vomiting.       Your child has increased tiredness and weakness.      Your child's symptoms do not improve or get worse within 3 days.       You have questions or concerns about your child's condition or care.    Treatment for your child's cold: There is no cure for the common cold. Colds are caused by viruses and do not get better with antibiotics. Most colds in children go away without treatment in 1 to 2 weeks. Do not give over-the-counter (OTC) cough or cold medicines to children younger than 4 years. Your child's healthcare provider may tell you not to give these medicines to children younger than 6 years. OTC cough and cold medicines can cause side effects that may harm your child. Your child may need any of the following to help manage his or her symptoms:     Over the counter Cough suppressants and Decongestants have not been shown to be effective in children. please consult with your physician before giving them to your child.    Acetaminophen decreases pain and fever. It is available without a doctor's order. Ask how much to give your child and how often to give it. Follow directions. Read the labels of all other medicines your child uses to see if they also contain acetaminophen, or ask your child's doctor or pharmacist. Acetaminophen can cause liver damage if not taken correctly.    NSAIDs, such as ibuprofen, help decrease swelling, pain, and fever. This medicine is available with or without a doctor's order. NSAIDs can cause stomach bleeding or kidney problems in certain people. If your child takes blood thinner medicine, always ask if NSAIDs are safe for him. Always read the medicine label and follow directions. Do not give these medicines to children under 6 months of age without direction from your child's healthcare provider.    Do not give aspirin to children under 18 years of age. Your child could develop Reye syndrome if he takes aspirin. Reye syndrome can cause life-threatening brain and liver damage. Check your child's medicine labels for aspirin, salicylates, or oil of wintergreen.       Give your child's medicine as directed. Contact your child's healthcare provider if you think the medicine is not working as expected. Tell him or her if your child is allergic to any medicine. Keep a current list of the medicines, vitamins, and herbs your child takes. Include the amounts, and when, how, and why they are taken. Bring the list or the medicines in their containers to follow-up visits. Carry your child's medicine list with you in case of an emergency.    Care for your child:     Have your child rest. Rest will help his or her body get better.     Give your child more liquids as directed. Liquids will help thin and loosen mucus so your child can cough it up. Liquids will also help prevent dehydration. Liquids that help prevent dehydration include water, fruit juice, and broth. Do not give your child liquids that contain caffeine. Caffeine can increase your child's risk for dehydration. Ask your child's healthcare provider how much liquid to give your child each day.     Clear mucus from your child's nose. Use a bulb syringe to remove mucus from a baby's nose. Squeeze the bulb and put the tip into one of your baby's nostrils. Gently close the other nostril with your finger. Slowly release the bulb to suck up the mucus. Empty the bulb syringe onto a tissue. Repeat the steps if needed. Do the same thing in the other nostril. Make sure your baby's nose is clear before he or she feeds or sleeps. Your child's healthcare provider may recommend you put saline drops into your baby's nose if the mucus is very thick.     Soothe your child's throat. If your child is 8 years or older, have him or her gargle with salt water. Make salt water by dissolving ¼ teaspoon salt in 1 cup warm water.     Soothe your child's cough. You can give honey to children older than 1 year. Give ½ teaspoon of honey to children 1 to 5 years. Give 1 teaspoon of honey to children 6 to 11 years. Give 2 teaspoons of honey to children 12 or older.    Use a cool-mist humidifier. This will add moisture to the air and help your child breathe easier. Make sure the humidifier is out of your child's reach.    Apply petroleum-based jelly around the outside of your child's nostrils. This can decrease irritation from blowing his or her nose.     Keep your child away from smoke. Do not smoke near your child. Do not let your older child smoke. Nicotine and other chemicals in cigarettes and cigars can make your child's symptoms worse. They can also cause infections such as bronchitis or pneumonia. Ask your child's healthcare provider for information if you or your child currently smoke and need help to quit. E-cigarettes or smokeless tobacco still contain nicotine. Talk to your healthcare provider before you or your child use these products.     Prevent the spread of a cold:     Keep your child away from other people during the first 3 to 5 days of his or her cold. The virus is spread most easily during this time.     Wash your hands and your child's hands often. Teach your child to cover his or her nose and mouth when he or she sneezes, coughs, and blows his or her nose. Show your child how to cough and sneeze into the crook of the elbow instead of the hands.      Do not let your child share toys, pacifiers, or towels with others while he or she is sick.     Do not let your child share foods, eating utensils, cups, or drinks with others while he or she is sick.    Follow up with your child's healthcare provider as directed: Write down your questions so you remember to ask them during your child's visits.

## 2023-02-06 ENCOUNTER — EMERGENCY (EMERGENCY)
Facility: HOSPITAL | Age: 83
LOS: 1 days | Discharge: HOME | End: 2023-02-06
Attending: STUDENT IN AN ORGANIZED HEALTH CARE EDUCATION/TRAINING PROGRAM
Payer: MEDICARE

## 2023-02-06 VITALS
SYSTOLIC BLOOD PRESSURE: 177 MMHG | DIASTOLIC BLOOD PRESSURE: 91 MMHG | RESPIRATION RATE: 15 BRPM | OXYGEN SATURATION: 96 % | TEMPERATURE: 98 F | HEART RATE: 72 BPM

## 2023-02-06 VITALS
HEART RATE: 65 BPM | DIASTOLIC BLOOD PRESSURE: 82 MMHG | WEIGHT: 130.07 LBS | HEIGHT: 63 IN | RESPIRATION RATE: 19 BRPM | TEMPERATURE: 98 F | SYSTOLIC BLOOD PRESSURE: 146 MMHG | OXYGEN SATURATION: 97 %

## 2023-02-06 DIAGNOSIS — E89.0 POSTPROCEDURAL HYPOTHYROIDISM: Chronic | ICD-10-CM

## 2023-02-06 DIAGNOSIS — S72.001A FRACTURE OF UNSPECIFIED PART OF NECK OF RIGHT FEMUR, INITIAL ENCOUNTER FOR CLOSED FRACTURE: Chronic | ICD-10-CM

## 2023-02-06 DIAGNOSIS — Z98.890 OTHER SPECIFIED POSTPROCEDURAL STATES: Chronic | ICD-10-CM

## 2023-02-06 LAB
ALBUMIN SERPL ELPH-MCNC: 4.2 G/DL — SIGNIFICANT CHANGE UP (ref 3.3–5)
ALP SERPL-CCNC: 89 U/L — SIGNIFICANT CHANGE UP (ref 40–120)
ALT FLD-CCNC: 17 U/L — SIGNIFICANT CHANGE UP (ref 10–45)
ANION GAP SERPL CALC-SCNC: 10 MMOL/L — SIGNIFICANT CHANGE UP (ref 5–17)
APPEARANCE UR: CLEAR — SIGNIFICANT CHANGE UP
AST SERPL-CCNC: 21 U/L — SIGNIFICANT CHANGE UP (ref 10–40)
BACTERIA # UR AUTO: NEGATIVE — SIGNIFICANT CHANGE UP
BASOPHILS # BLD AUTO: 0.07 K/UL — SIGNIFICANT CHANGE UP (ref 0–0.2)
BASOPHILS NFR BLD AUTO: 1.3 % — SIGNIFICANT CHANGE UP (ref 0–2)
BILIRUB SERPL-MCNC: 0.3 MG/DL — SIGNIFICANT CHANGE UP (ref 0.2–1.2)
BILIRUB UR-MCNC: NEGATIVE — SIGNIFICANT CHANGE UP
BUN SERPL-MCNC: 20 MG/DL — SIGNIFICANT CHANGE UP (ref 7–23)
CALCIUM SERPL-MCNC: 9.7 MG/DL — SIGNIFICANT CHANGE UP (ref 8.4–10.5)
CHLORIDE SERPL-SCNC: 103 MMOL/L — SIGNIFICANT CHANGE UP (ref 96–108)
CO2 SERPL-SCNC: 28 MMOL/L — SIGNIFICANT CHANGE UP (ref 22–31)
COLOR SPEC: COLORLESS — SIGNIFICANT CHANGE UP
CREAT SERPL-MCNC: 0.82 MG/DL — SIGNIFICANT CHANGE UP (ref 0.5–1.3)
DIFF PNL FLD: NEGATIVE — SIGNIFICANT CHANGE UP
EGFR: 72 ML/MIN/1.73M2 — SIGNIFICANT CHANGE UP
EOSINOPHIL # BLD AUTO: 0.08 K/UL — SIGNIFICANT CHANGE UP (ref 0–0.5)
EOSINOPHIL NFR BLD AUTO: 1.4 % — SIGNIFICANT CHANGE UP (ref 0–6)
EPI CELLS # UR: 1 /HPF — SIGNIFICANT CHANGE UP
FLUAV AG NPH QL: SIGNIFICANT CHANGE UP
FLUBV AG NPH QL: SIGNIFICANT CHANGE UP
GLUCOSE SERPL-MCNC: 104 MG/DL — HIGH (ref 70–99)
GLUCOSE UR QL: NEGATIVE — SIGNIFICANT CHANGE UP
HCT VFR BLD CALC: 39.5 % — SIGNIFICANT CHANGE UP (ref 34.5–45)
HGB BLD-MCNC: 13.1 G/DL — SIGNIFICANT CHANGE UP (ref 11.5–15.5)
HYALINE CASTS # UR AUTO: 1 /LPF — SIGNIFICANT CHANGE UP (ref 0–2)
IMM GRANULOCYTES NFR BLD AUTO: 0.4 % — SIGNIFICANT CHANGE UP (ref 0–0.9)
KETONES UR-MCNC: NEGATIVE — SIGNIFICANT CHANGE UP
LEUKOCYTE ESTERASE UR-ACNC: ABNORMAL
LYMPHOCYTES # BLD AUTO: 1.43 K/UL — SIGNIFICANT CHANGE UP (ref 1–3.3)
LYMPHOCYTES # BLD AUTO: 25.6 % — SIGNIFICANT CHANGE UP (ref 13–44)
MCHC RBC-ENTMCNC: 31.8 PG — SIGNIFICANT CHANGE UP (ref 27–34)
MCHC RBC-ENTMCNC: 33.2 GM/DL — SIGNIFICANT CHANGE UP (ref 32–36)
MCV RBC AUTO: 95.9 FL — SIGNIFICANT CHANGE UP (ref 80–100)
MONOCYTES # BLD AUTO: 0.43 K/UL — SIGNIFICANT CHANGE UP (ref 0–0.9)
MONOCYTES NFR BLD AUTO: 7.7 % — SIGNIFICANT CHANGE UP (ref 2–14)
NEUTROPHILS # BLD AUTO: 3.56 K/UL — SIGNIFICANT CHANGE UP (ref 1.8–7.4)
NEUTROPHILS NFR BLD AUTO: 63.6 % — SIGNIFICANT CHANGE UP (ref 43–77)
NITRITE UR-MCNC: NEGATIVE — SIGNIFICANT CHANGE UP
NRBC # BLD: 0 /100 WBCS — SIGNIFICANT CHANGE UP (ref 0–0)
PH UR: 7 — SIGNIFICANT CHANGE UP (ref 5–8)
PLATELET # BLD AUTO: 271 K/UL — SIGNIFICANT CHANGE UP (ref 150–400)
POTASSIUM SERPL-MCNC: 3.7 MMOL/L — SIGNIFICANT CHANGE UP (ref 3.5–5.3)
POTASSIUM SERPL-SCNC: 3.7 MMOL/L — SIGNIFICANT CHANGE UP (ref 3.5–5.3)
PROT SERPL-MCNC: 7.7 G/DL — SIGNIFICANT CHANGE UP (ref 6–8.3)
PROT UR-MCNC: NEGATIVE — SIGNIFICANT CHANGE UP
RBC # BLD: 4.12 M/UL — SIGNIFICANT CHANGE UP (ref 3.8–5.2)
RBC # FLD: 14.6 % — HIGH (ref 10.3–14.5)
RBC CASTS # UR COMP ASSIST: 1 /HPF — SIGNIFICANT CHANGE UP (ref 0–4)
RSV RNA NPH QL NAA+NON-PROBE: SIGNIFICANT CHANGE UP
SARS-COV-2 RNA SPEC QL NAA+PROBE: SIGNIFICANT CHANGE UP
SODIUM SERPL-SCNC: 141 MMOL/L — SIGNIFICANT CHANGE UP (ref 135–145)
SP GR SPEC: 1.01 — LOW (ref 1.01–1.02)
TROPONIN T, HIGH SENSITIVITY RESULT: 12 NG/L — SIGNIFICANT CHANGE UP (ref 0–51)
UROBILINOGEN FLD QL: NEGATIVE — SIGNIFICANT CHANGE UP
WBC # BLD: 5.59 K/UL — SIGNIFICANT CHANGE UP (ref 3.8–10.5)
WBC # FLD AUTO: 5.59 K/UL — SIGNIFICANT CHANGE UP (ref 3.8–10.5)
WBC UR QL: 3 /HPF — SIGNIFICANT CHANGE UP (ref 0–5)

## 2023-02-06 PROCEDURE — 80053 COMPREHEN METABOLIC PANEL: CPT

## 2023-02-06 PROCEDURE — 71045 X-RAY EXAM CHEST 1 VIEW: CPT | Mod: 26

## 2023-02-06 PROCEDURE — 87637 SARSCOV2&INF A&B&RSV AMP PRB: CPT

## 2023-02-06 PROCEDURE — 81001 URINALYSIS AUTO W/SCOPE: CPT

## 2023-02-06 PROCEDURE — 96375 TX/PRO/DX INJ NEW DRUG ADDON: CPT

## 2023-02-06 PROCEDURE — 70450 CT HEAD/BRAIN W/O DYE: CPT | Mod: MA

## 2023-02-06 PROCEDURE — 96374 THER/PROPH/DIAG INJ IV PUSH: CPT

## 2023-02-06 PROCEDURE — 87086 URINE CULTURE/COLONY COUNT: CPT

## 2023-02-06 PROCEDURE — 85025 COMPLETE CBC W/AUTO DIFF WBC: CPT

## 2023-02-06 PROCEDURE — 71045 X-RAY EXAM CHEST 1 VIEW: CPT

## 2023-02-06 PROCEDURE — 70450 CT HEAD/BRAIN W/O DYE: CPT | Mod: 26,MA

## 2023-02-06 PROCEDURE — 93005 ELECTROCARDIOGRAM TRACING: CPT

## 2023-02-06 PROCEDURE — 99285 EMERGENCY DEPT VISIT HI MDM: CPT | Mod: 25

## 2023-02-06 PROCEDURE — 99285 EMERGENCY DEPT VISIT HI MDM: CPT

## 2023-02-06 PROCEDURE — 36415 COLL VENOUS BLD VENIPUNCTURE: CPT

## 2023-02-06 PROCEDURE — 84484 ASSAY OF TROPONIN QUANT: CPT

## 2023-02-06 RX ORDER — METOCLOPRAMIDE HCL 10 MG
10 TABLET ORAL ONCE
Refills: 0 | Status: COMPLETED | OUTPATIENT
Start: 2023-02-06 | End: 2023-02-06

## 2023-02-06 RX ORDER — ACETAMINOPHEN 500 MG
1000 TABLET ORAL ONCE
Refills: 0 | Status: COMPLETED | OUTPATIENT
Start: 2023-02-06 | End: 2023-02-06

## 2023-02-06 RX ORDER — DIPHENHYDRAMINE HCL 50 MG
25 CAPSULE ORAL ONCE
Refills: 0 | Status: COMPLETED | OUTPATIENT
Start: 2023-02-06 | End: 2023-02-06

## 2023-02-06 RX ADMIN — Medication 400 MILLIGRAM(S): at 16:33

## 2023-02-06 RX ADMIN — Medication 10 MILLIGRAM(S): at 16:36

## 2023-02-06 RX ADMIN — Medication 25 MILLIGRAM(S): at 17:45

## 2023-02-06 RX ADMIN — Medication 1000 MILLIGRAM(S): at 18:06

## 2023-02-06 RX ADMIN — Medication 1000 MILLIGRAM(S): at 16:35

## 2023-02-06 NOTE — ED PROVIDER NOTE - OBJECTIVE STATEMENT
82-year-old female with PMHx of iron deficiency anemia, CAD, GI bleed presents for headache and fall with elevated BP.  Per patient, she tripped over a chair 4 days ago but did not hit head or pass out.  Denies any prodromal symptoms.  Today, she reports occipital, burning head pain as well as on the top of her head. No sudden onset.  No changes in vision weakness loss of sensation No nausea, vomiting, light sensitivity, neck pain, chest pain, shortness of breath, nausea, vomiting, diarrhea, abdominal pain.  BP was 196 systolic at home however here at 146 systolic after BP meds.  Not on any ACs.

## 2023-02-06 NOTE — ED PROVIDER NOTE - PROGRESS NOTE DETAILS
Landry PGY2  Patient signed out to me pending CT scan of head and reassessment. In summary, 82F presents with headache w/ elevated BP with a recent fall w/o head trauma or LOC. No red flag symptoms/signs. Bloodwork has been unremarkable including an unremarkable trop in the setting of lack of chest pain/sob.   CTH negative. Patient reassessed and reports that her headache has improved. Will discharge with neurology referral. Patient prefers to follow with her own cardiologist and GI doctor. Bloodwork, UA, CXR and CTH results reviewed with patient and daughter Shae at bedside.

## 2023-02-06 NOTE — ED PROVIDER NOTE - NSFOLLOWUPINSTRUCTIONS_ED_ALL_ED_FT
You were seen in the emergency department for headache and high blood pressure. Your workup in the emergency department includes bloodwork, urinalysis, xray of chest and ct scan of head. You can find the results of all the tests in this discharge packet.     For headache, you may take:  1) Acetaminophen (Tylenol): 500mg every 6 hours or as needed for pain     Please follow up with your primary care doctor within 48 hours for continuation of care.   Please follow up with your cardiologist Dr. Calle for further management.   Please follow up with your GI doctor for further management.   Please follow up with neurology for further management of headache. We will call you in the next day with an appointment. Or you can call the number listed below for the neurology clinic.     Return to the emergency department if you experience any new/concerning/worsening symptoms such as but not limited to: fever (>100.3F), vomiting, difficulty breathing.

## 2023-02-06 NOTE — ED PROVIDER NOTE - CARE PROVIDER_API CALL
Lyndon Calle (MD)  Cardiovascular Disease; Interventional Cardiology; Nuclear Cardiology  [address], [address]  [place] [address]  Phone: [phone]  Fax: [phone]  Follow Up Time:

## 2023-02-06 NOTE — ED PROVIDER NOTE - NSFOLLOWUPCLINICS_GEN_ALL_ED_FT
[facility]  Neurology  [address] [place] [address] [address] [place] [address]  Phone: [phone]  Fax:

## 2023-02-06 NOTE — ED PROVIDER NOTE - PATIENT PORTAL LINK FT
You can access the FollowMyHealth Patient Portal offered by Health system by registering at the following website: [email or web address]. By joining [employer]’s FollowMyHealth portal, you will also be able to view your health information using other applications (apps) compatible with our system.

## 2023-02-06 NOTE — ED PROVIDER NOTE - CLINICAL SUMMARY MEDICAL DECISION MAKING FREE TEXT BOX
Travis Denney MD (Attending Physician): 82-year-old female with PMHx of iron deficiency anemia, CAD, GI bleed presents for headache and fall with elevated BP.  Per patient, she tripped over a chair 4 days ago but did not hit head or pass out.  Denies any prodromal symptoms.  Today, she reports occipital, burning head pain as well as on the top of her head.  No nausea, vomiting, light sensitivity, neck pain, chest pain, shortness of breath, nausea, vomiting, diarrhea, abdominal pain.  BP was 196 systolic at home however here at 146 systolic after BP meds.  Not on any ACs.  On exam, patient is nontoxic-appearing, ANO x4.  HEENT: NC/AT, no tenderness in the occiput or any wounds or lesions.  Normal ROM of neck.  No midline or para minus tenderness.  CVS: RRR.  Lungs CTA B.  Abdomen soft and nontender.  Moves all extremities.  Differentials include but are not limited to CVA, ICH, uncontrolled hypertension, infection, electrolyte abnormalities, dehydration, contusion.  Will evaluate with labs, EKG, troponin, CTs and UA.  Will discuss with PMD once labs are resulted.

## 2023-02-06 NOTE — ED PROVIDER NOTE - ATTENDING CONTRIBUTION TO CARE
I, Travis Denney, performed a history and physical exam of the patient and discussed their management with the resident, student, and/or fellow. I reviewed the note and agree with the documented findings and plan of care. I was present and available for all procedures.    ---

## 2023-02-06 NOTE — ED ADULT NURSE NOTE - OBJECTIVE STATEMENT
Received an 82F came as walk in from home accompanied by daughter with c/o Elevated Blood pressure at home and recently had a fall 4 days ago. and c/o tingling sensations in her head where she took tylenol and tingling and pain was resolved. patient NAD, no signs of injuries, moves all extremitites and follows commands, BEFAST negative. h/o Arteriosclerotic heart disease (ASHD)  Diverticulitis Fe deficiency anemia Female bladder prolapse Gastrointestinal bleed GERD (gastroesophageal reflux disease) H/O mitral valve prolapse Hiatal hernia with GERD Hypertension Hypothyroid Macular degeneration. VS WDL in the ED. Seen and evaluate by Dr Choudhary.  RT AC 18g IV access inserted, labs drawn pending orders from MD.

## 2023-02-06 NOTE — ED ADULT NURSE NOTE - NSFALLRSKPSTHSTOCCUR_ED_ALL_ED
Patient contacted regarding recent discharge and COVID-19 risk   Care Transition Nurse/ Ambulatory Care Manager contacted the patient by telephone to perform post discharge assessment. Verified name and  with patient as identifiers. Patient has following risk factors of: hx cancer, asthma. CTN/ACM reviewed discharge instructions, medical action plan and red flags related to discharge diagnosis. Reviewed and educated them on any new and changed medications related to discharge diagnosis. Advised obtaining a 90-day supply of all daily and as-needed medications. Education provided regarding infection prevention, and signs and symptoms of COVID-19 and when to seek medical attention with patient who verbalized understanding. Discussed exposure protocols and quarantine from [address] you at higher risk for severe illness  and given an opportunity for questions and concerns. The patient agrees to contact the COVID-19 hotline [phone] or PCP office for questions related to their healthcare. CTN/ACM provided contact information for future reference. From CDC: Are you at higher risk for severe illness?  Wash your hands often.  Avoid close contact (6 feet, which is about two arm lengths) with people who are sick.  Put distance between yourself and other people if COVID-19 is spreading in your community.  Clean and disinfect frequently touched surfaces.  Avoid all cruise travel and non-essential air travel.  Call your healthcare professional if you have concerns about COVID-19 and your underlying condition or if you are sick.     For more information on steps you can take to protect yourself, see CDC's How to Protect Yourself Single Mechanical/Accidental Fall

## 2023-02-06 NOTE — ED ADULT TRIAGE NOTE - CHIEF COMPLAINT QUOTE
s/p mechanical fall x 4 days ago, +hit head, c/o HA, elevated BP at home, hx htn - on cardizem/carvedilol, c/o "feeling hazy"

## 2023-02-07 LAB
CULTURE RESULTS: SIGNIFICANT CHANGE UP
SPECIMEN SOURCE: SIGNIFICANT CHANGE UP

## 2023-02-07 NOTE — ED ADULT NURSE NOTE - NS ED NOTE ABUSE RESPONSE YN
Informed patient of results and recommendations. Patient verbalized understanding and is agreeable with starting glipizide.     Patient requesting increase quantity of specialist cast padding. Previous quantity 1. Patient uses 3 times per week.    Orders pended.   Yes

## 2023-02-23 ENCOUNTER — TRANSCRIPTION ENCOUNTER (OUTPATIENT)
Age: 83
End: 2023-02-23

## 2023-02-23 ENCOUNTER — INPATIENT (INPATIENT)
Facility: HOSPITAL | Age: 83
LOS: 4 days | Discharge: NURSING, REHAB OR LONG-TERM CARE | DRG: 522 | End: 2023-02-28
Attending: GENERAL ACUTE CARE HOSPITAL | Admitting: GENERAL ACUTE CARE HOSPITAL
Payer: MEDICARE

## 2023-02-23 VITALS
RESPIRATION RATE: 16 BRPM | SYSTOLIC BLOOD PRESSURE: 120 MMHG | OXYGEN SATURATION: 92 % | HEART RATE: 68 BPM | DIASTOLIC BLOOD PRESSURE: 74 MMHG | HEIGHT: 63 IN | TEMPERATURE: 98 F

## 2023-02-23 DIAGNOSIS — E89.0 POSTPROCEDURAL HYPOTHYROIDISM: Chronic | ICD-10-CM

## 2023-02-23 DIAGNOSIS — R52 PAIN, UNSPECIFIED: ICD-10-CM

## 2023-02-23 DIAGNOSIS — K21.9 GASTRO-ESOPHAGEAL REFLUX DISEASE WITHOUT ESOPHAGITIS: ICD-10-CM

## 2023-02-23 DIAGNOSIS — Z98.890 OTHER SPECIFIED POSTPROCEDURAL STATES: Chronic | ICD-10-CM

## 2023-02-23 DIAGNOSIS — S72.009A FRACTURE OF UNSPECIFIED PART OF NECK OF UNSPECIFIED FEMUR, INITIAL ENCOUNTER FOR CLOSED FRACTURE: ICD-10-CM

## 2023-02-23 DIAGNOSIS — N18.2 CHRONIC KIDNEY DISEASE, STAGE 2 (MILD): ICD-10-CM

## 2023-02-23 DIAGNOSIS — S72.002A FRACTURE OF UNSPECIFIED PART OF NECK OF LEFT FEMUR, INITIAL ENCOUNTER FOR CLOSED FRACTURE: ICD-10-CM

## 2023-02-23 DIAGNOSIS — I10 ESSENTIAL (PRIMARY) HYPERTENSION: ICD-10-CM

## 2023-02-23 DIAGNOSIS — S72.001A FRACTURE OF UNSPECIFIED PART OF NECK OF RIGHT FEMUR, INITIAL ENCOUNTER FOR CLOSED FRACTURE: Chronic | ICD-10-CM

## 2023-02-23 LAB
ALBUMIN SERPL ELPH-MCNC: 4 G/DL — SIGNIFICANT CHANGE UP (ref 3.3–5)
ALP SERPL-CCNC: 78 U/L — SIGNIFICANT CHANGE UP (ref 40–120)
ALT FLD-CCNC: 16 U/L — SIGNIFICANT CHANGE UP (ref 10–45)
ANION GAP SERPL CALC-SCNC: 14 MMOL/L — SIGNIFICANT CHANGE UP (ref 5–17)
APTT BLD: 32.4 SEC — SIGNIFICANT CHANGE UP (ref 27.5–35.5)
AST SERPL-CCNC: 30 U/L — SIGNIFICANT CHANGE UP (ref 10–40)
BASOPHILS # BLD AUTO: 0.05 K/UL — SIGNIFICANT CHANGE UP (ref 0–0.2)
BASOPHILS NFR BLD AUTO: 0.5 % — SIGNIFICANT CHANGE UP (ref 0–2)
BILIRUB SERPL-MCNC: 0.5 MG/DL — SIGNIFICANT CHANGE UP (ref 0.2–1.2)
BLD GP AB SCN SERPL QL: NEGATIVE — SIGNIFICANT CHANGE UP
BUN SERPL-MCNC: 12 MG/DL — SIGNIFICANT CHANGE UP (ref 7–23)
CALCIUM SERPL-MCNC: 9.4 MG/DL — SIGNIFICANT CHANGE UP (ref 8.4–10.5)
CHLORIDE SERPL-SCNC: 99 MMOL/L — SIGNIFICANT CHANGE UP (ref 96–108)
CO2 SERPL-SCNC: 22 MMOL/L — SIGNIFICANT CHANGE UP (ref 22–31)
CREAT SERPL-MCNC: 0.85 MG/DL — SIGNIFICANT CHANGE UP (ref 0.5–1.3)
EGFR: 69 ML/MIN/1.73M2 — SIGNIFICANT CHANGE UP
EOSINOPHIL # BLD AUTO: 0.12 K/UL — SIGNIFICANT CHANGE UP (ref 0–0.5)
EOSINOPHIL NFR BLD AUTO: 1.2 % — SIGNIFICANT CHANGE UP (ref 0–6)
FLUAV AG NPH QL: SIGNIFICANT CHANGE UP
FLUBV AG NPH QL: SIGNIFICANT CHANGE UP
GLUCOSE SERPL-MCNC: 106 MG/DL — HIGH (ref 70–99)
HCT VFR BLD CALC: 39.4 % — SIGNIFICANT CHANGE UP (ref 34.5–45)
HGB BLD-MCNC: 13 G/DL — SIGNIFICANT CHANGE UP (ref 11.5–15.5)
IMM GRANULOCYTES NFR BLD AUTO: 0.3 % — SIGNIFICANT CHANGE UP (ref 0–0.9)
INR BLD: 1.22 RATIO — HIGH (ref 0.88–1.16)
LYMPHOCYTES # BLD AUTO: 1.12 K/UL — SIGNIFICANT CHANGE UP (ref 1–3.3)
LYMPHOCYTES # BLD AUTO: 11.7 % — LOW (ref 13–44)
MCHC RBC-ENTMCNC: 31.1 PG — SIGNIFICANT CHANGE UP (ref 27–34)
MCHC RBC-ENTMCNC: 33 GM/DL — SIGNIFICANT CHANGE UP (ref 32–36)
MCV RBC AUTO: 94.3 FL — SIGNIFICANT CHANGE UP (ref 80–100)
MONOCYTES # BLD AUTO: 0.78 K/UL — SIGNIFICANT CHANGE UP (ref 0–0.9)
MONOCYTES NFR BLD AUTO: 8.1 % — SIGNIFICANT CHANGE UP (ref 2–14)
NEUTROPHILS # BLD AUTO: 7.51 K/UL — HIGH (ref 1.8–7.4)
NEUTROPHILS NFR BLD AUTO: 78.2 % — HIGH (ref 43–77)
NRBC # BLD: 0 /100 WBCS — SIGNIFICANT CHANGE UP (ref 0–0)
PLATELET # BLD AUTO: 248 K/UL — SIGNIFICANT CHANGE UP (ref 150–400)
POTASSIUM SERPL-MCNC: 3.7 MMOL/L — SIGNIFICANT CHANGE UP (ref 3.5–5.3)
POTASSIUM SERPL-SCNC: 3.7 MMOL/L — SIGNIFICANT CHANGE UP (ref 3.5–5.3)
PROT SERPL-MCNC: 7.1 G/DL — SIGNIFICANT CHANGE UP (ref 6–8.3)
PROTHROM AB SERPL-ACNC: 14.2 SEC — HIGH (ref 10.5–13.4)
RBC # BLD: 4.18 M/UL — SIGNIFICANT CHANGE UP (ref 3.8–5.2)
RBC # FLD: 14.1 % — SIGNIFICANT CHANGE UP (ref 10.3–14.5)
RH IG SCN BLD-IMP: POSITIVE — SIGNIFICANT CHANGE UP
RSV RNA NPH QL NAA+NON-PROBE: SIGNIFICANT CHANGE UP
SARS-COV-2 RNA SPEC QL NAA+PROBE: SIGNIFICANT CHANGE UP
SODIUM SERPL-SCNC: 135 MMOL/L — SIGNIFICANT CHANGE UP (ref 135–145)
WBC # BLD: 9.61 K/UL — SIGNIFICANT CHANGE UP (ref 3.8–10.5)
WBC # FLD AUTO: 9.61 K/UL — SIGNIFICANT CHANGE UP (ref 3.8–10.5)

## 2023-02-23 PROCEDURE — 76377 3D RENDER W/INTRP POSTPROCES: CPT | Mod: 26

## 2023-02-23 PROCEDURE — 71045 X-RAY EXAM CHEST 1 VIEW: CPT | Mod: 26

## 2023-02-23 PROCEDURE — 73552 X-RAY EXAM OF FEMUR 2/>: CPT | Mod: 26,LT

## 2023-02-23 PROCEDURE — 72192 CT PELVIS W/O DYE: CPT | Mod: 26,MD

## 2023-02-23 PROCEDURE — 72125 CT NECK SPINE W/O DYE: CPT | Mod: 26,MA

## 2023-02-23 PROCEDURE — 73502 X-RAY EXAM HIP UNI 2-3 VIEWS: CPT | Mod: 26,LT

## 2023-02-23 PROCEDURE — 73560 X-RAY EXAM OF KNEE 1 OR 2: CPT | Mod: 26,LT

## 2023-02-23 PROCEDURE — 70450 CT HEAD/BRAIN W/O DYE: CPT | Mod: 26,MD

## 2023-02-23 PROCEDURE — 99285 EMERGENCY DEPT VISIT HI MDM: CPT

## 2023-02-23 RX ORDER — LEVOTHYROXINE SODIUM 125 MCG
75 TABLET ORAL DAILY
Refills: 0 | Status: DISCONTINUED | OUTPATIENT
Start: 2023-02-23 | End: 2023-02-24

## 2023-02-23 RX ORDER — SENNA PLUS 8.6 MG/1
2 TABLET ORAL AT BEDTIME
Refills: 0 | Status: DISCONTINUED | OUTPATIENT
Start: 2023-02-23 | End: 2023-02-24

## 2023-02-23 RX ORDER — OXYCODONE HYDROCHLORIDE 5 MG/1
5 TABLET ORAL EVERY 4 HOURS
Refills: 0 | Status: DISCONTINUED | OUTPATIENT
Start: 2023-02-23 | End: 2023-02-24

## 2023-02-23 RX ORDER — INFLUENZA VIRUS VACCINE 15; 15; 15; 15 UG/.5ML; UG/.5ML; UG/.5ML; UG/.5ML
0.7 SUSPENSION INTRAMUSCULAR ONCE
Refills: 0 | Status: DISCONTINUED | OUTPATIENT
Start: 2023-02-23 | End: 2023-02-28

## 2023-02-23 RX ORDER — LOSARTAN POTASSIUM 100 MG/1
100 TABLET, FILM COATED ORAL DAILY
Refills: 0 | Status: DISCONTINUED | OUTPATIENT
Start: 2023-02-23 | End: 2023-02-24

## 2023-02-23 RX ORDER — ALPRAZOLAM 0.25 MG
1 TABLET ORAL AT BEDTIME
Refills: 0 | Status: DISCONTINUED | OUTPATIENT
Start: 2023-02-23 | End: 2023-02-24

## 2023-02-23 RX ORDER — DILTIAZEM HCL 120 MG
120 CAPSULE, EXT RELEASE 24 HR ORAL DAILY
Refills: 0 | Status: DISCONTINUED | OUTPATIENT
Start: 2023-02-23 | End: 2023-02-24

## 2023-02-23 RX ORDER — SUCRALFATE 1 G
1 TABLET ORAL EVERY 6 HOURS
Refills: 0 | Status: DISCONTINUED | OUTPATIENT
Start: 2023-02-23 | End: 2023-02-24

## 2023-02-23 RX ORDER — AMITRIPTYLINE HCL 25 MG
25 TABLET ORAL AT BEDTIME
Refills: 0 | Status: DISCONTINUED | OUTPATIENT
Start: 2023-02-23 | End: 2023-02-24

## 2023-02-23 RX ORDER — PANTOPRAZOLE SODIUM 20 MG/1
40 TABLET, DELAYED RELEASE ORAL
Refills: 0 | Status: DISCONTINUED | OUTPATIENT
Start: 2023-02-23 | End: 2023-02-24

## 2023-02-23 RX ORDER — SODIUM CHLORIDE 9 MG/ML
1000 INJECTION INTRAMUSCULAR; INTRAVENOUS; SUBCUTANEOUS
Refills: 0 | Status: DISCONTINUED | OUTPATIENT
Start: 2023-02-23 | End: 2023-02-23

## 2023-02-23 RX ORDER — SODIUM CHLORIDE 9 MG/ML
1000 INJECTION, SOLUTION INTRAVENOUS
Refills: 0 | Status: DISCONTINUED | OUTPATIENT
Start: 2023-02-23 | End: 2023-02-24

## 2023-02-23 RX ORDER — OXYCODONE HYDROCHLORIDE 5 MG/1
2.5 TABLET ORAL EVERY 4 HOURS
Refills: 0 | Status: DISCONTINUED | OUTPATIENT
Start: 2023-02-23 | End: 2023-02-24

## 2023-02-23 RX ORDER — MAGNESIUM HYDROXIDE 400 MG/1
30 TABLET, CHEWABLE ORAL DAILY
Refills: 0 | Status: DISCONTINUED | OUTPATIENT
Start: 2023-02-23 | End: 2023-02-24

## 2023-02-23 RX ORDER — ASCORBIC ACID 60 MG
500 TABLET,CHEWABLE ORAL DAILY
Refills: 0 | Status: DISCONTINUED | OUTPATIENT
Start: 2023-02-23 | End: 2023-02-24

## 2023-02-23 RX ORDER — MORPHINE SULFATE 50 MG/1
4 CAPSULE, EXTENDED RELEASE ORAL ONCE
Refills: 0 | Status: DISCONTINUED | OUTPATIENT
Start: 2023-02-23 | End: 2023-02-23

## 2023-02-23 RX ORDER — HYDRALAZINE HCL 50 MG
50 TABLET ORAL
Refills: 0 | Status: DISCONTINUED | OUTPATIENT
Start: 2023-02-23 | End: 2023-02-24

## 2023-02-23 RX ORDER — ACETAMINOPHEN 500 MG
650 TABLET ORAL EVERY 6 HOURS
Refills: 0 | Status: DISCONTINUED | OUTPATIENT
Start: 2023-02-23 | End: 2023-02-24

## 2023-02-23 RX ORDER — BUPROPION HYDROCHLORIDE 150 MG/1
100 TABLET, EXTENDED RELEASE ORAL
Refills: 0 | Status: DISCONTINUED | OUTPATIENT
Start: 2023-02-23 | End: 2023-02-24

## 2023-02-23 RX ORDER — MAGNESIUM OXIDE 400 MG ORAL TABLET 241.3 MG
400 TABLET ORAL DAILY
Refills: 0 | Status: DISCONTINUED | OUTPATIENT
Start: 2023-02-23 | End: 2023-02-24

## 2023-02-23 RX ORDER — LANOLIN ALCOHOL/MO/W.PET/CERES
3 CREAM (GRAM) TOPICAL AT BEDTIME
Refills: 0 | Status: DISCONTINUED | OUTPATIENT
Start: 2023-02-23 | End: 2023-02-24

## 2023-02-23 RX ORDER — CARVEDILOL PHOSPHATE 80 MG/1
25 CAPSULE, EXTENDED RELEASE ORAL EVERY 12 HOURS
Refills: 0 | Status: DISCONTINUED | OUTPATIENT
Start: 2023-02-23 | End: 2023-02-24

## 2023-02-23 RX ORDER — BUPROPION HYDROCHLORIDE 150 MG/1
100 TABLET, EXTENDED RELEASE ORAL
Refills: 0 | Status: DISCONTINUED | OUTPATIENT
Start: 2023-02-23 | End: 2023-02-23

## 2023-02-23 RX ADMIN — MORPHINE SULFATE 4 MILLIGRAM(S): 50 CAPSULE, EXTENDED RELEASE ORAL at 19:35

## 2023-02-23 RX ADMIN — MORPHINE SULFATE 4 MILLIGRAM(S): 50 CAPSULE, EXTENDED RELEASE ORAL at 16:46

## 2023-02-23 RX ADMIN — MORPHINE SULFATE 4 MILLIGRAM(S): 50 CAPSULE, EXTENDED RELEASE ORAL at 17:35

## 2023-02-23 NOTE — ED PROVIDER NOTE - ATTENDING CONTRIBUTION TO CARE
Jovanni Arana MD:  I personally saw the patient and performed a substantive portion of the visit including all aspects of the medical decision making.    MDM: 82-year-old female with history of iron deficiency anemia, CAD, GI bleed, thus no longer on antiplatelets, who was brought in by EMS for trip and fall yesterday at 1 PM while at home, landing on her left hip.  Patient with pain of the left hip, and top of the head.  Patient has been having more difficulty with ambulation despite use of cane/walker that she uses baseline.  Denies being on antiplatelets or anticoagulants.    ROS: patient denies LOC, syncope, neck pain, chest pain, shortness of breath, abdominal pain, back pain, numbness, weakness, vomiting, lightheadedness, vision changes, difficulty walking.    Primary Survey: airway intact, breathing with symmetrical bilateral lung sounds, circulation with pulses in all 4 extremities.  Secondary Survey: NAD, positive for tenderness over the top of the scalp, GCS 15, PERRL, EOMI without diplopia or discomfort, trachea midline, no stridor, CTAB, NRRR. No chest wall tenderness, deformity or crepitus. No abdominal tenderness or guarding. No signs of external trauma to chest and abdomen, no ecchymosis. No tenderness or deformity to head, maxillo-facial, or midline of cervical/thoracic/lumbar vertebrae. Pelvis stable.  Positive for decreased range of motion of the left hip but there is no shortening, abduction or internal/external rotation.  Otherwise extremities without tenderness, and all extremities neurovascularly intact with soft compartments. No focal sensory or motor deficits.    Will obtain CT Head to evaluate for acute intracranial pathology.  Will obtain CT C-spine to evaluate for acute traumatic cervical spine injury.  Will obtain x-ray to evaluate for hip fracture/dislocation.  We will also obtain CT of bony pelvis to evaluate further.  Will obtain chest x-ray to evaluate for acute cardiopulmonary pathology.    Signed out at 1500 pending full labs/imaging and close reassessments for further treatment and disposition decisions.

## 2023-02-23 NOTE — H&P ADULT - ATTENDING COMMENTS
Associated images, notes, and labs were reviewed. The patient was seen and examined. Risks and benefits of operative versus nonoperative management were discussed with the patient and the patient's family. The patient showed a good understanding of the injury and the treatment options. They would like to move forward with operative management of the left femoral neck fracture. She had a previous hip fracture on the right side that was treated operatively. She had some jean carlos-operative hypertension.

## 2023-02-23 NOTE — PATIENT PROFILE ADULT - FUNCTIONAL ASSESSMENT - BASIC MOBILITY 6.
2-calculated by average/Not able to assess (calculate score using [facility] averaging method)

## 2023-02-23 NOTE — ED PROVIDER NOTE - CLINICAL SUMMARY MEDICAL DECISION MAKING FREE TEXT BOX
Kristina Shaver MD, PGY-3: 83YO F hx anemia, pt on AC, p/w mechanical fall 1 day prior, fell onto head and L hip, nonambulatory. vss, pe L hip ttp, diminished ROM. consider L hip/femur fracture. also consider ICH. plan for ct head, c spine, ct bony pelvis, labs, xray L hip, chest/pelvis xray.

## 2023-02-23 NOTE — ED ADULT NURSE NOTE - OBJECTIVE STATEMENT
Pt complains of left non-tender groin pain radiating to the hip from a mechanical fall around 1300 on 02/22/2023.

## 2023-02-23 NOTE — ED PROVIDER NOTE - EKG ADDITIONAL INFORMATION FREE TEXT
I personally performed independent interpretation of the EKG, which shows:  Normal sinus rhythm with rate of 62 bpm, QTc 430, no ST elevations or depressions.  T wave inversions noted in lateral leads, similar in appearance from prior EKG on 2/6/2023

## 2023-02-23 NOTE — PATIENT PROFILE ADULT - FALL HARM RISK - HARM RISK INTERVENTIONS

## 2023-02-23 NOTE — ED PROVIDER NOTE - PHYSICAL EXAMINATION
Gen: WDWN, NAD  HEENT: EOMI, no nasal discharge, mucous membranes moist. no scalp hematoma.   CV: 2+ radial pulse L  Resp: no accessory muscle use, no increased work of breathing  GI: Abdomen soft non-distended, NTTP  MSK: No open wounds, no bruising, no LE edema. + diminished ROM L hip, + medial femur ttp, no gross deformity. no midline spinal ttp.   Neuro: A&Ox4, following commands, moving all four extremities spontaneously  Psych: appropriate mood

## 2023-02-23 NOTE — CONSULT NOTE ADULT - PROBLEM SELECTOR RECOMMENDATION 2
Patient with an episode of hypertensive urgency after previous hip surgery  would give all her antihypertensive meds prior to the OR  Patient seen By Dr Turner for cardiology who will see the Patient post op  Would ask anesthesia to see Patient prior to surgery  Patient may need ICU care post op for nitroprusside if BP is is difficult to control

## 2023-02-23 NOTE — H&P ADULT - ASSESSMENT
82y Female with a left femoral neck fracture  - Admit to Dr. Mendoza for OR  - Bedrest  - Pain control  - NPO/IVF  - Primafit  - CBC/BMP/Coags/UA/T+S x2  - EKG/CXR  - Medical clearance  - Plan for OR for CRPP vs. hemiarthroplasty with Dr. Mendoza 2/24 82y Female with a left femoral neck fracture  - Admit to Dr. Mendoza for OR  - Bedrest  - Pain control  - NPO/IVF  - Primafit  - CBC/BMP/Coags/T+S x2  - EKG/CXR  - Medical clearance  - Plan for OR for CRPP vs. hemiarthroplasty with Dr. Mendoza 2/24

## 2023-02-23 NOTE — ED PROVIDER NOTE - OBJECTIVE STATEMENT
83YO F hx anemia, pt on AC, p/w mechanical fall 1 day prior, in the afternoon.  Fell onto the left hip, fall from standing height to the wooden part of the couch, then to the ground.  Positive head strike on the couch.  Denies LOC, amnesia for event, confusion, numbness/weakness/tingling to arms/legs, n/v, blurry vision. Patient nonambulatory after.  Patient helped up immediately by son, minimal downtime.  Nonambulatory

## 2023-02-23 NOTE — H&P ADULT - NSHPPHYSICALEXAM_GEN_ALL_CORE
PE   LLE:  Skin intact; No ecchymosis/soft tissue swelling  Compartments soft; + TTP about hip. No TTP to knee/leg/ankle/foot   ROM limited 2/2 pain   Unable to SLR; + Log Roll/Heel Strike  Motor intact GS/TA/FHL/EHL  SILT L2-S1  DP/PT pulses 2+    RLE/BUE:   Well healed right hip incision. No bony TTP; Good ROM w/o pain; Exam Unremarkable    Imaging:  XR demonstrating a left minimally displaced femoral neck fracture  CT showing a left minimally displaced femoral neck fracture

## 2023-02-23 NOTE — H&P ADULT - HISTORY OF PRESENT ILLNESS
82y Female PMH HTN, HLD, depression, right hip CRPP (Dr. Aleman, 2018) presents s/p mechanical fall c/o severe left hip pain and inability to ambulate. States she feel from standing height at home yesterday, landing on the left hip fall c/o severe left hip pain and inability to ambulate. Endorses headstrike on the couch but denies LOC. Patient denies radiation of pain. Patient denies numbness/tingling/burning in the LLE. No other bone/joint complaints. Patient uses a walker around home at baseline.

## 2023-02-23 NOTE — CONSULT NOTE ADULT - PROBLEM SELECTOR RECOMMENDATION 9
Patient scheduled for an Open reduction and internal fixation of the left hip  No contraindication to scheduled procedure  NPO after MN  DVT and GI prophylaxis

## 2023-02-23 NOTE — H&P ADULT - NSHPLABSRESULTS_GEN_ALL_CORE
13.0   9.61  )-----------( 248      ( 23 Feb 2023 15:43 )             39.4     02-23    135  |  99  |  12  ----------------------------<  106<H>  3.7   |  22  |  0.85    Ca    9.4      23 Feb 2023 15:43    TPro  7.1  /  Alb  4.0  /  TBili  0.5  /  DBili  x   /  AST  30  /  ALT  16  /  AlkPhos  78  02-23    PT/INR - ( 23 Feb 2023 15:43 )   PT: 14.2 sec;   INR: 1.22 ratio         PTT - ( 23 Feb 2023 15:43 )  PTT:32.4 sec

## 2023-02-23 NOTE — CONSULT NOTE ADULT - PROBLEM SELECTOR RECOMMENDATION 4
continue to monitor renal function   would consider a nephrology eval post op if needed  avoid nephrotoxic agents

## 2023-02-24 ENCOUNTER — TRANSCRIPTION ENCOUNTER (OUTPATIENT)
Age: 83
End: 2023-02-24

## 2023-02-24 LAB
24R-OH-CALCIDIOL SERPL-MCNC: 79 NG/ML — SIGNIFICANT CHANGE UP (ref 30–80)
ALBUMIN SERPL ELPH-MCNC: 3.7 G/DL — SIGNIFICANT CHANGE UP (ref 3.3–5)
ANION GAP SERPL CALC-SCNC: 13 MMOL/L — SIGNIFICANT CHANGE UP (ref 5–17)
ANION GAP SERPL CALC-SCNC: 19 MMOL/L — HIGH (ref 5–17)
APTT BLD: 28.8 SEC — SIGNIFICANT CHANGE UP (ref 27.5–35.5)
BUN SERPL-MCNC: 10 MG/DL — SIGNIFICANT CHANGE UP (ref 7–23)
BUN SERPL-MCNC: 13 MG/DL — SIGNIFICANT CHANGE UP (ref 7–23)
CALCIUM SERPL-MCNC: 8.5 MG/DL — SIGNIFICANT CHANGE UP (ref 8.4–10.5)
CALCIUM SERPL-MCNC: 9.5 MG/DL — SIGNIFICANT CHANGE UP (ref 8.4–10.5)
CHLORIDE SERPL-SCNC: 101 MMOL/L — SIGNIFICANT CHANGE UP (ref 96–108)
CHLORIDE SERPL-SCNC: 101 MMOL/L — SIGNIFICANT CHANGE UP (ref 96–108)
CO2 SERPL-SCNC: 17 MMOL/L — LOW (ref 22–31)
CO2 SERPL-SCNC: 23 MMOL/L — SIGNIFICANT CHANGE UP (ref 22–31)
CREAT SERPL-MCNC: 0.75 MG/DL — SIGNIFICANT CHANGE UP (ref 0.5–1.3)
CREAT SERPL-MCNC: 0.83 MG/DL — SIGNIFICANT CHANGE UP (ref 0.5–1.3)
EGFR: 71 ML/MIN/1.73M2 — SIGNIFICANT CHANGE UP
EGFR: 80 ML/MIN/1.73M2 — SIGNIFICANT CHANGE UP
GLUCOSE SERPL-MCNC: 118 MG/DL — HIGH (ref 70–99)
GLUCOSE SERPL-MCNC: 132 MG/DL — HIGH (ref 70–99)
HCT VFR BLD CALC: 36.9 % — SIGNIFICANT CHANGE UP (ref 34.5–45)
HCT VFR BLD CALC: 40.7 % — SIGNIFICANT CHANGE UP (ref 34.5–45)
HGB BLD-MCNC: 12.3 G/DL — SIGNIFICANT CHANGE UP (ref 11.5–15.5)
HGB BLD-MCNC: 13.2 G/DL — SIGNIFICANT CHANGE UP (ref 11.5–15.5)
INR BLD: 1.26 RATIO — HIGH (ref 0.88–1.16)
MCHC RBC-ENTMCNC: 31.4 PG — SIGNIFICANT CHANGE UP (ref 27–34)
MCHC RBC-ENTMCNC: 31.7 PG — SIGNIFICANT CHANGE UP (ref 27–34)
MCHC RBC-ENTMCNC: 32.4 GM/DL — SIGNIFICANT CHANGE UP (ref 32–36)
MCHC RBC-ENTMCNC: 33.3 GM/DL — SIGNIFICANT CHANGE UP (ref 32–36)
MCV RBC AUTO: 95.1 FL — SIGNIFICANT CHANGE UP (ref 80–100)
MCV RBC AUTO: 96.7 FL — SIGNIFICANT CHANGE UP (ref 80–100)
NRBC # BLD: 0 /100 WBCS — SIGNIFICANT CHANGE UP (ref 0–0)
NRBC # BLD: 0 /100 WBCS — SIGNIFICANT CHANGE UP (ref 0–0)
PLATELET # BLD AUTO: 208 K/UL — SIGNIFICANT CHANGE UP (ref 150–400)
PLATELET # BLD AUTO: 220 K/UL — SIGNIFICANT CHANGE UP (ref 150–400)
POTASSIUM SERPL-MCNC: 3.5 MMOL/L — SIGNIFICANT CHANGE UP (ref 3.5–5.3)
POTASSIUM SERPL-MCNC: 3.7 MMOL/L — SIGNIFICANT CHANGE UP (ref 3.5–5.3)
POTASSIUM SERPL-SCNC: 3.5 MMOL/L — SIGNIFICANT CHANGE UP (ref 3.5–5.3)
POTASSIUM SERPL-SCNC: 3.7 MMOL/L — SIGNIFICANT CHANGE UP (ref 3.5–5.3)
PROTHROM AB SERPL-ACNC: 14.7 SEC — HIGH (ref 10.5–13.4)
RBC # BLD: 3.88 M/UL — SIGNIFICANT CHANGE UP (ref 3.8–5.2)
RBC # BLD: 4.21 M/UL — SIGNIFICANT CHANGE UP (ref 3.8–5.2)
RBC # FLD: 14.1 % — SIGNIFICANT CHANGE UP (ref 10.3–14.5)
RBC # FLD: 14.1 % — SIGNIFICANT CHANGE UP (ref 10.3–14.5)
SODIUM SERPL-SCNC: 137 MMOL/L — SIGNIFICANT CHANGE UP (ref 135–145)
SODIUM SERPL-SCNC: 137 MMOL/L — SIGNIFICANT CHANGE UP (ref 135–145)
WBC # BLD: 15.42 K/UL — HIGH (ref 3.8–10.5)
WBC # BLD: 19.47 K/UL — HIGH (ref 3.8–10.5)
WBC # FLD AUTO: 15.42 K/UL — HIGH (ref 3.8–10.5)
WBC # FLD AUTO: 19.47 K/UL — HIGH (ref 3.8–10.5)

## 2023-02-24 PROCEDURE — 99222 1ST HOSP IP/OBS MODERATE 55: CPT | Mod: 57

## 2023-02-24 PROCEDURE — 27236 TREAT THIGH FRACTURE: CPT | Mod: LT

## 2023-02-24 PROCEDURE — 73502 X-RAY EXAM HIP UNI 2-3 VIEWS: CPT | Mod: 26,LT

## 2023-02-24 DEVICE — HEAD FEM BI-P UHR 26MM 42MM: Type: IMPLANTABLE DEVICE | Site: LEFT | Status: FUNCTIONAL

## 2023-02-24 DEVICE — STEM CMT HIP ACCOLADE II V40 30X101MM 132D SZ 3: Type: IMPLANTABLE DEVICE | Site: LEFT | Status: FUNCTIONAL

## 2023-02-24 DEVICE — FEM HEAD LFIT V40 26MM: Type: IMPLANTABLE DEVICE | Site: LEFT | Status: FUNCTIONAL

## 2023-02-24 RX ORDER — LEVOTHYROXINE SODIUM 125 MCG
75 TABLET ORAL DAILY
Refills: 0 | Status: DISCONTINUED | OUTPATIENT
Start: 2023-02-24 | End: 2023-02-28

## 2023-02-24 RX ORDER — SODIUM CHLORIDE 9 MG/ML
500 INJECTION INTRAMUSCULAR; INTRAVENOUS; SUBCUTANEOUS ONCE
Refills: 0 | Status: COMPLETED | OUTPATIENT
Start: 2023-02-24 | End: 2023-02-24

## 2023-02-24 RX ORDER — ASCORBIC ACID 60 MG
500 TABLET,CHEWABLE ORAL DAILY
Refills: 0 | Status: DISCONTINUED | OUTPATIENT
Start: 2023-02-24 | End: 2023-02-28

## 2023-02-24 RX ORDER — MAGNESIUM OXIDE 400 MG ORAL TABLET 241.3 MG
500 TABLET ORAL DAILY
Refills: 0 | Status: DISCONTINUED | OUTPATIENT
Start: 2023-02-24 | End: 2023-02-25

## 2023-02-24 RX ORDER — HYDRALAZINE HCL 50 MG
50 TABLET ORAL
Refills: 0 | Status: DISCONTINUED | OUTPATIENT
Start: 2023-02-24 | End: 2023-02-24

## 2023-02-24 RX ORDER — HYDROMORPHONE HYDROCHLORIDE 2 MG/ML
0.5 INJECTION INTRAMUSCULAR; INTRAVENOUS; SUBCUTANEOUS ONCE
Refills: 0 | Status: DISCONTINUED | OUTPATIENT
Start: 2023-02-24 | End: 2023-02-28

## 2023-02-24 RX ORDER — PANTOPRAZOLE SODIUM 20 MG/1
40 TABLET, DELAYED RELEASE ORAL
Refills: 0 | Status: DISCONTINUED | OUTPATIENT
Start: 2023-02-24 | End: 2023-02-28

## 2023-02-24 RX ORDER — CARVEDILOL PHOSPHATE 80 MG/1
25 CAPSULE, EXTENDED RELEASE ORAL EVERY 12 HOURS
Refills: 0 | Status: DISCONTINUED | OUTPATIENT
Start: 2023-02-24 | End: 2023-02-24

## 2023-02-24 RX ORDER — SENNA PLUS 8.6 MG/1
2 TABLET ORAL AT BEDTIME
Refills: 0 | Status: DISCONTINUED | OUTPATIENT
Start: 2023-02-24 | End: 2023-02-28

## 2023-02-24 RX ORDER — DILTIAZEM HCL 120 MG
120 CAPSULE, EXT RELEASE 24 HR ORAL DAILY
Refills: 0 | Status: DISCONTINUED | OUTPATIENT
Start: 2023-02-24 | End: 2023-02-24

## 2023-02-24 RX ORDER — TRAMADOL HYDROCHLORIDE 50 MG/1
50 TABLET ORAL EVERY 6 HOURS
Refills: 0 | Status: DISCONTINUED | OUTPATIENT
Start: 2023-02-24 | End: 2023-02-28

## 2023-02-24 RX ORDER — LOSARTAN POTASSIUM 100 MG/1
100 TABLET, FILM COATED ORAL DAILY
Refills: 0 | Status: DISCONTINUED | OUTPATIENT
Start: 2023-02-24 | End: 2023-02-24

## 2023-02-24 RX ORDER — ACETAMINOPHEN 500 MG
1000 TABLET ORAL ONCE
Refills: 0 | Status: COMPLETED | OUTPATIENT
Start: 2023-02-24 | End: 2023-02-25

## 2023-02-24 RX ORDER — DILTIAZEM HCL 120 MG
120 CAPSULE, EXT RELEASE 24 HR ORAL EVERY 24 HOURS
Refills: 0 | Status: DISCONTINUED | OUTPATIENT
Start: 2023-02-25 | End: 2023-02-28

## 2023-02-24 RX ORDER — LOSARTAN POTASSIUM 100 MG/1
100 TABLET, FILM COATED ORAL
Refills: 0 | Status: DISCONTINUED | OUTPATIENT
Start: 2023-02-25 | End: 2023-02-28

## 2023-02-24 RX ORDER — HYDROMORPHONE HYDROCHLORIDE 2 MG/ML
0.25 INJECTION INTRAMUSCULAR; INTRAVENOUS; SUBCUTANEOUS
Refills: 0 | Status: DISCONTINUED | OUTPATIENT
Start: 2023-02-24 | End: 2023-02-24

## 2023-02-24 RX ORDER — MAGNESIUM HYDROXIDE 400 MG/1
30 TABLET, CHEWABLE ORAL DAILY
Refills: 0 | Status: DISCONTINUED | OUTPATIENT
Start: 2023-02-24 | End: 2023-02-28

## 2023-02-24 RX ORDER — ONDANSETRON 8 MG/1
4 TABLET, FILM COATED ORAL ONCE
Refills: 0 | Status: DISCONTINUED | OUTPATIENT
Start: 2023-02-24 | End: 2023-02-24

## 2023-02-24 RX ORDER — ACETAMINOPHEN 500 MG
975 TABLET ORAL EVERY 8 HOURS
Refills: 0 | Status: DISCONTINUED | OUTPATIENT
Start: 2023-02-25 | End: 2023-02-28

## 2023-02-24 RX ORDER — ATORVASTATIN CALCIUM 80 MG/1
10 TABLET, FILM COATED ORAL AT BEDTIME
Refills: 0 | Status: DISCONTINUED | OUTPATIENT
Start: 2023-02-24 | End: 2023-02-28

## 2023-02-24 RX ORDER — POLYETHYLENE GLYCOL 3350 17 G/17G
17 POWDER, FOR SOLUTION ORAL AT BEDTIME
Refills: 0 | Status: DISCONTINUED | OUTPATIENT
Start: 2023-02-24 | End: 2023-02-28

## 2023-02-24 RX ORDER — CARVEDILOL PHOSPHATE 80 MG/1
25 CAPSULE, EXTENDED RELEASE ORAL EVERY 12 HOURS
Refills: 0 | Status: DISCONTINUED | OUTPATIENT
Start: 2023-02-24 | End: 2023-02-28

## 2023-02-24 RX ORDER — ACETAMINOPHEN 500 MG
975 TABLET ORAL EVERY 8 HOURS
Refills: 0 | Status: DISCONTINUED | OUTPATIENT
Start: 2023-02-24 | End: 2023-02-24

## 2023-02-24 RX ORDER — ENOXAPARIN SODIUM 100 MG/ML
40 INJECTION SUBCUTANEOUS EVERY 24 HOURS
Refills: 0 | Status: DISCONTINUED | OUTPATIENT
Start: 2023-02-25 | End: 2023-02-28

## 2023-02-24 RX ORDER — BUPROPION HYDROCHLORIDE 150 MG/1
100 TABLET, EXTENDED RELEASE ORAL
Refills: 0 | Status: DISCONTINUED | OUTPATIENT
Start: 2023-02-24 | End: 2023-02-28

## 2023-02-24 RX ORDER — CEFAZOLIN SODIUM 1 G
2000 VIAL (EA) INJECTION EVERY 8 HOURS
Refills: 0 | Status: COMPLETED | OUTPATIENT
Start: 2023-02-24 | End: 2023-02-24

## 2023-02-24 RX ORDER — SODIUM CHLORIDE 9 MG/ML
1000 INJECTION, SOLUTION INTRAVENOUS
Refills: 0 | Status: DISCONTINUED | OUTPATIENT
Start: 2023-02-24 | End: 2023-02-26

## 2023-02-24 RX ORDER — ACETAMINOPHEN 500 MG
1000 TABLET ORAL ONCE
Refills: 0 | Status: COMPLETED | OUTPATIENT
Start: 2023-02-24 | End: 2023-02-24

## 2023-02-24 RX ORDER — SODIUM CHLORIDE 9 MG/ML
500 INJECTION INTRAMUSCULAR; INTRAVENOUS; SUBCUTANEOUS ONCE
Refills: 0 | Status: COMPLETED | OUTPATIENT
Start: 2023-02-25 | End: 2023-02-25

## 2023-02-24 RX ORDER — LOVASTATIN 20 MG
20 TABLET ORAL AT BEDTIME
Refills: 0 | Status: DISCONTINUED | OUTPATIENT
Start: 2023-02-24 | End: 2023-02-24

## 2023-02-24 RX ORDER — AMITRIPTYLINE HCL 25 MG
25 TABLET ORAL AT BEDTIME
Refills: 0 | Status: DISCONTINUED | OUTPATIENT
Start: 2023-02-24 | End: 2023-02-28

## 2023-02-24 RX ORDER — SUCRALFATE 1 G
1 TABLET ORAL EVERY 6 HOURS
Refills: 0 | Status: DISCONTINUED | OUTPATIENT
Start: 2023-02-24 | End: 2023-02-24

## 2023-02-24 RX ORDER — TOBRAMYCIN 0.3 %
1 DROPS OPHTHALMIC (EYE) EVERY 6 HOURS
Refills: 0 | Status: COMPLETED | OUTPATIENT
Start: 2023-02-24 | End: 2023-02-26

## 2023-02-24 RX ORDER — TOBRAMYCIN 0.3 %
1 DROPS OPHTHALMIC (EYE) ONCE
Refills: 0 | Status: COMPLETED | OUTPATIENT
Start: 2023-02-24 | End: 2023-02-24

## 2023-02-24 RX ORDER — BUPROPION HYDROCHLORIDE 150 MG/1
100 TABLET, EXTENDED RELEASE ORAL DAILY
Refills: 0 | Status: DISCONTINUED | OUTPATIENT
Start: 2023-02-24 | End: 2023-02-24

## 2023-02-24 RX ORDER — ALPRAZOLAM 0.25 MG
1 TABLET ORAL AT BEDTIME
Refills: 0 | Status: DISCONTINUED | OUTPATIENT
Start: 2023-02-24 | End: 2023-02-28

## 2023-02-24 RX ORDER — HYDRALAZINE HCL 50 MG
50 TABLET ORAL
Refills: 0 | Status: DISCONTINUED | OUTPATIENT
Start: 2023-02-24 | End: 2023-02-28

## 2023-02-24 RX ORDER — SUCRALFATE 1 G
1 TABLET ORAL
Refills: 0 | Status: DISCONTINUED | OUTPATIENT
Start: 2023-02-24 | End: 2023-02-28

## 2023-02-24 RX ORDER — OXYCODONE HYDROCHLORIDE 5 MG/1
2.5 TABLET ORAL EVERY 4 HOURS
Refills: 0 | Status: DISCONTINUED | OUTPATIENT
Start: 2023-02-24 | End: 2023-02-28

## 2023-02-24 RX ORDER — ACETAMINOPHEN 500 MG
1000 TABLET ORAL ONCE
Refills: 0 | Status: DISCONTINUED | OUTPATIENT
Start: 2023-02-24 | End: 2023-02-28

## 2023-02-24 RX ORDER — OXYCODONE HYDROCHLORIDE 5 MG/1
5 TABLET ORAL EVERY 4 HOURS
Refills: 0 | Status: DISCONTINUED | OUTPATIENT
Start: 2023-02-24 | End: 2023-02-28

## 2023-02-24 RX ADMIN — Medication 1 DROP(S): at 23:07

## 2023-02-24 RX ADMIN — Medication 100 MILLIGRAM(S): at 15:58

## 2023-02-24 RX ADMIN — Medication 1 MILLIGRAM(S): at 21:19

## 2023-02-24 RX ADMIN — BUPROPION HYDROCHLORIDE 100 MILLIGRAM(S): 150 TABLET, EXTENDED RELEASE ORAL at 16:42

## 2023-02-24 RX ADMIN — Medication 1000 MILLIGRAM(S): at 17:10

## 2023-02-24 RX ADMIN — Medication 1 DROP(S): at 17:19

## 2023-02-24 RX ADMIN — Medication 1 DROP(S): at 12:31

## 2023-02-24 RX ADMIN — SODIUM CHLORIDE 1000 MILLILITER(S): 9 INJECTION INTRAMUSCULAR; INTRAVENOUS; SUBCUTANEOUS at 17:01

## 2023-02-24 RX ADMIN — Medication 120 MILLIGRAM(S): at 05:40

## 2023-02-24 RX ADMIN — Medication 1 GRAM(S): at 00:23

## 2023-02-24 RX ADMIN — CARVEDILOL PHOSPHATE 25 MILLIGRAM(S): 80 CAPSULE, EXTENDED RELEASE ORAL at 05:40

## 2023-02-24 RX ADMIN — Medication 400 MILLIGRAM(S): at 15:57

## 2023-02-24 RX ADMIN — MAGNESIUM OXIDE 400 MG ORAL TABLET 500 MILLIGRAM(S): 241.3 TABLET ORAL at 17:15

## 2023-02-24 RX ADMIN — Medication 100 MILLIGRAM(S): at 21:19

## 2023-02-24 RX ADMIN — SODIUM CHLORIDE 85 MILLILITER(S): 9 INJECTION, SOLUTION INTRAVENOUS at 17:02

## 2023-02-24 RX ADMIN — SODIUM CHLORIDE 100 MILLILITER(S): 9 INJECTION, SOLUTION INTRAVENOUS at 00:34

## 2023-02-24 RX ADMIN — Medication 50 MILLIGRAM(S): at 05:40

## 2023-02-24 RX ADMIN — Medication 1 TABLET(S): at 15:56

## 2023-02-24 RX ADMIN — Medication 25 MILLIGRAM(S): at 21:19

## 2023-02-24 RX ADMIN — Medication 2 DROP(S): at 12:26

## 2023-02-24 RX ADMIN — Medication 75 MICROGRAM(S): at 05:40

## 2023-02-24 RX ADMIN — LOSARTAN POTASSIUM 100 MILLIGRAM(S): 100 TABLET, FILM COATED ORAL at 05:40

## 2023-02-24 RX ADMIN — ATORVASTATIN CALCIUM 10 MILLIGRAM(S): 80 TABLET, FILM COATED ORAL at 21:19

## 2023-02-24 RX ADMIN — Medication 1 GRAM(S): at 21:53

## 2023-02-24 NOTE — PRE-ANESTHESIA EVALUATION ADULT - NSANTHPMHFT_GEN_ALL_CORE
chart and consultant notes reviewed. no hx sig cv/pulm dz - states ambulates at home without cp/sob. recent tte essentially unremarkable. ekg with mild tw flattening, no change from prior. prior hx hypertensive crisis following last ortho sx- took am antihypertensives.

## 2023-02-24 NOTE — STUDENT SIGN OFF DOCUMENT - CO-SIGNATURE DATE
"Routing to provider to review.     Dr. Nguyễn-     I came on [place] this evening to do a recap of our visit to discuss with my PCP the next time I got a phone appointment.     I made clear what we discussed, even though I admit, I was all over the place. You said IMPLICITLY, while you agreed that you thought I should try physical therapy and intensive pain psychotherapy and get off opiods that you told me that you would not interfere with a recommendation of my being pulled off my medications.     It's crap like that, that makes me loathe to see doctors.Although I've never had a doctor LIE to me about telling me one thing and saying another to my provider. Unfortunately I didn't see your after visit summary until I documented my appointment with you today, which you can feel free to read.     In addition to finding your blog from 2015, I also found your being reprimanded and nearly having your medical licence revoked in 2015 due to your own medication issues as I saw the conditions that were put on your stay of revocation.     I can speak and advocate for myself and others who don't have a voice. I've been honest about my mental health issues, the 8 days out of 13 years of abusing opiates.  the \"md-ptsd\" I have from cra like this, which you just made a zillion times worse with you telling me one thing and stating on my chart you'd tell my long term provider another. I will continue to see her, even if she should pull my meds.     I get that I'm not an easy patient. If you would've told me the truth, I would've left. You could've spared yourself an hour, if this was your intention from the start. Unfortunately, psychology what you did to me was devastating,  because I trusted you and believed what you said and now it's an issue of your word against mine.     I'm sure you don't want anything to do with me after this message, that was kind of clear, at the end of the appointment. I know you are a busy doctor, but I " 24-Feb-2023 15:51 "can tell you, I do try to make it easier for patients to get help by trying to remove stigma (feel free and google [email or web address], but am not holding my breath that you'll do that).     I at least though, have a voice and can stand up for myself, whether I'm believed by medical professionals or not. What you did to me, I hope you'll reconsider not doing that to another patient.     I've said my peace, I am strongly thinking of filing a grievance. Because if this is how you play your patients, I honestly don't believe you should be practicing medicine.     Teri \"Kalpana\" Jose   "

## 2023-02-24 NOTE — PHYSICAL THERAPY INITIAL EVALUATION ADULT - PERTINENT HX OF CURRENT PROBLEM, REHAB EVAL
82y Female PMH HTN, HLD, depression, right hip CRPP (Dr. Aleman, 2018) presents s/p mechanical fall c/o severe left hip pain and inability to ambulate. States she feel from standing height at home yesterday, landing on the left hip fall c/o severe left hip pain and inability to ambulate. Endorses headstrike on the couch but denies LOC. Patient denies radiation of pain. Patient denies numbness/tingling/burning in the LLE. No other bone/joint complaints. Patient uses a walker around home at baseline. Hosp Course: 2/23 CXR Clear lunges, no acute rib fxs; 2/23 XR L Pelvis/Hip/Femur Lateral overhang of the left femoral head with respect to the femoral neck with focal cortical discontinuity along the medial femoral neck, most consistent with a subcapital/transcervical femoral neck fracture. No dislocation. Status postpinning of the right femoral neck. There are degenerative changes of the pubic symphysis. 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 Evaluation of the sacrum is limited due to overlying bowel. Knee joint is intact. There are vascular calcifications; 2/23 CT C-spine/Brain Head CT: No displaced calvarial fracture or acute intracranial hemorrhage. Cervical spine CT: No acute fracture. Cervical degenerative spondylosis; 2/23 CT Pelvis Impacted left subcapital/transcervical femoral neck fracture without   significant displacement, Status post pinning of the right femoral neck without acute complication. 2/23 XR L Knee No fracture, dislocation, or joint effusion. Intra-articular tibiofemoral chondrocalcinosis. Otherwise preserved joint spaces and no joint margin erosions. Unremarkable quadriceps and patellar tendon shadows. Generalized osteopenia otherwise no discrete lytic or blastic lesions

## 2023-02-24 NOTE — OCCUPATIONAL THERAPY INITIAL EVALUATION ADULT - MD ORDER
OT initial eval and treat   Activity: Hemiarthroplasty of left hip, WBAT OT initial eval and treat   Activity: Hemiarthroplasty of left hip, posterior approach, WBAT

## 2023-02-24 NOTE — OCCUPATIONAL THERAPY INITIAL EVALUATION ADULT - RANGE OF MOTION EXAMINATION, LOWER EXTREMITY
LLE limited to post-op discomfort/Left LE Active ROM was WFL (within functional limits)/Right LE Active ROM was WNL(within normal limits)

## 2023-02-24 NOTE — CHART NOTE - NSCHARTNOTEFT_GEN_A_CORE
POC    Seen in PACU, c/o "gravel-like" sensation in both eyes; Given tetracaine by PACU staff w/ (+) relief   No Chest Pain, SOB, N/V.    T(C): 36 (02-24-23 @ 11:30), Max: 37.1 (02-23-23 @ 14:45)  HR: 59 (02-24-23 @ 12:00) (59 - 83)  BP: 115/56 (02-24-23 @ 12:00) (98/53 - 150/83)  RR: 16 (02-24-23 @ 12:00) (16 - 18)  SpO2: 94% (02-24-23 @ 12:00) (92% - 100%)      Exam:  Alert and [place], No Acute Distress  conjunctivas slightly injected  Pulm: CTAB  Abdomen soft / benign  Vaughan  [n ]   EXT   LLE        Aquacel dressing C/D [x ]         ABD Pillow in place       Calves soft       (+) DF  PF;  EHL /FHL 5/5        No Sensory Deficits noted        2+ pulses    Xray:----  Xray Pelvis AP only (02.24.23 @ 10:07    IMPRESSION:  Status post interval noncemented left hip bipolar hemiarthroplasty with   expected postsurgical soft tissue changes.    Status post remote percutaneous cannulated cancellous screw fixation of   right femoral neck, unchanged.                          12.3   19.47<H> )-----------( 208      ( 24 Feb 2023 10:23 )             36.9      02-24    137  |  101  |  10  ----------------------------<  132<H>  3.7   |  23  |  0.75      A/P: S/p Hemiarthroplasty of left hip    - Tobramycin eye drops & artificial tears x 48hrs: corneal abrasion  -PT/OT-WBAT- posterior precautions  -Chk AM Labs  -DVT PPx: Lovenx QD  -Pain Control PO/IV Pain Rx  -Continue Current Tx  -Dispo planning: Anticipate rehab      ***See Above  [facility]  Orthopedics  B: [id number] POC    Seen in PACU, c/o "gravel-like" sensation in both eyes; Given tetracaine by PACU staff w/ (+) relief   No Chest Pain, SOB, N/V.    T(C): 36 (02-24-23 @ 11:30), Max: 37.1 (02-23-23 @ 14:45)  HR: 59 (02-24-23 @ 12:00) (59 - 83)  BP: 115/56 (02-24-23 @ 12:00) (98/53 - 150/83)  RR: 16 (02-24-23 @ 12:00) (16 - 18)  SpO2: 94% (02-24-23 @ 12:00) (92% - 100%)      Exam:  Alert and [place], No Acute Distress  conjunctivas slightly injected  Pulm: CTAB  Abdomen soft / benign  Vaughan  [n ]   EXT   LLE        Aquacel dressing C/D [x ]         ABD Pillow in place       Calves soft       (+) DF  PF;  EHL /FHL 5/5        No Sensory Deficits noted        2+ pulses    Xray:----  Xray Pelvis AP only (02.24.23 @ 10:07    IMPRESSION:  Status post interval noncemented left hip bipolar hemiarthroplasty with   expected postsurgical soft tissue changes.    Status post remote percutaneous cannulated cancellous screw fixation of   right femoral neck, unchanged.                          12.3   19.47<H> )-----------( 208      ( 24 Feb 2023 10:23 )             36.9      02-24    137  |  101  |  10  ----------------------------<  132<H>  3.7   |  23  |  0.75      A/P: S/p Hemiarthroplasty of left hip    - Tobramycin eye drops & artificial tears x 48hrs: corneal abrasion  -PT/OT-WBAT- posterior precautions  -Chk AM Labs  -DVT PPx: Lovenx QD  -Pain Control PO/IV Pain Rx  -Continue Current Tx   ** Home Rx verified and rescheduled according to family request  -Dispo planning: Anticipate rehab      ***See Above  [facility]imo [facility]AR[facility]  Orthopedics  B: [id number]

## 2023-02-24 NOTE — CONSULT NOTE ADULT - ASSESSMENT
Addended by: Maliha Villalta on: 6/15/2022 03:05 PM     Modules accepted: Orders
Echo 6/30/22: Mild-Mod AR, Hyeperdynamic lv fxn EF 77%    A/P  82y Female PMH HTN, HLD, depression, right hip CRPP (Dr. Aleman, 2018) presents s/p mechanical fall c/o severe left hip pain and inability to ambulate.     #Fall, Left Femoral Neck Fx  -Likely mechanical as pt can recall tripping. Denies CP, palpitations, dizziness prior to event  -EKG NSR  -Prior echo nml lv fxn, mild-mod AS  -Repeat echo to further eval AS  -S/p Hemiarthroplasty of left hip today with ortho  -Post op mgmt per ortho    #HTN  -Cont hydralazine  -Cont Coreg    dvt ppx      Please call/text me with any questions/concerns between 8am-4pm  [phone]
81yo woman with a hx of labile HTN, anemia, stage 2 renal failure,  presents after a fall at home with a left hip fracture. Patient is scheduled for an Open reduction and internal fixation of the left hip.

## 2023-02-24 NOTE — CONSULT NOTE ADULT - SUBJECTIVE AND OBJECTIVE BOX
81YO F hx anemia, pt on AC, p/w mechanical fall 1 day prior, in the afternoon.  Fell onto the left hip, fall from standing height to the wooden part of the couch, then to the ground.  Positive head strike on the couch.  Denies LOC, amnesia for event, confusion, numbness/weakness/tingling to arms/legs, n/v, blurry vision. Patient nonambulatory after.  Patient helped up immediately by son, minimal downtime. Patient was brought to [facility] for further evaluation and treatment. In the ED she was found to have a left hip fracture. Patient is scheduled for an Open reduction and internal fixation of the left hip. Of note, Patient has a hx of difficult to control HTN. Required an ICU admission after her right THR in 2018 for BP control. Patient seen now resting comfortably.    PAST MEDICAL & SURGICAL HISTORY:  Hypertension      GERD (gastroesophageal reflux disease)      Diverticulitis      Osteoarthritis      Macular degeneration      Hypothyroid      Stage 2 chronic kidney disease      Female bladder prolapse      H/O mitral valve prolapse      Hiatal hernia with GERD      Fe deficiency anemia      Tracheal nodule      Arteriosclerotic heart disease (ASHD)      Gastrointestinal bleed      COVID-19 vaccination declined      H/O thyroidectomy      Closed right hip fracture      H/O ovarian cystectomy            MEDICATIONS  (STANDING):  influenza  Vaccine (HIGH DOSE) 0.7 milliLiter(s) IntraMuscular once  lactated ringers. 1000 milliLiter(s) (100 mL/Hr) IV Continuous <Continuous>  senna 2 Tablet(s) Oral at bedtime    MEDICATIONS  (PRN):  acetaminophen     Tablet .. 650 milliGRAM(s) Oral every 6 hours PRN Mild Pain (1 - 3)  magnesium hydroxide Suspension 30 milliLiter(s) Oral daily PRN Constipation  melatonin 3 milliGRAM(s) Oral at bedtime PRN Insomnia  oxyCODONE    IR 2.5 milliGRAM(s) Oral every 4 hours PRN Moderate Pain (4 - 6)  oxyCODONE    IR 5 milliGRAM(s) Oral every 4 hours PRN Severe Pain (7 - 10)        CONSTITUTIONAL: No weakness, fevers or chills  EYES/ENT: No visual changes;  No vertigo or throat pain   NECK: No pain or stiffness  RESPIRATORY: No cough, wheezing, hemoptysis; No shortness of breath  CARDIOVASCULAR: No chest pain or palpitations  GASTROINTESTINAL: No abdominal or epigastric pain. No nausea, vomiting, or hematemesis; No diarrhea or constipation. No melena or hematochezia.  GENITOURINARY: No dysuria, frequency or hematuria  NEUROLOGICAL: No numbness or weakness  SKIN: No itching, burning, rashes, or lesions   MUSCULOSKELETAL: left leg pain    INTERVAL HPI/OVERNIGHT EVENTS:  T(C): 36.8 (02-23-23 @ 21:32), Max: 37.1 (02-23-23 @ 14:45)  HR: 69 (02-23-23 @ 21:32) (60 - 80)  BP: 135/81 (02-23-23 @ 21:32) (120/74 - 140/87)  RR: 18 (02-23-23 @ 21:32) (16 - 18)  SpO2: 94% (02-23-23 @ 21:32) (92% - 100%)  Wt(kg): --  I&O's Summary      PHYSICAL EXAM:  GENERAL: NAD, well-groomed, well-developed  HEAD:  Atraumatic, Normocephalic  EYES: EOMI, PERRLA, conjunctiva and sclera clear  ENMT: No tonsillar erythema, exudates, or enlargement; Moist mucous membranes, Good dentition, No lesions  NECK: Supple, No JVD, Normal thyroid  NERVOUS SYSTEM:  Alert & Oriented X3, Good concentration; Motor Strength 5/5 B/L upper and lower extremities; DTRs 2+ intact and symmetric  CHEST/LUNG: Clear to percussion bilaterally; No rales, rhonchi, wheezing, or rubs  HEART: Regular rate and rhythm; No murmurs, rubs, or gallops  ABDOMEN: Soft, Nontender, Nondistended; Bowel sounds present  EXTREMITIES:  2+ Peripheral Pulses, No clubbing, cyanosis, or edema  LYMPH: No lymphadenopathy noted  SKIN: No rashes or lesions        LABS:                        13.0   9.61  )-----------( 248      ( 23 Feb 2023 15:43 )             39.4     02-23    135  |  99  |  12  ----------------------------<  106<H>  3.7   |  22  |  0.85    Ca    9.4      23 Feb 2023 15:43    TPro  7.1  /  Alb  4.0  /  TBili  0.5  /  DBili  x   /  AST  30  /  ALT  16  /  AlkPhos  78  02-23    PT/INR - ( 23 Feb 2023 15:43 )   PT: 14.2 sec;   INR: 1.22 ratio         PTT - ( 23 Feb 2023 15:43 )  PTT:32.4 sec    EKG: NSR @  
CARDIOLOGY CONSULT - Dr. Calle         HPI:  82y Female PMH HTN, HLD, depression, right hip CRPP (Dr. Aleman, 2018) presents s/p mechanical fall c/o severe left hip pain and inability to ambulate. States she feel from standing height at home yesterday, landing on the left hip fall c/o severe left hip pain and inability to ambulate. Endorses headstrike on the couch but denies LOC. Patient denies radiation of pain. Patient denies numbness/tingling/burning in the LLE. No other bone/joint complaints. Patient uses a walker around home at baseline. (23 Feb 2023 19:20)      PAST MEDICAL & SURGICAL HISTORY:  Hypertension      GERD (gastroesophageal reflux disease)      Diverticulitis      Osteoarthritis      Macular degeneration      Hypothyroid      Stage 2 chronic kidney disease      Female bladder prolapse      H/O mitral valve prolapse      Hiatal hernia with GERD      Fe deficiency anemia      Tracheal nodule      Arteriosclerotic heart disease (ASHD)      Gastrointestinal bleed      COVID-19 vaccination declined      H/O thyroidectomy      Closed right hip fracture      H/O ovarian cystectomy              PREVIOUS DIAGNOSTIC TESTING:    [x] Echocardiogram:  < from: Transthoracic Echocardiogram (06.30.22 @ 23:59) >  Conclusions:  1. Normal mitral valve. Mild mitral regurgitation.  2. Calcified trileaflet aortic valve with normal opening.  Mild-moderate aortic regurgitation.  3. Moderately dilated left atrium.  LA volume index = 45  cc/m2.  4. Hyperdynamic left ventricular systolic function.  5. Moderate diastolic dysfunction (Stage II).  6. Normal right ventricular size and function.  7. Pericardial fat pad noted with trace pericardial  effusion.  *** Compared with echocardiogram of 10/17/2018, no  significant changes noted.    < end of copied text >    [ ]  Catheterization:  [ ] Stress Test:  	    MEDICATIONS:  Home Medications:  ALPRAZolam 1 mg oral tablet: 1 tab(s) orally once a day (at bedtime)  NOTE: pt uses to sleep (01 Jul 2022 10:43)  amitriptyline 25 mg oral tablet: 1 tab(s) orally once a day (at bedtime) (01 Jul 2022 10:43)  Caltrate 600 + D oral tablet: 1 tab(s) orally 2 times a day (01 Jul 2022 10:43)  dilTIAZem 120 mg/24 hours oral capsule, extended release: 1 cap(s) orally once a day (23 Feb 2023 22:54)  hydrALAZINE 50 mg oral tablet: 50 milligram(s) orally 2 times a day (23 Feb 2023 22:53)  lidocaine 5% patch: Apply 3 patches topically to affected area once a day for 12 hours, then remove  NOTE: apply to spine, right knee and back of right leg (01 Jul 2022 10:43)  lovastatin 20 mg oral tablet: 1 tab(s) orally once a day (in the evening) - with dinner (01 Jul 2022 10:43)  magnesium oxide 500 mg oral tablet: 1 to 2 tab(s) orally once a day (at bedtime)  NOTE: pt takes as laxative  (29 Jun 2022 23:42)  Multiple Vitamins oral tablet: 1 tab(s) orally once a day (lunch) (01 Jul 2022 10:43)  olmesartan 40 mg oral tablet: 1 tab(s) orally once a day (23 Feb 2023 22:54)  Synthroid 75 mcg (0.075 mg) oral tablet: 1 tab(s) orally once a day (Mon - Sat) and 1.5 tab on Sundays  (07 Jul 2022 11:06)  Systane ophthalmic solution: 1 drop(s) to each affected eye , As Needed (01 Jul 2022 10:43)  Vitamin C 500 mg oral tablet: 1 tab(s) orally 2 times a day (01 Jul 2022 10:43)  Wellbutrin  mg/12 hours oral tablet, extended release: 1 tab(s) orally 2 times a day (01 Jul 2022 10:43)      MEDICATIONS  (STANDING):  acetaminophen   IVPB .. 1000 milliGRAM(s) IV Intermittent once  acetaminophen   IVPB .. 1000 milliGRAM(s) IV Intermittent once  acetaminophen   IVPB .. 1000 milliGRAM(s) IV Intermittent once  ALPRAZolam 1 milliGRAM(s) Oral at bedtime  amitriptyline 25 milliGRAM(s) Oral at bedtime  ascorbic acid 500 milliGRAM(s) Oral daily  atorvastatin 10 milliGRAM(s) Oral at bedtime  buPROPion SR (12-Hour) 100 milliGRAM(s) Oral two times a day  calcium carbonate 1250 mG  + Vitamin D (OsCal 500 + D) 1 Tablet(s) Oral daily  carvedilol 25 milliGRAM(s) Oral every 12 hours  ceFAZolin   IVPB 2000 milliGRAM(s) IV Intermittent every 8 hours  hydrALAZINE 50 milliGRAM(s) Oral <User Schedule>  HYDROmorphone  Injectable 0.5 milliGRAM(s) IV Push once  influenza  Vaccine (HIGH DOSE) 0.7 milliLiter(s) IntraMuscular once  levothyroxine 75 MICROGram(s) Oral daily  magnesium oxide 500 milliGRAM(s) Oral daily  multivitamin 1 Tablet(s) Oral daily  pantoprazole    Tablet 40 milliGRAM(s) Oral before breakfast  polyethylene glycol 3350 17 Gram(s) Oral at bedtime  senna 2 Tablet(s) Oral at bedtime  sucralfate suspension 1 Gram(s) Oral <User Schedule>  tobramycin 0.3% Ophthalmic Solution 1 Drop(s) Both EYES every 6 hours      FAMILY HISTORY:  FH: HTN (hypertension) (Father, Mother)        SOCIAL HISTORY:    [x] Non-smoker  [ ] Smoker  [ ] Alcohol    Allergies    amoxicillin (Other)  hydrocortisone (Unknown)  NSAIDs (Rash)  penicillin (Other)    Intolerances    	    REVIEW OF SYSTEMS:  CONSTITUTIONAL: No fever, weight loss, or fatigue  EYES: No eye pain, visual disturbances, or discharge  ENMT:  No difficulty hearing, tinnitus, vertigo; No sinus or throat pain  NECK: No pain or stiffness  RESPIRATORY: No cough, wheezing, chills or hemoptysis; No Shortness of Breath  CARDIOVASCULAR: No chest pain, palpitations, passing out, dizziness, or leg swelling  GASTROINTESTINAL: No abdominal or epigastric pain. No nausea, vomiting, or hematemesis; No diarrhea or constipation. No melena or hematochezia.  GENITOURINARY: No dysuria, frequency, hematuria, or incontinence  NEUROLOGICAL: No headaches, memory loss, loss of strength, numbness, or tremors  SKIN: No itching, burning, rashes, or lesions   	    [x] All others negative	  [ ] Unable to obtain    PHYSICAL EXAM:  T(C): 36.4 (02-24-23 @ 13:30), Max: 37.1 (02-23-23 @ 14:45)  HR: 64 (02-24-23 @ 13:30) (59 - 83)  BP: 103/68 (02-24-23 @ 13:30) (98/53 - 150/83)  RR: 18 (02-24-23 @ 13:30) (16 - 18)  SpO2: 97% (02-24-23 @ 13:30) (94% - 100%)  Wt(kg): --  I&O's Summary    23 Feb 2023 07:01  -  24 Feb 2023 07:00  --------------------------------------------------------  IN: 0 mL / OUT: 400 mL / NET: -400 mL        Appearance: Elderly female	  Psychiatry: A & O x 3, Mood & affect appropriate  HEENT:   Normal oral mucosa, PERRL, EOMI	  Lymphatic: No lymphadenopathy  Cardiovascular: Normal S1 S2,RRR, No JVD, No murmurs  Respiratory: Lungs clear to auscultation b/l  Gastrointestinal:  Soft, Non-tender, + BS	  Skin: No rashes, No ecchymoses, No cyanosis	  Neurologic: Non-focal  Extremities: Normal range of motion, No clubbing, cyanosis or edema  Vascular: Peripheral pulses palpable 2+ bilaterally    TELEMETRY: 	    ECG:  	  RADIOLOGY:  < from: Xray Pelvis AP only (02.24.23 @ 10:07) >  IMPRESSION:  Status post interval noncemented left hip bipolar hemiarthroplasty with   expected postsurgical soft tissue changes.    Status post remote percutaneous cannulated cancellous screw fixation of   right femoral neck, unchanged.    < end of copied text >    < from: Xray Femur 2 Views, Left (02.23.23 @ 16:31) >  IMPRESSION:  Lateral overhang of the left femoral head with respect to the femoral   neck with focal cortical discontinuity along the medial femoral neck,   most consistent with a subcapital/transcervical femoral neck fracture. No   dislocation. Status postpinning of the right femoral neck. There are   degenerative changes of the pubic symphysis. There is lower lumbar   discogenic generative disease. Evaluation of the sacrum is limited due to   overlying bowel. Knee joint is intact. There are vascular calcifications.    --- End of Report ---    < end of copied text >      OTHER: 	  	  LABS:	 	    CARDIAC MARKERS:                                  12.3   19.47 )-----------( 208      ( 24 Feb 2023 10:23 )             36.9     02-24    137  |  101  |  10  ----------------------------<  132<H>  3.7   |  23  |  0.75    Ca    8.5      24 Feb 2023 10:23    TPro  x   /  Alb  3.7  /  TBili  x   /  DBili  x   /  AST  x   /  ALT  x   /  AlkPhos  x   02-24    PT/INR - ( 24 Feb 2023 05:49 )   PT: 14.7 sec;   INR: 1.26 ratio         PTT - ( 24 Feb 2023 05:49 )  PTT:28.8 sec  proBNP:   Lipid Profile:   HgA1c:   TSH:

## 2023-02-24 NOTE — CONSULT NOTE ADULT - TIME BILLING
Discussed treatment plan with patient and daughter at bedside.
Patient seen and examined.  Agree with above PA note.  A/P  82y Female PMH HTN, HLD, depression, right hip CRPP (Dr. Aleman, 2018) MAT, anemia, presents s/p mechanical fall c/o severe left hip pain and inability to ambulate.     #Fall, Left Femoral Neck Fx  -mechanical fall  -recent echo nml lv fxn, mild-mod AS  -/p Hemiarthroplasty of left hip today   -cv stable post op     #HTN  -Cont Coreg  -hold hydral if bp remains borderline low    #hx of Multifocal atrial tachycardia.   -continue bb, no indication for a/c    dvt ppx

## 2023-02-24 NOTE — OCCUPATIONAL THERAPY INITIAL EVALUATION ADULT - PERTINENT HX OF CURRENT PROBLEM, REHAB EVAL
82y Female PMH HTN, HLD, depression, right hip CRPP (Dr. Aleman, 2018) presents s/p mechanical fall c/o severe left hip pain and inability to ambulate. States she feel from standing height at home yesterday, landing on the left hip fall c/o severe left hip pain and inability to ambulate. Endorses headstrike on the couch but denies LOC. Patient denies radiation of pain. Patient denies numbness/tingling/burning in the LLE. No other bone/joint complaints. Patient uses a walker around home at baseline. Pt is now s/p Hemiarthroplasty of left hip 24-Feb-2023.     Head/Cervical Spine CT: IMPRESSION: Head CT: No displaced calvarial fracture or acute intracranial hemorrhage. Cervical spine CT: No acute fracture. Cervical degenerative spondylosis, as described above.  CT Pelvis: IMPRESSION: 1.  Impacted left subcapital/transcervical femoral neck fracture without significant displacement. 2.  Status post pinning of the right femoral neck without acute complication.  X-Ray Pelvis/Hip: IMPRESSION: Status post interval noncemented left hip bipolar hemiarthroplasty with expected postsurgical soft tissue changes. Status post remote percutaneous cannulated cancellous screw fixation of right femoral neck, unchanged.  X-Ray Left Knee: (-)   X-Ray Femur/X-Ray Chest: IMPRESSION: Lateral overhang of the left femoral head with respect to the femoral neck with focal cortical discontinuity along the medial femoral neck, most consistent with a subcapital/transcervical femoral neck fracture. No dislocation. Status post pinning of the right femoral neck. There are degenerative changes of the pubic symphysis. 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 Evaluation of the sacrum is limited due to overlying bowel. Knee joint is intact. There are vascular calcifications.

## 2023-02-24 NOTE — PROGRESS NOTE ADULT - ASSESSMENT
81yo woman with a hx of labile HTN, anemia, stage 2 renal failure,  presents after a fall at home with a left hip fracture. s/p Open reduction and internal fixation of the left hip.    Hip fracture, left/ s/p ORIF  - postop care   - pain control    HTN (hypertension).   - Patient with an episode of hypertensive urgency after previous hip surgery  - continue her antihypertensive meds   - cardiology follow     Chronic GERD.   - continue PPI daily  - keep HOB levated while eating.    Stage 2 chronic kidney disease.   - continue to monitor renal function   - avoid nephrotoxic agents.    Pain.   - Pain meds as needed  - follow for oversedation  - GI regimen to prevent constipation.    DVT prophylaxis    Paulei Moore MD phone [phone]

## 2023-02-24 NOTE — PHYSICAL THERAPY INITIAL EVALUATION ADULT - TRANSFER TRAINING, PT EVAL
GOAL: Patient will perform sit to stand transfers independently at rolling walker with proper hand placement and sequencing within posterior hip precautions in 2 weeks.

## 2023-02-24 NOTE — PHYSICAL THERAPY INITIAL EVALUATION ADULT - ADDITIONAL COMMENTS
Pt states that she lives in a pvt house with her son +0 steps to enter and +chairlift to 2nd floor bedroom and bathroom. Pt states that she ambulated with a RW/Rollator intermittently prior to admission. Pt reports that he son would assist with transfer/ADLs and functional mobility as needed, however per her son he is not around at all times.

## 2023-02-24 NOTE — PROGRESS NOTE ADULT - ASSESSMENT
82y Female with a left femoral neck fracture  - Admit to Dr. Mendoza for OR  - Bedrest  - Pain control  - NPO/IVF  - Primafit  - CBC/BMP/Coags/T+S x2  - EKG/CXR  - Medical clearance, appreciate recs  - patient's cardiologist Dr Calle reached out to, will follow patient post op  - Plan for OR for CRPP vs. hemiarthroplasty with Dr. Mendoza 2/24

## 2023-02-24 NOTE — OCCUPATIONAL THERAPY INITIAL EVALUATION ADULT - LIVES WITH, PROFILE
Pt lives in a house with 0 POOJA with son. Pt's bedroom is on the second floor and uses a chair lift. Pt has a tub-shower./children

## 2023-02-25 ENCOUNTER — TRANSCRIPTION ENCOUNTER (OUTPATIENT)
Age: 83
End: 2023-02-25

## 2023-02-25 LAB
ANION GAP SERPL CALC-SCNC: 12 MMOL/L — SIGNIFICANT CHANGE UP (ref 5–17)
BUN SERPL-MCNC: 18 MG/DL — SIGNIFICANT CHANGE UP (ref 7–23)
CALCIUM SERPL-MCNC: 8.6 MG/DL — SIGNIFICANT CHANGE UP (ref 8.4–10.5)
CHLORIDE SERPL-SCNC: 100 MMOL/L — SIGNIFICANT CHANGE UP (ref 96–108)
CO2 SERPL-SCNC: 23 MMOL/L — SIGNIFICANT CHANGE UP (ref 22–31)
CREAT SERPL-MCNC: 0.94 MG/DL — SIGNIFICANT CHANGE UP (ref 0.5–1.3)
EGFR: 61 ML/MIN/1.73M2 — SIGNIFICANT CHANGE UP
GLUCOSE SERPL-MCNC: 137 MG/DL — HIGH (ref 70–99)
HCT VFR BLD CALC: 30.2 % — LOW (ref 34.5–45)
HGB BLD-MCNC: 10 G/DL — LOW (ref 11.5–15.5)
MCHC RBC-ENTMCNC: 31.3 PG — SIGNIFICANT CHANGE UP (ref 27–34)
MCHC RBC-ENTMCNC: 33.1 GM/DL — SIGNIFICANT CHANGE UP (ref 32–36)
MCV RBC AUTO: 94.7 FL — SIGNIFICANT CHANGE UP (ref 80–100)
NRBC # BLD: 0 /100 WBCS — SIGNIFICANT CHANGE UP (ref 0–0)
PLATELET # BLD AUTO: 191 K/UL — SIGNIFICANT CHANGE UP (ref 150–400)
POTASSIUM SERPL-MCNC: 3.3 MMOL/L — LOW (ref 3.5–5.3)
POTASSIUM SERPL-SCNC: 3.3 MMOL/L — LOW (ref 3.5–5.3)
RBC # BLD: 3.19 M/UL — LOW (ref 3.8–5.2)
RBC # FLD: 14.2 % — SIGNIFICANT CHANGE UP (ref 10.3–14.5)
SODIUM SERPL-SCNC: 135 MMOL/L — SIGNIFICANT CHANGE UP (ref 135–145)
WBC # BLD: 12.6 K/UL — HIGH (ref 3.8–10.5)
WBC # FLD AUTO: 12.6 K/UL — HIGH (ref 3.8–10.5)

## 2023-02-25 RX ORDER — MAGNESIUM OXIDE 400 MG ORAL TABLET 241.3 MG
400 TABLET ORAL DAILY
Refills: 0 | Status: DISCONTINUED | OUTPATIENT
Start: 2023-02-25 | End: 2023-02-28

## 2023-02-25 RX ADMIN — CARVEDILOL PHOSPHATE 25 MILLIGRAM(S): 80 CAPSULE, EXTENDED RELEASE ORAL at 09:13

## 2023-02-25 RX ADMIN — TRAMADOL HYDROCHLORIDE 50 MILLIGRAM(S): 50 TABLET ORAL at 04:25

## 2023-02-25 RX ADMIN — SODIUM CHLORIDE 1000 MILLILITER(S): 9 INJECTION INTRAMUSCULAR; INTRAVENOUS; SUBCUTANEOUS at 08:13

## 2023-02-25 RX ADMIN — CARVEDILOL PHOSPHATE 25 MILLIGRAM(S): 80 CAPSULE, EXTENDED RELEASE ORAL at 21:36

## 2023-02-25 RX ADMIN — Medication 500 MILLIGRAM(S): at 11:22

## 2023-02-25 RX ADMIN — TRAMADOL HYDROCHLORIDE 50 MILLIGRAM(S): 50 TABLET ORAL at 05:20

## 2023-02-25 RX ADMIN — Medication 25 MILLIGRAM(S): at 21:35

## 2023-02-25 RX ADMIN — SENNA PLUS 2 TABLET(S): 8.6 TABLET ORAL at 21:34

## 2023-02-25 RX ADMIN — BUPROPION HYDROCHLORIDE 100 MILLIGRAM(S): 150 TABLET, EXTENDED RELEASE ORAL at 21:35

## 2023-02-25 RX ADMIN — LOSARTAN POTASSIUM 100 MILLIGRAM(S): 100 TABLET, FILM COATED ORAL at 17:39

## 2023-02-25 RX ADMIN — Medication 1 TABLET(S): at 11:21

## 2023-02-25 RX ADMIN — Medication 120 MILLIGRAM(S): at 08:12

## 2023-02-25 RX ADMIN — Medication 1 DROP(S): at 05:18

## 2023-02-25 RX ADMIN — ATORVASTATIN CALCIUM 10 MILLIGRAM(S): 80 TABLET, FILM COATED ORAL at 21:35

## 2023-02-25 RX ADMIN — POLYETHYLENE GLYCOL 3350 17 GRAM(S): 17 POWDER, FOR SOLUTION ORAL at 21:35

## 2023-02-25 RX ADMIN — Medication 1 GRAM(S): at 21:36

## 2023-02-25 RX ADMIN — Medication 75 MICROGRAM(S): at 05:18

## 2023-02-25 RX ADMIN — Medication 1 DROP(S): at 11:24

## 2023-02-25 RX ADMIN — Medication 50 MILLIGRAM(S): at 17:39

## 2023-02-25 RX ADMIN — Medication 50 MILLIGRAM(S): at 09:14

## 2023-02-25 RX ADMIN — Medication 1 DROP(S): at 17:39

## 2023-02-25 RX ADMIN — Medication 1000 MILLIGRAM(S): at 04:20

## 2023-02-25 RX ADMIN — MAGNESIUM OXIDE 400 MG ORAL TABLET 400 MILLIGRAM(S): 241.3 TABLET ORAL at 11:24

## 2023-02-25 RX ADMIN — Medication 1 MILLIGRAM(S): at 21:35

## 2023-02-25 RX ADMIN — Medication 400 MILLIGRAM(S): at 03:21

## 2023-02-25 RX ADMIN — ENOXAPARIN SODIUM 40 MILLIGRAM(S): 100 INJECTION SUBCUTANEOUS at 05:19

## 2023-02-25 RX ADMIN — BUPROPION HYDROCHLORIDE 100 MILLIGRAM(S): 150 TABLET, EXTENDED RELEASE ORAL at 09:13

## 2023-02-25 NOTE — DISCHARGE NOTE PROVIDER - NSDCCPCAREPLAN_GEN_ALL_CORE_FT
PRINCIPAL DISCHARGE DIAGNOSIS  Diagnosis: Fracture, femur neck  Assessment and Plan of Treatment:

## 2023-02-25 NOTE — PROGRESS NOTE ADULT - ASSESSMENT
81yo woman with a hx of labile HTN, anemia, stage 2 renal failure,  presents after a fall at home with a left hip fracture. s/p Open reduction and internal fixation of the left hip.    Hip fracture, left/ s/p ORIF  - postop care   - pain control    HTN (hypertension).   - Patient with an episode of hypertensive urgency after previous hip surgery  - continue her antihypertensive meds   - cardiology follow     Chronic GERD.   - continue PPI daily  - keep HOB levated while eating.    Stage 2 chronic kidney disease.   - continue to monitor renal function   - avoid nephrotoxic agents.    Pain.   - Pain meds as needed  - follow for oversedation  - GI regimen to prevent constipation.    Anemia  - iron studies  - follow Hct    DVT prophylaxis    Paulie Moore MD phone [phone]

## 2023-02-25 NOTE — DISCHARGE NOTE PROVIDER - PROVIDER TOKENS
PROVIDER:[TOKEN:[[id number]]] PROVIDER:[TOKEN:[[id number]],FOLLOWUP:[2 weeks]] PROVIDER:[TOKEN:[[id number]],FOLLOWUP:[2 weeks]],PROVIDER:[TOKEN:[[id number]],FOLLOWUP:[Routine]]

## 2023-02-25 NOTE — DISCHARGE NOTE PROVIDER - NSDCFUADDINST_GEN_ALL_CORE_FT
Please follow up with your Doctor after your discharge from Subacute Rehab (2 weeks, call for appointment).  PT-weight bearing as tolerated with posterior hip precautions.  Keep dressing clean, dry and intact.  Have doctor remove any staples/sutures postop day 14 (if applicable), and apply steristrips as needed.  Please follow up with your PMD 1 month after your hospital discharge.

## 2023-02-25 NOTE — PROVIDER CONTACT NOTE (OTHER) - RECOMMENDATIONS
repeat bladder scan and continue to monitor for discomfort
Bolus? Continuous fluids?
Bolus? IV fluids?

## 2023-02-25 NOTE — DISCHARGE NOTE PROVIDER - NSDCMRMEDTOKEN_GEN_ALL_CORE_FT
ALPRAZolam 1 mg oral tablet: 1 tab(s) orally once a day (at bedtime)  NOTE: pt uses to sleep  amitriptyline 25 mg oral tablet: 1 tab(s) orally once a day (at bedtime)  Caltrate 600 + D oral tablet: 1 tab(s) orally 2 times a day  carvedilol 25 mg oral tablet: 1 tab(s) orally every 12 hours  dilTIAZem 120 mg/24 hours oral capsule, extended release: 1 cap(s) orally once a day  hydrALAZINE 50 mg oral tablet: 50 milligram(s) orally 2 times a day  lidocaine 5% patch: Apply 3 patches topically to affected area once a day for 12 hours, then remove  NOTE: apply to spine, right knee and back of right leg  lovastatin 20 mg oral tablet: 1 tab(s) orally once a day (in the evening) - with dinner  magnesium oxide 500 mg oral tablet: 1 to 2 tab(s) orally once a day (at bedtime)  NOTE: pt takes as laxative   Multiple Vitamins oral tablet: 1 tab(s) orally once a day (lunch)  olmesartan 40 mg oral tablet: 1 tab(s) orally once a day  pantoprazole 40 mg oral delayed release tablet: 1 tab(s) orally 2 times a day  sucralfate 1 g/10 mL oral suspension: 10 milliliter(s) orally every 6 hours  Synthroid 75 mcg (0.075 mg) oral tablet: 1 tab(s) orally once a day (Mon - Sat) and 1.5 tab on Sundays   Systane ophthalmic solution: 1 drop(s) to each affected eye , As Needed  Vitamin C 500 mg oral tablet: 1 tab(s) orally 2 times a day  Wellbutrin  mg/12 hours oral tablet, extended release: 1 tab(s) orally 2 times a day   acetaminophen 325 mg oral tablet: 3 tab(s) orally every 8 hours  ALPRAZolam 1 mg oral tablet: 1 tab(s) orally once a day (at bedtime)  NOTE: pt uses to sleep  amitriptyline 25 mg oral tablet: 1 tab(s) orally once a day (at bedtime)  Caltrate 600 + D oral tablet: 1 tab(s) orally 2 times a day  carvedilol 25 mg oral tablet: 1 tab(s) orally every 12 hours  dilTIAZem 120 mg/24 hours oral capsule, extended release: 1 cap(s) orally once a day  enoxaparin: 40 milligram(s) subcutaneous once a day x 6 weeks post op for DVT ppx  hydrALAZINE 50 mg oral tablet: 50 milligram(s) orally 2 times a day  lovastatin 20 mg oral tablet: 1 tab(s) orally once a day (in the evening) - with dinner  magnesium oxide 500 mg oral tablet: 1 to 2 tab(s) orally once a day (at bedtime)  NOTE: pt takes as laxative   Multiple Vitamins oral tablet: 1 tab(s) orally once a day (lunch)  olmesartan 40 mg oral tablet: 1 tab(s) orally once a day  oxyCODONE 5 mg oral tablet: 1/2 tab(s) orally every 4 hours, As needed, moderate Pain (4 -6)  1 tab(s) orally every 4 hours, As needed, Severe Pain (7 - 10)  pantoprazole 40 mg oral delayed release tablet: 1 tab(s) orally 2 times a day  polyethylene glycol 3350 oral powder for reconstitution: 17 gram(s) orally once a day (at bedtime)  senna leaf extract oral tablet: 2 tab(s) orally once a day (at bedtime)  sucralfate 1 g/10 mL oral suspension: 10 milliliter(s) orally every 6 hours  Synthroid 75 mcg (0.075 mg) oral tablet: 1 tab(s) orally once a day (Mon - Sat) and 1.5 tab on Sundays   Systane ophthalmic solution: 1 drop(s) to each affected eye , As Needed  traMADol 50 mg oral tablet: 1 tab(s) orally every 6 hours, As needed, Mild Pain (1 - 3)  Vitamin C 500 mg oral tablet: 1 tab(s) orally 2 times a day  Wellbutrin  mg/12 hours oral tablet, extended release: 1 tab(s) orally 2 times a day   acetaminophen 325 mg oral tablet: 3 tab(s) orally every 8 hours  ALPRAZolam 1 mg oral tablet: 1 tab(s) orally once a day (at bedtime)  NOTE: pt uses to sleep  amitriptyline 25 mg oral tablet: 1 tab(s) orally once a day (at bedtime)  Caltrate 600 + D oral tablet: 1 tab(s) orally 2 times a day  carvedilol 25 mg oral tablet: 1 tab(s) orally every 12 hours  dilTIAZem 120 mg/24 hours oral capsule, extended release: 1 cap(s) orally once a day  enoxaparin: 40 milligram(s) subcutaneous once a day x 6 weeks post op for DVT ppx  hydrALAZINE 50 mg oral tablet: 50 milligram(s) orally 2 times a day  hydrocortisone 1% topical cream: 1 application topically 2 times a day to groin   lovastatin 20 mg oral tablet: 1 tab(s) orally once a day (in the evening) - with dinner  magnesium oxide 500 mg oral tablet: 1 to 2 tab(s) orally once a day (at bedtime)  NOTE: pt takes as laxative   Multiple Vitamins oral tablet: 1 tab(s) orally once a day (lunch)  olmesartan 40 mg oral tablet: 1 tab(s) orally once a day  oxyCODONE 5 mg oral tablet: 1/2 tab(s) orally every 4 hours, As needed, moderate Pain (4 -6)  1 tab(s) orally every 4 hours, As needed, Severe Pain (7 - 10)  pantoprazole 40 mg oral delayed release tablet: 1 tab(s) orally 2 times a day  polyethylene glycol 3350 oral powder for reconstitution: 17 gram(s) orally once a day (at bedtime)  senna leaf extract oral tablet: 2 tab(s) orally once a day (at bedtime)  sucralfate 1 g/10 mL oral suspension: 10 milliliter(s) orally every 6 hours  Synthroid 75 mcg (0.075 mg) oral tablet: 1 tab(s) orally once a day (Mon - Sat) and 1.5 tab on Sundays   Systane ophthalmic solution: 1 drop(s) to each affected eye , As Needed  traMADol 50 mg oral tablet: 1 tab(s) orally every 6 hours, As needed, Mild Pain (1 - 3)  Vitamin C 500 mg oral tablet: 1 tab(s) orally 2 times a day  Wellbutrin  mg/12 hours oral tablet, extended release: 1 tab(s) orally 2 times a day

## 2023-02-25 NOTE — DISCHARGE NOTE PROVIDER - CARE PROVIDER_API CALL
Servando Mendoza)  Orthopedics  [address] [place] [address]  Phone: [phone]  Fax: [phone]  Follow Up Time:    Servando Mendoza)  Orthopedics  [address] [place] [address]  Phone: [phone]  Fax: [phone]  Follow Up Time: 2 weeks   Servando Mendoza)  Orthopedics  [address] [facility], [address] [place] [address]  Phone: [phone]  Fax: [phone]  Follow Up Time: 2 weeks    Lyndon Calle)  Cardiovascular Disease; Interventional Cardiology; Nuclear Cardiology  [address] [facility] [address], [address]  [place] [address]  Phone: [phone]  Fax: [phone]  Follow Up Time: Routine

## 2023-02-25 NOTE — PROVIDER CONTACT NOTE (OTHER) - ASSESSMENT
BP 93/58, VS otherwise stable. Pt asymptomatic.
Pt hypotensive, BP 83/50.  Pt asymptomatic. VS otherwise stable.
Pt resting comfortably in bed. VSS. bladder scan revealed 564 mL. Pt denies discomfort. Pt states this is normal for her because she has history of bladder prolapse. Pt does not want to be straight cath'ed.

## 2023-02-25 NOTE — PROVIDER CONTACT NOTE (OTHER) - ACTION/TREATMENT ORDERED:
Fluids to be ordered. Cont to monitor.
repeat bladder scan in a few hours and continue to monitor for discomfort. No further intervention at this time
Recheck BP in 1 hr and notify provider. No interventions at this time. Cont to monitor.

## 2023-02-25 NOTE — PROGRESS NOTE ADULT - ASSESSMENT
Echo 6/30/22: Mild-Mod AR, Hyeperdynamic lv fxn EF 77%    A/P  82y Female PMH HTN, HLD, depression, right hip CRPP (Dr. Aleman, 2018) presents s/p mechanical fall c/o severe left hip pain and inability to ambulate.     #Fall, Left Femoral Neck Fx  -CV stable   -Likely mechanical as pt can recall tripping. Denies CP, palpitations, dizziness prior to event  -EKG NSR  -Prior echo nml lv fxn, mild-mod AS  -Repeat echo to further eval AS  -S/p Hemiarthroplasty of left hip today with ortho  -Post op mgmt per ortho    #HTN  -Cont hydralazine  -Cont Coreg    dvt ppx      35 minutes spent on total encounter; more than 50% of the visit was spent counseling and/or coordinating care by the attending physician.

## 2023-02-25 NOTE — DISCHARGE NOTE PROVIDER - NSDCCPTREATMENT_GEN_ALL_CORE_FT
FYI:    - Received refill request for desvenlafaxine 24 hr 50 mg sig: TAKE 1 TABLET BY MOUTH DAILY. STOP TAKING ESCITALOPRAM.      - Confirmed order with video visit note 02/14/2022 and contact/dtrs Shawna [phone] and Abi that pt is taking this with 25 mg capsule = 75 mg daily.    - Refill protocol flag indicated no normal BP reading within the last 12 months.  Per dtrs, s/p PCP visit few weeks ago.  Called and received visit note 03/24/2022 and BP = 120/74.    - Visit note with VS and most recent labs in MD box for review.  6 month f/u appt with TI Phelan scheduled 08/17/2022.  
PRINCIPAL PROCEDURE  Procedure: Hemiarthroplasty of left hip  Findings and Treatment:

## 2023-02-25 NOTE — DISCHARGE NOTE PROVIDER - HOSPITAL COURSE
History of Present Illness:   82y Female PMH HTN, HLD, depression, right hip CRPP (Dr. Aleman, 2018) presents s/p mechanical fall c/o severe left hip pain and inability to ambulate. States she feel from standing height at home yesterday, landing on the left hip fall c/o severe left hip pain and inability to ambulate. Endorses headstrike on the couch but denies LOC. Patient denies radiation of pain. Patient denies numbness/tingling/burning in the LLE. No other bone/joint complaints. Patient uses a walker around home at baseline.    This is a admitted to [facility] on 2/23/23 after a mechanical fall.  Patient found to have L hip fracture.  Patient evaluated and cleared by Medicine for operative procedure.  On 2/24, patient underwent an uncomplicated L hip Roly.  Evaluated and treated by PT, recommended for Subacute Rehab.  Remain of hospital stay unremarkable, and patient discharged to Subacute Rehab when bed available. History of Present Illness:   82y Female PMH HTN, HLD, depression, right hip CRPP (Dr. Aleman, 2018) presents s/p mechanical fall c/o severe left hip pain and inability to ambulate. States she feel from standing height at home yesterday, landing on the left hip fall c/o severe left hip pain and inability to ambulate. Endorses headstrike on the couch but denies LOC. Patient denies radiation of pain. Patient denies numbness/tingling/burning in the LLE. No other bone/joint complaints. Patient uses a walker around home at baseline.    Hospital Course:  This is a admitted to [facility] on 2/23/23 after a mechanical fall.  Patient found to have L hip fracture.  Patient evaluated and cleared by Medicine for operative procedure.  On 2/24, patient underwent an uncomplicated L hip Hemiarthroplasty.   Cardiology evaluated the patient postoperatively on 2/24/23 due to history of aortic regurg and hypertension. Repeat Echo performed during hospital stay, with normal LV fxn and moderate AR, no sig changes from prior echo in June 2022. Patient continued on home antihypertensives. All recommendations followed.   Evaluated and treated by PT, recommended for Subacute Rehab.  Remainder of hospital stay unremarkable, and patient discharged to Subacute Rehab when bed available.

## 2023-02-25 NOTE — DISCHARGE NOTE PROVIDER - NSDCFUADDAPPT_GEN_ALL_CORE_FT
Please follow up with your primary care physician and cardiology regarding your hospitalization within one month of your discharge for continued monitoring.

## 2023-02-26 LAB
ANION GAP SERPL CALC-SCNC: 8 MMOL/L — SIGNIFICANT CHANGE UP (ref 5–17)
BUN SERPL-MCNC: 16 MG/DL — SIGNIFICANT CHANGE UP (ref 7–23)
CALCIUM SERPL-MCNC: 8.7 MG/DL — SIGNIFICANT CHANGE UP (ref 8.4–10.5)
CHLORIDE SERPL-SCNC: 105 MMOL/L — SIGNIFICANT CHANGE UP (ref 96–108)
CO2 SERPL-SCNC: 26 MMOL/L — SIGNIFICANT CHANGE UP (ref 22–31)
CREAT SERPL-MCNC: 0.73 MG/DL — SIGNIFICANT CHANGE UP (ref 0.5–1.3)
EGFR: 83 ML/MIN/1.73M2 — SIGNIFICANT CHANGE UP
FERRITIN SERPL-MCNC: 138 NG/ML — SIGNIFICANT CHANGE UP (ref 15–150)
GLUCOSE SERPL-MCNC: 130 MG/DL — HIGH (ref 70–99)
HCT VFR BLD CALC: 29.3 % — LOW (ref 34.5–45)
HGB BLD-MCNC: 9.7 G/DL — LOW (ref 11.5–15.5)
IRON SATN MFR SERPL: 16 % — SIGNIFICANT CHANGE UP (ref 14–50)
IRON SATN MFR SERPL: 31 UG/DL — SIGNIFICANT CHANGE UP (ref 30–160)
MCHC RBC-ENTMCNC: 31.7 PG — SIGNIFICANT CHANGE UP (ref 27–34)
MCHC RBC-ENTMCNC: 33.1 GM/DL — SIGNIFICANT CHANGE UP (ref 32–36)
MCV RBC AUTO: 95.8 FL — SIGNIFICANT CHANGE UP (ref 80–100)
NRBC # BLD: 0 /100 WBCS — SIGNIFICANT CHANGE UP (ref 0–0)
PLATELET # BLD AUTO: 176 K/UL — SIGNIFICANT CHANGE UP (ref 150–400)
POTASSIUM SERPL-MCNC: 3.5 MMOL/L — SIGNIFICANT CHANGE UP (ref 3.5–5.3)
POTASSIUM SERPL-SCNC: 3.5 MMOL/L — SIGNIFICANT CHANGE UP (ref 3.5–5.3)
RBC # BLD: 3.06 M/UL — LOW (ref 3.8–5.2)
RBC # FLD: 14.4 % — SIGNIFICANT CHANGE UP (ref 10.3–14.5)
SODIUM SERPL-SCNC: 139 MMOL/L — SIGNIFICANT CHANGE UP (ref 135–145)
TIBC SERPL-MCNC: 192 UG/DL — LOW (ref 220–430)
UIBC SERPL-MCNC: 161 UG/DL — SIGNIFICANT CHANGE UP (ref 110–370)
WBC # BLD: 8.51 K/UL — SIGNIFICANT CHANGE UP (ref 3.8–10.5)
WBC # FLD AUTO: 8.51 K/UL — SIGNIFICANT CHANGE UP (ref 3.8–10.5)

## 2023-02-26 RX ORDER — HYDROCORTISONE 1 %
1 OINTMENT (GRAM) TOPICAL
Refills: 0 | Status: DISCONTINUED | OUTPATIENT
Start: 2023-02-26 | End: 2023-02-28

## 2023-02-26 RX ADMIN — Medication 1 MILLIGRAM(S): at 22:20

## 2023-02-26 RX ADMIN — Medication 500 MILLIGRAM(S): at 12:19

## 2023-02-26 RX ADMIN — BUPROPION HYDROCHLORIDE 100 MILLIGRAM(S): 150 TABLET, EXTENDED RELEASE ORAL at 13:38

## 2023-02-26 RX ADMIN — Medication 975 MILLIGRAM(S): at 13:39

## 2023-02-26 RX ADMIN — CARVEDILOL PHOSPHATE 25 MILLIGRAM(S): 80 CAPSULE, EXTENDED RELEASE ORAL at 09:15

## 2023-02-26 RX ADMIN — PANTOPRAZOLE SODIUM 40 MILLIGRAM(S): 20 TABLET, DELAYED RELEASE ORAL at 05:31

## 2023-02-26 RX ADMIN — BUPROPION HYDROCHLORIDE 100 MILLIGRAM(S): 150 TABLET, EXTENDED RELEASE ORAL at 09:15

## 2023-02-26 RX ADMIN — Medication 1 GRAM(S): at 22:15

## 2023-02-26 RX ADMIN — Medication 975 MILLIGRAM(S): at 06:01

## 2023-02-26 RX ADMIN — Medication 75 MICROGRAM(S): at 05:31

## 2023-02-26 RX ADMIN — LOSARTAN POTASSIUM 100 MILLIGRAM(S): 100 TABLET, FILM COATED ORAL at 17:53

## 2023-02-26 RX ADMIN — Medication 1 DROP(S): at 12:19

## 2023-02-26 RX ADMIN — Medication 975 MILLIGRAM(S): at 14:40

## 2023-02-26 RX ADMIN — Medication 1 TABLET(S): at 12:19

## 2023-02-26 RX ADMIN — Medication 50 MILLIGRAM(S): at 09:15

## 2023-02-26 RX ADMIN — MAGNESIUM OXIDE 400 MG ORAL TABLET 400 MILLIGRAM(S): 241.3 TABLET ORAL at 12:19

## 2023-02-26 RX ADMIN — ATORVASTATIN CALCIUM 10 MILLIGRAM(S): 80 TABLET, FILM COATED ORAL at 22:15

## 2023-02-26 RX ADMIN — Medication 50 MILLIGRAM(S): at 17:54

## 2023-02-26 RX ADMIN — Medication 1 APPLICATION(S): at 13:39

## 2023-02-26 RX ADMIN — Medication 975 MILLIGRAM(S): at 05:31

## 2023-02-26 RX ADMIN — CARVEDILOL PHOSPHATE 25 MILLIGRAM(S): 80 CAPSULE, EXTENDED RELEASE ORAL at 22:15

## 2023-02-26 RX ADMIN — ENOXAPARIN SODIUM 40 MILLIGRAM(S): 100 INJECTION SUBCUTANEOUS at 05:31

## 2023-02-26 RX ADMIN — Medication 120 MILLIGRAM(S): at 08:12

## 2023-02-26 RX ADMIN — Medication 25 MILLIGRAM(S): at 22:16

## 2023-02-26 NOTE — PROGRESS NOTE ADULT - ASSESSMENT
81yo woman with a hx of labile HTN, anemia, stage 2 renal failure,  presents after a fall at home with a left hip fracture. s/p Open reduction and internal fixation of the left hip.    Hip fracture, left/ s/p ORIF  - postop care   - pain control    HTN (hypertension).   - Patient with an episode of hypertensive urgency after previous hip surgery  - continue her antihypertensive meds   - cardiology follow     Chronic GERD.   - continue PPI daily  - keep HOB levated while eating.    Stage 2 chronic kidney disease.   - continue to monitor renal function   - avoid nephrotoxic agents.    Pain.   - Pain meds as needed  - follow for oversedation  - GI regimen to prevent constipation.    Anemia  - iron studies pending   - follow Hct    DVT prophylaxis    Paulie Moore MD phone [phone]

## 2023-02-26 NOTE — PROGRESS NOTE ADULT - PROBLEM SELECTOR PLAN 1
PT/OT-WBAT, post prec, abd pillow in bed, raised hip chair  IS  DVT PPx  Pain Control  Continue Current Tx.  JUAN planning    Robert Stephens PA-C  Team Pager: #[phone]
PT/OT-WBAT, post prec  IS  DVT PPx  Pain Control  Continue Current Tx.    Robert Stephens PA-C  Team Pager: #[phone]
ambulatory

## 2023-02-27 LAB
ANION GAP SERPL CALC-SCNC: 8 MMOL/L — SIGNIFICANT CHANGE UP (ref 5–17)
BLD GP AB SCN SERPL QL: NEGATIVE — SIGNIFICANT CHANGE UP
BUN SERPL-MCNC: 18 MG/DL — SIGNIFICANT CHANGE UP (ref 7–23)
CALCIUM SERPL-MCNC: 8.6 MG/DL — SIGNIFICANT CHANGE UP (ref 8.4–10.5)
CHLORIDE SERPL-SCNC: 105 MMOL/L — SIGNIFICANT CHANGE UP (ref 96–108)
CO2 SERPL-SCNC: 26 MMOL/L — SIGNIFICANT CHANGE UP (ref 22–31)
CREAT SERPL-MCNC: 0.85 MG/DL — SIGNIFICANT CHANGE UP (ref 0.5–1.3)
EGFR: 69 ML/MIN/1.73M2 — SIGNIFICANT CHANGE UP
GLUCOSE SERPL-MCNC: 100 MG/DL — HIGH (ref 70–99)
HCT VFR BLD CALC: 29.3 % — LOW (ref 34.5–45)
HGB BLD-MCNC: 9.7 G/DL — LOW (ref 11.5–15.5)
MCHC RBC-ENTMCNC: 31.9 PG — SIGNIFICANT CHANGE UP (ref 27–34)
MCHC RBC-ENTMCNC: 33.1 GM/DL — SIGNIFICANT CHANGE UP (ref 32–36)
MCV RBC AUTO: 96.4 FL — SIGNIFICANT CHANGE UP (ref 80–100)
NRBC # BLD: 0 /100 WBCS — SIGNIFICANT CHANGE UP (ref 0–0)
PLATELET # BLD AUTO: 215 K/UL — SIGNIFICANT CHANGE UP (ref 150–400)
POTASSIUM SERPL-MCNC: 3.7 MMOL/L — SIGNIFICANT CHANGE UP (ref 3.5–5.3)
POTASSIUM SERPL-SCNC: 3.7 MMOL/L — SIGNIFICANT CHANGE UP (ref 3.5–5.3)
RBC # BLD: 3.04 M/UL — LOW (ref 3.8–5.2)
RBC # FLD: 14.4 % — SIGNIFICANT CHANGE UP (ref 10.3–14.5)
RH IG SCN BLD-IMP: POSITIVE — SIGNIFICANT CHANGE UP
SARS-COV-2 RNA SPEC QL NAA+PROBE: SIGNIFICANT CHANGE UP
SODIUM SERPL-SCNC: 139 MMOL/L — SIGNIFICANT CHANGE UP (ref 135–145)
WBC # BLD: 7.76 K/UL — SIGNIFICANT CHANGE UP (ref 3.8–10.5)
WBC # FLD AUTO: 7.76 K/UL — SIGNIFICANT CHANGE UP (ref 3.8–10.5)

## 2023-02-27 PROCEDURE — 93306 TTE W/DOPPLER COMPLETE: CPT | Mod: 26

## 2023-02-27 RX ORDER — SENNA PLUS 8.6 MG/1
2 TABLET ORAL
Qty: 0 | Refills: 0 | DISCHARGE
Start: 2023-02-27

## 2023-02-27 RX ORDER — OXYCODONE HYDROCHLORIDE 5 MG/1
1 TABLET ORAL
Qty: 0 | Refills: 0 | DISCHARGE
Start: 2023-02-27

## 2023-02-27 RX ORDER — POLYETHYLENE GLYCOL 3350 17 G/17G
17 POWDER, FOR SOLUTION ORAL
Qty: 0 | Refills: 0 | DISCHARGE
Start: 2023-02-27

## 2023-02-27 RX ORDER — BENZOCAINE AND MENTHOL 5; 1 G/100ML; G/100ML
1 LIQUID ORAL
Refills: 0 | Status: DISCONTINUED | OUTPATIENT
Start: 2023-02-27 | End: 2023-02-28

## 2023-02-27 RX ORDER — LIDOCAINE 4 G/100G
3 CREAM TOPICAL
Qty: 0 | Refills: 0 | DISCHARGE

## 2023-02-27 RX ORDER — TRAMADOL HYDROCHLORIDE 50 MG/1
1 TABLET ORAL
Qty: 0 | Refills: 0 | DISCHARGE
Start: 2023-02-27

## 2023-02-27 RX ORDER — ENOXAPARIN SODIUM 100 MG/ML
40 INJECTION SUBCUTANEOUS
Qty: 0 | Refills: 0 | DISCHARGE
Start: 2023-02-27

## 2023-02-27 RX ORDER — ACETAMINOPHEN 500 MG
3 TABLET ORAL
Qty: 0 | Refills: 0 | DISCHARGE
Start: 2023-02-27

## 2023-02-27 RX ADMIN — Medication 50 MILLIGRAM(S): at 10:01

## 2023-02-27 RX ADMIN — CARVEDILOL PHOSPHATE 25 MILLIGRAM(S): 80 CAPSULE, EXTENDED RELEASE ORAL at 21:46

## 2023-02-27 RX ADMIN — Medication 1 TABLET(S): at 12:32

## 2023-02-27 RX ADMIN — Medication 975 MILLIGRAM(S): at 05:20

## 2023-02-27 RX ADMIN — Medication 25 MILLIGRAM(S): at 21:47

## 2023-02-27 RX ADMIN — Medication 50 MILLIGRAM(S): at 17:45

## 2023-02-27 RX ADMIN — Medication 75 MICROGRAM(S): at 05:21

## 2023-02-27 RX ADMIN — Medication 120 MILLIGRAM(S): at 08:36

## 2023-02-27 RX ADMIN — Medication 1 APPLICATION(S): at 05:21

## 2023-02-27 RX ADMIN — BENZOCAINE AND MENTHOL 1 LOZENGE: 5; 1 LIQUID ORAL at 10:01

## 2023-02-27 RX ADMIN — ATORVASTATIN CALCIUM 10 MILLIGRAM(S): 80 TABLET, FILM COATED ORAL at 21:46

## 2023-02-27 RX ADMIN — BUPROPION HYDROCHLORIDE 100 MILLIGRAM(S): 150 TABLET, EXTENDED RELEASE ORAL at 14:26

## 2023-02-27 RX ADMIN — Medication 500 MILLIGRAM(S): at 12:31

## 2023-02-27 RX ADMIN — PANTOPRAZOLE SODIUM 40 MILLIGRAM(S): 20 TABLET, DELAYED RELEASE ORAL at 05:21

## 2023-02-27 RX ADMIN — Medication 975 MILLIGRAM(S): at 05:50

## 2023-02-27 RX ADMIN — Medication 975 MILLIGRAM(S): at 21:46

## 2023-02-27 RX ADMIN — LOSARTAN POTASSIUM 100 MILLIGRAM(S): 100 TABLET, FILM COATED ORAL at 17:45

## 2023-02-27 RX ADMIN — Medication 975 MILLIGRAM(S): at 15:00

## 2023-02-27 RX ADMIN — Medication 975 MILLIGRAM(S): at 22:46

## 2023-02-27 RX ADMIN — Medication 1 APPLICATION(S): at 17:46

## 2023-02-27 RX ADMIN — Medication 975 MILLIGRAM(S): at 14:25

## 2023-02-27 RX ADMIN — Medication 1 TABLET(S): at 12:31

## 2023-02-27 RX ADMIN — Medication 1 GRAM(S): at 21:45

## 2023-02-27 RX ADMIN — ENOXAPARIN SODIUM 40 MILLIGRAM(S): 100 INJECTION SUBCUTANEOUS at 05:21

## 2023-02-27 RX ADMIN — Medication 1 MILLIGRAM(S): at 21:46

## 2023-02-27 RX ADMIN — BUPROPION HYDROCHLORIDE 100 MILLIGRAM(S): 150 TABLET, EXTENDED RELEASE ORAL at 08:36

## 2023-02-27 RX ADMIN — MAGNESIUM OXIDE 400 MG ORAL TABLET 400 MILLIGRAM(S): 241.3 TABLET ORAL at 12:32

## 2023-02-27 RX ADMIN — CARVEDILOL PHOSPHATE 25 MILLIGRAM(S): 80 CAPSULE, EXTENDED RELEASE ORAL at 08:37

## 2023-02-27 NOTE — PROGRESS NOTE ADULT - ASSESSMENT
81yo woman with a hx of labile HTN, anemia, stage 2 renal failure,  presents after a fall at home with a left hip fracture. s/p Open reduction and internal fixation of the left hip.    Hip fracture, left/ s/p ORIF  - postop care   - pain control    HTN (hypertension).   - Patient with an episode of hypertensive urgency after previous hip surgery  - continue her antihypertensive meds   - cardiology follow     Chronic GERD.   - continue PPI daily  - keep HOB levated while eating.    Stage 2 chronic kidney disease.   - continue to monitor renal function   - avoid nephrotoxic agents.    Pain.   - Pain meds as needed  - follow for oversedation  - GI regimen to prevent constipation.    Anemia  - iron studies noted  - follow Hct     Throat discomfort  - probably after intubation  - Lozenges     DVT prophylaxis    Paulie Moore MD phone [phone]

## 2023-02-27 NOTE — PROGRESS NOTE ADULT - ASSESSMENT
Echo 6/30/22: Mild-Mod AR, Hyeperdynamic lv fxn EF 77%  Echo 2/27/23: normal LV fxn, Mod AR    A/P  82y Female PMH HTN, HLD, depression, right hip CRPP (Dr. Aleman, 2018) presents s/p mechanical fall c/o severe left hip pain and inability to ambulate.     #Fall, Left Femoral Neck Fx  -CV stable   -Likely mechanical as pt can recall tripping. Denies CP, palpitations, dizziness prior to event  -EKG NSR  -Repeat echo with normal lv fxn and mod AR, no sig changes from prior echo  -S/p Hemiarthroplasty of left hip today with ortho  -Post op mgmt per ortho    #HTN  -Cont hydralazine  -Cont Coreg    dvt ppx      Please call/text me with any questions/concerns between 8am-4pm  [phone] Echo 6/30/22: Mild-Mod AR, Hyeperdynamic lv fxn EF 77%  Echo 2/27/23: normal LV fxn, Mod AR    A/P  82y Female PMH HTN, HLD, depression, right hip CRPP (Dr. Aleman, 2018) presents s/p mechanical fall c/o severe left hip pain and inability to ambulate.     #Fall, Left Femoral Neck Fx  -CV stable   -Likely mechanical as pt can recall tripping. Denies CP, palpitations, dizziness prior to event  -EKG NSR  -Repeat echo with normal lv fxn and mod AR, no sig changes from prior echo  -S/p Hemiarthroplasty of left hip today with ortho  -Post op mgmt per ortho    #HTN  -Cont hydralazine/ Coreg/dilt/losartan  -dec hydral if sbp remains borderline     dvt ppx      Please call/text me with any questions/concerns between 8am-4pm  [phone]

## 2023-02-28 ENCOUNTER — TRANSCRIPTION ENCOUNTER (OUTPATIENT)
Age: 83
End: 2023-02-28

## 2023-02-28 VITALS
OXYGEN SATURATION: 95 % | SYSTOLIC BLOOD PRESSURE: 118 MMHG | TEMPERATURE: 99 F | DIASTOLIC BLOOD PRESSURE: 73 MMHG | HEART RATE: 68 BPM | RESPIRATION RATE: 18 BRPM

## 2023-02-28 PROCEDURE — 86901 BLOOD TYPING SEROLOGIC RH(D): CPT

## 2023-02-28 PROCEDURE — 85027 COMPLETE CBC AUTOMATED: CPT

## 2023-02-28 PROCEDURE — 82306 VITAMIN D 25 HYDROXY: CPT

## 2023-02-28 PROCEDURE — 82728 ASSAY OF FERRITIN: CPT

## 2023-02-28 PROCEDURE — 82550 ASSAY OF CK (CPK): CPT

## 2023-02-28 PROCEDURE — 80048 BASIC METABOLIC PNL TOTAL CA: CPT

## 2023-02-28 PROCEDURE — 97530 THERAPEUTIC ACTIVITIES: CPT

## 2023-02-28 PROCEDURE — 73552 X-RAY EXAM OF FEMUR 2/>: CPT

## 2023-02-28 PROCEDURE — 73501 X-RAY EXAM HIP UNI 1 VIEW: CPT

## 2023-02-28 PROCEDURE — 99285 EMERGENCY DEPT VISIT HI MDM: CPT

## 2023-02-28 PROCEDURE — U0003: CPT

## 2023-02-28 PROCEDURE — C8929: CPT

## 2023-02-28 PROCEDURE — 97165 OT EVAL LOW COMPLEX 30 MIN: CPT

## 2023-02-28 PROCEDURE — 71045 X-RAY EXAM CHEST 1 VIEW: CPT

## 2023-02-28 PROCEDURE — 72125 CT NECK SPINE W/O DYE: CPT | Mod: MA

## 2023-02-28 PROCEDURE — 86850 RBC ANTIBODY SCREEN: CPT

## 2023-02-28 PROCEDURE — 96374 THER/PROPH/DIAG INJ IV PUSH: CPT

## 2023-02-28 PROCEDURE — 85610 PROTHROMBIN TIME: CPT

## 2023-02-28 PROCEDURE — U0005: CPT

## 2023-02-28 PROCEDURE — C1776: CPT

## 2023-02-28 PROCEDURE — 85730 THROMBOPLASTIN TIME PARTIAL: CPT

## 2023-02-28 PROCEDURE — 73560 X-RAY EXAM OF KNEE 1 OR 2: CPT

## 2023-02-28 PROCEDURE — 83550 IRON BINDING TEST: CPT

## 2023-02-28 PROCEDURE — 87637 SARSCOV2&INF A&B&RSV AMP PRB: CPT

## 2023-02-28 PROCEDURE — 97116 GAIT TRAINING THERAPY: CPT

## 2023-02-28 PROCEDURE — 80053 COMPREHEN METABOLIC PANEL: CPT

## 2023-02-28 PROCEDURE — 72192 CT PELVIS W/O DYE: CPT | Mod: MD

## 2023-02-28 PROCEDURE — 86900 BLOOD TYPING SEROLOGIC ABO: CPT

## 2023-02-28 PROCEDURE — 70450 CT HEAD/BRAIN W/O DYE: CPT | Mod: MD

## 2023-02-28 PROCEDURE — 82040 ASSAY OF SERUM ALBUMIN: CPT

## 2023-02-28 PROCEDURE — 76377 3D RENDER W/INTRP POSTPROCES: CPT

## 2023-02-28 PROCEDURE — 97161 PT EVAL LOW COMPLEX 20 MIN: CPT

## 2023-02-28 PROCEDURE — C9399: CPT

## 2023-02-28 PROCEDURE — 85025 COMPLETE CBC W/AUTO DIFF WBC: CPT

## 2023-02-28 PROCEDURE — 72170 X-RAY EXAM OF PELVIS: CPT

## 2023-02-28 PROCEDURE — 83540 ASSAY OF IRON: CPT

## 2023-02-28 PROCEDURE — 73502 X-RAY EXAM HIP UNI 2-3 VIEWS: CPT

## 2023-02-28 PROCEDURE — 36415 COLL VENOUS BLD VENIPUNCTURE: CPT

## 2023-02-28 RX ORDER — HYDROCORTISONE 1 %
1 OINTMENT (GRAM) TOPICAL
Qty: 0 | Refills: 0 | DISCHARGE
Start: 2023-02-28

## 2023-02-28 RX ADMIN — Medication 1 APPLICATION(S): at 05:24

## 2023-02-28 RX ADMIN — Medication 975 MILLIGRAM(S): at 06:24

## 2023-02-28 RX ADMIN — Medication 75 MICROGRAM(S): at 05:24

## 2023-02-28 RX ADMIN — Medication 975 MILLIGRAM(S): at 05:24

## 2023-02-28 RX ADMIN — PANTOPRAZOLE SODIUM 40 MILLIGRAM(S): 20 TABLET, DELAYED RELEASE ORAL at 05:24

## 2023-02-28 RX ADMIN — ENOXAPARIN SODIUM 40 MILLIGRAM(S): 100 INJECTION SUBCUTANEOUS at 05:24

## 2023-02-28 RX ADMIN — Medication 1 TABLET(S): at 11:45

## 2023-02-28 RX ADMIN — CARVEDILOL PHOSPHATE 25 MILLIGRAM(S): 80 CAPSULE, EXTENDED RELEASE ORAL at 08:20

## 2023-02-28 RX ADMIN — MAGNESIUM OXIDE 400 MG ORAL TABLET 400 MILLIGRAM(S): 241.3 TABLET ORAL at 11:45

## 2023-02-28 RX ADMIN — Medication 50 MILLIGRAM(S): at 08:21

## 2023-02-28 RX ADMIN — Medication 500 MILLIGRAM(S): at 11:44

## 2023-02-28 RX ADMIN — Medication 120 MILLIGRAM(S): at 08:21

## 2023-02-28 RX ADMIN — BUPROPION HYDROCHLORIDE 100 MILLIGRAM(S): 150 TABLET, EXTENDED RELEASE ORAL at 08:21

## 2023-02-28 NOTE — DISCHARGE NOTE NURSING/CASE MANAGEMENT/SOCIAL WORK - PATIENT PORTAL LINK FT
You can access the FollowMyHealth Patient Portal offered by [facility] by registering at the following website: [email or web address]. By joining [facility]’s FollowMyHealth portal, you will also be able to view your health information using other applications (apps) compatible with our system.

## 2023-02-28 NOTE — PROGRESS NOTE ADULT - ASSESSMENT
83yo woman with a hx of labile HTN, anemia, stage 2 renal failure,  presents after a fall at home with a left hip fracture. s/p Open reduction and internal fixation of the left hip.    Hip fracture, left/ s/p ORIF  - postop care   - pain control    HTN (hypertension).   - Patient with an episode of hypertensive urgency after previous hip surgery  - continue her antihypertensive meds   - cardiology follow     Chronic GERD.   - continue PPI daily  - keep HOB levated while eating.    Stage 2 chronic kidney disease.   - continue to monitor renal function   - avoid nephrotoxic agents.    Pain.   - Pain meds as needed  - follow for oversedation  - GI regimen to prevent constipation.    Anemia  - iron studies noted  - follow Hct     Throat discomfort  - probably after intubation  - Lozenges     DVT prophylaxis    Medically stable.     Paulie Moore MD phone [phone]  81yo woman with a hx of labile HTN, anemia, stage 2 renal failure,  presents after a fall at home with a left hip fracture. s/p Open reduction and internal fixation of the left hip.    Hip fracture, left/ s/p ORIF  - postop care   - pain control    HTN (hypertension).   - Patient with an episode of hypertensive urgency after previous hip surgery  - continue her antihypertensive meds   - cardiology follow     Chronic GERD.   - continue PPI daily  - keep HOB levated while eating.    Stage 2 chronic kidney disease.   - continue to monitor renal function   - avoid nephrotoxic agents.    Pain.   - Pain meds as needed  - follow for oversedation  - GI regimen to prevent constipation.    Anemia  - iron studies noted  - follow Hct     Throat discomfort improving   - probably after intubation  - Lozenges     DVT prophylaxis    Medically stable.     Paulie Moore MD phone [phone]

## 2023-02-28 NOTE — DISCHARGE NOTE NURSING/CASE MANAGEMENT/SOCIAL WORK - NSDCPEFALRISK_GEN_ALL_CORE
For information on Fall & Injury Prevention, visit: [email or web address] OR  [email or web address] OR  https://www.cdc.gov/steadi/patient.html

## 2023-02-28 NOTE — PROGRESS NOTE ADULT - REASON FOR ADMISSION
left hip fracture

## 2023-02-28 NOTE — PROGRESS NOTE ADULT - ASSESSMENT
Echo 6/30/22: Mild-Mod AR, Hyeperdynamic lv fxn EF 77%  Echo 2/27/23: normal LV fxn, Mod AR    A/P  82y Female PMH HTN, HLD, depression, right hip CRPP (Dr. Aleman, 2018) presents s/p mechanical fall c/o severe left hip pain and inability to ambulate.     #Fall, Left Femoral Neck Fx  -CV stable   -Likely mechanical as pt can recall tripping. Denies CP, palpitations, dizziness prior to event  -EKG NSR  -Repeat echo with normal lv fxn and mod AR, no sig changes from prior echo  -S/p Hemiarthroplasty of left hip today with ortho  -Post op mgmt per ortho    #HTN  -Cont hydralazine/ Coreg/dilt/losartan  -dec hydral if sbp remains borderline     dvt ppx      Please call/text me with any questions/concerns between 8am-4pm  [phone]

## 2023-02-28 NOTE — PROGRESS NOTE ADULT - SUBJECTIVE AND OBJECTIVE BOX
CARDIOLOGY FOLLOW UP - Dr. Calle  Date of Service: 2/25/23  CC: no cp/sob    Review of Systems:  Constitutional: No fever, weight loss, or fatigue  Respiratory: No cough, wheezing, or hemoptysis, no shortness of breath  Cardiovascular: No chest pain, palpitations, passing out, dizziness, or leg swelling  Gastrointestinal: No abd or epigastric pain. No nausea, vomiting, or hematemesis; no diarrhea or consiptaiton, no melena or hematochezia  Vascular: No edema     TELEMETRY:    PHYSICAL EXAM:  T(C): 36.5 (02-25-23 @ 08:02), Max: 36.6 (02-25-23 @ 00:15)  HR: 62 (02-25-23 @ 08:02) (59 - 73)  BP: 142/78 (02-25-23 @ 08:02) (83/50 - 142/78)  RR: 18 (02-25-23 @ 08:02) (16 - 18)  SpO2: 93% (02-25-23 @ 08:02) (93% - 98%)  Wt(kg): --  I&O's Summary    24 Feb 2023 07:01  -  25 Feb 2023 07:00  --------------------------------------------------------  IN: 1250 mL / OUT: 600 mL / NET: 650 mL        Appearance: Normal	  Cardiovascular: Normal S1 S2,RRR, No JVD, No murmurs  Respiratory: Lungs clear to auscultation	  Gastrointestinal:  Soft, Non-tender, + BS	  Extremities: Normal range of motion, No clubbing, cyanosis or edema  Vascular: Peripheral pulses palpable 2+ bilaterally       Home Medications:  ALPRAZolam 1 mg oral tablet: 1 tab(s) orally once a day (at bedtime)  NOTE: pt uses to sleep (01 Jul 2022 10:43)  amitriptyline 25 mg oral tablet: 1 tab(s) orally once a day (at bedtime) (01 Jul 2022 10:43)  Caltrate 600 + D oral tablet: 1 tab(s) orally 2 times a day (01 Jul 2022 10:43)  dilTIAZem 120 mg/24 hours oral capsule, extended release: 1 cap(s) orally once a day (23 Feb 2023 22:54)  hydrALAZINE 50 mg oral tablet: 50 milligram(s) orally 2 times a day (23 Feb 2023 22:53)  lidocaine 5% patch: Apply 3 patches topically to affected area once a day for 12 hours, then remove  NOTE: apply to spine, right knee and back of right leg (01 Jul 2022 10:43)  lovastatin 20 mg oral tablet: 1 tab(s) orally once a day (in the evening) - with dinner (01 Jul 2022 10:43)  magnesium oxide 500 mg oral tablet: 1 to 2 tab(s) orally once a day (at bedtime)  NOTE: pt takes as laxative  (29 Jun 2022 23:42)  Multiple Vitamins oral tablet: 1 tab(s) orally once a day (lunch) (01 Jul 2022 10:43)  olmesartan 40 mg oral tablet: 1 tab(s) orally once a day (23 Feb 2023 22:54)  Synthroid 75 mcg (0.075 mg) oral tablet: 1 tab(s) orally once a day (Mon - Sat) and 1.5 tab on Sundays  (07 Jul 2022 11:06)  Systane ophthalmic solution: 1 drop(s) to each affected eye , As Needed (01 Jul 2022 10:43)  Vitamin C 500 mg oral tablet: 1 tab(s) orally 2 times a day (01 Jul 2022 10:43)  Wellbutrin  mg/12 hours oral tablet, extended release: 1 tab(s) orally 2 times a day (01 Jul 2022 10:43)        MEDICATIONS  (STANDING):  acetaminophen     Tablet .. 975 milliGRAM(s) Oral every 8 hours  acetaminophen   IVPB .. 1000 milliGRAM(s) IV Intermittent once  ALPRAZolam 1 milliGRAM(s) Oral at bedtime  amitriptyline 25 milliGRAM(s) Oral at bedtime  ascorbic acid 500 milliGRAM(s) Oral daily  atorvastatin 10 milliGRAM(s) Oral at bedtime  buPROPion SR (12-Hour) 100 milliGRAM(s) Oral two times a day  calcium carbonate 1250 mG  + Vitamin D (OsCal 500 + D) 1 Tablet(s) Oral daily  carvedilol 25 milliGRAM(s) Oral every 12 hours  diltiazem    milliGRAM(s) Oral every 24 hours  enoxaparin Injectable 40 milliGRAM(s) SubCutaneous every 24 hours  hydrALAZINE 50 milliGRAM(s) Oral <User Schedule>  HYDROmorphone  Injectable 0.5 milliGRAM(s) IV Push once  influenza  Vaccine (HIGH DOSE) 0.7 milliLiter(s) IntraMuscular once  lactated ringers. 1000 milliLiter(s) (85 mL/Hr) IV Continuous <Continuous>  levothyroxine 75 MICROGram(s) Oral daily  losartan 100 milliGRAM(s) Oral <User Schedule>  magnesium oxide 400 milliGRAM(s) Oral daily  multivitamin 1 Tablet(s) Oral daily  pantoprazole    Tablet 40 milliGRAM(s) Oral before breakfast  polyethylene glycol 3350 17 Gram(s) Oral at bedtime  senna 2 Tablet(s) Oral at bedtime  sucralfate suspension 1 Gram(s) Oral <User Schedule>  tobramycin 0.3% Ophthalmic Solution 1 Drop(s) Both EYES every 6 hours        EKG:  RADIOLOGY:  DIAGNOSTIC TESTING:  [ ] Echocardiogram:  [ ] Catherterization:  [ ] Stress Test:  OTHER:     LABS:	 	                          10.0   12.60 )-----------( 191      ( 25 Feb 2023 06:30 )             30.2     02-25    135  |  100  |  18  ----------------------------<  137<H>  3.3<L>   |  23  |  0.94    Ca    8.6      25 Feb 2023 06:30    TPro  x   /  Alb  3.7  /  TBili  x   /  DBili  x   /  AST  x   /  ALT  x   /  AlkPhos  x   02-24      PT/INR - ( 24 Feb 2023 05:49 )   PT: 14.7 sec;   INR: 1.26 ratio         PTT - ( 24 Feb 2023 05:49 )  PTT:28.8 sec    CARDIAC MARKERS:                  
CARDIOLOGY FOLLOW UP - Dr. Calle  DATE OF SERVICE: 2/27/23    CC  No CV complaints  seen with PT    REVIEW OF SYSTEMS:  CONSTITUTIONAL: No fever, weight loss, or fatigue  RESPIRATORY: No cough, wheezing, chills or hemoptysis; No Shortness of Breath  CARDIOVASCULAR: No chest pain, palpitations, passing out, dizziness, or leg swelling  GASTROINTESTINAL: No abdominal or epigastric pain. No nausea, vomiting, or hematemesis; No diarrhea or constipation. No melena or hematochezia.  VASCULAR: No edema     PHYSICAL EXAM:  T(C): 36.8 (02-27-23 @ 11:48), Max: 37.1 (02-26-23 @ 20:56)  HR: 59 (02-27-23 @ 11:48) (59 - 78)  BP: 112/75 (02-27-23 @ 11:48) (103/61 - 145/68)  RR: 18 (02-27-23 @ 11:48) (18 - 18)  SpO2: 94% (02-27-23 @ 11:48) (91% - 95%)  Wt(kg): --  I&O's Summary    26 Feb 2023 07:01  -  27 Feb 2023 07:00  --------------------------------------------------------  IN: 660 mL / OUT: 1100 mL / NET: -440 mL    27 Feb 2023 07:01  -  27 Feb 2023 13:42  --------------------------------------------------------  IN: 240 mL / OUT: 600 mL / NET: -360 mL        Appearance: Elderly female	  Cardiovascular: Normal S1 S2,RRR, No JVD, +Systolic murmur  Respiratory: Lungs clear to auscultation	  Gastrointestinal:  Soft, Non-tender, + BS	  Extremities: Normal range of motion, No clubbing, cyanosis or edema      Home Medications:  ALPRAZolam 1 mg oral tablet: 1 tab(s) orally once a day (at bedtime)  NOTE: pt uses to sleep (01 Jul 2022 10:43)  amitriptyline 25 mg oral tablet: 1 tab(s) orally once a day (at bedtime) (01 Jul 2022 10:43)  Caltrate 600 + D oral tablet: 1 tab(s) orally 2 times a day (01 Jul 2022 10:43)  dilTIAZem 120 mg/24 hours oral capsule, extended release: 1 cap(s) orally once a day (23 Feb 2023 22:54)  hydrALAZINE 50 mg oral tablet: 50 milligram(s) orally 2 times a day (23 Feb 2023 22:53)  lidocaine 5% patch: Apply 3 patches topically to affected area once a day for 12 hours, then remove  NOTE: apply to spine, right knee and back of right leg (01 Jul 2022 10:43)  lovastatin 20 mg oral tablet: 1 tab(s) orally once a day (in the evening) - with dinner (01 Jul 2022 10:43)  magnesium oxide 500 mg oral tablet: 1 to 2 tab(s) orally once a day (at bedtime)  NOTE: pt takes as laxative  (29 Jun 2022 23:42)  Multiple Vitamins oral tablet: 1 tab(s) orally once a day (lunch) (01 Jul 2022 10:43)  olmesartan 40 mg oral tablet: 1 tab(s) orally once a day (23 Feb 2023 22:54)  Synthroid 75 mcg (0.075 mg) oral tablet: 1 tab(s) orally once a day (Mon - Sat) and 1.5 tab on Sundays  (07 Jul 2022 11:06)  Systane ophthalmic solution: 1 drop(s) to each affected eye , As Needed (01 Jul 2022 10:43)  Vitamin C 500 mg oral tablet: 1 tab(s) orally 2 times a day (01 Jul 2022 10:43)  Wellbutrin  mg/12 hours oral tablet, extended release: 1 tab(s) orally 2 times a day (01 Jul 2022 10:43)      MEDICATIONS  (STANDING):  acetaminophen     Tablet .. 975 milliGRAM(s) Oral every 8 hours  acetaminophen   IVPB .. 1000 milliGRAM(s) IV Intermittent once  ALPRAZolam 1 milliGRAM(s) Oral at bedtime  amitriptyline 25 milliGRAM(s) Oral at bedtime  ascorbic acid 500 milliGRAM(s) Oral daily  atorvastatin 10 milliGRAM(s) Oral at bedtime  buPROPion SR (12-Hour) 100 milliGRAM(s) Oral two times a day  calcium carbonate 1250 mG  + Vitamin D (OsCal 500 + D) 1 Tablet(s) Oral daily  carvedilol 25 milliGRAM(s) Oral every 12 hours  diltiazem    milliGRAM(s) Oral every 24 hours  enoxaparin Injectable 40 milliGRAM(s) SubCutaneous every 24 hours  hydrALAZINE 50 milliGRAM(s) Oral <User Schedule>  hydrocortisone 1% Cream 1 Application(s) Topical two times a day  HYDROmorphone  Injectable 0.5 milliGRAM(s) IV Push once  influenza  Vaccine (HIGH DOSE) 0.7 milliLiter(s) IntraMuscular once  levothyroxine 75 MICROGram(s) Oral daily  losartan 100 milliGRAM(s) Oral <User Schedule>  magnesium oxide 400 milliGRAM(s) Oral daily  multivitamin 1 Tablet(s) Oral daily  pantoprazole    Tablet 40 milliGRAM(s) Oral before breakfast  polyethylene glycol 3350 17 Gram(s) Oral at bedtime  senna 2 Tablet(s) Oral at bedtime  sucralfate suspension 1 Gram(s) Oral <User Schedule>      TELEMETRY: 	    ECG:  	  RADIOLOGY:   DIAGNOSTIC TESTING:  [x] Echocardiogram:  < from: TTE with Doppler (w/Cont) (02.27.23 @ 10:00) >  Conclusions:  1. Normal mitral valve. Mild mitral regurgitation.  2. Calcified trileaflet aortic valve with normal opening.  Mild aortic regurgitation.  3. Endocardial visualization enhanced with intravenous  injection of Ultrasonic Enhancing Agent (Definity). Normal  left ventricular systolic function. No segmental wall  motion abnormalities.  4. Normal right ventricular size and function.  5. Trace pericardial effusion.  *** Compared with echocardiogram of 6/30/2022, results are  similar on today's study.    < end of copied text >    [ ]  Catheterization:  [ ] Stress Test:    OTHER: 	    LABS:	 	    Creatine Kinase, Serum: 44 U/L [25 - 170] (02-23 @ 15:43)                          9.7    7.76  )-----------( 215      ( 27 Feb 2023 07:20 )             29.3     02-27    139  |  105  |  18  ----------------------------<  100<H>  3.7   |  26  |  0.85    Ca    8.6      27 Feb 2023 07:20              
Patient resting without complaints.  No chest pain, SOB, N/V. Has been OOB.  Pain controlled at rest.  States she has pain w/ movement, but pain primarily over lateral thigh under dressing.     ICU Vital Signs Last 24 Hrs  T(C): 36.8 (28 Feb 2023 04:29), Max: 37.2 (27 Feb 2023 20:25)  T(F): 98.2 (28 Feb 2023 04:29), Max: 99 (27 Feb 2023 20:25)  HR: 61 (28 Feb 2023 04:29) (58 - 78)  BP: 158/82 (28 Feb 2023 04:29) (112/75 - 158/82)  BP(mean): --  ABP: --  ABP(mean): --  RR: 18 (28 Feb 2023 04:29) (18 - 18)  SpO2: 96% (28 Feb 2023 04:29) (94% - 99%)    O2 Parameters below as of 28 Feb 2023 04:29  Patient On (Oxygen Delivery Method): room air      Exam:  Alert and Oriented, No Acute Distress  L hip dsg c/d/i with soft/compressible compartments  Abd pillow in place  Calves Soft, Non-tender bilaterally  +PF/DF/EHL/FHL  SILT  +DP Pulse  Leg lengths clinically equal  LLE not short or ER        A/p: 82yFemale s/p L hip Roly.  VSS. NAD.    PT/OT-WBAT, post prec, abd pillow in bed, raised hip chair  IS  DVT PPx  Pain Control  Continue Current Tx.  JUAN planning    
Patient is a 82y old  Female who presents with a chief complaint of left hip fracture (24 Feb 2023 14:20)      SUBJECTIVE / OVERNIGHT EVENTS: Comfortable. Daughter at bedside.  Review of Systems  chest pain no  palpitations no  sob no  nausea no  headache no    MEDICATIONS  (STANDING):  acetaminophen   IVPB .. 1000 milliGRAM(s) IV Intermittent once  acetaminophen   IVPB .. 1000 milliGRAM(s) IV Intermittent once  ALPRAZolam 1 milliGRAM(s) Oral at bedtime  amitriptyline 25 milliGRAM(s) Oral at bedtime  ascorbic acid 500 milliGRAM(s) Oral daily  atorvastatin 10 milliGRAM(s) Oral at bedtime  buPROPion SR (12-Hour) 100 milliGRAM(s) Oral two times a day  calcium carbonate 1250 mG  + Vitamin D (OsCal 500 + D) 1 Tablet(s) Oral daily  carvedilol 25 milliGRAM(s) Oral every 12 hours  ceFAZolin   IVPB 2000 milliGRAM(s) IV Intermittent every 8 hours  hydrALAZINE 50 milliGRAM(s) Oral <User Schedule>  HYDROmorphone  Injectable 0.5 milliGRAM(s) IV Push once  influenza  Vaccine (HIGH DOSE) 0.7 milliLiter(s) IntraMuscular once  lactated ringers. 1000 milliLiter(s) (85 mL/Hr) IV Continuous <Continuous>  levothyroxine 75 MICROGram(s) Oral daily  magnesium oxide 500 milliGRAM(s) Oral daily  multivitamin 1 Tablet(s) Oral daily  pantoprazole    Tablet 40 milliGRAM(s) Oral before breakfast  polyethylene glycol 3350 17 Gram(s) Oral at bedtime  senna 2 Tablet(s) Oral at bedtime  sucralfate suspension 1 Gram(s) Oral <User Schedule>  tobramycin 0.3% Ophthalmic Solution 1 Drop(s) Both EYES every 6 hours    MEDICATIONS  (PRN):  artificial tears (preservative free) Ophthalmic Solution 1 Drop(s) Both EYES every 4 hours PRN Dry Eyes  magnesium hydroxide Suspension 30 milliLiter(s) Oral daily PRN Constipation  oxyCODONE    IR 2.5 milliGRAM(s) Oral every 4 hours PRN Moderate Pain (4 - 6)  oxyCODONE    IR 5 milliGRAM(s) Oral every 4 hours PRN Severe Pain (7 - 10)  traMADol 50 milliGRAM(s) Oral every 6 hours PRN Mild Pain (1 - 3)      Vital Signs Last 24 Hrs  T(C): 36.4 (24 Feb 2023 16:30), Max: 36.8 (23 Feb 2023 21:32)  T(F): 97.5 (24 Feb 2023 16:30), Max: 98.3 (23 Feb 2023 21:32)  HR: 69 (24 Feb 2023 18:26) (59 - 83)  BP: 96/64 (24 Feb 2023 18:26) (83/50 - 150/83)  BP(mean): 81 (24 Feb 2023 12:00) (72 - 105)  RR: 18 (24 Feb 2023 16:30) (16 - 18)  SpO2: 96% (24 Feb 2023 16:30) (94% - 98%)    Parameters below as of 24 Feb 2023 16:30  Patient On (Oxygen Delivery Method): room air        PHYSICAL EXAM:  GENERAL: NAD   HEAD:  Atraumatic, Normocephalic  EYES: EOMI, PERRLA, conjunctiva and sclera clear  NECK: Supple, No JVD  CHEST/LUNG: Clear to auscultation bilaterally; No wheeze  HEART: Regular rate and rhythm; No murmurs, rubs, or gallops  ABDOMEN: Soft, Nontender, Nondistended; Bowel sounds present  EXTREMITIES:  2+ Peripheral Pulses, No clubbing, cyanosis, or edema s/p L hip hemiarthroplasty   PSYCH: AAOx3  NEUROLOGY: non-focal  SKIN: No rashes or lesions    LABS:                        12.3   19.47 )-----------( 208      ( 24 Feb 2023 10:23 )             36.9     02-24    137  |  101  |  10  ----------------------------<  132<H>  3.7   |  23  |  0.75    Ca    8.5      24 Feb 2023 10:23    TPro  x   /  Alb  3.7  /  TBili  x   /  DBili  x   /  AST  x   /  ALT  x   /  AlkPhos  x   02-24    PT/INR - ( 24 Feb 2023 05:49 )   PT: 14.7 sec;   INR: 1.26 ratio         PTT - ( 24 Feb 2023 05:49 )  PTT:28.8 sec  CARDIAC MARKERS ( 23 Feb 2023 15:43 )  x     / x     / 44 U/L / x     / x                RADIOLOGY & ADDITIONAL TESTS:    Imaging Personally Reviewed:    Consultant(s) Notes Reviewed:      Care Discussed with Consultants/Other Providers:  
Patient is a 82y old  Female who presents with a chief complaint of left hip fracture (26 Feb 2023 07:59)      SUBJECTIVE / OVERNIGHT EVENTS: Comfortable without new complaints. Daughter at bedside.  Review of Systems  chest pain no  palpitations no  sob no  nausea no  headache no    MEDICATIONS  (STANDING):  acetaminophen     Tablet .. 975 milliGRAM(s) Oral every 8 hours  acetaminophen   IVPB .. 1000 milliGRAM(s) IV Intermittent once  ALPRAZolam 1 milliGRAM(s) Oral at bedtime  amitriptyline 25 milliGRAM(s) Oral at bedtime  ascorbic acid 500 milliGRAM(s) Oral daily  atorvastatin 10 milliGRAM(s) Oral at bedtime  buPROPion SR (12-Hour) 100 milliGRAM(s) Oral two times a day  calcium carbonate 1250 mG  + Vitamin D (OsCal 500 + D) 1 Tablet(s) Oral daily  carvedilol 25 milliGRAM(s) Oral every 12 hours  diltiazem    milliGRAM(s) Oral every 24 hours  enoxaparin Injectable 40 milliGRAM(s) SubCutaneous every 24 hours  hydrALAZINE 50 milliGRAM(s) Oral <User Schedule>  hydrocortisone 1% Cream 1 Application(s) Topical two times a day  HYDROmorphone  Injectable 0.5 milliGRAM(s) IV Push once  influenza  Vaccine (HIGH DOSE) 0.7 milliLiter(s) IntraMuscular once  lactated ringers. 1000 milliLiter(s) (85 mL/Hr) IV Continuous <Continuous>  levothyroxine 75 MICROGram(s) Oral daily  losartan 100 milliGRAM(s) Oral <User Schedule>  magnesium oxide 400 milliGRAM(s) Oral daily  multivitamin 1 Tablet(s) Oral daily  pantoprazole    Tablet 40 milliGRAM(s) Oral before breakfast  polyethylene glycol 3350 17 Gram(s) Oral at bedtime  senna 2 Tablet(s) Oral at bedtime  sucralfate suspension 1 Gram(s) Oral <User Schedule>    MEDICATIONS  (PRN):  artificial tears (preservative free) Ophthalmic Solution 1 Drop(s) Both EYES every 4 hours PRN Dry Eyes  magnesium hydroxide Suspension 30 milliLiter(s) Oral daily PRN Constipation  oxyCODONE    IR 2.5 milliGRAM(s) Oral every 4 hours PRN Moderate Pain (4 - 6)  oxyCODONE    IR 5 milliGRAM(s) Oral every 4 hours PRN Severe Pain (7 - 10)  traMADol 50 milliGRAM(s) Oral every 6 hours PRN Mild Pain (1 - 3)      Vital Signs Last 24 Hrs  T(C): 36.5 (26 Feb 2023 12:04), Max: 37 (25 Feb 2023 16:05)  T(F): 97.7 (26 Feb 2023 12:04), Max: 98.6 (25 Feb 2023 16:05)  HR: 63 (26 Feb 2023 12:04) (60 - 75)  BP: 92/54 (26 Feb 2023 12:04) (92/54 - 130/80)  BP(mean): --  RR: 18 (26 Feb 2023 12:04) (18 - 18)  SpO2: 95% (26 Feb 2023 12:04) (93% - 97%)    Parameters below as of 26 Feb 2023 12:04  Patient On (Oxygen Delivery Method): room air        PHYSICAL EXAM:  GENERAL: NAD  HEAD:  Atraumatic, Normocephalic  EYES: EOMI, PERRLA, conjunctiva and sclera clear  NECK: Supple, No JVD  CHEST/LUNG: Clear to auscultation bilaterally; No wheeze  HEART: Regular rate and rhythm; No murmurs, rubs, or gallops  ABDOMEN: Soft, Nontender, Nondistended; Bowel sounds present  EXTREMITIES:  2+ Peripheral Pulses, No clubbing, cyanosis, or edema  PSYCH: AAOx3  NEUROLOGY: non-focal  SKIN: No rashes or lesions    LABS:                        9.7    8.51  )-----------( 176      ( 26 Feb 2023 10:00 )             29.3     02-26    139  |  105  |  16  ----------------------------<  130<H>  3.5   |  26  |  0.73    Ca    8.7      26 Feb 2023 10:00                  RADIOLOGY & ADDITIONAL TESTS:    Imaging Personally Reviewed:    Consultant(s) Notes Reviewed:      Care Discussed with Consultants/Other Providers:  
Patient is a 82y old  Female who presents with a chief complaint of left hip fracture (27 Feb 2023 13:42)      SUBJECTIVE / OVERNIGHT EVENTS: c/o throat discomfort.   Review of Systems  chest pain no  palpitations no  sob no  nausea no  headache no    MEDICATIONS  (STANDING):  acetaminophen     Tablet .. 975 milliGRAM(s) Oral every 8 hours  acetaminophen   IVPB .. 1000 milliGRAM(s) IV Intermittent once  ALPRAZolam 1 milliGRAM(s) Oral at bedtime  amitriptyline 25 milliGRAM(s) Oral at bedtime  ascorbic acid 500 milliGRAM(s) Oral daily  atorvastatin 10 milliGRAM(s) Oral at bedtime  buPROPion SR (12-Hour) 100 milliGRAM(s) Oral two times a day  calcium carbonate 1250 mG  + Vitamin D (OsCal 500 + D) 1 Tablet(s) Oral daily  carvedilol 25 milliGRAM(s) Oral every 12 hours  diltiazem    milliGRAM(s) Oral every 24 hours  enoxaparin Injectable 40 milliGRAM(s) SubCutaneous every 24 hours  hydrALAZINE 50 milliGRAM(s) Oral <User Schedule>  hydrocortisone 1% Cream 1 Application(s) Topical two times a day  HYDROmorphone  Injectable 0.5 milliGRAM(s) IV Push once  influenza  Vaccine (HIGH DOSE) 0.7 milliLiter(s) IntraMuscular once  levothyroxine 75 MICROGram(s) Oral daily  losartan 100 milliGRAM(s) Oral <User Schedule>  magnesium oxide 400 milliGRAM(s) Oral daily  multivitamin 1 Tablet(s) Oral daily  pantoprazole    Tablet 40 milliGRAM(s) Oral before breakfast  polyethylene glycol 3350 17 Gram(s) Oral at bedtime  senna 2 Tablet(s) Oral at bedtime  sucralfate suspension 1 Gram(s) Oral <User Schedule>    MEDICATIONS  (PRN):  artificial tears (preservative free) Ophthalmic Solution 1 Drop(s) Both EYES every 4 hours PRN Dry Eyes  benzocaine/menthol Lozenge 1 Lozenge Oral every 2 hours PRN Sore Throat  magnesium hydroxide Suspension 30 milliLiter(s) Oral daily PRN Constipation  oxyCODONE    IR 2.5 milliGRAM(s) Oral every 4 hours PRN Moderate Pain (4 - 6)  oxyCODONE    IR 5 milliGRAM(s) Oral every 4 hours PRN Severe Pain (7 - 10)  traMADol 50 milliGRAM(s) Oral every 6 hours PRN Mild Pain (1 - 3)      Vital Signs Last 24 Hrs  T(C): 36.8 (27 Feb 2023 16:40), Max: 37.1 (26 Feb 2023 20:56)  T(F): 98.3 (27 Feb 2023 16:40), Max: 98.7 (26 Feb 2023 20:56)  HR: 58 (27 Feb 2023 16:40) (58 - 78)  BP: 137/83 (27 Feb 2023 16:40) (112/75 - 145/68)  BP(mean): --  RR: 18 (27 Feb 2023 16:40) (18 - 18)  SpO2: 97% (27 Feb 2023 16:40) (91% - 97%)    Parameters below as of 27 Feb 2023 16:40  Patient On (Oxygen Delivery Method): room air        PHYSICAL EXAM:  GENERAL: NAD  HEAD:  Atraumatic, Normocephalic  EYES: EOMI, PERRLA, conjunctiva and sclera clear  NECK: Supple, No JVD  CHEST/LUNG: Clear to auscultation bilaterally; No wheeze  HEART: Regular rate and rhythm; No murmurs, rubs, or gallops  ABDOMEN: Soft, Nontender, Nondistended; Bowel sounds present  EXTREMITIES:  2+ Peripheral Pulses, No clubbing, cyanosis, or edema  PSYCH: AAOx3  NEUROLOGY: non-focal  SKIN: No rashes or lesions    LABS:                        9.7    7.76  )-----------( 215      ( 27 Feb 2023 07:20 )             29.3     02-27    139  |  105  |  18  ----------------------------<  100<H>  3.7   |  26  |  0.85    Ca    8.6      27 Feb 2023 07:20                  RADIOLOGY & ADDITIONAL TESTS:    Imaging Personally Reviewed:    Consultant(s) Notes Reviewed:      Care Discussed with Consultants/Other Providers:  
Patient resting without complaints.  No chest pain, SOB, N/V. Has been OOB.  Pain controlled at rest.        ICU Vital Signs Last 24 Hrs  T(C): 36.8 (27 Feb 2023 04:55), Max: 37.1 (26 Feb 2023 20:56)  T(F): 98.3 (27 Feb 2023 04:55), Max: 98.7 (26 Feb 2023 20:56)  HR: 78 (27 Feb 2023 04:55) (60 - 78)  BP: 145/68 (27 Feb 2023 04:55) (92/54 - 145/68)  BP(mean): --  ABP: --  ABP(mean): --  RR: 18 (27 Feb 2023 04:55) (18 - 18)  SpO2: 91% (27 Feb 2023 04:55) (91% - 96%)    O2 Parameters below as of 27 Feb 2023 04:55  Patient On (Oxygen Delivery Method): room air        Exam:  Alert and Oriented, No Acute Distress  L hip dsg c/d/i with soft/compressible compartments  Abd pillow in place  Calves Soft, Non-tender bilaterally  +PF/DF/EHL/FHL  SILT  +DP Pulse        A/p: 82yFemale s/p L hip Roly.  VSS. NAD.    PT/OT-WBAT, post prec, abd pillow in bed, raised hip chair  IS  DVT PPx  Pain Control  Continue Current Tx.  JUAN planning    
CARDIOLOGY FOLLOW UP - Dr. Calle  Date of Service: 2/26/2023  CC: no events    Review of Systems:  Constitutional: No fever, weight loss, or fatigue  Respiratory: No cough, wheezing, or hemoptysis, no shortness of breath  Cardiovascular: No chest pain, palpitations, passing out, dizziness, or leg swelling  Gastrointestinal: No abd or epigastric pain. No nausea, vomiting, or hematemesis; no diarrhea or consiptaiton, no melena or hematochezia  Vascular: No edema     TELEMETRY:    PHYSICAL EXAM:  T(C): 36.7 (02-26-23 @ 04:36), Max: 37 (02-25-23 @ 16:05)  HR: 74 (02-26-23 @ 04:36) (62 - 94)  BP: 123/72 (02-26-23 @ 04:36) (106/68 - 146/-)  RR: 18 (02-26-23 @ 04:36) (18 - 18)  SpO2: 93% (02-26-23 @ 04:36) (93% - 97%)  Wt(kg): --  I&O's Summary    25 Feb 2023 07:01  -  26 Feb 2023 07:00  --------------------------------------------------------  IN: 1865 mL / OUT: 2200 mL / NET: -335 mL        Appearance: Normal	  Cardiovascular: Normal S1 S2,RRR, No JVD, No murmurs  Respiratory: Lungs clear to auscultation	  Gastrointestinal:  Soft, Non-tender, + BS	  Extremities: Normal range of motion, No clubbing, cyanosis or edema  Vascular: Peripheral pulses palpable 2+ bilaterally       Home Medications:  ALPRAZolam 1 mg oral tablet: 1 tab(s) orally once a day (at bedtime)  NOTE: pt uses to sleep (01 Jul 2022 10:43)  amitriptyline 25 mg oral tablet: 1 tab(s) orally once a day (at bedtime) (01 Jul 2022 10:43)  Caltrate 600 + D oral tablet: 1 tab(s) orally 2 times a day (01 Jul 2022 10:43)  dilTIAZem 120 mg/24 hours oral capsule, extended release: 1 cap(s) orally once a day (23 Feb 2023 22:54)  hydrALAZINE 50 mg oral tablet: 50 milligram(s) orally 2 times a day (23 Feb 2023 22:53)  lidocaine 5% patch: Apply 3 patches topically to affected area once a day for 12 hours, then remove  NOTE: apply to spine, right knee and back of right leg (01 Jul 2022 10:43)  lovastatin 20 mg oral tablet: 1 tab(s) orally once a day (in the evening) - with dinner (01 Jul 2022 10:43)  magnesium oxide 500 mg oral tablet: 1 to 2 tab(s) orally once a day (at bedtime)  NOTE: pt takes as laxative  (29 Jun 2022 23:42)  Multiple Vitamins oral tablet: 1 tab(s) orally once a day (lunch) (01 Jul 2022 10:43)  olmesartan 40 mg oral tablet: 1 tab(s) orally once a day (23 Feb 2023 22:54)  Synthroid 75 mcg (0.075 mg) oral tablet: 1 tab(s) orally once a day (Mon - Sat) and 1.5 tab on Sundays  (07 Jul 2022 11:06)  Systane ophthalmic solution: 1 drop(s) to each affected eye , As Needed (01 Jul 2022 10:43)  Vitamin C 500 mg oral tablet: 1 tab(s) orally 2 times a day (01 Jul 2022 10:43)  Wellbutrin  mg/12 hours oral tablet, extended release: 1 tab(s) orally 2 times a day (01 Jul 2022 10:43)        MEDICATIONS  (STANDING):  acetaminophen     Tablet .. 975 milliGRAM(s) Oral every 8 hours  acetaminophen   IVPB .. 1000 milliGRAM(s) IV Intermittent once  ALPRAZolam 1 milliGRAM(s) Oral at bedtime  amitriptyline 25 milliGRAM(s) Oral at bedtime  ascorbic acid 500 milliGRAM(s) Oral daily  atorvastatin 10 milliGRAM(s) Oral at bedtime  buPROPion SR (12-Hour) 100 milliGRAM(s) Oral two times a day  calcium carbonate 1250 mG  + Vitamin D (OsCal 500 + D) 1 Tablet(s) Oral daily  carvedilol 25 milliGRAM(s) Oral every 12 hours  diltiazem    milliGRAM(s) Oral every 24 hours  enoxaparin Injectable 40 milliGRAM(s) SubCutaneous every 24 hours  hydrALAZINE 50 milliGRAM(s) Oral <User Schedule>  HYDROmorphone  Injectable 0.5 milliGRAM(s) IV Push once  influenza  Vaccine (HIGH DOSE) 0.7 milliLiter(s) IntraMuscular once  lactated ringers. 1000 milliLiter(s) (85 mL/Hr) IV Continuous <Continuous>  levothyroxine 75 MICROGram(s) Oral daily  losartan 100 milliGRAM(s) Oral <User Schedule>  magnesium oxide 400 milliGRAM(s) Oral daily  multivitamin 1 Tablet(s) Oral daily  pantoprazole    Tablet 40 milliGRAM(s) Oral before breakfast  polyethylene glycol 3350 17 Gram(s) Oral at bedtime  senna 2 Tablet(s) Oral at bedtime  sucralfate suspension 1 Gram(s) Oral <User Schedule>  tobramycin 0.3% Ophthalmic Solution 1 Drop(s) Both EYES every 6 hours        EKG:  RADIOLOGY:  DIAGNOSTIC TESTING:  [ ] Echocardiogram:  [ ] Catherterization:  [ ] Stress Test:  OTHER:     LABS:	 	                          10.0   12.60 )-----------( 191      ( 25 Feb 2023 06:30 )             30.2     02-25    135  |  100  |  18  ----------------------------<  137<H>  3.3<L>   |  23  |  0.94    Ca    8.6      25 Feb 2023 06:30            CARDIAC MARKERS:                  
CARDIOLOGY FOLLOW UP - Dr. Calle  Date of Service: 2/28/2023  CC: no events    Review of Systems:  Constitutional: No fever, weight loss, or fatigue  Respiratory: No cough, wheezing, or hemoptysis, no shortness of breath  Cardiovascular: No chest pain, palpitations, passing out, dizziness, or leg swelling  Gastrointestinal: No abd or epigastric pain. No nausea, vomiting, or hematemesis; no diarrhea or consiptaiton, no melena or hematochezia  Vascular: No edema     TELEMETRY:    PHYSICAL EXAM:  T(C): 37.2 (02-28-23 @ 12:21), Max: 37.2 (02-27-23 @ 20:25)  HR: 68 (02-28-23 @ 12:21) (60 - 78)  BP: 118/73 (02-28-23 @ 12:21) (118/73 - 165/89)  RR: 18 (02-28-23 @ 12:21) (18 - 18)  SpO2: 95% (02-28-23 @ 12:21) (95% - 99%)  Wt(kg): --  I&O's Summary    27 Feb 2023 07:01  -  28 Feb 2023 07:00  --------------------------------------------------------  IN: 960 mL / OUT: 1950 mL / NET: -990 mL    28 Feb 2023 07:01  -  28 Feb 2023 18:00  --------------------------------------------------------  IN: 120 mL / OUT: 1000 mL / NET: -880 mL        Appearance: Normal	  Cardiovascular: Normal S1 S2,RRR, No JVD, No murmurs  Respiratory: Lungs clear to auscultation	  Gastrointestinal:  Soft, Non-tender, + BS	  Extremities: Normal range of motion, No clubbing, cyanosis or edema  Vascular: Peripheral pulses palpable 2+ bilaterally       Home Medications:  acetaminophen 325 mg oral tablet: 3 tab(s) orally every 8 hours (27 Feb 2023 15:44)  ALPRAZolam 1 mg oral tablet: 1 tab(s) orally once a day (at bedtime)  NOTE: pt uses to sleep (01 Jul 2022 10:43)  amitriptyline 25 mg oral tablet: 1 tab(s) orally once a day (at bedtime) (01 Jul 2022 10:43)  Caltrate 600 + D oral tablet: 1 tab(s) orally 2 times a day (01 Jul 2022 10:43)  dilTIAZem 120 mg/24 hours oral capsule, extended release: 1 cap(s) orally once a day (23 Feb 2023 22:54)  enoxaparin: 40 milligram(s) subcutaneous once a day x 6 weeks post op for DVT ppx (27 Feb 2023 15:44)  hydrALAZINE 50 mg oral tablet: 50 milligram(s) orally 2 times a day (23 Feb 2023 22:53)  hydrocortisone 1% topical cream: 1 application topically 2 times a day to groin  (28 Feb 2023 06:51)  lovastatin 20 mg oral tablet: 1 tab(s) orally once a day (in the evening) - with dinner (01 Jul 2022 10:43)  magnesium oxide 500 mg oral tablet: 1 to 2 tab(s) orally once a day (at bedtime)  NOTE: pt takes as laxative  (29 Jun 2022 23:42)  Multiple Vitamins oral tablet: 1 tab(s) orally once a day (lunch) (01 Jul 2022 10:43)  olmesartan 40 mg oral tablet: 1 tab(s) orally once a day (23 Feb 2023 22:54)  oxyCODONE 5 mg oral tablet: 1/2 tab(s) orally every 4 hours, As needed, moderate Pain (4 -6)  1 tab(s) orally every 4 hours, As needed, Severe Pain (7 - 10) (27 Feb 2023 15:44)  polyethylene glycol 3350 oral powder for reconstitution: 17 gram(s) orally once a day (at bedtime) (27 Feb 2023 15:44)  senna leaf extract oral tablet: 2 tab(s) orally once a day (at bedtime) (27 Feb 2023 15:44)  Synthroid 75 mcg (0.075 mg) oral tablet: 1 tab(s) orally once a day (Mon - Sat) and 1.5 tab on Sundays  (07 Jul 2022 11:06)  Systane ophthalmic solution: 1 drop(s) to each affected eye , As Needed (01 Jul 2022 10:43)  traMADol 50 mg oral tablet: 1 tab(s) orally every 6 hours, As needed, Mild Pain (1 - 3) (27 Feb 2023 15:44)  Vitamin C 500 mg oral tablet: 1 tab(s) orally 2 times a day (01 Jul 2022 10:43)  Wellbutrin  mg/12 hours oral tablet, extended release: 1 tab(s) orally 2 times a day (01 Jul 2022 10:43)        MEDICATIONS  (STANDING):  acetaminophen     Tablet .. 975 milliGRAM(s) Oral every 8 hours  acetaminophen   IVPB .. 1000 milliGRAM(s) IV Intermittent once  ALPRAZolam 1 milliGRAM(s) Oral at bedtime  amitriptyline 25 milliGRAM(s) Oral at bedtime  ascorbic acid 500 milliGRAM(s) Oral daily  atorvastatin 10 milliGRAM(s) Oral at bedtime  buPROPion SR (12-Hour) 100 milliGRAM(s) Oral two times a day  calcium carbonate 1250 mG  + Vitamin D (OsCal 500 + D) 1 Tablet(s) Oral daily  carvedilol 25 milliGRAM(s) Oral every 12 hours  diltiazem    milliGRAM(s) Oral every 24 hours  enoxaparin Injectable 40 milliGRAM(s) SubCutaneous every 24 hours  hydrALAZINE 50 milliGRAM(s) Oral <User Schedule>  hydrocortisone 1% Cream 1 Application(s) Topical two times a day  HYDROmorphone  Injectable 0.5 milliGRAM(s) IV Push once  influenza  Vaccine (HIGH DOSE) 0.7 milliLiter(s) IntraMuscular once  levothyroxine 75 MICROGram(s) Oral daily  losartan 100 milliGRAM(s) Oral <User Schedule>  magnesium oxide 400 milliGRAM(s) Oral daily  multivitamin 1 Tablet(s) Oral daily  pantoprazole    Tablet 40 milliGRAM(s) Oral before breakfast  polyethylene glycol 3350 17 Gram(s) Oral at bedtime  senna 2 Tablet(s) Oral at bedtime  sucralfate suspension 1 Gram(s) Oral <User Schedule>        EKG:  RADIOLOGY:  DIAGNOSTIC TESTING:  [ ] Echocardiogram:  [ ] Catherterization:  [ ] Stress Test:  OTHER:     LABS:	 	                          9.7    7.76  )-----------( 215      ( 27 Feb 2023 07:20 )             29.3     02-27    139  |  105  |  18  ----------------------------<  100<H>  3.7   |  26  |  0.85    Ca    8.6      27 Feb 2023 07:20            CARDIAC MARKERS:                  
Patient is a 82y old  Female who presents with a chief complaint of left hip fracture (25 Feb 2023 08:58)      SUBJECTIVE / OVERNIGHT EVENTS: Comfortable without new complaints. Daughter at bedside.  Review of Systems  chest pain no  palpitations no  sob no  nausea no  headache no    MEDICATIONS  (STANDING):  acetaminophen     Tablet .. 975 milliGRAM(s) Oral every 8 hours  acetaminophen   IVPB .. 1000 milliGRAM(s) IV Intermittent once  ALPRAZolam 1 milliGRAM(s) Oral at bedtime  amitriptyline 25 milliGRAM(s) Oral at bedtime  ascorbic acid 500 milliGRAM(s) Oral daily  atorvastatin 10 milliGRAM(s) Oral at bedtime  buPROPion SR (12-Hour) 100 milliGRAM(s) Oral two times a day  calcium carbonate 1250 mG  + Vitamin D (OsCal 500 + D) 1 Tablet(s) Oral daily  carvedilol 25 milliGRAM(s) Oral every 12 hours  diltiazem    milliGRAM(s) Oral every 24 hours  enoxaparin Injectable 40 milliGRAM(s) SubCutaneous every 24 hours  hydrALAZINE 50 milliGRAM(s) Oral <User Schedule>  HYDROmorphone  Injectable 0.5 milliGRAM(s) IV Push once  influenza  Vaccine (HIGH DOSE) 0.7 milliLiter(s) IntraMuscular once  lactated ringers. 1000 milliLiter(s) (85 mL/Hr) IV Continuous <Continuous>  levothyroxine 75 MICROGram(s) Oral daily  losartan 100 milliGRAM(s) Oral <User Schedule>  magnesium oxide 400 milliGRAM(s) Oral daily  multivitamin 1 Tablet(s) Oral daily  pantoprazole    Tablet 40 milliGRAM(s) Oral before breakfast  polyethylene glycol 3350 17 Gram(s) Oral at bedtime  senna 2 Tablet(s) Oral at bedtime  sucralfate suspension 1 Gram(s) Oral <User Schedule>  tobramycin 0.3% Ophthalmic Solution 1 Drop(s) Both EYES every 6 hours    MEDICATIONS  (PRN):  artificial tears (preservative free) Ophthalmic Solution 1 Drop(s) Both EYES every 4 hours PRN Dry Eyes  magnesium hydroxide Suspension 30 milliLiter(s) Oral daily PRN Constipation  oxyCODONE    IR 2.5 milliGRAM(s) Oral every 4 hours PRN Moderate Pain (4 - 6)  oxyCODONE    IR 5 milliGRAM(s) Oral every 4 hours PRN Severe Pain (7 - 10)  traMADol 50 milliGRAM(s) Oral every 6 hours PRN Mild Pain (1 - 3)      Vital Signs Last 24 Hrs  T(C): 36.5 (25 Feb 2023 12:59), Max: 36.6 (25 Feb 2023 00:15)  T(F): 97.7 (25 Feb 2023 12:59), Max: 97.8 (25 Feb 2023 00:15)  HR: 71 (25 Feb 2023 12:59) (62 - 94)  BP: 113/68 (25 Feb 2023 12:59) (83/50 - 146/-)  BP(mean): --  RR: 18 (25 Feb 2023 12:59) (18 - 18)  SpO2: 95% (25 Feb 2023 12:59) (93% - 98%)    Parameters below as of 25 Feb 2023 12:59  Patient On (Oxygen Delivery Method): room air        PHYSICAL EXAM:  GENERAL: NAD  HEAD:  Atraumatic, Normocephalic  EYES: EOMI, PERRLA, conjunctiva and sclera clear  NECK: Supple, No JVD  CHEST/LUNG: Clear to auscultation bilaterally; No wheeze  HEART: Regular rate and rhythm; No murmurs, rubs, or gallops  ABDOMEN: Soft, Nontender, Nondistended; Bowel sounds present  EXTREMITIES:  2+ Peripheral Pulses, No clubbing, cyanosis, or edema  PSYCH: AAOx3  NEUROLOGY: non-focal  SKIN: No rashes or lesions    LABS:                        10.0   12.60 )-----------( 191      ( 25 Feb 2023 06:30 )             30.2     02-25    135  |  100  |  18  ----------------------------<  137<H>  3.3<L>   |  23  |  0.94    Ca    8.6      25 Feb 2023 06:30    TPro  x   /  Alb  3.7  /  TBili  x   /  DBili  x   /  AST  x   /  ALT  x   /  AlkPhos  x   02-24    PT/INR - ( 24 Feb 2023 05:49 )   PT: 14.7 sec;   INR: 1.26 ratio         PTT - ( 24 Feb 2023 05:49 )  PTT:28.8 sec  CARDIAC MARKERS ( 23 Feb 2023 15:43 )  x     / x     / 44 U/L / x     / x                RADIOLOGY & ADDITIONAL TESTS:    Imaging Personally Reviewed:    Consultant(s) Notes Reviewed:      Care Discussed with Consultants/Other Providers:  
Patient is a 82y old  Female who presents with a chief complaint of left hip fracture (28 Feb 2023 06:05)      SUBJECTIVE / OVERNIGHT EVENTS: Comfortable without new complaints.   Review of Systems  chest pain no  palpitations no  sob no  nausea no  headache no    MEDICATIONS  (STANDING):  acetaminophen     Tablet .. 975 milliGRAM(s) Oral every 8 hours  acetaminophen   IVPB .. 1000 milliGRAM(s) IV Intermittent once  ALPRAZolam 1 milliGRAM(s) Oral at bedtime  amitriptyline 25 milliGRAM(s) Oral at bedtime  ascorbic acid 500 milliGRAM(s) Oral daily  atorvastatin 10 milliGRAM(s) Oral at bedtime  buPROPion SR (12-Hour) 100 milliGRAM(s) Oral two times a day  calcium carbonate 1250 mG  + Vitamin D (OsCal 500 + D) 1 Tablet(s) Oral daily  carvedilol 25 milliGRAM(s) Oral every 12 hours  diltiazem    milliGRAM(s) Oral every 24 hours  enoxaparin Injectable 40 milliGRAM(s) SubCutaneous every 24 hours  hydrALAZINE 50 milliGRAM(s) Oral <User Schedule>  hydrocortisone 1% Cream 1 Application(s) Topical two times a day  HYDROmorphone  Injectable 0.5 milliGRAM(s) IV Push once  influenza  Vaccine (HIGH DOSE) 0.7 milliLiter(s) IntraMuscular once  levothyroxine 75 MICROGram(s) Oral daily  losartan 100 milliGRAM(s) Oral <User Schedule>  magnesium oxide 400 milliGRAM(s) Oral daily  multivitamin 1 Tablet(s) Oral daily  pantoprazole    Tablet 40 milliGRAM(s) Oral before breakfast  polyethylene glycol 3350 17 Gram(s) Oral at bedtime  senna 2 Tablet(s) Oral at bedtime  sucralfate suspension 1 Gram(s) Oral <User Schedule>    MEDICATIONS  (PRN):  artificial tears (preservative free) Ophthalmic Solution 1 Drop(s) Both EYES every 4 hours PRN Dry Eyes  benzocaine/menthol Lozenge 1 Lozenge Oral every 2 hours PRN Sore Throat  magnesium hydroxide Suspension 30 milliLiter(s) Oral daily PRN Constipation  oxyCODONE    IR 2.5 milliGRAM(s) Oral every 4 hours PRN Moderate Pain (4 - 6)  oxyCODONE    IR 5 milliGRAM(s) Oral every 4 hours PRN Severe Pain (7 - 10)  traMADol 50 milliGRAM(s) Oral every 6 hours PRN Mild Pain (1 - 3)      Vital Signs Last 24 Hrs  T(C): 37.2 (28 Feb 2023 12:21), Max: 37.2 (27 Feb 2023 20:25)  T(F): 99 (28 Feb 2023 12:21), Max: 99 (27 Feb 2023 20:25)  HR: 68 (28 Feb 2023 12:21) (60 - 78)  BP: 118/73 (28 Feb 2023 12:21) (118/73 - 165/89)  BP(mean): --  RR: 18 (28 Feb 2023 12:21) (18 - 18)  SpO2: 95% (28 Feb 2023 12:21) (95% - 99%)    Parameters below as of 28 Feb 2023 12:21  Patient On (Oxygen Delivery Method): room air        PHYSICAL EXAM:  GENERAL: NAD   HEAD:  Atraumatic, Normocephalic  EYES: EOMI, PERRLA, conjunctiva and sclera clear  NECK: Supple, No JVD  CHEST/LUNG: Clear to auscultation bilaterally; No wheeze  HEART: Regular rate and rhythm; No murmurs, rubs, or gallops  ABDOMEN: Soft, Nontender, Nondistended; Bowel sounds present   EXTREMITIES:  2+ Peripheral Pulses, No clubbing, cyanosis, or edema  PSYCH: AAOx3   NEUROLOGY: non-focal  SKIN: No rashes or lesions    LABS:                        9.7    7.76  )-----------( 215      ( 27 Feb 2023 07:20 )             29.3     02-27    139  |  105  |  18  ----------------------------<  100<H>  3.7   |  26  |  0.85    Ca    8.6      27 Feb 2023 07:20                  RADIOLOGY & ADDITIONAL TESTS:    Imaging Personally Reviewed:    Consultant(s) Notes Reviewed:      Care Discussed with Consultants/Other Providers:  
Patient seen and evaluated at bedside. Pain is well controlled denies any LLE numbness or tingling. Patient and family with concerns regarding patients blood pressure and medications, due to hx of hypertensive urgency after prior hip fx     Vital Signs Last 24 Hrs  T(C): 36.7 (24 Feb 2023 04:31), Max: 37.1 (23 Feb 2023 14:45)  T(F): 98.1 (24 Feb 2023 04:31), Max: 98.7 (23 Feb 2023 14:45)  HR: 83 (24 Feb 2023 05:38) (60 - 83)  BP: 150/83 (24 Feb 2023 05:38) (120/74 - 150/83)  BP(mean): 105 (24 Feb 2023 05:38) (75 - 105)  RR: 18 (24 Feb 2023 04:31) (16 - 18)  SpO2: 96% (24 Feb 2023 04:31) (92% - 100%)    Parameters below as of 24 Feb 2023 04:31  Patient On (Oxygen Delivery Method): room air      exam:  Gen: NAD  LLE:   skin intact  compartments soft compressible  + TA/EHL/FHL/GSC  DP + 
Post op Day [1 ]    Patient resting without complaints.  No chest pain, SOB, N/V.    T(C): 36.6 (02-25-23 @ 04:18), Max: 36.7 (02-24-23 @ 07:19)  HR: 73 (02-25-23 @ 04:18) (59 - 83)  BP: 139/76 (02-25-23 @ 04:18) (83/50 - 150/83)  RR: 18 (02-25-23 @ 04:18) (16 - 18)  SpO2: 94% (02-25-23 @ 04:18) (93% - 98%)  Wt(kg): --    Exam:  Sleeping but arousable, No Acute Distress  L hip dsg c/d/i with soft/compressible compartments  abd pillow in place  Calves Soft, Non-tender bilaterally  +PF/DF/EHL/FHL  SILT  +DP Pulse                          12.3   19.47 )-----------( 208      ( 24 Feb 2023 10:23 )             36.9    02-24    137  |  101  |  10  ----------------------------<  132<H>  3.7   |  23  |  0.75    Ca    8.5      24 Feb 2023 10:23    TPro  x   /  Alb  3.7  /  TBili  x   /  DBili  x   /  AST  x   /  ALT  x   /  AlkPhos  x   02-24      
Post op Day [2 ]    Patient resting without complaints.  No chest pain, SOB, N/V.    T(C): 36.7 (02-26-23 @ 04:36), Max: 37 (02-25-23 @ 16:05)  HR: 74 (02-26-23 @ 04:36) (62 - 94)  BP: 123/72 (02-26-23 @ 04:36) (106/68 - 146/-)  RR: 18 (02-26-23 @ 04:36) (18 - 18)  SpO2: 93% (02-26-23 @ 04:36) (93% - 97%)  Wt(kg): --    Exam:  Alert and Oriented, No Acute Distress  L hip dsg c/d/i with soft/compressible compartments  Abd pillow in place  Calves Soft, Non-tender bilaterally  +PF/DF/EHL/FHL  SILT  +DP Pulse                        10.0   12.60 )-----------( 191      ( 25 Feb 2023 06:30 )             30.2    02-25    135  |  100  |  18  ----------------------------<  137<H>  3.3<L>   |  23  |  0.94    Ca    8.6      25 Feb 2023 06:30

## 2023-02-28 NOTE — PROGRESS NOTE ADULT - PROVIDER SPECIALTY LIST ADULT
Cardiology
Cardiology
Internal Medicine
Internal Medicine
Orthopedics
Cardiology
Internal Medicine
Internal Medicine
Orthopedics
Orthopedics
Cardiology
Internal Medicine
Orthopedics
Orthopedics

## 2023-03-12 ENCOUNTER — RESULT REVIEW (OUTPATIENT)
Age: 83
End: 2023-03-12

## 2023-03-13 ENCOUNTER — RESULT REVIEW (OUTPATIENT)
Age: 83
End: 2023-03-13

## 2023-03-16 ENCOUNTER — APPOINTMENT (OUTPATIENT)
Age: 83
End: 2023-03-16
Payer: MEDICARE

## 2023-03-16 PROCEDURE — 73502 X-RAY EXAM HIP UNI 2-3 VIEWS: CPT

## 2023-03-16 PROCEDURE — 99024 POSTOP FOLLOW-UP VISIT: CPT

## 2023-04-05 ENCOUNTER — APPOINTMENT (OUTPATIENT)
Dept: ORTHOPEDIC SURGERY | Facility: CLINIC | Age: 83
End: 2023-04-05

## 2023-04-05 NOTE — HISTORY OF PRESENT ILLNESS
[Chills] : no chills [Constipation] : no constipation [Diarrhea] : no diarrhea [Dysuria] : no dysuria [Fever] : no fever [de-identified] : s/p left hip hemiarthroplasty, DOS: 2/24/23 [de-identified] : JOE Rondon is a 82 y.o. woman who presents to the office for post op s/p left hip hemiarthroplasty from 2/24/23. Since her surgery she states she is feeling [de-identified] : The patient is sitting comfortably in the exam room. \par LEFT hip\par -Skin is intact, no swelling, no ecchymosis\par -incisions clean and dry, no erythema\par -No tenderness to palpation over the greater trochanter\par -Painless range of motion hip\par -Flexion: , Internal rotation: , External rotation:\par -Sensation is intact L1-S1\par -5/5 EHL, FHL, TA, GS, quadriceps, hamstrings\par -Foot is warm and well-perfused, palpable dorsalis pedis pulse\par  [de-identified] : Xrays of the left hip and AP pelvis were taken in the office today, 4/5/23. [de-identified] : 82-year-old woman s/p left hip hemiarthroplasty, approximately 6 weeks out. [de-identified] : -Weightbearing as tolerated\par -Tylenol for pain\par -Physical therapy: ROM, Gait Training\par -Home exercises.  These were demonstrated in the office today.\par -Follow-up in with x-rays at that time\par -All the patient's questions and concerns were addressed during this visit\par

## 2023-04-20 ENCOUNTER — APPOINTMENT (OUTPATIENT)
Dept: ORTHOPEDIC SURGERY | Facility: CLINIC | Age: 83
End: 2023-04-20
Payer: MEDICARE

## 2023-04-20 PROCEDURE — 73502 X-RAY EXAM HIP UNI 2-3 VIEWS: CPT

## 2023-04-20 PROCEDURE — 99024 POSTOP FOLLOW-UP VISIT: CPT

## 2023-04-28 ENCOUNTER — EMERGENCY (EMERGENCY)
Facility: HOSPITAL | Age: 83
LOS: 1 days | Discharge: HOME | End: 2023-04-28
Attending: EMERGENCY MEDICINE
Payer: MEDICARE

## 2023-04-28 VITALS
OXYGEN SATURATION: 99 % | HEART RATE: 69 BPM | DIASTOLIC BLOOD PRESSURE: 89 MMHG | TEMPERATURE: 98 F | RESPIRATION RATE: 18 BRPM | SYSTOLIC BLOOD PRESSURE: 166 MMHG

## 2023-04-28 VITALS
SYSTOLIC BLOOD PRESSURE: 149 MMHG | DIASTOLIC BLOOD PRESSURE: 74 MMHG | WEIGHT: 125 LBS | RESPIRATION RATE: 18 BRPM | HEIGHT: 59 IN | OXYGEN SATURATION: 98 % | TEMPERATURE: 98 F | HEART RATE: 80 BPM

## 2023-04-28 DIAGNOSIS — S72.001A FRACTURE OF UNSPECIFIED PART OF NECK OF RIGHT FEMUR, INITIAL ENCOUNTER FOR CLOSED FRACTURE: Chronic | ICD-10-CM

## 2023-04-28 DIAGNOSIS — Z98.890 OTHER SPECIFIED POSTPROCEDURAL STATES: Chronic | ICD-10-CM

## 2023-04-28 DIAGNOSIS — E89.0 POSTPROCEDURAL HYPOTHYROIDISM: Chronic | ICD-10-CM

## 2023-04-28 PROCEDURE — 99283 EMERGENCY DEPT VISIT LOW MDM: CPT

## 2023-04-28 PROCEDURE — 99053 MED SERV 10PM-8AM 24 HR FAC: CPT

## 2023-04-28 PROCEDURE — 99282 EMERGENCY DEPT VISIT SF MDM: CPT

## 2023-04-28 NOTE — ED PROVIDER NOTE - CLINICAL SUMMARY MEDICAL DECISION MAKING FREE TEXT BOX
History of Present Illness: 82-year-old female with past medical history of hypertension, dyslipidemia, depression, right hip arthroplasty, presents to the emergency department for a rash which has been intermittently present for the last week.  She states that she started a new skin moisturizer.  On examination of the bottle, it is actually a facial cleanser.  She reports that the rash and itchiness have been present all over the body.  She denies any airway involvement including swelling of the lips, tongue, uvula.  She says the rash is not currently present because she has stopped using the skin cleanser.    Review of Systems: All other systems negative except as noted in the HPI    General: Alert, oriented to person, time, place  Psych: mood appropriate  Head: normocephalic; atraumatic  Eyes: conjunctivae clear bilaterally, sclerae anicteric  ENT: no nasal flaring, patent nares  Cardio: Regular rate and rhythm; normal heart sounds  Resp: Clear to auscultation bilaterally  GI: Abdomen soft, nontender, nondistended  : Exam deferred  Neuro: Strength 5/5 in upper and lower extremities; normal sensation  Skin: No rashes or bruising noted  MSK: Normal movement of extremities  Lymph/Vasc: No LE edema    Medical Decision Makin-year-old female here for concerning rash and itchiness.  Rash is not currently present and, based upon the history, sounds like a dermatologic reaction to the cleanser.  Patient is to be instructed to stop using the cleanser and to take Claritin or Zyrtec if symptoms return.

## 2023-04-28 NOTE — ED PROVIDER NOTE - ATTENDING CONTRIBUTION TO CARE
Patient is an 82-year-old female with a history of hypertension, dyslipidemia, depression here for evaluation of rash.  Patient is here with her daughter.  They are concerned about an allergic reaction.  She states that she used Cetaphil cleanser on her face and developed an itchy rash.  She discontinued using this and the rash improved.  She denies any associated shortness of breath, nausea, vomiting.  Patient states that it was very concerning to her so she came in to be evaluated.    VS noted  Gen. no acute distress, elderly  HEENT: EOMI, mmm  Lungs: CTAB/L no C/ W /R   CVS: RRR   Abd; Soft non tender, non distended   Ext: no edema  Skin: no rash  Neuro AAOx3 non focal clear speech  a/p: itchy rash/ allergic reaction, resolved. Patient reports she had an itchy rash after using Seroquel.  She already discontinued this product and no longer has a rash.  She denies any signs or symptoms of anaphylaxis.  Patient advised to take Zyrtec or Claritin.  She plans to not use this product again. She is stable for discharge.  Adrián Quinones MD

## 2023-04-28 NOTE — ED PROVIDER NOTE - PATIENT PORTAL LINK FT
You can access the FollowMyHealth Patient Portal offered by [facility] by registering at the following website: [email or web address]. By joining [employer]’s FollowMyHealth portal, you will also be able to view your health information using other applications (apps) compatible with our system.

## 2023-04-28 NOTE — ED PROVIDER NOTE - NSFOLLOWUPINSTRUCTIONS_ED_ALL_ED_FT
You were seen in the emergency department for a rash. We have evaluated you and determined that you do not require further hospital interventions.    Please follow up with your primary doctor to discuss the results of your stay in our department.    If you start to experience a rash or itchiness again, you may take Claritin or Zyrtec which are both over-the-counter antihistamine medications.    If you start to experience worsening symptoms such as nausea or vomiting, fevers or chills, chest pain or shortness of breath, please return to the emergency department for further evaluation.

## 2023-04-28 NOTE — ED ADULT NURSE NOTE - OBJECTIVE STATEMENT
81 y/o F AAO4 ambulatory presents to the ED with diffused spotted rash all over her body, 81 y/o F AAO4 ambulatory presents to the ED with diffused spotted rash all over her body, Pt states she used lotion and the rash appeared but then went away when she got called back from triage Pt denies CP, SOB, vision changes, n/v/d, fevers chills, abdominal pain.

## 2023-04-29 ENCOUNTER — APPOINTMENT (OUTPATIENT)
Dept: ULTRASOUND IMAGING | Facility: CLINIC | Age: 83
End: 2023-04-29
Payer: MEDICARE

## 2023-04-29 ENCOUNTER — OUTPATIENT (OUTPATIENT)
Dept: OUTPATIENT SERVICES | Facility: HOSPITAL | Age: 83
LOS: 1 days | End: 2023-04-29
Payer: MEDICARE

## 2023-04-29 ENCOUNTER — APPOINTMENT (OUTPATIENT)
Dept: MAMMOGRAPHY | Facility: CLINIC | Age: 83
End: 2023-04-29
Payer: MEDICARE

## 2023-04-29 DIAGNOSIS — S72.001A FRACTURE OF UNSPECIFIED PART OF NECK OF RIGHT FEMUR, INITIAL ENCOUNTER FOR CLOSED FRACTURE: Chronic | ICD-10-CM

## 2023-04-29 DIAGNOSIS — E89.0 POSTPROCEDURAL HYPOTHYROIDISM: Chronic | ICD-10-CM

## 2023-04-29 DIAGNOSIS — Z00.8 ENCOUNTER FOR OTHER GENERAL EXAMINATION: ICD-10-CM

## 2023-04-29 DIAGNOSIS — Z98.890 OTHER SPECIFIED POSTPROCEDURAL STATES: Chronic | ICD-10-CM

## 2023-04-29 PROCEDURE — 77067 SCR MAMMO BI INCL CAD: CPT | Mod: 26

## 2023-04-29 PROCEDURE — 77063 BREAST TOMOSYNTHESIS BI: CPT | Mod: 26

## 2023-04-29 PROCEDURE — 77063 BREAST TOMOSYNTHESIS BI: CPT

## 2023-04-29 PROCEDURE — 76641 ULTRASOUND BREAST COMPLETE: CPT

## 2023-04-29 PROCEDURE — 76641 ULTRASOUND BREAST COMPLETE: CPT | Mod: 26,50

## 2023-04-29 PROCEDURE — 77067 SCR MAMMO BI INCL CAD: CPT

## 2023-05-02 NOTE — HISTORY OF PRESENT ILLNESS
[Chills] : no chills [Constipation] : no constipation [Diarrhea] : no diarrhea [Dysuria] : no dysuria [Fever] : no fever [de-identified] : s/p left hip hemiarthroplasty, DOS: 2/24/23 [de-identified] : JOE Rondon is a 82 y.o. woman who presents to the office for post op s/p left hip hemiarthroplasty from 2/24/23. Since her surgery she states she is feeling better. She had participated in PT at [facility]. She is starting OT at home tomorrow and PT on Tuesday 3/21/23. She is ambulating with a walker. She is taking Tylenol for the pain with relief.  [de-identified] : The patient is sitting comfortably in the exam room. \par LEFT hip\par -Skin is intact, no swelling, no ecchymosis\par -incision is well-healed, no erythema, no signs of infection\par -No tenderness to palpation over the greater trochanter\par -Painless range of motion hip\par -Flexion: 120, Internal rotation: 10, External rotation: 45\par -Sensation is intact L1-S1\par -5/5 EHL, FHL, TA, GS, quadriceps, hamstrings\par -Foot is warm and well-perfused, palpable dorsalis pedis pulse\par  [de-identified] : Xrays of the left hip and AP pelvis were taken in the office today, 3/16/23.  AP and lateral of the hip.  AP pelvis.  X-rays show good overall alignment of the hip.  No subsidence of the femoral component.  The femoral head prosthesis is well-positioned and acetabulum. [de-identified] : 82-year-old woman s/p left hip hemiarthroplasty, approximately 3 weeks out. [de-identified] : - Weightbearing as tolerated\par -Tylenol for pain\par -Physical therapy: ROM, Gait Training\par -Home exercises.  These were demonstrated in the office today.\par -Follow-up in 2 months with x-rays at that time\par -All the patient's questions and concerns were addressed during this visit\par

## 2023-05-09 NOTE — ED PROVIDER NOTE - WR ORDER NAME 4
Xray Hip 2-3 Views, Left Simponi Counseling:  I discussed with the patient the risks of golimumab including but not limited to myelosuppression, immunosuppression, autoimmune hepatitis, demyelinating diseases, lymphoma, and serious infections.  The patient understands that monitoring is required including a PPD at baseline and must alert us or the primary physician if symptoms of infection or other concerning signs are noted.

## 2023-05-24 ENCOUNTER — APPOINTMENT (OUTPATIENT)
Dept: ORTHOPEDIC SURGERY | Facility: CLINIC | Age: 83
End: 2023-05-24
Payer: MEDICARE

## 2023-05-25 ENCOUNTER — APPOINTMENT (OUTPATIENT)
Dept: ORTHOPEDIC SURGERY | Facility: CLINIC | Age: 83
End: 2023-05-25
Payer: MEDICARE

## 2023-05-25 DIAGNOSIS — Z96.642 PRESENCE OF LEFT ARTIFICIAL HIP JOINT: ICD-10-CM

## 2023-05-25 PROCEDURE — 73502 X-RAY EXAM HIP UNI 2-3 VIEWS: CPT

## 2023-05-25 PROCEDURE — 99213 OFFICE O/P EST LOW 20 MIN: CPT | Mod: 24

## 2023-05-25 NOTE — HISTORY OF PRESENT ILLNESS
[Chills] : no chills [Constipation] : no constipation [Diarrhea] : no diarrhea [Dysuria] : no dysuria [Fever] : no fever [de-identified] : s/p left hip hemiarthroplasty, DOS: 2/24/23 [de-identified] : JOE Rondon is a 82 y.o. woman who presents to the office for post op s/p left hip hemiarthroplasty from 2/24/23. Since her last visit she states she is feeling better. She is participating in PT 2-3 times a week. She occasionally takes Tylenol for the pain with relief. She states intermittent mild left groin pain. She is ambulating with a rolling walker.  [de-identified] : The patient is sitting comfortably in the exam room. \par LEFT hip\par -Skin is intact, no swelling, no ecchymosis\par -incision is well-healed, no erythema\par -No tenderness to palpation over the greater trochanter\par -Painless range of motion hip\par -Flexion: , Internal rotation: , External rotation:\par -Sensation is intact L1-S1\par -5/5 EHL, FHL, TA, GS, quadriceps, hamstrings\par -Foot is warm and well-perfused, palpable dorsalis pedis pulse\par  [de-identified] : Xrays of the left hip and AP pelvis were taken in the office today, 5/24/23. [de-identified] : 82-year-old woman s/p left hip hemiarthroplasty, approximately 3 months out. [de-identified] : -Weightbearing as tolerated\par -Tylenol for pain\par -Physical therapy: ROM, Gait Training\par -Follow-up in 3 months with x-rays of the pelvis and left hip at that time\par -All the patient's questions and concerns were addressed during this visit\par

## 2023-07-06 ENCOUNTER — APPOINTMENT (OUTPATIENT)
Dept: ORTHOPEDIC SURGERY | Facility: CLINIC | Age: 83
End: 2023-07-06
Payer: MEDICARE

## 2023-07-06 PROCEDURE — 73502 X-RAY EXAM HIP UNI 2-3 VIEWS: CPT

## 2023-07-06 PROCEDURE — 99213 OFFICE O/P EST LOW 20 MIN: CPT

## 2023-07-06 NOTE — DISCUSSION/SUMMARY
[de-identified] : 82-year-old woman s/p left hip hemiarthroplasty, approximately 4.5 months out.\par -Weightbearing as tolerated\par -Tylenol for pain\par -Physical therapy: ROM, Gait Training\par -Follow-up in 4 months with x-rays of the AP pelvis and left hip at that time\par -All the patient's questions and concerns were addressed during this visit\par

## 2023-07-06 NOTE — HISTORY OF PRESENT ILLNESS
[de-identified] : JOE Rondon is a 82 y.o. woman who presents to the office for post op s/p left hip hemiarthroplasty from 2/24/23. Since her last visit she states she is feeling a little worse. She notes that she fell in the bathroom on 7/2/23 onto her right hip. She states that she feels like she isn't able to walk straight without her rolling walker. She denies pain in the left hip.

## 2023-07-06 NOTE — PHYSICAL EXAM
[de-identified] : The patient is sitting comfortably in the exam room. \par LEFT hip\par -Skin is intact, no swelling, no ecchymosis\par -incision is well-healed, no erythema\par -No tenderness to palpation over the greater trochanter\par -Painless range of motion hip\par -Flexion: , Internal rotation: , External rotation:\par -Sensation is intact L1-S1\par -5/5 EHL, FHL, TA, GS, quadriceps, hamstrings\par -Foot is warm and well-perfused, palpable dorsalis pedis pulse\par  [de-identified] : Xrays of the left hip and AP pelvis were taken in the office today, 7/6/23.

## 2023-07-10 ENCOUNTER — NON-APPOINTMENT (OUTPATIENT)
Age: 83
End: 2023-07-10

## 2023-07-12 RX ORDER — DENOSUMAB 60 MG/ML
60 INJECTION SUBCUTANEOUS
Qty: 1 | Refills: 0 | Status: ACTIVE | COMMUNITY
Start: 2022-11-10

## 2023-07-30 NOTE — ED ADULT NURSE NOTE - PMH
pt is a 5 y/o male with  c/o fever  and nausea  x 2 drays w/  fever  and  nausea .  pt  .  well appearing ,  crying ,  w/ + tears  noted.  no active  vomiting  in the ED. pt alert oriented x 3 with no signs of any distress nor discomfort noted. airway  patent no  signs opf any distress. Hypertension

## 2023-08-04 NOTE — PHYSICAL THERAPY INITIAL EVALUATION ADULT - TRANSFER SKILLS, REHAB EVAL
Encounter Date: 8/4/2023       History     Chief Complaint   Patient presents with    Fall     Pt fell at work on the 24th of July. Pt states she struck her head. Pt wants to be checked out.     71-year-old female with history of hypertension diabetes presents with headache after head injury.  Ten days ago she fell at work.  She states her jacket got caught on the store room door and pulled her back.  She landed on her left side and struck her head.  There was no loss of consciousness.  She is had a persistent headache since.  Headache seems to be more frontal.  She has soreness to her neck.  She has soreness to her left posterior ribs when she gets up in the morning.  There is no spinal back pain.  She denies any change in vision.  She is not confused.  There has been no vomiting.  She denies injury to her arms or legs.  She is able to walk normally.  She is been taking Tylenol for pain.  She surprised that the headache has not gotten better.      Review of patient's allergies indicates:   Allergen Reactions    Lisinopril Swelling     angioedema     Past Medical History:   Diagnosis Date    Diabetes mellitus, type 2     with neuropathy    Hypertension     Hypothyroidism      Past Surgical History:   Procedure Laterality Date    BREAST CYST EXCISION      CHOLECYSTECTOMY      HYSTERECTOMY      karen - not for cancer     Family History   Problem Relation Age of Onset    Cancer Mother         renal and colon    Stroke Mother     Heart disease Father     Diabetes Sister     Hypertension Sister     Hypertension Sister     Hyperlipidemia Sister     Hypertension Sister     Melanoma Neg Hx      Social History     Tobacco Use    Smoking status: Never     Passive exposure: Never    Smokeless tobacco: Never   Substance Use Topics    Alcohol use: No    Drug use: Never     Review of Systems    Physical Exam     Initial Vitals [08/04/23 0717]   BP Pulse Resp Temp SpO2   (!) 195/95 70 18 98.2 °F (36.8 °C) 98 %      MAP       --          Physical Exam    Constitutional: She appears well-developed and well-nourished. No distress.   HENT:   Small scalp hematoma to the occiput.  No crepitance.   Eyes: Conjunctivae are normal. Pupils are equal, round, and reactive to light.   Neck: Neck supple. No JVD present.   Diffuse mild tenderness to the posterior cervical spine.   Normal range of motion.  Cardiovascular:  Normal rate, regular rhythm and normal heart sounds.           Pulmonary/Chest: Breath sounds normal. No respiratory distress. She has no wheezes. She has no rales. She exhibits tenderness (Tenderness over the left posterior ribs.  No crepitance.).   Abdominal: Abdomen is soft. Bowel sounds are normal. She exhibits no distension. There is no abdominal tenderness. There is no guarding.   Musculoskeletal:      Cervical back: Normal range of motion and neck supple.      Comments: Full range of motion to upper and lower extremity joints.  No tenderness.  No tenderness along the thoracic or lumbar spine.     Neurological: She is alert. She has normal strength. No cranial nerve deficit or sensory deficit. GCS score is 15. GCS eye subscore is 4. GCS verbal subscore is 5. GCS motor subscore is 6.   Psychiatric: She has a normal mood and affect.         ED Course   Procedures  Labs Reviewed - No data to display       Imaging Results              CT Head Without Contrast (Final result)  Result time 08/04/23 08:54:33      Final result by Poli Fisher MD (08/04/23 08:54:33)                   Impression:      No acute intracranial hemorrhage/injury.      Electronically signed by: Poli Fisher  Date:    08/04/2023  Time:    08:54               Narrative:    EXAMINATION:  CT HEAD WITHOUT CONTRAST    CLINICAL HISTORY:  Head trauma, minor (Age >= 65y);    TECHNIQUE:  Low dose axial images were obtained through the head.  Coronal and sagittal reformations were also performed. Contrast was not administered.    COMPARISON:  06/04/2023    FINDINGS:  No  evidence of hydrocephalus, mass effect, intracranial hemorrhage or acute territorial infarct.  The brain parenchyma maintains normal attenuation.    The calvarium is intact. The visualized sinuses and mastoid air cells are clear.  Radiographic proptosis again identified                                       CT Cervical Spine Without Contrast (Final result)  Result time 08/04/23 10:03:50      Final result by Deuce Paulson MD (08/04/23 10:03:50)                   Impression:      No acute findings.    Cervical spondylosis, as detailed above.    Electronically signed by resident: Ingrid Balbuena  Date:    08/04/2023  Time:    09:28    Electronically signed by: Deuce Paulson MD  Date:    08/04/2023  Time:    10:03               Narrative:    EXAMINATION:  CT CERVICAL SPINE WITHOUT CONTRAST    CLINICAL HISTORY:  Neck trauma (Age >= 65y);    TECHNIQUE:  Low dose axial images, sagittal and coronal reformations were performed though the cervical spine.  Contrast was not administered.    COMPARISON:  No relevant priors.    FINDINGS:  Alignment: Mild reversal of normal cervical lordosis.    Vertebrae: Vertebral body heights are maintained.  Multilevel degenerative endplate changes with associated vacuum phenomenon.  No acute fractures.    Discs: Multilevel disc space height loss from C3-T1.  Calcified posterior osteophytes at C3-C4, C4-C5, and C7-T1.    C1-2: Dens is intact.  Pre-dens space is maintained.    Skull base and craniocervical junction: Normal.    Degenerative findings: There is multilevel degenerative disc disease, noting moderate disc height loss with sub endplate sclerosis and cystic change, most prominent from C3-4 through C6-7.  There is mild facet joint arthropathy.  There are posterior disc osteophyte complexes and uncovertebral spurring.  These findings contribute to moderate/severe spinal canal stenosis at C3-4 and C4-5 and moderate neural foraminal narrowing at C5-6 and C6-7.  Further evaluation could be  performed with MRI, if indicated.    Paraspinal muscles & soft tissues: Unremarkable.                                       X-Ray Chest PA And Lateral (Final result)  Result time 08/04/23 08:43:18      Final result by Cornell Laura MD (08/04/23 08:43:18)                   Impression:      No acute cardiopulmonary disease      Electronically signed by: Cornell Laura MD  Date:    08/04/2023  Time:    08:43               Narrative:    EXAMINATION:  XR CHEST PA AND LATERAL    CLINICAL HISTORY:  Pain chest (786.50);    TECHNIQUE:  PA and lateral views of the chest were performed.    COMPARISON:  03/14/2011    FINDINGS:  The heart size is normal.  Mediastinum shows aortic atherosclerosis.  Lungs are clear.  No pleural fluid detected.  Skeletal structures show degenerative spine changes.                                    X-Rays:   Independently Interpreted Readings:   Chest X-Ray: Normal heart size.  No infiltrates.  No acute abnormalities. No rib fractures seen.     Medications   acetaminophen tablet 650 mg (650 mg Oral Given 8/4/23 0800)     Medical Decision Making:   Initial Assessment:   71-year-old with persistent headache after trauma.  Will do a head CT.  Will evaluate for subdural, skull fracture, traumatic subarachnoid.  Fortunately she has a normal GCS and nonfocal neuro exam.  She does have some vague spinal tenderness to the cervical spine.  Will image with CT scan.  No neurologic deficit.  She also has some tenderness to her left posterior ribs.  Her lungs are clear there is no crepitance.  Will do a chest x-ray to evaluate for rib fracture and pneumothorax.  No other signs of trauma.  ED Management:  Chest x-ray head CT and cervical spine CT shows no acute abnormalities.  Will discharge home in stable condition.                          Clinical Impression:   Final diagnoses:  [S09.90XA] Injury of head, initial encounter (Primary)  [S20.212A] Rib contusion, left, initial encounter        ED  Disposition Condition    Discharge Stable          ED Prescriptions    None       Follow-up Information       Follow up With Specialties Details Why Contact Info    Edgar Rhodes MD Internal Medicine   [address], [facility] [address]  [facility] [address] [phone]      [facility] Emergency Medicine  If symptoms worsen [address]  [facility] [address] [phone]             Jonny Lucia MD  08/04/23 1019     RW/Rollator/needs device

## 2023-08-16 NOTE — HISTORY OF PRESENT ILLNESS
[Chills] : no chills [Constipation] : no constipation [Diarrhea] : no diarrhea [Dysuria] : no dysuria [Fever] : no fever [de-identified] : s/p left hip hemiarthroplasty, DOS: 2/24/23 [de-identified] : JOE Rondon is a 82 y.o. woman who presents to the office for post op s/p left hip hemiarthroplasty from 2/24/23. Since her last visit she states she is feeling the same. She notes pain at the left hip that radiates to her abdomen and left lower back. She has GI issues and is not sure if that is contributing to her pain. She rarely takes Tylenol for pain but did take it yesterday with some relief. She is ambulating with a rolling walker. She participates in PT 4 times a week.  [de-identified] : The patient is sitting comfortably in the exam room.  LEFT hip -Skin is intact, no swelling, no ecchymosis -incisions clean and dry, no erythema -No tenderness to palpation over the greater trochanter -Painless range of motion hip -Sensation is intact L1-S1 -5/5 EHL, FHL, TA, GS, quadriceps, hamstrings -Foot is warm and well-perfused, palpable dorsalis pedis pulse  [de-identified] : Xrays of the left hip and AP pelvis were taken in the office today, 4/20/23.  AP and lateral of the hip and AP pelvis.  Patient is status post percutaneous fixation of the right femoral neck.  The right femoral neck looks excellent.  The left hip looks great.  The femoral component is in good position.  No subsidence of the femoral component.  The prosthetic femoral head is concentric in the acetabulum [de-identified] : 82-year-old woman s/p left hip hemiarthroplasty, approximately 8 weeks out. [de-identified] : -X-ray and physical exam findings were discussed with the patient -Weightbearing as tolerated -Tylenol for pain -Physical therapy: ROM, Gait Training -Home exercises.  These were demonstrated in the office today. -Follow-up in with x-rays at that time -All the patient's questions and concerns were addressed during this visit

## 2023-08-23 ENCOUNTER — APPOINTMENT (OUTPATIENT)
Dept: MRI IMAGING | Facility: IMAGING CENTER | Age: 83
End: 2023-08-23

## 2023-08-23 ENCOUNTER — APPOINTMENT (OUTPATIENT)
Dept: RADIOLOGY | Facility: IMAGING CENTER | Age: 83
End: 2023-08-23

## 2023-09-09 NOTE — PROGRESS NOTE ADULT - ATTENDING COMMENTS
For any question, call:  Cell # [phone]  Pager # [phone]  Callback # [phone]    Office # for Apt is [phone]
98.2
agree with above NP note.  cv stable   sbp controlled  cont home meds  remains off aldactone
agree with above NP note.  cv stable   sbp controlled  cont home meds  remains off aldactone
For any question, call:  Cell # [phone]  Pager # [phone]  Callback # [phone]

## 2023-10-09 ENCOUNTER — INPATIENT (INPATIENT)
Facility: HOSPITAL | Age: 83
LOS: 2 days | Discharge: HOME | End: 2023-10-12
Attending: STUDENT IN AN ORGANIZED HEALTH CARE EDUCATION/TRAINING PROGRAM | Admitting: STUDENT IN AN ORGANIZED HEALTH CARE EDUCATION/TRAINING PROGRAM
Payer: MEDICARE

## 2023-10-09 VITALS
WEIGHT: 123.9 LBS | DIASTOLIC BLOOD PRESSURE: 122 MMHG | SYSTOLIC BLOOD PRESSURE: 161 MMHG | TEMPERATURE: 99 F | OXYGEN SATURATION: 99 % | RESPIRATION RATE: 16 BRPM | HEART RATE: 59 BPM | HEIGHT: 59 IN

## 2023-10-09 DIAGNOSIS — S72.001A FRACTURE OF UNSPECIFIED PART OF NECK OF RIGHT FEMUR, INITIAL ENCOUNTER FOR CLOSED FRACTURE: Chronic | ICD-10-CM

## 2023-10-09 DIAGNOSIS — E89.0 POSTPROCEDURAL HYPOTHYROIDISM: Chronic | ICD-10-CM

## 2023-10-09 DIAGNOSIS — Z98.890 OTHER SPECIFIED POSTPROCEDURAL STATES: Chronic | ICD-10-CM

## 2023-10-09 LAB
ALBUMIN SERPL ELPH-MCNC: 3.2 G/DL — LOW (ref 3.3–5)
ALP SERPL-CCNC: 85 U/L — SIGNIFICANT CHANGE UP (ref 40–120)
ALT FLD-CCNC: 26 U/L — SIGNIFICANT CHANGE UP (ref 12–78)
ANION GAP SERPL CALC-SCNC: 7 MMOL/L — SIGNIFICANT CHANGE UP (ref 5–17)
APTT BLD: 30.8 SEC — SIGNIFICANT CHANGE UP (ref 24.5–35.6)
AST SERPL-CCNC: 25 U/L — SIGNIFICANT CHANGE UP (ref 15–37)
BASOPHILS # BLD AUTO: 0.07 K/UL — SIGNIFICANT CHANGE UP (ref 0–0.2)
BASOPHILS NFR BLD AUTO: 0.8 % — SIGNIFICANT CHANGE UP (ref 0–2)
BILIRUB SERPL-MCNC: 0.2 MG/DL — SIGNIFICANT CHANGE UP (ref 0.2–1.2)
BUN SERPL-MCNC: 20 MG/DL — SIGNIFICANT CHANGE UP (ref 7–23)
CALCIUM SERPL-MCNC: 9 MG/DL — SIGNIFICANT CHANGE UP (ref 8.5–10.1)
CHLORIDE SERPL-SCNC: 103 MMOL/L — SIGNIFICANT CHANGE UP (ref 96–108)
CO2 SERPL-SCNC: 24 MMOL/L — SIGNIFICANT CHANGE UP (ref 22–31)
CREAT SERPL-MCNC: 0.79 MG/DL — SIGNIFICANT CHANGE UP (ref 0.5–1.3)
EGFR: 75 ML/MIN/1.73M2 — SIGNIFICANT CHANGE UP
EOSINOPHIL # BLD AUTO: 0.11 K/UL — SIGNIFICANT CHANGE UP (ref 0–0.5)
EOSINOPHIL NFR BLD AUTO: 1.3 % — SIGNIFICANT CHANGE UP (ref 0–6)
GLUCOSE SERPL-MCNC: 110 MG/DL — HIGH (ref 70–99)
HCT VFR BLD CALC: 40 % — SIGNIFICANT CHANGE UP (ref 34.5–45)
HGB BLD-MCNC: 13.5 G/DL — SIGNIFICANT CHANGE UP (ref 11.5–15.5)
IMM GRANULOCYTES NFR BLD AUTO: 1 % — HIGH (ref 0–0.9)
INR BLD: 0.9 RATIO — SIGNIFICANT CHANGE UP (ref 0.85–1.18)
LYMPHOCYTES # BLD AUTO: 1.96 K/UL — SIGNIFICANT CHANGE UP (ref 1–3.3)
LYMPHOCYTES # BLD AUTO: 22.8 % — SIGNIFICANT CHANGE UP (ref 13–44)
MCHC RBC-ENTMCNC: 30.8 PG — SIGNIFICANT CHANGE UP (ref 27–34)
MCHC RBC-ENTMCNC: 33.8 G/DL — SIGNIFICANT CHANGE UP (ref 32–36)
MCV RBC AUTO: 91.1 FL — SIGNIFICANT CHANGE UP (ref 80–100)
MONOCYTES # BLD AUTO: 0.81 K/UL — SIGNIFICANT CHANGE UP (ref 0–0.9)
MONOCYTES NFR BLD AUTO: 9.4 % — SIGNIFICANT CHANGE UP (ref 2–14)
NEUTROPHILS # BLD AUTO: 5.55 K/UL — SIGNIFICANT CHANGE UP (ref 1.8–7.4)
NEUTROPHILS NFR BLD AUTO: 64.7 % — SIGNIFICANT CHANGE UP (ref 43–77)
NRBC # BLD: 0 /100 WBCS — SIGNIFICANT CHANGE UP (ref 0–0)
PLATELET # BLD AUTO: 260 K/UL — SIGNIFICANT CHANGE UP (ref 150–400)
POTASSIUM SERPL-MCNC: 3.9 MMOL/L — SIGNIFICANT CHANGE UP (ref 3.5–5.3)
POTASSIUM SERPL-SCNC: 3.9 MMOL/L — SIGNIFICANT CHANGE UP (ref 3.5–5.3)
PROT SERPL-MCNC: 7.3 GM/DL — SIGNIFICANT CHANGE UP (ref 6–8.3)
PROTHROM AB SERPL-ACNC: 10.8 SEC — SIGNIFICANT CHANGE UP (ref 9.5–13)
RBC # BLD: 4.39 M/UL — SIGNIFICANT CHANGE UP (ref 3.8–5.2)
RBC # FLD: 13.4 % — SIGNIFICANT CHANGE UP (ref 10.3–14.5)
SODIUM SERPL-SCNC: 134 MMOL/L — LOW (ref 135–145)
TROPONIN I, HIGH SENSITIVITY RESULT: 5.5 NG/L — SIGNIFICANT CHANGE UP
WBC # BLD: 8.59 K/UL — SIGNIFICANT CHANGE UP (ref 3.8–10.5)
WBC # FLD AUTO: 8.59 K/UL — SIGNIFICANT CHANGE UP (ref 3.8–10.5)

## 2023-10-09 PROCEDURE — 99053 MED SERV 10PM-8AM 24 HR FAC: CPT

## 2023-10-09 PROCEDURE — 99291 CRITICAL CARE FIRST HOUR: CPT

## 2023-10-09 PROCEDURE — 93010 ELECTROCARDIOGRAM REPORT: CPT

## 2023-10-09 PROCEDURE — 70498 CT ANGIOGRAPHY NECK: CPT | Mod: 26,MA

## 2023-10-09 PROCEDURE — 70496 CT ANGIOGRAPHY HEAD: CPT | Mod: 26,MA

## 2023-10-09 PROCEDURE — 0042T: CPT | Mod: MA

## 2023-10-09 PROCEDURE — 70450 CT HEAD/BRAIN W/O DYE: CPT | Mod: 26,MA,XU

## 2023-10-09 RX ORDER — ACETAMINOPHEN 500 MG
650 TABLET ORAL ONCE
Refills: 0 | Status: DISCONTINUED | OUTPATIENT
Start: 2023-10-09 | End: 2023-10-12

## 2023-10-09 RX ORDER — HYDRALAZINE HCL 50 MG
5 TABLET ORAL ONCE
Refills: 0 | Status: DISCONTINUED | OUTPATIENT
Start: 2023-10-09 | End: 2023-10-09

## 2023-10-09 NOTE — ED PROVIDER NOTE - PROGRESS NOTE DETAILS
pt. comfortable, stable, pressure improved without intervention, Pt. has good motion at L hip now but still decreased compared to R, paresthesia still present but CT/CTA negative for acute pathology  will admit to tele for further care

## 2023-10-09 NOTE — ED PROVIDER NOTE - OBJECTIVE STATEMENT
84 yo F with headache, L arm paresthesia, L leg weakness since 8:45 pm, normal at 8:30 pm.  Pt. denies inciting event other than headache starting first.  No trauma.  Pt. denies other associated symptoms.  code stroke called at triage.  ROS: negative for fever, cough, chest pain, shortness of breath, abd pain, nausea, vomiting, diarrhea, and rash--all other systems reviewed are negative.   PMH: negative; Meds: Denies; SH: Denies smoking/drinking/drug use 84 yo F with headache, L arm paresthesia, L leg weakness since 8:45 pm, normal at 8:30 pm.  Pt. denies inciting event other than headache starting first.  No trauma.  Pt. denies other associated symptoms.  code stroke called at triage.  ROS: negative for fever, cough, chest pain, shortness of breath, abd pain, nausea, vomiting, diarrhea, and rash--all other systems reviewed are negative.   PMH: diverticulitis hx, anemia (Fe def)Meds: Denies; SH: Denies smoking/drinking/drug use

## 2023-10-09 NOTE — ED PROVIDER NOTE - PHYSICAL EXAMINATION
Vitals: HTN at 161/122  Gen: AAOx3, NAD, sitting comfortably in stretcher, calm, non-toxic, responsive to questions   Head: ncat, perrla, eomi b/l, no photophobia   Neck: supple, no lymphadenopathy, no midline deviation  Heart: rrr, no m/r/g  Lungs: CTA b/l, no rales/ronchi/wheezes  Abd: soft, nontender, non-distended, no rebound or guarding  Ext: no clubbing/cyanosis/edema  Neuro: sensation and muscle strength intact b/l, +LLE weakness compared to R, decreased sensation in LUE compared to R Vitals: HTN at 161/122  Gen: AAOx3, NAD, sitting comfortably in stretcher, calm, non-toxic, responsive to questions   Head: ncat, perrla, eomi b/l, no photophobia   Neck: supple, no lymphadenopathy, no midline deviation  Heart: rrr, no m/r/g  Lungs: CTA b/l, no rales/ronchi/wheezes  Abd: soft, nontender, non-distended, no rebound or guarding  Ext: no clubbing/cyanosis/edema  Neuro: sensation and muscle strength intact b/l, LLE hip motion is limited by pain of L hip (pt. admits to arthritis), decreased sensation in LUE compared to R

## 2023-10-09 NOTE — ED ADULT TRIAGE NOTE - CHIEF COMPLAINT QUOTE
n/a
BIBA,  pt c/o L-leg stiffness, L-arm tingling since, head pain since 2045hrs.  pt took her carvedilol at 9pm,  pt also c/o epigastric pain x 20minutes. denies N/V/D,  pt c/o facial tingling in triage.  (PMH-HTN,)

## 2023-10-09 NOTE — ED PROVIDER NOTE - CLINICAL SUMMARY MEDICAL DECISION MAKING FREE TEXT BOX
84 yo F with LLE weakness, LUE paresthesia, headache, concerning for CVA, less likely acs, possible cervical muscle spasm or cervical radiculopathy--code stroke called   -cbc, cmp, coags, lactate, lipid profile, finger stick, trop, CTA head/neck stroke protocol, CXR screening, EKG, hydralazine for pressure, npo, monitor, sz precautions   -f/u results, reeval

## 2023-10-09 NOTE — ED PROVIDER NOTE - CARE PLAN
1 Principal Discharge DX:	Paresthesia  Secondary Diagnosis:	Lower extremity weakness   Principal Discharge DX:	Paresthesia  Secondary Diagnosis:	Lower extremity weakness  Secondary Diagnosis:	Hypertensive urgency

## 2023-10-10 DIAGNOSIS — G45.9 TRANSIENT CEREBRAL ISCHEMIC ATTACK, UNSPECIFIED: ICD-10-CM

## 2023-10-10 DIAGNOSIS — E78.5 HYPERLIPIDEMIA, UNSPECIFIED: ICD-10-CM

## 2023-10-10 DIAGNOSIS — M19.90 UNSPECIFIED OSTEOARTHRITIS, UNSPECIFIED SITE: ICD-10-CM

## 2023-10-10 DIAGNOSIS — N81.10 CYSTOCELE, UNSPECIFIED: ICD-10-CM

## 2023-10-10 DIAGNOSIS — E03.9 HYPOTHYROIDISM, UNSPECIFIED: ICD-10-CM

## 2023-10-10 DIAGNOSIS — R20.2 PARESTHESIA OF SKIN: ICD-10-CM

## 2023-10-10 DIAGNOSIS — Z87.11 PERSONAL HISTORY OF PEPTIC ULCER DISEASE: ICD-10-CM

## 2023-10-10 DIAGNOSIS — R29.6 REPEATED FALLS: ICD-10-CM

## 2023-10-10 DIAGNOSIS — K59.09 OTHER CONSTIPATION: ICD-10-CM

## 2023-10-10 DIAGNOSIS — I10 ESSENTIAL (PRIMARY) HYPERTENSION: ICD-10-CM

## 2023-10-10 LAB
A1C WITH ESTIMATED AVERAGE GLUCOSE RESULT: 5.6 % — SIGNIFICANT CHANGE UP (ref 4–5.6)
BLD GP AB SCN SERPL QL: SIGNIFICANT CHANGE UP
CHOLEST SERPL-MCNC: 177 MG/DL — SIGNIFICANT CHANGE UP
CHOLEST SERPL-MCNC: 182 MG/DL — SIGNIFICANT CHANGE UP
ESTIMATED AVERAGE GLUCOSE: 114 MG/DL — SIGNIFICANT CHANGE UP (ref 68–114)
HDLC SERPL-MCNC: 52 MG/DL — SIGNIFICANT CHANGE UP
HDLC SERPL-MCNC: 59 MG/DL — SIGNIFICANT CHANGE UP
LACTATE SERPL-SCNC: 0.8 MMOL/L — SIGNIFICANT CHANGE UP (ref 0.7–2)
LIPID PNL WITH DIRECT LDL SERPL: 106 MG/DL — HIGH
LIPID PNL WITH DIRECT LDL SERPL: 92 MG/DL — SIGNIFICANT CHANGE UP
NON HDL CHOLESTEROL: 123 MG/DL — SIGNIFICANT CHANGE UP
NON HDL CHOLESTEROL: 124 MG/DL — SIGNIFICANT CHANGE UP
TRIGL SERPL-MCNC: 193 MG/DL — HIGH
TRIGL SERPL-MCNC: 93 MG/DL — SIGNIFICANT CHANGE UP
TSH SERPL-MCNC: 4.24 UU/ML — HIGH (ref 0.36–3.74)

## 2023-10-10 PROCEDURE — 99222 1ST HOSP IP/OBS MODERATE 55: CPT

## 2023-10-10 PROCEDURE — 72141 MRI NECK SPINE W/O DYE: CPT | Mod: 26

## 2023-10-10 PROCEDURE — 70551 MRI BRAIN STEM W/O DYE: CPT | Mod: 26

## 2023-10-10 PROCEDURE — 71045 X-RAY EXAM CHEST 1 VIEW: CPT | Mod: 26

## 2023-10-10 RX ORDER — AMITRIPTYLINE HCL 25 MG
25 TABLET ORAL AT BEDTIME
Refills: 0 | Status: DISCONTINUED | OUTPATIENT
Start: 2023-10-10 | End: 2023-10-12

## 2023-10-10 RX ORDER — ALPRAZOLAM 0.25 MG
1 TABLET ORAL AT BEDTIME
Refills: 0 | Status: DISCONTINUED | OUTPATIENT
Start: 2023-10-10 | End: 2023-10-12

## 2023-10-10 RX ORDER — ATORVASTATIN CALCIUM 80 MG/1
40 TABLET, FILM COATED ORAL AT BEDTIME
Refills: 0 | Status: DISCONTINUED | OUTPATIENT
Start: 2023-10-10 | End: 2023-10-12

## 2023-10-10 RX ORDER — CARVEDILOL PHOSPHATE 80 MG/1
1 CAPSULE, EXTENDED RELEASE ORAL
Refills: 0 | DISCHARGE

## 2023-10-10 RX ORDER — LOSARTAN POTASSIUM 100 MG/1
50 TABLET, FILM COATED ORAL DAILY
Refills: 0 | Status: DISCONTINUED | OUTPATIENT
Start: 2023-10-10 | End: 2023-10-12

## 2023-10-10 RX ORDER — LEVOTHYROXINE SODIUM 125 MCG
1 TABLET ORAL
Refills: 0 | DISCHARGE

## 2023-10-10 RX ORDER — SENNA PLUS 8.6 MG/1
2 TABLET ORAL AT BEDTIME
Refills: 0 | Status: DISCONTINUED | OUTPATIENT
Start: 2023-10-10 | End: 2023-10-12

## 2023-10-10 RX ORDER — BUPROPION HYDROCHLORIDE 150 MG/1
100 TABLET, EXTENDED RELEASE ORAL
Refills: 0 | Status: DISCONTINUED | OUTPATIENT
Start: 2023-10-10 | End: 2023-10-12

## 2023-10-10 RX ORDER — DILTIAZEM HCL 120 MG
120 CAPSULE, EXT RELEASE 24 HR ORAL DAILY
Refills: 0 | Status: DISCONTINUED | OUTPATIENT
Start: 2023-10-10 | End: 2023-10-12

## 2023-10-10 RX ORDER — ASCORBIC ACID 60 MG
500 TABLET,CHEWABLE ORAL DAILY
Refills: 0 | Status: DISCONTINUED | OUTPATIENT
Start: 2023-10-10 | End: 2023-10-12

## 2023-10-10 RX ORDER — LANOLIN ALCOHOL/MO/W.PET/CERES
3 CREAM (GRAM) TOPICAL AT BEDTIME
Refills: 0 | Status: DISCONTINUED | OUTPATIENT
Start: 2023-10-10 | End: 2023-10-12

## 2023-10-10 RX ORDER — CARVEDILOL PHOSPHATE 80 MG/1
25 CAPSULE, EXTENDED RELEASE ORAL EVERY 12 HOURS
Refills: 0 | Status: DISCONTINUED | OUTPATIENT
Start: 2023-10-10 | End: 2023-10-12

## 2023-10-10 RX ORDER — HYDRALAZINE HCL 50 MG
50 TABLET ORAL
Refills: 0 | Status: DISCONTINUED | OUTPATIENT
Start: 2023-10-10 | End: 2023-10-12

## 2023-10-10 RX ORDER — ONDANSETRON 8 MG/1
4 TABLET, FILM COATED ORAL EVERY 8 HOURS
Refills: 0 | Status: DISCONTINUED | OUTPATIENT
Start: 2023-10-10 | End: 2023-10-12

## 2023-10-10 RX ORDER — ACETAMINOPHEN 500 MG
650 TABLET ORAL EVERY 6 HOURS
Refills: 0 | Status: DISCONTINUED | OUTPATIENT
Start: 2023-10-10 | End: 2023-10-12

## 2023-10-10 RX ORDER — LEVOTHYROXINE SODIUM 125 MCG
75 TABLET ORAL DAILY
Refills: 0 | Status: DISCONTINUED | OUTPATIENT
Start: 2023-10-10 | End: 2023-10-12

## 2023-10-10 RX ORDER — CLOPIDOGREL BISULFATE 75 MG/1
75 TABLET, FILM COATED ORAL DAILY
Refills: 0 | Status: DISCONTINUED | OUTPATIENT
Start: 2023-10-10 | End: 2023-10-11

## 2023-10-10 RX ORDER — PANTOPRAZOLE SODIUM 20 MG/1
40 TABLET, DELAYED RELEASE ORAL
Refills: 0 | Status: DISCONTINUED | OUTPATIENT
Start: 2023-10-10 | End: 2023-10-12

## 2023-10-10 RX ADMIN — CARVEDILOL PHOSPHATE 25 MILLIGRAM(S): 80 CAPSULE, EXTENDED RELEASE ORAL at 18:11

## 2023-10-10 RX ADMIN — Medication 50 MILLIGRAM(S): at 14:10

## 2023-10-10 NOTE — PHYSICAL THERAPY INITIAL EVALUATION ADULT - IMPAIRED TRANSFERS: SIT/STAND, REHAB EVAL
Pt in ER bed that cooper snot lower completely to lowest position - so pt needs assistance for  xfers/decreased strength

## 2023-10-10 NOTE — H&P ADULT - NSICDXPASTMEDICALHX_GEN_ALL_CORE_FT
PAST MEDICAL HISTORY:  Arteriosclerotic heart disease (ASHD)     COVID-19 vaccination declined     Fe deficiency anemia     Female bladder prolapse     GERD (gastroesophageal reflux disease)     H/O mitral valve prolapse     Hiatal hernia with GERD     Hypertension     Hypothyroid     Macular degeneration     Osteoarthritis     Tracheal nodule

## 2023-10-10 NOTE — H&P ADULT - NSHPLABSRESULTS_GEN_ALL_CORE
13.5   8.59  )-----------( 260      ( 09 Oct 2023 22:40 )             40.0   10-09    134<L>  |  103  |  20  ----------------------------<  110<H>  3.9   |  24  |  0.79    Ca    9.0      09 Oct 2023 22:40    TPro  7.3  /  Alb  3.2<L>  /  TBili  0.2  /  DBili  x   /  AST  25  /  ALT  26  /  AlkPhos  85  10-09    CT head 10/9/23  IMPRESSION:  No evidence of large territory infarct, intracranial hemorrhage, or mass effect.    CTA head/neck 10/9/23  IMPRESSION:  No proximal large vessel occlusion, dissection, or hemodynamically flow-limiting stenosis identified in the head and neck.    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    CT perfusion 10/9/23  IMPRESSION:  No convincing evidence of core infarct or ischemic penumbra.    Findings above were discussed directly by the ED radiologist on call, Kamaljit Lyn MD, with the emergency Department ordering physician, Kem Olivera M.D., at 11:10 PM on 10/9/2023.

## 2023-10-10 NOTE — ED ADULT NURSE NOTE - CHIEF COMPLAINT QUOTE
BIBA,  pt c/o L-leg stiffness, L-arm tingling since, head pain since 2045hrs.  pt took her carvedilol at 9pm,  pt also c/o epigastric pain x 20minutes. denies N/V/D,  pt c/o facial tingling in triage.  (PMH-HTN,)

## 2023-10-10 NOTE — PHYSICAL THERAPY INITIAL EVALUATION ADULT - ADL SKILLS, REHAB EVAL
Son does all cooking, cleaning, laundry and shopping. Pt does not take  showers/baths anymore du eto inability to perform tub transfers . She washes self c water / soap in bathroom ./needed assist

## 2023-10-10 NOTE — ED ADULT NURSE REASSESSMENT NOTE - NS ED NURSE REASSESS COMMENT FT1
Per Dr Gimenez, she spoke to patient and patient agrees to stay in hospital. Respirations equal and unlabored. No acute distress noted at this time. Remains on cardiac monitor at this time.

## 2023-10-10 NOTE — H&P ADULT - HISTORY OF PRESENT ILLNESS
Humaira Yost is an 83 year old female with PMHx of ***    In the ED, VSS except BP as elevated as 161/122. Code stroke called. NCHCT without evidence of large territory infarct, intracranial hemorrhage, or mass effect. NCHCT without acute intracranial findings. CTA head/neck without proximal large vessel occlusion but with 1 to 2 mm saccular aneurysms identified along the posterior aspects of the bilateral supraclinoid ICAs. CT perfusion without convincing evidence of core infarct or ischemic penumbra. Received hydralazine 5 mg IV. Humaira Yost is an 83 year old female with PMHx of hypothyroidism, HTN, HLD, hx of gastric ulcer, hx of bladder prolapse, mitral valve prolapse, chronic constipation, and recent PSHx of partial L. hip replacement (on 2/2023) who presented to the ED on 10/9/23 for left upper extremity paresthesias and headache.     Patient reports she was sitting down talking on the phone when she started having left upper extremity paresthesia. Associated headache at the top of the headache. She checked her blood pressure at home and it was 185/109. Typically SBP is in 150s. Of note, reports she used to take baby aspirin daily but then had a GI bleed secondary to bleeding ulcer which was cauterized and a tear in the duodenum that was fixed. Advised to not take aspirin again and that is why it is marked as an allergy in her chart. In addition, she has had recurrent falls recently prompting her PCP to order an MRI brain. Patient states she was given some kind of earpiece to wear for the MRI and had an instantaneous headache when she was about to go into the MRI machine and therefore, MRI had to be aborted.    She underwent partial left hip replacement in 2/2023. Since then, physical therapy has come to her house twice a week, 1 hour sessions. Ambulates with walker.    In the ED, VSS except BP as elevated as 161/122. EKG NSR. Code stroke called. NCHCT without evidence of large territory infarct, intracranial hemorrhage, or mass effect. NCHCT without acute intracranial findings. CTA head/neck without proximal large vessel occlusion but with 1 to 2 mm saccular aneurysms identified along the posterior aspects of the bilateral supraclinoid ICAs. CT perfusion without convincing evidence of core infarct or ischemic penumbra. Received hydralazine 5 mg IV.

## 2023-10-10 NOTE — PHYSICAL THERAPY INITIAL EVALUATION ADULT - ADDITIONAL COMMENTS
Pt lives in a  with 1 step to enter c no HR. Inside has stair lift to 2nd fl . Pt gets assistance for stair negotiation from son. Due to bladder prolapse pt does no have bladder control- uses pads/pull ups at all times.  Dtr expressed desire to sleep over with her mom and assist in ADLs. Pt indicated that she does not want her dtr to get stressed.

## 2023-10-10 NOTE — CONSULT NOTE ADULT - SUBJECTIVE AND OBJECTIVE BOX
Neurology consult    JOE [id number]     Patient is a 83y old  Female who presents with a chief complaint of TIA vs. CVA (10 Oct 2023 00:07)      HPI:  Joe Uriostegui is an 83 year old female with PMHx of hypothyroidism, HTN, HLD, hx of gastric ulcer, hx of bladder prolapse, mitral valve prolapse, chronic constipation, and recent PSHx of partial L. hip replacement (on 2/2023) who presented to the ED on 10/9/23 for left upper extremity paresthesias and headache.     Patient reports she was sitting down talking on the phone when she started having left upper extremity paresthesia. Associated headache at the top of the headache. She checked her blood pressure at home and it was 185/109. Typically SBP is in 150s. Of note, reports she used to take baby aspirin daily but then had a GI bleed secondary to bleeding ulcer which was cauterized and a tear in the duodenum that was fixed. Advised to not take aspirin again and that is why it is marked as an allergy in her chart. In addition, she has had recurrent falls recently prompting her PCP to order an MRI brain. Patient states she was given some kind of earpiece to wear for the MRI and had an instantaneous headache when she was about to go into the MRI machine and therefore, MRI had to be aborted.    She underwent partial left hip replacement in 2/2023. Since then, physical therapy has come to her house twice a week, 1 hour sessions. Ambulates with walker.    In the ED, VSS except BP as elevated as 161/122. EKG NSR. Code stroke called. NCHCT without evidence of large territory infarct, intracranial hemorrhage, or mass effect. NCHCT without acute intracranial findings. CTA head/neck without proximal large vessel occlusion but with 1 to 2 mm saccular aneurysms identified along the posterior aspects of the bilateral supraclinoid ICAs. CT perfusion without convincing evidence of core infarct or ischemic penumbra. Received hydralazine 5 mg IV. (10 Oct 2023 00:07)      MEDICATIONS    acetaminophen     Tablet .. 650 milliGRAM(s) Oral every 6 hours PRN  acetaminophen     Tablet .. 650 milliGRAM(s) Oral once  ALPRAZolam 1 milliGRAM(s) Oral at bedtime  aluminum hydroxide/magnesium hydroxide/simethicone Suspension 30 milliLiter(s) Oral every 4 hours PRN  amitriptyline 25 milliGRAM(s) Oral at bedtime  ascorbic acid 500 milliGRAM(s) Oral daily  atorvastatin 40 milliGRAM(s) Oral at bedtime  buPROPion SR (12-Hour) 100 milliGRAM(s) Oral two times a day  calcium carbonate 1250 mG  + Vitamin D (OsCal 500 + D) 1 Tablet(s) Oral daily  diltiazem    milliGRAM(s) Oral daily  hydrALAZINE 50 milliGRAM(s) Oral two times a day  levothyroxine 75 MICROGram(s) Oral daily  losartan 50 milliGRAM(s) Oral daily  melatonin 3 milliGRAM(s) Oral at bedtime PRN  multivitamin 1 Tablet(s) Oral daily  ondansetron Injectable 4 milliGRAM(s) IV Push every 8 hours PRN  pantoprazole    Tablet 40 milliGRAM(s) Oral before breakfast      PMH: Hypertension    GERD (gastroesophageal reflux disease)    Diverticulitis    Osteoarthritis    Macular degeneration    Hypothyroid    Stage 2 chronic kidney disease    Female bladder prolapse    H/O mitral valve prolapse    Hiatal hernia with GERD    Fe deficiency anemia    Tracheal nodule    Arteriosclerotic heart disease (ASHD)    Gastrointestinal bleed    COVID-19 vaccination declined         PSH: No significant past surgical history    H/O thyroidectomy    Closed right hip fracture    H/O ovarian cystectomy        Family history: No history of dementia, strokes, or seizures   FAMILY HISTORY:  FH: HTN (hypertension) (Father, Mother)        SOCIAL HISTORY:  No history of tobacco or alcohol use     Allergies    NSAIDs (Rash)  hydrocortisone (Unknown)  penicillin (Other)  amoxicillin (Other)    Intolerances        Height (cm): 149.9 (10-09 @ 22:22)  Weight (kg): 56.2 (10-09 @ 22:22)  BMI (kg/m2): 25 (10-09 @ 22:22)    Vital Signs Last 24 Hrs  T(C): 37.2 (10 Oct 2023 07:00), Max: 37.2 (09 Oct 2023 22:22)  T(F): 98.9 (10 Oct 2023 07:00), Max: 99 (09 Oct 2023 22:22)  HR: 70 (10 Oct 2023 12:57) (59 - 70)  BP: 170/76 (10 Oct 2023 12:57) (155/82 - 177/66)  BP(mean): --  RR: 16 (10 Oct 2023 12:57) (16 - 17)  SpO2: 99% (10 Oct 2023 12:57) (99% - 100%)    Parameters below as of 10 Oct 2023 12:57  Patient On (Oxygen Delivery Method): room air          On Neurological Examination:    Mental Status - Patient is alert, awake, oriented X3. fluent, names, no dysarthria no aphasia Follows commands well and able to answer questions appropriately. Mood and affect  normal    Cranial Nerves - PERRL, EOMI, VFF, V1-V3 intact, no gross facial asymmetry, tongue/uvula midline    Motor Exam -   5/5 exceptmild l >r triceps weakness    Sensory    Intact to light touch and pinprick bilaterally    Coord: FTN intact bilaterally     Gait -  deferred    Reflexes:          2+ throughout        3+ patellar, toes down                                         LABS:  CBC Full  -  ( 09 Oct 2023 22:40 )  WBC Count : 8.59 K/uL  RBC Count : 4.39 M/uL  Hemoglobin : 13.5 g/dL  Hematocrit : 40.0 %  Platelet Count - Automated : 260 K/uL  Mean Cell Volume : 91.1 fl  Mean Cell Hemoglobin : 30.8 pg  Mean Cell Hemoglobin Concentration : 33.8 g/dL  Auto Neutrophil # : 5.55 K/uL  Auto Lymphocyte # : 1.96 K/uL  Auto Monocyte # : 0.81 K/uL  Auto Eosinophil # : 0.11 K/uL  Auto Basophil # : 0.07 K/uL  Auto Neutrophil % : 64.7 %  Auto Lymphocyte % : 22.8 %  Auto Monocyte % : 9.4 %  Auto Eosinophil % : 1.3 %  Auto Basophil % : 0.8 %    Urinalysis Basic - ( 09 Oct 2023 22:40 )    Color: x / Appearance: x / SG: x / pH: x  Gluc: 110 mg/dL / Ketone: x  / Bili: x / Urobili: x   Blood: x / Protein: x / Nitrite: x   Leuk Esterase: x / RBC: x / WBC x   Sq Epi: x / Non Sq Epi: x / Bacteria: x      10-09    134<L>  |  103  |  20  ----------------------------<  110<H>  3.9   |  24  |  0.79    Ca    9.0      09 Oct 2023 22:40    TPro  7.3  /  Alb  3.2<L>  /  TBili  0.2  /  DBili  x   /  AST  25  /  ALT  26  /  AlkPhos  85  10-09    LIVER FUNCTIONS - ( 09 Oct 2023 22:40 )  Alb: 3.2 g/dL / Pro: 7.3 gm/dL / ALK PHOS: 85 U/L / ALT: 26 U/L / AST: 25 U/L / GGT: x           Hemoglobin A1C:   Lipid Panel 10-10 @ 08:05  Total Cholesterol, Serum 182  LDL --  Triglycerides 93  Lipid Panel 10-09 @ 22:40  Total Cholesterol, Serum 177  LDL --  Triglycerides 193      PT/INR - ( 09 Oct 2023 22:40 )   PT: 10.8 sec;   INR: 0.90 ratio         PTT - ( 09 Oct 2023 22:40 )  PTT:30.8 sec      RADIOLOGY        < from: MR Cervical Spine No Cont (10.10.23 @ 11:55) >  1. No evidence of abnormal cord signal.  2. Degenerative disc disease, most pronounced at C6-C7, including type I   Modic changes in the endplates. Severe multilevel facet arthrosis,   including a right C4-C5 facet effusion and periarticular marrow edema.  3. Multilevel cervical disc bulges, contributing to multilevel central   canal and neural foramen stenosis, described in detail above. No cervical   disc herniation.  4. Additional findings describedin detail above.    --- End of Report ---      < end of copied text >  < from: MR Head No Cont (10.10.23 @ 11:15) >    IMPRESSION: No acute intracranial hemorrhage or evidence of acute   ischemia.    --- End of Report ---    < end of copied text >  < from: CT Angio Neck Stroke Protocol w/ IV Cont (10.09.23 @ 22:56) >  IMPRESSION:    NONCONTRAST CT HEAD:  No evidence of large territory infarct, intracranial hemorrhage, or mass   effect.    CTA HEAD/NECK:  No proximal large vessel occlusion, dissection, or hemodynamically   flow-limiting stenosis identified in the head and neck.    1 to 2 mm saccular aneurysms identified along the posterior aspects of   the bilateral supraclinoid ICAs.    CT PERFUSION:  No convincing evidence of core infarct or ischemic penumbra.    Findings above were discussed directly by the ED radiologist on call,   Kamaljit Lyn MD, with the emergency Department ordering physician,   Kem Olivera M.D., at 11:10 PM on 10/9/2023.    --- End of Report ---      < end of copied text >  < from: CT Angio Neck Stroke Protocol w/ IV Cont (10.09.23 @ 22:56) >  IMPRESSION:    NONCONTRAST CT HEAD:  No evidence of large territory infarct, intracranial hemorrhage, or mass   effect.    CTA HEAD/NECK:  No proximal large vessel occlusion, dissection, or hemodynamically   flow-limiting stenosis identified in the head and neck.    1 to 2 mm saccular aneurysms identified along the posterior aspects of   the bilateral supraclinoid ICAs.    CT PERFUSION:  No convincing evidence of core infarct or ischemic penumbra.    Findings above were discussed directly by the ED radiologist on call,   Kamaljit Lyn MD, with the emergency Department ordering physician,   Kem Olivera M.D., at 11:10 PM on 10/9/2023.    --- End of Report ---      < end of copied text >

## 2023-10-10 NOTE — CONSULT NOTE ADULT - ASSESSMENT
83 year old female with PMHx of hypothyroidism, HTN, HLD, hx of gastric ulcer, hx of bladder prolapse, mitral valve prolapse, chronic constipation, and recent PSHx of partial L. hip replacement (on 2/2023) who presented to the ED on 10/9/23 for left upper extremity paresthesias and headache. Pe non-focal other than mild l> R triceps weakness brisk patellars  CTA no LVO, incidental saccular aneurysms. MRI brain negative. MRI Cpsine degenrative changes      Impression: symptoms, exam and imaging suggest symptoms may be from degenerative disease in the cervical spine than a TIA    Aspirin 81 for now  TTE  DVT prophylaxis, Neurochecks  outpt patient to cervical region  HbA1C and LDL.   PT/OT/Speech and swallow/safety eval.

## 2023-10-10 NOTE — PHYSICAL THERAPY INITIAL EVALUATION ADULT - LEVEL OF INDEPENDENCE: SIT/SUPINE, REHAB EVAL
Due to high ER bed- needs assistance  to bring BLE back in bed/moderate assist (50% patients effort)

## 2023-10-10 NOTE — H&P ADULT - NSHPPHYSICALEXAM_GEN_ALL_CORE
T(C): 37.2 (10-09-23 @ 22:22), Max: 37.2 (10-09-23 @ 22:22)  HR: 69 (10-09-23 @ 23:09) (59 - 69)  BP: 155/82 (10-09-23 @ 23:09) (155/82 - 161/122)  RR: 16 (10-09-23 @ 23:09) (16 - 16)  SpO2: 100% (10-09-23 @ 23:09) (99% - 100%)    CONSTITUTIONAL: Well groomed, no apparent distress  EYES: PERRLA and symmetric, EOMI  ENMT: Oral mucosa with moist membranes  RESP: No respiratory distress, no use of accessory muscles; CTA b/l  CV: RRR  GI: Soft, NT, ND  MSK: LLE hip motion limited by pain (due to recent L. partial hip replacement)  NEURO: CN II-XII intact; RUE and RLE muscle strength 4/5, LUE and LLE muscle strength 3/5, finger to nose WNL, sensation intact in upper and lower extremities b/l to light touch

## 2023-10-10 NOTE — PHYSICAL THERAPY INITIAL EVALUATION ADULT - PERTINENT HX OF CURRENT PROBLEM, REHAB EVAL
Pt  ws admitted with LUE paresthesiae last night - now significantly reduced but still has it.  Seen by Neuro- no neuro event- Neck DJD   MRI brain negative

## 2023-10-10 NOTE — PHYSICAL THERAPY INITIAL EVALUATION ADULT - GENERAL OBSERVATIONS, REHAB EVAL
Pt recd supine in ER c dtr Shae at bedside sittig on patient's rollator. +Primafit , + cardiac monitor .  Pt awake, alert, covered in multiple blankets + wearing headscarf 2* c/o feeling cold from AC vent above her head.  Pt wearing eye glasses for distance.  A x O x4, Reported that  due to B/l cataract, her eyes are blurry. Per dtr pt falls 1-2/wk since her L hip Surgery in February 2023 . Pt received Home PT/OT once a week.  Her last fall was 2 weeks ago in the night . Pt has L groin pain since L hip Sx.  She has recently been experiencing headache that starts from the top of head and radiates distally to L side of neck, LUE & left lower limb accompanied by paresthesiae.

## 2023-10-10 NOTE — ED ADULT NURSE REASSESSMENT NOTE - NS ED NURSE REASSESS COMMENT FT1
Pt received from [facility]. V/s WNL, pt not in any acute distress at his time. Pt is to be admitted. Plan of care on going.

## 2023-10-10 NOTE — H&P ADULT - ASSESSMENT
TIA vs. CVA  Complaints of LUE paresthesias with associated headache since 8:45 PM last night  NCHCT without acute intracranial finding  CTA H/N without large vessel occlusion but with 1 to 2 mm saccular aneurysms identified along the posterior aspects of the bilateral supraclinoid ICAs  CT perfusion without convincing evidence of core infarct or ischemic penumbra  Neuro checks q4  Allowing for permissive HTN up to 220/120 for first 24-48 hours  F/u MRI brain  Consult neurology - please call in AM  PT/OT consulted  Telemetry to monitor for arrhythmias      Chronic medical conditions: Humaira Yost is an 83 year old female with PMHx of hypothyroidism, HTN, HLD, hx of gastric ulcer, hx of bladder prolapse, mitral valve prolapse, chronic constipation, and recent PSHx of partial L. hip replacement (on 2/2023) who presented to the ED on 10/9/23 for left upper extremity paresthesias and headache and admitted for TIA vs. CVA..    TIA vs. CVA  Complaints of LUE paresthesias with associated headache since 8:45 PM last night  NIHSS 1 at time of presentation  as per ER physician documentation   NCHCT without acute intracranial finding  CTA H/N without large vessel occlusion but with 1 to 2 mm saccular aneurysms identified along the posterior aspects of the bilateral supraclinoid ICAs  CT perfusion without convincing evidence of core infarct or ischemic penumbra  Declined ASA due to hx of bleeding ulcer, PTA lovastatin reordered as atorvastatin  Neuro checks q4  Allowing for permissive HTN up to 220/120 for first 24-48 hours  Declined MRI brain, discussed at length with pt and son at bedside that MRI is needed to r/o acute infarct and pt declined due to "instantaneous headache" she got when she was undergoing outpatient MRI  F/u echocardiogram  F/u A1c, lipid panel, TSH for risk stratification  Consult neurology - please call in AM  PT/OT consulted  Telemetry to monitor for arrhythmias      Chronic medical conditions:  HTN: PTA olmesartan, carvedilol, diltiazem held due to allowing for permissive HTN  HLD: PTA lovastatin reordered as atorvastatin  Hx of bleeding ulcer: PTA omeprazole reordered as pantoprazole  Hypothyroidism: PTA levothyroxine    Plan of care discussed with son, Bran Yost at bedside.  Humaira Yost is an 83 year old female with PMHx of hypothyroidism, HTN, HLD, hx of gastric ulcer, hx of bladder prolapse, mitral valve prolapse, chronic constipation, and recent PSHx of partial L. hip replacement (on 2/2023) who presented to the ED on 10/9/23 for left upper extremity paresthesias and headache and admitted for TIA vs. CVA..    TIA vs. CVA  Complaints of LUE paresthesias with associated headache since 8:45 PM last night  NIHSS 1 at time of presentation  as per ER physician documentation   NCHCT without acute intracranial finding  CTA H/N without large vessel occlusion but with 1 to 2 mm saccular aneurysms identified along the posterior aspects of the bilateral supraclinoid ICAs  CT perfusion without convincing evidence of core infarct or ischemic penumbra  Declined ASA due to hx of bleeding ulcer, PTA lovastatin reordered as atorvastatin  Neuro checks q4  Allowing for permissive HTN up to 220/120 for first 24-48 hours  Declined MRI brain, discussed at length with pt and son at bedside that MRI is needed to r/o acute infarct and pt declined due to "instantaneous headache" she got when she was undergoing outpatient MRI  F/u echocardiogram  F/u A1c, lipid panel, TSH for risk stratification  Consult neurology - please call in AM  PT/OT consulted  Telemetry to monitor for arrhythmias      Chronic medical conditions:  HTN: PTA olmesartan, carvedilol, diltiazem held due to allowing for permissive HTN  HLD: PTA lovastatin reordered as atorvastatin  Hx of bleeding ulcer: PTA omeprazole reordered as pantoprazole  Hypothyroidism: PTA levothyroxine    Plan of care discussed with son, Bran Yost at bedside.     Addendum:  3:30 AM - received message from primary RN regarding patient wanting to sign out AMA.  3:45 AM - patient seen and re-examined at bedside. Now agreeable to stay and get MRI brain. Plan of care discussed with daughter at bedside.

## 2023-10-10 NOTE — ED ADULT NURSE REASSESSMENT NOTE - NS ED NURSE REASSESS COMMENT FT1
pt refusing meds d/t taking home blood pressure medication at 0400 this morning without notifying nurse or staff. pt upset d/t not "being updated by anyone". hospitalist seen at bedside previously, hospitalist paged and confirmed spoke with pt and daughter. as per pt, MD told her to keep "blood pressure high in the 300's to 400's". hospitalist called and stated not true, states will make oncoming team aware of situation and to tell pt that oncoming hospitalist will speak to pt during next shift. pt made aware, repositioned in bed, urine cannister emptied and replaced. daughter at bedside. side rails up x2.

## 2023-10-10 NOTE — PATIENT PROFILE ADULT - FALL HARM RISK - HARM RISK INTERVENTIONS

## 2023-10-10 NOTE — PHYSICAL THERAPY INITIAL EVALUATION ADULT - PATIENT PROFILE REVIEW, REHAB EVAL
Patient returned call and was informed of colonoscopy biopsy results. Patient verbalized understanding. No further questions at this time.    yes

## 2023-10-10 NOTE — ED ADULT NURSE NOTE - OBJECTIVE STATEMENT
pt presents to the ED c/o L-leg stiffness, L-arm tingling and headache  since 2045, normal at 2030.  pt took her xanax and carvedilol at 9pm,  pt denies: cp, sob, abd pain, N/V/D, fever, rash, cough.  (PMH-HTN, diverticulitis, anemia). Stroke code called in traige, FS done, pt sent to CT, labs drawn, on cardiac monitor, EKG done.

## 2023-10-11 LAB
ANION GAP SERPL CALC-SCNC: 6 MMOL/L — SIGNIFICANT CHANGE UP (ref 5–17)
BUN SERPL-MCNC: 17 MG/DL — SIGNIFICANT CHANGE UP (ref 7–23)
CALCIUM SERPL-MCNC: 8.5 MG/DL — SIGNIFICANT CHANGE UP (ref 8.5–10.1)
CHLORIDE SERPL-SCNC: 107 MMOL/L — SIGNIFICANT CHANGE UP (ref 96–108)
CO2 SERPL-SCNC: 26 MMOL/L — SIGNIFICANT CHANGE UP (ref 22–31)
CREAT SERPL-MCNC: 0.72 MG/DL — SIGNIFICANT CHANGE UP (ref 0.5–1.3)
EGFR: 84 ML/MIN/1.73M2 — SIGNIFICANT CHANGE UP
GLUCOSE SERPL-MCNC: 98 MG/DL — SIGNIFICANT CHANGE UP (ref 70–99)
HCT VFR BLD CALC: 39.4 % — SIGNIFICANT CHANGE UP (ref 34.5–45)
HGB BLD-MCNC: 13 G/DL — SIGNIFICANT CHANGE UP (ref 11.5–15.5)
MCHC RBC-ENTMCNC: 30.6 PG — SIGNIFICANT CHANGE UP (ref 27–34)
MCHC RBC-ENTMCNC: 33 G/DL — SIGNIFICANT CHANGE UP (ref 32–36)
MCV RBC AUTO: 92.7 FL — SIGNIFICANT CHANGE UP (ref 80–100)
NRBC # BLD: 0 /100 WBCS — SIGNIFICANT CHANGE UP (ref 0–0)
PLATELET # BLD AUTO: 238 K/UL — SIGNIFICANT CHANGE UP (ref 150–400)
POTASSIUM SERPL-MCNC: 3.6 MMOL/L — SIGNIFICANT CHANGE UP (ref 3.5–5.3)
POTASSIUM SERPL-SCNC: 3.6 MMOL/L — SIGNIFICANT CHANGE UP (ref 3.5–5.3)
RBC # BLD: 4.25 M/UL — SIGNIFICANT CHANGE UP (ref 3.8–5.2)
RBC # FLD: 13.6 % — SIGNIFICANT CHANGE UP (ref 10.3–14.5)
SODIUM SERPL-SCNC: 139 MMOL/L — SIGNIFICANT CHANGE UP (ref 135–145)
WBC # BLD: 8.39 K/UL — SIGNIFICANT CHANGE UP (ref 3.8–10.5)
WBC # FLD AUTO: 8.39 K/UL — SIGNIFICANT CHANGE UP (ref 3.8–10.5)

## 2023-10-11 PROCEDURE — 99232 SBSQ HOSP IP/OBS MODERATE 35: CPT

## 2023-10-11 PROCEDURE — 93306 TTE W/DOPPLER COMPLETE: CPT | Mod: 26

## 2023-10-11 RX ADMIN — BUPROPION HYDROCHLORIDE 100 MILLIGRAM(S): 150 TABLET, EXTENDED RELEASE ORAL at 15:12

## 2023-10-11 RX ADMIN — CARVEDILOL PHOSPHATE 25 MILLIGRAM(S): 80 CAPSULE, EXTENDED RELEASE ORAL at 18:02

## 2023-10-11 RX ADMIN — Medication 1 TABLET(S): at 13:04

## 2023-10-11 RX ADMIN — BUPROPION HYDROCHLORIDE 100 MILLIGRAM(S): 150 TABLET, EXTENDED RELEASE ORAL at 10:33

## 2023-10-11 RX ADMIN — Medication 50 MILLIGRAM(S): at 18:00

## 2023-10-11 RX ADMIN — Medication 1 TABLET(S): at 13:09

## 2023-10-11 RX ADMIN — ATORVASTATIN CALCIUM 40 MILLIGRAM(S): 80 TABLET, FILM COATED ORAL at 22:02

## 2023-10-11 RX ADMIN — Medication 75 MICROGRAM(S): at 05:19

## 2023-10-11 RX ADMIN — CARVEDILOL PHOSPHATE 25 MILLIGRAM(S): 80 CAPSULE, EXTENDED RELEASE ORAL at 06:33

## 2023-10-11 NOTE — OCCUPATIONAL THERAPY INITIAL EVALUATION ADULT - ADDITIONAL COMMENTS
Prior to admission, pt was functioning in her roles, self sufficient & ambulating independently with a rollator on the main floor and rolling walker on the second floor. needed assist; Son does all cooking, cleaning, laundry and shopping. Pt does not take showers/baths anymore due to inability to perform tub transfers  instead she takes a sponge bath bathroom. Pt was receiving home PT intervention x2 per week  Pt is right hand dominant and wears glasses for reading. Pt is incontinent in bladder control due to prolapse bladder and hence uses disposable underwear, Prior to admission, pt was functioning in her roles, self sufficient & ambulating independently with a rollator on the main floor and rolling walker on the second floor. needed assist; Son does all cooking, cleaning, laundry and shopping. Pt does not take showers/baths anymore due to inability to perform tub transfers  instead she takes a sponge bath in the bathroom. Pt was receiving home PT intervention x2 per week  Pt is right hand dominant and wears glasses for reading. Pt is incontinent in bladder control due to prolapse bladder and hence uses disposable underwear,

## 2023-10-11 NOTE — OCCUPATIONAL THERAPY INITIAL EVALUATION ADULT - GENERAL OBSERVATIONS, REHAB EVAL
Pt was seen for initial OT consult, encountered OOB to chair on cardiac monitoring in NAD with her daughter at bedside. Pt was AA&Ox4, cooperative & followed commands. Pt c/o abdominal discomfort. Pt presents with generalized weakness. This limits pt's activity tolerance ,balance, ADL management and functional mobility

## 2023-10-11 NOTE — OCCUPATIONAL THERAPY INITIAL EVALUATION ADULT - PLANNED THERAPY INTERVENTIONS, OT EVAL
energy conservation techniques/ADL retraining/balance training/bed mobility training/neuromuscular re-education/strengthening/transfer training

## 2023-10-11 NOTE — OCCUPATIONAL THERAPY INITIAL EVALUATION ADULT - PERTINENT HX OF CURRENT PROBLEM, REHAB EVAL
Pt is an 82 y/o female who presented to ER on due to acute onset of neurological deficit.  Pt had left upper extremity paresthesias and headache.  Pt has  PMHx of hypothyroidism, HTN, HLD, hx of gastric ulcer, hx of bladder prolapse, mitral valve prolapse, chronic constipation, and recent PSHx of partial L. hip replacement (on 2/2023) . Pt is currently diagnosed with Paresthesia ,Lower extremity weakness and hypertensive urgency. CT of head  on 10/9/23 results confirm No evidence of large territory infarct, intracranial hemorrhage, or mass effect.

## 2023-10-11 NOTE — OCCUPATIONAL THERAPY INITIAL EVALUATION ADULT - LIVES WITH, PROFILE
her son Rban in a private house with 2 steps to with hand rail.   Once inside, pt has to negotiate a flight of stairs  with a stair lift to access the bedroom and bathroom.  The bathroom has a tub/shower combination and standard toilet. her son Bran in a private house with 2 steps to with hand rail.  Once inside, pt has to negotiate a flight of stairs  with a stair lift to access the bedroom and bathroom.  The bathroom has a tub/shower combination and standard toilet.

## 2023-10-11 NOTE — OCCUPATIONAL THERAPY INITIAL EVALUATION ADULT - ASR WT BEARING STATUS EVAL
monitor vital signs with acitivies. Pt has a history of multiple comorbidities. and diminished reaction time due to age related changes

## 2023-10-11 NOTE — OCCUPATIONAL THERAPY INITIAL EVALUATION ADULT - NSOTDISCHREC_GEN_A_CORE
with resuption of prior support service to prevent falls, optimize pt's ability for ADL management & safely navigate in all terrains/Home OT

## 2023-10-11 NOTE — OCCUPATIONAL THERAPY INITIAL EVALUATION ADULT - BALANCE TRAINING, PT EVAL
Pt will increased standing balance from fair to fair+ in 3o days to prevent falls, optimize pt's ability for ADL management & safely navigate in all terrains

## 2023-10-11 NOTE — OCCUPATIONAL THERAPY INITIAL EVALUATION ADULT - UPPER BODY DRESSING, PREVIOUS LEVEL OF FUNCTION, OT EVAL
Bedside shift change report given to Ford Barthel RN (oncoming nurse) by Anne-Marie Aleman RN (offgoing nurse). Report included the following information SBAR, Kardex, Intake/Output and MAR.
independent

## 2023-10-11 NOTE — OCCUPATIONAL THERAPY INITIAL EVALUATION ADULT - TRANSFER TRAINING, PT EVAL
Pt will transfer to all surfaces, safely and independently with the  least restrictive adaptive device within 2-4 weeks.

## 2023-10-12 ENCOUNTER — TRANSCRIPTION ENCOUNTER (OUTPATIENT)
Age: 83
End: 2023-10-12

## 2023-10-12 VITALS
HEART RATE: 71 BPM | SYSTOLIC BLOOD PRESSURE: 108 MMHG | TEMPERATURE: 98 F | RESPIRATION RATE: 18 BRPM | DIASTOLIC BLOOD PRESSURE: 72 MMHG | OXYGEN SATURATION: 99 %

## 2023-10-12 PROCEDURE — 99239 HOSP IP/OBS DSCHRG MGMT >30: CPT

## 2023-10-12 RX ADMIN — CARVEDILOL PHOSPHATE 25 MILLIGRAM(S): 80 CAPSULE, EXTENDED RELEASE ORAL at 06:18

## 2023-10-12 RX ADMIN — Medication 1 MILLIGRAM(S): at 00:17

## 2023-10-12 RX ADMIN — Medication 75 MICROGRAM(S): at 05:00

## 2023-10-12 RX ADMIN — Medication 50 MILLIGRAM(S): at 06:17

## 2023-10-12 RX ADMIN — LOSARTAN POTASSIUM 50 MILLIGRAM(S): 100 TABLET, FILM COATED ORAL at 06:17

## 2023-10-12 RX ADMIN — PANTOPRAZOLE SODIUM 40 MILLIGRAM(S): 20 TABLET, DELAYED RELEASE ORAL at 06:17

## 2023-10-12 RX ADMIN — Medication 120 MILLIGRAM(S): at 06:18

## 2023-10-12 RX ADMIN — Medication 25 MILLIGRAM(S): at 00:19

## 2023-10-12 NOTE — DISCHARGE NOTE PROVIDER - NSDCADMDATE_GEN_ALL_CORE_FT
----- Message from Mary Jane Stephens DO sent at 9/3/2020  5:59 AM CDT -----  Please notify patient of results and recommendations  Urine micro is negative for proteinuria  Hemoglobin a1c is 7.8% - stable.   Borderline controlled. Recommend working on healthy diabetic diet and activity as tolerated.  Does patient tolerate increases in metformin? Consider increasing to 2 tablets in the morning and 1-2 tablets at night as tolerated  Cholesterol is controlled. LDL at goal.  CMP - blood sugar elevated at 178, kidney function is stable.  Liver function ok.   10-Oct-2023 00:21

## 2023-10-12 NOTE — PROGRESS NOTE ADULT - PROBLEM SELECTOR PLAN 1
Neuro on board   MRI without CVA  TTE done, pending read  PT - home with home PT  continue neurochecks
Neuro on board   TTE pending  PT - home with home PT  continue neurochecks
Neuro on board - will start plavix   TTE and PT eval pending  continue neurochecks

## 2023-10-12 NOTE — DISCHARGE NOTE PROVIDER - NSDCCPCAREPLAN_GEN_ALL_CORE_FT
PRINCIPAL DISCHARGE DIAGNOSIS  Diagnosis: Paresthesia  Assessment and Plan of Treatment:       SECONDARY DISCHARGE DIAGNOSES  Diagnosis: Lower extremity weakness  Assessment and Plan of Treatment:     Diagnosis: Hypertensive urgency  Assessment and Plan of Treatment:

## 2023-10-12 NOTE — DISCHARGE NOTE PROVIDER - ATTENDING DISCHARGE PHYSICAL EXAMINATION:
VITALS:   T(C): 36.6 (10-12-23 @ 12:24), Max: 36.8 (10-11-23 @ 23:45)  HR: 68 (10-12-23 @ 12:24) (68 - 77)  BP: 94/60 (10-12-23 @ 12:24) (94/60 - 159/76)  RR: 16 (10-12-23 @ 12:24) (16 - 17)  SpO2: 96% (10-12-23 @ 12:24) (96% - 97%)    GENERAL: NAD, lying in bed comfortably  HEAD:  Atraumatic, Normocephalic  EYES: EOMI, PERRLA, conjunctiva and sclera clear  ENT: Moist mucous membranes  NECK: Supple, No JVD  CHEST/LUNG: Clear to auscultation bilaterally; No rales, rhonchi, wheezing, or rubs. Unlabored respirations  HEART: Regular rate and rhythm; No murmurs, rubs, or gallops  ABDOMEN: BSx4; Soft, nontender, nondistended  EXTREMITIES:  2+ Peripheral Pulses, brisk capillary refill. No clubbing, cyanosis, or edema  NERVOUS SYSTEM:  A&Ox3, no focal deficits   SKIN: No rashes or lesions  PSYCH: Normal affect, euthymic mood

## 2023-10-12 NOTE — DISCHARGE NOTE PROVIDER - NSDCFUSCHEDAPPT_GEN_ALL_CORE_FT
Servando Mendoza  [facility]  ORTHOSURG [address] [place] [address]  Scheduled Appointment: 11/02/2023    Sarah Hope  [facility]  ENDOCRIN [address]  Scheduled Appointment: 11/07/2023

## 2023-10-12 NOTE — DISCHARGE NOTE PROVIDER - NSDCMRMEDTOKEN_GEN_ALL_CORE_FT
acetaminophen 500 mg oral tablet: 2 tab(s) orally every 6 hours as needed for  mild pain  ALPRAZolam 1 mg oral tablet: 1 tab(s) orally once a day (at bedtime)  NOTE: pt uses to sleep  amitriptyline 25 mg oral tablet: 1 tab(s) orally once a day (at bedtime)  Caltrate 600 + D oral tablet: 1 tab(s) orally 2 times a day  carvedilol 25 mg oral tablet: 1 tab(s) orally every 12 hours  dilTIAZem 120 mg/24 hours oral capsule, extended release: 1 cap(s) orally once a day  hydrALAZINE 50 mg oral tablet: 50 milligram(s) orally 2 times a day  lovastatin 20 mg oral tablet: 1 tab(s) orally once a day (in the evening) - with dinner  magnesium oxide 500 mg oral tablet: 1 to 2 tab(s) orally once a day (at bedtime)  NOTE: pt takes as laxative   Multiple Vitamins oral tablet: 1 tab(s) orally once a day (lunch)  olmesartan 40 mg oral tablet: 1 tab(s) orally once a day  omeprazole 40 mg oral delayed release capsule: 1 cap(s) orally once a day  outpatient physical therapy : diagnose and treat  physical therapy: diagnose and treat  polyethylene glycol 3350 oral powder for reconstitution: 17 gram(s) orally once a day (at bedtime)  senna leaf extract oral tablet: 2 tab(s) orally once a day (at bedtime)  Synthroid 75 mcg (0.075 mg) oral tablet: 1 tab(s) orally once a day (Mon - Sat) and 1.5 tab on Sundays   Vitamin C 500 mg oral tablet: 1 tab(s) orally 2 times a day  Wellbutrin  mg/12 hours oral tablet, extended release: 1 tab(s) orally 2 times a day

## 2023-10-12 NOTE — PROGRESS NOTE ADULT - SUBJECTIVE AND OBJECTIVE BOX
Patient seen and examined this am. No new events    MEDICATIONS:    acetaminophen     Tablet .. 650 milliGRAM(s) Oral every 6 hours PRN  acetaminophen     Tablet .. 650 milliGRAM(s) Oral once  ALPRAZolam 1 milliGRAM(s) Oral at bedtime  aluminum hydroxide/magnesium hydroxide/simethicone Suspension 30 milliLiter(s) Oral every 4 hours PRN  amitriptyline 25 milliGRAM(s) Oral at bedtime  ascorbic acid 500 milliGRAM(s) Oral daily  atorvastatin 40 milliGRAM(s) Oral at bedtime  buPROPion SR (12-Hour) 100 milliGRAM(s) Oral two times a day  calcium carbonate 1250 mG  + Vitamin D (OsCal 500 + D) 1 Tablet(s) Oral daily  carvedilol 25 milliGRAM(s) Oral every 12 hours  clopidogrel Tablet 75 milliGRAM(s) Oral daily  diltiazem    milliGRAM(s) Oral daily  hydrALAZINE 50 milliGRAM(s) Oral two times a day  levothyroxine 75 MICROGram(s) Oral daily  losartan 50 milliGRAM(s) Oral daily  melatonin 3 milliGRAM(s) Oral at bedtime PRN  multivitamin 1 Tablet(s) Oral daily  ondansetron Injectable 4 milliGRAM(s) IV Push every 8 hours PRN  pantoprazole    Tablet 40 milliGRAM(s) Oral before breakfast  senna 2 Tablet(s) Oral at bedtime      LABS:                          13.0   8.39  )-----------( 238      ( 11 Oct 2023 06:50 )             39.4     10-11    139  |  107  |  17  ----------------------------<  98  3.6   |  26  |  0.72    Ca    8.5      11 Oct 2023 06:50    TPro  7.3  /  Alb  3.2<L>  /  TBili  0.2  /  DBili  x   /  AST  25  /  ALT  26  /  AlkPhos  85  10-09    CAPILLARY BLOOD GLUCOSE        PT/INR - ( 09 Oct 2023 22:40 )   PT: 10.8 sec;   INR: 0.90 ratio         PTT - ( 09 Oct 2023 22:40 )  PTT:30.8 sec  Urinalysis Basic - ( 11 Oct 2023 06:50 )    Color: x / Appearance: x / SG: x / pH: x  Gluc: 98 mg/dL / Ketone: x  / Bili: x / Urobili: x   Blood: x / Protein: x / Nitrite: x   Leuk Esterase: x / RBC: x / WBC x   Sq Epi: x / Non Sq Epi: x / Bacteria: x      I&O's Summary    Vital Signs Last 24 Hrs  T(C): 36.8 (11 Oct 2023 04:35), Max: 37 (10 Oct 2023 23:10)  T(F): 98.2 (11 Oct 2023 04:35), Max: 98.6 (10 Oct 2023 23:10)  HR: 74 (11 Oct 2023 10:30) (67 - 79)  BP: 115/79 (11 Oct 2023 10:30) (115/79 - 170/76)  BP(mean): 91 (11 Oct 2023 10:30) (91 - 91)  RR: 18 (11 Oct 2023 04:35) (16 - 18)  SpO2: 98% (11 Oct 2023 04:35) (98% - 100%)    Parameters below as of 11 Oct 2023 04:35  Patient On (Oxygen Delivery Method): room air          On Neurological Examination:    Mental Status - Patient is alert, awake, oriented X3. fluent, names, no dysarthria no aphasia Follows commands well and able to answer questions appropriately. Mood and affect  normal    Cranial Nerves - PERRL, EOMI, VFF, V1-V3 intact, no gross facial asymmetry, tongue/uvula midline    Motor Exam -   5/5 exceptmild l >r triceps weakness    Sensory    Intact to light touch and pinprick bilaterally    Coord: FTN intact bilaterally     Gait -  deferred    Reflexes:          2+ throughout        3+ patellar, toes down                                     RADIOLOGY        < from: MR Cervical Spine No Cont (10.10.23 @ 11:55) >  1. No evidence of abnormal cord signal.  2. Degenerative disc disease, most pronounced at C6-C7, including type I   Modic changes in the endplates. Severe multilevel facet arthrosis,   including a right C4-C5 facet effusion and periarticular marrow edema.  3. Multilevel cervical disc bulges, contributing to multilevel central   canal and neural foramen stenosis, described in detail above. No cervical   disc herniation.  4. Additional findings describedin detail above.    --- End of Report ---      < end of copied text >  < from: MR Head No Cont (10.10.23 @ 11:15) >    IMPRESSION: No acute intracranial hemorrhage or evidence of acute   ischemia.    --- End of Report ---    < end of copied text >  < from: CT Angio Neck Stroke Protocol w/ IV Cont (10.09.23 @ 22:56) >  IMPRESSION:    NONCONTRAST CT HEAD:  No evidence of large territory infarct, intracranial hemorrhage, or mass   effect.    CTA HEAD/NECK:  No proximal large vessel occlusion, dissection, or hemodynamically   flow-limiting stenosis identified in the head and neck.    1 to 2 mm saccular aneurysms identified along the posterior aspects of   the bilateral supraclinoid ICAs.    CT PERFUSION:  No convincing evidence of core infarct or ischemic penumbra.    Findings above were discussed directly by the ED radiologist on call,   Kamaljit Lyn MD, with the emergency Department ordering physician,   Kem Olivera M.D., at 11:10 PM on 10/9/2023.    --- End of Report ---      < end of copied text >  < from: CT Angio Neck Stroke Protocol w/ IV Cont (10.09.23 @ 22:56) >  IMPRESSION:    NONCONTRAST CT HEAD:  No evidence of large territory infarct, intracranial hemorrhage, or mass   effect.    CTA HEAD/NECK:  No proximal large vessel occlusion, dissection, or hemodynamically   flow-limiting stenosis identified in the head and neck.    1 to 2 mm saccular aneurysms identified along the posterior aspects of   the bilateral supraclinoid ICAs.    CT PERFUSION:  No convincing evidence of core infarct or ischemic penumbra.    Findings above were discussed directly by the ED radiologist on call,   Kamaljit Lyn MD, with the emergency Department ordering physician,   Kem Olivera M.D., at 11:10 PM on 10/9/2023.    --- End of Report ---      < end of copied text >  
Patient seen and examined this am. No new events    MEDICATIONS:    acetaminophen     Tablet .. 650 milliGRAM(s) Oral once  acetaminophen     Tablet .. 650 milliGRAM(s) Oral every 6 hours PRN  ALPRAZolam 1 milliGRAM(s) Oral at bedtime  aluminum hydroxide/magnesium hydroxide/simethicone Suspension 30 milliLiter(s) Oral every 4 hours PRN  amitriptyline 25 milliGRAM(s) Oral at bedtime  ascorbic acid 500 milliGRAM(s) Oral daily  atorvastatin 40 milliGRAM(s) Oral at bedtime  buPROPion SR (12-Hour) 100 milliGRAM(s) Oral two times a day  calcium carbonate 1250 mG  + Vitamin D (OsCal 500 + D) 1 Tablet(s) Oral daily  carvedilol 25 milliGRAM(s) Oral every 12 hours  diltiazem    milliGRAM(s) Oral daily  hydrALAZINE 50 milliGRAM(s) Oral two times a day  levothyroxine 75 MICROGram(s) Oral daily  losartan 50 milliGRAM(s) Oral daily  melatonin 3 milliGRAM(s) Oral at bedtime PRN  multivitamin 1 Tablet(s) Oral daily  ondansetron Injectable 4 milliGRAM(s) IV Push every 8 hours PRN  pantoprazole    Tablet 40 milliGRAM(s) Oral before breakfast  senna 2 Tablet(s) Oral at bedtime      LABS:                          13.0   8.39  )-----------( 238      ( 11 Oct 2023 06:50 )             39.4     10-11    139  |  107  |  17  ----------------------------<  98  3.6   |  26  |  0.72    Ca    8.5      11 Oct 2023 06:50      CAPILLARY BLOOD GLUCOSE          Urinalysis Basic - ( 11 Oct 2023 06:50 )    Color: x / Appearance: x / SG: x / pH: x  Gluc: 98 mg/dL / Ketone: x  / Bili: x / Urobili: x   Blood: x / Protein: x / Nitrite: x   Leuk Esterase: x / RBC: x / WBC x   Sq Epi: x / Non Sq Epi: x / Bacteria: x      I&O's Summary    11 Oct 2023 07:01  -  12 Oct 2023 07:00  --------------------------------------------------------  IN: 0 mL / OUT: 1200 mL / NET: -1200 mL      Vital Signs Last 24 Hrs  T(C): 36.5 (12 Oct 2023 05:00), Max: 36.9 (11 Oct 2023 11:48)  T(F): 97.7 (12 Oct 2023 05:00), Max: 98.4 (11 Oct 2023 11:48)  HR: 69 (12 Oct 2023 05:00) (69 - 85)  BP: 134/69 (12 Oct 2023 05:00) (123/77 - 159/76)  BP(mean): --  RR: 17 (12 Oct 2023 05:00) (16 - 17)  SpO2: 97% (12 Oct 2023 05:00) (96% - 99%)    Parameters below as of 12 Oct 2023 05:00  Patient On (Oxygen Delivery Method): room air          On Neurological Examination:    Mental Status - Patient is alert, awake, oriented X3. fluent, names, no dysarthria no aphasia Follows commands well and able to answer questions appropriately. Mood and affect  normal    Cranial Nerves - PERRL, EOMI, VFF, V1-V3 intact, no gross facial asymmetry, tongue/uvula midline    Motor Exam -   5/5 exceptmild l >r triceps weakness    Sensory    Intact to light touch and pinprick bilaterally    Coord: FTN intact bilaterally     Gait -  deferred    Reflexes:          2+ throughout        3+ patellar, toes down                                     RADIOLOGY        < from: MR Cervical Spine No Cont (10.10.23 @ 11:55) >  1. No evidence of abnormal cord signal.  2. Degenerative disc disease, most pronounced at C6-C7, including type I   Modic changes in the endplates. Severe multilevel facet arthrosis,   including a right C4-C5 facet effusion and periarticular marrow edema.  3. Multilevel cervical disc bulges, contributing to multilevel central   canal and neural foramen stenosis, described in detail above. No cervical   disc herniation.  4. Additional findings describedin detail above.    --- End of Report ---      < end of copied text >  < from: MR Head No Cont (10.10.23 @ 11:15) >    IMPRESSION: No acute intracranial hemorrhage or evidence of acute   ischemia.    --- End of Report ---    < end of copied text >  < from: CT Angio Neck Stroke Protocol w/ IV Cont (10.09.23 @ 22:56) >  IMPRESSION:    NONCONTRAST CT HEAD:  No evidence of large territory infarct, intracranial hemorrhage, or mass   effect.    CTA HEAD/NECK:  No proximal large vessel occlusion, dissection, or hemodynamically   flow-limiting stenosis identified in the head and neck.    1 to 2 mm saccular aneurysms identified along the posterior aspects of   the bilateral supraclinoid ICAs.    CT PERFUSION:  No convincing evidence of core infarct or ischemic penumbra.    Findings above were discussed directly by the ED radiologist on call,   Kamaljit Lyn MD, with the emergency Department ordering physician,   Kem Olivera M.D., at 11:10 PM on 10/9/2023.    --- End of Report ---      < end of copied text >  < from: CT Angio Neck Stroke Protocol w/ IV Cont (10.09.23 @ 22:56) >  IMPRESSION:    NONCONTRAST CT HEAD:  No evidence of large territory infarct, intracranial hemorrhage, or mass   effect.    CTA HEAD/NECK:  No proximal large vessel occlusion, dissection, or hemodynamically   flow-limiting stenosis identified in the head and neck.    1 to 2 mm saccular aneurysms identified along the posterior aspects of   the bilateral supraclinoid ICAs.    CT PERFUSION:  No convincing evidence of core infarct or ischemic penumbra.    Findings above were discussed directly by the ED radiologist on call,   Kamaljit Lyn MD, with the emergency Department ordering physician,   Kem Olivera M.D., at 11:10 PM on 10/9/2023.    --- End of Report ---      < end of copied text >  < from: TTE with Doppler (w/Cont) (02.27.23 @ 10:00) >  essures.  ------------------------------------------------------------------------  Conclusions:  1. Normal mitral valve. Mild mitral regurgitation.  2. Calcified trileaflet aortic valve with normal opening.  Mild aortic regurgitation.  3. Endocardial visualization enhanced with intravenous  injection of Ultrasonic Enhancing Agent (Definity). Normal  left ventricular systolic function. No segmental wall  motion abnormalities.  4. Normal right ventricular size and function.  5. Trace pericardial effusion.  *** Compared with echocardiogram of 6/30/2022, results are  similar on today's study.  ------------------------------------------------------------------------  Confirmed on  2/27/2023 - 12:22:19 by Gris Ott M.D.  ------------------------------------------------------------------------    < end of copied text >  
Humaira Yost is an 83 year old female with PMHx of hypothyroidism, HTN, HLD, hx of gastric ulcer, hx of bladder prolapse, mitral valve prolapse, chronic constipation, and recent PSHx of partial L. hip replacement (on 2/2023) who presented to the ED on 10/9/23 for left upper extremity paresthesias.  Admitted to [facility] for CVA eval.    Patient seen and examined in the ED  Reports that the parestesias have improved.  Case discussed with patient and her daughter  Recently returned from MRI  Medication list reviewed with family and updated  Seen by neuro today   TTE, PT pending    Physical exam:  General: patient in no acute distress, resting comfortably  Head:  Atraumatic, Normocephalic  Eyes: EOMI, PERRLA, clear sclera  Neck: Supple, thyroid nontender, non enlarged  Cardio: S1/S2 +ve, regular rate and rhythm, no M/G/R  Resp: clear to ausculation bilaterally, no rales or wheezes  GI: abdomen soft, nontender, non distended, no guarding, BS +ve x 4  Ext: no significant pedal edema  Neuro: Patient AAOx3, no gaze deviation.  Motor normal in all extremities, Sensory deficits to LUE and all three zones of L face.  No facial droop, dysarthria, or aphasia.   Skin: No rashes or lesions 
Humaira Yost is an 83 year old female with PMHx of hypothyroidism, HTN, HLD, hx of gastric ulcer, hx of bladder prolapse, mitral valve prolapse, chronic constipation, and recent PSHx of partial L. hip replacement (on 2/2023) who presented to the ED on 10/9/23 for left upper extremity paresthesias.  Admitted to tele for CVA eval.    Patient seen and examined at the bedside  Patient currently has no complaints.    MR brain negative  Neuro recommended aspirin however patient allergic    ordered plavix however patient refused - notes she was taken off of it in the past due to gastric ulcers  Case discussed with patient and her daughter  TTE pending  PT - home w/ home PT    Physical exam:  General: patient in no acute distress, resting comfortably  Head:  Atraumatic, Normocephalic  Eyes: EOMI, PERRLA, clear sclera  Neck: Supple, thyroid nontender, non enlarged  Cardio: S1/S2 +ve, regular rate and rhythm, no M/G/R  Resp: clear to ausculation bilaterally, no rales or wheezes  GI: abdomen soft, nontender, non distended, no guarding, BS +ve x 4  Ext: no significant pedal edema  Neuro: Patient AAOx3, no gaze deviation.  Motor and sensory normal in all extremities, finger to nose/heel to shin good.  No facial droop, dysarthria, or aphasia.    Skin: No rashes or lesions     Recent Vitals  T(C): 36.9 (10-11-23 @ 11:48), Max: 37 (10-10-23 @ 23:10)  HR: 69 (10-11-23 @ 14:44) (67 - 85)  BP: 132/86 (10-11-23 @ 14:44) (115/79 - 159/82)  RR: 16 (10-11-23 @ 11:48) (16 - 18)  SpO2: 98% (10-11-23 @ 11:48) (98% - 100%)                        13.0   8.39  )-----------( 238      ( 11 Oct 2023 06:50 )             39.4     10-11    139  |  107  |  17  ----------------------------<  98  3.6   |  26  |  0.72    Ca    8.5      11 Oct 2023 06:50    TPro  7.3  /  Alb  3.2<L>  /  TBili  0.2  /  DBili  x   /  AST  25  /  ALT  26  /  AlkPhos  85  10-09    PT/INR - ( 09 Oct 2023 22:40 )   PT: 10.8 sec;   INR: 0.90 ratio         PTT - ( 09 Oct 2023 22:40 )  PTT:30.8 sec  LIVER FUNCTIONS - ( 09 Oct 2023 22:40 )  Alb: 3.2 g/dL / Pro: 7.3 gm/dL / ALK PHOS: 85 U/L / ALT: 26 U/L / AST: 25 U/L / GGT: x           Urinalysis Basic - ( 11 Oct 2023 06:50 )    Color: x / Appearance: x / SG: x / pH: x  Gluc: 98 mg/dL / Ketone: x  / Bili: x / Urobili: x   Blood: x / Protein: x / Nitrite: x   Leuk Esterase: x / RBC: x / WBC x   Sq Epi: x / Non Sq Epi: x / Bacteria: x        Home Medications:  acetaminophen 500 mg oral tablet: 2 tab(s) orally every 6 hours as needed for  mild pain (10 Oct 2023 01:31)  ALPRAZolam 1 mg oral tablet: 1 tab(s) orally once a day (at bedtime)  NOTE: pt uses to sleep (01 Jul 2022 10:43)  amitriptyline 25 mg oral tablet: 1 tab(s) orally once a day (at bedtime) (01 Jul 2022 10:43)  Caltrate 600 + D oral tablet: 1 tab(s) orally 2 times a day (01 Jul 2022 10:43)  dilTIAZem 120 mg/24 hours oral capsule, extended release: 1 cap(s) orally once a day (23 Feb 2023 22:54)  hydrALAZINE 50 mg oral tablet: 50 milligram(s) orally 2 times a day (23 Feb 2023 22:53)  lovastatin 20 mg oral tablet: 1 tab(s) orally once a day (in the evening) - with dinner (01 Jul 2022 10:43)  magnesium oxide 500 mg oral tablet: 1 to 2 tab(s) orally once a day (at bedtime)  NOTE: pt takes as laxative  (29 Jun 2022 23:42)  Multiple Vitamins oral tablet: 1 tab(s) orally once a day (lunch) (01 Jul 2022 10:43)  olmesartan 40 mg oral tablet: 1 tab(s) orally once a day (23 Feb 2023 22:54)  omeprazole 40 mg oral delayed release capsule: 1 cap(s) orally once a day (10 Oct 2023 01:33)  polyethylene glycol 3350 oral powder for reconstitution: 17 gram(s) orally once a day (at bedtime) (27 Feb 2023 15:44)  senna leaf extract oral tablet: 2 tab(s) orally once a day (at bedtime) (27 Feb 2023 15:44)  Synthroid 75 mcg (0.075 mg) oral tablet: 1 tab(s) orally once a day (Mon - Sat) and 1.5 tab on Sundays  (07 Jul 2022 11:06)  Vitamin C 500 mg oral tablet: 1 tab(s) orally 2 times a day (01 Jul 2022 10:43)  Wellbutrin  mg/12 hours oral tablet, extended release: 1 tab(s) orally 2 times a day (01 Jul 2022 10:43)    MR brain 10/10/23  FINDINGS: Multiple small rounded nonspecific foci of T2/FLAIR   hyperintensity are noted throughout the deep and periventricular white   matter of the cerebral hemispheres. There is no associated mass effect.   There is no evidence of acute ischemia on the diffusion-weighted images.    There is diffuse cerebral volume loss with prominence of the sulci,   fissures, and cisternal spaces which is normal for the patient's age.   Ventricular size and configuration is unremarkable. Flow-voids are noted   throughout the major intracranial vessels, on the T2 weighted images,   consistent with their patency. There is an unchanged appearing partially   empty sella. The posterior fossa appear unremarkable.    The paranasal sinuses and tympanomastoid cavities are clear. Calvarial   signal is within normal limits. The orbits appear unremarkable.    IMPRESSION: No acute intracranial hemorrhage or evidence of acute   ischemia.
William Jefferson M.D.    Patient is a 83y old  Female who presents with a chief complaint of TIA vs. CVA (12 Oct 2023 14:55)      SUBJECTIVE / OVERNIGHT EVENTS: no concerns.     Patient denies chest pain, SOB, abd pain, N/V, fever, chills, dysuria or any other complaints. All remainder ROS negative.     MEDICATIONS  (STANDING):  acetaminophen     Tablet .. 650 milliGRAM(s) Oral once  ALPRAZolam 1 milliGRAM(s) Oral at bedtime  amitriptyline 25 milliGRAM(s) Oral at bedtime  ascorbic acid 500 milliGRAM(s) Oral daily  atorvastatin 40 milliGRAM(s) Oral at bedtime  buPROPion SR (12-Hour) 100 milliGRAM(s) Oral two times a day  calcium carbonate 1250 mG  + Vitamin D (OsCal 500 + D) 1 Tablet(s) Oral daily  carvedilol 25 milliGRAM(s) Oral every 12 hours  diltiazem    milliGRAM(s) Oral daily  hydrALAZINE 50 milliGRAM(s) Oral two times a day  levothyroxine 75 MICROGram(s) Oral daily  losartan 50 milliGRAM(s) Oral daily  multivitamin 1 Tablet(s) Oral daily  pantoprazole    Tablet 40 milliGRAM(s) Oral before breakfast  senna 2 Tablet(s) Oral at bedtime    MEDICATIONS  (PRN):  acetaminophen     Tablet .. 650 milliGRAM(s) Oral every 6 hours PRN Temp greater or equal to 38C (100.4F), Mild Pain (1 - 3)  aluminum hydroxide/magnesium hydroxide/simethicone Suspension 30 milliLiter(s) Oral every 4 hours PRN Dyspepsia  melatonin 3 milliGRAM(s) Oral at bedtime PRN Insomnia  ondansetron Injectable 4 milliGRAM(s) IV Push every 8 hours PRN Nausea and/or Vomiting      I&O's Summary    11 Oct 2023 07:01  -  12 Oct 2023 07:00  --------------------------------------------------------  IN: 0 mL / OUT: 1200 mL / NET: -1200 mL    12 Oct 2023 07:01  -  12 Oct 2023 14:59  --------------------------------------------------------  IN: 420 mL / OUT: 0 mL / NET: 420 mL        PHYSICAL EXAM:  Vital Signs Last 24 Hrs  T(C): 36.6 (12 Oct 2023 12:24), Max: 36.8 (11 Oct 2023 23:45)  T(F): 97.8 (12 Oct 2023 12:24), Max: 98.2 (11 Oct 2023 23:45)  HR: 68 (12 Oct 2023 12:24) (68 - 77)  BP: 94/60 (12 Oct 2023 12:24) (94/60 - 159/76)  BP(mean): --  RR: 16 (12 Oct 2023 12:24) (16 - 17)  SpO2: 96% (12 Oct 2023 12:24) (96% - 97%)    Parameters below as of 12 Oct 2023 12:24  Patient On (Oxygen Delivery Method): room air        CONSTITUTIONAL: NAD, well-groomed  ENMT: Moist oral mucosa, no pharyngeal injection or exudates; normal dentition  RESPIRATORY: Normal respiratory effort; lungs are clear to auscultation bilaterally  CARDIOVASCULAR: Regular rate and rhythm, normal S1 and S2, no murmur/rub/gallop; No lower extremity edema; Peripheral pulses are 2+ bilaterally  ABDOMEN: Nontender to palpation, normoactive bowel sounds, no rebound/guarding; No hepatosplenomegaly  MUSCLOSKELETAL:  Normal gait; no clubbing or cyanosis of digits; no joint swelling or tenderness to palpation  PSYCH: A+O x3; affect appropriate  NEUROLOGY: CN 2-12 are intact and symmetric; no gross sensory deficits;   SKIN: No rashes; no palpable lesions    LABS:                        13.0   8.39  )-----------( 238      ( 11 Oct 2023 06:50 )             39.4     10-11    139  |  107  |  17  ----------------------------<  98  3.6   |  26  |  0.72    Ca    8.5      11 Oct 2023 06:50            Urinalysis Basic - ( 11 Oct 2023 06:50 )    Color: x / Appearance: x / SG: x / pH: x  Gluc: 98 mg/dL / Ketone: x  / Bili: x / Urobili: x   Blood: x / Protein: x / Nitrite: x   Leuk Esterase: x / RBC: x / WBC x   Sq Epi: x / Non Sq Epi: x / Bacteria: x        CAPILLARY BLOOD GLUCOSE          RADIOLOGY & ADDITIONAL TESTS:  Results Reviewed:   Imaging Personally Reviewed:  Electrocardiogram Personally Reviewed:

## 2023-10-12 NOTE — DISCHARGE NOTE NURSING/CASE MANAGEMENT/SOCIAL WORK - PATIENT PORTAL LINK FT
You can access the FollowMyHealth Patient Portal offered by [facility] by registering at the following website: [email or web address]. By joining [facility]’s FollowMyHealth portal, you will also be able to view your health information using other applications (apps) compatible with our system.

## 2023-10-12 NOTE — PROGRESS NOTE ADULT - ASSESSMENT
83 year old female with PMHx of hypothyroidism, HTN, HLD, hx of gastric ulcer, hx of bladder prolapse, mitral valve prolapse, chronic constipation, and recent PSHx of partial L. hip replacement (on 2/2023) who presented to the ED on 10/9/23 for left upper extremity paresthesias and headache. Pe non-focal other than mild l> R triceps weakness brisk patellars  CTA no LVO, incidental saccular aneurysms. MRI brain negative. MRI Cpsine degenrative changes      Impression: symptoms, exam and imaging suggest symptoms may be from degenerative disease in the cervical spine than a TIA    TTE reviewed  Given history of GIB and very low suspicion for TIA, can hold off antiplatelet from a stroke perspective  DVT prophylaxis, Neurochecks  outpt physical therapy to cervical region  HbA1C and LDL.   PT/OT/Speech and swallow/safety eval.  Can follow up with Neurology, Dr. Adan Jacobs at [phone]  
83 year old female with PMHx of hypothyroidism, HTN, HLD, hx of gastric ulcer, hx of bladder prolapse, mitral valve prolapse, chronic constipation, and recent PSHx of partial L. hip replacement (on 2/2023) who presented to the ED on 10/9/23 for left upper extremity paresthesias and headache. Pe non-focal other than mild l> R triceps weakness brisk patellars  CTA no LVO, incidental saccular aneurysms. MRI brain negative. MRI Cpsine degenrative changes      Impression: symptoms, exam and imaging suggest symptoms may be from degenerative disease in the cervical spine than a TIA    TTE  DVT prophylaxis, Neurochecks  outpt physical therapy to cervical region  HbA1C and LDL.   PT/OT/Speech and swallow/safety eval.  Can follow up with Neurology, Dr. Adan Jacobs at [phone]  
Humaira Yost is an 83 year old female with PMHx of hypothyroidism, HTN, HLD, hx of gastric ulcer, hx of bladder prolapse, mitral valve prolapse, chronic constipation, and recent PSHx of partial L. hip replacement (on 2/2023) who presented to the ED on 10/9/23 for left upper extremity paresthesias.  Admitted to tele for CVA eval.

## 2023-10-12 NOTE — DISCHARGE NOTE PROVIDER - HOSPITAL COURSE
Humaira Yost is an 83 year old female with PMHx of hypothyroidism, HTN, HLD, hx of gastric ulcer, hx of bladder prolapse, mitral valve prolapse, chronic constipation, and recent PSHx of partial L. hip replacement (on 2/2023) who presented to the ED on 10/9/23 for left upper extremity paresthesias. Admitted to tele for CVA eval. MRI brain neg, and TTE normal. seen by neuro, suspect symptoms more 2/2 degenerative disc disease.   Stable for discharge.

## 2023-10-18 DIAGNOSIS — E03.9 HYPOTHYROIDISM, UNSPECIFIED: ICD-10-CM

## 2023-10-18 DIAGNOSIS — Z88.8 ALLERGY STATUS TO OTHER DRUGS, MEDICAMENTS AND BIOLOGICAL SUBSTANCES: ICD-10-CM

## 2023-10-18 DIAGNOSIS — Z87.19 PERSONAL HISTORY OF OTHER DISEASES OF THE DIGESTIVE SYSTEM: ICD-10-CM

## 2023-10-18 DIAGNOSIS — K59.09 OTHER CONSTIPATION: ICD-10-CM

## 2023-10-18 DIAGNOSIS — I16.0 HYPERTENSIVE URGENCY: ICD-10-CM

## 2023-10-18 DIAGNOSIS — Z88.1 ALLERGY STATUS TO OTHER ANTIBIOTIC AGENTS STATUS: ICD-10-CM

## 2023-10-18 DIAGNOSIS — Z79.890 HORMONE REPLACEMENT THERAPY: ICD-10-CM

## 2023-10-18 DIAGNOSIS — I12.9 HYPERTENSIVE CHRONIC KIDNEY DISEASE WITH STAGE 1 THROUGH STAGE 4 CHRONIC KIDNEY DISEASE, OR UNSPECIFIED CHRONIC KIDNEY DISEASE: ICD-10-CM

## 2023-10-18 DIAGNOSIS — Z79.899 OTHER LONG TERM (CURRENT) DRUG THERAPY: ICD-10-CM

## 2023-10-18 DIAGNOSIS — Z79.1 LONG TERM (CURRENT) USE OF NON-STEROIDAL ANTI-INFLAMMATORIES (NSAID): ICD-10-CM

## 2023-10-18 DIAGNOSIS — M50.30 OTHER CERVICAL DISC DEGENERATION, UNSPECIFIED CERVICAL REGION: ICD-10-CM

## 2023-10-18 DIAGNOSIS — R20.2 PARESTHESIA OF SKIN: ICD-10-CM

## 2023-10-18 DIAGNOSIS — R29.898 OTHER SYMPTOMS AND SIGNS INVOLVING THE MUSCULOSKELETAL SYSTEM: ICD-10-CM

## 2023-10-18 DIAGNOSIS — N18.2 CHRONIC KIDNEY DISEASE, STAGE 2 (MILD): ICD-10-CM

## 2023-10-18 DIAGNOSIS — E78.5 HYPERLIPIDEMIA, UNSPECIFIED: ICD-10-CM

## 2023-10-18 DIAGNOSIS — Z96.642 PRESENCE OF LEFT ARTIFICIAL HIP JOINT: ICD-10-CM

## 2023-10-18 DIAGNOSIS — Z88.0 ALLERGY STATUS TO PENICILLIN: ICD-10-CM

## 2023-10-18 DIAGNOSIS — Z28.310 UNVACCINATED FOR COVID-19: ICD-10-CM

## 2023-10-27 NOTE — ED BEHAVIORAL HEALTH ASSESSMENT NOTE - DOMICILE TYPE
Patient attended Phase 2 Cardiac Rehab Exercise Session. Further documentation will be scanned into the medical record upon discharge.    
Gretel Cisneros(Attending)
Private Residence

## 2023-11-02 ENCOUNTER — EMERGENCY (EMERGENCY)
Facility: HOSPITAL | Age: 83
LOS: 1 days | Discharge: HOME | End: 2023-11-02
Attending: EMERGENCY MEDICINE
Payer: MEDICARE

## 2023-11-02 ENCOUNTER — APPOINTMENT (OUTPATIENT)
Dept: ORTHOPEDIC SURGERY | Facility: CLINIC | Age: 83
End: 2023-11-02
Payer: MEDICARE

## 2023-11-02 VITALS
HEIGHT: 59 IN | HEART RATE: 65 BPM | TEMPERATURE: 99 F | RESPIRATION RATE: 18 BRPM | WEIGHT: 126.99 LBS | OXYGEN SATURATION: 97 % | DIASTOLIC BLOOD PRESSURE: 96 MMHG | SYSTOLIC BLOOD PRESSURE: 161 MMHG

## 2023-11-02 DIAGNOSIS — E89.0 POSTPROCEDURAL HYPOTHYROIDISM: Chronic | ICD-10-CM

## 2023-11-02 DIAGNOSIS — Z98.890 OTHER SPECIFIED POSTPROCEDURAL STATES: Chronic | ICD-10-CM

## 2023-11-02 DIAGNOSIS — S72.001A FRACTURE OF UNSPECIFIED PART OF NECK OF RIGHT FEMUR, INITIAL ENCOUNTER FOR CLOSED FRACTURE: Chronic | ICD-10-CM

## 2023-11-02 PROCEDURE — 99283 EMERGENCY DEPT VISIT LOW MDM: CPT

## 2023-11-03 VITALS
TEMPERATURE: 98 F | HEART RATE: 64 BPM | DIASTOLIC BLOOD PRESSURE: 70 MMHG | RESPIRATION RATE: 18 BRPM | OXYGEN SATURATION: 98 % | SYSTOLIC BLOOD PRESSURE: 155 MMHG

## 2023-11-03 LAB
ALBUMIN SERPL ELPH-MCNC: 3.9 G/DL — SIGNIFICANT CHANGE UP (ref 3.3–5)
ALBUMIN SERPL ELPH-MCNC: 3.9 G/DL — SIGNIFICANT CHANGE UP (ref 3.3–5)
ALP SERPL-CCNC: 87 U/L — SIGNIFICANT CHANGE UP (ref 40–120)
ALP SERPL-CCNC: 87 U/L — SIGNIFICANT CHANGE UP (ref 40–120)
ALT FLD-CCNC: 24 U/L — SIGNIFICANT CHANGE UP (ref 10–45)
ALT FLD-CCNC: 24 U/L — SIGNIFICANT CHANGE UP (ref 10–45)
ANION GAP SERPL CALC-SCNC: 13 MMOL/L — SIGNIFICANT CHANGE UP (ref 5–17)
ANION GAP SERPL CALC-SCNC: 13 MMOL/L — SIGNIFICANT CHANGE UP (ref 5–17)
APPEARANCE UR: CLEAR — SIGNIFICANT CHANGE UP
APPEARANCE UR: CLEAR — SIGNIFICANT CHANGE UP
APTT BLD: 32 SEC — SIGNIFICANT CHANGE UP (ref 24.5–35.6)
APTT BLD: 32 SEC — SIGNIFICANT CHANGE UP (ref 24.5–35.6)
AST SERPL-CCNC: 36 U/L — SIGNIFICANT CHANGE UP (ref 10–40)
AST SERPL-CCNC: 36 U/L — SIGNIFICANT CHANGE UP (ref 10–40)
BACTERIA # UR AUTO: NEGATIVE /HPF — SIGNIFICANT CHANGE UP
BACTERIA # UR AUTO: NEGATIVE /HPF — SIGNIFICANT CHANGE UP
BASE EXCESS BLDV CALC-SCNC: 3.5 MMOL/L — HIGH (ref -2–3)
BASE EXCESS BLDV CALC-SCNC: 3.5 MMOL/L — HIGH (ref -2–3)
BASOPHILS # BLD AUTO: 0.08 K/UL — SIGNIFICANT CHANGE UP (ref 0–0.2)
BASOPHILS # BLD AUTO: 0.08 K/UL — SIGNIFICANT CHANGE UP (ref 0–0.2)
BASOPHILS NFR BLD AUTO: 0.9 % — SIGNIFICANT CHANGE UP (ref 0–2)
BASOPHILS NFR BLD AUTO: 0.9 % — SIGNIFICANT CHANGE UP (ref 0–2)
BILIRUB SERPL-MCNC: 0.3 MG/DL — SIGNIFICANT CHANGE UP (ref 0.2–1.2)
BILIRUB SERPL-MCNC: 0.3 MG/DL — SIGNIFICANT CHANGE UP (ref 0.2–1.2)
BILIRUB UR-MCNC: NEGATIVE — SIGNIFICANT CHANGE UP
BILIRUB UR-MCNC: NEGATIVE — SIGNIFICANT CHANGE UP
BUN SERPL-MCNC: 17 MG/DL — SIGNIFICANT CHANGE UP (ref 7–23)
BUN SERPL-MCNC: 17 MG/DL — SIGNIFICANT CHANGE UP (ref 7–23)
CA-I SERPL-SCNC: 1.21 MMOL/L — SIGNIFICANT CHANGE UP (ref 1.15–1.33)
CA-I SERPL-SCNC: 1.21 MMOL/L — SIGNIFICANT CHANGE UP (ref 1.15–1.33)
CALCIUM SERPL-MCNC: 10 MG/DL — SIGNIFICANT CHANGE UP (ref 8.4–10.5)
CALCIUM SERPL-MCNC: 10 MG/DL — SIGNIFICANT CHANGE UP (ref 8.4–10.5)
CAST: 0 /LPF — SIGNIFICANT CHANGE UP (ref 0–4)
CAST: 0 /LPF — SIGNIFICANT CHANGE UP (ref 0–4)
CHLORIDE BLDV-SCNC: 104 MMOL/L — SIGNIFICANT CHANGE UP (ref 96–108)
CHLORIDE BLDV-SCNC: 104 MMOL/L — SIGNIFICANT CHANGE UP (ref 96–108)
CHLORIDE SERPL-SCNC: 100 MMOL/L — SIGNIFICANT CHANGE UP (ref 96–108)
CHLORIDE SERPL-SCNC: 100 MMOL/L — SIGNIFICANT CHANGE UP (ref 96–108)
CO2 BLDV-SCNC: 29 MMOL/L — HIGH (ref 22–26)
CO2 BLDV-SCNC: 29 MMOL/L — HIGH (ref 22–26)
CO2 SERPL-SCNC: 23 MMOL/L — SIGNIFICANT CHANGE UP (ref 22–31)
CO2 SERPL-SCNC: 23 MMOL/L — SIGNIFICANT CHANGE UP (ref 22–31)
COLOR SPEC: YELLOW — SIGNIFICANT CHANGE UP
COLOR SPEC: YELLOW — SIGNIFICANT CHANGE UP
CREAT SERPL-MCNC: 0.81 MG/DL — SIGNIFICANT CHANGE UP (ref 0.5–1.3)
CREAT SERPL-MCNC: 0.81 MG/DL — SIGNIFICANT CHANGE UP (ref 0.5–1.3)
DIFF PNL FLD: NEGATIVE — SIGNIFICANT CHANGE UP
DIFF PNL FLD: NEGATIVE — SIGNIFICANT CHANGE UP
EGFR: 73 ML/MIN/1.73M2 — SIGNIFICANT CHANGE UP
EGFR: 73 ML/MIN/1.73M2 — SIGNIFICANT CHANGE UP
EOSINOPHIL # BLD AUTO: 0.12 K/UL — SIGNIFICANT CHANGE UP (ref 0–0.5)
EOSINOPHIL # BLD AUTO: 0.12 K/UL — SIGNIFICANT CHANGE UP (ref 0–0.5)
EOSINOPHIL NFR BLD AUTO: 1.3 % — SIGNIFICANT CHANGE UP (ref 0–6)
EOSINOPHIL NFR BLD AUTO: 1.3 % — SIGNIFICANT CHANGE UP (ref 0–6)
FLUAV AG NPH QL: SIGNIFICANT CHANGE UP
FLUAV AG NPH QL: SIGNIFICANT CHANGE UP
FLUBV AG NPH QL: SIGNIFICANT CHANGE UP
FLUBV AG NPH QL: SIGNIFICANT CHANGE UP
GAS PNL BLDV: 131 MMOL/L — LOW (ref 136–145)
GAS PNL BLDV: 131 MMOL/L — LOW (ref 136–145)
GAS PNL BLDV: SIGNIFICANT CHANGE UP
GAS PNL BLDV: SIGNIFICANT CHANGE UP
GLUCOSE BLDV-MCNC: 99 MG/DL — SIGNIFICANT CHANGE UP (ref 70–99)
GLUCOSE BLDV-MCNC: 99 MG/DL — SIGNIFICANT CHANGE UP (ref 70–99)
GLUCOSE SERPL-MCNC: 101 MG/DL — HIGH (ref 70–99)
GLUCOSE SERPL-MCNC: 101 MG/DL — HIGH (ref 70–99)
GLUCOSE UR QL: NEGATIVE MG/DL — SIGNIFICANT CHANGE UP
GLUCOSE UR QL: NEGATIVE MG/DL — SIGNIFICANT CHANGE UP
HCO3 BLDV-SCNC: 28 MMOL/L — SIGNIFICANT CHANGE UP (ref 22–29)
HCO3 BLDV-SCNC: 28 MMOL/L — SIGNIFICANT CHANGE UP (ref 22–29)
HCT VFR BLD CALC: 36.3 % — SIGNIFICANT CHANGE UP (ref 34.5–45)
HCT VFR BLD CALC: 36.3 % — SIGNIFICANT CHANGE UP (ref 34.5–45)
HCT VFR BLDA CALC: 40 % — SIGNIFICANT CHANGE UP (ref 34.5–46.5)
HCT VFR BLDA CALC: 40 % — SIGNIFICANT CHANGE UP (ref 34.5–46.5)
HGB BLD CALC-MCNC: 13.4 G/DL — SIGNIFICANT CHANGE UP (ref 11.7–16.1)
HGB BLD CALC-MCNC: 13.4 G/DL — SIGNIFICANT CHANGE UP (ref 11.7–16.1)
HGB BLD-MCNC: 12.3 G/DL — SIGNIFICANT CHANGE UP (ref 11.5–15.5)
HGB BLD-MCNC: 12.3 G/DL — SIGNIFICANT CHANGE UP (ref 11.5–15.5)
IMM GRANULOCYTES NFR BLD AUTO: 0.4 % — SIGNIFICANT CHANGE UP (ref 0–0.9)
IMM GRANULOCYTES NFR BLD AUTO: 0.4 % — SIGNIFICANT CHANGE UP (ref 0–0.9)
INR BLD: 1.06 RATIO — SIGNIFICANT CHANGE UP (ref 0.85–1.18)
INR BLD: 1.06 RATIO — SIGNIFICANT CHANGE UP (ref 0.85–1.18)
KETONES UR-MCNC: NEGATIVE MG/DL — SIGNIFICANT CHANGE UP
KETONES UR-MCNC: NEGATIVE MG/DL — SIGNIFICANT CHANGE UP
LACTATE BLDV-MCNC: 1.6 MMOL/L — SIGNIFICANT CHANGE UP (ref 0.5–2)
LACTATE BLDV-MCNC: 1.6 MMOL/L — SIGNIFICANT CHANGE UP (ref 0.5–2)
LEUKOCYTE ESTERASE UR-ACNC: ABNORMAL
LEUKOCYTE ESTERASE UR-ACNC: ABNORMAL
LIDOCAIN IGE QN: 18 U/L — SIGNIFICANT CHANGE UP (ref 7–60)
LIDOCAIN IGE QN: 18 U/L — SIGNIFICANT CHANGE UP (ref 7–60)
LYMPHOCYTES # BLD AUTO: 1.86 K/UL — SIGNIFICANT CHANGE UP (ref 1–3.3)
LYMPHOCYTES # BLD AUTO: 1.86 K/UL — SIGNIFICANT CHANGE UP (ref 1–3.3)
LYMPHOCYTES # BLD AUTO: 19.8 % — SIGNIFICANT CHANGE UP (ref 13–44)
LYMPHOCYTES # BLD AUTO: 19.8 % — SIGNIFICANT CHANGE UP (ref 13–44)
MAGNESIUM SERPL-MCNC: 2 MG/DL — SIGNIFICANT CHANGE UP (ref 1.6–2.6)
MAGNESIUM SERPL-MCNC: 2 MG/DL — SIGNIFICANT CHANGE UP (ref 1.6–2.6)
MCHC RBC-ENTMCNC: 31.5 PG — SIGNIFICANT CHANGE UP (ref 27–34)
MCHC RBC-ENTMCNC: 31.5 PG — SIGNIFICANT CHANGE UP (ref 27–34)
MCHC RBC-ENTMCNC: 33.9 GM/DL — SIGNIFICANT CHANGE UP (ref 32–36)
MCHC RBC-ENTMCNC: 33.9 GM/DL — SIGNIFICANT CHANGE UP (ref 32–36)
MCV RBC AUTO: 92.8 FL — SIGNIFICANT CHANGE UP (ref 80–100)
MCV RBC AUTO: 92.8 FL — SIGNIFICANT CHANGE UP (ref 80–100)
MONOCYTES # BLD AUTO: 0.81 K/UL — SIGNIFICANT CHANGE UP (ref 0–0.9)
MONOCYTES # BLD AUTO: 0.81 K/UL — SIGNIFICANT CHANGE UP (ref 0–0.9)
MONOCYTES NFR BLD AUTO: 8.6 % — SIGNIFICANT CHANGE UP (ref 2–14)
MONOCYTES NFR BLD AUTO: 8.6 % — SIGNIFICANT CHANGE UP (ref 2–14)
NEUTROPHILS # BLD AUTO: 6.47 K/UL — SIGNIFICANT CHANGE UP (ref 1.8–7.4)
NEUTROPHILS # BLD AUTO: 6.47 K/UL — SIGNIFICANT CHANGE UP (ref 1.8–7.4)
NEUTROPHILS NFR BLD AUTO: 69 % — SIGNIFICANT CHANGE UP (ref 43–77)
NEUTROPHILS NFR BLD AUTO: 69 % — SIGNIFICANT CHANGE UP (ref 43–77)
NITRITE UR-MCNC: NEGATIVE — SIGNIFICANT CHANGE UP
NITRITE UR-MCNC: NEGATIVE — SIGNIFICANT CHANGE UP
NRBC # BLD: 0 /100 WBCS — SIGNIFICANT CHANGE UP (ref 0–0)
NRBC # BLD: 0 /100 WBCS — SIGNIFICANT CHANGE UP (ref 0–0)
NT-PROBNP SERPL-SCNC: 490 PG/ML — HIGH (ref 0–300)
NT-PROBNP SERPL-SCNC: 490 PG/ML — HIGH (ref 0–300)
PCO2 BLDV: 40 MMHG — SIGNIFICANT CHANGE UP (ref 39–42)
PCO2 BLDV: 40 MMHG — SIGNIFICANT CHANGE UP (ref 39–42)
PH BLDV: 7.45 — HIGH (ref 7.32–7.43)
PH BLDV: 7.45 — HIGH (ref 7.32–7.43)
PH UR: 7.5 — SIGNIFICANT CHANGE UP (ref 5–8)
PH UR: 7.5 — SIGNIFICANT CHANGE UP (ref 5–8)
PLATELET # BLD AUTO: 260 K/UL — SIGNIFICANT CHANGE UP (ref 150–400)
PLATELET # BLD AUTO: 260 K/UL — SIGNIFICANT CHANGE UP (ref 150–400)
PO2 BLDV: 45 MMHG — SIGNIFICANT CHANGE UP (ref 25–45)
PO2 BLDV: 45 MMHG — SIGNIFICANT CHANGE UP (ref 25–45)
POTASSIUM BLDV-SCNC: 3.6 MMOL/L — SIGNIFICANT CHANGE UP (ref 3.5–5.1)
POTASSIUM BLDV-SCNC: 3.6 MMOL/L — SIGNIFICANT CHANGE UP (ref 3.5–5.1)
POTASSIUM SERPL-MCNC: 3.6 MMOL/L — SIGNIFICANT CHANGE UP (ref 3.5–5.3)
POTASSIUM SERPL-MCNC: 3.6 MMOL/L — SIGNIFICANT CHANGE UP (ref 3.5–5.3)
POTASSIUM SERPL-SCNC: 3.6 MMOL/L — SIGNIFICANT CHANGE UP (ref 3.5–5.3)
POTASSIUM SERPL-SCNC: 3.6 MMOL/L — SIGNIFICANT CHANGE UP (ref 3.5–5.3)
PROT SERPL-MCNC: 7.2 G/DL — SIGNIFICANT CHANGE UP (ref 6–8.3)
PROT SERPL-MCNC: 7.2 G/DL — SIGNIFICANT CHANGE UP (ref 6–8.3)
PROT UR-MCNC: NEGATIVE MG/DL — SIGNIFICANT CHANGE UP
PROT UR-MCNC: NEGATIVE MG/DL — SIGNIFICANT CHANGE UP
PROTHROM AB SERPL-ACNC: 11.1 SEC — SIGNIFICANT CHANGE UP (ref 9.5–13)
PROTHROM AB SERPL-ACNC: 11.1 SEC — SIGNIFICANT CHANGE UP (ref 9.5–13)
RBC # BLD: 3.91 M/UL — SIGNIFICANT CHANGE UP (ref 3.8–5.2)
RBC # BLD: 3.91 M/UL — SIGNIFICANT CHANGE UP (ref 3.8–5.2)
RBC # FLD: 13.6 % — SIGNIFICANT CHANGE UP (ref 10.3–14.5)
RBC # FLD: 13.6 % — SIGNIFICANT CHANGE UP (ref 10.3–14.5)
RBC CASTS # UR COMP ASSIST: 3 /HPF — SIGNIFICANT CHANGE UP (ref 0–4)
RBC CASTS # UR COMP ASSIST: 3 /HPF — SIGNIFICANT CHANGE UP (ref 0–4)
RSV RNA NPH QL NAA+NON-PROBE: SIGNIFICANT CHANGE UP
RSV RNA NPH QL NAA+NON-PROBE: SIGNIFICANT CHANGE UP
SAO2 % BLDV: 78.4 % — SIGNIFICANT CHANGE UP (ref 67–88)
SAO2 % BLDV: 78.4 % — SIGNIFICANT CHANGE UP (ref 67–88)
SARS-COV-2 RNA SPEC QL NAA+PROBE: SIGNIFICANT CHANGE UP
SARS-COV-2 RNA SPEC QL NAA+PROBE: SIGNIFICANT CHANGE UP
SODIUM SERPL-SCNC: 136 MMOL/L — SIGNIFICANT CHANGE UP (ref 135–145)
SODIUM SERPL-SCNC: 136 MMOL/L — SIGNIFICANT CHANGE UP (ref 135–145)
SP GR SPEC: 1.02 — SIGNIFICANT CHANGE UP (ref 1–1.03)
SP GR SPEC: 1.02 — SIGNIFICANT CHANGE UP (ref 1–1.03)
SQUAMOUS # UR AUTO: 0 /HPF — SIGNIFICANT CHANGE UP (ref 0–5)
SQUAMOUS # UR AUTO: 0 /HPF — SIGNIFICANT CHANGE UP (ref 0–5)
TROPONIN T, HIGH SENSITIVITY RESULT: 12 NG/L — SIGNIFICANT CHANGE UP (ref 0–51)
UROBILINOGEN FLD QL: 0.2 MG/DL — SIGNIFICANT CHANGE UP (ref 0.2–1)
UROBILINOGEN FLD QL: 0.2 MG/DL — SIGNIFICANT CHANGE UP (ref 0.2–1)
WBC # BLD: 9.38 K/UL — SIGNIFICANT CHANGE UP (ref 3.8–10.5)
WBC # BLD: 9.38 K/UL — SIGNIFICANT CHANGE UP (ref 3.8–10.5)
WBC # FLD AUTO: 9.38 K/UL — SIGNIFICANT CHANGE UP (ref 3.8–10.5)
WBC # FLD AUTO: 9.38 K/UL — SIGNIFICANT CHANGE UP (ref 3.8–10.5)
WBC UR QL: 0 /HPF — SIGNIFICANT CHANGE UP (ref 0–5)
WBC UR QL: 0 /HPF — SIGNIFICANT CHANGE UP (ref 0–5)

## 2023-11-03 PROCEDURE — 87637 SARSCOV2&INF A&B&RSV AMP PRB: CPT

## 2023-11-03 PROCEDURE — 81001 URINALYSIS AUTO W/SCOPE: CPT

## 2023-11-03 PROCEDURE — 84132 ASSAY OF SERUM POTASSIUM: CPT

## 2023-11-03 PROCEDURE — 85014 HEMATOCRIT: CPT

## 2023-11-03 PROCEDURE — G0378: CPT

## 2023-11-03 PROCEDURE — 70496 CT ANGIOGRAPHY HEAD: CPT | Mod: 26,MA

## 2023-11-03 PROCEDURE — 83690 ASSAY OF LIPASE: CPT

## 2023-11-03 PROCEDURE — 70450 CT HEAD/BRAIN W/O DYE: CPT | Mod: 26,MA,59

## 2023-11-03 PROCEDURE — 80053 COMPREHEN METABOLIC PANEL: CPT

## 2023-11-03 PROCEDURE — 84484 ASSAY OF TROPONIN QUANT: CPT

## 2023-11-03 PROCEDURE — 70496 CT ANGIOGRAPHY HEAD: CPT | Mod: MA

## 2023-11-03 PROCEDURE — 83880 ASSAY OF NATRIURETIC PEPTIDE: CPT

## 2023-11-03 PROCEDURE — 84295 ASSAY OF SERUM SODIUM: CPT

## 2023-11-03 PROCEDURE — 70498 CT ANGIOGRAPHY NECK: CPT | Mod: 26,MA

## 2023-11-03 PROCEDURE — 82330 ASSAY OF CALCIUM: CPT

## 2023-11-03 PROCEDURE — 93005 ELECTROCARDIOGRAM TRACING: CPT

## 2023-11-03 PROCEDURE — 82947 ASSAY GLUCOSE BLOOD QUANT: CPT

## 2023-11-03 PROCEDURE — 99285 EMERGENCY DEPT VISIT HI MDM: CPT | Mod: 25

## 2023-11-03 PROCEDURE — 71045 X-RAY EXAM CHEST 1 VIEW: CPT

## 2023-11-03 PROCEDURE — 82435 ASSAY OF BLOOD CHLORIDE: CPT

## 2023-11-03 PROCEDURE — 99236 HOSP IP/OBS SAME DATE HI 85: CPT

## 2023-11-03 PROCEDURE — 85018 HEMOGLOBIN: CPT

## 2023-11-03 PROCEDURE — 70450 CT HEAD/BRAIN W/O DYE: CPT | Mod: MA

## 2023-11-03 PROCEDURE — 85025 COMPLETE CBC W/AUTO DIFF WBC: CPT

## 2023-11-03 PROCEDURE — 85730 THROMBOPLASTIN TIME PARTIAL: CPT

## 2023-11-03 PROCEDURE — 87086 URINE CULTURE/COLONY COUNT: CPT

## 2023-11-03 PROCEDURE — 70498 CT ANGIOGRAPHY NECK: CPT | Mod: MA

## 2023-11-03 PROCEDURE — 83735 ASSAY OF MAGNESIUM: CPT

## 2023-11-03 PROCEDURE — 82803 BLOOD GASES ANY COMBINATION: CPT

## 2023-11-03 PROCEDURE — 71045 X-RAY EXAM CHEST 1 VIEW: CPT | Mod: 26

## 2023-11-03 PROCEDURE — 85610 PROTHROMBIN TIME: CPT

## 2023-11-03 PROCEDURE — 83605 ASSAY OF LACTIC ACID: CPT

## 2023-11-03 RX ORDER — LEVOTHYROXINE SODIUM 125 MCG
75 TABLET ORAL DAILY
Refills: 0 | Status: DISCONTINUED | OUTPATIENT
Start: 2023-11-03 | End: 2023-11-06

## 2023-11-03 RX ORDER — LOSARTAN POTASSIUM 100 MG/1
100 TABLET, FILM COATED ORAL DAILY
Refills: 0 | Status: DISCONTINUED | OUTPATIENT
Start: 2023-11-03 | End: 2023-11-06

## 2023-11-03 RX ORDER — CARVEDILOL PHOSPHATE 80 MG/1
25 CAPSULE, EXTENDED RELEASE ORAL ONCE
Refills: 0 | Status: COMPLETED | OUTPATIENT
Start: 2023-11-03 | End: 2023-11-03

## 2023-11-03 RX ORDER — HYDRALAZINE HCL 50 MG
50 TABLET ORAL
Refills: 0 | Status: DISCONTINUED | OUTPATIENT
Start: 2023-11-03 | End: 2023-11-06

## 2023-11-03 RX ORDER — DILTIAZEM HCL 120 MG
120 CAPSULE, EXT RELEASE 24 HR ORAL DAILY
Refills: 0 | Status: DISCONTINUED | OUTPATIENT
Start: 2023-11-03 | End: 2023-11-06

## 2023-11-03 RX ADMIN — Medication 120 MILLIGRAM(S): at 10:07

## 2023-11-03 RX ADMIN — LOSARTAN POTASSIUM 100 MILLIGRAM(S): 100 TABLET, FILM COATED ORAL at 06:25

## 2023-11-03 RX ADMIN — Medication 75 MICROGRAM(S): at 09:01

## 2023-11-03 RX ADMIN — CARVEDILOL PHOSPHATE 25 MILLIGRAM(S): 80 CAPSULE, EXTENDED RELEASE ORAL at 10:07

## 2023-11-03 NOTE — ED PROVIDER NOTE - NSFOLLOWUPINSTRUCTIONS_ED_ALL_ED_FT
You were seen in the Emergency Department for  recent headache, Chest pain, shortness of breath.   your symptoms resolved on its own. Findings came back without concern for disorders requiring admission at this time.    1) Advance activity as tolerated.   2) Continue all previously prescribed medications as directed.    3) Follow up with your primary care physician in 24-48 hours - take copies of your results.    4) Return to the Emergency Department for worsening or persistent symptoms, and/or ANY NEW OR CONCERNING SYMPTOMS.     Chest Pain    Chest pain can be caused by many different conditions which may or may not be dangerous. Causes include heartburn, lung infections, heart attack, blood clot in lungs, skin infections, strain or damage to muscle, cartilage, or bones, etc. In addition to a history and physical examination, an electrocardiogram (ECG) or other lab tests may have been performed to determine the cause of your chest pain. Follow up with your primary care provider or with a cardiologist as instructed.     SEEK IMMEDIATE MEDICAL CARE IF YOU HAVE ANY OF THE FOLLOWING SYMPTOMS: worsening chest pain, coughing up blood, unexplained back/neck/jaw pain, severe abdominal pain, dizziness or lightheadedness, fainting, shortness of breath, sweaty or clammy skin, vomiting, or racing heart beat. These symptoms may represent a serious problem that is an emergency. Do not wait to see if the symptoms will go away. Get medical help right away. Call 911 and do not drive yourself to the hospital.     Headache    A headache is pain or discomfort felt around the head or neck area. The specific cause of a headache may not be found as there are many types including tension headaches, migraine headaches, and cluster headaches. Watch your condition for any changes. Things you can do to manage your pain include taking over the counter and prescription medications as instructed by your health care provider, lying down in a dark quiet room, limiting stress, getting regular sleep, and refraining from alcohol and tobacco products.    SEEK IMMEDIATE MEDICAL CARE IF YOU HAVE ANY OF THE FOLLOWING SYMPTOMS: fever, vomiting, stiff neck, loss of vision, problems with speech, muscle weakness, loss of balance, trouble walking, passing out, or confusion.     Shortness of breath    Shortness of breath (dyspnea) means you have trouble breathing and could indicate a medical problem. Causes include lung disease, heart disease, low amount of red blood cells (anemia), poor physical fitness, being overweight, smoking, etc. Your health care provider today may not be able to find a cause for your shortness of breath after your exam. In this case, it is important to have a follow-up exam with your primary care physician as instructed. If medicines were prescribed, take them as directed for the full length of time directed. Refrain from tobacco products.    SEEK IMMEDIATE MEDICAL CARE IF YOU HAVE ANY OF THE FOLLOWING SYMPTOMS: worsening shortness of breath, chest pain, back pain, abdominal pain, fever, coughing up blood, lightheadedness/dizziness.

## 2023-11-03 NOTE — ED ADULT NURSE NOTE - OBJECTIVE STATEMENT
82 YO female  via ems in presenting with complaints of  multiple complaints. pt reports 3 days ago experienced lower leg swelling which was worse in the left leg and was scheduled to get out pt duplex, however, did not go. pt also reports 3 days of headache which was worse today , however took tylenols  and xanax with improvement. pt also reporting hypertension with bp in the 170s systolic. denies cp,m sob, fevers, n/v.   Pt Axox4, gross neuro intact, . respirations even, & non-labored Abdomen soft, non-tender, non-distended. Skin warm, dry, and intact. Pt placed in position of comfort. Pt educated on call bell system and provided call bell. Bed in lowest position, wheels locked, appropriate side rails raised. Pt denies needs at this time.

## 2023-11-03 NOTE — ED PROVIDER NOTE - ATTENDING CONTRIBUTION TO CARE
Patient presents with a constellation of symptoms including left-sided headache along with left-sided facial numbness, now resolved, along with persistent left-sided chest/breast pain, shortness of breath, in the setting of elevated blood pressure at home.  Patient states she experiences these symptoms often in the setting of elevated blood pressure but it was worse than normal today which prompted the ED visit.  On arrival patient is moderately hypertensive, neurologically intact without any focal deficits, breathing comfortably in no distress, no significant abnormalities.  Patient will get a work-up to rule out intracranial pathology versus endorgan damage from hypertensive crisis versus ACS or any acute pulmonary pathology.

## 2023-11-03 NOTE — ED CDU PROVIDER INITIAL DAY NOTE - NS ED ATTENDING STATEMENT MOD
Attending with This was a shared visit with the MAGDA. I reviewed and verified the documentation and independently performed the documented:

## 2023-11-03 NOTE — ED CDU PROVIDER INITIAL DAY NOTE - OBJECTIVE STATEMENT
82yo female presents with c/o left sided headache and facial numbness since last night. pt states her bp was high as well. Feels much better now but is concerned about elevated bp. no fevers, chills, chest pain, sob, n/v/d.

## 2023-11-03 NOTE — CONSULT NOTE ADULT - SUBJECTIVE AND OBJECTIVE BOX
CARDIOLOGY CONSULT - Dr. Calle         HPI:   84yo female presents with c/o left sided headache and facial numbness since last night. pt states her bp was high as well. Feels much better now but is concerned about elevated bp. no fevers, chills, chest pain, sob, n/v/d.  outpt echo 10/5/23 There is normal LV systolic function. The estimated LV ejection fraction is normal at 60-65%      PAST MEDICAL HISTORY:  Hypertension, Hyperlipidemia, Thyroid Cancer, Multifocal Atrial Tachycardia, Aortic Regurgitation, Mitral Regurgitation, Colitis, Hiatal hernia, Duodenal Ulcer, s/p Clipping 2022, Anemia    PAST SURGICAL HISTORY:  Hip Surgery    SOCIAL HISTORY:  Tobacco History: Denies; Alcohol: denies; Caffeine: Decaf-one cup daily; Number of children # 3    FAMILY HISTORY:  Father: CVA; Mother: MI; Sibling: MI    ALLERGIES: Lisinopril, Severity: Mild, Symptoms: ACE cough    HOME MEDICATIONS:   XANAX 1 MG....................Sig: for 30 Days Qty: 45, As Needed, 1-2 tabs daily as needed  SYSTANE PRESERVED.............Si solution QD for 90 Days Qty: 90  VITAMIN C 500 MG..............Si tablet 2 times per day for 90 Days Qty: 180  SUCRALFATE 1 G/10 ML..........Sig: 10 Milliliter every 6 hours for 90 Days Qty: 3600  PANTOPRAZOLE 40 MG............Si delayed release tablet 2 times per day for 90 Days Qty: 180  MAGNESIUM OXIDE 500 MG........Si tablet QD for 90 Days Qty: 90  LIDOCAINE 0.05 MG/MG MEDICATED PATCH ..Sig: 3 patches every 12 hours for 30 Days Qty: 180  OLMESARTAN 40 MG..............Si tablet QD for 90 Days Qty: 90  ASPIRIN ADULT EC LOW DOSE 81 MG..Si delayed release tablet QD for 90 Days Qty: 90  HYDRALAZINE 50 MG.............Si tablet 2 times per day for 90 Days Qty: 180  LOVASTATIN 20 MG..............Si tablet QD for 90 Days Qty: 90  SYNTHROID 75 MCG (0.075 MG)...Si tablet QD for 90 Days Qty: 90  DILTIAZEM 120 MG/24 HOURS.....Si capsule, extended release QD for 90 Days Qty: 90  CARVEDILOL 25 MG..............Si tablet every 12 hours for 90 Days Qty: 180  BUPROPION 100 MG/12 HOURS.....Si tablet, extended release 2 times per day for 90 Days Qty: 180  AMITRIPTYLINE 25 MG...........Si tablet QD for 90 Days Qty: 90  ease: 1 tab(s) orally 2 times a day (2022 10:43)      MEDICATIONS  (STANDING):  carvedilol 25 milliGRAM(s) Oral once  diltiazem    milliGRAM(s) Oral daily  hydrALAZINE 50 milliGRAM(s) Oral two times a day  levothyroxine 75 MICROGram(s) Oral daily  losartan 100 milliGRAM(s) Oral daily      PREVIOUS DIAGNOSTIC TESTING:    [ ] Echocardiogram:  [ ]  Catheterization:  [ ] Stress Test:  	      	    REVIEW OF SYSTEMS:  CONSTITUTIONAL: No fever, weight loss, or fatigue  EYES: No eye pain, visual disturbances, or discharge  ENMT:  No difficulty hearing, tinnitus, vertigo; No sinus or throat pain  NECK: No pain or stiffness  RESPIRATORY: No cough, wheezing, chills or hemoptysis; No Shortness of Breath  CARDIOVASCULAR: No chest pain, palpitations, passing out, dizziness, or leg swelling  GASTROINTESTINAL: No abdominal or epigastric pain. No nausea, vomiting, or hematemesis; No diarrhea or constipation. No melena or hematochezia.  GENITOURINARY: No dysuria, frequency, hematuria, or incontinence  NEUROLOGICAL: No headaches, memory loss, loss of strength, numbness, or tremors  SKIN: No itching, burning, rashes, or lesions   	    [ ] All others negative	  [ ] Unable to obtain    PHYSICAL EXAM:  T(C): 36.7 (23 @ 05:30), Max: 37.1 (23 @ 23:19)  HR: 61 (23 @ 08:50) (61 - 77)  BP: 179/80 (23 @ 08:50) (157/97 - 187/72)  RR: 16 (23 @ 08:50) (15 - 20)  SpO2: 98% (23 @ 08:50) (96% - 100%)  Wt(kg): --  I&O's Summary      Appearance: Normal	  Psychiatry: A & O x 3, Mood & affect appropriate  HEENT:   Normal oral mucosa, PERRL, EOMI	  Lymphatic: No lymphadenopathy  Cardiovascular: Normal S1 S2,RRR, No JVD, No murmurs  Respiratory: Lungs clear to auscultation	  Gastrointestinal:  Soft, Non-tender, + BS	  Skin: No rashes, No ecchymoses, No cyanosis	  Neurologic: Non-focal  Extremities: Normal range of motion, No clubbing, cyanosis or edema  Vascular: Peripheral pulses palpable 2+ bilaterally    TELEMETRY: 	    ECG:  	  RADIOLOGY:  OTHER: 	  	  LABS:	 	    CARDIAC MARKERS:  Troponin T, High Sensitivity Result: 12 ng/L ( @ 03:08)  Troponin T, High Sensitivity Result: 12 ng/L ( @ 01:23)                                  12.3   9.38  )-----------( 260      ( 2023 01:23 )             36.3         136  |  100  |  17  ----------------------------<  101<H>  3.6   |  23  |  0.81    Ca    10.0      :  Mg     2.0         TPro  7.2  /  Alb  3.9  /  TBili  0.3  /  DBili  x   /  AST  36  /  ALT  24  /  AlkPhos  87      PT/INR - ( :23 )   PT: 11.1 sec;   INR: 1.06 ratio         PTT - ( :23 )  PTT:32.0 sec  proBNP:   Lipid Profile:   HgA1c:   TSH:        CARDIOLOGY CONSULT - Dr. Calle         HPI:   84yo female presents with c/o left sided headache and facial numbness since last night. pt states her bp was high as well. Feels much better now but is concerned about elevated bp. no fevers, chills, chest pain, sob, n/v/d.  outpt echo 10/5/23 There is normal LV systolic function. The estimated LV ejection fraction is normal at 60-65%. she reports to be compliant with all BP meds. states she currently take 12.5 mg coreg BID- all other meds remained the same   no cp or sob   feels better on exam         PAST MEDICAL HISTORY:  Hypertension, Hyperlipidemia, Thyroid Cancer, Multifocal Atrial Tachycardia, Aortic Regurgitation, Mitral Regurgitation, Colitis, Hiatal hernia, Duodenal Ulcer, s/p Clipping 2022, Anemia    PAST SURGICAL HISTORY:  Hip Surgery    SOCIAL HISTORY:  Tobacco History: Denies; Alcohol: denies; Caffeine: Decaf-one cup daily; Number of children # 3    FAMILY HISTORY:  Father: CVA; Mother: MI; Sibling: MI    ALLERGIES: Lisinopril, Severity: Mild, Symptoms: ACE cough    HOME MEDICATIONS:   XANAX 1 MG....................Sig: for 30 Days Qty: 45, As Needed, 1-2 tabs daily as needed  SYSTANE PRESERVED.............Si solution QD for 90 Days Qty: 90  VITAMIN C 500 MG..............Si tablet 2 times per day for 90 Days Qty: 180  SUCRALFATE 1 G/10 ML..........Sig: 10 Milliliter every 6 hours for 90 Days Qty: 3600  PANTOPRAZOLE 40 MG............Si delayed release tablet 2 times per day for 90 Days Qty: 180  MAGNESIUM OXIDE 500 MG........Si tablet QD for 90 Days Qty: 90  LIDOCAINE 0.05 MG/MG MEDICATED PATCH ..Sig: 3 patches every 12 hours for 30 Days Qty: 180  OLMESARTAN 40 MG..............Si tablet QD for 90 Days Qty: 90  ASPIRIN ADULT EC LOW DOSE 81 MG..Si delayed release tablet QD for 90 Days Qty: 90  HYDRALAZINE 50 MG.............Si tablet 2 times per day for 90 Days Qty: 180  LOVASTATIN 20 MG..............Si tablet QD for 90 Days Qty: 90  SYNTHROID 75 MCG (0.075 MG)...Si tablet QD for 90 Days Qty: 90  DILTIAZEM 120 MG/24 HOURS.....Si capsule, extended release QD for 90 Days Qty: 90  CARVEDILOL 25 MG..............Si tablet every 12 hours for 90 Days Qty: 180  BUPROPION 100 MG/12 HOURS.....Si tablet, extended release 2 times per day for 90 Days Qty: 180  AMITRIPTYLINE 25 MG...........Si tablet QD for 90 Days Qty: 90  ease: 1 tab(s) orally 2 times a day (2022 10:43)      MEDICATIONS  (STANDING):  carvedilol 25 milliGRAM(s) Oral once  diltiazem    milliGRAM(s) Oral daily  hydrALAZINE 50 milliGRAM(s) Oral two times a day  levothyroxine 75 MICROGram(s) Oral daily  losartan 100 milliGRAM(s) Oral daily      PREVIOUS DIAGNOSTIC TESTING:    [ ] Echocardiogram:  [ ]  Catheterization:  [ ] Stress Test:  	      	    REVIEW OF SYSTEMS:  CONSTITUTIONAL: No fever, weight loss, or fatigue  EYES: No eye pain, visual disturbances, or discharge  ENMT:  No difficulty hearing, tinnitus, vertigo; No sinus or throat pain  NECK: No pain or stiffness  RESPIRATORY: No cough, wheezing, chills or hemoptysis; No Shortness of Breath  CARDIOVASCULAR: No chest pain, palpitations, passing out, dizziness, or leg swelling  GASTROINTESTINAL: No abdominal or epigastric pain. No nausea, vomiting, or hematemesis; No diarrhea or constipation. No melena or hematochezia.  GENITOURINARY: No dysuria, frequency, hematuria, or incontinence  NEUROLOGICAL: No headaches, memory loss, loss of strength, numbness, or tremors  SKIN: No itching, burning, rashes, or lesions   	    [ ] All others negative	  [ ] Unable to obtain    PHYSICAL EXAM:  T(C): 36.7 (23 @ 05:30), Max: 37.1 (23 @ 23:19)  HR: 61 (23 @ 08:50) (61 - 77)  BP: 179/80 (23 @ 08:50) (157/97 - 187/72)  RR: 16 (23 @ 08:50) (15 - 20)  SpO2: 98% (23 @ 08:50) (96% - 100%)  Wt(kg): --  I&O's Summary      Appearance: Normal	  Psychiatry: A & O x 3, Mood & affect appropriate  HEENT:   Normal oral mucosa, PERRL, EOMI	  Lymphatic: No lymphadenopathy  Cardiovascular: Normal S1 S2,RRR, No JVD, No murmurs  Respiratory: Lungs clear to auscultation	  Gastrointestinal:  Soft, Non-tender, + BS	  Skin: No rashes, No ecchymoses, No cyanosis	  Neurologic: Non-focal  Extremities: Normal range of motion, No clubbing, cyanosis or edema  Vascular: Peripheral pulses palpable 2+ bilaterally    TELEMETRY: 	  nsr   ECG:  	nsr   RADIOLOGY:  < from: CT Angio Head w/ IV Cont (23 @ 03:21) >  IMPRESSION:  NONCONTRAST HEAD CT SCAN: No CT evidence of acute intracranial pathology.    CT ANGIOGRAPHY NECK:  1.  Patent cervical vasculature.  No hemodynamically significant carotid   stenosis using NASCET criteria.  No flow-limiting vertebral artery   stenosis.  No carotid or vertebral artery dissection.  2.  Incidental findings as discussed above.  Of note, an approximately 12   x 7 mm nodule versus lymph node in the anterior mediastinum, anterior to   the main pulmonary trunk, is grossly stable in size from 11 x 6 mm on   2022 and slightly increased from 8 x 5 mm on 10/07/2021.    CT ANGIOGRAPHY BRAIN:  1. No major vessel occlusion or stenosis about the [place].  2. Redemonstration of small 2 mm outpouchings arising from the   supraclinoid segments of both internal carotid arteries, which may   represent infundibular origins of small nonvisualized branch vessels   versus aneurysms.  Serial imaging may be performed over time to exclude   lesion growth.      --- End of Report ---            < end of copied text >    OTHER: 	  	  LABS:	 	    CARDIAC MARKERS:  Troponin T, High Sensitivity Result: 12 ng/L ( @ 03:08)  Troponin T, High Sensitivity Result: 12 ng/L ( @ 01:23)                                  12.3   9.38  )-----------( 260      ( 2023 01:23 )             36.3         136  |  100  |  17  ----------------------------<  101<H>  3.6   |  23  |  0.81    Ca    10.0      2023 01:23  Mg     2.0     -    TPro  7.2  /  Alb  3.9  /  TBili  0.3  /  DBili  x   /  AST  36  /  ALT  24  /  AlkPhos  87  11-    PT/INR - ( 2023 01:23 )   PT: 11.1 sec;   INR: 1.06 ratio         PTT - ( 2023 01:23 )  PTT:32.0 sec  proBNP:   Lipid Profile:   HgA1c:   TSH:

## 2023-11-03 NOTE — ED PROVIDER NOTE - PATIENT PORTAL LINK FT
You can access the FollowMyHealth Patient Portal offered by [facility] by registering at the following website: [email or web address]. By joining [employer]’s FollowMyHealth portal, you will also be able to view your health information using other applications (apps) compatible with our system.

## 2023-11-03 NOTE — ED ADULT NURSE REASSESSMENT NOTE - NS ED NURSE REASSESS COMMENT FT1
Pt was educated on the reason for COVID/flu swap. Pt denies any symptoms at the moment. is resting comfortably on stretcher. Pt placed in position of comfort. Bed in lowest position, wheels locked, appropriate side rails raised. Pt denies needs at this time.

## 2023-11-03 NOTE — CONSULT NOTE ADULT - TIME BILLING
Patient seen and examined.  Agree with above NP note.  a/p  83 year old female known to the practice with hx Hypertension, Hyperlipidemia, Thyroid Cancer, Multifocal Atrial Tachycardia, Aortic Regurgitation, Mitral Regurgitation, Colitis, Hiatal hernia, Duodenal Ulcer, s/p Clipping 7/2022, Anemia presenting with HA and elevated BP     #Hypertension  -with associated HA  -neuro imaging with no acute pathology, cva, ich  -ica findings noted, consider repeat imaging as outpt mra  -incidental anterior mediastinum ln similar ot previous imaging   -cont current meds  -inc coreg to 25mg bid  -cont olmesartan 40mg in PM  -NO ACE given ACE cough   -cont DILTIZIAM, HYDRAL as ordered   -if bp remains elevated will increase hyrdal as outpt  to 75 mg BID   -recent outpt echo revealed normal lv function and no severe valve dysfunction    #MAT.  -off ASA for gi bleed  -Continue coreg    # Aortic/Mitral Valve Disease  -stable AI/MR on recent echo  -cont to monitor    # Continue Xanax for anxiety, palpitations, insomnia    ok for DCP -- can fu as outpt    d/w er

## 2023-11-03 NOTE — CONSULT NOTE ADULT - ASSESSMENT
outpt echo 10/5/23 There is normal LV systolic function. The estimated LV ejection fraction is normal at 60-65%    a/p      Hypertension  -continue coreg 25mg bid  -olmesartan 40mg in PM  -NO ACE given co cough   -cont DILTIZIAM, HYDRAL  -echo today revealed normal lv function and no severe valve dysfunction    2. MAT.  -off ASA for gi bleed  -Continue coreg    3. Aortic/Mitral Valve Disease  -stable AI/MR on recent echo  -cont to monitor    4. Continue Xanax for anxiety, palpitations, insomnia outpt echo 10/5/23 There is normal LV systolic function. The estimated LV ejection fraction is normal at 60-65%    a/p  83 year old female known to the practice with hx Hypertension, Hyperlipidemia, Thyroid Cancer, Multifocal Atrial Tachycardia, Aortic Regurgitation, Mitral Regurgitation, Colitis, Hiatal hernia, Duodenal Ulcer, s/p Clipping 7/2022, Anemia presenting with HA and elevated BP     #Hypertension  - with HA   -cta head with no acute findings-   Redemonstration of small 2 mm outpouchings arising from the  supraclinoid segments of both internal carotid arteries, which may  represent infundibular origins of small nonvisualized branch vessels  versus aneurysms; incidental findings of approximately 12  x 7 mm nodule versus lymph node in the anterior mediastinum, anterior to  the main pulmonary trunk   -report to be taking 12.5 mg bid at home  -increase coreg to  25mg bid  -c.w olmesartan 40mg in PM  -NO ACE given co cough   -cont DILTIZIAM, HYDRAL as ordered   -if bp remains elevated will increase hyrdal as outpt  to 75 mg BID   -recent outpt echo revealed normal lv function and no severe valve dysfunction    #MAT.  -off ASA for gi bleed  -Continue coreg    # Aortic/Mitral Valve Disease  -stable AI/MR on recent echo  -cont to monitor    # Continue Xanax for anxiety, palpitations, insomnia    ok for DCP -- can fu as outpt

## 2023-11-03 NOTE — ED CDU PROVIDER DISPOSITION NOTE - ATTENDING APP SHARED VISIT CONTRIBUTION OF CARE
Attending MD Bullard:   I personally have seen and examined this patient.  Physician assistant note reviewed and agree on plan of care and except where noted.  See below for details.     Seen in CDU    83F with PMH/PSH including HTN, hypothyroid, GERD, sent to the CDU after presenting to the ED with elevated blood pressure, L sided headache, facial numbness, now improved.  Denies chest pain, shortness of breath, abdominal pain, nausea, vomiting, diarrhea, urinary complaints, dizziness, vision changes.  Seen by cards.    A/P: 83F with uncontrolled BP, L sided headache now resolved, feeling improved.  No acute issues at  this time.  Lab and radiology tests reviewed with patient.  Patient stable for discharge. Follow up instructions given, importance of follow up emphasized, return to ED parameters reviewed and patient verbalized understanding.  All questions answered, all concerns addressed.

## 2023-11-03 NOTE — ED CDU PROVIDER DISPOSITION NOTE - NSFOLLOWUPINSTRUCTIONS_ED_ALL_ED_FT
1. Rest. Stay hydrated.  2. Continue your current home medications. Take your Coreg as previously prescribed, 25mg two times per day.  3. Follow-up with your medical doctor and cardiologist in 2-3 days.  Bring your test results with you for follow-up.  4. Return to the ER if you have any worsening symptoms, chest pain, difficulty breathing, fevers, chills, weakness, or any other concerns. 1. Stay hydrated.  2. Continue your current home medications. Take your Coreg as previously prescribed, 25mg two times per day.  3. Follow-up with your medical doctor and cardiologist in 2-3 days.  Bring your test results with you for follow-up.  4. Return to the ER if you have any worsening symptoms, chest pain, difficulty breathing, fevers, chills, weakness, or any other concerns.

## 2023-11-03 NOTE — ED CDU PROVIDER INITIAL DAY NOTE - DETAILS
frequent re-evaluations  telemetry monitoring frequent re-evaluations  telemetry monitoring  bp control

## 2023-11-03 NOTE — ED CDU PROVIDER DISPOSITION NOTE - CLINICAL COURSE
82yo female presents with c/o left sided headache and facial numbness since last night. pt states her bp was high as well. Feels much better now but was concerned about elevated bp. no fevers, chills, chest pain, sob, n/v/d. sent to cdu for telemetry monitoring and bp control. pt was seen by cardiology who recommended medication adjustment. symptoms improved. bp improved. pt was discharged home with outpt followup.

## 2023-11-03 NOTE — ED PROVIDER NOTE - CLINICAL SUMMARY MEDICAL DECISION MAKING FREE TEXT BOX
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 Started randomly. Their pain/symptom is moderate to severe, constant, non mediating with rest. Reports additional symptoms of nauseous, decreased appetite.  Took every blood pressure medication today except olmesartan.    Meds/blood thinner use: None    Pharmacy: [facility]  [id number]  Took Tylenol at 10:00pm with improvement of headache.    PCP: Dr. Marline Bear, Private  Cardiologist Dr. 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 Their symptoms and exam findings are concerning for metabolic abnormality, viral illness, pna, other infection, arrhythmia. Plan to rule out ACS.    Ordered labs, imaging, medications for diagnosis, management, and treatment. 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 Started randomly. Their pain/symptom is moderate to severe, constant, non mediating with rest. Reports additional symptoms of nauseous, decreased appetite.  Took every blood pressure medication today except olmesartan.    Meds/blood thinner use: None    Pharmacy: [facility]  [id number]  Took Tylenol at 10:00pm with improvement of headache.    PCP: Dr. Marline Bear, Private  Cardiologist Dr. 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 Their symptoms and exam findings are concerning for metabolic abnormality, viral illness, pna, other infection, arrhythmia. Plan to rule out ACS.    Ordered labs, imaging, medications for diagnosis, management, and treatment.

## 2023-11-03 NOTE — ED ADULT NURSE REASSESSMENT NOTE - NS ED NURSE REASSESS COMMENT FT1
Pt states she doesn't have concerns for urine testing/results, when pt has urgency to urinate she'll inform the team.

## 2023-11-03 NOTE — ED CDU PROVIDER INITIAL DAY NOTE - PROGRESS NOTE DETAILS
Pt comfortable. No complaints. Feeling much better. Vital signs stable. seen by cardiology Dr. Calle who recommended pt increase coreg to 25mg bid as previously prescribed. pt states she was taking concetta of the dose. pt cleared for d/c from cardiology perspective. -Leila Dumas PA-C Pt comfortable. No complaints. Feeling much better. Vital signs stable. seen by cardiology Dr. Calle who recommended pt increase coreg to 25mg bid as previously prescribed. pt states she was taking half of the dose. pt cleared for d/c from cardiology perspective. redemonstration of incidental brain and lung findings reviewed with pt and family. -Leila Dumas PA-C

## 2023-11-03 NOTE — ED PROVIDER NOTE - PROGRESS NOTE DETAILS
Patient requesting discharge without urine results.  Explained to patient that they could possibly have a UTI, patient however denies any symptoms related to UTI at this time.  Plan to discharge patient with follow-up with their PCP. Improvement on symptoms. Spoke to patient/family about results including incidental findings. Plan to discharge patient. Patient given PCP follow up and return precautions. Patient/family agrees with plan.

## 2023-11-03 NOTE — ED ADULT NURSE REASSESSMENT NOTE - NS ED NURSE REASSESS COMMENT FT1
Pt states having to urinate, pt was wheeled to the bathroom and provided urine sample. Urine was collect and sent to lab.

## 2023-11-03 NOTE — ED ADULT NURSE REASSESSMENT NOTE - NS ED NURSE REASSESS COMMENT FT1
Received report from Adele RN. Patient resting in bed with no acute distress noted. Patient reporting mild HA. Denies visual changes, SOB, CP, palpitations. Patient aware of plan of care for monitoring.

## 2023-11-03 NOTE — ED CDU PROVIDER DISPOSITION NOTE - PATIENT PORTAL LINK FT
You can access the FollowMyHealth Patient Portal offered by [facility] by registering at the following website: [email or web address]. By joining [employer]’s FollowMyHealth portal, you will also be able to view your health information using other applications (apps) compatible with our system.

## 2023-11-07 ENCOUNTER — APPOINTMENT (OUTPATIENT)
Dept: ENDOCRINOLOGY | Facility: CLINIC | Age: 83
End: 2023-11-07

## 2023-11-09 NOTE — PATIENT PROFILE ADULT - FUNCTIONAL ASSESSMENT - BASIC MOBILITY ASSESSMENT TYPE
What Type Of Note Output Would You Prefer (Optional)?: Standard Output How Severe Are Your Spot(S)?: mild Have Your Spot(S) Been Treated In The Past?: has not been treated Hpi Title: Evaluation of Skin Lesions Family Member: Father Admission

## 2023-11-15 ENCOUNTER — APPOINTMENT (OUTPATIENT)
Dept: ORTHOPEDIC SURGERY | Facility: CLINIC | Age: 83
End: 2023-11-15
Payer: MEDICARE

## 2023-11-15 VITALS — WEIGHT: 130 LBS | BODY MASS INDEX: 27.29 KG/M2 | HEIGHT: 58 IN

## 2023-11-15 DIAGNOSIS — S72.002A FRACTURE OF UNSPECIFIED PART OF NECK OF LEFT FEMUR, INITIAL ENCOUNTER FOR CLOSED FRACTURE: ICD-10-CM

## 2023-11-15 PROCEDURE — 99213 OFFICE O/P EST LOW 20 MIN: CPT

## 2023-11-15 PROCEDURE — 73502 X-RAY EXAM HIP UNI 2-3 VIEWS: CPT

## 2023-11-16 ENCOUNTER — TRANSCRIPTION ENCOUNTER (OUTPATIENT)
Age: 83
End: 2023-11-16

## 2023-11-16 ENCOUNTER — INPATIENT (INPATIENT)
Facility: HOSPITAL | Age: 83
LOS: 0 days | Discharge: HOME | End: 2023-11-16
Attending: INTERNAL MEDICINE | Admitting: INTERNAL MEDICINE
Payer: MEDICARE

## 2023-11-16 VITALS
RESPIRATION RATE: 18 BRPM | WEIGHT: 119.93 LBS | HEIGHT: 59 IN | OXYGEN SATURATION: 99 % | HEART RATE: 68 BPM | DIASTOLIC BLOOD PRESSURE: 81 MMHG | SYSTOLIC BLOOD PRESSURE: 177 MMHG | TEMPERATURE: 98 F

## 2023-11-16 VITALS
HEART RATE: 64 BPM | TEMPERATURE: 98 F | RESPIRATION RATE: 18 BRPM | DIASTOLIC BLOOD PRESSURE: 93 MMHG | SYSTOLIC BLOOD PRESSURE: 157 MMHG | OXYGEN SATURATION: 98 %

## 2023-11-16 DIAGNOSIS — E78.5 HYPERLIPIDEMIA, UNSPECIFIED: ICD-10-CM

## 2023-11-16 DIAGNOSIS — E03.9 HYPOTHYROIDISM, UNSPECIFIED: ICD-10-CM

## 2023-11-16 DIAGNOSIS — E89.0 POSTPROCEDURAL HYPOTHYROIDISM: Chronic | ICD-10-CM

## 2023-11-16 DIAGNOSIS — K59.00 CONSTIPATION, UNSPECIFIED: ICD-10-CM

## 2023-11-16 DIAGNOSIS — S72.001A FRACTURE OF UNSPECIFIED PART OF NECK OF RIGHT FEMUR, INITIAL ENCOUNTER FOR CLOSED FRACTURE: Chronic | ICD-10-CM

## 2023-11-16 DIAGNOSIS — T78.40XA ALLERGY, UNSPECIFIED, INITIAL ENCOUNTER: ICD-10-CM

## 2023-11-16 DIAGNOSIS — I10 ESSENTIAL (PRIMARY) HYPERTENSION: ICD-10-CM

## 2023-11-16 DIAGNOSIS — I16.0 HYPERTENSIVE URGENCY: ICD-10-CM

## 2023-11-16 DIAGNOSIS — Z98.890 OTHER SPECIFIED POSTPROCEDURAL STATES: Chronic | ICD-10-CM

## 2023-11-16 LAB
ALBUMIN SERPL ELPH-MCNC: 3.5 G/DL — SIGNIFICANT CHANGE UP (ref 3.3–5)
ALBUMIN SERPL ELPH-MCNC: 3.5 G/DL — SIGNIFICANT CHANGE UP (ref 3.3–5)
ALP SERPL-CCNC: 83 U/L — SIGNIFICANT CHANGE UP (ref 40–120)
ALP SERPL-CCNC: 83 U/L — SIGNIFICANT CHANGE UP (ref 40–120)
ALT FLD-CCNC: 33 U/L — SIGNIFICANT CHANGE UP (ref 12–78)
ALT FLD-CCNC: 33 U/L — SIGNIFICANT CHANGE UP (ref 12–78)
ANION GAP SERPL CALC-SCNC: 4 MMOL/L — LOW (ref 5–17)
ANION GAP SERPL CALC-SCNC: 4 MMOL/L — LOW (ref 5–17)
AST SERPL-CCNC: 56 U/L — HIGH (ref 15–37)
AST SERPL-CCNC: 56 U/L — HIGH (ref 15–37)
BASOPHILS # BLD AUTO: 0.09 K/UL — SIGNIFICANT CHANGE UP (ref 0–0.2)
BASOPHILS # BLD AUTO: 0.09 K/UL — SIGNIFICANT CHANGE UP (ref 0–0.2)
BASOPHILS NFR BLD AUTO: 0.9 % — SIGNIFICANT CHANGE UP (ref 0–2)
BASOPHILS NFR BLD AUTO: 0.9 % — SIGNIFICANT CHANGE UP (ref 0–2)
BILIRUB SERPL-MCNC: 0.4 MG/DL — SIGNIFICANT CHANGE UP (ref 0.2–1.2)
BILIRUB SERPL-MCNC: 0.4 MG/DL — SIGNIFICANT CHANGE UP (ref 0.2–1.2)
BUN SERPL-MCNC: 17 MG/DL — SIGNIFICANT CHANGE UP (ref 7–23)
BUN SERPL-MCNC: 17 MG/DL — SIGNIFICANT CHANGE UP (ref 7–23)
CALCIUM SERPL-MCNC: 9.1 MG/DL — SIGNIFICANT CHANGE UP (ref 8.5–10.1)
CALCIUM SERPL-MCNC: 9.1 MG/DL — SIGNIFICANT CHANGE UP (ref 8.5–10.1)
CHLORIDE SERPL-SCNC: 102 MMOL/L — SIGNIFICANT CHANGE UP (ref 96–108)
CHLORIDE SERPL-SCNC: 102 MMOL/L — SIGNIFICANT CHANGE UP (ref 96–108)
CO2 SERPL-SCNC: 29 MMOL/L — SIGNIFICANT CHANGE UP (ref 22–31)
CO2 SERPL-SCNC: 29 MMOL/L — SIGNIFICANT CHANGE UP (ref 22–31)
CREAT SERPL-MCNC: 0.91 MG/DL — SIGNIFICANT CHANGE UP (ref 0.5–1.3)
CREAT SERPL-MCNC: 0.91 MG/DL — SIGNIFICANT CHANGE UP (ref 0.5–1.3)
EGFR: 63 ML/MIN/1.73M2 — SIGNIFICANT CHANGE UP
EGFR: 63 ML/MIN/1.73M2 — SIGNIFICANT CHANGE UP
EOSINOPHIL # BLD AUTO: 0.13 K/UL — SIGNIFICANT CHANGE UP (ref 0–0.5)
EOSINOPHIL # BLD AUTO: 0.13 K/UL — SIGNIFICANT CHANGE UP (ref 0–0.5)
EOSINOPHIL NFR BLD AUTO: 1.4 % — SIGNIFICANT CHANGE UP (ref 0–6)
EOSINOPHIL NFR BLD AUTO: 1.4 % — SIGNIFICANT CHANGE UP (ref 0–6)
GLUCOSE SERPL-MCNC: 123 MG/DL — HIGH (ref 70–99)
GLUCOSE SERPL-MCNC: 123 MG/DL — HIGH (ref 70–99)
HCT VFR BLD CALC: 40.8 % — SIGNIFICANT CHANGE UP (ref 34.5–45)
HCT VFR BLD CALC: 40.8 % — SIGNIFICANT CHANGE UP (ref 34.5–45)
HGB BLD-MCNC: 13.8 G/DL — SIGNIFICANT CHANGE UP (ref 11.5–15.5)
HGB BLD-MCNC: 13.8 G/DL — SIGNIFICANT CHANGE UP (ref 11.5–15.5)
IMM GRANULOCYTES NFR BLD AUTO: 0.3 % — SIGNIFICANT CHANGE UP (ref 0–0.9)
IMM GRANULOCYTES NFR BLD AUTO: 0.3 % — SIGNIFICANT CHANGE UP (ref 0–0.9)
LYMPHOCYTES # BLD AUTO: 1.83 K/UL — SIGNIFICANT CHANGE UP (ref 1–3.3)
LYMPHOCYTES # BLD AUTO: 1.83 K/UL — SIGNIFICANT CHANGE UP (ref 1–3.3)
LYMPHOCYTES # BLD AUTO: 19 % — SIGNIFICANT CHANGE UP (ref 13–44)
LYMPHOCYTES # BLD AUTO: 19 % — SIGNIFICANT CHANGE UP (ref 13–44)
MCHC RBC-ENTMCNC: 31.9 PG — SIGNIFICANT CHANGE UP (ref 27–34)
MCHC RBC-ENTMCNC: 31.9 PG — SIGNIFICANT CHANGE UP (ref 27–34)
MCHC RBC-ENTMCNC: 33.8 G/DL — SIGNIFICANT CHANGE UP (ref 32–36)
MCHC RBC-ENTMCNC: 33.8 G/DL — SIGNIFICANT CHANGE UP (ref 32–36)
MCV RBC AUTO: 94.2 FL — SIGNIFICANT CHANGE UP (ref 80–100)
MCV RBC AUTO: 94.2 FL — SIGNIFICANT CHANGE UP (ref 80–100)
MONOCYTES # BLD AUTO: 0.88 K/UL — SIGNIFICANT CHANGE UP (ref 0–0.9)
MONOCYTES # BLD AUTO: 0.88 K/UL — SIGNIFICANT CHANGE UP (ref 0–0.9)
MONOCYTES NFR BLD AUTO: 9.1 % — SIGNIFICANT CHANGE UP (ref 2–14)
MONOCYTES NFR BLD AUTO: 9.1 % — SIGNIFICANT CHANGE UP (ref 2–14)
NEUTROPHILS # BLD AUTO: 6.66 K/UL — SIGNIFICANT CHANGE UP (ref 1.8–7.4)
NEUTROPHILS # BLD AUTO: 6.66 K/UL — SIGNIFICANT CHANGE UP (ref 1.8–7.4)
NEUTROPHILS NFR BLD AUTO: 69.3 % — SIGNIFICANT CHANGE UP (ref 43–77)
NEUTROPHILS NFR BLD AUTO: 69.3 % — SIGNIFICANT CHANGE UP (ref 43–77)
NRBC # BLD: 0 /100 WBCS — SIGNIFICANT CHANGE UP (ref 0–0)
NRBC # BLD: 0 /100 WBCS — SIGNIFICANT CHANGE UP (ref 0–0)
PLATELET # BLD AUTO: 286 K/UL — SIGNIFICANT CHANGE UP (ref 150–400)
PLATELET # BLD AUTO: 286 K/UL — SIGNIFICANT CHANGE UP (ref 150–400)
POTASSIUM SERPL-MCNC: 4.5 MMOL/L — SIGNIFICANT CHANGE UP (ref 3.5–5.3)
POTASSIUM SERPL-MCNC: 4.5 MMOL/L — SIGNIFICANT CHANGE UP (ref 3.5–5.3)
POTASSIUM SERPL-SCNC: 4.5 MMOL/L — SIGNIFICANT CHANGE UP (ref 3.5–5.3)
POTASSIUM SERPL-SCNC: 4.5 MMOL/L — SIGNIFICANT CHANGE UP (ref 3.5–5.3)
PROT SERPL-MCNC: 8 GM/DL — SIGNIFICANT CHANGE UP (ref 6–8.3)
PROT SERPL-MCNC: 8 GM/DL — SIGNIFICANT CHANGE UP (ref 6–8.3)
RBC # BLD: 4.33 M/UL — SIGNIFICANT CHANGE UP (ref 3.8–5.2)
RBC # BLD: 4.33 M/UL — SIGNIFICANT CHANGE UP (ref 3.8–5.2)
RBC # FLD: 13.5 % — SIGNIFICANT CHANGE UP (ref 10.3–14.5)
RBC # FLD: 13.5 % — SIGNIFICANT CHANGE UP (ref 10.3–14.5)
SODIUM SERPL-SCNC: 135 MMOL/L — SIGNIFICANT CHANGE UP (ref 135–145)
SODIUM SERPL-SCNC: 135 MMOL/L — SIGNIFICANT CHANGE UP (ref 135–145)
WBC # BLD: 9.62 K/UL — SIGNIFICANT CHANGE UP (ref 3.8–10.5)
WBC # BLD: 9.62 K/UL — SIGNIFICANT CHANGE UP (ref 3.8–10.5)
WBC # FLD AUTO: 9.62 K/UL — SIGNIFICANT CHANGE UP (ref 3.8–10.5)
WBC # FLD AUTO: 9.62 K/UL — SIGNIFICANT CHANGE UP (ref 3.8–10.5)

## 2023-11-16 PROCEDURE — 74176 CT ABD & PELVIS W/O CONTRAST: CPT | Mod: 26,MA

## 2023-11-16 PROCEDURE — 93010 ELECTROCARDIOGRAM REPORT: CPT

## 2023-11-16 PROCEDURE — 70450 CT HEAD/BRAIN W/O DYE: CPT | Mod: 26,MA

## 2023-11-16 PROCEDURE — 99053 MED SERV 10PM-8AM 24 HR FAC: CPT

## 2023-11-16 PROCEDURE — 99236 HOSP IP/OBS SAME DATE HI 85: CPT

## 2023-11-16 PROCEDURE — 99285 EMERGENCY DEPT VISIT HI MDM: CPT

## 2023-11-16 RX ORDER — MAGNESIUM OXIDE 400 MG ORAL TABLET 241.3 MG
1 TABLET ORAL
Qty: 0 | Refills: 0 | DISCHARGE

## 2023-11-16 RX ORDER — DILTIAZEM HCL 120 MG
120 CAPSULE, EXT RELEASE 24 HR ORAL DAILY
Refills: 0 | Status: DISCONTINUED | OUTPATIENT
Start: 2023-11-16 | End: 2023-11-16

## 2023-11-16 RX ORDER — ALPRAZOLAM 0.25 MG
1 TABLET ORAL AT BEDTIME
Refills: 0 | Status: DISCONTINUED | OUTPATIENT
Start: 2023-11-16 | End: 2023-11-16

## 2023-11-16 RX ORDER — HYDRALAZINE HCL 50 MG
50 TABLET ORAL
Refills: 0 | Status: DISCONTINUED | OUTPATIENT
Start: 2023-11-16 | End: 2023-11-16

## 2023-11-16 RX ORDER — ATORVASTATIN CALCIUM 80 MG/1
10 TABLET, FILM COATED ORAL AT BEDTIME
Refills: 0 | Status: DISCONTINUED | OUTPATIENT
Start: 2023-11-16 | End: 2023-11-16

## 2023-11-16 RX ORDER — PANTOPRAZOLE SODIUM 20 MG/1
40 TABLET, DELAYED RELEASE ORAL
Refills: 0 | Status: DISCONTINUED | OUTPATIENT
Start: 2023-11-16 | End: 2023-11-16

## 2023-11-16 RX ORDER — LEVOTHYROXINE SODIUM 125 MCG
75 TABLET ORAL DAILY
Refills: 0 | Status: DISCONTINUED | OUTPATIENT
Start: 2023-11-16 | End: 2023-11-16

## 2023-11-16 RX ORDER — SODIUM CHLORIDE 9 MG/ML
1000 INJECTION INTRAMUSCULAR; INTRAVENOUS; SUBCUTANEOUS ONCE
Refills: 0 | Status: COMPLETED | OUTPATIENT
Start: 2023-11-16 | End: 2023-11-16

## 2023-11-16 RX ORDER — DIPHENHYDRAMINE HCL 50 MG
50 CAPSULE ORAL ONCE
Refills: 0 | Status: COMPLETED | OUTPATIENT
Start: 2023-11-16 | End: 2023-11-16

## 2023-11-16 RX ORDER — ACETAMINOPHEN 500 MG
975 TABLET ORAL ONCE
Refills: 0 | Status: COMPLETED | OUTPATIENT
Start: 2023-11-16 | End: 2023-11-16

## 2023-11-16 RX ORDER — HYDRALAZINE HCL 50 MG
5 TABLET ORAL ONCE
Refills: 0 | Status: COMPLETED | OUTPATIENT
Start: 2023-11-16 | End: 2023-11-16

## 2023-11-16 RX ORDER — AMITRIPTYLINE HCL 25 MG
25 TABLET ORAL AT BEDTIME
Refills: 0 | Status: DISCONTINUED | OUTPATIENT
Start: 2023-11-16 | End: 2023-11-16

## 2023-11-16 RX ORDER — SENNA PLUS 8.6 MG/1
2 TABLET ORAL AT BEDTIME
Refills: 0 | Status: DISCONTINUED | OUTPATIENT
Start: 2023-11-16 | End: 2023-11-16

## 2023-11-16 RX ORDER — ALPRAZOLAM 0.25 MG
1 TABLET ORAL ONCE
Refills: 0 | Status: DISCONTINUED | OUTPATIENT
Start: 2023-11-16 | End: 2023-11-16

## 2023-11-16 RX ORDER — CARVEDILOL PHOSPHATE 80 MG/1
25 CAPSULE, EXTENDED RELEASE ORAL EVERY 12 HOURS
Refills: 0 | Status: DISCONTINUED | OUTPATIENT
Start: 2023-11-16 | End: 2023-11-16

## 2023-11-16 RX ORDER — POLYETHYLENE GLYCOL 3350 17 G/17G
17 POWDER, FOR SOLUTION ORAL AT BEDTIME
Refills: 0 | Status: DISCONTINUED | OUTPATIENT
Start: 2023-11-16 | End: 2023-11-16

## 2023-11-16 RX ORDER — LOSARTAN POTASSIUM 100 MG/1
100 TABLET, FILM COATED ORAL DAILY
Refills: 0 | Status: DISCONTINUED | OUTPATIENT
Start: 2023-11-16 | End: 2023-11-16

## 2023-11-16 RX ORDER — FAMOTIDINE 10 MG/ML
20 INJECTION INTRAVENOUS ONCE
Refills: 0 | Status: COMPLETED | OUTPATIENT
Start: 2023-11-16 | End: 2023-11-16

## 2023-11-16 RX ORDER — ACETAMINOPHEN 500 MG
650 TABLET ORAL EVERY 6 HOURS
Refills: 0 | Status: DISCONTINUED | OUTPATIENT
Start: 2023-11-16 | End: 2023-11-16

## 2023-11-16 RX ADMIN — Medication 5 MILLIGRAM(S): at 04:10

## 2023-11-16 RX ADMIN — SODIUM CHLORIDE 1000 MILLILITER(S): 9 INJECTION INTRAMUSCULAR; INTRAVENOUS; SUBCUTANEOUS at 04:01

## 2023-11-16 RX ADMIN — Medication 1 MILLIGRAM(S): at 05:46

## 2023-11-16 RX ADMIN — Medication 975 MILLIGRAM(S): at 05:47

## 2023-11-16 RX ADMIN — Medication 120 MILLIGRAM(S): at 10:29

## 2023-11-16 NOTE — ED ADULT NURSE NOTE - OBJECTIVE STATEMENT
Pt AAOx4. 83 year old presents to ED with complaint of "allergy" to using cetaphil face lotion. States she used it 2x today and then felt like her throat was dry and "tight". No rashes noted on skin. Pt able to talk in complete sentences. Respirations equal and unlabored. No acute distress noted at this time.    hx of HTN, HLd, acid reflux.  per pt allergy list, pt is allergic to new lotions and it makes her bp elevated.

## 2023-11-16 NOTE — DISCHARGE NOTE PROVIDER - HOSPITAL COURSE
83 years old female with h/o HTN, HLD, hypothyroidism, MAT, h/o duodenal ulcer s/p clipping in 2022 present to ED with concern for allergic reaction. Patient reported burning sensation of her whole body after using new body lotion. NO skin rash or tongue swelling. Patient has chronic intermittent paresthesia/weakness of left side, had multiple CT head/CTA brain over last few months, including MRI brain on 10/10/2023 with no acute pathology. MRI C spine with no abnormal cord signal. Noted degenerative disc disease most pronounced at C 6-7. 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 Patient has not follow up with neurology outpatient yet due to insurance copay. Patient also has chronic constipation, complain of intermittent lower abd discomfort.   Hypertensive in ED, improved with BP meds. No leukocytosis, plt 286, K 4.5, Cr 0.91. CT head with no acute pathology. CT abd with distended bladder with mild bilateraly hydroureteronephrosis. Moderate colonic stool burden.   No focal neurological weakness. BP improved. Initially elevated BP likely due to missing PM dose of medication. No tongue/lip swelling, no stridor, no skin rash. Offer claritin, but patient does not want to take it. Patient declined steroid while in ED, dose not need steroid now as symptoms totally resolved.  Patient is medically stable to be discharged with outpatient follow up  Outpatient follow up with spine/neurology. Outpatient follow up with GI for constipation. Outpatient follow up with urology  All questions answered

## 2023-11-16 NOTE — DISCHARGE NOTE PROVIDER - PROVIDER TOKENS
PROVIDER:[TOKEN:[[id number]],FOLLOWUP:[2 weeks],ESTABLISHEDPATIENT:[T]],FREE:[LAST:[PCP],PHONE:[(   )    -],FAX:[(   )    -],FOLLOWUP:[1 week],ESTABLISHEDPATIENT:[T]],PROVIDER:[TOKEN:[[id number]],FOLLOWUP:[1 month],ESTABLISHEDPATIENT:[T]],PROVIDER:[TOKEN:[[id number]],FOLLOWUP:[1 week],ESTABLISHEDPATIENT:[T]]

## 2023-11-16 NOTE — H&P ADULT - HISTORY OF PRESENT ILLNESS
83 years old female with h/o HTN, HLD, hypothyroidism, MAT, h/o duodenal ulcer s/p clipping in 2022 present to ED with concern for allergic reaction. Patient reported burning sensation of her whole body after using new body lotion. NO skin rash or tongue swelling. Patient has chronic intermittent paresthesia/weakness of left side, had multiple CT head/CTA brain over last few months, including MRI brain on 10/10/2023 with no acute pathology. MRI C spine with no abnormal cord signal. Noted degenerative disc disease most pronounced at C 6-7. 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 Patient has not follow up with neurology outpatient yet due to insurance copay. Patient also has chronic constpiation, complain of intermittent lower abd discomfort.  Patient also has chronic intermittent low back pain radiate to right hip region  Hypertensive in ED, improved with BP meds. No leukocytosis, plt 286, K 4.5, Cr 0.91. CT head with no acute pathology. CT abd with distended bladder with mild bilateraly hydroureteronephrosis. Moderate colonic stool burden.

## 2023-11-16 NOTE — ED ADULT TRIAGE NOTE - CHIEF COMPLAINT QUOTE
bibems from home for allergic reaction?  Per pt, used new body lotion/moisturizer today and started to feel "hot, burning all over my body."  No rash noted, NAD noted at this time,  bp elevated in triage.   hx of HTN, HLd, acid reflux.  extensive bibems from home for allergic reaction?  Per pt, used new body lotion/moisturizer today and started to feel "hot, burning all over my body."  No rash noted, NAD noted at this time,  bp elevated in triage.   hx of HTN, HLd, acid reflux.  per pt allergy list, pt is allergic to new lotions and it makes her bp elevated.

## 2023-11-16 NOTE — H&P ADULT - PROBLEM SELECTOR PLAN 1
admitted with suspected allergic reaction after using new skin lotion, resolved  No skin erythema. No lip/togue swelling. No stridor  Offered steroid in ED but patient declined. Currently symptoms resolved, no indication for steroid.  Patient declined anti histamine  Outpatient PCP follow up  Avoid allergen

## 2023-11-16 NOTE — DISCHARGE NOTE PROVIDER - NSDCMRMEDTOKEN_GEN_ALL_CORE_FT
acetaminophen 500 mg oral tablet: 2 tab(s) orally every 6 hours as needed for  mild pain  ALPRAZolam 1 mg oral tablet: 1 tab(s) orally once a day (at bedtime)  NOTE: pt uses to sleep  amitriptyline 25 mg oral tablet: 1 tab(s) orally once a day (at bedtime)  Caltrate 600 + D oral tablet: 1 tab(s) orally 2 times a day  carvedilol 25 mg oral tablet: 1 tab(s) orally every 12 hours  dilTIAZem 120 mg/24 hours oral capsule, extended release: 1 cap(s) orally once a day  hydrALAZINE 50 mg oral tablet: 50 milligram(s) orally 2 times a day  lovastatin 20 mg oral tablet: 1 tab(s) orally once a day (in the evening) - with dinner  Multiple Vitamins oral tablet: 1 tab(s) orally once a day (lunch)  olmesartan 40 mg oral tablet: 1 tab(s) orally once a day  omeprazole 40 mg oral delayed release capsule: 1 cap(s) orally once a day  outpatient physical therapy : diagnose and treat  physical therapy: diagnose and treat  polyethylene glycol 3350 oral powder for reconstitution: 17 gram(s) orally once a day (at bedtime)  senna leaf extract oral tablet: 2 tab(s) orally once a day (at bedtime)  Synthroid 75 mcg (0.075 mg) oral tablet: 1 tab(s) orally once a day (Mon - Sat) and 1.5 tab on Sundays   Vitamin C 500 mg oral tablet: 1 tab(s) orally 2 times a day  Wellbutrin  mg/12 hours oral tablet, extended release: 1 tab(s) orally 2 times a day

## 2023-11-16 NOTE — DISCHARGE NOTE PROVIDER - CARE PROVIDER_API CALL
Mauricio Foote  Orthopaedic Surgery  [address] [place] [id number]  Phone: [phone]  Fax: [phone]  Established Patient  Follow Up Time: 2 weeks    PCP,   Phone: (   )    -  Fax: (   )    -  Established Patient  Follow Up Time: 1 week    Tyson Styles  Gastroenterology  [address] [facility], [address]  [place] [address]  Phone: [phone]  Fax: [phone]  Established Patient  Follow Up Time: 1 month    Xavier Rene  Urology  [address] [facility] [facility], [address]  [place] [address]  Phone: [phone]  Fax: [phone]  Established Patient  Follow Up Time: 1 week

## 2023-11-16 NOTE — PATIENT PROFILE ADULT - FALL HARM RISK - HARM RISK INTERVENTIONS

## 2023-11-16 NOTE — H&P ADULT - NSHPPHYSICALEXAM_GEN_ALL_CORE
CONSTITUTIONAL: alert and cooperative, no acute distress  EYES: PERRL, no scleral icterus  ENT: Mucosa moist, tongue normal  NECK: Neck supple, trachea midline, non-tender,  CARDIAC: Normal S1 and S2. Regular rate and rhythms. No Pedal edema. Peripheral pulses intact  LUNGS: Clear to auscultation, equal air entry both lungs. No rales, rhonchi, wheezing. Normal respiratory effort.   ABDOMEN: Soft, nondistended, nontender. No guarding or rebound tenderness. No hepatomegaly or splenomegaly. Bowel sound normal.   MUSCULOSKELETAL: Normocephalic, atraumatic. No significant deformity or joint abnormality  NEUROLOGICAL: No gross motor or sensory deficits. CN II-XII grossly intact  SKIN: no lesions or eruptions. Normal turgor  PSYCHIATRIC: A&O x 3, appropriate mood and affect

## 2023-11-16 NOTE — ED ADULT NURSE NOTE - ED STAT RN HANDOFF DETAILS
Report endorsed to oncoming RN Angel. Safety checks completed this shift.  IV sites checked Q2+remains WDL. Meds given as ordered with no s/s of adverse RXNs. Fall +skin precs in place. Any issues endorsed to oncoming RN for follow up.

## 2023-11-16 NOTE — ED ADULT NURSE NOTE - CHIEF COMPLAINT QUOTE
bibems from home for allergic reaction?  Per pt, used new body lotion/moisturizer today and started to feel "hot, burning all over my body."  No rash noted, NAD noted at this time,  bp elevated in triage.   hx of HTN, HLd, acid reflux.  per pt allergy list, pt is allergic to new lotions and it makes her bp elevated.

## 2023-11-16 NOTE — DISCHARGE NOTE PROVIDER - NSDCCPCAREPLAN_GEN_ALL_CORE_FT
PRINCIPAL DISCHARGE DIAGNOSIS  Diagnosis: Allergic reaction  Assessment and Plan of Treatment:       SECONDARY DISCHARGE DIAGNOSES  Diagnosis: Hypertensive urgency  Assessment and Plan of Treatment:     Diagnosis: LLQ abdominal pain  Assessment and Plan of Treatment:     Diagnosis: Tension headache  Assessment and Plan of Treatment:     Diagnosis: Anxiety  Assessment and Plan of Treatment:     Diagnosis: Constipation  Assessment and Plan of Treatment:

## 2023-11-16 NOTE — ED PROVIDER NOTE - OBJECTIVE STATEMENT
84 yo F with burning sensation of face, neck after applying new moisturizing lotion to face.  Pt. also had sensation of throat closing.  She blames the new lotion she's been using, "cetaphil moisturizing cream for face."  Pt. applied it tonight before symptoms started.  Pt. admits to history of allergies from lotions and her doctor recommended she doesn't use new lotions, but she notes that she tried it before and seems to not be reacting adversely to it in the past, so she continued.  No other complaints, no rash.  No sob.  Pt. feels otherwise well.   ROS: negative for fever, cough, headache, chest pain, shortness of breath, abd pain, nausea, vomiting, diarrhea, paresthesia, and weakness--all other systems reviewed are negative.   PMH: CAD, iron def anemia, bladder prolapse, GERD, mitral valve prolapse, HTN, hypothyroid, macular degeneration, osteoarthritis; Meds: See EMR for list; SH: Denies smoking/drinking/drug use 84 yo F with burning sensation of face, neck after applying new moisturizing lotion to face.  Pt. also had sensation of throat closing.  She blames the new lotion she's been using, "Cetaphil moisturizing cream for face."  Pt. applied it tonight before symptoms started.  Pt. admits to history of allergies from lotions and her doctor recommended she doesn't use new lotions, but she notes that she tried it before and seems to not be reacting adversely to it in the past, so she continued.  No other complaints, no rash.  No sob.  Pt. feels otherwise well.   ROS: negative for fever, cough, headache, chest pain, shortness of breath, abd pain, nausea, vomiting, diarrhea, paresthesia, and weakness--all other systems reviewed are negative.   PMH: CAD, iron def anemia, bladder prolapse, GERD, mitral valve prolapse, HTN, hypothyroid, macular degeneration, osteoarthritis, Thyroid Cancer, Multifocal Atrial Tachycardia, Aortic Regurgitation, Mitral Regurgitation, Colitis, Hiatal hernia, Duodenal Ulcer, s/p Clipping 7/2022; Meds: See EMR for list; SH: Denies smoking/drinking/drug use

## 2023-11-16 NOTE — ED PROVIDER NOTE - CLINICAL SUMMARY MEDICAL DECISION MAKING FREE TEXT BOX
82 yo F with likely contact dermatitis, no evidence for systemic allergic reaction, rash is mild and limited to area where cream was applied  -cbc, cmp, ekg, iv, monitor, hydration bolus, benadryl, famotidine, Solu-medrol for allergy, hydralazine for pressure  -educated pt. on use of new lotions/creams and danger of allergic reaction   -f/u results, reeval

## 2023-11-16 NOTE — DISCHARGE NOTE PROVIDER - CARE PROVIDERS DIRECT ADDRESSES
,DirectAddress_Unknown,DirectAddress_Unknown,DirectAddress_Unknown,[email or web address]

## 2023-11-16 NOTE — ED PROVIDER NOTE - PHYSICAL EXAMINATION
Vitals: HTN at 177/81, otherwise WNL  Gen: AAOx3, NAD, sitting comfortably in stretcher  ENT: patent airway without swelling, no exudate or erythema   Head: ncat, perrla, eomi b/l  Neck: supple, no lymphadenopathy, no midline deviation  Heart: rrr, no m/r/g  Lungs: CTA b/l, no rales/ronchi/wheezes  Abd: soft, nontender, non-distended, no rebound or guarding  Ext: no clubbing/cyanosis/edema  Neuro: sensation and muscle strength intact b/l, steady gait   derm: mild erythema over L lateral face and neck (area where pt. reports applying lotion), on skin breaks

## 2023-11-16 NOTE — ED ADULT NURSE REASSESSMENT NOTE - NS ED NURSE REASSESS COMMENT FT1
Pt complained of new onset headache and abdominal pain. Pt also c/o weakness in her left leg. Face symmetrical, speech is clear, pt is a&ox4, upper extremities equal strength bilaterally, drift present on lower left leg. Called Dr. Olivera to bedside. Pt taken to radiology and returned. IV access remains in place. Safety and fall precautions in place.

## 2023-11-16 NOTE — ED PROVIDER NOTE - PROGRESS NOTE DETAILS
pt. refused IV medications, stating that if she doesn't need it, she doesn't want it (referring to benadryl/famotidine/solu-medrol), Skin irritation has since resolved  pt. also notes 30 minutes of LLQ abd pain that has been coming and going for weeks and LLQ fullness that she "told doctors about."  Pt. also notes LLE weakness compared to R for 30 minutes--but this has also been on and off for weeks.  She notes recent CT/MRI here in recent past for similar symptoms without evidence of stroke--her PCP told her it's more likely neurological or caused by nerve compression  pt. also admits to acute anxiety and shakiness and requests xanax (her usual dose is 1 mg PO)--will oblige and order, pt. also accepts tylenol for the LLQ discomfort and L sided headache which has resolved completely over the last 30 minutes

## 2023-11-16 NOTE — PATIENT PROFILE ADULT - HISTORY OF COVID-19 VACCINATION
"Chief Complaint   Patient presents with     Headache     x 10 days        Initial BP 90/60  Pulse 77  Temp 98.8  F (37.1  C) (Tympanic)  Wt 160 lb (72.6 kg)  LMP 10/29/2017  SpO2 97%  BMI 24.53 kg/m2 Estimated body mass index is 24.53 kg/(m^2) as calculated from the following:    Height as of 6/29/17: 5' 7.72\" (1.72 m).    Weight as of this encounter: 160 lb (72.6 kg).  Medication Reconciliation: complete    Current Outpatient Prescriptions   Medication Sig Dispense Refill     ZOLMitriptan (ZOMIG) 2.5 MG tablet Take 1-2 tablets (2.5-5 mg) by mouth at onset of headache for migraine May repeat in 2 hours. Max 4 tablets/24 hours. 18 tablet 1     naproxen (NAPROSYN) 500 MG tablet Take 1 tablet (500 mg) by mouth 2 times daily (with meals) (Patient not taking: Reported on 9/22/2017) 30 tablet 0     desonide (DESOWEN) 0.05 % cream Apply sparingly to affected area three times daily for 14 days. (Patient not taking: Reported on 9/22/2017) 60 g 0       Jonah SNOW CMA  "
No

## 2023-11-16 NOTE — DISCHARGE NOTE NURSING/CASE MANAGEMENT/SOCIAL WORK - NSDCPEFALRISK_GEN_ALL_CORE
For information on Fall & Injury Prevention, visit: [email or web address] [place]  [email or web address] [place]  https://www.cdc.gov/steadi/patient.html

## 2023-11-16 NOTE — ED ADULT NURSE NOTE - ED STAT RN HANDOFF DETAILS 2
Report given to receiving HAL RAMOS,pts history, current condition and reason for admission discussed, safety concerns addressed and reviewed, pt currently in stable condition, IV flushes for patency and site shows no signs or symptoms of infiltrate, dressing is clean dry and intact, pt is aware of plan of care. Pt education deemed successful at time of report after patient demonstrates successful teach back for proficiency.

## 2023-11-16 NOTE — DISCHARGE NOTE PROVIDER - NSDCFUSCHEDAPPT_GEN_ALL_CORE_FT
Coy Hurd  [facility]  P[address]ED [phone]  Scheduled Appointment: 12/06/2023

## 2023-11-16 NOTE — H&P ADULT - NSHPADDITIONALINFOADULT_GEN_ALL_CORE
Outpatient neurology/spine follow up for chronic intermittent left parasthesia/weakness.  CT finding distended bladder with mild bilaterally hydroureteronephrosis. Normal renal function. No acute urinary retention. Encourage frequent urination. Follow up outpatient urology

## 2023-11-16 NOTE — DISCHARGE NOTE NURSING/CASE MANAGEMENT/SOCIAL WORK - PATIENT PORTAL LINK FT
You can access the FollowMyHealth Patient Portal offered by [facility] by registering at the following website: [email or web address]. By joining [employer]’s FollowMyHealth portal, you will also be able to view your health information using other applications (apps) compatible with our system.

## 2023-11-16 NOTE — ED PROVIDER NOTE - CARE PLAN
1 Principal Discharge DX:	Contact dermatitis  Secondary Diagnosis:	Hypertensive urgency   Principal Discharge DX:	Contact dermatitis  Secondary Diagnosis:	Hypertensive urgency  Secondary Diagnosis:	LLQ abdominal pain  Secondary Diagnosis:	Tension headache  Secondary Diagnosis:	Anxiety

## 2023-11-16 NOTE — H&P ADULT - ASSESSMENT
83 years old female with h/o HTN, HLD, hypothyroidism, MAT, h/o duodenal ulcer s/p clipping in 2022 present to ED with concern for allergic reaction. Patient reported burning sensation of her whole body after using new body lotion. NO skin rash or tongue swelling. Patient has chronic intermittent paresthesia/weakness of left side, had multiple CT head/CTA brain over last few months, including MRI brain on 10/10/2023 with no acute pathology. MRI C spine with no abnormal cord signal. Noted degenerative disc disease most pronounced at C 6-7. 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 Patient has not follow up with neurology outpatient yet due to insurance copay. Patient also has chronic constpiation, complain of intermittent lower abd discomfort.   Hypertensive in ED, improved with BP meds. No leukocytosis, plt 286, K 4.5, Cr 0.91. CT head  ( I personally review) with no acute pathology. CT abd with distended bladder with mild bilaterally hydroureteronephrosis. Moderate colonic stool burden.

## 2023-11-16 NOTE — H&P ADULT - PROBLEM SELECTOR PLAN 2
Elevated BP in ED, likely due to missed PM dose of BP med  Resume home BP med  Compliance reinforced

## 2023-11-20 DIAGNOSIS — Z88.8 ALLERGY STATUS TO OTHER DRUGS, MEDICAMENTS AND BIOLOGICAL SUBSTANCES: ICD-10-CM

## 2023-11-20 DIAGNOSIS — I10 ESSENTIAL (PRIMARY) HYPERTENSION: ICD-10-CM

## 2023-11-20 DIAGNOSIS — Z91.148 PATIENT'S OTHER NONCOMPLIANCE WITH MEDICATION REGIMEN FOR OTHER REASON: ICD-10-CM

## 2023-11-20 DIAGNOSIS — Z88.6 ALLERGY STATUS TO ANALGESIC AGENT: ICD-10-CM

## 2023-11-20 DIAGNOSIS — L23.89 ALLERGIC CONTACT DERMATITIS DUE TO OTHER AGENTS: ICD-10-CM

## 2023-11-20 DIAGNOSIS — Z88.0 ALLERGY STATUS TO PENICILLIN: ICD-10-CM

## 2023-11-20 DIAGNOSIS — R20.8 OTHER DISTURBANCES OF SKIN SENSATION: ICD-10-CM

## 2023-11-20 DIAGNOSIS — I16.0 HYPERTENSIVE URGENCY: ICD-10-CM

## 2023-11-20 DIAGNOSIS — E78.5 HYPERLIPIDEMIA, UNSPECIFIED: ICD-10-CM

## 2023-11-20 DIAGNOSIS — T49.95XA ADVERSE EFFECT OF UNSPECIFIED TOPICAL AGENT, INITIAL ENCOUNTER: ICD-10-CM

## 2023-11-20 DIAGNOSIS — I25.10 ATHEROSCLEROTIC HEART DISEASE OF NATIVE CORONARY ARTERY WITHOUT ANGINA PECTORIS: ICD-10-CM

## 2023-11-20 DIAGNOSIS — F41.9 ANXIETY DISORDER, UNSPECIFIED: ICD-10-CM

## 2023-11-20 DIAGNOSIS — K59.09 OTHER CONSTIPATION: ICD-10-CM

## 2023-11-20 DIAGNOSIS — M50.323 OTHER CERVICAL DISC DEGENERATION AT C6-C7 LEVEL: ICD-10-CM

## 2023-11-20 DIAGNOSIS — N13.30 UNSPECIFIED HYDRONEPHROSIS: ICD-10-CM

## 2023-11-20 DIAGNOSIS — Z79.890 HORMONE REPLACEMENT THERAPY: ICD-10-CM

## 2023-11-20 DIAGNOSIS — G44.209 TENSION-TYPE HEADACHE, UNSPECIFIED, NOT INTRACTABLE: ICD-10-CM

## 2023-11-20 DIAGNOSIS — E03.9 HYPOTHYROIDISM, UNSPECIFIED: ICD-10-CM

## 2023-12-06 ENCOUNTER — APPOINTMENT (OUTPATIENT)
Dept: ENDOCRINOLOGY | Facility: CLINIC | Age: 83
End: 2023-12-06
Payer: MEDICARE

## 2023-12-06 VITALS
BODY MASS INDEX: 26.66 KG/M2 | OXYGEN SATURATION: 98 % | HEART RATE: 70 BPM | WEIGHT: 127 LBS | HEIGHT: 57.9 IN | DIASTOLIC BLOOD PRESSURE: 74 MMHG | SYSTOLIC BLOOD PRESSURE: 116 MMHG

## 2023-12-06 DIAGNOSIS — E89.0 POSTPROCEDURAL HYPOTHYROIDISM: ICD-10-CM

## 2023-12-06 DIAGNOSIS — R73.09 OTHER ABNORMAL GLUCOSE: ICD-10-CM

## 2023-12-06 DIAGNOSIS — M81.0 AGE-RELATED OSTEOPOROSIS W/OUT CURRENT PATHOLOGICAL FRACTURE: ICD-10-CM

## 2023-12-06 PROCEDURE — ZZZZZ: CPT

## 2023-12-06 PROCEDURE — 99214 OFFICE O/P EST MOD 30 MIN: CPT | Mod: 25

## 2023-12-06 PROCEDURE — 77080 DXA BONE DENSITY AXIAL: CPT

## 2023-12-08 LAB
25(OH)D3 SERPL-MCNC: 68.8 NG/ML
ALBUMIN SERPL ELPH-MCNC: 4.4 G/DL
ALP BLD-CCNC: 99 U/L
ALT SERPL-CCNC: 25 U/L
ANION GAP SERPL CALC-SCNC: 13 MMOL/L
AST SERPL-CCNC: 30 U/L
BILIRUB SERPL-MCNC: 0.2 MG/DL
BUN SERPL-MCNC: 13 MG/DL
CALCIUM SERPL-MCNC: 10.5 MG/DL
CALCIUM SERPL-MCNC: 10.5 MG/DL
CHLORIDE SERPL-SCNC: 100 MMOL/L
CO2 SERPL-SCNC: 28 MMOL/L
CREAT SERPL-MCNC: 0.86 MG/DL
EGFR: 68 ML/MIN/1.73M2
ESTIMATED AVERAGE GLUCOSE: 111 MG/DL
GLUCOSE SERPL-MCNC: 98 MG/DL
HBA1C MFR BLD HPLC: 5.5 %
HCT VFR BLD CALC: 42.7 %
HGB BLD-MCNC: 14.1 G/DL
MCHC RBC-ENTMCNC: 31.8 PG
MCHC RBC-ENTMCNC: 33 GM/DL
MCV RBC AUTO: 96.4 FL
PARATHYROID HORMONE INTACT: 12 PG/ML
PHOSPHATE SERPL-MCNC: 3.9 MG/DL
PLATELET # BLD AUTO: 295 K/UL
POTASSIUM SERPL-SCNC: 4.3 MMOL/L
PROT SERPL-MCNC: 7.6 G/DL
RBC # BLD: 4.43 M/UL
RBC # FLD: 14 %
SODIUM SERPL-SCNC: 141 MMOL/L
T4 FREE SERPL-MCNC: 2 NG/DL
TSH SERPL-ACNC: 1.39 UIU/ML
WBC # FLD AUTO: 6.01 K/UL

## 2024-01-03 ENCOUNTER — APPOINTMENT (OUTPATIENT)
Dept: PULMONOLOGY | Facility: CLINIC | Age: 84
End: 2024-01-03
Payer: COMMERCIAL

## 2024-01-03 VITALS
DIASTOLIC BLOOD PRESSURE: 84 MMHG | BODY MASS INDEX: 26.87 KG/M2 | OXYGEN SATURATION: 98 % | WEIGHT: 128 LBS | SYSTOLIC BLOOD PRESSURE: 152 MMHG | HEART RATE: 67 BPM | HEIGHT: 57.9 IN | TEMPERATURE: 97.4 F

## 2024-01-03 DIAGNOSIS — J39.8 OTHER SPECIFIED DISEASES OF UPPER RESPIRATORY TRACT: ICD-10-CM

## 2024-01-03 PROCEDURE — 99213 OFFICE O/P EST LOW 20 MIN: CPT

## 2024-01-04 NOTE — PROCEDURE
[FreeTextEntry1] : 07/07/2021 Pulmonary function testing Normal Flow Rates Normal Lung Volumes. There is a moderate diffusion impairment. Corrects to mild with lung volume correction

## 2024-01-04 NOTE — HISTORY OF PRESENT ILLNESS
[TextBox_4] : has been stable broke hip and needed partial replacement in Feb  no longer on ecotrin and no further ulcers concerned when is next ct   No significant cough, wheezing, chest pain or SOB. Occ weak voice.   never takes flu vaccine

## 2024-01-04 NOTE — ASSESSMENT
[FreeTextEntry1] : Continue observation. Follow-up CAT scan sept 2024, 2 year f/u.  Task sent as reminder Follow-up post CT for reevaluation on lung function testing.

## 2024-01-04 NOTE — DISCUSSION/SUMMARY
[FreeTextEntry1] : Status post hip replacement did well from respiratory standpoint. Respiratory status stable. ? Tracheal polyps.  No significant change on prior CAT scans.

## 2024-02-08 ENCOUNTER — OUTPATIENT (OUTPATIENT)
Dept: OUTPATIENT SERVICES | Facility: HOSPITAL | Age: 84
LOS: 1 days | End: 2024-02-08
Payer: MEDICARE

## 2024-02-08 ENCOUNTER — APPOINTMENT (OUTPATIENT)
Dept: ULTRASOUND IMAGING | Facility: IMAGING CENTER | Age: 84
End: 2024-02-08

## 2024-02-08 ENCOUNTER — APPOINTMENT (OUTPATIENT)
Dept: MAMMOGRAPHY | Facility: IMAGING CENTER | Age: 84
End: 2024-02-08
Payer: MEDICARE

## 2024-02-08 DIAGNOSIS — E89.0 POSTPROCEDURAL HYPOTHYROIDISM: Chronic | ICD-10-CM

## 2024-02-08 DIAGNOSIS — S72.001A FRACTURE OF UNSPECIFIED PART OF NECK OF RIGHT FEMUR, INITIAL ENCOUNTER FOR CLOSED FRACTURE: Chronic | ICD-10-CM

## 2024-02-08 DIAGNOSIS — Z00.8 ENCOUNTER FOR OTHER GENERAL EXAMINATION: ICD-10-CM

## 2024-02-08 PROCEDURE — 76641 ULTRASOUND BREAST COMPLETE: CPT

## 2024-02-08 PROCEDURE — 77066 DX MAMMO INCL CAD BI: CPT

## 2024-02-08 PROCEDURE — 76641 ULTRASOUND BREAST COMPLETE: CPT | Mod: 26,LT

## 2024-02-08 PROCEDURE — 77066 DX MAMMO INCL CAD BI: CPT | Mod: 26

## 2024-02-08 PROCEDURE — G0279: CPT

## 2024-02-08 PROCEDURE — G0279: CPT | Mod: 26

## 2024-02-11 ENCOUNTER — INPATIENT (INPATIENT)
Facility: HOSPITAL | Age: 84
LOS: 5 days | Discharge: HOME | DRG: 872 | End: 2024-02-17
Attending: INTERNAL MEDICINE | Admitting: INTERNAL MEDICINE
Payer: MEDICARE

## 2024-02-11 VITALS
OXYGEN SATURATION: 95 % | RESPIRATION RATE: 19 BRPM | TEMPERATURE: 99 F | HEART RATE: 80 BPM | DIASTOLIC BLOOD PRESSURE: 79 MMHG | WEIGHT: 128.09 LBS | SYSTOLIC BLOOD PRESSURE: 132 MMHG

## 2024-02-11 DIAGNOSIS — E89.0 POSTPROCEDURAL HYPOTHYROIDISM: Chronic | ICD-10-CM

## 2024-02-11 DIAGNOSIS — Z98.890 OTHER SPECIFIED POSTPROCEDURAL STATES: Chronic | ICD-10-CM

## 2024-02-11 DIAGNOSIS — S72.001A FRACTURE OF UNSPECIFIED PART OF NECK OF RIGHT FEMUR, INITIAL ENCOUNTER FOR CLOSED FRACTURE: Chronic | ICD-10-CM

## 2024-02-11 LAB
ALBUMIN SERPL ELPH-MCNC: 3.3 G/DL — SIGNIFICANT CHANGE UP (ref 3.3–5)
ALP SERPL-CCNC: 95 U/L — SIGNIFICANT CHANGE UP (ref 40–120)
ALT FLD-CCNC: 65 U/L — HIGH (ref 10–45)
ANION GAP SERPL CALC-SCNC: 11 MMOL/L — SIGNIFICANT CHANGE UP (ref 5–17)
AST SERPL-CCNC: 95 U/L — HIGH (ref 10–40)
BASOPHILS # BLD AUTO: 0.05 K/UL — SIGNIFICANT CHANGE UP (ref 0–0.2)
BASOPHILS NFR BLD AUTO: 0.4 % — SIGNIFICANT CHANGE UP (ref 0–2)
BILIRUB SERPL-MCNC: 0.6 MG/DL — SIGNIFICANT CHANGE UP (ref 0.2–1.2)
BUN SERPL-MCNC: 22 MG/DL — SIGNIFICANT CHANGE UP (ref 7–23)
CALCIUM SERPL-MCNC: 8.8 MG/DL — SIGNIFICANT CHANGE UP (ref 8.4–10.5)
CHLORIDE SERPL-SCNC: 99 MMOL/L — SIGNIFICANT CHANGE UP (ref 96–108)
CO2 SERPL-SCNC: 23 MMOL/L — SIGNIFICANT CHANGE UP (ref 22–31)
CREAT SERPL-MCNC: 1.37 MG/DL — HIGH (ref 0.5–1.3)
EGFR: 39 ML/MIN/1.73M2 — LOW
EOSINOPHIL # BLD AUTO: 0.04 K/UL — SIGNIFICANT CHANGE UP (ref 0–0.5)
EOSINOPHIL NFR BLD AUTO: 0.3 % — SIGNIFICANT CHANGE UP (ref 0–6)
GLUCOSE SERPL-MCNC: 111 MG/DL — HIGH (ref 70–99)
HCT VFR BLD CALC: 33.5 % — LOW (ref 34.5–45)
HGB BLD-MCNC: 11.2 G/DL — LOW (ref 11.5–15.5)
IMM GRANULOCYTES NFR BLD AUTO: 0.4 % — SIGNIFICANT CHANGE UP (ref 0–0.9)
LACTATE BLDV-MCNC: 0.9 MMOL/L — SIGNIFICANT CHANGE UP (ref 0.5–2)
LYMPHOCYTES # BLD AUTO: 1.04 K/UL — SIGNIFICANT CHANGE UP (ref 1–3.3)
LYMPHOCYTES # BLD AUTO: 7.5 % — LOW (ref 13–44)
MCHC RBC-ENTMCNC: 31.3 PG — SIGNIFICANT CHANGE UP (ref 27–34)
MCHC RBC-ENTMCNC: 33.4 GM/DL — SIGNIFICANT CHANGE UP (ref 32–36)
MCV RBC AUTO: 93.6 FL — SIGNIFICANT CHANGE UP (ref 80–100)
MONOCYTES # BLD AUTO: 1.17 K/UL — HIGH (ref 0–0.9)
MONOCYTES NFR BLD AUTO: 8.4 % — SIGNIFICANT CHANGE UP (ref 2–14)
NEUTROPHILS # BLD AUTO: 11.54 K/UL — HIGH (ref 1.8–7.4)
NEUTROPHILS NFR BLD AUTO: 83 % — HIGH (ref 43–77)
NRBC # BLD: 0 /100 WBCS — SIGNIFICANT CHANGE UP (ref 0–0)
PLATELET # BLD AUTO: 204 K/UL — SIGNIFICANT CHANGE UP (ref 150–400)
POTASSIUM SERPL-MCNC: 3.5 MMOL/L — SIGNIFICANT CHANGE UP (ref 3.5–5.3)
POTASSIUM SERPL-SCNC: 3.5 MMOL/L — SIGNIFICANT CHANGE UP (ref 3.5–5.3)
PROT SERPL-MCNC: 6.4 G/DL — SIGNIFICANT CHANGE UP (ref 6–8.3)
RBC # BLD: 3.58 M/UL — LOW (ref 3.8–5.2)
RBC # FLD: 13.7 % — SIGNIFICANT CHANGE UP (ref 10.3–14.5)
SODIUM SERPL-SCNC: 133 MMOL/L — LOW (ref 135–145)
WBC # BLD: 13.9 K/UL — HIGH (ref 3.8–10.5)
WBC # FLD AUTO: 13.9 K/UL — HIGH (ref 3.8–10.5)

## 2024-02-11 PROCEDURE — 71045 X-RAY EXAM CHEST 1 VIEW: CPT | Mod: 26

## 2024-02-11 PROCEDURE — 99285 EMERGENCY DEPT VISIT HI MDM: CPT

## 2024-02-11 RX ORDER — ACETAMINOPHEN 500 MG
1000 TABLET ORAL ONCE
Refills: 0 | Status: COMPLETED | OUTPATIENT
Start: 2024-02-11 | End: 2024-02-11

## 2024-02-11 RX ORDER — SODIUM CHLORIDE 9 MG/ML
1800 INJECTION INTRAMUSCULAR; INTRAVENOUS; SUBCUTANEOUS ONCE
Refills: 0 | Status: COMPLETED | OUTPATIENT
Start: 2024-02-11 | End: 2024-02-11

## 2024-02-11 RX ORDER — CEFTRIAXONE 500 MG/1
1000 INJECTION, POWDER, FOR SOLUTION INTRAMUSCULAR; INTRAVENOUS ONCE
Refills: 0 | Status: COMPLETED | OUTPATIENT
Start: 2024-02-11 | End: 2024-02-11

## 2024-02-11 RX ADMIN — CEFTRIAXONE 100 MILLIGRAM(S): 500 INJECTION, POWDER, FOR SOLUTION INTRAMUSCULAR; INTRAVENOUS at 23:25

## 2024-02-11 RX ADMIN — Medication 400 MILLIGRAM(S): at 23:50

## 2024-02-11 RX ADMIN — SODIUM CHLORIDE 1800 MILLILITER(S): 9 INJECTION INTRAMUSCULAR; INTRAVENOUS; SUBCUTANEOUS at 23:02

## 2024-02-11 NOTE — ED PROVIDER NOTE - OBJECTIVE STATEMENT
83-year-old female with a history of dm , hypertension, hyperlipidemia, PUD , status post thyroidectomy, osteoarthritis, drop bladder , came in complaining of weakness, fatigue, lower abdominal pain, fever , seen by first med yesterday had negative COVID, sent home to take Tylenol for the fever, today felt  worst , increase abdominal pain and started to experience chest pain     no urinary frequency or urgency but states that she has frequent UTIs, denies nausea vomiting,  or shortness of breath   primary care doctor dr Bear

## 2024-02-11 NOTE — ED ADULT NURSE NOTE - NSFALLRISKINTERV_ED_ALL_ED

## 2024-02-11 NOTE — ED PROVIDER NOTE - CLINICAL SUMMARY MEDICAL DECISION MAKING FREE TEXT BOX
83-year-old female with a fever 102, negative COVID first med yesterday, denies any respiratory symptoms complaining mostly of abdominal pain weakness, fatigue, and today started to experience chest pain   history of  dropped bladder and frequent UTIs , concern for urosepsis ,, will obtain blood work cultures IV hydration ct scan antibiotics, and admission to the hospital [facility]

## 2024-02-11 NOTE — ED ADULT NURSE NOTE - OBJECTIVE STATEMENT
pt is an 82yo female PMH anemia, HTN, HLD, prolapsed bladder presenting to the ED complaining of chest pain. pt reports flu-like symptoms onset yesterday, fevers at home 102F highest temp, has not been taking tylenol. pt reports generalized weakness, malaise reports multiple falls yesterday (denies LOC no thinners, no head strikes), states "I felt so weak I was unable to lift myself off the floor". pt reports onset of pain to the underside of the L breast, LUQ pain, and L shoulder pain today, coming to ED for evaluation. pt also endorsing difficulty with urination, possible acute on chronic. pt reports negative outpatient covid swab from yesterday. pt A&Ox3 gross neuro intact, no difficulty speaking in complete sentences, pulses x 4, myles x4, abdomen soft nontender nondistended, skin intact. pt denies n/v/d, abdominal pain, f/c, hematuria

## 2024-02-12 DIAGNOSIS — I10 ESSENTIAL (PRIMARY) HYPERTENSION: ICD-10-CM

## 2024-02-12 DIAGNOSIS — N17.9 ACUTE KIDNEY FAILURE, UNSPECIFIED: ICD-10-CM

## 2024-02-12 DIAGNOSIS — E89.0 POSTPROCEDURAL HYPOTHYROIDISM: Chronic | ICD-10-CM

## 2024-02-12 DIAGNOSIS — Z98.890 OTHER SPECIFIED POSTPROCEDURAL STATES: Chronic | ICD-10-CM

## 2024-02-12 DIAGNOSIS — N12 TUBULO-INTERSTITIAL NEPHRITIS, NOT SPECIFIED AS ACUTE OR CHRONIC: ICD-10-CM

## 2024-02-12 DIAGNOSIS — E78.5 HYPERLIPIDEMIA, UNSPECIFIED: ICD-10-CM

## 2024-02-12 DIAGNOSIS — K59.00 CONSTIPATION, UNSPECIFIED: ICD-10-CM

## 2024-02-12 DIAGNOSIS — R50.9 FEVER, UNSPECIFIED: ICD-10-CM

## 2024-02-12 DIAGNOSIS — K44.9 DIAPHRAGMATIC HERNIA WITHOUT OBSTRUCTION OR GANGRENE: ICD-10-CM

## 2024-02-12 DIAGNOSIS — E03.9 HYPOTHYROIDISM, UNSPECIFIED: ICD-10-CM

## 2024-02-12 LAB
ALBUMIN SERPL ELPH-MCNC: 2.8 G/DL — LOW (ref 3.3–5)
ALP SERPL-CCNC: 88 U/L — SIGNIFICANT CHANGE UP (ref 40–120)
ALT FLD-CCNC: 43 U/L — SIGNIFICANT CHANGE UP (ref 10–45)
ANION GAP SERPL CALC-SCNC: 12 MMOL/L — SIGNIFICANT CHANGE UP (ref 5–17)
APPEARANCE UR: ABNORMAL
APTT BLD: 28.4 SEC — SIGNIFICANT CHANGE UP (ref 24.5–35.6)
AST SERPL-CCNC: 49 U/L — HIGH (ref 10–40)
BACTERIA # UR AUTO: ABNORMAL /HPF
BILIRUB SERPL-MCNC: 0.4 MG/DL — SIGNIFICANT CHANGE UP (ref 0.2–1.2)
BILIRUB UR-MCNC: NEGATIVE — SIGNIFICANT CHANGE UP
BUN SERPL-MCNC: 21 MG/DL — SIGNIFICANT CHANGE UP (ref 7–23)
CALCIUM SERPL-MCNC: 7.7 MG/DL — LOW (ref 8.4–10.5)
CAST: 32 /LPF — HIGH (ref 0–4)
CHLORIDE SERPL-SCNC: 106 MMOL/L — SIGNIFICANT CHANGE UP (ref 96–108)
CO2 SERPL-SCNC: 18 MMOL/L — LOW (ref 22–31)
COLOR SPEC: YELLOW — SIGNIFICANT CHANGE UP
CREAT SERPL-MCNC: 1.4 MG/DL — HIGH (ref 0.5–1.3)
DIFF PNL FLD: ABNORMAL
E COLI DNA BLD POS QL NAA+NON-PROBE: SIGNIFICANT CHANGE UP
EGFR: 38 ML/MIN/1.73M2 — LOW
FLUAV AG NPH QL: SIGNIFICANT CHANGE UP
FLUBV AG NPH QL: SIGNIFICANT CHANGE UP
GLUCOSE BLDC GLUCOMTR-MCNC: 106 MG/DL — HIGH (ref 70–99)
GLUCOSE BLDC GLUCOMTR-MCNC: 109 MG/DL — HIGH (ref 70–99)
GLUCOSE BLDC GLUCOMTR-MCNC: 120 MG/DL — HIGH (ref 70–99)
GLUCOSE SERPL-MCNC: 92 MG/DL — SIGNIFICANT CHANGE UP (ref 70–99)
GLUCOSE UR QL: NEGATIVE MG/DL — SIGNIFICANT CHANGE UP
GRAM STN FLD: ABNORMAL
GRAM STN FLD: ABNORMAL
HCT VFR BLD CALC: 32.9 % — LOW (ref 34.5–45)
HGB BLD-MCNC: 10.6 G/DL — LOW (ref 11.5–15.5)
INR BLD: 1.36 RATIO — HIGH (ref 0.85–1.18)
KETONES UR-MCNC: NEGATIVE MG/DL — SIGNIFICANT CHANGE UP
LEUKOCYTE ESTERASE UR-ACNC: ABNORMAL
MCHC RBC-ENTMCNC: 31.1 PG — SIGNIFICANT CHANGE UP (ref 27–34)
MCHC RBC-ENTMCNC: 32.2 GM/DL — SIGNIFICANT CHANGE UP (ref 32–36)
MCV RBC AUTO: 96.5 FL — SIGNIFICANT CHANGE UP (ref 80–100)
METHOD TYPE: SIGNIFICANT CHANGE UP
NITRITE UR-MCNC: NEGATIVE — SIGNIFICANT CHANGE UP
NRBC # BLD: 0 /100 WBCS — SIGNIFICANT CHANGE UP (ref 0–0)
PH UR: 5.5 — SIGNIFICANT CHANGE UP (ref 5–8)
PLATELET # BLD AUTO: 184 K/UL — SIGNIFICANT CHANGE UP (ref 150–400)
POTASSIUM SERPL-MCNC: 3.8 MMOL/L — SIGNIFICANT CHANGE UP (ref 3.5–5.3)
POTASSIUM SERPL-SCNC: 3.8 MMOL/L — SIGNIFICANT CHANGE UP (ref 3.5–5.3)
PROT SERPL-MCNC: 5.8 G/DL — LOW (ref 6–8.3)
PROT UR-MCNC: 30 MG/DL
PROTHROM AB SERPL-ACNC: 14.8 SEC — HIGH (ref 9.5–13)
RBC # BLD: 3.41 M/UL — LOW (ref 3.8–5.2)
RBC # FLD: 14.1 % — SIGNIFICANT CHANGE UP (ref 10.3–14.5)
RBC CASTS # UR COMP ASSIST: 1 /HPF — SIGNIFICANT CHANGE UP (ref 0–4)
REVIEW: SIGNIFICANT CHANGE UP
RSV RNA NPH QL NAA+NON-PROBE: SIGNIFICANT CHANGE UP
SARS-COV-2 RNA SPEC QL NAA+PROBE: SIGNIFICANT CHANGE UP
SODIUM SERPL-SCNC: 136 MMOL/L — SIGNIFICANT CHANGE UP (ref 135–145)
SP GR SPEC: 1.01 — SIGNIFICANT CHANGE UP (ref 1–1.03)
SPECIMEN SOURCE: SIGNIFICANT CHANGE UP
SPECIMEN SOURCE: SIGNIFICANT CHANGE UP
SQUAMOUS # UR AUTO: 14 /HPF — HIGH (ref 0–5)
UROBILINOGEN FLD QL: 0.2 MG/DL — SIGNIFICANT CHANGE UP (ref 0.2–1)
WBC # BLD: 20.8 K/UL — HIGH (ref 3.8–10.5)
WBC # FLD AUTO: 20.8 K/UL — HIGH (ref 3.8–10.5)
WBC UR QL: >998 /HPF — HIGH (ref 0–5)

## 2024-02-12 PROCEDURE — 99223 1ST HOSP IP/OBS HIGH 75: CPT

## 2024-02-12 PROCEDURE — 99223 1ST HOSP IP/OBS HIGH 75: CPT | Mod: GC

## 2024-02-12 PROCEDURE — 74177 CT ABD & PELVIS W/CONTRAST: CPT | Mod: 26,MA

## 2024-02-12 RX ORDER — DEXTROSE 50 % IN WATER 50 %
25 SYRINGE (ML) INTRAVENOUS ONCE
Refills: 0 | Status: DISCONTINUED | OUTPATIENT
Start: 2024-02-12 | End: 2024-02-17

## 2024-02-12 RX ORDER — ACETAMINOPHEN 500 MG
650 TABLET ORAL EVERY 6 HOURS
Refills: 0 | Status: DISCONTINUED | OUTPATIENT
Start: 2024-02-12 | End: 2024-02-17

## 2024-02-12 RX ORDER — BUPROPION HYDROCHLORIDE 150 MG/1
100 TABLET, EXTENDED RELEASE ORAL
Refills: 0 | Status: DISCONTINUED | OUTPATIENT
Start: 2024-02-12 | End: 2024-02-17

## 2024-02-12 RX ORDER — ATORVASTATIN CALCIUM 80 MG/1
20 TABLET, FILM COATED ORAL AT BEDTIME
Refills: 0 | Status: DISCONTINUED | OUTPATIENT
Start: 2024-02-12 | End: 2024-02-17

## 2024-02-12 RX ORDER — INSULIN LISPRO 100/ML
VIAL (ML) SUBCUTANEOUS
Refills: 0 | Status: DISCONTINUED | OUTPATIENT
Start: 2024-02-12 | End: 2024-02-17

## 2024-02-12 RX ORDER — SODIUM CHLORIDE 9 MG/ML
1000 INJECTION, SOLUTION INTRAVENOUS
Refills: 0 | Status: DISCONTINUED | OUTPATIENT
Start: 2024-02-12 | End: 2024-02-17

## 2024-02-12 RX ORDER — ONDANSETRON 8 MG/1
4 TABLET, FILM COATED ORAL EVERY 8 HOURS
Refills: 0 | Status: DISCONTINUED | OUTPATIENT
Start: 2024-02-12 | End: 2024-02-17

## 2024-02-12 RX ORDER — CEFEPIME 1 G/1
1000 INJECTION, POWDER, FOR SOLUTION INTRAMUSCULAR; INTRAVENOUS ONCE
Refills: 0 | Status: COMPLETED | OUTPATIENT
Start: 2024-02-12 | End: 2024-02-12

## 2024-02-12 RX ORDER — DEXTROSE 50 % IN WATER 50 %
12.5 SYRINGE (ML) INTRAVENOUS ONCE
Refills: 0 | Status: DISCONTINUED | OUTPATIENT
Start: 2024-02-12 | End: 2024-02-17

## 2024-02-12 RX ORDER — MULTIVIT-MIN/FERROUS GLUCONATE 9 MG/15 ML
1 LIQUID (ML) ORAL DAILY
Refills: 0 | Status: DISCONTINUED | OUTPATIENT
Start: 2024-02-12 | End: 2024-02-17

## 2024-02-12 RX ORDER — LIDOCAINE 4 G/100G
1 CREAM TOPICAL ONCE
Refills: 0 | Status: COMPLETED | OUTPATIENT
Start: 2024-02-12 | End: 2024-02-12

## 2024-02-12 RX ORDER — LEVOTHYROXINE SODIUM 125 MCG
75 TABLET ORAL DAILY
Refills: 0 | Status: DISCONTINUED | OUTPATIENT
Start: 2024-02-12 | End: 2024-02-17

## 2024-02-12 RX ORDER — MIDODRINE HYDROCHLORIDE 2.5 MG/1
10 TABLET ORAL EVERY 8 HOURS
Refills: 0 | Status: DISCONTINUED | OUTPATIENT
Start: 2024-02-12 | End: 2024-02-15

## 2024-02-12 RX ORDER — CEFTRIAXONE 500 MG/1
1000 INJECTION, POWDER, FOR SOLUTION INTRAMUSCULAR; INTRAVENOUS EVERY 24 HOURS
Refills: 0 | Status: DISCONTINUED | OUTPATIENT
Start: 2024-02-12 | End: 2024-02-12

## 2024-02-12 RX ORDER — ALPRAZOLAM 0.25 MG
1 TABLET ORAL AT BEDTIME
Refills: 0 | Status: DISCONTINUED | OUTPATIENT
Start: 2024-02-12 | End: 2024-02-17

## 2024-02-12 RX ORDER — CEFEPIME 1 G/1
INJECTION, POWDER, FOR SOLUTION INTRAMUSCULAR; INTRAVENOUS
Refills: 0 | Status: DISCONTINUED | OUTPATIENT
Start: 2024-02-12 | End: 2024-02-14

## 2024-02-12 RX ORDER — POLYETHYLENE GLYCOL 3350 17 G/17G
17 POWDER, FOR SOLUTION ORAL DAILY
Refills: 0 | Status: DISCONTINUED | OUTPATIENT
Start: 2024-02-12 | End: 2024-02-17

## 2024-02-12 RX ORDER — LACTULOSE 10 G/15ML
20 SOLUTION ORAL
Refills: 0 | Status: DISCONTINUED | OUTPATIENT
Start: 2024-02-12 | End: 2024-02-14

## 2024-02-12 RX ORDER — ENOXAPARIN SODIUM 100 MG/ML
40 INJECTION SUBCUTANEOUS EVERY 24 HOURS
Refills: 0 | Status: DISCONTINUED | OUTPATIENT
Start: 2024-02-12 | End: 2024-02-12

## 2024-02-12 RX ORDER — LANOLIN ALCOHOL/MO/W.PET/CERES
3 CREAM (GRAM) TOPICAL AT BEDTIME
Refills: 0 | Status: DISCONTINUED | OUTPATIENT
Start: 2024-02-12 | End: 2024-02-17

## 2024-02-12 RX ORDER — SODIUM CHLORIDE 9 MG/ML
250 INJECTION INTRAMUSCULAR; INTRAVENOUS; SUBCUTANEOUS ONCE
Refills: 0 | Status: COMPLETED | OUTPATIENT
Start: 2024-02-12 | End: 2024-02-12

## 2024-02-12 RX ORDER — SODIUM CHLORIDE 9 MG/ML
1000 INJECTION INTRAMUSCULAR; INTRAVENOUS; SUBCUTANEOUS ONCE
Refills: 0 | Status: COMPLETED | OUTPATIENT
Start: 2024-02-12 | End: 2024-02-12

## 2024-02-12 RX ORDER — DEXTROSE 50 % IN WATER 50 %
15 SYRINGE (ML) INTRAVENOUS ONCE
Refills: 0 | Status: DISCONTINUED | OUTPATIENT
Start: 2024-02-12 | End: 2024-02-17

## 2024-02-12 RX ORDER — PANTOPRAZOLE SODIUM 20 MG/1
40 TABLET, DELAYED RELEASE ORAL
Refills: 0 | Status: DISCONTINUED | OUTPATIENT
Start: 2024-02-12 | End: 2024-02-17

## 2024-02-12 RX ORDER — CEFEPIME 1 G/1
1000 INJECTION, POWDER, FOR SOLUTION INTRAMUSCULAR; INTRAVENOUS EVERY 24 HOURS
Refills: 0 | Status: DISCONTINUED | OUTPATIENT
Start: 2024-02-13 | End: 2024-02-14

## 2024-02-12 RX ORDER — ENOXAPARIN SODIUM 100 MG/ML
30 INJECTION SUBCUTANEOUS EVERY 24 HOURS
Refills: 0 | Status: DISCONTINUED | OUTPATIENT
Start: 2024-02-12 | End: 2024-02-15

## 2024-02-12 RX ORDER — GLUCAGON INJECTION, SOLUTION 0.5 MG/.1ML
1 INJECTION, SOLUTION SUBCUTANEOUS ONCE
Refills: 0 | Status: DISCONTINUED | OUTPATIENT
Start: 2024-02-12 | End: 2024-02-17

## 2024-02-12 RX ADMIN — MIDODRINE HYDROCHLORIDE 10 MILLIGRAM(S): 2.5 TABLET ORAL at 21:14

## 2024-02-12 RX ADMIN — MIDODRINE HYDROCHLORIDE 10 MILLIGRAM(S): 2.5 TABLET ORAL at 14:46

## 2024-02-12 RX ADMIN — SODIUM CHLORIDE 1000 MILLILITER(S): 9 INJECTION INTRAMUSCULAR; INTRAVENOUS; SUBCUTANEOUS at 07:34

## 2024-02-12 RX ADMIN — SODIUM CHLORIDE 250 MILLILITER(S): 9 INJECTION INTRAMUSCULAR; INTRAVENOUS; SUBCUTANEOUS at 06:16

## 2024-02-12 RX ADMIN — LIDOCAINE 1 PATCH: 4 CREAM TOPICAL at 05:09

## 2024-02-12 RX ADMIN — CEFEPIME 1000 MILLIGRAM(S): 1 INJECTION, POWDER, FOR SOLUTION INTRAMUSCULAR; INTRAVENOUS at 13:12

## 2024-02-12 RX ADMIN — POLYETHYLENE GLYCOL 3350 17 GRAM(S): 17 POWDER, FOR SOLUTION ORAL at 12:04

## 2024-02-12 RX ADMIN — Medication 1 TABLET(S): at 12:03

## 2024-02-12 RX ADMIN — ATORVASTATIN CALCIUM 20 MILLIGRAM(S): 80 TABLET, FILM COATED ORAL at 21:15

## 2024-02-12 RX ADMIN — Medication 1 TABLET(S): at 12:19

## 2024-02-12 NOTE — CONSULT NOTE ADULT - SUBJECTIVE AND OBJECTIVE BOX
MICU Consult Note    HPI:  83F w/ DM2, HTN, s/p thyroidectomy and PUD who presented with weakness/fatigue and abdominal pain, found to have sepsis 2/2 UTI. CTAP with concern for pyelonephritis. S/p 1.8L IVF. CXR and CTAP with small pleff. On 2L NC. MICU consulted for hypotension. Pt denies CP, SOB, abdominal pain.    PAST MEDICAL & SURGICAL HISTORY:      FAMILY HISTORY:      Advance Directives:    Allergies    penicillin (Anaphylaxis)    Intolerances      OBJECTIVE:  ICU Vital Signs Last 24 Hrs  T(C): 36.8 (2024 07:29), Max: 38.1 (2024 23:16)  T(F): 98.3 (:29), Max: 100.6 (2024 23:16)  HR: 78 (2024 07:29) (71 - 85)  BP: 92/53 (2024 07:29) (86/51 - 132/79)  BP(mean): 64 (:) (63 - 77)  ABP: --  ABP(mean): --  RR: 18 (2024 07:29) (16 - 19)  SpO2: 98% (:29) (92% - 98%)    O2 Parameters below as of 2024 07:29  Patient On (Oxygen Delivery Method): nasal cannula  O2 Flow (L/min): 2            CAPILLARY BLOOD GLUCOSE          PHYSICAL EXAM:  Gen: awake and alert, NAD  HEENT: atraumatic, normocephalic, EOMI, no conjunctival injection  CV: RRR, no murmurs  Resp: RLL crackles, no respiratory distress  GI: soft, nontender, mod distended  MSK: extremities atraumatic, no cyanosis or clubbing, no LE edema  Skin: warm, dry, no rashes or lesions  Neuro: no focal deficits, sensation grossly intact  Psych: alert and oriented x3        HOSPITAL MEDICATIONS:  MEDICATIONS  (STANDING):  midodrine. 10 milliGRAM(s) Oral every 8 hours  sodium chloride 0.9% Bolus 1000 milliLiter(s) IV Bolus once    MEDICATIONS  (PRN):      LABS:                        11.2   13.90 )-----------( 204      ( 2024 23:18 )             33.5     0211    133<L>  |  99  |  22  ----------------------------<  111<H>  3.5   |  23  |  1.37<H>    Ca    8.8      2024 23:18    TPro  6.4  /  Alb  3.3  /  TBili  0.6  /  DBili  x   /  AST  95<H>  /  ALT  65<H>  /  AlkPhos  95  11    PT/INR - ( 2024 23:18 )   PT: 14.8 sec;   INR: 1.36 ratio         PTT - ( 2024 23:18 )  PTT:28.4 sec  Urinalysis Basic - ( 2024 01:13 )    Color: Yellow / Appearance: Turbid / S.013 / pH: x  Gluc: x / Ketone: Negative mg/dL  / Bili: Negative / Urobili: 0.2 mg/dL   Blood: x / Protein: 30 mg/dL / Nitrite: Negative   Leuk Esterase: Large / RBC: 1 /HPF / WBC >998 /HPF   Sq Epi: x / Non Sq Epi: 14 /HPF / Bacteria: Many /HPF        Venous Blood Gas:   @ 22:58  --/--/--/--/--  VBG Lactate: 0.9     MICU Consult Note    HPI:  83F w/ DM2, HTN, s/p thyroidectomy and PUD who presented with weakness/fatigue and abdominal pain, found to have sepsis 2/2 UTI. CTAP with concern for pyelonephritis. S/p 1.8L IVF. CXR and CTAP with small pleff. On 2L NC. MICU consulted for hypotension. Pt denies CP, SOB, abdominal pain.    PAST MEDICAL & SURGICAL HISTORY:  as above    FAMILY HISTORY:      Advance Directives:    Allergies  penicillin (Anaphylaxis)      OBJECTIVE:  ICU Vital Signs Last 24 Hrs  T(C): 36.8 (2024 07:29), Max: 38.1 (2024 23:16)  T(F): 98.3 (:29), Max: 100.6 (2024 23:16)  HR: 78 (:) (71 - 85)  BP: 92/53 (2024 07:) (86/51 - 132/79)  BP(mean): 64 (:) (63 - 77)  RR: 18 (:) (16 - 19)  SpO2: 98% (:) (92% - 98%)    O2 Parameters below as of :29  Patient On (Oxygen Delivery Method): nasal cannula  O2 Flow (L/min): 2      PHYSICAL EXAM:  Gen: awake and alert, NAD  HEENT: atraumatic, normocephalic, EOMI, no conjunctival injection  CV: RRR, no murmurs  Resp: RLL crackles, no respiratory distress  GI: soft, nontender, mod distended  MSK: extremities atraumatic, no cyanosis or clubbing, no LE edema  Skin: warm, dry, no rashes or lesions  Neuro: no focal deficits, sensation grossly intact  Psych: alert and oriented x3        HOSPITAL MEDICATIONS:  MEDICATIONS  (STANDING):  midodrine. 10 milliGRAM(s) Oral every 8 hours  sodium chloride 0.9% Bolus 1000 milliLiter(s) IV Bolus once    MEDICATIONS  (PRN):      LABS:                        11.2   13.90 )-----------( 204      ( 2024 23:18 )             33.5     02-11    133<L>  |  99  |  22  ----------------------------<  111<H>  3.5   |  23  |  1.37<H>    Ca    8.8      2024 23:18    TPro  6.4  /  Alb  3.3  /  TBili  0.6  /  DBili  x   /  AST  95<H>  /  ALT  65<H>  /  AlkPhos  95      PT/INR - ( 2024 23:18 )   PT: 14.8 sec;   INR: 1.36 ratio         PTT - ( 2024 23:18 )  PTT:28.4 sec  Urinalysis Basic - ( 2024 01:13 )    Color: Yellow / Appearance: Turbid / S.013 / pH: x  Gluc: x / Ketone: Negative mg/dL  / Bili: Negative / Urobili: 0.2 mg/dL   Blood: x / Protein: 30 mg/dL / Nitrite: Negative   Leuk Esterase: Large / RBC: 1 /HPF / WBC >998 /HPF   Sq Epi: x / Non Sq Epi: 14 /HPF / Bacteria: Many /HPF    Venous Blood Gas:   @ 22:58  --/--/--/--/--  VBG Lactate: 0.9    RADIOLOGY:  Personally reviewed.    < from: CT Abdomen and Pelvis w/ IV Cont (24 @ 00:41) >    IMPRESSION:  Cystitis with bilateral ascending ureteritis and pyelonephritis.    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    < end of copied text >

## 2024-02-12 NOTE — H&P ADULT - HISTORY OF PRESENT ILLNESS
83F w/ DM2, HTN, s/p thyroidectomy and PUD who presented with weakness/fatigue and abdominal pain, found to have sepsis 2/2 UTI. CTAP with concern for pyelonephritis. S/p 1.8L IVF. CXR and CTAP with small pleff. On 2L NC. Pt denies CP, SOB, abdominal pain.  MICU  has seen patient  for hypotension and patient is not considered a candidate .      Patient was seen with daughter at the bedside     ED  : Tm = 100.6  HR 78 BP = /50-77  Patient received about 3 L of IV and Ceftriaxone IV and admitted for further evaluation and treatment  83F w/ DM2, HTN, HLD, OA, Hiatal Hernia , PUD, s/p thyroidectomy who presented with weakness/fatigue and  low grade fever at home and was seen by first med yesterday had negative COVID, sent home to take Tylenol for the fever, today felt  worst ,  and started to experience chest pain  no urinary frequency or urgency but states that she has frequent UTIs, denies nausea vomiting,  or shortness of breath.  In the ED, patient was found to have sepsis 2/2 UTI. CTAP with concern for pyelonephritis. S/p IVF. CXR and CTAP with small pleff. On 2L NC. Pt denies CP, SOB, abdominal pain.  MICU  has seen patient  for hypotension and patient is not considered a candidate .    ED  : Tm = 100.6  HR 78 BP = /50-77  Patient received about 3 L of IV and Ceftriaxone IV and admitted for further evaluation and treatment   Patient was seen with daughter at the bedside            83F w/ DM2, HTN, HLD, OA, Hiatal Hernia , PUD,  Uterine Prolapse, Papillary thyroid CA, s/p thyroidectomy who presented with weakness/fatigue and  low grade fever at home and was seen by [facility] yesterday had negative COVID, sent home to take Tylenol for the fever, today felt  worst ,  and started to experience chest discomfort and increased weakness  so she came to hospital.   No urinary frequency or urgency but states that she has frequent UTIs, denies nausea vomiting,  or palpitations or  shortness of breath.  In the ED, patient was found to have sepsis 2/2 UTI. CTAP with concern for pyelonephritis. Pt denies CP, SOB, abdominal pain.  IN the ED  : Tm = 100.6  HR 78 BP = /50-77  Patient received about 3 L of IV and Ceftriaxone IV and admitted for further evaluation and treatment     MICU  has seen patient  for hypotension and patient is not considered a candidate .      Patient was seen with daughter at the bedside

## 2024-02-12 NOTE — CONSULT NOTE ADULT - ASSESSMENT
constipation   pyelonephritis    cont iv antibiotics per ID  f/u cultures  diet as tolerated  CT noted  large HH  large stool burden  will start lactulose  outpatient follow up with primary GI (Haroon horton)

## 2024-02-12 NOTE — CONSULT NOTE ADULT - ATTENDING COMMENTS
Patient seen and examined.  Agree with resident note as above.  Patient with hx as noted including HTN, frequent UTIs who presented to [facility] with chest and abdominal pain.  In the ER with +UA and CT +cystitis/pyelo.  Patient with initially stable BP but then developed hypoTN that is now somewhat responsive to IVF.    POCUS performed and patient has anterior A lines, robust biV function.  Unable to visualize IVC.  Patient is lying flat comfortably on 2L via NC.    Recs as above- 1L bolus IVF, start midodrine 10mg po q8h (first dose now).  Follow cx, empiric abx for  source.  Discussed at length with patient and daughter at bedside.    No need for MICU at this time.

## 2024-02-12 NOTE — H&P ADULT - PROBLEM SELECTOR PLAN 2
Start Bowel regimen Start Bowel regimen  Pt admits to chronic constipation and only Milk of magnesia tablet works for her as she states --> we do not have it in the hospital as per RX  GI consult is called

## 2024-02-12 NOTE — H&P ADULT - PROBLEM SELECTOR PLAN 7
Lowest Cr was <1 in 2023   RAYMOND most likely in setting of infection cont with IVF and repeat in AM   If not better , get Bladder scan and renal function and urine lytes   avoid nephrotoxic agents

## 2024-02-12 NOTE — H&P ADULT - NSICDXPASTMEDICALHX_GEN_ALL_CORE_FT
PAST MEDICAL HISTORY:  Constipation     Hiatal hernia     HLD (hyperlipidemia)     Osteoarthritis     Papillary carcinoma     Primary hypertension     Uterine prolapse

## 2024-02-12 NOTE — CONSULT NOTE ADULT - SUBJECTIVE AND OBJECTIVE BOX
[facility]  Roc Ziegler PA-C  [address] [place], [place] [id number] [phone]      Chief Complaint:  Patient is a 83y old  Female who presents with a chief complaint of Weakness and fatigue (12 Feb 2024 14:02)      HPI:83F w/ DM2, HTN, HLD, OA, Hiatal Hernia , PUD, s/p thyroidectomy who presented with weakness/fatigue and  low grade fever at home and was seen by first med yesterday had negative COVID, sent home to take Tylenol for the fever, today felt  worst ,  and started to experience chest pain  no urinary frequency or urgency but states that she has frequent UTIs, denies nausea vomiting,  or shortness of breath.  In the ED, patient was found to have sepsis 2/2 UTI. CTAP with concern for pyelonephritis. S/p IVF. CXR and CTAP with small pleff. On 2L NC. Pt denies CP, SOB, abdominal pain.  MICU  has seen patient  for hypotension and patient is not considered a candidate .     Allergies:  penicillin (Anaphylaxis)      Medications:  acetaminophen     Tablet .. 650 milliGRAM(s) Oral every 6 hours PRN  ALPRAZolam 1 milliGRAM(s) Oral at bedtime PRN  atorvastatin 20 milliGRAM(s) Oral at bedtime  bisacodyl 5 milliGRAM(s) Oral every 12 hours PRN  buPROPion . 100 milliGRAM(s) Oral two times a day  calcium carbonate 1250 mG  + Vitamin D (OsCal 500 + D) 1 Tablet(s) Oral daily  cefepime   IVPB      dextrose 5%. 1000 milliLiter(s) IV Continuous <Continuous>  dextrose 5%. 1000 milliLiter(s) IV Continuous <Continuous>  dextrose 50% Injectable 12.5 Gram(s) IV Push once  dextrose 50% Injectable 25 Gram(s) IV Push once  dextrose 50% Injectable 25 Gram(s) IV Push once  dextrose Oral Gel 15 Gram(s) Oral once PRN  enoxaparin Injectable 30 milliGRAM(s) SubCutaneous every 24 hours  glucagon  Injectable 1 milliGRAM(s) IntraMuscular once  insulin lispro (ADMELOG) corrective regimen sliding scale   SubCutaneous three times a day before meals  levothyroxine 75 MICROGram(s) Oral daily  melatonin 3 milliGRAM(s) Oral at bedtime PRN  midodrine. 10 milliGRAM(s) Oral every 8 hours  multivitamin/minerals 1 Tablet(s) Oral daily  ondansetron Injectable 4 milliGRAM(s) IV Push every 8 hours PRN  pantoprazole    Tablet 40 milliGRAM(s) Oral before breakfast  polyethylene glycol 3350 17 Gram(s) Oral daily      PMHX/PSHX:      Family history:      Social History:     ROS:     General:  no fevers, chills, night sweats, fatigue,   Eyes:  Good vision, no reported pain  ENT:  No sore throat, pain, runny nose, dysphagia  CV:  No pain, palpitations, hypo/hypertension  Resp:  No dyspnea, cough, tachypnea, wheezing  GI:  No pain, No nausea, No vomiting, No diarrhea, No constipation, No weight loss, No fever, No pruritis, No rectal bleeding, No tarry stools, No dysphagia,  :  No pain, bleeding, incontinence, nocturia  Muscle:  No pain, weakness  Neuro:  No weakness, tingling, memory problems  Psych:  No fatigue, insomnia, mood problems, depression  Endocrine:  No polyuria, polydipsia, cold/heat intolerance  Heme:  No petechiae, ecchymosis, easy bruisability  Skin:  No rash, tattoos, scars, edema      PHYSICAL EXAM:   Vital Signs:  Vital Signs Last 24 Hrs  T(C): 36.6 (12 Feb 2024 12:20), Max: 38.1 (11 Feb 2024 23:16)  T(F): 97.9 (12 Feb 2024 12:20), Max: 100.6 (11 Feb 2024 23:16)  HR: 73 (12 Feb 2024 14:32) (71 - 85)  BP: 106/61 (12 Feb 2024 14:32) (86/51 - 132/79)  BP(mean): 76 (12 Feb 2024 14:32) (63 - 79)  RR: 18 (12 Feb 2024 14:32) (16 - 20)  SpO2: 97% (12 Feb 2024 14:32) (92% - 98%)    Parameters below as of 12 Feb 2024 14:32  Patient On (Oxygen Delivery Method): nasal cannula  O2 Flow (L/min): 2    Daily     Daily     GENERAL:  Appears stated age,   HEENT:  NC/AT,    CHEST:  Full & symmetric excursion,   HEART:  Regular rhythm  ABDOMEN:  Soft, non-tender, non-distended,   EXTEREMITIES:  no cyanosis,clubbing or edema  SKIN:  No rash  NEURO:  Alert,    LABS:                        10.6   20.80 )-----------( 184      ( 12 Feb 2024 12:42 )             32.9     02-12    136  |  106  |  21  ----------------------------<  92  3.8   |  18<L>  |  1.40<H>    Ca    7.7<L>      12 Feb 2024 12:42    TPro  5.8<L>  /  Alb  2.8<L>  /  TBili  0.4  /  DBili  x   /  AST  49<H>  /  ALT  43  /  AlkPhos  88  02-12    LIVER FUNCTIONS - ( 12 Feb 2024 12:42 )  Alb: 2.8 g/dL / Pro: 5.8 g/dL / ALK PHOS: 88 U/L / ALT: 43 U/L / AST: 49 U/L / GGT: x           PT/INR - ( 11 Feb 2024 23:18 )   PT: 14.8 sec;   INR: 1.36 ratio         PTT - ( 11 Feb 2024 23:18 )  PTT:28.4 sec  Urinalysis Basic - ( 12 Feb 2024 12:42 )    Color: x / Appearance: x / SG: x / pH: x  Gluc: 92 mg/dL / Ketone: x  / Bili: x / Urobili: x   Blood: x / Protein: x / Nitrite: x   Leuk Esterase: x / RBC: x / WBC x   Sq Epi: x / Non Sq Epi: x / Bacteria: x          Imaging:

## 2024-02-12 NOTE — CONSULT NOTE ADULT - ASSESSMENT
83F w/ DM2, HTN, s/p thyroidectomy and PUD who presented with weakness and fatigue, found to have sepsis 2/2 UTI. S/p 1.8L IVF. CXR and CTAP with small pleff. On 2L NC. MICU consulted for hypotension.    #Sepsis  #UTI/Pyelonephritis   POCUS a-line predominant. LV function grossly preserved.  - Recommend additional 1L IVF  - Start midodrine 10 q8h  - F/u infectious work up, c/w empiric abx  - Currently not a MICU candidate, please reconsult as needed     Discussed with Dr. Jo. ACP notified of recommendations.

## 2024-02-12 NOTE — PHYSICAL THERAPY INITIAL EVALUATION ADULT - ADDITIONAL COMMENTS
Pt lives with son in private house, 1 stair to enter, 1 flight to bedroom, (+)stair lift. Ambulates with rolling walker or rollator.

## 2024-02-12 NOTE — CONSULT NOTE ADULT - ASSESSMENT
83 woman with prediabetes, HTN, HLD, OA, Hiatal Hernia , PUD, s/p thyroidectomy who was admitted 2/12/24 with weakness/fatigue and  low grade fever at home.    Evaluation remarkable for   fever  leukocytosis  pyuria  Imaging indicating bilateral pyelonephritis, ascending ureteritis and cystitis  low risk amoxicillin allergy history  hypotension  - responding to fluids,midodrine    Antibiotics  Ceftriaxone 1/11  Cefepime 2/12    This patient has complicated UTI  - perhaps related to uterine prolapse and recent unsuccessful pessary fitting    Suggest  Continue Cefepime  monitor cultures  hemodynamic monitoring and support    discussed with primary medical attending

## 2024-02-12 NOTE — CONSULT NOTE ADULT - SUBJECTIVE AND OBJECTIVE BOX
Patient is a 83y old  Female who presents with a chief complaint of Weakness and fatigue (12 Feb 2024 08:33)    HPI:  83 woman with prediabetes, HTN, HLD, OA, Hiatal Hernia , PUD, s/p thyroidectomy who was admitted 2/12/24 with weakness/fatigue and  low grade fever at home and was seen by [facility] yesterday had negative COVID, sent home to take Tylenol for the fever, today felt  worst ,  and started to experience chest pain  no urinary frequency or urgency but states that she has frequent UTIs, denies nausea vomiting,  or shortness of breath.  In the ED, patient was found to have sepsis 2/2 UTI. CTAP with concern for pyelonephritis. S/p IVF. CXR and CTAP with small pleff. On 2L NC. Pt denies CP, SOB, abdominal pain.  MICU  has seen patient  for hypotension and patient is not considered a candidate .    ED  : Tm = 100.6  HR 78 BP = /50-77  Patient received about 3 L of IV and Ceftriaxone IV and admitted for further evaluation and treatment     This am, seen with daughter. Denies frequent UTIs. Unaware of any antibiotics since her hip surgery Feb 2023. No recent UTIs  The daughter notes general weakness. poor recall and limited quality of life. She goes out only to see doctors and is fatigued.  Ambulates with walker    Pt denies dysuria. No flank or back pain, Lower abd pain noted  There is history of uterovaginal prolapse. 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 Occasional  nausea  GENITOURINARY: No dysuria, frequency or hematuria  NEUROLOGICAL: No numbness or weakness  SKIN: No itching, burning, rashes, or lesions   All other review of systems is negative unless indicated above    Allergies  penicillin (Anaphylaxis)   pt reports only GI distress    Antimicrobials:  cefepime   IVPB          Vital Signs Last 24 Hrs  T(C): 36.6 (12 Feb 2024 12:20), Max: 38.1 (11 Feb 2024 23:16)  T(F): 97.9 (12 Feb 2024 12:20), Max: 100.6 (11 Feb 2024 23:16)  HR: 79 (12 Feb 2024 12:20) (71 - 85)  BP: 117/72 (12 Feb 2024 12:20) (86/51 - 132/79)  BP(mean): 79 (12 Feb 2024 10:28) (63 - 79)  RR: 18 (12 Feb 2024 12:20) (16 - 20)  SpO2: 98% (12 Feb 2024 12:20) (92% - 98%)    Parameters below as of 12 Feb 2024 12:20  Patient On (Oxygen Delivery Method): nasal cannula  O2 Flow (L/min): 2      PHYSICAL EXAM:  General: WN/WD NAD, Non-toxic  Neurology: A&Ox3, nonfocal  Respiratory: Clear to auscultation bilaterally  CV: RRR, S1S2, no murmurs, rubs or gallops  Abdominal: Soft, lower abd tenderness, no cvat  Extremities: No edema  Line Sites: Clear  Skin: No rash                        10.6   20.80 )-----------( 184      ( 12 Feb 2024 12:42 )             32.9   WBC Count: 20.80 (02-12 @ 12:42)   WBC Count: 13.90 (02-11 @ 23:18)  83 Neut      02-12    136  |  106  |  21  ----------------------------<  92  3.8   |  18<L>  |  1.40<H>  Creatinine: 1.40 (02-12 @ 12:42)    Creatinine: 1.37 (02-11 @ 23:18)      Ca    7.7<L>      12 Feb 2024 12:42    TPro  5.8<L>  /  Alb  2.8<L>  /  TBili  0.4  /  DBili  x   /  AST  49<H>  /  ALT  43  /  AlkPhos  88  02-12      MICROBIOLOGY:    Radiology:  images reviewed  < from: CT Abdomen and Pelvis w/ IV Cont (02.12.24 @ 00:41) >  FINDINGS:  LOWER CHEST: Bibasilar atelectasis and trace pleural effusions.   Moderately large hiatal hernia with questionable thickening of the distal   esophagus.    LIVER: Within normal limits.  BILE DUCTS: Normal caliber.  GALLBLADDER: Within normal limits.  SPLEEN: Within normal limits.  PANCREAS: Within normal limits.  ADRENALS: Within normal limits.  KIDNEYS/URETERS: Bilateral striated nephrograms. Bilateral renal cysts   and subcentimeter hypodensities, too small to characterize. There is   diffuse urothelial enhancement of the bilateral renal collecting systems   with mild periureteral stranding.    Limited assessment of the pelvic structures due to streak artifact from   metallic left hip arthroplasty and right hip intramedullary screws.  BLADDER: Distended with disproportional wall thickening and perivesicular   fat stranding.  REPRODUCTIVE ORGANS: Uterus and adnexa within normal limits.    BOWEL: Moderate hiatal hernia. No bowel obstruction. Mobile cecum which   is located in the left hemiabdomen. The appendix is not visualized, no   pericecal inflammatory changes. Moderately large amount of formed   retained fecal material throughout the colon. No colonic wall thickening.  PERITONEUM: No ascites.  VESSELS: Right common iliac artery aneurysm measuring up to 2.5 cm. The   abdominal aorta is normal in caliber. The mesenteric vessels are patent.  RETROPERITONEUM/LYMPH NODES: No lymphadenopathy.  ABDOMINAL WALL: Within normal limits.  BONES: Degenerative changes. Left hip total arthroplasty. Right femoral   neck screw fixation. Grade 1 retrolisthesis of L2 on L3. No acute osseous   findings.      IMPRESSION:  Cystitis with bilateral ascending ureteritis and pyelonephritis.    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    < end of copied text >  < from: Xray Chest 1 View-PORTABLE IMMEDIATE (02.11.24 @ 23:22) >  FINDINGS:    Cardiac/mediastinum/hilum: Heart size cannot be accurately assessed on   this projection.    Lung parenchyma/Pleura: The lungs are clear. Trace left pleural effusion.   There is no pneumothorax.    Skeleton/soft tissues: Surgical clips overlie the leftupper thorax.   Elevated right hemidiaphragm. No acute osseous abnormalities.    IMPRESSION:    Trace left pleural effusion.    < end of copied text >      Archie Bell MD; Division of Infectious Disease; Pager: [phone]; nights and weekends: [phone]

## 2024-02-12 NOTE — H&P ADULT - NSHPLABSRESULTS_GEN_ALL_CORE
LaB RESULTS ARE REVIEWED BY ME LaB RESULTS ARE REVIEWED BY ME ( please see full report) noted leucocytosis , elevated LFT , RAYMOND  , see CT and CXR report

## 2024-02-12 NOTE — H&P ADULT - ASSESSMENT
83F w/ DM2, HTN, HLD, OA, Hiatal Hernia , PUD, s/p thyroidectomy who presented with weakness/fatigue and  low grade fever at home and was seen by [facility] yesterday had negative COVID, sent home to take Tylenol for the fever, today felt  worst ,  and started to experience chest pain  no urinary frequency or urgency but states that she has frequent UTIs, denies nausea vomiting,  or shortness of breath.  In the ED, patient was found to have sepsis 2/2 UTI. CTAP with concern for pyelonephritis. S/p IVF. CXR and CTAP with small pleff. On 2L NC. Pt denies CP, SOB, abdominal pain.  MICU  has seen patient  for hypotension and patient is not considered a candidate .    ED  : Tm = 100.6  HR 78 BP = /50-77  Patient received about 3 L of IV and Ceftriaxone IV and admitted for further evaluation and treatment  83F w/ DM2, HTN, HLD, OA, Hiatal Hernia , PUD,  Uterine Prolapse, Papillary thyroid CA, s/p thyroidectomy who presented with weakness/fatigue and  low grade fever at home and was seen by [facility] yesterday had negative COVID, sent home to take Tylenol for the fever, today felt  worst ,  and started to experience chest discomfort and increased weakness  so she came to hospital.   No urinary frequency or urgency but states that she has frequent UTIs, denies nausea vomiting,  or palpitations or  shortness of breath.  In the ED, patient was found to have sepsis 2/2 UTI. CTAP with concern for pyelonephritis. Pt denies CP, SOB, abdominal pain.  IN the ED  : Tm = 100.6  HR 78 BP = /50-77  Patient received about 3 L of IV and Ceftriaxone IV and admitted for further evaluation and treatment     MICU  has seen patient  for hypotension and patient is not considered a candidate .

## 2024-02-12 NOTE — H&P ADULT - TIME BILLING
Personally evaluate patient , review records, long discussion with patient and her daughter , review and order labs and implement care

## 2024-02-12 NOTE — PHYSICAL THERAPY INITIAL EVALUATION ADULT - PERTINENT HX OF CURRENT PROBLEM, REHAB EVAL
83 y/oF admitted 2/12 p/w weakness/fatigue and  low grade fever at home and was seen by [facility] yesterday had negative COVID, sent home to take Tylenol for the fever, today felt  worse, and started to experience chest pain. No urinary frequency or urgency but states that she has frequent UTIs, denies nausea vomiting, or shortness of breath. In the ED, patient was found to have sepsis 2/2 UTI. CTAP with concern for pyelonephritis. S/p IVF. CXR and CTAP with small pleff. On 2L NC. MICU consulted, pt not a candidate.  Patient received about 3 L of IV and Ceftriaxone IV and admitted for further evaluation and treatment. PMH DM2, HTN, HLD, OA, Hiatal Hernia , PUD, s/p thyroidectomy

## 2024-02-12 NOTE — H&P ADULT - PROBLEM SELECTOR PLAN 6
HOLD antihypertensive meds due to severe sepsis   restart once able HOLD antihypertensive meds due to severe sepsis   restart once able  She is on Diltiazem, Coreg and Hydralazine

## 2024-02-12 NOTE — H&P ADULT - PROBLEM SELECTOR PLAN 1
with SIRS --> severe Sepsis   s/p IVF in the ED and Midodrine was started   HOLD antihypertensive meds   COnt with Ceftriaxone   F/up Blood and Urine CX   MICU rec is appreciated with SIRS --> Sepsis   s/p IVF in the ED and Midodrine was started   HOLD antihypertensive meds   COnt with Ceftriaxone   F/up Blood and Urine CX   MICU rec is appreciated  ID consult was called

## 2024-02-12 NOTE — H&P ADULT - NSHPADDITIONALINFOADULT_GEN_ALL_CORE
Gretchen Tolbert   Hospitalist   available on TEAMS RT Common Iliac Artery Aneurysm ( 2.5 CM )           Gretchen Tolbert   Hospitalist   available on TEAMS RT Common Iliac Artery Aneurysm ( 2.5 CM )--> Dr Moore to call vascular team if deemed warranted   Monitor LFT in setting of sepsis           Gretchen Tolbert   Hospitalist   available on TEAMS

## 2024-02-13 ENCOUNTER — RESULT REVIEW (OUTPATIENT)
Age: 84
End: 2024-02-13

## 2024-02-13 LAB
A1C WITH ESTIMATED AVERAGE GLUCOSE RESULT: 5.6 % — SIGNIFICANT CHANGE UP (ref 4–5.6)
ALBUMIN SERPL ELPH-MCNC: 3 G/DL — LOW (ref 3.3–5)
ALP SERPL-CCNC: 102 U/L — SIGNIFICANT CHANGE UP (ref 40–120)
ALT FLD-CCNC: 36 U/L — SIGNIFICANT CHANGE UP (ref 10–45)
ANION GAP SERPL CALC-SCNC: 14 MMOL/L — SIGNIFICANT CHANGE UP (ref 5–17)
AST SERPL-CCNC: 32 U/L — SIGNIFICANT CHANGE UP (ref 10–40)
BASOPHILS # BLD AUTO: 0.07 K/UL — SIGNIFICANT CHANGE UP (ref 0–0.2)
BASOPHILS NFR BLD AUTO: 0.5 % — SIGNIFICANT CHANGE UP (ref 0–2)
BILIRUB SERPL-MCNC: 0.3 MG/DL — SIGNIFICANT CHANGE UP (ref 0.2–1.2)
BUN SERPL-MCNC: 24 MG/DL — HIGH (ref 7–23)
CALCIUM SERPL-MCNC: 8.6 MG/DL — SIGNIFICANT CHANGE UP (ref 8.4–10.5)
CHLORIDE SERPL-SCNC: 105 MMOL/L — SIGNIFICANT CHANGE UP (ref 96–108)
CO2 SERPL-SCNC: 19 MMOL/L — LOW (ref 22–31)
CREAT SERPL-MCNC: 1.56 MG/DL — HIGH (ref 0.5–1.3)
EGFR: 33 ML/MIN/1.73M2 — LOW
EOSINOPHIL # BLD AUTO: 0.15 K/UL — SIGNIFICANT CHANGE UP (ref 0–0.5)
EOSINOPHIL NFR BLD AUTO: 1 % — SIGNIFICANT CHANGE UP (ref 0–6)
ESTIMATED AVERAGE GLUCOSE: 114 MG/DL — SIGNIFICANT CHANGE UP (ref 68–114)
GLUCOSE BLDC GLUCOMTR-MCNC: 101 MG/DL — HIGH (ref 70–99)
GLUCOSE BLDC GLUCOMTR-MCNC: 104 MG/DL — HIGH (ref 70–99)
GLUCOSE BLDC GLUCOMTR-MCNC: 96 MG/DL — SIGNIFICANT CHANGE UP (ref 70–99)
GLUCOSE BLDC GLUCOMTR-MCNC: 96 MG/DL — SIGNIFICANT CHANGE UP (ref 70–99)
GLUCOSE SERPL-MCNC: 93 MG/DL — SIGNIFICANT CHANGE UP (ref 70–99)
HCT VFR BLD CALC: 34.6 % — SIGNIFICANT CHANGE UP (ref 34.5–45)
HGB BLD-MCNC: 10.8 G/DL — LOW (ref 11.5–15.5)
IMM GRANULOCYTES NFR BLD AUTO: 0.8 % — SIGNIFICANT CHANGE UP (ref 0–0.9)
LYMPHOCYTES # BLD AUTO: 1.1 K/UL — SIGNIFICANT CHANGE UP (ref 1–3.3)
LYMPHOCYTES # BLD AUTO: 7.2 % — LOW (ref 13–44)
MCHC RBC-ENTMCNC: 30.5 PG — SIGNIFICANT CHANGE UP (ref 27–34)
MCHC RBC-ENTMCNC: 31.2 GM/DL — LOW (ref 32–36)
MCV RBC AUTO: 97.7 FL — SIGNIFICANT CHANGE UP (ref 80–100)
MONOCYTES # BLD AUTO: 1.1 K/UL — HIGH (ref 0–0.9)
MONOCYTES NFR BLD AUTO: 7.2 % — SIGNIFICANT CHANGE UP (ref 2–14)
NEUTROPHILS # BLD AUTO: 12.79 K/UL — HIGH (ref 1.8–7.4)
NEUTROPHILS NFR BLD AUTO: 83.3 % — HIGH (ref 43–77)
NRBC # BLD: 0 /100 WBCS — SIGNIFICANT CHANGE UP (ref 0–0)
PLATELET # BLD AUTO: 205 K/UL — SIGNIFICANT CHANGE UP (ref 150–400)
POTASSIUM SERPL-MCNC: 3.6 MMOL/L — SIGNIFICANT CHANGE UP (ref 3.5–5.3)
POTASSIUM SERPL-SCNC: 3.6 MMOL/L — SIGNIFICANT CHANGE UP (ref 3.5–5.3)
PROT SERPL-MCNC: 6.3 G/DL — SIGNIFICANT CHANGE UP (ref 6–8.3)
RBC # BLD: 3.54 M/UL — LOW (ref 3.8–5.2)
RBC # FLD: 14.2 % — SIGNIFICANT CHANGE UP (ref 10.3–14.5)
SODIUM SERPL-SCNC: 138 MMOL/L — SIGNIFICANT CHANGE UP (ref 135–145)
WBC # BLD: 15.33 K/UL — HIGH (ref 3.8–10.5)
WBC # FLD AUTO: 15.33 K/UL — HIGH (ref 3.8–10.5)

## 2024-02-13 PROCEDURE — 99232 SBSQ HOSP IP/OBS MODERATE 35: CPT

## 2024-02-13 PROCEDURE — 99223 1ST HOSP IP/OBS HIGH 75: CPT

## 2024-02-13 PROCEDURE — 93306 TTE W/DOPPLER COMPLETE: CPT | Mod: 26

## 2024-02-13 RX ORDER — ACETAMINOPHEN 500 MG
1000 TABLET ORAL ONCE
Refills: 0 | Status: COMPLETED | OUTPATIENT
Start: 2024-02-13 | End: 2024-02-13

## 2024-02-13 RX ADMIN — CEFEPIME 100 MILLIGRAM(S): 1 INJECTION, POWDER, FOR SOLUTION INTRAMUSCULAR; INTRAVENOUS at 11:10

## 2024-02-13 RX ADMIN — MIDODRINE HYDROCHLORIDE 10 MILLIGRAM(S): 2.5 TABLET ORAL at 21:28

## 2024-02-13 RX ADMIN — MIDODRINE HYDROCHLORIDE 10 MILLIGRAM(S): 2.5 TABLET ORAL at 07:26

## 2024-02-13 RX ADMIN — BUPROPION HYDROCHLORIDE 100 MILLIGRAM(S): 150 TABLET, EXTENDED RELEASE ORAL at 09:45

## 2024-02-13 RX ADMIN — Medication 1 TABLET(S): at 11:09

## 2024-02-13 RX ADMIN — Medication 400 MILLIGRAM(S): at 21:27

## 2024-02-13 RX ADMIN — Medication 75 MICROGRAM(S): at 05:53

## 2024-02-13 RX ADMIN — LACTULOSE 20 GRAM(S): 10 SOLUTION ORAL at 07:27

## 2024-02-13 RX ADMIN — ATORVASTATIN CALCIUM 20 MILLIGRAM(S): 80 TABLET, FILM COATED ORAL at 21:28

## 2024-02-13 RX ADMIN — ENOXAPARIN SODIUM 30 MILLIGRAM(S): 100 INJECTION SUBCUTANEOUS at 05:58

## 2024-02-13 RX ADMIN — BUPROPION HYDROCHLORIDE 100 MILLIGRAM(S): 150 TABLET, EXTENDED RELEASE ORAL at 18:08

## 2024-02-13 NOTE — CONSULT NOTE ADULT - ASSESSMENT
83F PMH DM2, HTN, HLD, OA, Hiatal hernia, PUD, uterine prolapse, PSH thyroidectomy p/t ED for urosepsis. On CT A/P incidental finding of 2.5 cm right common iliac aneurysm.     Plan  - No acute surgical intervention as aneurysm does not meet interventional threshold (>3cm)  - Please follow-up with Dr. Snow as outpatient  - Vascular surgery will follow    Vascular Surgery  [phone] (pager)      83F PMH DM2, HTN, HLD, OA, Hiatal hernia, PUD, uterine prolapse, PSH thyroidectomy p/t ED for urosepsis. On CT A/P incidental finding of 2.5 cm right common iliac aneurysm.     Plan  - No acute surgical intervention as aneurysm does not meet interventional threshold (>3cm)  - Please follow-up with Dr. Snow as outpatient  - Vascular surgery will follow    Discussed with fellow on behalf of Dr Snow    Vascular Surgery  [phone] (pager)

## 2024-02-13 NOTE — CONSULT NOTE ADULT - TIME BILLING
agree with above  83F w/ DM2, HTN, HLD, OA, Hiatal Hernia , PUD,  Uterine Prolapse, Papillary thyroid CA, s/p thyroidectomy who presented with weakness/fatigue and  low grade fever at home a/w urosepsis.    #Urosepsis  -CTAP with cystitis with bilateral ascending ureteritis and pyelonephritis.  -Micu eval appreciated  -Patient with improvement in bp on midodrine  -Abx per ID     #Abdominal Pain  -GI eval    #HTN  -Hypotensive on midodrine  -At home on coreg 25mg bid and olmesartan 40mg qhs  -Continue to hold home meds for now  -Wean midodrine as able    #MAT  -off ASA for gi bleed  -Eventually resume coreg    #Aortic/Mitral Valve Disease  -XR with trace pleural effusion  -No overload on exam  -stable AI/MR on recent echo  -Can repeat echo      dvt ppx

## 2024-02-13 NOTE — CONSULT NOTE ADULT - SUBJECTIVE AND OBJECTIVE BOX
CARDIOLOGY CONSULT - Dr. Calle         HPI:  83F w/ DM2, HTN, HLD, OA, Hiatal Hernia , PUD,  Uterine Prolapse, Papillary thyroid CA, s/p thyroidectomy who presented with weakness/fatigue and  low grade fever at home and [facility] had negative COVID, sent home to take Tylenol for the fever, today felt  worst ,  and started to experience chest discomfort and increased weakness  so she came to hospital.   No urinary frequency or urgency but states that she has frequent UTIs, denies nausea vomiting,  or palpitations or  shortness of breath.  In the ED, patient was found to have sepsis 2/2 UTI. CTAP with concern for pyelonephritis. Pt denies CP, SOB, abdominal pain.  IN the ED  : Tm = 100.6  HR 78 BP = /50-77  Patient received about 3 L of IV and Ceftriaxone IV and admitted for further evaluation and treatment     MICU  has seen patient  for hypotension and patient is not considered a candidate .      Patient was seen with daughter at the bedside            (12 Feb 2024 08:33)      PAST MEDICAL & SURGICAL HISTORY:  Primary hypertension      HLD (hyperlipidemia)      Hiatal hernia      Osteoarthritis      Uterine prolapse      Constipation      Papillary carcinoma      History of hip surgery      H/O thyroidectomy      PREVIOUS DIAGNOSTIC TESTING:    [ ] Echocardiogram:  [ ]  Catheterization:  [ ] Stress Test:  	    MEDICATIONS:  Home Medications:  amitriptyline 25 mg oral tablet: 1 tab(s) orally once a day (12 Feb 2024 09:23)  ascorbic acid 500 mg oral tablet: 1 tab(s) orally 2 times a day (12 Feb 2024 09:22)  buPROPion 100 mg/12 hours (SR) oral tablet, extended release: 1 tab(s) orally 2 times a day (12 Feb 2024 09:22)  Caltrate 600 + D oral tablet: 1 tab(s) orally 2 times a day (12 Feb 2024 09:22)  Centrum Silver oral tablet: 1 tab(s) orally once a day (12 Feb 2024 09:22)  Coreg 25 mg oral tablet: 1 tab(s) orally 2 times a day NOTE: As per patients pharmacy, last filled July 2023. (12 Feb 2024 09:27)  DilTIAZem (Eqv-Cardizem CD) 120 mg/24 hours oral capsule, extended release: 1 cap(s) orally once a day (12 Feb 2024 09:27)  hydrALAZINE 50 mg oral tablet: 1 tab(s) orally 2 times a day (12 Feb 2024 09:27)  Laxative magnesium 500mg caplet: 1-2 caplets at night as needed (12 Feb 2024 09:27)  lidocaine 5% topical film: Apply topically to affected area 3 times a day as needed for pain as per pt (12 Feb 2024 09:27)  lovastatin 20 mg oral tablet: 1 tab(s) orally once a day (12 Feb 2024 09:27)  olmesartan 40 mg oral tablet: 1 tab(s) orally once a day (12 Feb 2024 09:27)  omeprazole 20 mg oral delayed release capsule: 1 cap(s) orally once a day (12 Feb 2024 09:27)  Synthroid 75 mcg (0.075 mg) oral tablet: 1 tab(s) orally once a day monday through saturday and 1.5 tablets on sunday. (12 Feb 2024 09:27)  Tylenol Extra Strength 500 mg oral tablet: 1 tab(s) orally as needed for pain (12 Feb 2024 09:27)  Xanax 1 mg oral tablet: 1 tab(s) orally once a day (at bedtime) note: pharmacy has 1-2 prn. (12 Feb 2024 09:27)      MEDICATIONS  (STANDING):  atorvastatin 20 milliGRAM(s) Oral at bedtime  buPROPion . 100 milliGRAM(s) Oral two times a day  calcium carbonate 1250 mG  + Vitamin D (OsCal 500 + D) 1 Tablet(s) Oral daily  cefepime   IVPB      cefepime   IVPB 1000 milliGRAM(s) IV Intermittent every 24 hours  dextrose 5%. 1000 milliLiter(s) (50 mL/Hr) IV Continuous <Continuous>  dextrose 5%. 1000 milliLiter(s) (100 mL/Hr) IV Continuous <Continuous>  dextrose 50% Injectable 12.5 Gram(s) IV Push once  dextrose 50% Injectable 25 Gram(s) IV Push once  dextrose 50% Injectable 25 Gram(s) IV Push once  enoxaparin Injectable 30 milliGRAM(s) SubCutaneous every 24 hours  glucagon  Injectable 1 milliGRAM(s) IntraMuscular once  insulin lispro (ADMELOG) corrective regimen sliding scale   SubCutaneous three times a day before meals  lactulose Syrup 20 Gram(s) Oral two times a day  levothyroxine 75 MICROGram(s) Oral daily  midodrine. 10 milliGRAM(s) Oral every 8 hours  multivitamin/minerals 1 Tablet(s) Oral daily  pantoprazole    Tablet 40 milliGRAM(s) Oral before breakfast  polyethylene glycol 3350 17 Gram(s) Oral daily      FAMILY HISTORY:      SOCIAL HISTORY:    [x] Non-smoker  [ ] Smoker  [ ] Alcohol    Allergies    penicillin (Anaphylaxis)    Intolerances    	    REVIEW OF SYSTEMS:  CONSTITUTIONAL: No fever, weight loss, or fatigue  EYES: No eye pain, visual disturbances, or discharge  ENMT:  No difficulty hearing, tinnitus, vertigo; No sinus or throat pain  NECK: No pain or stiffness  RESPIRATORY: No cough, wheezing, chills or hemoptysis; No Shortness of Breath  CARDIOVASCULAR: No chest pain, palpitations, passing out, dizziness, or leg swelling  GASTROINTESTINAL: +abdominal pain worse w/ leaning forward. No n/v  GENITOURINARY: No dysuria, frequency, hematuria, or incontinence  NEUROLOGICAL: No headaches, memory loss, loss of strength, numbness, or tremors  SKIN: No itching, burning, rashes, or lesions   	    [x] All others negative	  [ ] Unable to obtain    PHYSICAL EXAM:  T(C): 36.7 (02-13-24 @ 05:11), Max: 37.4 (02-12-24 @ 18:55)  HR: 75 (02-13-24 @ 05:11) (73 - 82)  BP: 115/67 (02-13-24 @ 05:11) (104/68 - 121/78)  RR: 18 (02-13-24 @ 05:11) (18 - 18)  SpO2: 97% (02-13-24 @ 05:11) (96% - 100%)  Wt(kg): --  I&O's Summary      Appearance: Elderly female 	  Psychiatry: A & O x 3, Mood & affect appropriate  HEENT:   Normal oral mucosa, PERRL, EOMI	  Lymphatic: No lymphadenopathy  Cardiovascular: Normal S1 S2,RRR, No JVD, No murmurs  Respiratory: Lungs clear to auscultation b/l   Gastrointestinal:  Soft, Non-tender, + BS	  Skin: No rashes, No ecchymoses, No cyanosis	  Neurologic: Non-focal  Extremities: Normal range of motion, No clubbing, cyanosis or edema  Vascular: Peripheral pulses palpable 2+ bilaterally    TELEMETRY: 	    ECG:  NSR 78bpm  RADIOLOGY:  < from: Xray Chest 1 View-PORTABLE IMMEDIATE (02.11.24 @ 23:22) >    IMPRESSION:    Trace left pleural effusion.    --- End of Report ---    < end of copied text >    < from: CT Abdomen and Pelvis w/ IV Cont (02.12.24 @ 00:41) >  IMPRESSION:  Cystitis with bilateral ascending ureteritis and pyelonephritis.    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    --- End of Report ---    < end of copied text >      OTHER: 	  	  LABS:	 	    CARDIAC MARKERS:  Troponin T, High Sensitivity Result: 19 ng/L (02-11 @ 23:18)                                  10.8   15.33 )-----------( 205      ( 13 Feb 2024 07:39 )             34.6     02-13    138  |  105  |  24<H>  ----------------------------<  93  3.6   |  19<L>  |  1.56<H>    Ca    8.6      13 Feb 2024 07:38    TPro  6.3  /  Alb  3.0<L>  /  TBili  0.3  /  DBili  x   /  AST  32  /  ALT  36  /  AlkPhos  102  02-13    PT/INR - ( 11 Feb 2024 23:18 )   PT: 14.8 sec;   INR: 1.36 ratio         PTT - ( 11 Feb 2024 23:18 )  PTT:28.4 sec  proBNP:   Lipid Profile:   HgA1c:   TSH:

## 2024-02-13 NOTE — CONSULT NOTE ADULT - SUBJECTIVE AND OBJECTIVE BOX
VASCULAR SURGERY CONSULT NOTE    HPI: 83F PMH DM2, HTN, HLD, OA, Hiatal hernia, PUD, uterine prolapse, PSH thyroidectomy p/t ED for urosepsis. On CT A/P incidental finding of 2.5 cm right common iliac aneurysm. Patient denies any groin pain, issues with ambulation with walker, no neurosensory changes. No previous CT scans available for comparison     Vascular surgery consulted for 2.5cm R common iliac aneurysm.     PMH/PSH: Primary hypertension    HLD (hyperlipidemia)    Hiatal hernia    Osteoarthritis    Uterine prolapse    Constipation    Papillary carcinoma    History of hip surgery    H/O thyroidectomy        MEDS:atorvastatin 20 milliGRAM(s) Oral at bedtime  buPROPion . 100 milliGRAM(s) Oral two times a day  calcium carbonate 1250 mG  + Vitamin D (OsCal 500 + D) 1 Tablet(s) Oral daily  cefepime   IVPB      cefepime   IVPB 1000 milliGRAM(s) IV Intermittent every 24 hours  dextrose 5%. 1000 milliLiter(s) (50 mL/Hr) IV Continuous <Continuous>  dextrose 5%. 1000 milliLiter(s) (100 mL/Hr) IV Continuous <Continuous>  dextrose 50% Injectable 12.5 Gram(s) IV Push once  dextrose 50% Injectable 25 Gram(s) IV Push once  dextrose 50% Injectable 25 Gram(s) IV Push once  enoxaparin Injectable 30 milliGRAM(s) SubCutaneous every 24 hours  glucagon  Injectable 1 milliGRAM(s) IntraMuscular once  insulin lispro (ADMELOG) corrective regimen sliding scale   SubCutaneous three times a day before meals  lactulose Syrup 20 Gram(s) Oral two times a day  levothyroxine 75 MICROGram(s) Oral daily  midodrine. 10 milliGRAM(s) Oral every 8 hours  multivitamin/minerals 1 Tablet(s) Oral daily  pantoprazole    Tablet 40 milliGRAM(s) Oral before breakfast  polyethylene glycol 3350 17 Gram(s) Oral daily  acetaminophen     Tablet .. 650 milliGRAM(s) Oral every 6 hours PRN Temp greater or equal to 38C (100.4F), Mild Pain (1 - 3)  ALPRAZolam 1 milliGRAM(s) Oral at bedtime PRN ANxiety and Insomnia  bisacodyl 5 milliGRAM(s) Oral every 12 hours PRN Constipation  dextrose Oral Gel 15 Gram(s) Oral once PRN Blood Glucose LESS THAN 70 milliGRAM(s)/deciliter  melatonin 3 milliGRAM(s) Oral at bedtime PRN Insomnia  ondansetron Injectable 4 milliGRAM(s) IV Push every 8 hours PRN Nausea and/or Vomiting      ALLERGIES: NKDA    REVIEW OF SYSTEMS: All ROS negative except as per HPI.  ____________________________________________    VITALS:T(C): 37.2, Max: 37.4 (02-12)  T(F): 98.9, Max: 99.3 (02-12)  HR: 68 (65 - 80)  BP: 149/82 (104/68 - 149/82)  BP(mean): 82 (82 - 82)  ABP: --  ABP(mean): --  RR: 18 (18 - 18)  SpO2: 97% (97% - 100%)  CVP(mm Hg): --  CVP(cm H2O): --  nasal cannula 2          PHYSICAL EXAM:  General: AAOx3, no acute distress.  Respiratory: breathing comfortably, no increased WOB   Abdomen: soft, nontender, nondistended, no rebound, no guarding  Extremities: Moves all four. Bilateral palpable DP/PT pulses  ____________________________________________    LABS:                      10.8  15.33 )-----------( 205    ( 13 Feb 2024 07:39 )             34.6  138  |  105  |  24<H>  ----------------------------<  93    (02-13)  3.6   |  19<L>  |  1.56<H>          Ca    8.6      02-13  Mg    x  Phos  x        LIVER FUNCTIONS - ( 13 Feb 2024 07:38 )  Alb: 3.0 g/dL / Pro: 6.3 g/dL / ALK PHOS: 102 U/L / ALT: 36 U/L / AST: 32 U/L / GGT: x      PT/INR - ( 11 Feb 2024 23:18 )   PT: 14.8 sec;   INR: 1.36 ratio         PTT - ( 11 Feb 2024 23:18 )  PTT:28.4 sec  Urinalysis Basic - ( 13 Feb 2024 07:38 )    Color: x / Appearance: x / SG: x / pH: x  Gluc: 93 mg/dL / Ketone: x  / Bili: x / Urobili: x   Blood: x / Protein: x / Nitrite: x   Leuk Esterase: x / RBC: x / WBC x   Sq Epi: x / Non Sq Epi: x / Bacteria: x      ____________________________________________    RADIOLOGY & ADDITIONAL STUDIES:< from: CT Abdomen and Pelvis w/ IV Cont (02.12.24 @ 00:41) >  FINDINGS:  LOWER CHEST: Bibasilar atelectasis and trace pleural effusions.   Moderately large hiatal hernia with questionable thickening of the distal   esophagus.    LIVER: Within normal limits.  BILE DUCTS: Normal caliber.  GALLBLADDER: Within normal limits.  SPLEEN: Within normal limits.  PANCREAS: Within normal limits.  ADRENALS: Within normal limits.  KIDNEYS/URETERS: Bilateral striated nephrograms. Bilateral renal cysts   and subcentimeter hypodensities, too small to characterize. There is   diffuse urothelial enhancement of the bilateral renal collecting systems   with mild periureteral stranding.    Limited assessment of the pelvic structures due to streak artifact from   metallic left hip arthroplasty and right hip intramedullary screws.  BLADDER: Distended with disproportional wall thickening and perivesicular   fat stranding.  REPRODUCTIVE ORGANS: Uterus and adnexa within normal limits.    BOWEL: Moderate hiatal hernia. No bowel obstruction. Mobile cecum which   is located in the left hemiabdomen. The appendix is not visualized, no   pericecal inflammatory changes. Moderately large amount of formed   retained fecal material throughout the colon. No colonic wall thickening.  PERITONEUM: No ascites.  VESSELS: Right common iliac artery aneurysm measuring up to 2.5 cm. The   abdominal aorta is normal in caliber. The mesenteric vessels are patent.  RETROPERITONEUM/LYMPH NODES: No lymphadenopathy.  ABDOMINAL WALL: Within normal limits.  BONES: Degenerative changes. Left hip total arthroplasty. Right femoral   neck screw fixation. Grade 1 retrolisthesis of L2 on L3. No acute osseous   findings.      IMPRESSION:  Cystitis with bilateral ascending ureteritis and pyelonephritis.    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    < end of copied text >

## 2024-02-13 NOTE — PATIENT PROFILE ADULT - HOW PATIENT ADDRESSED, PROFILE
5 and a half year old autistic male presented with mild epistaxis. No bleeding now. Talt to mother lightly about nose bleeding and the importance of the F/U with PMD and maybe ENT [place]

## 2024-02-13 NOTE — CONSULT NOTE ADULT - ASSESSMENT
A/p  83F w/ DM2, HTN, HLD, OA, Hiatal Hernia , PUD,  Uterine Prolapse, Papillary thyroid CA, s/p thyroidectomy who presented with weakness/fatigue and  low grade fever at home a/w urosepsis.    #Urosepsis  -CTAP with cystitis with bilateral ascending ureteritis and pyelonephritis.  -Micu eval appreciated  -Patient with improvement in bp on midodrine  -Abx per ID     #Abdominal Pain  -GI eval    #HTN  -Hypotensive on midodrine  -At home on coreg 25mg bid and olmesartan 40mg qhs  -Continue to hold home meds for now  -Wean midodrine as able    #MAT  -off ASA for gi bleed  -Eventually resume coreg    #Aortic/Mitral Valve Disease  -XR with trace pleural effusion  -No overload on exam  -stable AI/MR on recent echo  -Can repeat echo      dvt ppx

## 2024-02-13 NOTE — CONSULT NOTE ADULT - ATTENDING COMMENTS
83F p/w urosepsis. CT scan shows incidental finding of 2.5cm right common iliac artery aneurysm. Palpable femoral pulses. No history of lower extremity arterial insufficiency. Recommend outpatient follow up for surveillance. Care per primary team. 83F p/w urosepsis. CT scan shows incidental finding of 2.5cm right common iliac artery aneurysm. Palpable femoral pulses. No history of lower extremity arterial insufficiency. Recommend outpatient follow up for surveillance. Care per primary team. Repeat blood cultures until negative.

## 2024-02-13 NOTE — PATIENT PROFILE ADULT - FALL HARM RISK - HARM RISK INTERVENTIONS
Assistance OOB with selected safe patient handling equipment/Communicate Risk of Fall with Harm to all staff/Discuss with provider need for PT consult/Monitor gait and stability/Provide patient with walking aids - walker, cane, crutches/Reinforce activity limits and safety measures with patient and family/Tailored Fall Risk Interventions/Visual Cue: Yellow wristband and red socks/Bed in lowest position, wheels locked, appropriate side rails in place/Call bell, personal items and telephone in reach/Instruct patient to call for assistance before getting out of bed or chair/Non-slip footwear when patient is out of bed/[place] to call system/Physically safe environment - no spills, clutter or unnecessary equipment/Purposeful Proactive Rounding/Room/bathroom lighting operational, light cord in reach Assistance with ambulation/Assistance OOB with selected safe patient handling equipment/Communicate Risk of Fall with Harm to all staff/Discuss with provider need for PT consult/Monitor gait and stability/Provide patient with walking aids - walker, cane, crutches/Reinforce activity limits and safety measures with patient and family/Tailored Fall Risk Interventions/Visual Cue: Yellow wristband and red socks/Bed in lowest position, wheels locked, appropriate side rails in place/Call bell, personal items and telephone in reach/Instruct patient to call for assistance before getting out of bed or chair/Non-slip footwear when patient is out of bed/[place] to call system/Physically safe environment - no spills, clutter or unnecessary equipment/Purposeful Proactive Rounding/Room/bathroom lighting operational, light cord in reach

## 2024-02-14 LAB
-  AMOXICILLIN/CLAVULANIC ACID: SIGNIFICANT CHANGE UP
-  AMPICILLIN/SULBACTAM: SIGNIFICANT CHANGE UP
-  AMPICILLIN/SULBACTAM: SIGNIFICANT CHANGE UP
-  AMPICILLIN: SIGNIFICANT CHANGE UP
-  AMPICILLIN: SIGNIFICANT CHANGE UP
-  AZTREONAM: SIGNIFICANT CHANGE UP
-  AZTREONAM: SIGNIFICANT CHANGE UP
-  CEFAZOLIN: SIGNIFICANT CHANGE UP
-  CEFAZOLIN: SIGNIFICANT CHANGE UP
-  CEFEPIME: SIGNIFICANT CHANGE UP
-  CEFEPIME: SIGNIFICANT CHANGE UP
-  CEFOXITIN: SIGNIFICANT CHANGE UP
-  CEFOXITIN: SIGNIFICANT CHANGE UP
-  CEFTRIAXONE: SIGNIFICANT CHANGE UP
-  CEFTRIAXONE: SIGNIFICANT CHANGE UP
-  CEFUROXIME: SIGNIFICANT CHANGE UP
-  CIPROFLOXACIN: SIGNIFICANT CHANGE UP
-  CIPROFLOXACIN: SIGNIFICANT CHANGE UP
-  ERTAPENEM: SIGNIFICANT CHANGE UP
-  ERTAPENEM: SIGNIFICANT CHANGE UP
-  GENTAMICIN: SIGNIFICANT CHANGE UP
-  GENTAMICIN: SIGNIFICANT CHANGE UP
-  IMIPENEM: SIGNIFICANT CHANGE UP
-  IMIPENEM: SIGNIFICANT CHANGE UP
-  LEVOFLOXACIN: SIGNIFICANT CHANGE UP
-  LEVOFLOXACIN: SIGNIFICANT CHANGE UP
-  MEROPENEM: SIGNIFICANT CHANGE UP
-  MEROPENEM: SIGNIFICANT CHANGE UP
-  NITROFURANTOIN: SIGNIFICANT CHANGE UP
-  PIPERACILLIN/TAZOBACTAM: SIGNIFICANT CHANGE UP
-  PIPERACILLIN/TAZOBACTAM: SIGNIFICANT CHANGE UP
-  TOBRAMYCIN: SIGNIFICANT CHANGE UP
-  TOBRAMYCIN: SIGNIFICANT CHANGE UP
-  TRIMETHOPRIM/SULFAMETHOXAZOLE: SIGNIFICANT CHANGE UP
-  TRIMETHOPRIM/SULFAMETHOXAZOLE: SIGNIFICANT CHANGE UP
ALBUMIN SERPL ELPH-MCNC: 2.8 G/DL — LOW (ref 3.3–5)
ALP SERPL-CCNC: 105 U/L — SIGNIFICANT CHANGE UP (ref 40–120)
ALT FLD-CCNC: 29 U/L — SIGNIFICANT CHANGE UP (ref 10–45)
ANION GAP SERPL CALC-SCNC: 11 MMOL/L — SIGNIFICANT CHANGE UP (ref 5–17)
AST SERPL-CCNC: 26 U/L — SIGNIFICANT CHANGE UP (ref 10–40)
BASOPHILS # BLD AUTO: 0.05 K/UL — SIGNIFICANT CHANGE UP (ref 0–0.2)
BASOPHILS NFR BLD AUTO: 0.4 % — SIGNIFICANT CHANGE UP (ref 0–2)
BILIRUB SERPL-MCNC: 0.2 MG/DL — SIGNIFICANT CHANGE UP (ref 0.2–1.2)
BUN SERPL-MCNC: 21 MG/DL — SIGNIFICANT CHANGE UP (ref 7–23)
CALCIUM SERPL-MCNC: 8.9 MG/DL — SIGNIFICANT CHANGE UP (ref 8.4–10.5)
CHLORIDE SERPL-SCNC: 107 MMOL/L — SIGNIFICANT CHANGE UP (ref 96–108)
CO2 SERPL-SCNC: 21 MMOL/L — LOW (ref 22–31)
CREAT SERPL-MCNC: 1.31 MG/DL — HIGH (ref 0.5–1.3)
CULTURE RESULTS: ABNORMAL
EGFR: 41 ML/MIN/1.73M2 — LOW
EOSINOPHIL # BLD AUTO: 0.25 K/UL — SIGNIFICANT CHANGE UP (ref 0–0.5)
EOSINOPHIL NFR BLD AUTO: 2.1 % — SIGNIFICANT CHANGE UP (ref 0–6)
FLUAV AG NPH QL: SIGNIFICANT CHANGE UP
FLUBV AG NPH QL: SIGNIFICANT CHANGE UP
GLUCOSE BLDC GLUCOMTR-MCNC: 111 MG/DL — HIGH (ref 70–99)
GLUCOSE BLDC GLUCOMTR-MCNC: 115 MG/DL — HIGH (ref 70–99)
GLUCOSE BLDC GLUCOMTR-MCNC: 181 MG/DL — HIGH (ref 70–99)
GLUCOSE BLDC GLUCOMTR-MCNC: 95 MG/DL — SIGNIFICANT CHANGE UP (ref 70–99)
GLUCOSE SERPL-MCNC: 104 MG/DL — HIGH (ref 70–99)
HCT VFR BLD CALC: 32.5 % — LOW (ref 34.5–45)
HGB BLD-MCNC: 10.5 G/DL — LOW (ref 11.5–15.5)
IMM GRANULOCYTES NFR BLD AUTO: 0.7 % — SIGNIFICANT CHANGE UP (ref 0–0.9)
LYMPHOCYTES # BLD AUTO: 1.55 K/UL — SIGNIFICANT CHANGE UP (ref 1–3.3)
LYMPHOCYTES # BLD AUTO: 12.9 % — LOW (ref 13–44)
MCHC RBC-ENTMCNC: 31.3 PG — SIGNIFICANT CHANGE UP (ref 27–34)
MCHC RBC-ENTMCNC: 32.3 GM/DL — SIGNIFICANT CHANGE UP (ref 32–36)
MCV RBC AUTO: 96.7 FL — SIGNIFICANT CHANGE UP (ref 80–100)
METHOD TYPE: SIGNIFICANT CHANGE UP
METHOD TYPE: SIGNIFICANT CHANGE UP
MONOCYTES # BLD AUTO: 1.32 K/UL — HIGH (ref 0–0.9)
MONOCYTES NFR BLD AUTO: 11 % — SIGNIFICANT CHANGE UP (ref 2–14)
NEUTROPHILS # BLD AUTO: 8.75 K/UL — HIGH (ref 1.8–7.4)
NEUTROPHILS NFR BLD AUTO: 72.9 % — SIGNIFICANT CHANGE UP (ref 43–77)
NRBC # BLD: 0 /100 WBCS — SIGNIFICANT CHANGE UP (ref 0–0)
ORGANISM # SPEC MICROSCOPIC CNT: ABNORMAL
PLATELET # BLD AUTO: 224 K/UL — SIGNIFICANT CHANGE UP (ref 150–400)
POTASSIUM SERPL-MCNC: 3.5 MMOL/L — SIGNIFICANT CHANGE UP (ref 3.5–5.3)
POTASSIUM SERPL-SCNC: 3.5 MMOL/L — SIGNIFICANT CHANGE UP (ref 3.5–5.3)
PROT SERPL-MCNC: 6.1 G/DL — SIGNIFICANT CHANGE UP (ref 6–8.3)
RAPID RVP RESULT: SIGNIFICANT CHANGE UP
RBC # BLD: 3.36 M/UL — LOW (ref 3.8–5.2)
RBC # FLD: 13.9 % — SIGNIFICANT CHANGE UP (ref 10.3–14.5)
RSV RNA NPH QL NAA+NON-PROBE: SIGNIFICANT CHANGE UP
SARS-COV-2 RNA SPEC QL NAA+PROBE: SIGNIFICANT CHANGE UP
SARS-COV-2 RNA SPEC QL NAA+PROBE: SIGNIFICANT CHANGE UP
SODIUM SERPL-SCNC: 139 MMOL/L — SIGNIFICANT CHANGE UP (ref 135–145)
SPECIMEN SOURCE: SIGNIFICANT CHANGE UP
WBC # BLD: 12 K/UL — HIGH (ref 3.8–10.5)
WBC # FLD AUTO: 12 K/UL — HIGH (ref 3.8–10.5)

## 2024-02-14 PROCEDURE — 99232 SBSQ HOSP IP/OBS MODERATE 35: CPT

## 2024-02-14 PROCEDURE — 71045 X-RAY EXAM CHEST 1 VIEW: CPT | Mod: 26

## 2024-02-14 RX ORDER — CEFTRIAXONE 500 MG/1
1000 INJECTION, POWDER, FOR SOLUTION INTRAMUSCULAR; INTRAVENOUS ONCE
Refills: 0 | Status: COMPLETED | OUTPATIENT
Start: 2024-02-14 | End: 2024-02-14

## 2024-02-14 RX ORDER — CEFTRIAXONE 500 MG/1
1000 INJECTION, POWDER, FOR SOLUTION INTRAMUSCULAR; INTRAVENOUS EVERY 24 HOURS
Refills: 0 | Status: DISCONTINUED | OUTPATIENT
Start: 2024-02-15 | End: 2024-02-17

## 2024-02-14 RX ORDER — CEFTRIAXONE 500 MG/1
INJECTION, POWDER, FOR SOLUTION INTRAMUSCULAR; INTRAVENOUS
Refills: 0 | Status: DISCONTINUED | OUTPATIENT
Start: 2024-02-14 | End: 2024-02-17

## 2024-02-14 RX ADMIN — MIDODRINE HYDROCHLORIDE 10 MILLIGRAM(S): 2.5 TABLET ORAL at 21:38

## 2024-02-14 RX ADMIN — POLYETHYLENE GLYCOL 3350 17 GRAM(S): 17 POWDER, FOR SOLUTION ORAL at 12:50

## 2024-02-14 RX ADMIN — Medication 75 MICROGRAM(S): at 05:51

## 2024-02-14 RX ADMIN — ATORVASTATIN CALCIUM 20 MILLIGRAM(S): 80 TABLET, FILM COATED ORAL at 21:38

## 2024-02-14 RX ADMIN — PANTOPRAZOLE SODIUM 40 MILLIGRAM(S): 20 TABLET, DELAYED RELEASE ORAL at 08:43

## 2024-02-14 RX ADMIN — CEFTRIAXONE 100 MILLIGRAM(S): 500 INJECTION, POWDER, FOR SOLUTION INTRAMUSCULAR; INTRAVENOUS at 16:13

## 2024-02-14 RX ADMIN — ENOXAPARIN SODIUM 30 MILLIGRAM(S): 100 INJECTION SUBCUTANEOUS at 05:52

## 2024-02-14 RX ADMIN — MIDODRINE HYDROCHLORIDE 10 MILLIGRAM(S): 2.5 TABLET ORAL at 05:51

## 2024-02-14 RX ADMIN — BUPROPION HYDROCHLORIDE 100 MILLIGRAM(S): 150 TABLET, EXTENDED RELEASE ORAL at 17:31

## 2024-02-14 RX ADMIN — Medication 1 TABLET(S): at 12:50

## 2024-02-14 RX ADMIN — CEFEPIME 100 MILLIGRAM(S): 1 INJECTION, POWDER, FOR SOLUTION INTRAMUSCULAR; INTRAVENOUS at 13:25

## 2024-02-14 RX ADMIN — MIDODRINE HYDROCHLORIDE 10 MILLIGRAM(S): 2.5 TABLET ORAL at 16:10

## 2024-02-14 RX ADMIN — BUPROPION HYDROCHLORIDE 100 MILLIGRAM(S): 150 TABLET, EXTENDED RELEASE ORAL at 09:21

## 2024-02-14 NOTE — PROVIDER CONTACT NOTE (OTHER) - ASSESSMENT
VS: BP: 143/72 HR: 87 Temp: 100.3 (rectal), 100.0 (oral) 92% on room air. Denies pain and discomfort. Denies SOB

## 2024-02-14 NOTE — PROVIDER CONTACT NOTE (OTHER) - DATE AND TIME:
13-Feb-2024 21:07 Mart-1 - Positive Histology Text: MART-1 staining demonstrates areas of higher density and clustering of melanocytes with Pagetoid spread upwards within the epidermis. The surgical margins are positive for tumor cells.

## 2024-02-15 LAB
ANION GAP SERPL CALC-SCNC: 10 MMOL/L — SIGNIFICANT CHANGE UP (ref 5–17)
BUN SERPL-MCNC: 20 MG/DL — SIGNIFICANT CHANGE UP (ref 7–23)
CALCIUM SERPL-MCNC: 8.2 MG/DL — LOW (ref 8.4–10.5)
CHLORIDE SERPL-SCNC: 108 MMOL/L — SIGNIFICANT CHANGE UP (ref 96–108)
CO2 SERPL-SCNC: 23 MMOL/L — SIGNIFICANT CHANGE UP (ref 22–31)
CREAT SERPL-MCNC: 1.1 MG/DL — SIGNIFICANT CHANGE UP (ref 0.5–1.3)
EGFR: 50 ML/MIN/1.73M2 — LOW
GLUCOSE BLDC GLUCOMTR-MCNC: 109 MG/DL — HIGH (ref 70–99)
GLUCOSE BLDC GLUCOMTR-MCNC: 109 MG/DL — HIGH (ref 70–99)
GLUCOSE BLDC GLUCOMTR-MCNC: 128 MG/DL — HIGH (ref 70–99)
GLUCOSE BLDC GLUCOMTR-MCNC: 88 MG/DL — SIGNIFICANT CHANGE UP (ref 70–99)
GLUCOSE BLDC GLUCOMTR-MCNC: 99 MG/DL — SIGNIFICANT CHANGE UP (ref 70–99)
GLUCOSE SERPL-MCNC: 100 MG/DL — HIGH (ref 70–99)
HCT VFR BLD CALC: 31.5 % — LOW (ref 34.5–45)
HGB BLD-MCNC: 10.1 G/DL — LOW (ref 11.5–15.5)
MCHC RBC-ENTMCNC: 30.5 PG — SIGNIFICANT CHANGE UP (ref 27–34)
MCHC RBC-ENTMCNC: 32.1 GM/DL — SIGNIFICANT CHANGE UP (ref 32–36)
MCV RBC AUTO: 95.2 FL — SIGNIFICANT CHANGE UP (ref 80–100)
NRBC # BLD: 0 /100 WBCS — SIGNIFICANT CHANGE UP (ref 0–0)
PLATELET # BLD AUTO: 231 K/UL — SIGNIFICANT CHANGE UP (ref 150–400)
POTASSIUM SERPL-MCNC: 3.5 MMOL/L — SIGNIFICANT CHANGE UP (ref 3.5–5.3)
POTASSIUM SERPL-SCNC: 3.5 MMOL/L — SIGNIFICANT CHANGE UP (ref 3.5–5.3)
RBC # BLD: 3.31 M/UL — LOW (ref 3.8–5.2)
RBC # FLD: 13.7 % — SIGNIFICANT CHANGE UP (ref 10.3–14.5)
SODIUM SERPL-SCNC: 141 MMOL/L — SIGNIFICANT CHANGE UP (ref 135–145)
WBC # BLD: 8.75 K/UL — SIGNIFICANT CHANGE UP (ref 3.8–10.5)
WBC # FLD AUTO: 8.75 K/UL — SIGNIFICANT CHANGE UP (ref 3.8–10.5)

## 2024-02-15 PROCEDURE — 99232 SBSQ HOSP IP/OBS MODERATE 35: CPT

## 2024-02-15 RX ORDER — CARVEDILOL PHOSPHATE 80 MG/1
25 CAPSULE, EXTENDED RELEASE ORAL EVERY 12 HOURS
Refills: 0 | Status: DISCONTINUED | OUTPATIENT
Start: 2024-02-15 | End: 2024-02-15

## 2024-02-15 RX ORDER — LOSARTAN POTASSIUM 100 MG/1
100 TABLET, FILM COATED ORAL DAILY
Refills: 0 | Status: DISCONTINUED | OUTPATIENT
Start: 2024-02-15 | End: 2024-02-15

## 2024-02-15 RX ORDER — DILTIAZEM HCL 120 MG
120 CAPSULE, EXT RELEASE 24 HR ORAL DAILY
Refills: 0 | Status: DISCONTINUED | OUTPATIENT
Start: 2024-02-15 | End: 2024-02-15

## 2024-02-15 RX ORDER — APIXABAN 2.5 MG/1
2.5 TABLET, FILM COATED ORAL
Refills: 0 | Status: DISCONTINUED | OUTPATIENT
Start: 2024-02-15 | End: 2024-02-15

## 2024-02-15 RX ORDER — HYDRALAZINE HCL 50 MG
50 TABLET ORAL
Refills: 0 | Status: DISCONTINUED | OUTPATIENT
Start: 2024-02-15 | End: 2024-02-15

## 2024-02-15 RX ORDER — LIDOCAINE 4 G/100G
1 CREAM TOPICAL EVERY 24 HOURS
Refills: 0 | Status: DISCONTINUED | OUTPATIENT
Start: 2024-02-15 | End: 2024-02-17

## 2024-02-15 RX ORDER — ENOXAPARIN SODIUM 100 MG/ML
30 INJECTION SUBCUTANEOUS EVERY 24 HOURS
Refills: 0 | Status: DISCONTINUED | OUTPATIENT
Start: 2024-02-15 | End: 2024-02-17

## 2024-02-15 RX ORDER — MIDODRINE HYDROCHLORIDE 2.5 MG/1
5 TABLET ORAL THREE TIMES A DAY
Refills: 0 | Status: DISCONTINUED | OUTPATIENT
Start: 2024-02-15 | End: 2024-02-15

## 2024-02-15 RX ADMIN — BUPROPION HYDROCHLORIDE 100 MILLIGRAM(S): 150 TABLET, EXTENDED RELEASE ORAL at 17:58

## 2024-02-15 RX ADMIN — ENOXAPARIN SODIUM 30 MILLIGRAM(S): 100 INJECTION SUBCUTANEOUS at 16:22

## 2024-02-15 RX ADMIN — MIDODRINE HYDROCHLORIDE 10 MILLIGRAM(S): 2.5 TABLET ORAL at 05:09

## 2024-02-15 RX ADMIN — CEFTRIAXONE 100 MILLIGRAM(S): 500 INJECTION, POWDER, FOR SOLUTION INTRAMUSCULAR; INTRAVENOUS at 16:22

## 2024-02-15 RX ADMIN — Medication 1 TABLET(S): at 12:41

## 2024-02-15 RX ADMIN — Medication 650 MILLIGRAM(S): at 02:38

## 2024-02-15 RX ADMIN — Medication 75 MICROGRAM(S): at 05:09

## 2024-02-15 RX ADMIN — PANTOPRAZOLE SODIUM 40 MILLIGRAM(S): 20 TABLET, DELAYED RELEASE ORAL at 05:10

## 2024-02-15 RX ADMIN — Medication 650 MILLIGRAM(S): at 01:05

## 2024-02-15 RX ADMIN — ATORVASTATIN CALCIUM 20 MILLIGRAM(S): 80 TABLET, FILM COATED ORAL at 21:38

## 2024-02-15 RX ADMIN — Medication 650 MILLIGRAM(S): at 21:38

## 2024-02-15 RX ADMIN — BUPROPION HYDROCHLORIDE 100 MILLIGRAM(S): 150 TABLET, EXTENDED RELEASE ORAL at 05:09

## 2024-02-15 RX ADMIN — ENOXAPARIN SODIUM 30 MILLIGRAM(S): 100 INJECTION SUBCUTANEOUS at 05:09

## 2024-02-15 RX ADMIN — LIDOCAINE 1 PATCH: 4 CREAM TOPICAL at 18:37

## 2024-02-15 RX ADMIN — Medication 1 TABLET(S): at 12:40

## 2024-02-16 ENCOUNTER — TRANSCRIPTION ENCOUNTER (OUTPATIENT)
Age: 84
End: 2024-02-16

## 2024-02-16 LAB
ANION GAP SERPL CALC-SCNC: 11 MMOL/L — SIGNIFICANT CHANGE UP (ref 5–17)
BUN SERPL-MCNC: 15 MG/DL — SIGNIFICANT CHANGE UP (ref 7–23)
CALCIUM SERPL-MCNC: 9 MG/DL — SIGNIFICANT CHANGE UP (ref 8.4–10.5)
CHLORIDE SERPL-SCNC: 106 MMOL/L — SIGNIFICANT CHANGE UP (ref 96–108)
CO2 SERPL-SCNC: 26 MMOL/L — SIGNIFICANT CHANGE UP (ref 22–31)
CREAT SERPL-MCNC: 0.95 MG/DL — SIGNIFICANT CHANGE UP (ref 0.5–1.3)
EGFR: 60 ML/MIN/1.73M2 — SIGNIFICANT CHANGE UP
GLUCOSE BLDC GLUCOMTR-MCNC: 103 MG/DL — HIGH (ref 70–99)
GLUCOSE BLDC GLUCOMTR-MCNC: 105 MG/DL — HIGH (ref 70–99)
GLUCOSE BLDC GLUCOMTR-MCNC: 109 MG/DL — HIGH (ref 70–99)
GLUCOSE BLDC GLUCOMTR-MCNC: 97 MG/DL — SIGNIFICANT CHANGE UP (ref 70–99)
GLUCOSE SERPL-MCNC: 90 MG/DL — SIGNIFICANT CHANGE UP (ref 70–99)
HCT VFR BLD CALC: 34.2 % — LOW (ref 34.5–45)
HGB BLD-MCNC: 11 G/DL — LOW (ref 11.5–15.5)
MCHC RBC-ENTMCNC: 30.5 PG — SIGNIFICANT CHANGE UP (ref 27–34)
MCHC RBC-ENTMCNC: 32.2 GM/DL — SIGNIFICANT CHANGE UP (ref 32–36)
MCV RBC AUTO: 94.7 FL — SIGNIFICANT CHANGE UP (ref 80–100)
NRBC # BLD: 0 /100 WBCS — SIGNIFICANT CHANGE UP (ref 0–0)
PLATELET # BLD AUTO: 288 K/UL — SIGNIFICANT CHANGE UP (ref 150–400)
POTASSIUM SERPL-MCNC: 3.6 MMOL/L — SIGNIFICANT CHANGE UP (ref 3.5–5.3)
POTASSIUM SERPL-SCNC: 3.6 MMOL/L — SIGNIFICANT CHANGE UP (ref 3.5–5.3)
RBC # BLD: 3.61 M/UL — LOW (ref 3.8–5.2)
RBC # FLD: 13.6 % — SIGNIFICANT CHANGE UP (ref 10.3–14.5)
SODIUM SERPL-SCNC: 143 MMOL/L — SIGNIFICANT CHANGE UP (ref 135–145)
WBC # BLD: 8.93 K/UL — SIGNIFICANT CHANGE UP (ref 3.8–10.5)
WBC # FLD AUTO: 8.93 K/UL — SIGNIFICANT CHANGE UP (ref 3.8–10.5)

## 2024-02-16 PROCEDURE — 99232 SBSQ HOSP IP/OBS MODERATE 35: CPT

## 2024-02-16 RX ORDER — LOSARTAN POTASSIUM 100 MG/1
100 TABLET, FILM COATED ORAL DAILY
Refills: 0 | Status: DISCONTINUED | OUTPATIENT
Start: 2024-02-16 | End: 2024-02-17

## 2024-02-16 RX ORDER — CARVEDILOL PHOSPHATE 80 MG/1
25 CAPSULE, EXTENDED RELEASE ORAL EVERY 12 HOURS
Refills: 0 | Status: DISCONTINUED | OUTPATIENT
Start: 2024-02-16 | End: 2024-02-17

## 2024-02-16 RX ORDER — CARVEDILOL PHOSPHATE 80 MG/1
1 CAPSULE, EXTENDED RELEASE ORAL
Qty: 60 | Refills: 0
Start: 2024-02-16 | End: 2024-03-16

## 2024-02-16 RX ORDER — LOSARTAN POTASSIUM 100 MG/1
100 TABLET, FILM COATED ORAL DAILY
Refills: 0 | Status: DISCONTINUED | OUTPATIENT
Start: 2024-02-16 | End: 2024-02-16

## 2024-02-16 RX ORDER — POLYETHYLENE GLYCOL 3350 17 G/17G
17 POWDER, FOR SOLUTION ORAL
Qty: 510 | Refills: 0
Start: 2024-02-16 | End: 2024-03-16

## 2024-02-16 RX ORDER — OLMESARTAN MEDOXOMIL 5 MG/1
40 TABLET, FILM COATED ORAL
Qty: 0 | Refills: 0 | DISCHARGE
Start: 2024-02-16

## 2024-02-16 RX ADMIN — LOSARTAN POTASSIUM 100 MILLIGRAM(S): 100 TABLET, FILM COATED ORAL at 18:51

## 2024-02-16 RX ADMIN — BUPROPION HYDROCHLORIDE 100 MILLIGRAM(S): 150 TABLET, EXTENDED RELEASE ORAL at 17:30

## 2024-02-16 RX ADMIN — CARVEDILOL PHOSPHATE 25 MILLIGRAM(S): 80 CAPSULE, EXTENDED RELEASE ORAL at 17:33

## 2024-02-16 RX ADMIN — ATORVASTATIN CALCIUM 20 MILLIGRAM(S): 80 TABLET, FILM COATED ORAL at 21:31

## 2024-02-16 RX ADMIN — LIDOCAINE 1 PATCH: 4 CREAM TOPICAL at 12:12

## 2024-02-16 RX ADMIN — LIDOCAINE 1 PATCH: 4 CREAM TOPICAL at 17:31

## 2024-02-16 RX ADMIN — LIDOCAINE 1 PATCH: 4 CREAM TOPICAL at 19:48

## 2024-02-16 RX ADMIN — BUPROPION HYDROCHLORIDE 100 MILLIGRAM(S): 150 TABLET, EXTENDED RELEASE ORAL at 06:23

## 2024-02-16 RX ADMIN — Medication 1 TABLET(S): at 11:32

## 2024-02-16 RX ADMIN — PANTOPRAZOLE SODIUM 40 MILLIGRAM(S): 20 TABLET, DELAYED RELEASE ORAL at 06:23

## 2024-02-16 RX ADMIN — Medication 75 MICROGRAM(S): at 06:23

## 2024-02-16 RX ADMIN — CEFTRIAXONE 100 MILLIGRAM(S): 500 INJECTION, POWDER, FOR SOLUTION INTRAMUSCULAR; INTRAVENOUS at 13:21

## 2024-02-16 NOTE — DISCHARGE NOTE PROVIDER - NSDCCPCAREPLAN_GEN_ALL_CORE_FT
PRINCIPAL DISCHARGE DIAGNOSIS  Diagnosis: Pyelonephritis  Assessment and Plan of Treatment: With sepsis.      SECONDARY DISCHARGE DIAGNOSES  Diagnosis: RAYMOND (acute kidney injury)  Assessment and Plan of Treatment:     Diagnosis: Constipation  Assessment and Plan of Treatment:      PRINCIPAL DISCHARGE DIAGNOSIS  Diagnosis: Pyelonephritis  Assessment and Plan of Treatment: With sepsis.      SECONDARY DISCHARGE DIAGNOSES  Diagnosis: RAYMOND (acute kidney injury)  Assessment and Plan of Treatment:     Diagnosis: Constipation  Assessment and Plan of Treatment:     Diagnosis: Pleural effusion  Assessment and Plan of Treatment:     Diagnosis: E coli bacteremia  Assessment and Plan of Treatment:      PRINCIPAL DISCHARGE DIAGNOSIS  Diagnosis: Pyelonephritis  Assessment and Plan of Treatment: With sepsis. You had severe UTI. CT abdomen was showing pyelo nephritis. Received IV antibiotic. Continue antibiotic as recommended.   Recurrent UTIs likely due to prolapsed uterus. Please follow up with GYN.      SECONDARY DISCHARGE DIAGNOSES  Diagnosis: RAYMOND (acute kidney injury)  Assessment and Plan of Treatment: Your creatinine was elevated due to poor oral intake. Received IV hydration. Now normal.    Diagnosis: Constipation  Assessment and Plan of Treatment: CT abdomen was showing large stool burden. Now resolved with laxatives. Continue medications as recommended.    Diagnosis: Pleural effusion  Assessment and Plan of Treatment: Chest xray was showing mild pleural effusion. You required supplemental oxygen,now on room air    Diagnosis: E coli bacteremia  Assessment and Plan of Treatment: continue antibiotic as recommended.    Diagnosis: Iliac artery aneurysm  Assessment and Plan of Treatment:    On CT A/P incidental finding of 2.5 cm right common iliac aneurysm.  Recommend out patient follow up with Dr. Kenyetta Snow for surveillance.  [address] [facility], [address], [place], [address] [phone]       PRINCIPAL DISCHARGE DIAGNOSIS  Diagnosis: Pyelonephritis  Assessment and Plan of Treatment: With sepsis. You had severe UTI. CT abdomen was showing pyelonephritis. Received IV antibiotic. Continue antibiotic as recommended.   Recurrent UTIs likely due to prolapsed uterus. Please follow up with GYN.      SECONDARY DISCHARGE DIAGNOSES  Diagnosis: Constipation  Assessment and Plan of Treatment: CT abdomen was showing large stool burden. Now resolved with laxatives. Continue medications as recommended.  Follow up with your gastroenterologist upon discharge.    Diagnosis: RAYMOND (acute kidney injury)  Assessment and Plan of Treatment: Your creatinine was elevated due to poor oral intake. Received IV hydration. Now normal.    Diagnosis: Pleural effusion  Assessment and Plan of Treatment: Chest xray was showing mild pleural effusion. You required supplemental oxygen,now on room air    Diagnosis: E coli bacteremia  Assessment and Plan of Treatment: continue antibiotic as recommended.    Diagnosis: Iliac artery aneurysm  Assessment and Plan of Treatment:    On CT A/P incidental finding of 2.5 cm right common iliac aneurysm.  Recommend out patient follow up with Dr. Kenyetta Snow for surveillance.  [address] [facility], [address], [place], [address] [phone]       PRINCIPAL DISCHARGE DIAGNOSIS  Diagnosis: Pyelonephritis  Assessment and Plan of Treatment: With sepsis. You had severe UTI. CT abdomen was showing pyelonephritis. Received IV antibiotic. Continue antibiotic as recommended.   Recurrent UTIs likely due to prolapsed uterus. Please follow up with GYN.      SECONDARY DISCHARGE DIAGNOSES  Diagnosis: Constipation  Assessment and Plan of Treatment: CT abdomen was showing large stool burden. Now resolved with laxatives. Continue medications as recommended.  Follow up with your gastroenterologist upon discharge.    Diagnosis: RAYMOND (acute kidney injury)  Assessment and Plan of Treatment: Your creatinine was elevated due to poor oral intake. Received IV hydration. Now normal.    Diagnosis: Pleural effusion  Assessment and Plan of Treatment: Chest xray was showing mild pleural effusion. You required supplemental oxygen,now on room air    Diagnosis: E coli bacteremia  Assessment and Plan of Treatment: continue antibiotic as recommended.    Diagnosis: Primary hypertension  Assessment and Plan of Treatment: Your blood pressure was low during this admission, so all of your blood pressure medications were stopped.  Since your blood pressure has normalized, you were restarted on Coreg 25mg oral every 12 hours.  A new prescription was sent to your pharmacy, at your request.  You will continue to hold your other blood pressure medications (olmesaratan, hydralazine, and cardizem), and you must follow up with your cardiologist within 2-3 days of discharge.  At this appointment, you will discuss if/when to restart these medications.  Low salt diet  Activity as tolerated.  Take all medication as prescribed.  Follow up with your medical doctor for routine blood pressure monitoring at your next visit.  Notify your doctor if you have any of the following symptoms:   Dizziness, Lightheadedness, Blurry vision, Headache, Chest pain, Shortness of breath      Diagnosis: Iliac artery aneurysm  Assessment and Plan of Treatment:    On CT A/P incidental finding of 2.5 cm right common iliac aneurysm.  Recommend out patient follow up with Dr. Keneytta Snow for surveillance.  [address] [facility], [address], [place], [address] [phone]       PRINCIPAL DISCHARGE DIAGNOSIS  Diagnosis: Pyelonephritis  Assessment and Plan of Treatment: With sepsis and bacteremic pyelonephritis. You had severe UTI. CT abdomen was showing pyelonephritis. Received IV antibiotic. Continue antibiotic as recommended.   Recurrent UTIs likely due to prolapsed uterus. Please follow up with GYN.      SECONDARY DISCHARGE DIAGNOSES  Diagnosis: Constipation  Assessment and Plan of Treatment: CT abdomen was showing large stool burden. Now resolved with laxatives. Continue medications as recommended.  Follow up with your gastroenterologist upon discharge.    Diagnosis: RAYMOND (acute kidney injury)  Assessment and Plan of Treatment: Your creatinine was elevated due to poor oral intake. Received IV hydration. Now normal.    Diagnosis: Pleural effusion  Assessment and Plan of Treatment: Chest xray was showing mild pleural effusion. You required supplemental oxygen,now on room air    Diagnosis: E coli bacteremia  Assessment and Plan of Treatment: continue antibiotic as recommended.    Diagnosis: Primary hypertension  Assessment and Plan of Treatment: Your blood pressure was low during this admission, so all of your blood pressure medications were stopped.  Since your blood pressure has normalized, you were restarted on Coreg 25mg oral every 12 hours.  A new prescription was sent to your pharmacy, at your request.  You will continue to hold your other blood pressure medications (olmesaratan, hydralazine, and cardizem), and you must follow up with your cardiologist within 2-3 days of discharge.  At this appointment, you will discuss if/when to restart these medications.  Low salt diet  Activity as tolerated.  Take all medication as prescribed.  Follow up with your medical doctor for routine blood pressure monitoring at your next visit.  Notify your doctor if you have any of the following symptoms:   Dizziness, Lightheadedness, Blurry vision, Headache, Chest pain, Shortness of breath      Diagnosis: Iliac artery aneurysm  Assessment and Plan of Treatment:    On CT A/P incidental finding of 2.5 cm right common iliac aneurysm.  Recommend out patient follow up with Dr. Kenyetta Snow for surveillance.  [address] [facility], [address], [place], [place], [id number] [phone]

## 2024-02-16 NOTE — DISCHARGE NOTE PROVIDER - HOSPITAL COURSE
HPI:  83F w/ DM2, HTN, HLD, OA, Hiatal Hernia , PUD,  Uterine Prolapse, Papillary thyroid CA, s/p thyroidectomy who presented with weakness/fatigue and  low grade fever at home and was seen by [facility] yesterday had negative COVID, sent home to take Tylenol for the fever, today felt  worst ,  and started to experience chest discomfort and increased weakness  so she came to hospital.   No urinary frequency or urgency but states that she has frequent UTIs, denies nausea vomiting,  or palpitations or  shortness of breath.  In the ED, patient was found to have sepsis 2/2 UTI. CTAP with concern for pyelonephritis. Pt denies CP, SOB, abdominal pain.  IN the ED  : Tm = 100.6  HR 78 BP = /50-77  Patient received about 3 L of IV and Ceftriaxone IV and admitted for further evaluation and treatment     MICU  has seen patient  for hypotension and patient is not considered a candidate .      Patient was seen with daughter at the bedside            (12 Feb 2024 08:33)    Hospital Course:      Important Medication Changes and Reason:    Active or Pending Issues Requiring Follow-up:    Advanced Directives:   [ ] Full code  [ ] DNR  [ ] Hospice    Discharge Diagnoses:         HPI:  83F w/ DM2, HTN, HLD, OA, Hiatal Hernia , PUD,  Uterine Prolapse, Papillary thyroid CA, s/p thyroidectomy who presented with weakness/fatigue and  low grade fever at home and was seen by [facility] yesterday had negative COVID, sent home to take Tylenol for the fever, today felt  worst ,  and started to experience chest discomfort and increased weakness  so she came to hospital.   No urinary frequency or urgency but states that she has frequent UTIs, denies nausea vomiting,  or palpitations or  shortness of breath.  In the ED, patient was found to have sepsis 2/2 UTI. CTAP with concern for pyelonephritis. Pt denies CP, SOB, abdominal pain.  IN the ED  : Tm = 100.6  HR 78 BP = /50-77  Patient received about 3 L of IV and Ceftriaxone IV and admitted for further evaluation and treatment     MICU  has seen patient  for hypotension and patient is not considered a candidate .      Patient was seen with daughter at the bedside            (12 Feb 2024 08:33)    Hospital Course:  Patient was admitted with sepsis. CT a/p showed b/l pyelonephritis , ascending ureteritis and cystitis. Started on ceftriaxone initially, then changed to cefepime from 2/12 to 2/214. Now switched back to ceftriaxone from 2/14. blood culture with e coli bacteremia. RVP negative. Patient was with severe hypotension, s/p IVF. Started on midodrine, now discontinued. Holding bP meds. Also noted to have pleural effusion, required supplemental oxygen, now on RA. RAYMOND was also noted likely due to sepsis/hypotension, now resolved.            Suggest  Continue Ceftriaxone    when able to be discharged change abx  to   Levofloxacin 500 mg po daily  through 2/20        Important Medication Changes and Reason:    Active or Pending Issues Requiring Follow-up:mireya    Advanced Directives:   [ ] Full code  [ ] DNR  [ ] Hospice    Discharge Diagnoses:  Pyelonephritis  Sepsis  Ecoli bacteremia  RAYMOND  Plural effusion  Hypoxia  Hypotension         HPI:  83F w/ DM2, HTN, HLD, OA, Hiatal Hernia , PUD,  Uterine Prolapse, Papillary thyroid CA, s/p thyroidectomy who presented with weakness/fatigue and  low grade fever at home and was seen by [facility] yesterday had negative COVID, sent home to take Tylenol for the fever, today felt  worst ,  and started to experience chest discomfort and increased weakness  so she came to hospital.   No urinary frequency or urgency but states that she has frequent UTIs, denies nausea vomiting,  or palpitations or  shortness of breath.  In the ED, patient was found to have sepsis 2/2 UTI. CTAP with concern for pyelonephritis. Pt denies CP, SOB, abdominal pain.  IN the ED  : Tm = 100.6  HR 78 BP = /50-77  Patient received about 3 L of IV and Ceftriaxone IV and admitted for further evaluation and treatment     MICU  has seen patient  for hypotension and patient is not considered a candidate .      Patient was seen with daughter at the bedside      (12 Feb 2024 08:33)    Hospital Course:  Patient was admitted with sepsis. CT a/p showed b/l pyelonephritis , ascending ureteritis and cystitis. Started on ceftriaxone initially, then changed to cefepime from 2/12 to 2/214. Now switched back to ceftriaxone from 2/14. blood culture with e coli bacteremia. RVP negative. Patient was with severe hypotension, s/p IVF. Started on midodrine, now discontinued. Holding bP meds. Also noted to have pleural effusion on chest xray, required supplemental oxygen, now on RA. RAYMOND was also noted likely due to sepsis/hypotension, now resolved. Incidental finding of Iliac artery aneurysm. Vascular recommended for out patient follow up. CT A/P was showing large stool burden, GI was consulted, resolved with laxatives. Patient clinically stable for discharge. Per ID  able to be discharged on   Levofloxacin 500 mg po daily  through 2/20        Important Medication Changes and Reason:    Active or Pending Issues Requiring Follow-up:  Cardiology  Vascular  PCP  Advanced Directives:   [ ] Full code  [ ] DNR  [ ] Hospice    Discharge Diagnoses:  Pyelonephritis  Sepsis  Ecoli bacteremia  RAYMOND  Plural effusion  Hypoxia  Hypotension  Iliac artery aneurysm  Constupation         HPI:  83F w/ DM2, HTN, HLD, OA, Hiatal Hernia , PUD,  Uterine Prolapse, Papillary thyroid CA, s/p thyroidectomy who presented with weakness/fatigue and  low grade fever at home and was seen by [facility] Urgent care yesterday had negative COVID, sent home to take Tylenol for the fever, today felt  worst ,  and started to experience chest discomfort and increased weakness  so she came to hospital.   No urinary frequency or urgency but states that she has frequent UTIs, denies nausea vomiting,  or palpitations or  shortness of breath.  In the ED, patient was found to have sepsis 2/2 UTI. CTAP with concern for pyelonephritis. Pt denies CP, SOB, abdominal pain.  IN the ED  : Tm = 100.6  HR 78 BP = /50-77  Patient received about 3 L of IV and Ceftriaxone IV and admitted for further evaluation and treatment     MICU  has seen patient  for hypotension and patient is not considered a candidate .      Patient was seen with daughter at the bedside      (12 Feb 2024 08:33)    Hospital Course:  Patient was admitted with sepsis. CT a/p showed b/l pyelonephritis , ascending ureteritis and cystitis. Started on ceftriaxone initially, then changed to cefepime from 2/12 to 2/214. Now switched back to ceftriaxone from 2/14. blood culture with e coli bacteremia. RVP negative. Patient was with severe hypotension, s/p IVF. Started on midodrine, now discontinued. Holding bP meds. Also noted to have pleural effusion on chest xray, required supplemental oxygen, now on RA. RAYMOND was also noted likely due to sepsis/hypotension, now resolved. Incidental finding of Iliac artery aneurysm. Vascular recommended for out patient follow up. CT A/P was showing large stool burden, GI was consulted, resolved with laxatives. Patient clinically stable for discharge. Per ID  able to be discharged on Levofloxacin 500 mg po daily through 2/20.        Important Medication Changes and Reason:    Active or Pending Issues Requiring Follow-up:  Cardiology  Vascular  PCP  Advanced Directives:   [ ] Full code  [ ] DNR  [ ] Hospice    Discharge Diagnoses:  Pyelonephritis  Sepsis  Ecoli bacteremia  RAYMOND  Plural effusion  Hypoxia  Hypotension  Iliac artery aneurysm  Constupation         HPI:  83F w/ DM2, HTN, HLD, OA, Hiatal Hernia , PUD,  Uterine Prolapse, Papillary thyroid CA, s/p thyroidectomy who presented with weakness/fatigue and  low grade fever at home and was seen by [facility]gent care yesterday had negative COVID, sent home to take Tylenol for the fever, today felt  worst ,  and started to experience chest discomfort and increased weakness  so she came to hospital.   No urinary frequency or urgency but states that she has frequent UTIs, denies nausea vomiting,  or palpitations or  shortness of breath.  In the ED, patient was found to have sepsis 2/2 UTI. CTAP with concern for pyelonephritis. Pt denies CP, SOB, abdominal pain.  IN the ED  : Tm = 100.6  HR 78 BP = /50-77  Patient received about 3 L of IV and Ceftriaxone IV and admitted for further evaluation and treatment     MICU  has seen patient  for hypotension and patient is not considered a candidate .      Patient was seen with daughter at the bedside      (12 Feb 2024 08:33)    Hospital Course:  Patient was admitted with sepsis. CT a/p showed b/l pyelonephritis , ascending ureteritis and cystitis. Started on ceftriaxone initially, then changed to cefepime from 2/12 to 2/214. Now switched back to ceftriaxone from 2/14. blood culture with e coli bacteremia. RVP negative. Patient was with severe hypotension, s/p IVF. Started on midodrine, now discontinued. Holding bP meds. Also noted to have pleural effusion on chest xray, required supplemental oxygen, now on RA. RAYMOND was also noted likely due to sepsis/hypotension, now resolved. Incidental finding of Iliac artery aneurysm. Vascular recommended for out patient follow up. CT A/P was showing large stool burden, GI was consulted, resolved with laxatives. Patient clinically stable for discharge. Per ID  able to be discharged on Levofloxacin 500 mg po daily through 2/20.        Important Medication Changes and Reason: po Levaquin added to complete antibiotic course through 2/20     Active or Pending Issues Requiring Follow-up:  Cardiology  Vascular  PCP  Advanced Directives:   [X ] Full code  [ ] DNR  [ ] Hospice    Discharge Diagnoses:  Pyelonephritis  Sepsis  E-coli bacteremia  RAYMOND  Plural effusion  Hypoxia  Hypotension  Iliac artery aneurysm  Constipation

## 2024-02-16 NOTE — DISCHARGE NOTE PROVIDER - CARE PROVIDER_API CALL
Marline Bear  [facility]  [address] [address]  Phone: [phone]  Fax: [phone]  Follow Up Time:    Marline Bear  [facility]  [address] [place] [address]  Phone: [phone]  Fax: [phone]  Follow Up Time:     Kenyetta Snow  Vascular Surgery  [address] [place] [address]  Phone: [phone]  Fax: [phone]  Follow Up Time:    Marline Bear  [facility]  [address] [place] [address]  Phone: [phone]  Fax: [phone]  Follow Up Time:     Kenyetta Snow  Vascular Surgery  [address] [place] [address]  Phone: [phone]  Fax: [phone]  Follow Up Time:     Haroon Franklin  Gastroenterology  [address] [place] [address]  Phone: [phone]  Fax: [phone]  Follow Up Time:    Marline Bear  [facility]  [address] [place] [address]  Phone: [phone]  Fax: [phone]  Follow Up Time:     Kenyetta Snow  Vascular Surgery  [address] [place] [address]  Phone: [phone]  Fax: [phone]  Follow Up Time:     Haroon Franklin  Gastroenterology  [address] [place] [address]  Phone: [phone]  Fax: [phone]  Follow Up Time:     Lyndon Calle  Cardiovascular Disease  [address] [facility] [address]  [place] [address]  Phone: [phone]  Fax: [phone]  Follow Up Time: 1-3 days

## 2024-02-16 NOTE — DISCHARGE NOTE PROVIDER - NPI NUMBER (FOR SYSADMIN USE ONLY) :
[[id number]] [[id number]],[[id number]] [[id number]],[[id number]],[[id number]] [[id number]],[[id number]],[[id number]],[[id number]]

## 2024-02-16 NOTE — DISCHARGE NOTE PROVIDER - NSDCFUADDAPPT_GEN_ALL_CORE_FT
APPTS ARE READY TO BE MADE: [ ] YES    Best Family or Patient Contact (if needed):    Additional Information about above appointments (if needed):    1:   2:   3:     Other comments or requests:    You must follow up with your primary medical doctor within 2-3 days of discharge - please call to make an appointment.    You must follow up with our vascular surgeon within one week of discharge - please call to make an appointment.    You must follow up with your gastroenterologist within 1-2 weeks of discharge - please call to make an appointment.          APPTS ARE READY TO BE MADE: [X ] YES    Best Family or Patient Contact (if needed):    Additional Information about above appointments (if needed):    1: Primary medical doctor  2: Vascular surgeon  3: Gastroenterologist    Other comments or requests:    You must follow up with your cardiologist, Dr. Calle, within 2-3 days of discharge - please call to make an appointment.  At this appointment, you must discuss your current medication regimen and if any changes need to be made.    You must follow up with your primary medical doctor within 2-3 days of discharge - please call to make an appointment.    You must follow up with our vascular surgeon within two weeks of discharge - please call to make an appointment.    You must follow up with your gastroenterologist within 1-2 weeks of discharge - please call to make an appointment.          APPTS ARE READY TO BE MADE: [X ] YES    Best Family or Patient Contact (if needed):    Additional Information about above appointments (if needed):    1: Primary medical doctor  2: Vascular surgeon  3: Gastroenterologist    Other comments or requests:    You must follow up with your cardiologist, Dr. Calle, within 2-3 days of discharge - please call to make an appointment.  At this appointment, you must discuss your current medication regimen and if any changes need to be made.    You must follow up with your primary medical doctor within 2-3 days of discharge - please call to make an appointment.    You must follow up with our vascular surgeon within two weeks of discharge - please call to make an appointment.    You must follow up with your gastroenterologist within 1-2 weeks of discharge - please call to make an appointment.          APPTS ARE READY TO BE MADE: [X ] YES    Best Family or Patient Contact (if needed):    Additional Information about above appointments (if needed):    1: Primary medical doctor  2: Vascular surgeon  3: Gastroenterologist    Other comments or requests:   Patient informed us they already have secured a follow up appointment which is not visible on [facility]. Dr. Calle at [address] [place] [address]  Providers office was contacted to secure an appointment, however the office will follow up with the patient/caregiver directly.   Patient advised they did not want to proceed with scheduling appointments with the providers on their referrals. They will coordinate care on their own. Patient's son advised that she has another GI provider that they will be following up with.

## 2024-02-16 NOTE — DISCHARGE NOTE NURSING/CASE MANAGEMENT/SOCIAL WORK - NSDCFUADDAPPT_GEN_ALL_CORE_FT
You must follow up with your cardiologist, Dr. Calle, within 2-3 days of discharge - please call to make an appointment.  At this appointment, you must discuss your current medication regimen and if any changes need to be made.    You must follow up with your primary medical doctor within 2-3 days of discharge - please call to make an appointment.    You must follow up with our vascular surgeon within two weeks of discharge - please call to make an appointment.    You must follow up with your gastroenterologist within 1-2 weeks of discharge - please call to make an appointment.          APPTS ARE READY TO BE MADE: [X ] YES    Best Family or Patient Contact (if needed):    Additional Information about above appointments (if needed):    1: Primary medical doctor  2: Vascular surgeon  3: Gastroenterologist    Other comments or requests:

## 2024-02-16 NOTE — DISCHARGE NOTE NURSING/CASE MANAGEMENT/SOCIAL WORK - PATIENT PORTAL LINK FT
You can access the FollowMyHealth Patient Portal offered by [facility] by registering at the following website: [email or web address]. By joining [facility]’s FollowMyHealth portal, you will also be able to view your health information using other applications (apps) compatible with our system.

## 2024-02-16 NOTE — DISCHARGE NOTE PROVIDER - CARE PROVIDERS DIRECT ADDRESSES
,DirectAddress_Unknown ,DirectAddress_Unknown,[email or web address] ,DirectAddress_Unknown,[email or web address] ,[email or web address],[email or web address]

## 2024-02-16 NOTE — DISCHARGE NOTE PROVIDER - NSDCMRMEDTOKEN_GEN_ALL_CORE_FT
amitriptyline 25 mg oral tablet: 1 tab(s) orally once a day  ascorbic acid 500 mg oral tablet: 1 tab(s) orally 2 times a day  buPROPion 100 mg/12 hours (SR) oral tablet, extended release: 1 tab(s) orally 2 times a day  Caltrate 600 + D oral tablet: 1 tab(s) orally 2 times a day  Centrum Silver oral tablet: 1 tab(s) orally once a day  Coreg 25 mg oral tablet: 1 tab(s) orally 2 times a day NOTE: As per patients pharmacy, last filled July 2023.  DilTIAZem (Eqv-Cardizem CD) 120 mg/24 hours oral capsule, extended release: 1 cap(s) orally once a day  hydrALAZINE 50 mg oral tablet: 1 tab(s) orally 2 times a day  Laxative magnesium 500mg caplet: 1-2 caplets at night as needed  lidocaine 5% topical film: Apply topically to affected area 3 times a day as needed for pain as per pt  lovastatin 20 mg oral tablet: 1 tab(s) orally once a day  olmesartan 40 mg oral tablet: 1 tab(s) orally once a day  omeprazole 20 mg oral delayed release capsule: 1 cap(s) orally once a day  Synthroid 75 mcg (0.075 mg) oral tablet: 1 tab(s) orally once a day monday through saturday and 1.5 tablets on sunday.  Tylenol Extra Strength 500 mg oral tablet: 1 tab(s) orally as needed for pain  Xanax 1 mg oral tablet: 1 tab(s) orally once a day (at bedtime) note: pharmacy has 1-2 prn.   amitriptyline 25 mg oral tablet: 1 tab(s) orally once a day  ascorbic acid 500 mg oral tablet: 1 tab(s) orally 2 times a day  buPROPion 100 mg/12 hours (SR) oral tablet, extended release: 1 tab(s) orally 2 times a day  Caltrate 600 + D oral tablet: 1 tab(s) orally 2 times a day  Centrum Silver oral tablet: 1 tab(s) orally once a day  Coreg 25 mg oral tablet: 1 tab(s) orally 2 times a day NOTE: As per patients pharmacy, last filled July 2023.  DilTIAZem (Eqv-Cardizem CD) 120 mg/24 hours oral capsule, extended release: 1 cap(s) orally once a day  hydrALAZINE 50 mg oral tablet: 1 tab(s) orally 2 times a day  Laxative magnesium 500mg caplet: 1-2 caplets at night as needed  lidocaine 5% topical film: Apply topically to affected area 3 times a day as needed for pain as per pt  lovastatin 20 mg oral tablet: 1 tab(s) orally once a day  olmesartan 40 mg oral tablet: 1 tab(s) orally once a day  omeprazole 20 mg oral delayed release capsule: 1 cap(s) orally once a day  Outpatient Physical Therapy at home: 2-3 x week  Synthroid 75 mcg (0.075 mg) oral tablet: 1 tab(s) orally once a day monday through saturday and 1.5 tablets on sunday.  Tylenol Extra Strength 500 mg oral tablet: 1 tab(s) orally as needed for pain  Xanax 1 mg oral tablet: 1 tab(s) orally once a day (at bedtime) note: pharmacy has 1-2 prn.   ascorbic acid 500 mg oral tablet: 1 tab(s) orally 2 times a day  buPROPion 100 mg/12 hours (SR) oral tablet, extended release: 1 tab(s) orally 2 times a day  Caltrate 600 + D oral tablet: 1 tab(s) orally 2 times a day  carvedilol 25 mg oral tablet: 1 tab(s) orally every 12 hours  Centrum Silver oral tablet: 1 tab(s) orally once a day  Laxative magnesium 500mg caplet: 1-2 caplets at night as needed  levoFLOXacin 500 mg oral tablet: 1 tab(s) orally every 24 hours Start on 2/17/2024, and take through 2/20/2024, and then discontinue  lidocaine 5% topical film: Apply topically to affected area 3 times a day as needed for pain as per pt  lovastatin 20 mg oral tablet: 1 tab(s) orally once a day  omeprazole 20 mg oral delayed release capsule: 1 cap(s) orally once a day  polyethylene glycol 3350 oral powder for reconstitution: 17 gram(s) orally once a day as needed for  constipation  Synthroid 75 mcg (0.075 mg) oral tablet: 1 tab(s) orally once a day monday through saturday and 1.5 tablets on sunday.  Tylenol Extra Strength 500 mg oral tablet: 1 tab(s) orally every 8 hours as needed for pain  Xanax 1 mg oral tablet: 1 tab(s) orally once a day (at bedtime) as needed for  anxiety note: pharmacy has 1-2 prn.   ascorbic acid 500 mg oral tablet: 1 tab(s) orally 2 times a day  buPROPion 100 mg/12 hours (SR) oral tablet, extended release: 1 tab(s) orally 2 times a day  Caltrate 600 + D oral tablet: 1 tab(s) orally 2 times a day  carvedilol 25 mg oral tablet: 1 tab(s) orally every 12 hours  Centrum Silver oral tablet: 1 tab(s) orally once a day  Laxative magnesium 500mg caplet: 1-2 caplets at night as needed  levoFLOXacin 500 mg oral tablet: 1 tab(s) orally every 24 hours Start on 2/17/2024, and take through 2/20/2024, and then discontinue  lidocaine 5% topical film: Apply topically to affected area 3 times a day as needed for pain as per pt  lovastatin 20 mg oral tablet: 1 tab(s) orally once a day  olmesartan: 40 milligram(s) orally once a day  omeprazole 20 mg oral delayed release capsule: 1 cap(s) orally once a day  polyethylene glycol 3350 oral powder for reconstitution: 17 gram(s) orally once a day as needed for  constipation  Synthroid 75 mcg (0.075 mg) oral tablet: 1 tab(s) orally once a day monday through saturday and 1.5 tablets on sunday.  Tylenol Extra Strength 500 mg oral tablet: 1 tab(s) orally every 8 hours as needed for pain  Xanax 1 mg oral tablet: 1 tab(s) orally once a day (at bedtime) as needed for  anxiety note: pharmacy has 1-2 prn.   ascorbic acid 500 mg oral tablet: 1 tab(s) orally 2 times a day  buPROPion 100 mg/12 hours (SR) oral tablet, extended release: 1 tab(s) orally 2 times a day  Caltrate 600 + D oral tablet: 1 tab(s) orally 2 times a day  carvedilol 25 mg oral tablet: 1 tab(s) orally every 12 hours  Centrum Silver oral tablet: 1 tab(s) orally once a day  Laxative magnesium 500mg caplet: 1-2 caplets at night as needed  levoFLOXacin 500 mg oral tablet: 1 tab(s) orally every 24 hours Start on 2/17/2024, and take through 2/20/2024, and then discontinue  lidocaine 5% topical film: Apply topically to affected area 3 times a day as needed for pain as per pt  lovastatin 20 mg oral tablet: 1 tab(s) orally once a day  olmesartan: 40 milligram(s) orally once a day  omeprazole 20 mg oral delayed release capsule: 1 cap(s) orally once a day  polyethylene glycol 3350 oral powder for reconstitution: 17 gram(s) orally once a day as needed for  constipation  PT out-pt  / 2-3 x week for 2-3 weeks: as directed  Synthroid 75 mcg (0.075 mg) oral tablet: 1 tab(s) orally once a day monday through saturday and 1.5 tablets on sunday.  Tylenol Extra Strength 500 mg oral tablet: 1 tab(s) orally every 8 hours as needed for pain  Xanax 1 mg oral tablet: 1 tab(s) orally once a day (at bedtime) as needed for  anxiety note: pharmacy has 1-2 prn.

## 2024-02-16 NOTE — DISCHARGE NOTE NURSING/CASE MANAGEMENT/SOCIAL WORK - NSDCPEFALRISK_GEN_ALL_CORE
For information on Fall & Injury Prevention, visit: [email or web address]  [email or web address] OR  https://www.cdc.gov/steadi/patient.html

## 2024-02-16 NOTE — DISCHARGE NOTE PROVIDER - PROVIDER TOKENS
PROVIDER:[TOKEN:[[id number]]] PROVIDER:[TOKEN:[[id number]]],PROVIDER:[TOKEN:[[id number]]] PROVIDER:[TOKEN:[[id number]]],PROVIDER:[TOKEN:[[id number]]],PROVIDER:[TOKEN:[[id number]]] PROVIDER:[TOKEN:[[id number]]],PROVIDER:[TOKEN:[[id number]]],PROVIDER:[TOKEN:[[id number]]],PROVIDER:[TOKEN:[[id number]],FOLLOWUP:[1-3 days]]

## 2024-02-16 NOTE — DISCHARGE NOTE NURSING/CASE MANAGEMENT/SOCIAL WORK - NSDCPNINST_GEN_ALL_CORE
call md for any discomforts felt--safety measures reemphasized call md for any discomforts felt--safety measures reemphasized-- keep skin clean and dry always

## 2024-02-17 VITALS — OXYGEN SATURATION: 96 % | RESPIRATION RATE: 18 BRPM | TEMPERATURE: 98 F | HEART RATE: 67 BPM

## 2024-02-17 LAB
GLUCOSE BLDC GLUCOMTR-MCNC: 104 MG/DL — HIGH (ref 70–99)
GLUCOSE BLDC GLUCOMTR-MCNC: 97 MG/DL — SIGNIFICANT CHANGE UP (ref 70–99)

## 2024-02-17 PROCEDURE — 80053 COMPREHEN METABOLIC PANEL: CPT

## 2024-02-17 PROCEDURE — 97116 GAIT TRAINING THERAPY: CPT

## 2024-02-17 PROCEDURE — 85730 THROMBOPLASTIN TIME PARTIAL: CPT

## 2024-02-17 PROCEDURE — 87077 CULTURE AEROBIC IDENTIFY: CPT

## 2024-02-17 PROCEDURE — 96375 TX/PRO/DX INJ NEW DRUG ADDON: CPT

## 2024-02-17 PROCEDURE — 0225U NFCT DS DNA&RNA 21 SARSCOV2: CPT

## 2024-02-17 PROCEDURE — 97161 PT EVAL LOW COMPLEX 20 MIN: CPT

## 2024-02-17 PROCEDURE — 81001 URINALYSIS AUTO W/SCOPE: CPT

## 2024-02-17 PROCEDURE — 87150 DNA/RNA AMPLIFIED PROBE: CPT

## 2024-02-17 PROCEDURE — 85610 PROTHROMBIN TIME: CPT

## 2024-02-17 PROCEDURE — 96374 THER/PROPH/DIAG INJ IV PUSH: CPT

## 2024-02-17 PROCEDURE — 71045 X-RAY EXAM CHEST 1 VIEW: CPT

## 2024-02-17 PROCEDURE — 99285 EMERGENCY DEPT VISIT HI MDM: CPT | Mod: 25

## 2024-02-17 PROCEDURE — 93306 TTE W/DOPPLER COMPLETE: CPT

## 2024-02-17 PROCEDURE — 82962 GLUCOSE BLOOD TEST: CPT

## 2024-02-17 PROCEDURE — 74177 CT ABD & PELVIS W/CONTRAST: CPT | Mod: MA

## 2024-02-17 PROCEDURE — 83036 HEMOGLOBIN GLYCOSYLATED A1C: CPT

## 2024-02-17 PROCEDURE — 85025 COMPLETE CBC W/AUTO DIFF WBC: CPT

## 2024-02-17 PROCEDURE — 85027 COMPLETE CBC AUTOMATED: CPT

## 2024-02-17 PROCEDURE — 84484 ASSAY OF TROPONIN QUANT: CPT

## 2024-02-17 PROCEDURE — 80048 BASIC METABOLIC PNL TOTAL CA: CPT

## 2024-02-17 PROCEDURE — 36415 COLL VENOUS BLD VENIPUNCTURE: CPT

## 2024-02-17 PROCEDURE — 83605 ASSAY OF LACTIC ACID: CPT

## 2024-02-17 PROCEDURE — 97530 THERAPEUTIC ACTIVITIES: CPT

## 2024-02-17 PROCEDURE — 87040 BLOOD CULTURE FOR BACTERIA: CPT

## 2024-02-17 PROCEDURE — 87186 SC STD MICRODIL/AGAR DIL: CPT

## 2024-02-17 PROCEDURE — 87086 URINE CULTURE/COLONY COUNT: CPT

## 2024-02-17 PROCEDURE — 87637 SARSCOV2&INF A&B&RSV AMP PRB: CPT

## 2024-02-17 RX ORDER — LEVOFLOXACIN 5 MG/ML
1 INJECTION, SOLUTION INTRAVENOUS
Qty: 4 | Refills: 0
Start: 2024-02-17 | End: 2024-02-20

## 2024-02-17 RX ADMIN — Medication 75 MICROGRAM(S): at 05:32

## 2024-02-17 RX ADMIN — PANTOPRAZOLE SODIUM 40 MILLIGRAM(S): 20 TABLET, DELAYED RELEASE ORAL at 05:32

## 2024-02-17 RX ADMIN — Medication 1 TABLET(S): at 12:03

## 2024-02-17 RX ADMIN — LOSARTAN POTASSIUM 100 MILLIGRAM(S): 100 TABLET, FILM COATED ORAL at 12:03

## 2024-02-17 RX ADMIN — BUPROPION HYDROCHLORIDE 100 MILLIGRAM(S): 150 TABLET, EXTENDED RELEASE ORAL at 05:32

## 2024-02-17 RX ADMIN — Medication 650 MILLIGRAM(S): at 01:42

## 2024-02-17 RX ADMIN — CARVEDILOL PHOSPHATE 25 MILLIGRAM(S): 80 CAPSULE, EXTENDED RELEASE ORAL at 05:32

## 2024-02-17 NOTE — PROGRESS NOTE ADULT - REASON FOR ADMISSION
Weakness and fatigue

## 2024-02-17 NOTE — PROGRESS NOTE ADULT - ASSESSMENT
A/p  83F w/ DM2, HTN, HLD, OA, Hiatal Hernia , PUD,  Uterine Prolapse, Papillary thyroid CA, s/p thyroidectomy who presented with weakness/fatigue and  low grade fever at home a/w urosepsis.    #Urosepsis  -CTAP with cystitis with bilateral ascending ureteritis and pyelonephritis.  -Micu eval appreciated  -Patient with improvement in bp on midodrine  -Abx per ID     #Abdominal Pain  -GI following    #HTN  -hypotension resolved- now htn -- Mido DC  -Continue coreg  -eventually will olmesartan 40mg qhs (home regimen) if bp remains elevated     #MAT  -off ASA for gi bleed  -resume coreg    #Aortic/Mitral Valve Disease  -XR with trace pleural effusion  -No overload on exam  -Echo with nml lv fxn, moderate diastolic dysfxn, moderate MR    #Right Common Iliac Artery Aneurysm  -Appreciate vascular sx recs  -Rec outpt f/u      dvt ppx
constipation   pyelonephritis    cont iv antibiotics per ID  cultures noted  diet as tolerated  CT noted  large HH  large stool burden  good effect with lactulose; now dced  miralax daily to prevent constipation  outpatient follow up with primary GI (Haroon horton)    I reviewed the overnight course of events on the unit, re-confirming the patient history. I discussed the care with the patient and their family  The plan of care was discussed with the physician assistant and modifications were made to the notation where appropriate.   Differential diagnosis and plan of care discussed with patient after the evaluation  35 minutes spent on total encounter of which more than fifty percent of the encounter was spent counseling and/or coordinating care by the attending physician.  Advanced care planning was discussed with patient and family.  Advanced care planning forms were reviewed and discussed.  Risks, benefits and alternatives of gastroenterologic procedures were discussed in detail and all questions were answered.  
constipation   pyelonephritis    cont iv antibiotics per ID  cultures noted  diet as tolerated  CT noted  large HH  large stool burden  good effect with lactulose; now dced  miralax daily to prevent constipation  outpatient follow up with primary GI (Haroon horton)    I reviewed the overnight course of events on the unit, re-confirming the patient history. I discussed the care with the patient and their family  The plan of care was discussed with the physician assistant and modifications were made to the notation where appropriate.   Differential diagnosis and plan of care discussed with patient after the evaluation  35 minutes spent on total encounter of which more than fifty percent of the encounter was spent counseling and/or coordinating care by the attending physician.  Advanced care planning was discussed with patient and family.  Advanced care planning forms were reviewed and discussed.  Risks, benefits and alternatives of gastroenterologic procedures were discussed in detail and all questions were answered.  
constipation   pyelonephritis    cont iv antibiotics per ID  cultures noted  diet as tolerated  CT noted  large HH  large stool burden  good effect with lactulose; now dced  miralax daily to prevent constipation  outpatient follow up with primary GI (Haroon horton)  dc planning per primary    I reviewed the overnight course of events on the unit, re-confirming the patient history. I discussed the care with the patient and their family  The plan of care was discussed with the physician assistant and modifications were made to the notation where appropriate.   Differential diagnosis and plan of care discussed with patient after the evaluation  35 minutes spent on total encounter of which more than fifty percent of the encounter was spent counseling and/or coordinating care by the attending physician.  Advanced care planning was discussed with patient and family.  Advanced care planning forms were reviewed and discussed.  Risks, benefits and alternatives of gastroenterologic procedures were discussed in detail and all questions were answered.  
83 woman with prediabetes, HTN, HLD, OA, Hiatal Hernia , PUD, s/p thyroidectomy who was admitted 2/12/24 with weakness/fatigue and  low grade fever at home.    E coli bacteremia/ bacteuria  fever  leukocytosis  - improved  pyuria  Imaging indicating bilateral pyelonephritis, ascending ureteritis and cystitis  low risk amoxicillin allergy history  hypotension  - responding to fluids,midodrine    Antibiotics  Ceftriaxone 2/11  Cefepime 2/12 -->     complicated E coli bacteremic UTI / bilateral pyelonephritis, ascending ureteritis and cystitis - perhaps related to uterine prolapse and recent unsuccessful pessary fitting  improved BP notes    Suggest  Continue Cefepime  monitor susceptibilities  repeat bld cx in am (to facilitate mid line if needed)  I ordered  
83 woman with prediabetes, HTN, HLD, OA, Hiatal Hernia , PUD, s/p thyroidectomy who was admitted 2/12/24 with weakness/fatigue and  low grade fever at home.    E coli bacteremia/ bacteuria  isolate susceptible to all tested  fever  leukocytosis  - improved  pyuria  Imaging indicating bilateral pyelonephritis, ascending ureteritis and cystitis  low risk amoxicillin allergy history  hypotension  - responding to fluids,midodrine    Antibiotics  Ceftriaxone 2/11; 2/14-->  Cefepime 2/12 --> 2/14    complicated E coli bacteremic UTI / bilateral pyelonephritis, ascending ureteritis and cystitis - perhaps related to uterine prolapse and recent unsuccessful pessary fitting  improved BP noted  2/14  decreased leukocytosis.  sob and cough - NEG RVP  ?fluid overload/diastolic dysfunction  - Midrodine being weaned  2/15 afebrile since 2/13 2107  decreased leukocytosis  - I suspect component of fluid overload    Suggest  Continue Ceftriaxone    when able to be discharged change abx  to   Levofloxacin 500 mg po daily  through 2/20  
83 woman with prediabetes, HTN, HLD, OA, Hiatal Hernia , PUD, s/p thyroidectomy who was admitted 2/12/24 with weakness/fatigue and  low grade fever at home.    E coli bacteremia/ bacteuria  isolate susceptible to all tested  fever  leukocytosis  - improved  pyuria  Imaging indicating bilateral pyelonephritis, ascending ureteritis and cystitis  low risk amoxicillin allergy history  hypotension  - responding to fluids,midodrine    Antibiotics  Ceftriaxone 2/11; 2/14-->  Cefepime 2/12 --> 2/14    complicated E coli bacteremic UTI / bilateral pyelonephritis, ascending ureteritis and cystitis - perhaps related to uterine prolapse and recent unsuccessful pessary fitting  improved BP noted  2/14  decreased leukocytosis.  sob and cough - NEG RVP  ?fluid overload/diastolic dysfunction  - Midrodine being weaned  2/15 afebrile since 2/13 2107  decreased leukocytosis  - I suspect component of fluid overload  2/16 leukocytosis resolved, bacteremia cleared    Suggest  Stop  Ceftriaxone when discharged then change abx  to   Levofloxacin 500 mg po once daily  through 2/20  discharge anticipated later today  
83F w/ DM2, HTN, HLD, OA, Hiatal Hernia , PUD,  Uterine Prolapse, Papillary thyroid CA, s/p thyroidectomy who presented with weakness/fatigue and  low grade fever at home and was seen by [facility] yesterday had negative COVID, sent home to take Tylenol for the fever, today felt  worst ,  and started to experience chest discomfort and increased weakness  so she came to hospital.   No urinary frequency or urgency but states that she has frequent UTIs, denies nausea vomiting,  or palpitations or  shortness of breath.  In the ED, patient was found to have sepsis 2/2 UTI. CTAP with concern for pyelonephritis. Pt denies CP, SOB, abdominal pain.  IN the ED  : Tm = 100.6  HR 78 BP = /50-77  Patient received about 3 L of IV and Ceftriaxone IV and admitted for further evaluation and treatment   MICU  has seen patient  for hypotension and patient is not considered a candidate .      Pyelonephritis.   - with SIRS --> Sepsis   - HOLD antihypertensive meds   - Ceftriaxone  - ID follow    Cxr with possible Pneumonia  - viral studies negative  - antibiotic    Arm superficial phlebitis  - warm compress     Constipation.   - Bowel regimen. Lactulose.  - Pt admits to chronic constipation and only Milk of magnesia tablet works for her as she states --> we do not have it in the hospital as per RX  - GI follow    Hypothyroidism.   - Synthroid  - TSH    HLD (hyperlipidemia).   - Lovastatin changed to Atorvastatin.    Hiatal hernia.   - Large Hiatal hernia on CT --> fup with GI.    Primary hypertension.   - HOLD antihypertensive meds due to severe sepsis   - restart once able  - She is on Diltiazem, Coreg and Hydralazine.  - Midodrine taper and DC    RAYMOND (acute kidney injury).   - Lowest Cr was <1 in 2023   - RAYMOND most likely in setting of infection cont with IVF   - avoid nephrotoxic agents.    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 No intervention    DVT prophylaxis    Paulie Moore MD phone [phone] 
{\rtf1\weznfq02862\ansi\qjcepqk3998\ftnbj\uc1\deff0  {\fonttbl{\f0 \fnil Segoe UI;}{\f1 \fnil \fcharset0 Segoe UI;}{\f2 \fnil Times New Bart;}}  {\colortbl ;\ypg866\vgdup440\mnrd474 ;\red0\green0\blue0 ;\red0\green0\qjuj727 ;\red0\green0\blue0 ;}  {\stylesheet{\f0\fs20 Normal;}{\cs1 Default Paragraph Font;}{\cs2\f0\fs16 Line Number;}{\cs3\f2\fs24\ul\cf3 Hyperlink;}}  {\*\revtbl{Unknown;}}  \zntozs22027\ogknwe35112\vqsnd0985\khhcr4512\mrprj2138\xwvhv1063\udwqxnz388\wftqhwv729\nogrowautofit\ebuquh078\formshade\nofeaturethrottle1\dntblnsbdb\fet4\aendnotes\aftnnrlc\pgbrdrhead\pgbrdrfoot  \sectd\noalwa75501\zqkflo44066\guttersxn0\neuaapmh3096\pxyvqyvd5125\wpecezlg3034\lrxtjvdz0316\rhinwci374\vkxdvww182\sbkpage\pgncont\pgndec  \plain\plain\f0\fs24\ql\plain\f0\fs24\plain\f0\fs20\fpwc7474\hich\f0\dbch\f0\loch\f0\fs20 83F w/ DM2, HTN, HLD, OA, Hiatal Hernia , PUD,  Uterine Prolapse, Papillary thyroid CA, s/p thyroidectomy who presented with weakness/fatigue and  low grade fever   at home and [facility] had negative COVID, sent home to take Tylenol for the fever, today felt  worst ,  and started to experience chest discomfort and increased weakness  so she came to hospital.   No urinary frequency   or urgency but states that she has frequent UTIs, denies nausea vomiting,  or palpitations or  shortness of breath.  In the ED, patient was found to have sepsis 2/2 UTI. CTAP with concern for pyelonephritis. Pt denies CP, SOB, abdominal pain.  IN the   ED  : Tm = 100.6  HR 78 BP = /50-77  Patient received about 3 L of IV and Ceftriaxone IV and admitted for further evaluation and treatment \par  MICU  has seen patient  for hypotension and patient is not considered a candidate .  \par  \par  \plain\f1\fs20\yegi7279\hich\f1\dbch\f1\loch\f1\cf2\fs20\strike\plain\f1\fs20\kmox5590\hich\f1\dbch\f1\loch\f1\cf2\fs20{\*\bkmkstart [id number]}{\*\bkmkend ke05285732512}{\*\bkmkstart yv86436807287}{\*\bkmkend wx54042299020}\plain\f0\fs20\lryh8435\hich\f0\dbch\f0\loch\f0\fs20   Pyelonephritis. \par  - {\*\bkmkstart [id number]}{\*\bkmkend wd52498133138}{\*\bkmkstart ni45854163456}{\*\bkmkend dt89353025506}with SIRS --> Sepsis \par  - s/p IVF in the ED and Midodrine was started \par  - HOLD antihypertensive meds \par  - Cefepime \par  - F/up Blood and Urine CX \par  - ID follow\plain\f1\fs20\qqej1126\hich\f1\dbch\f1\loch\f1\cf2\fs20\strike\plain\f0\fs20\fnud9795\hich\f0\dbch\f0\loch\f0\fs20\par  {\*\bkmkstart zn55233995239}{\*\bkmkend zy02012017322}{\*\bkmkstart zl00337772818}{\*\bkmkend zs92177257360}Constipation. \par  - {\*\bkmkstart rr79229375291}{\*\bkmkend oc05213730789}{\*\bkmkstart yf31471759207}{\*\bkmkend ps03814369407}Bowel regimen. Lactulose.\par  - Pt admits to chronic constipation and only Milk of magnesia tablet works for her as she states --> we do not have it in the hospital as per RX\par  - GI follow\plain\f1\fs20\gecz9337\hich\f1\dbch\f1\loch\f1\cf2\fs20\strike\plain\f0\fs20\umor7799\hich\f0\dbch\f0\loch\f0\fs20\par  \par  {\*\bkmkstart wx57655980770}{\*\bkmkend ro42035211107}{\*\bkmkstart ko16417755262}{\*\bkmkend hw54569250939}Hypothyroidism. \par  - {\*\bkmkstart fe55903647078}{\*\bkmkend fr81364220560}{\*\bkmkstart ik69549361788}{\*\bkmkend to95973596914}Synthroid\par  - TSH\par  \par  {\*\bkmkstart bi94766467942}{\*\bkmkend hs46375349154}{\*\bkmkstart wt57420071146}{\*\bkmkend dc06833677401}HLD (hyperlipidemia). \par  - {\*\bkmkstart el83974109947}{\*\bkmkend cz94467233365}{\*\bkmkstart nf89315398055}{\*\bkmkend rn09530343942}Lovastatin changed to Atorvastatin.\par  \par  {\*\bkmkstart bt77115157323}{\*\bkmkend xg89754873320}{\*\bkmkstart kd98338061865}{\*\bkmkend gm48828544650}Hiatal hernia. \par  - \plain\f1\fs20\obig9196\hich\f1\dbch\f1\loch\f1\cf2\fs20\strike{\*\bkmkstart zh84640876279}{\*\bkmkend xp72459191420}\plain\f0\fs20\ncul8368\hich\f0\dbch\f0\loch\f0\fs20{\*\bkmkstart tb15521710653}{\*\bkmkend ui48058419215}Large Hiatal hernia on CT   --> fup with GI.\par  \par  {\*\bkmkstart bw38741107741}{\*\bkmkend tv91008712961}{\*\bkmkstart tk64607913932}{\*\bkmkend pi77368449925}Primary hypertension. \par  - {\*\bkmkstart dk90817775157}{\*\bkmkend kq19433237800}{\*\bkmkstart yt30737469718}{\*\bkmkend gm12033781447}HOLD antihypertensive meds due to severe sepsis \par  - restart once able\par  - She is on Diltiazem, Coreg and Hydralazine.\plain\f1\fs20\rjlq0621\hich\f1\dbch\f1\loch\f1\cf2\fs20\strike\plain\f0\fs20\egxd0625\hich\f0\dbch\f0\loch\f0\fs20\par  {\*\bkmkstart gc68873947991}{\*\bkmkend gt67426901614}{\*\bkmkstart wz50369692332}{\*\bkmkend tr89457021702}RAYMOND (acute kidney injury). \par  - \plain\f1\fs20\dxrs4728\hich\f1\dbch\f1\loch\f1\cf2\fs20\strike{\*\bkmkstart pk18389789386}{\*\bkmkend bs36744118991}\plain\f0\fs20\javu8397\hich\f0\dbch\f0\loch\f0\fs20{\*\bkmkstart xk47340751247}{\*\bkmkend qd29022296636}Lowest Cr was <1 in 2023 \par  - RAYMOND most likely in setting of infection cont with IVF \par  - avoid nephrotoxic agents.\par  \par  R Iliac artery anevrism\par  - Vascular evaluation noted. No intervention\par  \par  DVT prophylaxis\par  \par  Paulie Moore MD phone [phone] \par  }  
  A/p  83F w/ DM2, HTN, HLD, OA, Hiatal Hernia , PUD,  Uterine Prolapse, Papillary thyroid CA, s/p thyroidectomy who presented with weakness/fatigue and  low grade fever at home a/w urosepsis.    #Urosepsis  -CTAP with cystitis with bilateral ascending ureteritis and pyelonephritis.  -Micu eval appreciated  -Patient with improvement in bp on midodrine  -Abx per ID     #Abdominal Pain  -GI following    #HTN  -Hypotensive on midodrine  -At home on coreg 25mg bid and olmesartan 40mg qhs  -Continue to hold home meds for now  -Wean midodrine to 5mg tid     #MAT  -off ASA for gi bleed  -Eventually resume coreg    #Aortic/Mitral Valve Disease  -XR with trace pleural effusion  -No overload on exam  -Echo with nml lv fxn, moderate diastolic dysfxn, moderate MR    #Right Common Iliac Artery Aneurysm  -Appreciate vascular sx recs  -Rec outpt f/u      dvt ppx
  A/p  83F w/ DM2, HTN, HLD, OA, Hiatal Hernia , PUD,  Uterine Prolapse, Papillary thyroid CA, s/p thyroidectomy who presented with weakness/fatigue and  low grade fever at home a/w urosepsis.    #Urosepsis  -CTAP with cystitis with bilateral ascending ureteritis and pyelonephritis.  -Micu eval appreciated  -Patient with improvement in bp on midodrine  -Abx per ID     #Abdominal Pain  -GI following    #HTN  -Hypotensive on midodrine  -At home on coreg 25mg bid and olmesartan 40mg qhs  -Continue to hold home meds for now  -Wean midodrine to 5mg tid     #MAT  -off ASA for gi bleed  -Eventually resume coreg    #Aortic/Mitral Valve Disease  -XR with trace pleural effusion  -No overload on exam  -Echo with nml lv fxn, moderate diastolic dysfxn, moderate MR    #Right Common Iliac Artery Aneurysm  -Appreciate vascular sx recs  -Rec outpt f/u      dvt ppx
83F w/ DM2, HTN, HLD, OA, Hiatal Hernia , PUD,  Uterine Prolapse, Papillary thyroid CA, s/p thyroidectomy who presented with weakness/fatigue and  low grade fever at home and was seen by [facility] yesterday had negative COVID, sent home to take Tylenol for the fever, today felt  worst ,  and started to experience chest discomfort and increased weakness  so she came to hospital.   No urinary frequency or urgency but states that she has frequent UTIs, denies nausea vomiting,  or palpitations or  shortness of breath.  In the ED, patient was found to have sepsis 2/2 UTI. CTAP with concern for pyelonephritis. Pt denies CP, SOB, abdominal pain.  IN the ED  : Tm = 100.6  HR 78 BP = /50-77  Patient received about 3 L of IV and Ceftriaxone IV and admitted for further evaluation and treatment   MICU  has seen patient  for hypotension and patient is not considered a candidate .      Pyelonephritis.   - with SIRS --> Sepsis   - HOLD antihypertensive meds   - Cefepime changed to Ceftriaxone  - ID follow    Cxr with possible Pneumonia  - viral studies negative  - antibiotic    Arm superficial phlebitis  - warm compress     Constipation.   - Bowel regimen. Lactulose.  - Pt admits to chronic constipation and only Milk of magnesia tablet works for her as she states --> we do not have it in the hospital as per RX  - GI follow    Hypothyroidism.   - Synthroid  - TSH    HLD (hyperlipidemia).   - Lovastatin changed to Atorvastatin.    Hiatal hernia.   - Large Hiatal hernia on CT --> fup with GI.    Primary hypertension.   - HOLD antihypertensive meds due to severe sepsis   - restart once able  - She is on Diltiazem, Coreg and Hydralazine.    RAYMOND (acute kidney injury).   - Lowest Cr was <1 in 2023   - RAYMOND most likely in setting of infection cont with IVF   - avoid nephrotoxic agents.    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 No intervention    DVT prophylaxis    Paulie Moore MD phone [phone] 
constipation   pyelonephritis    cont iv antibiotics per ID  cultures noted  diet as tolerated  CT noted  large HH  large stool burden  good effect with lactulose; now dced  miralax daily to prevent constipation  outpatient follow up with primary GI (Haroon horton)  dc planning per primary    I reviewed the overnight course of events on the unit, re-confirming the patient history. I discussed the care with the patient and their family  The plan of care was discussed with the physician assistant and modifications were made to the notation where appropriate.   Differential diagnosis and plan of care discussed with patient after the evaluation  35 minutes spent on total encounter of which more than fifty percent of the encounter was spent counseling and/or coordinating care by the attending physician.  Advanced care planning was discussed with patient and family.  Advanced care planning forms were reviewed and discussed.  Risks, benefits and alternatives of gastroenterologic procedures were discussed in detail and all questions were answered.  
  A/p  83F w/ DM2, HTN, HLD, OA, Hiatal Hernia , PUD,  Uterine Prolapse, Papillary thyroid CA, s/p thyroidectomy who presented with weakness/fatigue and  low grade fever at home a/w urosepsis.    #Urosepsis  -CTAP with cystitis with bilateral ascending ureteritis and pyelonephritis.  -Micu eval appreciated  -Patient with improvement in bp on midodrine  -Abx per ID     #Abdominal Pain  -GI following    #HTN  -hypotension resolved- now htn -- Mido DC  -Resume coreg 25mg bid   -eventually will olmesartan 40mg qhs (home regimen) if bp remains elevated     #MAT  -off ASA for gi bleed  -resume coreg    #Aortic/Mitral Valve Disease  -XR with trace pleural effusion  -No overload on exam  -Echo with nml lv fxn, moderate diastolic dysfxn, moderate MR    #Right Common Iliac Artery Aneurysm  -Appreciate vascular sx recs  -Rec outpt f/u      dvt ppx
83 woman with prediabetes, HTN, HLD, OA, Hiatal Hernia , PUD, s/p thyroidectomy who was admitted 2/12/24 with weakness/fatigue and  low grade fever at home.    E coli bacteremia/ bacteuria  isolate susceptible to all tested  fever  leukocytosis  - improved  pyuria  Imaging indicating bilateral pyelonephritis, ascending ureteritis and cystitis  low risk amoxicillin allergy history  hypotension  - responding to fluids,midodrine    Antibiotics  Ceftriaxone 2/11; 2/14-->  Cefepime 2/12 --> 2/14    complicated E coli bacteremic UTI / bilateral pyelonephritis, ascending ureteritis and cystitis - perhaps related to uterine prolapse and recent unsuccessful pessary fitting  improved BP noted  2/14  decreased leukocytosis.  sob and cough - NEG RVP  ?fluid overload/diastolic dysfunction  - Midrodine being weaned    Suggest  STOP Cefepime  Resume Ceftriaxone    I anticipate continuing  antibiotics through 2/20 and completing antibiotic course with po Levofloxacin  
83F w/ DM2, HTN, HLD, OA, Hiatal Hernia , PUD,  Uterine Prolapse, Papillary thyroid CA, s/p thyroidectomy who presented with weakness/fatigue and  low grade fever at home and was seen by [facility] yesterday had negative COVID, sent home to take Tylenol for the fever, today felt  worst ,  and started to experience chest discomfort and increased weakness  so she came to hospital.   No urinary frequency or urgency but states that she has frequent UTIs, denies nausea vomiting,  or palpitations or  shortness of breath.  In the ED, patient was found to have sepsis 2/2 UTI. CTAP with concern for pyelonephritis. Pt denies CP, SOB, abdominal pain.  IN the ED  : Tm = 100.6  HR 78 BP = /50-77  Patient received about 3 L of IV and Ceftriaxone IV and admitted for further evaluation and treatment   MICU  has seen patient  for hypotension and patient is not considered a candidate .      Pyelonephritis.   - with SIRS --> Sepsis   - restart antihypertensive meds   - Ceftriaxone. Levoquin at DC.   - ID follow    Cxr with possible Pneumonia  - viral studies negative  - antibiotic    Arm superficial phlebitis resolved  - warm compress     Constipation.   - Bowel regimen. Lactulose.  - Pt admits to chronic constipation and only Milk of magnesia tablet works for her as she states --> we do not have it in the hospital as per RX  - GI follow    Hypothyroidism.   - Synthroid  - TSH    HLD (hyperlipidemia).   - Lovastatin changed to Atorvastatin.    Hiatal hernia.   - Large Hiatal hernia on CT --> fup with GI.    Primary hypertension.   - restart Coreg and Olmesartan   - Midodrine taper and DC    RAYMOND (acute kidney injury).   - Lowest Cr was <1 in 2023   - RAYMOND most likely in setting of infection cont with IVF   - avoid nephrotoxic agents.    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 No intervention    DVT prophylaxis    DCP home in am if stable.     d/w patient and ACP    Paulie Moore MD phone [phone] 
83F w/ DM2, HTN, HLD, OA, Hiatal Hernia , PUD,  Uterine Prolapse, Papillary thyroid CA, s/p thyroidectomy who presented with weakness/fatigue and  low grade fever at home and was seen by [facility] yesterday had negative COVID, sent home to take Tylenol for the fever, today felt  worst ,  and started to experience chest discomfort and increased weakness  so she came to hospital.   No urinary frequency or urgency but states that she has frequent UTIs, denies nausea vomiting,  or palpitations or  shortness of breath.  In the ED, patient was found to have sepsis 2/2 UTI. CTAP with concern for pyelonephritis. Pt denies CP, SOB, abdominal pain.  IN the ED  : Tm = 100.6  HR 78 BP = /50-77  Patient received about 3 L of IV and Ceftriaxone IV and admitted for further evaluation and treatment   MICU  has seen patient  for hypotension and patient is not considered a candidate .      Pyelonephritis.   - with SIRS --> Sepsis   - restart antihypertensive meds   - Ceftriaxone. Levoquin at DC.   - ID follow    Cxr with possible Pneumonia  - viral studies negative  - antibiotic    Arm superficial phlebitis resolved  - warm compress     Constipation.   - Bowel regimen. Lactulose.  - Pt admits to chronic constipation and only Milk of magnesia tablet works for her as she states --> we do not have it in the hospital as per RX  - GI follow    Hypothyroidism.   - Synthroid  - TSH    HLD (hyperlipidemia).   - Lovastatin changed to Atorvastatin.    Hiatal hernia.   - Large Hiatal hernia on CT --> fup with GI.    Primary hypertension.   - restart Coreg and Olmesartan   - Midodrine taper and DC    RAYMOND (acute kidney injury).   - Lowest Cr was <1 in 2023   - RAYMOND most likely in setting of infection cont with IVF   - avoid nephrotoxic agents.    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 No intervention    DVT prophylaxis    DCP home in am if stable.     DC home. Follow with PMD/ Cardiology/ Vascular Sx     d/w patient , daughter, consultant and ACP    Paulie Moore MD phone [phone]

## 2024-02-17 NOTE — PROGRESS NOTE ADULT - PROVIDER SPECIALTY LIST ADULT
Cardiology
Gastroenterology
Internal Medicine
Cardiology
Cardiology
Internal Medicine
Gastroenterology
Infectious Disease
Infectious Disease
Internal Medicine
Internal Medicine
Cardiology
Infectious Disease
Infectious Disease
Internal Medicine

## 2024-02-17 NOTE — PROGRESS NOTE ADULT - SUBJECTIVE AND OBJECTIVE BOX
Follow Up:  bacteremic pyelo    Interval History/ROS:  ambulating to bathroom  -- complains of occasional dry cough    Allergies  penicillin (Anaphylaxis)    ANTIMICROBIALS:  cefTRIAXone   IVPB    cefTRIAXone   IVPB 1000 every 24 hours      OTHER MEDS:  MEDICATIONS  (STANDING):  acetaminophen     Tablet .. 650 every 6 hours PRN  ALPRAZolam 1 at bedtime PRN  atorvastatin 20 at bedtime  bisacodyl 5 every 12 hours PRN  buPROPion . 100 two times a day  carvedilol 25 every 12 hours  dextrose 50% Injectable 25 once  dextrose 50% Injectable 25 once  dextrose 50% Injectable 12.5 once  dextrose Oral Gel 15 once PRN  enoxaparin Injectable 30 every 24 hours  glucagon  Injectable 1 once  insulin lispro (ADMELOG) corrective regimen sliding scale  three times a day before meals  levothyroxine 75 daily  losartan 100 daily  melatonin 3 at bedtime PRN  ondansetron Injectable 4 every 8 hours PRN  pantoprazole    Tablet 40 before breakfast  polyethylene glycol 3350 17 daily      Vital Signs Last 24 Hrs  T(C): 36.9 (16 Feb 2024 14:02), Max: 36.9 (16 Feb 2024 01:33)  T(F): 98.5 (16 Feb 2024 14:02), Max: 98.5 (16 Feb 2024 04:49)  HR: 68 (16 Feb 2024 15:40) (62 - 79)  BP: 155/93 (16 Feb 2024 15:40) (153/79 - 166/83)  BP(mean): --  RR: 18 (16 Feb 2024 14:02) (18 - 18)  SpO2: 96% (16 Feb 2024 15:40) (95% - 99%)    Parameters below as of 16 Feb 2024 15:40  Patient On (Oxygen Delivery Method): room air        PHYSICAL EXAM:  General:  NAD, Non-toxic  Neurology: A&Ox3, nonfocal  Respiratory: Clear to auscultation bilaterally  CV: RRR, S1S2, no murmurs, rubs or gallops  Abdominal: Soft, Non-tender, non-distended, normal bowel sounds  Extremities: No edema  Line Sites: Clear  Skin: No rash                        11.0   8.93  )-----------( 288      ( 16 Feb 2024 07:29 )             34.2   WBC Count: 8.93 (02-16 @ 07:29)  WBC Count: 8.75 (02-15 @ 06:56)  WBC Count: 12.00 (02-14 @ 07:18)  WBC Count: 15.33 (02-13 @ 07:39)  WBC Count: 20.80 (02-12 @ 12:42)  WBC Count: 13.90 (02-11 @ 23:18)    02-16    143  |  106  |  15  ----------------------------<  90  3.6   |  26  |  0.95  Creatinine: 0.95 (02-16 @ 07:30)    Creatinine: 1.10 (02-15 @ 06:54)    Creatinine: 1.31 (02-14 @ 07:17)    Creatinine: 1.56 (02-13 @ 07:38)    Creatinine: 1.40 (02-12 @ 12:42)    Creatinine: 1.37 (02-11 @ 23:18)      Ca    9.0      16 Feb 2024 07:30        MICROBIOLOGY:  .Blood Blood  02-14-24   No growth at 48 Hours  --  --      .Blood Blood-Peripheral  02-13-24   No growth at 72 Hours  --  --      Clean Catch Clean Catch (Midstream)  02-12-24   >100,000 CFU/ml Escherichia coli  <10,000 CFU/ml Normal Urogenital janiya present  --  Escherichia coli      .Blood Blood-Peripheral  02-11-24   Growth in aerobic and anaerobic bottles: Escherichia coli  See previous culture [id number]  --    Growth in aerobic and anaerobic bottles: Gram Negative Rods      .Blood Blood-Peripheral  02-11-24   Growth in aerobic and anaerobic bottles: Escherichia coli  Direct identification is available within approximately 3-5  hours either by Blood Panel Multiplexed PCR or Direct  MALDI-TOF. Details: [email or web address]  --  Blood Culture PCR  Escherichia coli        Rapid RVP Result: NotDetec (02-14 @ 14:49)        RADIOLOGY:  < from: Xray Chest 1 View AP/PA (02.14.24 @ 15:35) >  IMPRESSION:  Mild increase in left pleural effusion.  New bilateral lung opacities which may be due to pulmonary edema or to   underlying infection.    < end of copied text >      Archie Bell MD; Division of Infectious Disease; Pager: [phone]; nights and weekends: [phone]
CARDIOLOGY FOLLOW UP - Dr. Calle  DATE OF SERVICE: 2/15/24    CC  No CV complaints     REVIEW OF SYSTEMS:  CONSTITUTIONAL: No fever, weight loss, or fatigue  RESPIRATORY: No cough, wheezing, chills or hemoptysis; No Shortness of Breath  CARDIOVASCULAR: No chest pain, palpitations, passing out, dizziness, or leg swelling  GASTROINTESTINAL: No abdominal or epigastric pain. No nausea, vomiting, or hematemesis; No diarrhea or constipation. No melena or hematochezia.  VASCULAR: No edema     PHYSICAL EXAM:  T(C): 36.3 (02-15-24 @ 05:46), Max: 37.2 (02-14-24 @ 12:56)  HR: 65 (02-15-24 @ 05:46) (65 - 68)  BP: 136/85 (02-15-24 @ 05:46) (136/85 - 140/82)  RR: 18 (02-15-24 @ 05:46) (17 - 18)  SpO2: 96% (02-15-24 @ 05:46) (95% - 99%)  Wt(kg): --  I&O's Summary    14 Feb 2024 07:01  -  15 Feb 2024 07:00  --------------------------------------------------------  IN: 480 mL / OUT: 500 mL / NET: -20 mL        Appearance: Elderly female 	  Cardiovascular: Normal S1 S2,RRR, No JVD, No murmurs  Respiratory: Lungs clear to auscultation b/l   Gastrointestinal:  Soft, Non-tender, + BS	  Extremities: Normal range of motion, No clubbing, cyanosis or edema      Home Medications:  amitriptyline 25 mg oral tablet: 1 tab(s) orally once a day (12 Feb 2024 09:23)  ascorbic acid 500 mg oral tablet: 1 tab(s) orally 2 times a day (12 Feb 2024 09:22)  buPROPion 100 mg/12 hours (SR) oral tablet, extended release: 1 tab(s) orally 2 times a day (12 Feb 2024 09:22)  Caltrate 600 + D oral tablet: 1 tab(s) orally 2 times a day (12 Feb 2024 09:22)  Centrum Silver oral tablet: 1 tab(s) orally once a day (12 Feb 2024 09:22)  Coreg 25 mg oral tablet: 1 tab(s) orally 2 times a day NOTE: As per patients pharmacy, last filled July 2023. (12 Feb 2024 09:27)  DilTIAZem (Eqv-Cardizem CD) 120 mg/24 hours oral capsule, extended release: 1 cap(s) orally once a day (12 Feb 2024 09:27)  hydrALAZINE 50 mg oral tablet: 1 tab(s) orally 2 times a day (12 Feb 2024 09:27)  Laxative magnesium 500mg caplet: 1-2 caplets at night as needed (12 Feb 2024 09:27)  lidocaine 5% topical film: Apply topically to affected area 3 times a day as needed for pain as per pt (12 Feb 2024 09:27)  lovastatin 20 mg oral tablet: 1 tab(s) orally once a day (12 Feb 2024 09:27)  olmesartan 40 mg oral tablet: 1 tab(s) orally once a day (12 Feb 2024 09:27)  omeprazole 20 mg oral delayed release capsule: 1 cap(s) orally once a day (12 Feb 2024 09:27)  Synthroid 75 mcg (0.075 mg) oral tablet: 1 tab(s) orally once a day monday through saturday and 1.5 tablets on sunday. (12 Feb 2024 09:27)  Tylenol Extra Strength 500 mg oral tablet: 1 tab(s) orally as needed for pain (12 Feb 2024 09:27)  Xanax 1 mg oral tablet: 1 tab(s) orally once a day (at bedtime) note: pharmacy has 1-2 prn. (12 Feb 2024 09:27)      MEDICATIONS  (STANDING):  atorvastatin 20 milliGRAM(s) Oral at bedtime  buPROPion . 100 milliGRAM(s) Oral two times a day  calcium carbonate 1250 mG  + Vitamin D (OsCal 500 + D) 1 Tablet(s) Oral daily  cefTRIAXone   IVPB 1000 milliGRAM(s) IV Intermittent every 24 hours  cefTRIAXone   IVPB      dextrose 5%. 1000 milliLiter(s) (50 mL/Hr) IV Continuous <Continuous>  dextrose 5%. 1000 milliLiter(s) (100 mL/Hr) IV Continuous <Continuous>  dextrose 50% Injectable 12.5 Gram(s) IV Push once  dextrose 50% Injectable 25 Gram(s) IV Push once  dextrose 50% Injectable 25 Gram(s) IV Push once  enoxaparin Injectable 30 milliGRAM(s) SubCutaneous every 24 hours  glucagon  Injectable 1 milliGRAM(s) IntraMuscular once  insulin lispro (ADMELOG) corrective regimen sliding scale   SubCutaneous three times a day before meals  levothyroxine 75 MICROGram(s) Oral daily  midodrine. 10 milliGRAM(s) Oral every 8 hours  multivitamin/minerals 1 Tablet(s) Oral daily  pantoprazole    Tablet 40 milliGRAM(s) Oral before breakfast  polyethylene glycol 3350 17 Gram(s) Oral daily      TELEMETRY: 	    ECG:  	  RADIOLOGY:   DIAGNOSTIC TESTING:  [ ] Echocardiogram:  [ ]  Catheterization:  [ ] Stress Test:    OTHER: 	    LABS:	 	    Troponin T, High Sensitivity Result: 19 ng/L [0 - 51] (02-11 @ 23:18)                          10.1   8.75  )-----------( 231      ( 15 Feb 2024 06:56 )             31.5     02-15    141  |  108  |  20  ----------------------------<  100<H>  3.5   |  23  |  1.10    Ca    8.2<L>      15 Feb 2024 06:54    TPro  6.1  /  Alb  2.8<L>  /  TBili  0.2  /  DBili  x   /  AST  26  /  ALT  29  /  AlkPhos  105  02-14            
CARDIOLOGY FOLLOW UP - Dr. Calle  DATE OF SERVICE: 2/16/24    CC no cp or sob       REVIEW OF SYSTEMS:  CONSTITUTIONAL: No fever, weight loss, or fatigue  RESPIRATORY: No cough, wheezing, chills or hemoptysis; No Shortness of Breath  CARDIOVASCULAR: No chest pain, palpitations, passing out, dizziness, or leg swelling  GASTROINTESTINAL: No abdominal or epigastric pain. No nausea, vomiting, or hematemesis; No diarrhea or constipation. No melena or hematochezia.  VASCULAR: No edema     PHYSICAL EXAM:  T(C): 36.9 (02-16-24 @ 04:49), Max: 37.2 (02-15-24 @ 17:23)  HR: 68 (02-16-24 @ 04:49) (62 - 79)  BP: 166/83 (02-16-24 @ 04:49) (153/81 - 166/83)  RR: 18 (02-16-24 @ 04:49) (18 - 18)  SpO2: 95% (02-16-24 @ 04:49) (95% - 99%)  Wt(kg): --  I&O's Summary    15 Feb 2024 07:01  -  16 Feb 2024 07:00  --------------------------------------------------------  IN: 480 mL / OUT: 0 mL / NET: 480 mL        Appearance: Normal	  Cardiovascular: Normal S1 S2,RRR, No JVD, No murmurs  Respiratory: Lungs clear to auscultation	  Gastrointestinal:  Soft, Non-tender, + BS	  Extremities: Normal range of motion, No clubbing, cyanosis or edema      Home Medications:  amitriptyline 25 mg oral tablet: 1 tab(s) orally once a day (12 Feb 2024 09:23)  ascorbic acid 500 mg oral tablet: 1 tab(s) orally 2 times a day (12 Feb 2024 09:22)  buPROPion 100 mg/12 hours (SR) oral tablet, extended release: 1 tab(s) orally 2 times a day (12 Feb 2024 09:22)  Caltrate 600 + D oral tablet: 1 tab(s) orally 2 times a day (12 Feb 2024 09:22)  Centrum Silver oral tablet: 1 tab(s) orally once a day (12 Feb 2024 09:22)  Coreg 25 mg oral tablet: 1 tab(s) orally 2 times a day NOTE: As per patients pharmacy, last filled July 2023. (12 Feb 2024 09:27)  DilTIAZem (Eqv-Cardizem CD) 120 mg/24 hours oral capsule, extended release: 1 cap(s) orally once a day (12 Feb 2024 09:27)  hydrALAZINE 50 mg oral tablet: 1 tab(s) orally 2 times a day (12 Feb 2024 09:27)  Laxative magnesium 500mg caplet: 1-2 caplets at night as needed (12 Feb 2024 09:27)  lidocaine 5% topical film: Apply topically to affected area 3 times a day as needed for pain as per pt (12 Feb 2024 09:27)  lovastatin 20 mg oral tablet: 1 tab(s) orally once a day (12 Feb 2024 09:27)  olmesartan 40 mg oral tablet: 1 tab(s) orally once a day (12 Feb 2024 09:27)  omeprazole 20 mg oral delayed release capsule: 1 cap(s) orally once a day (12 Feb 2024 09:27)  Synthroid 75 mcg (0.075 mg) oral tablet: 1 tab(s) orally once a day monday through saturday and 1.5 tablets on sunday. (12 Feb 2024 09:27)  Tylenol Extra Strength 500 mg oral tablet: 1 tab(s) orally as needed for pain (12 Feb 2024 09:27)  Xanax 1 mg oral tablet: 1 tab(s) orally once a day (at bedtime) note: pharmacy has 1-2 prn. (12 Feb 2024 09:27)      MEDICATIONS  (STANDING):  atorvastatin 20 milliGRAM(s) Oral at bedtime  buPROPion . 100 milliGRAM(s) Oral two times a day  calcium carbonate 1250 mG  + Vitamin D (OsCal 500 + D) 1 Tablet(s) Oral daily  cefTRIAXone   IVPB      cefTRIAXone   IVPB 1000 milliGRAM(s) IV Intermittent every 24 hours  dextrose 5%. 1000 milliLiter(s) (50 mL/Hr) IV Continuous <Continuous>  dextrose 5%. 1000 milliLiter(s) (100 mL/Hr) IV Continuous <Continuous>  dextrose 50% Injectable 12.5 Gram(s) IV Push once  dextrose 50% Injectable 25 Gram(s) IV Push once  dextrose 50% Injectable 25 Gram(s) IV Push once  enoxaparin Injectable 30 milliGRAM(s) SubCutaneous every 24 hours  glucagon  Injectable 1 milliGRAM(s) IntraMuscular once  insulin lispro (ADMELOG) corrective regimen sliding scale   SubCutaneous three times a day before meals  levothyroxine 75 MICROGram(s) Oral daily  lidocaine   4% Patch 1 Patch Transdermal every 24 hours  multivitamin/minerals 1 Tablet(s) Oral daily  pantoprazole    Tablet 40 milliGRAM(s) Oral before breakfast  polyethylene glycol 3350 17 Gram(s) Oral daily      TELEMETRY: 	    ECG:  	  RADIOLOGY:   DIAGNOSTIC TESTING:  [ ] Echocardiogram:  [ ]  Catheterization:  [ ] Stress Test:    OTHER: 	    LABS:	 	    Troponin T, High Sensitivity Result: 19 ng/L [0 - 51] (02-11 @ 23:18)                          11.0   8.93  )-----------( 288      ( 16 Feb 2024 07:29 )             34.2     02-16    143  |  106  |  15  ----------------------------<  90  3.6   |  26  |  0.95    Ca    9.0      16 Feb 2024 07:30              
Patient is a 83y old  Female who presents with a chief complaint of Weakness and fatigue (13 Feb 2024 16:28)      SUBJECTIVE / OVERNIGHT EVENTS: feels better. Less abdominal pain.   Review of Systems  chest pain no  palpitations no  sob no  nausea no  headache no    MEDICATIONS  (STANDING):  atorvastatin 20 milliGRAM(s) Oral at bedtime  buPROPion . 100 milliGRAM(s) Oral two times a day  calcium carbonate 1250 mG  + Vitamin D (OsCal 500 + D) 1 Tablet(s) Oral daily  cefepime   IVPB 1000 milliGRAM(s) IV Intermittent every 24 hours  cefepime   IVPB      dextrose 5%. 1000 milliLiter(s) (50 mL/Hr) IV Continuous <Continuous>  dextrose 5%. 1000 milliLiter(s) (100 mL/Hr) IV Continuous <Continuous>  dextrose 50% Injectable 12.5 Gram(s) IV Push once  dextrose 50% Injectable 25 Gram(s) IV Push once  dextrose 50% Injectable 25 Gram(s) IV Push once  enoxaparin Injectable 30 milliGRAM(s) SubCutaneous every 24 hours  glucagon  Injectable 1 milliGRAM(s) IntraMuscular once  insulin lispro (ADMELOG) corrective regimen sliding scale   SubCutaneous three times a day before meals  lactulose Syrup 20 Gram(s) Oral two times a day  levothyroxine 75 MICROGram(s) Oral daily  midodrine. 10 milliGRAM(s) Oral every 8 hours  multivitamin/minerals 1 Tablet(s) Oral daily  pantoprazole    Tablet 40 milliGRAM(s) Oral before breakfast  polyethylene glycol 3350 17 Gram(s) Oral daily    MEDICATIONS  (PRN):  acetaminophen     Tablet .. 650 milliGRAM(s) Oral every 6 hours PRN Temp greater or equal to 38C (100.4F), Mild Pain (1 - 3)  ALPRAZolam 1 milliGRAM(s) Oral at bedtime PRN ANxiety and Insomnia  bisacodyl 5 milliGRAM(s) Oral every 12 hours PRN Constipation  dextrose Oral Gel 15 Gram(s) Oral once PRN Blood Glucose LESS THAN 70 milliGRAM(s)/deciliter  melatonin 3 milliGRAM(s) Oral at bedtime PRN Insomnia  ondansetron Injectable 4 milliGRAM(s) IV Push every 8 hours PRN Nausea and/or Vomiting      Vital Signs Last 24 Hrs  T(C): 37.2 (13 Feb 2024 12:26), Max: 37.4 (12 Feb 2024 18:55)  T(F): 98.9 (13 Feb 2024 12:26), Max: 99.3 (12 Feb 2024 18:55)  HR: 68 (13 Feb 2024 15:16) (65 - 80)  BP: 149/82 (13 Feb 2024 15:16) (104/68 - 149/82)  BP(mean): --  RR: 18 (13 Feb 2024 12:26) (18 - 18)  SpO2: 97% (13 Feb 2024 12:26) (97% - 99%)    Parameters below as of 13 Feb 2024 12:26  Patient On (Oxygen Delivery Method): nasal cannula  O2 Flow (L/min): 2      PHYSICAL EXAM:  GENERAL: NAD, well-developed  HEAD:  Atraumatic, Normocephalic  EYES: EOMI, PERRLA, conjunctiva and sclera clear  NECK: Supple, No JVD  CHEST/LUNG: Clear to auscultation bilaterally; No wheeze  HEART: Regular rate and rhythm; No murmurs, rubs, or gallops  ABDOMEN: Soft, Nontender, Nondistended; Bowel sounds present  EXTREMITIES:  2+ Peripheral Pulses, No clubbing, cyanosis, or edema  PSYCH: AAOx3  NEUROLOGY: non-focal  SKIN: No rashes or lesions    LABS:                        10.8   15.33 )-----------( 205      ( 13 Feb 2024 07:39 )             34.6     02-13    138  |  105  |  24<H>  ----------------------------<  93  3.6   |  19<L>  |  1.56<H>    Ca    8.6      13 Feb 2024 07:38    TPro  6.3  /  Alb  3.0<L>  /  TBili  0.3  /  DBili  x   /  AST  32  /  ALT  36  /  AlkPhos  102  02-13    PT/INR - ( 11 Feb 2024 23:18 )   PT: 14.8 sec;   INR: 1.36 ratio         PTT - ( 11 Feb 2024 23:18 )  PTT:28.4 sec      Urinalysis Basic - ( 13 Feb 2024 07:38 )    Color: x / Appearance: x / SG: x / pH: x  Gluc: 93 mg/dL / Ketone: x  / Bili: x / Urobili: x   Blood: x / Protein: x / Nitrite: x   Leuk Esterase: x / RBC: x / WBC x   Sq Epi: x / Non Sq Epi: x / Bacteria: x        Culture - Urine (collected 12 Feb 2024 01:14)  Source: Clean Catch Clean Catch (Midstream)  Preliminary Report (13 Feb 2024 08:07):    >100,000 CFU/ml Escherichia coli    <10,000 CFU/ml Normal Urogenital janiya present    Culture - Blood (collected 11 Feb 2024 23:05)  Source: .Blood Blood-Peripheral  Gram Stain (12 Feb 2024 19:05):    Growth in aerobic and anaerobic bottles: Gram Negative Rods  Preliminary Report (13 Feb 2024 12:55):    Growth in aerobic and anaerobic bottles: Escherichia coli    See previous culture [id number]    Culture - Blood (collected 11 Feb 2024 22:50)  Source: .Blood Blood-Peripheral  Gram Stain (12 Feb 2024 19:02):    Aerobic and Anaerobic Bottle: Gram Negative Rods  Preliminary Report (13 Feb 2024 12:52):    Growth in aerobic and anaerobic bottles: Escherichia coli    Direct identification is available within approximately 3-5    hours either by Blood Panel Multiplexed PCR or Direct    MALDI-TOF. Details: [email or web address]  Organism: Blood Culture PCR (12 Feb 2024 20:45)  Organism: Blood Culture PCR (12 Feb 2024 20:45)        RADIOLOGY & ADDITIONAL TESTS:    Imaging Personally Reviewed:    Consultant(s) Notes Reviewed:      Care Discussed with Consultants/Other Providers:  
Patient is a 83y old  Female who presents with a chief complaint of Weakness and fatigue (16 Feb 2024 18:21)      SUBJECTIVE / OVERNIGHT EVENTS: Comfortable without new complaints.   Review of Systems  chest pain no  palpitations no  sob no  nausea no  headache no    MEDICATIONS  (STANDING):  atorvastatin 20 milliGRAM(s) Oral at bedtime  buPROPion . 100 milliGRAM(s) Oral two times a day  calcium carbonate 1250 mG  + Vitamin D (OsCal 500 + D) 1 Tablet(s) Oral daily  carvedilol 25 milliGRAM(s) Oral every 12 hours  cefTRIAXone   IVPB 1000 milliGRAM(s) IV Intermittent every 24 hours  cefTRIAXone   IVPB      dextrose 5%. 1000 milliLiter(s) (100 mL/Hr) IV Continuous <Continuous>  dextrose 5%. 1000 milliLiter(s) (50 mL/Hr) IV Continuous <Continuous>  dextrose 50% Injectable 25 Gram(s) IV Push once  dextrose 50% Injectable 25 Gram(s) IV Push once  dextrose 50% Injectable 12.5 Gram(s) IV Push once  enoxaparin Injectable 30 milliGRAM(s) SubCutaneous every 24 hours  glucagon  Injectable 1 milliGRAM(s) IntraMuscular once  insulin lispro (ADMELOG) corrective regimen sliding scale   SubCutaneous three times a day before meals  levothyroxine 75 MICROGram(s) Oral daily  lidocaine   4% Patch 1 Patch Transdermal every 24 hours  losartan 100 milliGRAM(s) Oral daily  multivitamin/minerals 1 Tablet(s) Oral daily  pantoprazole    Tablet 40 milliGRAM(s) Oral before breakfast  polyethylene glycol 3350 17 Gram(s) Oral daily    MEDICATIONS  (PRN):  acetaminophen     Tablet .. 650 milliGRAM(s) Oral every 6 hours PRN Temp greater or equal to 38C (100.4F), Mild Pain (1 - 3)  ALPRAZolam 1 milliGRAM(s) Oral at bedtime PRN ANxiety and Insomnia  bisacodyl 5 milliGRAM(s) Oral every 12 hours PRN Constipation  dextrose Oral Gel 15 Gram(s) Oral once PRN Blood Glucose LESS THAN 70 milliGRAM(s)/deciliter  melatonin 3 milliGRAM(s) Oral at bedtime PRN Insomnia  ondansetron Injectable 4 milliGRAM(s) IV Push every 8 hours PRN Nausea and/or Vomiting      Vital Signs Last 24 Hrs  T(C): 36.7 (16 Feb 2024 20:23), Max: 36.9 (16 Feb 2024 01:33)  T(F): 98.1 (16 Feb 2024 20:23), Max: 98.5 (16 Feb 2024 04:49)  HR: 69 (16 Feb 2024 20:23) (62 - 79)  BP: 153/69 (16 Feb 2024 20:23) (147/82 - 172/104)  BP(mean): --  RR: 18 (16 Feb 2024 20:23) (18 - 18)  SpO2: 96% (16 Feb 2024 20:23) (95% - 99%)    Parameters below as of 16 Feb 2024 20:23  Patient On (Oxygen Delivery Method): room air        PHYSICAL EXAM:  GENERAL: NAD  HEAD:  Atraumatic, Normocephalic  EYES: EOMI, PERRLA, conjunctiva and sclera clear  NECK: Supple, No JVD  CHEST/LUNG: Clear to auscultation bilaterally; No wheeze  HEART: Regular rate and rhythm; No murmurs, rubs, or gallops  ABDOMEN: Soft, Nontender, Nondistended; Bowel sounds present  EXTREMITIES:  2+ Peripheral Pulses, No clubbing, cyanosis, or edema  PSYCH: AAOx3  NEUROLOGY: non-focal  SKIN: No rashes or lesions    LABS:                        11.0   8.93  )-----------( 288      ( 16 Feb 2024 07:29 )             34.2     02-16    143  |  106  |  15  ----------------------------<  90  3.6   |  26  |  0.95    Ca    9.0      16 Feb 2024 07:30            Urinalysis Basic - ( 16 Feb 2024 07:30 )    Color: x / Appearance: x / SG: x / pH: x  Gluc: 90 mg/dL / Ketone: x  / Bili: x / Urobili: x   Blood: x / Protein: x / Nitrite: x   Leuk Esterase: x / RBC: x / WBC x   Sq Epi: x / Non Sq Epi: x / Bacteria: x        Culture - Blood (collected 14 Feb 2024 07:17)  Source: .Blood Blood-Venous  Preliminary Report (16 Feb 2024 10:01):    No growth at 48 Hours    Culture - Blood (collected 14 Feb 2024 07:17)  Source: .Blood Blood  Preliminary Report (16 Feb 2024 10:01):    No growth at 48 Hours        RADIOLOGY & ADDITIONAL TESTS:    Imaging Personally Reviewed:    Consultant(s) Notes Reviewed:      Care Discussed with Consultants/Other Providers:  
  Follow Up:  E coli bacteremic pyelo/cystitis    Interval History/ROS:  bad night last night  with coughing - had low grade fever    Allergies  penicillin (Anaphylaxis)    ANTIMICROBIALS:  cefTRIAXone   IVPB        OTHER MEDS:  MEDICATIONS  (STANDING):  acetaminophen     Tablet .. 650 every 6 hours PRN  ALPRAZolam 1 at bedtime PRN  atorvastatin 20 at bedtime  bisacodyl 5 every 12 hours PRN  buPROPion . 100 two times a day  dextrose 50% Injectable 25 once  dextrose 50% Injectable 25 once  dextrose 50% Injectable 12.5 once  dextrose Oral Gel 15 once PRN  enoxaparin Injectable 30 every 24 hours  glucagon  Injectable 1 once  insulin lispro (ADMELOG) corrective regimen sliding scale  three times a day before meals  levothyroxine 75 daily  melatonin 3 at bedtime PRN  midodrine. 10 every 8 hours  ondansetron Injectable 4 every 8 hours PRN  pantoprazole    Tablet 40 before breakfast  polyethylene glycol 3350 17 daily      Vital Signs Last 24 Hrs  T(C): 37.2 (14 Feb 2024 12:56), Max: 37.9 (13 Feb 2024 21:07)  T(F): 99 (14 Feb 2024 12:56), Max: 100.3 (13 Feb 2024 21:07)  HR: 67 (14 Feb 2024 12:56) (56 - 87)  BP: 140/82 (14 Feb 2024 12:56) (118/72 - 169/81)  BP(mean): --  RR: 18 (14 Feb 2024 12:56) (18 - 18)  SpO2: 99% (14 Feb 2024 12:56) (92% - 99%)    Parameters below as of 14 Feb 2024 12:56  Patient On (Oxygen Delivery Method): nasal cannula  O2 Flow (L/min): 2      PHYSICAL EXAM:  General: WN/WD NAD, Non-toxic  Neurology: A&Ox3, nonfocal  Respiratory: basilar crackles bilaterally  CV: RRR, S1S2, no murmurs, rubs or gallops  Abdominal: Soft, Non-tender, non-distended, normal bowel sounds  Extremities: No edema,   Line Sites: Clear  Skin: No rash                          10.5   12.00 )-----------( 224      ( 14 Feb 2024 07:18 )             32.5   WBC Count: 12.00 (02-14 @ 07:18)  WBC Count: 15.33 (02-13 @ 07:39)  WBC Count: 20.80 (02-12 @ 12:42)  WBC Count: 13.90 (02-11 @ 23:18)    02-14    139  |  107  |  21  ----------------------------<  104<H>  3.5   |  21<L>  |  1.31<H>  Creatinine: 1.31 (02-14 @ 07:17)    Creatinine: 1.56 (02-13 @ 07:38)    Creatinine: 1.40 (02-12 @ 12:42)    Creatinine: 1.37 (02-11 @ 23:18)    Ca    8.9      14 Feb 2024 07:17    TPro  6.1  /  Alb  2.8<L>  /  TBili  0.2  /  DBili  x   /  AST  26  /  ALT  29  /  AlkPhos  105  02-14    Urine Microscopic-Add On (NC) (02.12.24 @ 01:13)   White Blood Cell - Urine: >998 /HPF  Red Blood Cell - Urine: 1 /HPF  Bacteria: Many /HPF  Review: Reviewed  Cast: 32 /LPF  Epithelial Cells: 14 /HPF    MICROBIOLOGY:  .Blood Blood-Peripheral  02-13-24   No growth at 24 hours  --  --      Clean Catch Clean Catch (Midstream)  02-12-24   >100,000 CFU/ml Escherichia coli  <10,000 CFU/ml Normal Urogenital janiya present  --  Escherichia coli      .Blood Blood-Peripheral  02-11-24   Growth in aerobic and anaerobic bottles: Escherichia coli  See previous culture [id number]  --    Growth in aerobic and anaerobic bottles: Gram Negative Rods      .Blood Blood-Peripheral  02-11-24   Growth in aerobic and anaerobic bottles: Escherichia coli  Direct identification is available within approximately 3-5  hours either by Blood Panel Multiplexed PCR or Direct  MALDI-TOF. Details: [email or web address]  --  Blood Culture PCR  Escherichia coli    Rapid RVP Result: Pj (02-14 @ 14:49)      RADIOLOGY:  < from: Xray Chest 1 View AP/PA (02.14.24 @ 15:35) >  FINDINGS:  Surgical clips overlie the mid, upper thorax.  Difficult to assess heart size on this AP projection.  Similar, mild right hemidiaphragmatic elevation.  New patchiness and bilateral perihilar bronchovascular interstitial   prominence (since prior exam), may be due to pulmonary edema or to   underlying infection.  Interval mild increase in small left pleural effusion.  No pneumothorax.  No acute bony finding    IMPRESSION:  Mild increase in left pleural effusion.  New bilateral lung opacities which may be due to pulmonary edema or to   underlying infection.    < end of copied text >    < from: TTE W or WO Ultrasound Enhancing Agent (02.13.24 @ 08:57) >  CONCLUSIONS:      1. Left ventricular systolic function is normal with an ejection fraction of 68 % by Marroquin's method of disks.   2. Left ventricular global longitudinal strain is -23.1 % which is normal (< -18%). Images were acquired on a [employer] ultrasound system and processed on the ultrasound machine with a heart rate of 69 bpm and a blood pressure of 125/71 mmHg.   3. There is moderate (grade 2) left ventricular diastolic dysfunction.   4. Normal rightventricular cavity size, wall thickness, and normal systolic function.   5. The left atrium is moderately dilated.   6. The right atrium is dilated.   7. Moderate mitral regurgitation.   8. Mild to moderate aortic regurgitation.   9. Mild tricuspid regurgitation.  10. No prior echocardiogram is available for comparison.    < end of copied text >    Archie Bell MD; Division of Infectious Disease; Pager: [phone]; nights and weekends: [phone]
CARDIOLOGY FOLLOW UP - Dr. Calle  DATE OF SERVICE: 2/14/24    CC  No CV complaints     REVIEW OF SYSTEMS:  CONSTITUTIONAL: No fever, weight loss, or fatigue  RESPIRATORY: No cough, wheezing, chills or hemoptysis; No Shortness of Breath  CARDIOVASCULAR: No chest pain, palpitations, passing out, dizziness, or leg swelling  GASTROINTESTINAL: No abdominal or epigastric pain. No nausea, vomiting, or hematemesis; No diarrhea or constipation. No melena or hematochezia.  VASCULAR: No edema     PHYSICAL EXAM:  T(C): 37.2 (02-14-24 @ 12:56), Max: 37.9 (02-13-24 @ 21:07)  HR: 67 (02-14-24 @ 12:56) (56 - 87)  BP: 140/82 (02-14-24 @ 12:56) (118/72 - 169/81)  RR: 18 (02-14-24 @ 12:56) (18 - 18)  SpO2: 99% (02-14-24 @ 12:56) (92% - 99%)  Wt(kg): --  I&O's Summary    13 Feb 2024 07:01  -  14 Feb 2024 07:00  --------------------------------------------------------  IN: 480 mL / OUT: 0 mL / NET: 480 mL    14 Feb 2024 07:01  -  14 Feb 2024 14:03  --------------------------------------------------------  IN: 240 mL / OUT: 0 mL / NET: 240 mL        Appearance: Normal	  Cardiovascular: Normal S1 S2,RRR, No JVD, No murmurs  Respiratory: Lungs clear to auscultation b/l  Gastrointestinal:  Soft, Non-tender, + BS	  Extremities: Normal range of motion, No clubbing, cyanosis or edema      Home Medications:  amitriptyline 25 mg oral tablet: 1 tab(s) orally once a day (12 Feb 2024 09:23)  ascorbic acid 500 mg oral tablet: 1 tab(s) orally 2 times a day (12 Feb 2024 09:22)  buPROPion 100 mg/12 hours (SR) oral tablet, extended release: 1 tab(s) orally 2 times a day (12 Feb 2024 09:22)  Caltrate 600 + D oral tablet: 1 tab(s) orally 2 times a day (12 Feb 2024 09:22)  Centrum Silver oral tablet: 1 tab(s) orally once a day (12 Feb 2024 09:22)  Coreg 25 mg oral tablet: 1 tab(s) orally 2 times a day NOTE: As per patients pharmacy, last filled July 2023. (12 Feb 2024 09:27)  DilTIAZem (Eqv-Cardizem CD) 120 mg/24 hours oral capsule, extended release: 1 cap(s) orally once a day (12 Feb 2024 09:27)  hydrALAZINE 50 mg oral tablet: 1 tab(s) orally 2 times a day (12 Feb 2024 09:27)  Laxative magnesium 500mg caplet: 1-2 caplets at night as needed (12 Feb 2024 09:27)  lidocaine 5% topical film: Apply topically to affected area 3 times a day as needed for pain as per pt (12 Feb 2024 09:27)  lovastatin 20 mg oral tablet: 1 tab(s) orally once a day (12 Feb 2024 09:27)  olmesartan 40 mg oral tablet: 1 tab(s) orally once a day (12 Feb 2024 09:27)  omeprazole 20 mg oral delayed release capsule: 1 cap(s) orally once a day (12 Feb 2024 09:27)  Synthroid 75 mcg (0.075 mg) oral tablet: 1 tab(s) orally once a day monday through saturday and 1.5 tablets on sunday. (12 Feb 2024 09:27)  Tylenol Extra Strength 500 mg oral tablet: 1 tab(s) orally as needed for pain (12 Feb 2024 09:27)  Xanax 1 mg oral tablet: 1 tab(s) orally once a day (at bedtime) note: pharmacy has 1-2 prn. (12 Feb 2024 09:27)      MEDICATIONS  (STANDING):  atorvastatin 20 milliGRAM(s) Oral at bedtime  buPROPion . 100 milliGRAM(s) Oral two times a day  calcium carbonate 1250 mG  + Vitamin D (OsCal 500 + D) 1 Tablet(s) Oral daily  cefTRIAXone   IVPB      cefTRIAXone   IVPB 1000 milliGRAM(s) IV Intermittent once  dextrose 5%. 1000 milliLiter(s) (50 mL/Hr) IV Continuous <Continuous>  dextrose 5%. 1000 milliLiter(s) (100 mL/Hr) IV Continuous <Continuous>  dextrose 50% Injectable 12.5 Gram(s) IV Push once  dextrose 50% Injectable 25 Gram(s) IV Push once  dextrose 50% Injectable 25 Gram(s) IV Push once  enoxaparin Injectable 30 milliGRAM(s) SubCutaneous every 24 hours  glucagon  Injectable 1 milliGRAM(s) IntraMuscular once  insulin lispro (ADMELOG) corrective regimen sliding scale   SubCutaneous three times a day before meals  lactulose Syrup 20 Gram(s) Oral two times a day  levothyroxine 75 MICROGram(s) Oral daily  midodrine. 10 milliGRAM(s) Oral every 8 hours  multivitamin/minerals 1 Tablet(s) Oral daily  pantoprazole    Tablet 40 milliGRAM(s) Oral before breakfast  polyethylene glycol 3350 17 Gram(s) Oral daily      TELEMETRY: 	    ECG:  	  RADIOLOGY:   DIAGNOSTIC TESTING:  [ ] Echocardiogram:  < from: TTE W or WO Ultrasound Enhancing Agent (02.13.24 @ 08:57) >     CONCLUSIONS:      1. Left ventricular systolic function is normal with an ejection fraction of 68 % by Marroquin's method of disks.   2. Left ventricular global longitudinal strain is -23.1 % which is normal (< -18%). Images were acquired on a [employer] ultrasound system and processed on the ultrasound machine with a heart rate of 69 bpm and a blood pressure of 125/71 mmHg.   3. There is moderate (grade 2) left ventricular diastolic dysfunction.   4. Normal rightventricular cavity size, wall thickness, and normal systolic function.   5. The left atrium is moderately dilated.   6. The right atrium is dilated.   7. Moderate mitral regurgitation.   8. Mild to moderate aortic regurgitation.   9. Mild tricuspid regurgitation.  10. No prior echocardiogram is available for comparison.    < end of copied text >    [ ]  Catheterization:  [ ] Stress Test:    OTHER: 	    LABS:	 	    Troponin T, High Sensitivity Result: 19 ng/L [0 - 51] (02-11 @ 23:18)                          10.5   12.00 )-----------( 224      ( 14 Feb 2024 07:18 )             32.5     02-14    139  |  107  |  21  ----------------------------<  104<H>  3.5   |  21<L>  |  1.31<H>    Ca    8.9      14 Feb 2024 07:17    TPro  6.1  /  Alb  2.8<L>  /  TBili  0.2  /  DBili  x   /  AST  26  /  ALT  29  /  AlkPhos  105  02-14            
[facility]  Roc Ziegler PA-C  [address] [place] [address]  [phone]      INTERVAL HPI/OVERNIGHT EVENTS:      no new events    MEDICATIONS  (STANDING):  atorvastatin 20 milliGRAM(s) Oral at bedtime  buPROPion . 100 milliGRAM(s) Oral two times a day  calcium carbonate 1250 mG  + Vitamin D (OsCal 500 + D) 1 Tablet(s) Oral daily  cefTRIAXone   IVPB 1000 milliGRAM(s) IV Intermittent once  cefTRIAXone   IVPB      dextrose 5%. 1000 milliLiter(s) (100 mL/Hr) IV Continuous <Continuous>  dextrose 5%. 1000 milliLiter(s) (50 mL/Hr) IV Continuous <Continuous>  dextrose 50% Injectable 25 Gram(s) IV Push once  dextrose 50% Injectable 12.5 Gram(s) IV Push once  dextrose 50% Injectable 25 Gram(s) IV Push once  enoxaparin Injectable 30 milliGRAM(s) SubCutaneous every 24 hours  glucagon  Injectable 1 milliGRAM(s) IntraMuscular once  insulin lispro (ADMELOG) corrective regimen sliding scale   SubCutaneous three times a day before meals  levothyroxine 75 MICROGram(s) Oral daily  midodrine. 10 milliGRAM(s) Oral every 8 hours  multivitamin/minerals 1 Tablet(s) Oral daily  pantoprazole    Tablet 40 milliGRAM(s) Oral before breakfast  polyethylene glycol 3350 17 Gram(s) Oral daily    MEDICATIONS  (PRN):  acetaminophen     Tablet .. 650 milliGRAM(s) Oral every 6 hours PRN Temp greater or equal to 38C (100.4F), Mild Pain (1 - 3)  ALPRAZolam 1 milliGRAM(s) Oral at bedtime PRN ANxiety and Insomnia  bisacodyl 5 milliGRAM(s) Oral every 12 hours PRN Constipation  dextrose Oral Gel 15 Gram(s) Oral once PRN Blood Glucose LESS THAN 70 milliGRAM(s)/deciliter  melatonin 3 milliGRAM(s) Oral at bedtime PRN Insomnia  ondansetron Injectable 4 milliGRAM(s) IV Push every 8 hours PRN Nausea and/or Vomiting      Allergies    penicillin (Anaphylaxis)    Intolerances        ROS:   General:  No  fevers, chills, night sweats, fatigue,   Eyes:  Good vision, no reported pain  ENT:  No sore throat, pain, runny nose, dysphagia  CV:  No pain, palpitations, hypo/hypertension  Resp:  No dyspnea, cough, tachypnea, wheezing  GI:  No pain, No nausea, No vomiting, No diarrhea, No constipation, No weight loss, No fever, No pruritis, No rectal bleeding, No tarry stools, No dysphagia,  :  No pain, bleeding, incontinence, nocturia  Muscle:  No pain, weakness  Neuro:  No weakness, tingling, memory problems  Psych:  No fatigue, insomnia, mood problems, depression  Endocrine:  No polyuria, polydipsia, cold/heat intolerance  Heme:  No petechiae, ecchymosis, easy bruisability  Skin:  No rash, tattoos, scars, edema      PHYSICAL EXAM:   Vital Signs:  Vital Signs Last 24 Hrs  T(C): 37.2 (14 Feb 2024 12:56), Max: 37.9 (13 Feb 2024 21:07)  T(F): 99 (14 Feb 2024 12:56), Max: 100.3 (13 Feb 2024 21:07)  HR: 67 (14 Feb 2024 12:56) (56 - 87)  BP: 140/82 (14 Feb 2024 12:56) (118/72 - 169/81)  BP(mean): --  RR: 18 (14 Feb 2024 12:56) (18 - 18)  SpO2: 99% (14 Feb 2024 12:56) (92% - 99%)    Parameters below as of 14 Feb 2024 12:56  Patient On (Oxygen Delivery Method): nasal cannula  O2 Flow (L/min): 2    Daily     Daily     GENERAL:  Appears stated age,   HEENT:  NC/AT,    CHEST:  Full & symmetric excursion,   HEART:  Regular rhythm,  ABDOMEN:  Soft, non-tender, non-distended,  EXTEREMITIES:  no cyanosis  SKIN:  No rash  NEURO:  Alert,       LABS:                        10.5   12.00 )-----------( 224      ( 14 Feb 2024 07:18 )             32.5     02-14    139  |  107  |  21  ----------------------------<  104<H>  3.5   |  21<L>  |  1.31<H>    Ca    8.9      14 Feb 2024 07:17    TPro  6.1  /  Alb  2.8<L>  /  TBili  0.2  /  DBili  x   /  AST  26  /  ALT  29  /  AlkPhos  105  02-14      Urinalysis Basic - ( 14 Feb 2024 07:17 )    Color: x / Appearance: x / SG: x / pH: x  Gluc: 104 mg/dL / Ketone: x  / Bili: x / Urobili: x   Blood: x / Protein: x / Nitrite: x   Leuk Esterase: x / RBC: x / WBC x   Sq Epi: x / Non Sq Epi: x / Bacteria: x        RADIOLOGY & ADDITIONAL TESTS:  
  Follow Up:  E coli bacteremic UTI/pyelo    Interval History/ROS:  "how did I get this?"  denies pain/dysuria    Allergies  penicillin (Anaphylaxis)      ANTIMICROBIALS:  cefepime   IVPB    cefepime   IVPB 1000 every 24 hours      OTHER MEDS:  MEDICATIONS  (STANDING):  acetaminophen     Tablet .. 650 every 6 hours PRN  ALPRAZolam 1 at bedtime PRN  atorvastatin 20 at bedtime  bisacodyl 5 every 12 hours PRN  buPROPion . 100 two times a day  dextrose 50% Injectable 25 once  dextrose 50% Injectable 25 once  dextrose 50% Injectable 12.5 once  dextrose Oral Gel 15 once PRN  enoxaparin Injectable 30 every 24 hours  glucagon  Injectable 1 once  insulin lispro (ADMELOG) corrective regimen sliding scale  three times a day before meals  lactulose Syrup 20 two times a day  levothyroxine 75 daily  melatonin 3 at bedtime PRN  midodrine. 10 every 8 hours  ondansetron Injectable 4 every 8 hours PRN  pantoprazole    Tablet 40 before breakfast  polyethylene glycol 3350 17 daily      Vital Signs Last 24 Hrs  T(C): 37.2 (13 Feb 2024 12:26), Max: 37.4 (12 Feb 2024 18:55)  T(F): 98.9 (13 Feb 2024 12:26), Max: 99.3 (12 Feb 2024 18:55)  HR: 68 (13 Feb 2024 15:16) (65 - 80)  BP: 149/82 (13 Feb 2024 15:16) (104/68 - 149/82)  BP(mean): 82 (12 Feb 2024 16:40) (82 - 82)  RR: 18 (13 Feb 2024 12:26) (18 - 18)  SpO2: 97% (13 Feb 2024 12:26) (97% - 100%)    Parameters below as of 13 Feb 2024 12:26  Patient On (Oxygen Delivery Method): nasal cannula  O2 Flow (L/min): 2      PHYSICAL EXAM:  General: WN/WD NAD, Non-toxic  Neurology: A&Ox3, nonfocal  Respiratory: Clear to auscultation bilaterally  CV: RRR, S1S2, no murmurs, rubs or gallops  Abdominal: Soft, Non-tender, non-distended, no cvat  Extremities: No edema,   Line Sites: Clear  Skin: No rash  - daughter notes pale appearance                          10.8   15.33 )-----------( 205      ( 13 Feb 2024 07:39 )             34.6   WBC Count: 15.33 (02-13 @ 07:39)  WBC Count: 20.80 (02-12 @ 12:42)  WBC Count: 13.90 (02-11 @ 23:18)    02-13    138  |  105  |  24<H>  ----------------------------<  93  3.6   |  19<L>  |  1.56<H>  Creatinine: 1.56 (02-13 @ 07:38)    Creatinine: 1.40 (02-12 @ 12:42)    Creatinine: 1.37 (02-11 @ 23:18)      Ca    8.6      13 Feb 2024 07:38    TPro  6.3  /  Alb  3.0<L>  /  TBili  0.3  /  DBili  x   /  AST  32  /  ALT  36  /  AlkPhos  102  02-13 02.12.24 @ 01:13)   Review: Reviewed  Cast: 32 /LPF  Red Blood Cell - Urine: 1 /HPF  White Blood Cell - Urine: >998 /HPF  Bacteria: Many /HPF  Epithelial Cells: 14 /HPF02.12.24 @ 01:13)   Glucose Qualitative, Urine: Negative mg/dL  Blood, Urine: Small  pH Urine: 5.5  Color: Yellow  Urine Appearance: Turbid  Bilirubin: Negative  Ketone - Urine: Negative mg/dL  Specific Gravity: 1.013  Protein, Urine: 30 mg/dL  Urobilinogen: 0.2 mg/dL  Nitrite: Negative  Leukocyte Esterase Concentration: Large    MICROBIOLOGY:  Clean Catch Clean Catch (Midstream)  02-12-24   >100,000 CFU/ml Escherichia coli  <10,000 CFU/ml Normal Urogenital janiya present  --  --      .Blood Blood-Peripheral  02-11-24   Growth in aerobic and anaerobic bottles: Escherichia coli    .Blood Blood-Peripheral  02-11-24   Growth in aerobic and anaerobic bottles: Escherichia coli      RADIOLOGY:  FINDINGS:  LOWER CHEST: Bibasilar atelectasis and trace pleural effusions.   Moderately large hiatal hernia with questionable thickening of the distal   esophagus.    LIVER: Within normal limits.  BILE DUCTS: Normal caliber.  GALLBLADDER: Within normal limits.  SPLEEN: Within normal limits.  PANCREAS: Within normal limits.  ADRENALS: Within normal limits.  KIDNEYS/URETERS: Bilateral striated nephrograms. Bilateral renal cysts   and subcentimeter hypodensities, too small to characterize. There is   diffuse urothelial enhancement of the bilateral renal collecting systems   with mild periureteral stranding.    Limited assessment of the pelvic structures due to streak artifact from   metallic left hip arthroplasty and right hip intramedullary screws.  BLADDER: Distended with disproportional wall thickening and perivesicular   fat stranding.  REPRODUCTIVE ORGANS: Uterus and adnexa within normal limits.    BOWEL: Moderate hiatal hernia. No bowel obstruction. Mobile cecum which   is located in the left hemiabdomen. The appendix is not visualized, no   pericecal inflammatory changes. Moderately large amount of formed   retained fecal material throughout the colon. No colonic wall thickening.  PERITONEUM: No ascites.  VESSELS: Right common iliac artery aneurysm measuring up to 2.5 cm. The   abdominal aorta is normal in caliber. The mesenteric vessels are patent.  RETROPERITONEUM/LYMPH NODES: No lymphadenopathy.  ABDOMINAL WALL: Within normal limits.  BONES: Degenerative changes. Left hip total arthroplasty. Right femoral   neck screw fixation. Grade 1 retrolisthesis of L2 on L3. No acute osseous   findings.      IMPRESSION:  Cystitis with bilateral ascending ureteritis and pyelonephritis.    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    Archie Bell MD; Division of Infectious Disease; Pager: [phone]; nights and weekends: [phone]
CARDIOLOGY FOLLOW UP - Dr. Calle  DATE OF SERVICE: 2/17/24    CC  No CV complaints     REVIEW OF SYSTEMS:  CONSTITUTIONAL: No fever, weight loss, or fatigue  RESPIRATORY: No cough, wheezing, chills or hemoptysis; No Shortness of Breath  CARDIOVASCULAR: No chest pain, palpitations, passing out, dizziness, or leg swelling  GASTROINTESTINAL: No abdominal or epigastric pain. No nausea, vomiting, or hematemesis; No diarrhea or constipation. No melena or hematochezia.  VASCULAR: No edema     PHYSICAL EXAM:  T(C): 36.6 (02-17-24 @ 04:40), Max: 36.9 (02-16-24 @ 14:02)  HR: 70 (02-17-24 @ 04:40) (64 - 71)  BP: 137/67 (02-17-24 @ 04:40) (137/67 - 172/104)  RR: 18 (02-17-24 @ 04:40) (18 - 18)  SpO2: 96% (02-17-24 @ 04:40) (96% - 96%)  Wt(kg): --  I&O's Summary      Appearance: Elderly female 	  Cardiovascular: Normal S1 S2,RRR, No JVD, No murmurs  Respiratory: Lungs clear to auscultation b/l   Gastrointestinal:  Soft, Non-tender, + BS	  Extremities: Normal range of motion, No clubbing, cyanosis or edema      Home Medications:  ascorbic acid 500 mg oral tablet: 1 tab(s) orally 2 times a day (12 Feb 2024 09:22)  buPROPion 100 mg/12 hours (SR) oral tablet, extended release: 1 tab(s) orally 2 times a day (12 Feb 2024 09:22)  Caltrate 600 + D oral tablet: 1 tab(s) orally 2 times a day (12 Feb 2024 09:22)  Centrum Silver oral tablet: 1 tab(s) orally once a day (12 Feb 2024 09:22)  Laxative magnesium 500mg caplet: 1-2 caplets at night as needed (12 Feb 2024 09:27)  lidocaine 5% topical film: Apply topically to affected area 3 times a day as needed for pain as per pt (12 Feb 2024 09:27)  lovastatin 20 mg oral tablet: 1 tab(s) orally once a day (12 Feb 2024 09:27)  olmesartan: 40 milligram(s) orally once a day (16 Feb 2024 17:54)  omeprazole 20 mg oral delayed release capsule: 1 cap(s) orally once a day (12 Feb 2024 09:27)  Synthroid 75 mcg (0.075 mg) oral tablet: 1 tab(s) orally once a day monday through saturday and 1.5 tablets on sunday. (12 Feb 2024 09:27)  Tylenol Extra Strength 500 mg oral tablet: 1 tab(s) orally every 8 hours as needed for pain (16 Feb 2024 16:25)  Xanax 1 mg oral tablet: 1 tab(s) orally once a day (at bedtime) as needed for  anxiety note: pharmacy has 1-2 prn. (16 Feb 2024 16:25)      MEDICATIONS  (STANDING):  atorvastatin 20 milliGRAM(s) Oral at bedtime  buPROPion . 100 milliGRAM(s) Oral two times a day  calcium carbonate 1250 mG  + Vitamin D (OsCal 500 + D) 1 Tablet(s) Oral daily  carvedilol 25 milliGRAM(s) Oral every 12 hours  cefTRIAXone   IVPB      cefTRIAXone   IVPB 1000 milliGRAM(s) IV Intermittent every 24 hours  dextrose 5%. 1000 milliLiter(s) (100 mL/Hr) IV Continuous <Continuous>  dextrose 5%. 1000 milliLiter(s) (50 mL/Hr) IV Continuous <Continuous>  dextrose 50% Injectable 25 Gram(s) IV Push once  dextrose 50% Injectable 12.5 Gram(s) IV Push once  dextrose 50% Injectable 25 Gram(s) IV Push once  enoxaparin Injectable 30 milliGRAM(s) SubCutaneous every 24 hours  glucagon  Injectable 1 milliGRAM(s) IntraMuscular once  insulin lispro (ADMELOG) corrective regimen sliding scale   SubCutaneous three times a day before meals  levothyroxine 75 MICROGram(s) Oral daily  lidocaine   4% Patch 1 Patch Transdermal every 24 hours  losartan 100 milliGRAM(s) Oral daily  multivitamin/minerals 1 Tablet(s) Oral daily  pantoprazole    Tablet 40 milliGRAM(s) Oral before breakfast  polyethylene glycol 3350 17 Gram(s) Oral daily      TELEMETRY: 	    ECG:  	  RADIOLOGY:   DIAGNOSTIC TESTING:  [ ] Echocardiogram:  [ ]  Catheterization:  [ ] Stress Test:    OTHER: 	    LABS:	 	    Troponin T, High Sensitivity Result: 19 ng/L [0 - 51] (02-11 @ 23:18)                          11.0   8.93  )-----------( 288      ( 16 Feb 2024 07:29 )             34.2     02-16    143  |  106  |  15  ----------------------------<  90  3.6   |  26  |  0.95    Ca    9.0      16 Feb 2024 07:30              
Patient is a 83y old  Female who presents with a chief complaint of Weakness and fatigue (17 Feb 2024 12:47)      SUBJECTIVE / OVERNIGHT EVENTS: Comfortable without new complaints. Daughter at bedside.   Review of Systems  chest pain no  palpitations no  sob no  nausea no  headache no    MEDICATIONS  (STANDING):  atorvastatin 20 milliGRAM(s) Oral at bedtime  buPROPion . 100 milliGRAM(s) Oral two times a day  calcium carbonate 1250 mG  + Vitamin D (OsCal 500 + D) 1 Tablet(s) Oral daily  carvedilol 25 milliGRAM(s) Oral every 12 hours  cefTRIAXone   IVPB      cefTRIAXone   IVPB 1000 milliGRAM(s) IV Intermittent every 24 hours  dextrose 5%. 1000 milliLiter(s) (100 mL/Hr) IV Continuous <Continuous>  dextrose 5%. 1000 milliLiter(s) (50 mL/Hr) IV Continuous <Continuous>  dextrose 50% Injectable 25 Gram(s) IV Push once  dextrose 50% Injectable 25 Gram(s) IV Push once  dextrose 50% Injectable 12.5 Gram(s) IV Push once  enoxaparin Injectable 30 milliGRAM(s) SubCutaneous every 24 hours  glucagon  Injectable 1 milliGRAM(s) IntraMuscular once  insulin lispro (ADMELOG) corrective regimen sliding scale   SubCutaneous three times a day before meals  levothyroxine 75 MICROGram(s) Oral daily  lidocaine   4% Patch 1 Patch Transdermal every 24 hours  losartan 100 milliGRAM(s) Oral daily  multivitamin/minerals 1 Tablet(s) Oral daily  pantoprazole    Tablet 40 milliGRAM(s) Oral before breakfast  polyethylene glycol 3350 17 Gram(s) Oral daily    MEDICATIONS  (PRN):  acetaminophen     Tablet .. 650 milliGRAM(s) Oral every 6 hours PRN Temp greater or equal to 38C (100.4F), Mild Pain (1 - 3)  ALPRAZolam 1 milliGRAM(s) Oral at bedtime PRN ANxiety and Insomnia  bisacodyl 5 milliGRAM(s) Oral every 12 hours PRN Constipation  dextrose Oral Gel 15 Gram(s) Oral once PRN Blood Glucose LESS THAN 70 milliGRAM(s)/deciliter  melatonin 3 milliGRAM(s) Oral at bedtime PRN Insomnia  ondansetron Injectable 4 milliGRAM(s) IV Push every 8 hours PRN Nausea and/or Vomiting      Vital Signs Last 24 Hrs  T(C): 36.4 (17 Feb 2024 16:01), Max: 36.7 (16 Feb 2024 20:23)  T(F): 97.6 (17 Feb 2024 16:01), Max: 98.1 (16 Feb 2024 20:23)  HR: 67 (17 Feb 2024 16:01) (61 - 70)  BP: 150/84 (17 Feb 2024 15:48) (137/67 - 179/93)  BP(mean): --  RR: 18 (17 Feb 2024 16:01) (18 - 18)  SpO2: 96% (17 Feb 2024 16:01) (94% - 96%)    Parameters below as of 17 Feb 2024 16:01  Patient On (Oxygen Delivery Method): room air        PHYSICAL EXAM:  GENERAL: NAD   HEAD:  Atraumatic, Normocephalic  EYES: EOMI, PERRLA, conjunctiva and sclera clear  NECK: Supple, No JVD  CHEST/LUNG: Clear to auscultation bilaterally; No wheeze  HEART: Regular rate and rhythm; No murmurs, rubs, or gallops  ABDOMEN: Soft, Nontender, Nondistended; Bowel sounds present  EXTREMITIES:  2+ Peripheral Pulses, No clubbing, cyanosis, or edema  PSYCH: AAOx3  NEUROLOGY: non-focal  SKIN: No rashes or lesions    LABS:                        11.0   8.93  )-----------( 288      ( 16 Feb 2024 07:29 )             34.2     02-16    143  |  106  |  15  ----------------------------<  90  3.6   |  26  |  0.95    Ca    9.0      16 Feb 2024 07:30            Urinalysis Basic - ( 16 Feb 2024 07:30 )    Color: x / Appearance: x / SG: x / pH: x  Gluc: 90 mg/dL / Ketone: x  / Bili: x / Urobili: x   Blood: x / Protein: x / Nitrite: x   Leuk Esterase: x / RBC: x / WBC x   Sq Epi: x / Non Sq Epi: x / Bacteria: x          RADIOLOGY & ADDITIONAL TESTS:    Imaging Personally Reviewed:    Consultant(s) Notes Reviewed:      Care Discussed with Consultants/Other Providers:  
  Follow Up:  bacteremic pyelo    Interval History/ROS: slept better    Allergies  penicillin (Anaphylaxis)    ANTIMICROBIALS:  cefTRIAXone   IVPB    cefTRIAXone   IVPB 1000 every 24 hours      OTHER MEDS:  MEDICATIONS  (STANDING):  acetaminophen     Tablet .. 650 every 6 hours PRN  ALPRAZolam 1 at bedtime PRN  atorvastatin 20 at bedtime  bisacodyl 5 every 12 hours PRN  buPROPion . 100 two times a day  carvedilol 25 every 12 hours  dextrose 50% Injectable 25 once  dextrose 50% Injectable 25 once  dextrose 50% Injectable 12.5 once  dextrose Oral Gel 15 once PRN  diltiazem    daily  enoxaparin Injectable 30 every 24 hours  glucagon  Injectable 1 once  hydrALAZINE 50 two times a day  insulin lispro (ADMELOG) corrective regimen sliding scale  three times a day before meals  levothyroxine 75 daily  losartan 100 daily  melatonin 3 at bedtime PRN  ondansetron Injectable 4 every 8 hours PRN  pantoprazole    Tablet 40 before breakfast  polyethylene glycol 3350 17 daily      Vital Signs Last 24 Hrs  T(C): 36.3 (15 Feb 2024 05:46), Max: 36.6 (14 Feb 2024 20:21)  T(F): 97.4 (15 Feb 2024 05:46), Max: 97.8 (14 Feb 2024 20:21)  HR: 65 (15 Feb 2024 05:46) (65 - 68)  BP: 136/85 (15 Feb 2024 05:46) (136/85 - 138/76)  BP(mean): --  RR: 18 (15 Feb 2024 05:46) (17 - 18)  SpO2: 96% (15 Feb 2024 05:46) (95% - 97%)    Parameters below as of 15 Feb 2024 05:46  Patient On (Oxygen Delivery Method): nasal cannula  O2 Flow (L/min): 2      PHYSICAL EXAM:  General: NAD, Non-toxic  Neurology: A&Ox3, nonfocal  Respiratory: Clear to auscultation bilaterally  CV: RRR, S1S2, no murmurs, rubs or gallops  Abdominal: Soft, Non-tender, non-distended, normal bowel sounds  Extremities: No edema  Line Sites: Clear  Skin: No rash                          10.1   8.75  )-----------( 231      ( 15 Feb 2024 06:56 )             31.5   WBC Count: 12.00 (02-14 @ 07:18)  WBC Count: 15.33 (02-13 @ 07:39)  WBC Count: 20.80 (02-12 @ 12:42)  WBC Count: 13.90 (02-11 @ 23:18)    02-15    141  |  108  |  20  ----------------------------<  100<H>  3.5   |  23  |  1.10    Ca    8.2<L>      15 Feb 2024 06:54    TPro  6.1  /  Alb  2.8<L>  /  TBili  0.2  /  DBili  x   /  AST  26  /  ALT  29  /  AlkPhos  105  02-14  Respiratory Viral Panel with COVID-19 by JAMAL (02.14.24 @ 14:49) Rapid RVP Result: [facility]    MICROBIOLOGY:  .Blood Blood  02-14-24   No growth at 24 hours  --  --      .Blood Blood-Peripheral  02-13-24   No growth at 48 Hours  --  --      Clean Catch Clean Catch (Midstream)  02-12-24   >100,000 CFU/ml Escherichia coli  <10,000 CFU/ml Normal Urogenital janiya present      .Blood Blood-Peripheral  02-11-24   Growth in aerobic and anaerobic bottles: Escherichia coli      .Blood Blood-Peripheral  02-11-24   Growth in aerobic and anaerobic bottles: Escherichia coli      Rapid RVP Result: [facility] (02-14 @ 14:49)    RADIOLOGY:  < from: Xray Chest 1 View AP/PA (02.14.24 @ 15:35) >  IMPRESSION:  Mild increase in left pleural effusion.  New bilateral lung opacities which may be due to pulmonary edema or to   underlying infection.    < end of copied text >      Archie Bell MD; Division of Infectious Disease; Pager: [phone]; nights and weekends: [phone] Count: 8.75 (02-15 @ 06:56)      
[facility]  Roc Ziegler PA-C  [address] [place] [address]  [phone]      INTERVAL HPI/OVERNIGHT EVENTS:      abdomen pain resolved  appetite is good     MEDICATIONS  (STANDING):  atorvastatin 20 milliGRAM(s) Oral at bedtime  buPROPion . 100 milliGRAM(s) Oral two times a day  calcium carbonate 1250 mG  + Vitamin D (OsCal 500 + D) 1 Tablet(s) Oral daily  cefTRIAXone   IVPB 1000 milliGRAM(s) IV Intermittent once  cefTRIAXone   IVPB      dextrose 5%. 1000 milliLiter(s) (100 mL/Hr) IV Continuous <Continuous>  dextrose 5%. 1000 milliLiter(s) (50 mL/Hr) IV Continuous <Continuous>  dextrose 50% Injectable 25 Gram(s) IV Push once  dextrose 50% Injectable 12.5 Gram(s) IV Push once  dextrose 50% Injectable 25 Gram(s) IV Push once  enoxaparin Injectable 30 milliGRAM(s) SubCutaneous every 24 hours  glucagon  Injectable 1 milliGRAM(s) IntraMuscular once  insulin lispro (ADMELOG) corrective regimen sliding scale   SubCutaneous three times a day before meals  levothyroxine 75 MICROGram(s) Oral daily  midodrine. 10 milliGRAM(s) Oral every 8 hours  multivitamin/minerals 1 Tablet(s) Oral daily  pantoprazole    Tablet 40 milliGRAM(s) Oral before breakfast  polyethylene glycol 3350 17 Gram(s) Oral daily    MEDICATIONS  (PRN):  acetaminophen     Tablet .. 650 milliGRAM(s) Oral every 6 hours PRN Temp greater or equal to 38C (100.4F), Mild Pain (1 - 3)  ALPRAZolam 1 milliGRAM(s) Oral at bedtime PRN ANxiety and Insomnia  bisacodyl 5 milliGRAM(s) Oral every 12 hours PRN Constipation  dextrose Oral Gel 15 Gram(s) Oral once PRN Blood Glucose LESS THAN 70 milliGRAM(s)/deciliter  melatonin 3 milliGRAM(s) Oral at bedtime PRN Insomnia  ondansetron Injectable 4 milliGRAM(s) IV Push every 8 hours PRN Nausea and/or Vomiting      Allergies    penicillin (Anaphylaxis)    Intolerances        ROS:   General:  No  fevers, chills, night sweats, fatigue,   Eyes:  Good vision, no reported pain  ENT:  No sore throat, pain, runny nose, dysphagia  CV:  No pain, palpitations, hypo/hypertension  Resp:  No dyspnea, cough, tachypnea, wheezing  GI:  No pain, No nausea, No vomiting, No diarrhea, No constipation, No weight loss, No fever, No pruritis, No rectal bleeding, No tarry stools, No dysphagia,  :  No pain, bleeding, incontinence, nocturia  Muscle:  No pain, weakness  Neuro:  No weakness, tingling, memory problems  Psych:  No fatigue, insomnia, mood problems, depression  Endocrine:  No polyuria, polydipsia, cold/heat intolerance  Heme:  No petechiae, ecchymosis, easy bruisability  Skin:  No rash, tattoos, scars, edema      PHYSICAL EXAM:   Vital Signs:  Vital Signs Last 24 Hrs  T(C): 37.2 (14 Feb 2024 12:56), Max: 37.9 (13 Feb 2024 21:07)  T(F): 99 (14 Feb 2024 12:56), Max: 100.3 (13 Feb 2024 21:07)  HR: 67 (14 Feb 2024 12:56) (56 - 87)  BP: 140/82 (14 Feb 2024 12:56) (118/72 - 169/81)  BP(mean): --  RR: 18 (14 Feb 2024 12:56) (18 - 18)  SpO2: 99% (14 Feb 2024 12:56) (92% - 99%)    Parameters below as of 14 Feb 2024 12:56  Patient On (Oxygen Delivery Method): nasal cannula  O2 Flow (L/min): 2    Daily     Daily     GENERAL:  Appears stated age,   HEENT:  NC/AT,    CHEST:  Full & symmetric excursion,   HEART:  Regular rhythm,  ABDOMEN:  Soft, non-tender, non-distended,  EXTEREMITIES:  no cyanosis  SKIN:  No rash  NEURO:  Alert,       LABS:                        10.5   12.00 )-----------( 224      ( 14 Feb 2024 07:18 )             32.5     02-14    139  |  107  |  21  ----------------------------<  104<H>  3.5   |  21<L>  |  1.31<H>    Ca    8.9      14 Feb 2024 07:17    TPro  6.1  /  Alb  2.8<L>  /  TBili  0.2  /  DBili  x   /  AST  26  /  ALT  29  /  AlkPhos  105  02-14      Urinalysis Basic - ( 14 Feb 2024 07:17 )    Color: x / Appearance: x / SG: x / pH: x  Gluc: 104 mg/dL / Ketone: x  / Bili: x / Urobili: x   Blood: x / Protein: x / Nitrite: x   Leuk Esterase: x / RBC: x / WBC x   Sq Epi: x / Non Sq Epi: x / Bacteria: x        RADIOLOGY & ADDITIONAL TESTS:  
[facility]  Roc Ziegler PA-C  [address] [place] [address]  [phone]      INTERVAL HPI/OVERNIGHT EVENTS:    multiple bm's yesterday  abdomen pain resolved  appetite is good     MEDICATIONS  (STANDING):  atorvastatin 20 milliGRAM(s) Oral at bedtime  buPROPion . 100 milliGRAM(s) Oral two times a day  calcium carbonate 1250 mG  + Vitamin D (OsCal 500 + D) 1 Tablet(s) Oral daily  cefTRIAXone   IVPB 1000 milliGRAM(s) IV Intermittent once  cefTRIAXone   IVPB      dextrose 5%. 1000 milliLiter(s) (100 mL/Hr) IV Continuous <Continuous>  dextrose 5%. 1000 milliLiter(s) (50 mL/Hr) IV Continuous <Continuous>  dextrose 50% Injectable 25 Gram(s) IV Push once  dextrose 50% Injectable 12.5 Gram(s) IV Push once  dextrose 50% Injectable 25 Gram(s) IV Push once  enoxaparin Injectable 30 milliGRAM(s) SubCutaneous every 24 hours  glucagon  Injectable 1 milliGRAM(s) IntraMuscular once  insulin lispro (ADMELOG) corrective regimen sliding scale   SubCutaneous three times a day before meals  levothyroxine 75 MICROGram(s) Oral daily  midodrine. 10 milliGRAM(s) Oral every 8 hours  multivitamin/minerals 1 Tablet(s) Oral daily  pantoprazole    Tablet 40 milliGRAM(s) Oral before breakfast  polyethylene glycol 3350 17 Gram(s) Oral daily    MEDICATIONS  (PRN):  acetaminophen     Tablet .. 650 milliGRAM(s) Oral every 6 hours PRN Temp greater or equal to 38C (100.4F), Mild Pain (1 - 3)  ALPRAZolam 1 milliGRAM(s) Oral at bedtime PRN ANxiety and Insomnia  bisacodyl 5 milliGRAM(s) Oral every 12 hours PRN Constipation  dextrose Oral Gel 15 Gram(s) Oral once PRN Blood Glucose LESS THAN 70 milliGRAM(s)/deciliter  melatonin 3 milliGRAM(s) Oral at bedtime PRN Insomnia  ondansetron Injectable 4 milliGRAM(s) IV Push every 8 hours PRN Nausea and/or Vomiting      Allergies    penicillin (Anaphylaxis)    Intolerances        ROS:   General:  No  fevers, chills, night sweats, fatigue,   Eyes:  Good vision, no reported pain  ENT:  No sore throat, pain, runny nose, dysphagia  CV:  No pain, palpitations, hypo/hypertension  Resp:  No dyspnea, cough, tachypnea, wheezing  GI:  No pain, No nausea, No vomiting, No diarrhea, No constipation, No weight loss, No fever, No pruritis, No rectal bleeding, No tarry stools, No dysphagia,  :  No pain, bleeding, incontinence, nocturia  Muscle:  No pain, weakness  Neuro:  No weakness, tingling, memory problems  Psych:  No fatigue, insomnia, mood problems, depression  Endocrine:  No polyuria, polydipsia, cold/heat intolerance  Heme:  No petechiae, ecchymosis, easy bruisability  Skin:  No rash, tattoos, scars, edema      PHYSICAL EXAM:   Vital Signs:  Vital Signs Last 24 Hrs  T(C): 37.2 (14 Feb 2024 12:56), Max: 37.9 (13 Feb 2024 21:07)  T(F): 99 (14 Feb 2024 12:56), Max: 100.3 (13 Feb 2024 21:07)  HR: 67 (14 Feb 2024 12:56) (56 - 87)  BP: 140/82 (14 Feb 2024 12:56) (118/72 - 169/81)  BP(mean): --  RR: 18 (14 Feb 2024 12:56) (18 - 18)  SpO2: 99% (14 Feb 2024 12:56) (92% - 99%)    Parameters below as of 14 Feb 2024 12:56  Patient On (Oxygen Delivery Method): nasal cannula  O2 Flow (L/min): 2    Daily     Daily     GENERAL:  Appears stated age,   HEENT:  NC/AT,    CHEST:  Full & symmetric excursion,   HEART:  Regular rhythm,  ABDOMEN:  Soft, non-tender, non-distended,  EXTEREMITIES:  no cyanosis  SKIN:  No rash  NEURO:  Alert,       LABS:                        10.5   12.00 )-----------( 224      ( 14 Feb 2024 07:18 )             32.5     02-14    139  |  107  |  21  ----------------------------<  104<H>  3.5   |  21<L>  |  1.31<H>    Ca    8.9      14 Feb 2024 07:17    TPro  6.1  /  Alb  2.8<L>  /  TBili  0.2  /  DBili  x   /  AST  26  /  ALT  29  /  AlkPhos  105  02-14      Urinalysis Basic - ( 14 Feb 2024 07:17 )    Color: x / Appearance: x / SG: x / pH: x  Gluc: 104 mg/dL / Ketone: x  / Bili: x / Urobili: x   Blood: x / Protein: x / Nitrite: x   Leuk Esterase: x / RBC: x / WBC x   Sq Epi: x / Non Sq Epi: x / Bacteria: x        RADIOLOGY & ADDITIONAL TESTS:  
Patient is a 83y old  Female who presents with a chief complaint of Weakness and fatigue (14 Feb 2024 17:34)      SUBJECTIVE / OVERNIGHT EVENTS: c/o L arm tenderness Daughter at bedside  Review of Systems  chest pain no  palpitations no  sob no  nausea no  headache no    MEDICATIONS  (STANDING):  atorvastatin 20 milliGRAM(s) Oral at bedtime  buPROPion . 100 milliGRAM(s) Oral two times a day  calcium carbonate 1250 mG  + Vitamin D (OsCal 500 + D) 1 Tablet(s) Oral daily  cefTRIAXone   IVPB      dextrose 5%. 1000 milliLiter(s) (100 mL/Hr) IV Continuous <Continuous>  dextrose 5%. 1000 milliLiter(s) (50 mL/Hr) IV Continuous <Continuous>  dextrose 50% Injectable 25 Gram(s) IV Push once  dextrose 50% Injectable 12.5 Gram(s) IV Push once  dextrose 50% Injectable 25 Gram(s) IV Push once  enoxaparin Injectable 30 milliGRAM(s) SubCutaneous every 24 hours  glucagon  Injectable 1 milliGRAM(s) IntraMuscular once  insulin lispro (ADMELOG) corrective regimen sliding scale   SubCutaneous three times a day before meals  levothyroxine 75 MICROGram(s) Oral daily  midodrine. 10 milliGRAM(s) Oral every 8 hours  multivitamin/minerals 1 Tablet(s) Oral daily  pantoprazole    Tablet 40 milliGRAM(s) Oral before breakfast  polyethylene glycol 3350 17 Gram(s) Oral daily    MEDICATIONS  (PRN):  acetaminophen     Tablet .. 650 milliGRAM(s) Oral every 6 hours PRN Temp greater or equal to 38C (100.4F), Mild Pain (1 - 3)  ALPRAZolam 1 milliGRAM(s) Oral at bedtime PRN ANxiety and Insomnia  bisacodyl 5 milliGRAM(s) Oral every 12 hours PRN Constipation  dextrose Oral Gel 15 Gram(s) Oral once PRN Blood Glucose LESS THAN 70 milliGRAM(s)/deciliter  melatonin 3 milliGRAM(s) Oral at bedtime PRN Insomnia  ondansetron Injectable 4 milliGRAM(s) IV Push every 8 hours PRN Nausea and/or Vomiting      Vital Signs Last 24 Hrs  T(C): 36.6 (14 Feb 2024 20:21), Max: 37.9 (13 Feb 2024 21:07)  T(F): 97.8 (14 Feb 2024 20:21), Max: 100.3 (13 Feb 2024 21:07)  HR: 68 (14 Feb 2024 20:21) (56 - 87)  BP: 138/76 (14 Feb 2024 20:21) (118/72 - 143/72)  BP(mean): --  RR: 17 (14 Feb 2024 20:21) (17 - 18)  SpO2: 97% (14 Feb 2024 20:21) (92% - 99%)    Parameters below as of 14 Feb 2024 20:21  Patient On (Oxygen Delivery Method): room air        PHYSICAL EXAM:  GENERAL: NAD, well-developed  HEAD:  Atraumatic, Normocephalic  EYES: EOMI, PERRLA, conjunctiva and sclera clear  NECK: Supple, No JVD  CHEST/LUNG: Clear to auscultation bilaterally; No wheeze  HEART: Regular rate and rhythm; No murmurs, rubs, or gallops  ABDOMEN: Soft, Nontender, Nondistended; Bowel sounds present  EXTREMITIES:  2+ Peripheral Pulses, No clubbing, cyanosis, or edema L arm superficial phlebitis  PSYCH: AAOx3  NEUROLOGY: non-focal  SKIN: No rashes or lesions    LABS:                        10.5   12.00 )-----------( 224      ( 14 Feb 2024 07:18 )             32.5     02-14    139  |  107  |  21  ----------------------------<  104<H>  3.5   |  21<L>  |  1.31<H>    Ca    8.9      14 Feb 2024 07:17    TPro  6.1  /  Alb  2.8<L>  /  TBili  0.2  /  DBili  x   /  AST  26  /  ALT  29  /  AlkPhos  105  02-14          Urinalysis Basic - ( 14 Feb 2024 07:17 )    Color: x / Appearance: x / SG: x / pH: x  Gluc: 104 mg/dL / Ketone: x  / Bili: x / Urobili: x   Blood: x / Protein: x / Nitrite: x   Leuk Esterase: x / RBC: x / WBC x   Sq Epi: x / Non Sq Epi: x / Bacteria: x        Culture - Blood (collected 13 Feb 2024 08:18)  Source: .Blood Blood-Peripheral  Preliminary Report (14 Feb 2024 12:02):    No growth at 24 hours    Culture - Blood (collected 13 Feb 2024 08:18)  Source: .Blood Blood-Peripheral  Preliminary Report (14 Feb 2024 12:02):    No growth at 24 hours    Culture - Urine (collected 12 Feb 2024 01:14)  Source: Clean Catch Clean Catch (Midstream)  Final Report (14 Feb 2024 10:39):    >100,000 CFU/ml Escherichia coli    <10,000 CFU/ml Normal Urogenital janiya present  Organism: Escherichia coli (14 Feb 2024 10:39)  Organism: Escherichia coli (14 Feb 2024 10:39)    Culture - Blood (collected 11 Feb 2024 23:05)  Source: .Blood Blood-Peripheral  Gram Stain (12 Feb 2024 19:05):    Growth in aerobic and anaerobic bottles: Gram Negative Rods  Final Report (14 Feb 2024 08:22):    Growth in aerobic and anaerobic bottles: Escherichia coli    See previous culture [id number]    Culture - Blood (collected 11 Feb 2024 22:50)  Source: .Blood Blood-Peripheral  Gram Stain (12 Feb 2024 19:02):    Aerobic and Anaerobic Bottle: Gram Negative Rods  Final Report (14 Feb 2024 08:21):    Growth in aerobic and anaerobic bottles: Escherichia coli    Direct identification is available within approximately 3-5    hours either by Blood Panel Multiplexed PCR or Direct    MALDI-TOF. Details: [email or web address]  Organism: Blood Culture PCR  Escherichia coli (14 Feb 2024 08:21)  Organism: Escherichia coli (14 Feb 2024 08:21)  Organism: Blood Culture PCR (14 Feb 2024 08:21)        RADIOLOGY & ADDITIONAL TESTS:    Imaging Personally Reviewed:    Consultant(s) Notes Reviewed:      Care Discussed with Consultants/Other Providers:  
Patient is a 83y old  Female who presents with a chief complaint of Weakness and fatigue (15 Feb 2024 16:29)      SUBJECTIVE / OVERNIGHT EVENTS:  Review of Systems  chest pain no  palpitations no  sob no  nausea no  headache no    MEDICATIONS  (STANDING):  atorvastatin 20 milliGRAM(s) Oral at bedtime  buPROPion . 100 milliGRAM(s) Oral two times a day  calcium carbonate 1250 mG  + Vitamin D (OsCal 500 + D) 1 Tablet(s) Oral daily  cefTRIAXone   IVPB      cefTRIAXone   IVPB 1000 milliGRAM(s) IV Intermittent every 24 hours  dextrose 5%. 1000 milliLiter(s) (50 mL/Hr) IV Continuous <Continuous>  dextrose 5%. 1000 milliLiter(s) (100 mL/Hr) IV Continuous <Continuous>  dextrose 50% Injectable 12.5 Gram(s) IV Push once  dextrose 50% Injectable 25 Gram(s) IV Push once  dextrose 50% Injectable 25 Gram(s) IV Push once  enoxaparin Injectable 30 milliGRAM(s) SubCutaneous every 24 hours  glucagon  Injectable 1 milliGRAM(s) IntraMuscular once  insulin lispro (ADMELOG) corrective regimen sliding scale   SubCutaneous three times a day before meals  levothyroxine 75 MICROGram(s) Oral daily  lidocaine   4% Patch 1 Patch Transdermal every 24 hours  multivitamin/minerals 1 Tablet(s) Oral daily  pantoprazole    Tablet 40 milliGRAM(s) Oral before breakfast  polyethylene glycol 3350 17 Gram(s) Oral daily    MEDICATIONS  (PRN):  acetaminophen     Tablet .. 650 milliGRAM(s) Oral every 6 hours PRN Temp greater or equal to 38C (100.4F), Mild Pain (1 - 3)  ALPRAZolam 1 milliGRAM(s) Oral at bedtime PRN ANxiety and Insomnia  bisacodyl 5 milliGRAM(s) Oral every 12 hours PRN Constipation  dextrose Oral Gel 15 Gram(s) Oral once PRN Blood Glucose LESS THAN 70 milliGRAM(s)/deciliter  melatonin 3 milliGRAM(s) Oral at bedtime PRN Insomnia  ondansetron Injectable 4 milliGRAM(s) IV Push every 8 hours PRN Nausea and/or Vomiting      Vital Signs Last 24 Hrs  T(C): 37.2 (15 Feb 2024 17:23), Max: 37.2 (15 Feb 2024 17:23)  T(F): 99 (15 Feb 2024 17:23), Max: 99 (15 Feb 2024 17:23)  HR: 71 (15 Feb 2024 17:23) (65 - 71)  BP: 165/85 (15 Feb 2024 17:23) (136/85 - 165/85)  BP(mean): --  RR: 18 (15 Feb 2024 17:23) (18 - 18)  SpO2: 95% (15 Feb 2024 17:23) (95% - 96%)    Parameters below as of 15 Feb 2024 17:23  Patient On (Oxygen Delivery Method): room air        PHYSICAL EXAM:  GENERAL: NAD, well-developed  HEAD:  Atraumatic, Normocephalic  EYES: EOMI, PERRLA, conjunctiva and sclera clear  NECK: Supple, No JVD  CHEST/LUNG: Clear to auscultation bilaterally; No wheeze  HEART: Regular rate and rhythm; No murmurs, rubs, or gallops  ABDOMEN: Soft, Nontender, Nondistended; Bowel sounds present  EXTREMITIES:  2+ Peripheral Pulses, No clubbing, cyanosis, or edema  PSYCH: AAOx3  NEUROLOGY: non-focal  SKIN: No rashes or lesions    LABS:                        10.1   8.75  )-----------( 231      ( 15 Feb 2024 06:56 )             31.5     02-15    141  |  108  |  20  ----------------------------<  100<H>  3.5   |  23  |  1.10    Ca    8.2<L>      15 Feb 2024 06:54    TPro  6.1  /  Alb  2.8<L>  /  TBili  0.2  /  DBili  x   /  AST  26  /  ALT  29  /  AlkPhos  105  02-14          Urinalysis Basic - ( 15 Feb 2024 06:54 )    Color: x / Appearance: x / SG: x / pH: x  Gluc: 100 mg/dL / Ketone: x  / Bili: x / Urobili: x   Blood: x / Protein: x / Nitrite: x   Leuk Esterase: x / RBC: x / WBC x   Sq Epi: x / Non Sq Epi: x / Bacteria: x        Culture - Blood (collected 14 Feb 2024 07:17)  Source: .Blood Blood-Venous  Preliminary Report (15 Feb 2024 10:02):    No growth at 24 hours    Culture - Blood (collected 14 Feb 2024 07:17)  Source: .Blood Blood  Preliminary Report (15 Feb 2024 10:02):    No growth at 24 hours    Culture - Blood (collected 13 Feb 2024 08:18)  Source: .Blood Blood-Peripheral  Preliminary Report (15 Feb 2024 12:02):    No growth at 48 Hours    Culture - Blood (collected 13 Feb 2024 08:18)  Source: .Blood Blood-Peripheral  Preliminary Report (15 Feb 2024 12:02):    No growth at 48 Hours        RADIOLOGY & ADDITIONAL TESTS:    Imaging Personally Reviewed:    Consultant(s) Notes Reviewed:      Care Discussed with Consultants/Other Providers:  
[facility]  Roc Ziegler PA-C  [address] [place] [address]  [phone]      INTERVAL HPI/OVERNIGHT EVENTS:      no new events    MEDICATIONS  (STANDING):  atorvastatin 20 milliGRAM(s) Oral at bedtime  buPROPion . 100 milliGRAM(s) Oral two times a day  calcium carbonate 1250 mG  + Vitamin D (OsCal 500 + D) 1 Tablet(s) Oral daily  cefTRIAXone   IVPB 1000 milliGRAM(s) IV Intermittent once  cefTRIAXone   IVPB      dextrose 5%. 1000 milliLiter(s) (100 mL/Hr) IV Continuous <Continuous>  dextrose 5%. 1000 milliLiter(s) (50 mL/Hr) IV Continuous <Continuous>  dextrose 50% Injectable 25 Gram(s) IV Push once  dextrose 50% Injectable 12.5 Gram(s) IV Push once  dextrose 50% Injectable 25 Gram(s) IV Push once  enoxaparin Injectable 30 milliGRAM(s) SubCutaneous every 24 hours  glucagon  Injectable 1 milliGRAM(s) IntraMuscular once  insulin lispro (ADMELOG) corrective regimen sliding scale   SubCutaneous three times a day before meals  levothyroxine 75 MICROGram(s) Oral daily  midodrine. 10 milliGRAM(s) Oral every 8 hours  multivitamin/minerals 1 Tablet(s) Oral daily  pantoprazole    Tablet 40 milliGRAM(s) Oral before breakfast  polyethylene glycol 3350 17 Gram(s) Oral daily    MEDICATIONS  (PRN):  acetaminophen     Tablet .. 650 milliGRAM(s) Oral every 6 hours PRN Temp greater or equal to 38C (100.4F), Mild Pain (1 - 3)  ALPRAZolam 1 milliGRAM(s) Oral at bedtime PRN ANxiety and Insomnia  bisacodyl 5 milliGRAM(s) Oral every 12 hours PRN Constipation  dextrose Oral Gel 15 Gram(s) Oral once PRN Blood Glucose LESS THAN 70 milliGRAM(s)/deciliter  melatonin 3 milliGRAM(s) Oral at bedtime PRN Insomnia  ondansetron Injectable 4 milliGRAM(s) IV Push every 8 hours PRN Nausea and/or Vomiting      Allergies    penicillin (Anaphylaxis)    Intolerances        ROS:   General:  No  fevers, chills, night sweats, fatigue,   Eyes:  Good vision, no reported pain  ENT:  No sore throat, pain, runny nose, dysphagia  CV:  No pain, palpitations, hypo/hypertension  Resp:  No dyspnea, cough, tachypnea, wheezing  GI:  No pain, No nausea, No vomiting, No diarrhea, No constipation, No weight loss, No fever, No pruritis, No rectal bleeding, No tarry stools, No dysphagia,  :  No pain, bleeding, incontinence, nocturia  Muscle:  No pain, weakness  Neuro:  No weakness, tingling, memory problems  Psych:  No fatigue, insomnia, mood problems, depression  Endocrine:  No polyuria, polydipsia, cold/heat intolerance  Heme:  No petechiae, ecchymosis, easy bruisability  Skin:  No rash, tattoos, scars, edema      PHYSICAL EXAM:   Vital Signs:  Vital Signs Last 24 Hrs  T(C): 37.2 (14 Feb 2024 12:56), Max: 37.9 (13 Feb 2024 21:07)  T(F): 99 (14 Feb 2024 12:56), Max: 100.3 (13 Feb 2024 21:07)  HR: 67 (14 Feb 2024 12:56) (56 - 87)  BP: 140/82 (14 Feb 2024 12:56) (118/72 - 169/81)  BP(mean): --  RR: 18 (14 Feb 2024 12:56) (18 - 18)  SpO2: 99% (14 Feb 2024 12:56) (92% - 99%)    Parameters below as of 14 Feb 2024 12:56  Patient On (Oxygen Delivery Method): nasal cannula  O2 Flow (L/min): 2    Daily     Daily     GENERAL:  Appears stated age,   HEENT:  NC/AT,    CHEST:  Full & symmetric excursion,   HEART:  Regular rhythm,  ABDOMEN:  Soft, non-tender, non-distended,  EXTEREMITIES:  no cyanosis  SKIN:  No rash  NEURO:  Alert,       LABS:                        10.5   12.00 )-----------( 224      ( 14 Feb 2024 07:18 )             32.5     02-14    139  |  107  |  21  ----------------------------<  104<H>  3.5   |  21<L>  |  1.31<H>    Ca    8.9      14 Feb 2024 07:17    TPro  6.1  /  Alb  2.8<L>  /  TBili  0.2  /  DBili  x   /  AST  26  /  ALT  29  /  AlkPhos  105  02-14      Urinalysis Basic - ( 14 Feb 2024 07:17 )    Color: x / Appearance: x / SG: x / pH: x  Gluc: 104 mg/dL / Ketone: x  / Bili: x / Urobili: x   Blood: x / Protein: x / Nitrite: x   Leuk Esterase: x / RBC: x / WBC x   Sq Epi: x / Non Sq Epi: x / Bacteria: x        RADIOLOGY & ADDITIONAL TESTS:

## 2024-02-17 NOTE — PROGRESS NOTE ADULT - TIME BILLING
Agree with above ACP note.  cv stable  bp improve  taper mido off as bp allows  hold outpt bp meds  abx per med
Agree with above ACP note.  cv stable  bp improved  d/c mido   restart home coreg  resume olmesartan as outpt  dcp
Patient seen and examined.  Agree with above ACP note.  Agree with above ACP note.  cv stable  bp improved  off mido   cont home coreg  resume olmesartan as outpt  dcp
agree with above  cv stable  cont IV abx per ID

## 2024-02-19 ENCOUNTER — EMERGENCY (EMERGENCY)
Facility: HOSPITAL | Age: 84
LOS: 1 days | Discharge: HOME | End: 2024-02-19
Attending: EMERGENCY MEDICINE
Payer: MEDICARE

## 2024-02-19 VITALS
TEMPERATURE: 98 F | SYSTOLIC BLOOD PRESSURE: 183 MMHG | DIASTOLIC BLOOD PRESSURE: 85 MMHG | OXYGEN SATURATION: 97 % | HEART RATE: 70 BPM | RESPIRATION RATE: 15 BRPM

## 2024-02-19 VITALS
RESPIRATION RATE: 18 BRPM | SYSTOLIC BLOOD PRESSURE: 178 MMHG | HEART RATE: 82 BPM | OXYGEN SATURATION: 97 % | TEMPERATURE: 98 F | HEIGHT: 59 IN | WEIGHT: 119.93 LBS | DIASTOLIC BLOOD PRESSURE: 105 MMHG

## 2024-02-19 DIAGNOSIS — Z98.890 OTHER SPECIFIED POSTPROCEDURAL STATES: Chronic | ICD-10-CM

## 2024-02-19 DIAGNOSIS — E89.0 POSTPROCEDURAL HYPOTHYROIDISM: Chronic | ICD-10-CM

## 2024-02-19 LAB
ALBUMIN SERPL ELPH-MCNC: 3.5 G/DL — SIGNIFICANT CHANGE UP (ref 3.3–5)
ALP SERPL-CCNC: 119 U/L — SIGNIFICANT CHANGE UP (ref 40–120)
ALT FLD-CCNC: 31 U/L — SIGNIFICANT CHANGE UP (ref 10–45)
ANION GAP SERPL CALC-SCNC: 17 MMOL/L — SIGNIFICANT CHANGE UP (ref 5–17)
APTT BLD: 33.2 SEC — SIGNIFICANT CHANGE UP (ref 24.5–35.6)
AST SERPL-CCNC: 33 U/L — SIGNIFICANT CHANGE UP (ref 10–40)
BASOPHILS # BLD AUTO: 0.03 K/UL — SIGNIFICANT CHANGE UP (ref 0–0.2)
BASOPHILS NFR BLD AUTO: 0.3 % — SIGNIFICANT CHANGE UP (ref 0–2)
BILIRUB SERPL-MCNC: 0.4 MG/DL — SIGNIFICANT CHANGE UP (ref 0.2–1.2)
BUN SERPL-MCNC: 15 MG/DL — SIGNIFICANT CHANGE UP (ref 7–23)
CALCIUM SERPL-MCNC: 10 MG/DL — SIGNIFICANT CHANGE UP (ref 8.4–10.5)
CHLORIDE SERPL-SCNC: 96 MMOL/L — SIGNIFICANT CHANGE UP (ref 96–108)
CO2 SERPL-SCNC: 24 MMOL/L — SIGNIFICANT CHANGE UP (ref 22–31)
CREAT SERPL-MCNC: 0.92 MG/DL — SIGNIFICANT CHANGE UP (ref 0.5–1.3)
CULTURE RESULTS: SIGNIFICANT CHANGE UP
CULTURE RESULTS: SIGNIFICANT CHANGE UP
EGFR: 63 ML/MIN/1.73M2 — SIGNIFICANT CHANGE UP
EOSINOPHIL # BLD AUTO: 0.1 K/UL — SIGNIFICANT CHANGE UP (ref 0–0.5)
EOSINOPHIL NFR BLD AUTO: 1 % — SIGNIFICANT CHANGE UP (ref 0–6)
GLUCOSE SERPL-MCNC: 117 MG/DL — HIGH (ref 70–99)
HCT VFR BLD CALC: 35.6 % — SIGNIFICANT CHANGE UP (ref 34.5–45)
HGB BLD-MCNC: 12.2 G/DL — SIGNIFICANT CHANGE UP (ref 11.5–15.5)
IMM GRANULOCYTES NFR BLD AUTO: 1.9 % — HIGH (ref 0–0.9)
INR BLD: 1.22 RATIO — HIGH (ref 0.85–1.18)
LYMPHOCYTES # BLD AUTO: 1.38 K/UL — SIGNIFICANT CHANGE UP (ref 1–3.3)
LYMPHOCYTES # BLD AUTO: 13.6 % — SIGNIFICANT CHANGE UP (ref 13–44)
MCHC RBC-ENTMCNC: 31.4 PG — SIGNIFICANT CHANGE UP (ref 27–34)
MCHC RBC-ENTMCNC: 34.3 GM/DL — SIGNIFICANT CHANGE UP (ref 32–36)
MCV RBC AUTO: 91.5 FL — SIGNIFICANT CHANGE UP (ref 80–100)
MONOCYTES # BLD AUTO: 0.81 K/UL — SIGNIFICANT CHANGE UP (ref 0–0.9)
MONOCYTES NFR BLD AUTO: 8 % — SIGNIFICANT CHANGE UP (ref 2–14)
NEUTROPHILS # BLD AUTO: 7.64 K/UL — HIGH (ref 1.8–7.4)
NEUTROPHILS NFR BLD AUTO: 75.2 % — SIGNIFICANT CHANGE UP (ref 43–77)
NRBC # BLD: 0 /100 WBCS — SIGNIFICANT CHANGE UP (ref 0–0)
PLATELET # BLD AUTO: 365 K/UL — SIGNIFICANT CHANGE UP (ref 150–400)
POTASSIUM SERPL-MCNC: 3.5 MMOL/L — SIGNIFICANT CHANGE UP (ref 3.5–5.3)
POTASSIUM SERPL-SCNC: 3.5 MMOL/L — SIGNIFICANT CHANGE UP (ref 3.5–5.3)
PROT SERPL-MCNC: 7 G/DL — SIGNIFICANT CHANGE UP (ref 6–8.3)
PROTHROM AB SERPL-ACNC: 12.7 SEC — SIGNIFICANT CHANGE UP (ref 9.5–13)
RBC # BLD: 3.89 M/UL — SIGNIFICANT CHANGE UP (ref 3.8–5.2)
RBC # FLD: 13.3 % — SIGNIFICANT CHANGE UP (ref 10.3–14.5)
SODIUM SERPL-SCNC: 137 MMOL/L — SIGNIFICANT CHANGE UP (ref 135–145)
SPECIMEN SOURCE: SIGNIFICANT CHANGE UP
SPECIMEN SOURCE: SIGNIFICANT CHANGE UP
TROPONIN T, HIGH SENSITIVITY RESULT: 19 NG/L — SIGNIFICANT CHANGE UP (ref 0–51)
WBC # BLD: 10.15 K/UL — SIGNIFICANT CHANGE UP (ref 3.8–10.5)
WBC # FLD AUTO: 10.15 K/UL — SIGNIFICANT CHANGE UP (ref 3.8–10.5)

## 2024-02-19 PROCEDURE — 70450 CT HEAD/BRAIN W/O DYE: CPT | Mod: 26,XU,MA

## 2024-02-19 PROCEDURE — 70496 CT ANGIOGRAPHY HEAD: CPT | Mod: 26,MA

## 2024-02-19 PROCEDURE — 85730 THROMBOPLASTIN TIME PARTIAL: CPT

## 2024-02-19 PROCEDURE — 85610 PROTHROMBIN TIME: CPT

## 2024-02-19 PROCEDURE — 71045 X-RAY EXAM CHEST 1 VIEW: CPT | Mod: 26

## 2024-02-19 PROCEDURE — 85014 HEMATOCRIT: CPT

## 2024-02-19 PROCEDURE — 85018 HEMOGLOBIN: CPT

## 2024-02-19 PROCEDURE — 99285 EMERGENCY DEPT VISIT HI MDM: CPT

## 2024-02-19 PROCEDURE — 83605 ASSAY OF LACTIC ACID: CPT

## 2024-02-19 PROCEDURE — 87040 BLOOD CULTURE FOR BACTERIA: CPT

## 2024-02-19 PROCEDURE — 82435 ASSAY OF BLOOD CHLORIDE: CPT

## 2024-02-19 PROCEDURE — 70496 CT ANGIOGRAPHY HEAD: CPT | Mod: MA

## 2024-02-19 PROCEDURE — 82330 ASSAY OF CALCIUM: CPT

## 2024-02-19 PROCEDURE — 70450 CT HEAD/BRAIN W/O DYE: CPT | Mod: MA

## 2024-02-19 PROCEDURE — 84132 ASSAY OF SERUM POTASSIUM: CPT

## 2024-02-19 PROCEDURE — 82962 GLUCOSE BLOOD TEST: CPT

## 2024-02-19 PROCEDURE — 96374 THER/PROPH/DIAG INJ IV PUSH: CPT | Mod: XU

## 2024-02-19 PROCEDURE — 99283 EMERGENCY DEPT VISIT LOW MDM: CPT

## 2024-02-19 PROCEDURE — 99285 EMERGENCY DEPT VISIT HI MDM: CPT | Mod: 25

## 2024-02-19 PROCEDURE — 84484 ASSAY OF TROPONIN QUANT: CPT

## 2024-02-19 PROCEDURE — 85025 COMPLETE CBC W/AUTO DIFF WBC: CPT

## 2024-02-19 PROCEDURE — 82947 ASSAY GLUCOSE BLOOD QUANT: CPT

## 2024-02-19 PROCEDURE — 70498 CT ANGIOGRAPHY NECK: CPT | Mod: 26,MA

## 2024-02-19 PROCEDURE — 93005 ELECTROCARDIOGRAM TRACING: CPT

## 2024-02-19 PROCEDURE — 84295 ASSAY OF SERUM SODIUM: CPT

## 2024-02-19 PROCEDURE — 82803 BLOOD GASES ANY COMBINATION: CPT

## 2024-02-19 PROCEDURE — 70498 CT ANGIOGRAPHY NECK: CPT | Mod: MA

## 2024-02-19 PROCEDURE — 80053 COMPREHEN METABOLIC PANEL: CPT

## 2024-02-19 PROCEDURE — 71045 X-RAY EXAM CHEST 1 VIEW: CPT

## 2024-02-19 RX ORDER — ACETAMINOPHEN 500 MG
1000 TABLET ORAL ONCE
Refills: 0 | Status: COMPLETED | OUTPATIENT
Start: 2024-02-19 | End: 2024-02-19

## 2024-02-19 RX ORDER — SODIUM CHLORIDE 9 MG/ML
500 INJECTION INTRAMUSCULAR; INTRAVENOUS; SUBCUTANEOUS ONCE
Refills: 0 | Status: DISCONTINUED | OUTPATIENT
Start: 2024-02-19 | End: 2024-02-19

## 2024-02-19 RX ORDER — SODIUM CHLORIDE 9 MG/ML
500 INJECTION, SOLUTION INTRAVENOUS ONCE
Refills: 0 | Status: COMPLETED | OUTPATIENT
Start: 2024-02-19 | End: 2024-02-19

## 2024-02-19 RX ORDER — LIDOCAINE 4 G/100G
1 CREAM TOPICAL ONCE
Refills: 0 | Status: COMPLETED | OUTPATIENT
Start: 2024-02-19 | End: 2024-02-19

## 2024-02-19 RX ADMIN — Medication 400 MILLIGRAM(S): at 01:02

## 2024-02-19 RX ADMIN — SODIUM CHLORIDE 500 MILLILITER(S): 9 INJECTION, SOLUTION INTRAVENOUS at 03:00

## 2024-02-19 RX ADMIN — LIDOCAINE 1 PATCH: 4 CREAM TOPICAL at 01:02

## 2024-02-19 NOTE — CONSULT NOTE ADULT - SUBJECTIVE AND OBJECTIVE BOX
Neurology - Consult Note    -  Spectra: [phone] ([facility]), [phone] ([facility])  -    HPI: Patient JOE URIOSTEGUI is a 83y (1940) right handed woman with a PMHx significant for DM2, HTN, HLD, OA, Hiatal Hernia , PUD,  Uterine Prolapse, Papillary thyroid CA, s/p thyroidectomy, recently discharged on 2/17/24 for pyelonephritis who presents with headache nausea, weakness and inability to ambulate. Code stroke called in ED as patient reported left sided numbness and tingling.  and /105. Upon assessment patient reports she hasn't felt well since discharge. Reports numbness and tingling in both arms and weakness in both legs, difficulty ambulating Not on AC/AP due to history of PUD per daughter. She currently ambulates with walker. No history of stroke or TIA per patient.         Admission 2/11-2/17  Patient was admitted with sepsis. CT a/p showed b/l pyelonephritis , ascending ureteritis and cystitis. Started on ceftriaxone initially, then changed to cefepime from 2/12 to 2/214. Now switched back to ceftriaxone from 2/14. blood culture with e coli bacteremia. RVP negative. Patient was with severe hypotension, s/p IVF. Started on midodrine, now discontinued. Holding bP meds. Also noted to have pleural effusion on chest xray, required supplemental oxygen, now on RA. RAYMOND was also noted likely due to sepsis/hypotension, now resolved. Incidental finding of Iliac artery aneurysm. Vascular recommended for out patient follow up. CT A/P was showing large stool burden, GI was consulted, resolved with laxatives. Patient clinically stable for discharge. Per ID  able to be discharged on Levofloxacin 500 mg po daily through 2/20.        Review of Systems:    All other review of systems is negative unless indicated above.    Allergies:  penicillin (Anaphylaxis)      PMHx/PSHx/Family Hx: As above, otherwise see below   Primary hypertension  HLD (hyperlipidemia)  Hiatal hernia  Osteoarthritis  Uterine prolapse  Constipation  Papillary carcinoma    Social Hx:  No current use of tobacco, alcohol, or illicit drugs  Lives with ***    Medications:  MEDICATIONS  (STANDING):    MEDICATIONS  (PRN):      Vitals:  T(C): 36.6 (02-19-24 @ 00:10), Max: 36.6 (02-19-24 @ 00:10)  HR: 82 (02-19-24 @ 00:10) (82 - 82)  BP: 178/105 (02-19-24 @ 00:10) (178/105 - 178/105)  RR: 18 (02-19-24 @ 00:10) (18 - 18)  SpO2: 97% (02-19-24 @ 00:10) (97% - 97%)    Physical Examination: INCOMPLETE  General - NAD  Cardiovascular - Peripheral pulses palpable, no edema  Eyes - Fundoscopy with flat, sharp optic discs and no hemorrhage or exudates; Fundoscopy not well visualized; Fundoscopy not performed due to safety precautions in the setting of the COVID-19 pandemic    Neurologic Exam:  Mental status - Awake, Alert, Oriented to person, place, and time. Speech fluent, repetition and naming intact. Follows simple and complex commands. Attention/concentration, recent and remote memory (including registration and recall), and fund of knowledge intact    Cranial nerves - PERRLA, VFF, EOMI, face sensation (V1-V3) intact b/l, facial strength intact without asymmetry b/l, hearing intact b/l, palate with symmetric elevation, trapezius  5/5 strength b/l, tongue midline on protrusion with full lateral movement    Motor - Normal bulk and tone throughout. No pronator drift.  Strength testing            Deltoid      Biceps      Triceps     Wrist Extension    Wrist Flexion     Interossei         R            5                 5               5                     5                              5                        5                 5  L             5                 5               5                     5                              5                        5                 5              Hip Flexion    Hip Extension    Knee Flexion    Knee Extension    Dorsiflexion    Plantar Flexion  R              5                           5                       5                           5                            5                          5  L              5                           5                        5                           5                            5                          5    Sensation - Light touch/temperature OR pain/vibration intact throughout    DTR's -             Biceps      Triceps     Brachioradialis      Patellar    Ankle    Toes/plantar response  R             2+             2+                  2+                       2+            2+                 Down  L              2+             2+                 2+                        2+           2+                 Down    Coordination - Finger to Nose intact b/l. No tremors appreciated    Gait and station - Normal casual gait. Romberg (-)    Labs:        CAPILLARY BLOOD GLUCOSE  123 (19 Feb 2024 00:24)      POCT Blood Glucose.: 123 mg/dL (19 Feb 2024 00:14)            Radiology:       Neurology - Consult Note    -  Spectra: [phone] ([facility]), [phone] ([facility])  -    HPI: Patient JOE URIOSTEGUI is a 83y (1940) right handed woman with a PMHx significant for DM2, HTN, HLD, OA, Hiatal Hernia , PUD,  Uterine Prolapse, Papillary thyroid CA, s/p thyroidectomy, B/L hip replacements, recently discharged on 2/17/24 for pyelonephritis who presents with headache nausea, weakness and inability to ambulate. Code stroke called in ED as patient reported left sided numbness and tingling.  and /105. Upon assessment patient reports she hasn't felt well since discharge. Reports numbness and tingling in both arms and weakness in both legs, difficulty ambulating, progressively worsening throughout the day. Not on AC/AP due to history of PUD per daughter. She currently ambulates with walker/rollator. No history of stroke or TIA per patient. Denied any dizziness, word finding difficultly. Noted to have subtle L NLF flattening. Per son at bedside, her face and smile appears to be at baseline. Denies any recent falls. Son also reports patient has longstanding history of poorly controlled hypertension, usually has associated numbness and tingling of extremities with elevated BP. After recent discharge, patient's Cardiologist had adjusted some of her antihypertensives and she has been very hypertensive today.      Admission 2/11-2/17  Patient was admitted with sepsis. CT a/p showed b/l pyelonephritis , ascending ureteritis and cystitis. Started on ceftriaxone initially, then changed to cefepime from 2/12 to 2/214. Now switched back to ceftriaxone from 2/14. blood culture with e coli bacteremia. RVP negative. Patient was with severe hypotension, s/p IVF. Started on midodrine, now discontinued. Holding BP meds. Also noted to have pleural effusion on chest xray, required supplemental oxygen, now on RA. RAYMOND was also noted likely due to sepsis/hypotension, now resolved. Incidental finding of Iliac artery aneurysm. Vascular recommended for out patient follow up. CT A/P was showing large stool burden, GI was consulted, resolved with laxatives. Patient clinically stable for discharge. Per ID  able to be discharged on Levofloxacin 500 mg po daily through 2/20.      Review of Systems:    All other review of systems is negative unless indicated above.    Allergies:  penicillin (Anaphylaxis)      PMHx/PSHx/Family Hx: As above, otherwise see below   Primary hypertension  HLD (hyperlipidemia)  Hiatal hernia  Osteoarthritis  Uterine prolapse  Constipation  Papillary carcinoma    Social Hx:  No current use of tobacco, alcohol, or illicit drugs  Lives with son  Ambulates with rollator     Medications:  MEDICATIONS  (STANDING):    MEDICATIONS  (PRN):      Vitals:  T(C): 36.6 (02-19-24 @ 00:10), Max: 36.6 (02-19-24 @ 00:10)  HR: 82 (02-19-24 @ 00:10) (82 - 82)  BP: 178/105 (02-19-24 @ 00:10) (178/105 - 178/105)  RR: 18 (02-19-24 @ 00:10) (18 - 18)  SpO2: 97% (02-19-24 @ 00:10) (97% - 97%)    Physical Examination:   General - NAD  Cardiovascular - Peripheral pulses palpable, no edema  Eyes -  Fundoscopy not performed due to safety precautions in the setting of the COVID-19 pandemic    Neurologic Exam:  Mental status - Awake, Alert, Oriented to person, place, and time. Speech fluent, repetition and naming intact. Follows simple and complex commands. Attention/concentration, recent and remote memory (including registration and recall), and fund of knowledge intact    Cranial nerves - PERRLA, VFF, EOMI, face sensation (V1-V3) intact b/l, subtle L NLF flattening at baseline per son, hearing intact b/l, palate with symmetric elevation, trapezius 4/5 strength b/l, tongue midline on protrusion with full lateral movement    Motor - Normal bulk and tone throughout. No pronator drift.  Strength testing  RUE and LUE 4+/5 throughout  LLE and RLE 2/5 proximally. Distally 4+/5 B/L    Sensation - Light touch intact throughout    DTR's -             Biceps      Triceps     Brachioradialis      Patellar    Ankle    Toes/plantar response  R             2+             2+                  2+                       2+            1+               mute  L              2+             2+                 2+                        2+           1+                 up    Brisk reflexes     Coordination - Finger to Nose intact b/l. No tremors appreciated    Gait and station - Not assessed due to fall risk    Labs:                          12.2   10.15 )-----------( 365      ( 19 Feb 2024 00:48 )             35.6     02-19    137  |  96  |  15  ----------------------------<  117<H>  3.5   |  24  |  0.92    Ca    10.0      19 Feb 2024 00:48    TPro  7.0  /  Alb  3.5  /  TBili  0.4  /  DBili  x   /  AST  33  /  ALT  31  /  AlkPhos  119  02-19    PT/INR - ( 19 Feb 2024 00:48 )   PT: 12.7 sec;   INR: 1.22 ratio         PTT - ( 19 Feb 2024 00:48 )  PTT:33.2 sec  Urinalysis Basic - ( 19 Feb 2024 00:48 )    Color: x / Appearance: x / SG: x / pH: x  Gluc: 117 mg/dL / Ketone: x  / Bili: x / Urobili: x   Blood: x / Protein: x / Nitrite: x   Leuk Esterase: x / RBC: x / WBC x   Sq Epi: x / Non Sq Epi: x / Bacteria: x        RADIOLOGY, EKG & ADDITIONAL TESTS:     CT Brain Stroke Protocol (02.19.24 @ 00:31)   FINDINGS:    There is no acute intra-axial or extra axial hemorrhage. There is no mass   effect or shift of the midline. The basal cisterns are not effaced. There   is age-appropriate cerebral volume loss with prominence of the ventricles   and sulci. Gray-white matter differentiation is preserved. There is noCT   evidence of an acute vascular territorial infarct.    The regional soft tissues and osseous structures are unremarkable. The   visualized paranasal sinuses and tympanic/mastoid cavities are clear.    IMPRESSION:  No acute intracranial hemorrhage, mass effect, or CT evidence of an acute   vascular territorial infarct.    CT Angio Neck Stroke Protocol w/ IV Cont (02.19.24 @ 00:31)   IMPRESSION:    CT ANGIOGRAPHY NECK:  Patent cervical vasculature.  No hemodynamically significant carotid   stenosis or flow-limiting vertebral artery stenosis.  No evidence of   dissection.    CT ANGIOGRAPHY BRAIN:  No vessel occlusion, flow-limiting stenosis identified about the [place]   of [place].    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    3 mm inferiorly oriented saccular aneurysm at the right supraclinoid ICA.

## 2024-02-19 NOTE — CONSULT NOTE ADULT - ATTENDING COMMENTS
Small incidental ICA aneurysms. Will discuss management as outpatient, will recommend observation due to small size and thus low rupture risk, and advanced age.

## 2024-02-19 NOTE — ED PROVIDER NOTE - ATTENDING CONTRIBUTION TO CARE
Attending MD Bolden:  I have seen and examined this patient and fully participated in the care of this patient as the teaching attending. I personally made/approved the management plan and take responsibility for the patient management.      83-year-old woman presenting for evaluation of left-sided numbness and tingling.  Patient was initially evaluated in expedited fashion as triage nurse was concerned for possible stroke activation.  Per patient report, she states that she developed numbness and tingling of the left arm and left leg sometime in the afternoon, she cannot be specific exactly what time it began.  Initial triage note and EMS report stated 2-hour onset of symptoms however patient contradicts this and states it was sometime in the afternoon today.  She says she is also having trouble swallowing.  She says she is also having trouble speaking.  Denies head or neck pain.  Has some discomfort in the left back as well.  Was recently treated in this hospital for urosepsis.    Patient seen and evaluated briefly in triage bay, patient is awake and alert fatigued appearing but in no apparent distress.  Breathing comfortably on room air, extremities warm to the touch.  No obvious drift in the bilateral upper extremities, patient notes hypoesthesia left arm and left face, no obvious facial asymmetry, no obvious dysarthria or aphasia, no gaze palsy, when attempting to examine for drift in the lower extremities patient appears uncomfortable when I am lifting the left leg and she lets the left leg and right leg drop and offers no resistance on both sides.  Remainder of history and exam truncated as patient directed to CT for stroke imaging.  Patient was met by stroke neurologist and ushered to CT scan.    Patient presenting with onset of left-sided arm and leg numbness and tingling but also having some left-sided pain, onset of symptoms is sometime this afternoon but we do not have a firm last known normal.  Given that symptoms are within 24 hours and lateralizing, code stroke was activated and will proceed to CTA CT head imaging.  Will follow-up on stroke neurology recommendations.  Will perform basic infectious and metabolic workup, ECG to rule out atypical ischemia cardiac biomarkers to rule out atypical ischemia chest film and repeat urinalysis and will reassess after          *The above represents an initial assessment/impression. Please refer to progress notes for potential changes in patient clinical course*

## 2024-02-19 NOTE — ED PROVIDER NOTE - PROGRESS NOTE DETAILS
Consulted neurosurgery for aneurysm found on CTA.  Awaiting recommendations. Neurosurgery has evaluated patient found previous imaging of patient from another [facility].  Aneurysm seen chronic and unchanged, unlikely to be etiology of patient's symptoms.  From their standpoint is stable for routine outpatient follow-up. Attending MD Bolden: Workup thus far is unrevealing.  Incidental tiny aneurysms noted, 2 mm and 3 mm.  Patient has been seen by the neurosurgical service and these are incidental and not to require any further inpatient workup.  Patient can follow-up as outpatient with Dr. Bear.  Neurology opinion on patient is that presenting symptoms do not represent ischemic CVA.  Neurology note states "no further inpatient neurologic workup from stroke standpoint".  No obvious toxic metabolic or infectious derangements noted.  Patient as of yet has not been able to produce urinalysis, will wait a little bit longer to see if she is able to void for us given recent UTI so that we may exclude recurrent UTI although very low suspicion for such Attending MD Bolden: Patient unable to provide urinalysis however patient is actually still on levofloxacin until tomorrow so urinalysis would not .  Patient no longer will need to wait in the ED for producing urinalysis.  Will reevaluate patient and if she is stable on her feet she would be appropriate for discharge.

## 2024-02-19 NOTE — ED ADULT NURSE REASSESSMENT NOTE - NS ED NURSE REASSESS COMMENT FT1
- pt placed on prima fit x2, prima fit malfunction causing pt to saturated bed. attempted to straight cath pt refusing. pt able to ambulate with steady gait. pt walked to toilet and unable to give urine sample at this time. MD beckford

## 2024-02-19 NOTE — CONSULT NOTE ADULT - ASSESSMENT
Humaira Yost  83F Hx DM/HTN/HLD/OA/PUD/Uterine-prolapase/Pap-thyroidCA recent Urosepsis p/f L sided numbness code stroke called. 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 Bear PRN

## 2024-02-19 NOTE — ED PROVIDER NOTE - PATIENT PORTAL LINK FT
You can access the FollowMyHealth Patient Portal offered by [facility] by registering at the following website: [email or web address]. By joining [employer]’s FollowMyHealth portal, you will also be able to view your health information using other applications (apps) compatible with our system.

## 2024-02-19 NOTE — ED ADULT TRIAGE NOTE - AS HEIGHT TYPE
Patient Education        Toenail Fungus: Care Instructions  Your Care Instructions  A toenail that is infected by a fungus usually turns white or yellow. As the fungus spreads, the nail turns a darker color and gets thicker, and its edges start to turn ragged and crumble. A bad infection can cause toe pain, and the nail may pull away from the toe. Toenails that are exposed to moisture and warmth a lot are more likely to get infected by a fungus. This can happen from wearing sweaty shoes often and from walking barefoot on shower floors. It is hard to treat toenail fungus, and the infection can return after it has cleared up. But medicines can sometimes get rid of toenail fungus for good. If the infection is very bad, or if it causes a lot of pain, you may need to have the nail removed. Follow-up care is a key part of your treatment and safety. Be sure to make and go to all appointments, and call your doctor if you are having problems. It's also a good idea to know your test results and keep a list of the medicines you take. How can you care for yourself at home? · Take your medicines exactly as prescribed. Call your doctor if you have any problems with your medicine. You will get more details on the specific medicines your doctor prescribes. · If your doctor gave you a cream or liquid to put on your toenail, use it exactly as directed. · Wash your feet often, and wash your hands after touching your feet. · Keep your toenails clean and dry. Dry your feet completely after you bathe and before you put on shoes and socks. · Keep your toenails trimmed. · Change socks often. Wear dry socks that absorb moisture. · Do not go barefoot in public places. · Use a spray or powder that fights fungus on your feet and in your shoes. · Do not pick at the skin around your nails. · Do not use nail polish or fake nails on your toenails. When should you call for help?   Call your doctor now or seek immediate medical care if:    · You have signs of infection, such as:  ? Increased pain, swelling, warmth, or redness. ? Red streaks leading from the site. ? Pus draining from the site. ? A fever.     · You have new or increased toe pain.    Watch closely for changes in your health, and be sure to contact your doctor if:    · You do not get better as expected. Where can you learn more? Go to [email or web address] and sign in to your [employer] account. Enter D202 in the [employer] box to learn more about \"Toenail Fungus: Care Instructions. \"     If you do not have an account, please click on the \"Sign Up Now\" link. Current as of: April 1, 2019  Content Version: 12.3  © 6929-0174 [employer], [employer]. Care instructions adapted under license by [facility] ([facility]). If you have questions about a medical condition or this instruction, always ask your healthcare professional. [address] any warranty or liability for your use of this information. 1.) Take bactrim as prescribed for infection of toe. Do warm epsom salt soaks a couple times daily. May apply triple antibiotic cream to outside of toenail. 2.) Use pen lac as prescribed for thickened toenail  3.) Follow up with podiatrist as scheduled  4. )If symptoms worsen or do not improve please follow-up with PCP or return to clinic stated

## 2024-02-19 NOTE — ED ADULT TRIAGE NOTE - CHIEF COMPLAINT QUOTE
BIBEMS for back pain, L sided numbness/tingling, nausea, headache x 2 hours. Code stroke called 0012

## 2024-02-19 NOTE — ED ADULT NURSE NOTE - NSFALLRISKINTERV_ED_ALL_ED

## 2024-02-19 NOTE — CONSULT NOTE ADULT - CONSULT REASON
Code stroke- Headache, nausea, difficulty ambulating due to generalized weakness
incidental aneurysms

## 2024-02-19 NOTE — CONSULT NOTE ADULT - ASSESSMENT
LKW: 2/17 unknown time  NIHSS:  5  Baseline MRS: 3  Not a Tenecteplase candidate due to out of window  Not a thrombectomy candidate due to out of window      Impression:        Recommendations:     [] Evaluate for and address any metabolic/infectious etiologies   [] PT/OT      Discussed with stroke fellow Dr. Nick Child under supervision of attending Dr. Rojo regarding decision against candidacy for Tenecteplase / thrombectomy. Will be formally staffed on morning rounds with attending. Recommendations will be complete once signed by attending.         LKW: 2/17 unknown time  NIHSS:  5  Baseline MRS: 3  Not a Tenecteplase candidate due to out of window  Not a thrombectomy candidate due to out of window      Impression:        Recommendations:     [] Evaluate for and address any metabolic/infectious etiologies   [] Manage vascular risk factors- SBP goal normotension  [] PT/OT      Discussed with stroke fellow Dr. Nick Child under supervision of attending Dr. Rojo regarding decision against candidacy for Tenecteplase / thrombectomy. Will be formally staffed on morning rounds with attending. Recommendations will be complete once signed by attending. JOE URIOSTEGUI is a 83y (1940) right handed woman with a PMHx significant for DM2, HTN, HLD, OA, Hiatal Hernia , PUD,  Uterine Prolapse, Papillary thyroid CA, s/p thyroidectomy, B/L hip replacements, recently discharged on 2/17/24 for pyelonephritis who presents with headache nausea, weakness and inability to ambulate. Code stroke called in ED as patient reported left sided numbness and tingling.  and /105. Upon assessment patient reports she hasn't felt well since discharge. Reports numbness and tingling in both arms and weakness in both legs, difficulty ambulating. Not on AC/AP due to history of PUD per daughter. She currently ambulates with walker/rollator. No history of stroke or TIA per patient. Noted to have subtle L NLF flattening. Per son at bedside, her face and smile appears to be at baseline.       LKW: 2/17 unknown time  NIHSS:  5  Baseline MRS: 3  Not a Tenecteplase candidate due to out of window  Not a thrombectomy candidate due to out of window    Impression:    Progressive generalized weakness likely deconditioning in setting of acute illness, poor baseline mobility. 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 Nick Child under supervision of attending Dr. Rjoo regarding decision against candidacy for Tenecteplase / thrombectomy. Will be formally staffed on morning rounds with attending. Recommendations will be complete once signed by attending. JOE URIOSTEGUI is a 83y (1940) right handed woman with a PMHx significant for DM2, HTN, HLD, OA, Hiatal Hernia , PUD,  Uterine Prolapse, Papillary thyroid CA, s/p thyroidectomy, B/L hip replacements, recently discharged on 2/17/24 for pyelonephritis who presents with headache nausea, weakness and inability to ambulate. Code stroke called in ED as patient reported left sided numbness and tingling.  and /105. Upon assessment patient reports she hasn't felt well since discharge. Reports numbness and tingling in both arms and weakness in both legs, difficulty ambulating. Not on AC/AP due to history of PUD per daughter. She currently ambulates with walker/rollator. No history of stroke or TIA per patient. Noted to have subtle L NLF flattening. Per son at bedside, her face and smile appears to be at baseline.       LKW: 2/17 unknown time  NIHSS:  5  Baseline MRS: 3  Not a Tenecteplase candidate due to out of window  Not a thrombectomy candidate due to out of window    Impression:    1. Progressive generalized weakness, paresthesias likely 2/2 deconditioning in setting of acute illness, poor baseline mobility, and hypertensive urgency. Lower concern at this time for acute ischemic infarct  2. 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 Nick Child under supervision of attending Dr. Rojo regarding decision against candidacy for Tenecteplase / thrombectomy. Will be formally staffed on morning rounds with attending. Recommendations will be complete once signed by attending. JOE URIOSTEGUI is a 83y (1940) right handed woman with a PMHx significant for DM2, HTN, HLD, OA, Hiatal Hernia , PUD,  Uterine Prolapse, Papillary thyroid CA, s/p thyroidectomy, B/L hip replacements, recently discharged on 2/17/24 for pyelonephritis who presents with headache nausea, weakness and inability to ambulate. Code stroke called in ED as patient reported left sided numbness and tingling.  and /105. Upon assessment patient reports she hasn't felt well since discharge. Reports numbness and tingling in both arms and weakness in both legs, difficulty ambulating. Not on AC/AP due to history of PUD per daughter. She currently ambulates with walker/rollator. No history of stroke or TIA per patient. Noted to have subtle L NLF flattening. Per son at bedside, her face and smile appears to be at baseline.       LKW: 2/17 unknown time  NIHSS:  5  Baseline MRS: 3  Not a Tenecteplase candidate due to out of window  Not a thrombectomy candidate due to out of window    Impression:    1. Progressive generalized weakness, paresthesias likely 2/2 deconditioning in setting of acute illness, poor baseline mobility, and hypertensive urgency. Lower concern at this time for acute ischemic infarct  2. 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 If patient does not have a Neurologist, can follow at [address] [place] [address]. [address]. [place] [address].  Patient can call [phone] to schedule this appointment.  If pt is without insurance, patient can follow up with [facility], located in [address] [place] [address] at [address] [place] [address]. Patient/family can call [phone] to schedule this appointment.      Discussed with stroke fellow Dr. Nick Child under supervision of attending Dr. Rojo regarding decision against candidacy for Tenecteplase / thrombectomy. Will be formally staffed on morning rounds with attending. Recommendations will be complete once signed by attending. JOE URIOSTEGUI is a 83y (1940) right handed woman with a PMHx significant for DM2, HTN, HLD, OA, Hiatal Hernia , PUD,  Uterine Prolapse, Papillary thyroid CA, s/p thyroidectomy, B/L hip replacements, recently discharged on 2/17/24 for pyelonephritis who presents with headache nausea, weakness and inability to ambulate. Code stroke called in ED as patient reported left sided numbness and tingling.  and /105. Upon assessment patient reports she hasn't felt well since discharge. Reports numbness and tingling in both arms and weakness in both legs, difficulty ambulating. Not on AC/AP due to history of PUD per daughter. She currently ambulates with walker/rollator. No history of stroke or TIA per patient. Noted to have subtle L NLF flattening. Per son at bedside, her face and smile appears to be at baseline.       LKW: 2/17 unknown time  NIHSS:  5  Baseline MRS: 3  Not a Tenecteplase candidate due to out of window  Not a thrombectomy candidate due to out of window    Impression:    1. Progressive generalized weakness, paresthesias likely 2/2 deconditioning in setting of acute illness, poor baseline mobility, and hypertensive urgency. Lower concern at this time for acute ischemic infarct  2. 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 If patient does not have a Neurologist, can follow at [address] [place] [address]. [address]. [place] [address].  Patient can call [phone] to schedule this appointment.  If pt is without insurance, patient can follow up with [facility], located in [address] [place] [address] at [address] [place] [address]. Patient/family can call [phone] to schedule this appointment.      Discussed with stroke fellow Dr. Nick Child under supervision of attending Dr. Rojo regarding decision against candidacy for Tenecteplase / thrombectomy. Will be formally staffed on morning rounds with attending. Recommendations will be complete once signed by attending.

## 2024-02-19 NOTE — ED PROVIDER NOTE - OTHER FINDINGS
ECG recorded at 00 28 independently interpreted by me, Dr Jerman Bolden, shows normal sinus rhythm normal axis normal intervals, significant baseline artifact in multiple leads limits interpretation however no acute diagnostic ischemic ST-T changes

## 2024-02-19 NOTE — ED PROVIDER NOTE - CLINICAL SUMMARY MEDICAL DECISION MAKING FREE TEXT BOX
See attending attestation See attending attestation      Melanie Jones DO PGY-3  HPI:   83-year-old female with history of type 2 diabetes, hypertension, hyperlipidemia, awake, PAD, papillary thyroid cancer, recent discharge from the hospital 2 days ago for urosepsis presents to the ED with headache, nausea, generalized weakness, nonspecific tingling of the upper and lower extremities more so in the left.  Unknown last known well states she has not felt well since she was discharged from the hospital, also complaining of some difficulty swallowing and feeling generalized fatigue.  Per EMS patient initially reported symptom onset 2 hours ago although patient states that occurred yesterday afternoon.    ROS: Denies fever, chest pain, shortness of breath, abdominal pain, vomiting, diarrhea.    PHYSICAL EXAM:  CONSTITUTIONAL: Nontoxic appearing, awake, alert, oriented to person, place, time/situation.  HEAD: Atraumatic, no step offs.  NECK: Supple, no meningismus.   EYES: Clear bilaterally, pupils equal, round and reactive to light.  ENMT: Airway patent, Nasal mucosa clear. Mouth with normal mucosa. Uvula is midline.   CARDIAC: Regular rate, regular rhythm.  RESPIRATORY: Breathing unlabored. Breath sounds clear and equal bilaterally.  ABDOMEN:  Soft, nontender, nondistended. No rebound tenderness or guarding.  FLANK: No CVA tenderness bilaterally.  NEUROLOGICAL: Alert and oriented, no focal deficits, no motor or sensory deficits. CN2-12 intact. Sensation intact x4 extremities.   MSK: No clubbing, cyanosis, or edema. Full range of motion of all extremities. No tenderness to palpation. No midline or paraspinal tenderness. No spinal step-offs.  SKIN: Skin warm and dry. No evidence of rashes or lesions.    MDM:   83-year-old female  presents to the ED with generalized weakness, nonspecific extremity tingling, generalized fatigue.  Activated as a code stroke.  Patient arrived hemodynamically stable, fingerstick normal, no focal neurodeficits on exam.  Initial differential includes but is not limited to CVA, hematologic or electrolyte abnormalities, toxic/metabolic encephalopathy, UTI.   Plan to obtain CT, neuro recommendations, labs, urinalysis, and reassess    Note: This is my initial impression and plan. Please refer to progress notes and disposition for updates on the patient's clinical course.

## 2024-02-19 NOTE — ED PROVIDER NOTE - NSFOLLOWUPINSTRUCTIONS_ED_ALL_ED_FT
You were seen in the ER today for tingling of your arms and leg.    While you were here you had CAT scans, labs, and an ECG that were performed which were discussed with you.     Please follow-up with a neurologist at  [address] [place] [address]. [address]. [place] [address].  You can call [phone] to schedule this appointment.    Please follow up with your primary care doctor within 1 - 3 days. Call and let them know you were seen in the ER today.   If you do not have a primary care doctor, please call [phone] to find one convenient for you.    Bring the results of your blood work and imaging with you to your appointment, if applicable.    Please return to the Emergency Department if you experience any new or concerning symptoms, such as, but not limited to: worsening or severe pain, large amount of bleeding, passing out, shaking chills, vision changes, chest pain, difficulty breathing, unable to eat or drink, continuous vomiting or diarrhea, or are unable to move or feel part of your body.

## 2024-02-19 NOTE — CONSULT NOTE ADULT - SUBJECTIVE AND OBJECTIVE BOX
p ([id number])     HPI: Humaira Yost  83F Hx DM/HTN/HLD/OA/PUD/Uterine-prolapase/Pap-thyroidCA recent Urosepsis p/f L sided numbness code stroke called. CTH wnl, CTA w/b/l ICA terminus aneurysm R3mm L2mm, seen on 11/2023 CTA prior to sepsis  Exam: neurointact        =====================  PAST MEDICAL HISTORY   Primary hypertension    HLD (hyperlipidemia)    Hiatal hernia    Osteoarthritis    Uterine prolapse    Constipation    Papillary carcinoma      PAST SURGICAL HISTORY   History of hip surgery    H/O thyroidectomy      penicillin (Anaphylaxis)      MEDICATIONS:  Antibiotics:    Neuro:    Other:  lactated ringers Bolus 500 milliLiter(s) IV Bolus once      SOCIAL HISTORY:   Occupation:   Marital Status:     FAMILY HISTORY:      ROS: Negative except per HPI    LABS:  PT/INR - ( 19 Feb 2024 00:48 )   PT: 12.7 sec;   INR: 1.22 ratio         PTT - ( 19 Feb 2024 00:48 )  PTT:33.2 sec                        12.2   10.15 )-----------( 365      ( 19 Feb 2024 00:48 )             35.6     02-19    137  |  96  |  15  ----------------------------<  117<H>  3.5   |  24  |  0.92    Ca    10.0      19 Feb 2024 00:48    TPro  7.0  /  Alb  3.5  /  TBili  0.4  /  DBili  x   /  AST  33  /  ALT  31  /  AlkPhos  119  02-19

## 2024-02-19 NOTE — ED ADULT NURSE NOTE - OBJECTIVE STATEMENT
Break Coverage RN. Pt is a 82yo F bibems c/o left sided numbness starting yesterday. Pt recently dc from [facility] yesterday d/t urosepsis. Pt also endorses HA, dizziness, difficulty ambulating, nausea. PMH of papillary carcinoma, constipation, HLD, HTN, uterine prolapse. Code stroke called at 0013. PERRL, b/l sensation intact in all 4 extremities, b/l LE weakness noted, no slurred speech noted. Denies  lightheadedness, blurred/double vision. PT A,Ox4, ambulatory with walker at baseline. Respirations even and unlabored, abd soft, nondistended and nontender, skin warm, dry and intact. Denies CP, SOB, v/d, fever, chills and urinary symptoms. Stretcher locked in lowest position, appropriate side rails up for safety, pt instructed to call for RN if anything needed.

## 2024-03-04 ENCOUNTER — APPOINTMENT (OUTPATIENT)
Dept: VASCULAR SURGERY | Facility: CLINIC | Age: 84
End: 2024-03-04
Payer: MEDICARE

## 2024-03-04 VITALS
TEMPERATURE: 97.8 F | DIASTOLIC BLOOD PRESSURE: 83 MMHG | SYSTOLIC BLOOD PRESSURE: 147 MMHG | HEIGHT: 59 IN | HEART RATE: 60 BPM | BODY MASS INDEX: 26.21 KG/M2 | WEIGHT: 130 LBS

## 2024-03-04 PROCEDURE — 93923 UPR/LXTR ART STDY 3+ LVLS: CPT

## 2024-03-04 PROCEDURE — 99213 OFFICE O/P EST LOW 20 MIN: CPT

## 2024-03-05 NOTE — ASSESSMENT
[FreeTextEntry1] : In summary, Mrs. 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 DWIGHT/PVR today were WNL. She is asymptomatic. She will follow up in six months for a surveillance duplex. Signs/symptoms of rupture were discussed with patient and son and she was instructed to present to ED and inform staff she has history of aneurysm should she experience these.

## 2024-03-05 NOTE — HISTORY OF PRESENT ILLNESS
[FreeTextEntry1] : Mrs. Yost presents in follow up for right CIAA (2.5cm). She was recently hospitalized at [facility] with UTI/bacteremia. A CT was performed which showed 2.5cm right CIAA. She was also diagnosed with intracerebral aneurysms and is following up with neurosurgery next week.  She denies any known family or personal history of aneurysm. She denies any abdominal/flank/groin pain. She denies any symptoms of claudication/rest pain/tissue loss. She is a non-smoker.

## 2024-03-05 NOTE — PHYSICAL EXAM
[Normal Breath Sounds] : Normal breath sounds [Normal Heart Sounds] : normal heart sounds [2+] : left 2+ [de-identified] : NAD [de-identified] : WNL [FreeTextEntry1] : No prominent popliteal pulses.

## 2024-03-06 ENCOUNTER — APPOINTMENT (OUTPATIENT)
Dept: ULTRASOUND IMAGING | Facility: CLINIC | Age: 84
End: 2024-03-06
Payer: MEDICARE

## 2024-03-06 ENCOUNTER — OUTPATIENT (OUTPATIENT)
Dept: OUTPATIENT SERVICES | Facility: HOSPITAL | Age: 84
LOS: 1 days | End: 2024-03-06
Payer: MEDICARE

## 2024-03-06 DIAGNOSIS — S72.001A FRACTURE OF UNSPECIFIED PART OF NECK OF RIGHT FEMUR, INITIAL ENCOUNTER FOR CLOSED FRACTURE: Chronic | ICD-10-CM

## 2024-03-06 DIAGNOSIS — E89.0 POSTPROCEDURAL HYPOTHYROIDISM: Chronic | ICD-10-CM

## 2024-03-06 DIAGNOSIS — Z00.8 ENCOUNTER FOR OTHER GENERAL EXAMINATION: ICD-10-CM

## 2024-03-06 DIAGNOSIS — Z98.890 OTHER SPECIFIED POSTPROCEDURAL STATES: Chronic | ICD-10-CM

## 2024-03-06 PROCEDURE — 76700 US EXAM ABDOM COMPLETE: CPT | Mod: 26

## 2024-03-06 PROCEDURE — 76857 US EXAM PELVIC LIMITED: CPT | Mod: 26

## 2024-03-06 PROCEDURE — 76857 US EXAM PELVIC LIMITED: CPT

## 2024-03-06 PROCEDURE — 76700 US EXAM ABDOM COMPLETE: CPT

## 2024-03-07 ENCOUNTER — APPOINTMENT (OUTPATIENT)
Dept: INFECTIOUS DISEASE | Facility: CLINIC | Age: 84
End: 2024-03-07

## 2024-03-08 RX ORDER — OLMESARTAN MEDOXOMIL 5 MG/1
1 TABLET, FILM COATED ORAL
Refills: 0 | DISCHARGE

## 2024-03-08 RX ORDER — CARVEDILOL PHOSPHATE 80 MG/1
1 CAPSULE, EXTENDED RELEASE ORAL
Refills: 0 | DISCHARGE

## 2024-03-08 RX ORDER — AMITRIPTYLINE HCL 25 MG
1 TABLET ORAL
Refills: 0 | DISCHARGE

## 2024-03-08 RX ORDER — HYDRALAZINE HCL 50 MG
1 TABLET ORAL
Refills: 0 | DISCHARGE

## 2024-03-08 RX ORDER — DILTIAZEM HCL 120 MG
1 CAPSULE, EXT RELEASE 24 HR ORAL
Refills: 0 | DISCHARGE

## 2024-03-13 ENCOUNTER — APPOINTMENT (OUTPATIENT)
Dept: NEUROSURGERY | Facility: CLINIC | Age: 84
End: 2024-03-13
Payer: MEDICARE

## 2024-03-13 ENCOUNTER — NON-APPOINTMENT (OUTPATIENT)
Age: 84
End: 2024-03-13

## 2024-03-13 VITALS
HEART RATE: 54 BPM | HEIGHT: 59 IN | WEIGHT: 130 LBS | DIASTOLIC BLOOD PRESSURE: 75 MMHG | BODY MASS INDEX: 26.21 KG/M2 | SYSTOLIC BLOOD PRESSURE: 141 MMHG | OXYGEN SATURATION: 95 %

## 2024-03-13 DIAGNOSIS — I67.1 CEREBRAL ANEURYSM, NONRUPTURED: ICD-10-CM

## 2024-03-13 PROBLEM — I10 ESSENTIAL (PRIMARY) HYPERTENSION: Chronic | Status: ACTIVE | Noted: 2024-02-12

## 2024-03-13 PROBLEM — N81.4 UTEROVAGINAL PROLAPSE, UNSPECIFIED: Chronic | Status: ACTIVE | Noted: 2024-02-12

## 2024-03-13 PROBLEM — K44.9 DIAPHRAGMATIC HERNIA WITHOUT OBSTRUCTION OR GANGRENE: Chronic | Status: ACTIVE | Noted: 2024-02-12

## 2024-03-13 PROBLEM — C80.1 MALIGNANT (PRIMARY) NEOPLASM, UNSPECIFIED: Chronic | Status: ACTIVE | Noted: 2024-02-12

## 2024-03-13 PROBLEM — M19.90 UNSPECIFIED OSTEOARTHRITIS, UNSPECIFIED SITE: Chronic | Status: ACTIVE | Noted: 2024-02-12

## 2024-03-13 PROBLEM — E78.5 HYPERLIPIDEMIA, UNSPECIFIED: Chronic | Status: ACTIVE | Noted: 2024-02-12

## 2024-03-13 PROBLEM — K59.00 CONSTIPATION, UNSPECIFIED: Chronic | Status: ACTIVE | Noted: 2024-02-12

## 2024-03-13 PROCEDURE — 99213 OFFICE O/P EST LOW 20 MIN: CPT

## 2024-03-13 PROCEDURE — 99203 OFFICE O/P NEW LOW 30 MIN: CPT

## 2024-03-13 NOTE — ASSESSMENT
[FreeTextEntry1] : IMPRESSION: 83F with PMH of HTN, HLD, GERD, MVP, OA, CKD, iron deficiency anemia, hypothyroidism, duodenal ulcer, CIAA following vascular, with incidental 1-2mm saccular aneurysms of bilateral supraclinoid ICAs initially found on CTA 10/9/23 upon work-up for  chronic intermittent paresthesia/weakness of left side. Agan seen on repeat CTA 11/3/23 when p/w HA, high BP, LUE numbness. Redemonstrated small 2mm aneurysms arising from the supraclinoid segments of bilateral ICAs, representing infundibulum vs. aneurysms. No family history of cerebral aneurysms or smoking history.  Counseled patient and family on the natural history of aneurysms, discussed risk of aneurysm rupture with the patient. The risk of aneurysm rupture is related to the size and location. Due to the small size of her aneurysms, it has very low risk of rupturing which is approximately less than 1% per year. Risks of treatment outweigh the benefits in her case. Recommend conservative management with serial scans. If growth seen on interval scans, discussed aneurysm treatment options including minimally invasive endovascular intervention with embolization, or surgically open clipping of aneurysm. Educated the patient and family on signs and symptoms of aneurysm rupture or subarachnoid hemorrhage. Should she have severe, sudden onset worst headache of her life, advised that she must seek medical attention immediately and go to the emergency room if this occurs.     PLAN: MRA head non con in 1 year from previous imaging - November 2024 Follow up after to review results

## 2024-03-13 NOTE — HISTORY OF PRESENT ILLNESS
[de-identified] : JOE URIOSTEGUI is an 83 year old female with PMH of HTN, HLD, GERD, mitral valve prolapse, iron deficiency anemia, hypothyroidism, duodenal ulcer, osteoarthritis. Follows vascular surgery for common iliac artery aneurysm. She has had chronic intermittent paresthesia/weakness of left side, had multiple CT head/CTA head over last few months. Incidental 1-2mm saccular aneurysms identified along the posterior aspects of the bilateral supraclinoid ICAs were found on CTA 10/9/23 during admission to [facility]. More recently presented to [facility] on 11/3/23 with headaches, high blood pressure, and episode of LUE numbness. CTA Head/neck performed redemonstrated small 2mm aneurysms arising from the supraclinoid segments of bilateral ICAs, representing infundibulum vs. aneurysms. Denies known family history of cerebral aneurysms or aneurysm rupture. She is not a current smoker.

## 2024-03-13 NOTE — PHYSICAL EXAM
[Person] : oriented to person [Place] : oriented to place [Time] : oriented to time [FreeTextEntry8] : uses wheelchair for assistance with ambulation

## 2024-03-13 NOTE — DATA REVIEWED
[de-identified] : CT head 11/16/23, 11/3/23 [de-identified] : CTA head and neck 11/3/23 - [facility] CTA head and neck 10/9/23 - KAMALJIT VS [de-identified] : MRI brain 10/10/23

## 2024-03-28 ENCOUNTER — INPATIENT (INPATIENT)
Facility: HOSPITAL | Age: 84
LOS: 4 days | Discharge: HOME | DRG: 690 | End: 2024-04-02
Attending: INTERNAL MEDICINE | Admitting: INTERNAL MEDICINE
Payer: MEDICARE

## 2024-03-28 VITALS
TEMPERATURE: 98 F | DIASTOLIC BLOOD PRESSURE: 93 MMHG | SYSTOLIC BLOOD PRESSURE: 161 MMHG | RESPIRATION RATE: 19 BRPM | HEART RATE: 73 BPM | OXYGEN SATURATION: 98 % | HEIGHT: 59 IN | WEIGHT: 130.07 LBS

## 2024-03-28 DIAGNOSIS — N39.0 URINARY TRACT INFECTION, SITE NOT SPECIFIED: ICD-10-CM

## 2024-03-28 DIAGNOSIS — S72.001A FRACTURE OF UNSPECIFIED PART OF NECK OF RIGHT FEMUR, INITIAL ENCOUNTER FOR CLOSED FRACTURE: Chronic | ICD-10-CM

## 2024-03-28 DIAGNOSIS — E89.0 POSTPROCEDURAL HYPOTHYROIDISM: Chronic | ICD-10-CM

## 2024-03-28 DIAGNOSIS — Z98.890 OTHER SPECIFIED POSTPROCEDURAL STATES: Chronic | ICD-10-CM

## 2024-03-28 LAB
ALBUMIN SERPL ELPH-MCNC: 3.9 G/DL — SIGNIFICANT CHANGE UP (ref 3.3–5)
ALP SERPL-CCNC: 81 U/L — SIGNIFICANT CHANGE UP (ref 40–120)
ALT FLD-CCNC: 14 U/L — SIGNIFICANT CHANGE UP (ref 10–45)
ANION GAP SERPL CALC-SCNC: 14 MMOL/L — SIGNIFICANT CHANGE UP (ref 5–17)
APPEARANCE UR: ABNORMAL
AST SERPL-CCNC: 16 U/L — SIGNIFICANT CHANGE UP (ref 10–40)
BACTERIA # UR AUTO: ABNORMAL /HPF
BASOPHILS # BLD AUTO: 0.06 K/UL — SIGNIFICANT CHANGE UP (ref 0–0.2)
BASOPHILS NFR BLD AUTO: 0.7 % — SIGNIFICANT CHANGE UP (ref 0–2)
BILIRUB SERPL-MCNC: 0.5 MG/DL — SIGNIFICANT CHANGE UP (ref 0.2–1.2)
BILIRUB UR-MCNC: NEGATIVE — SIGNIFICANT CHANGE UP
BUN SERPL-MCNC: 13 MG/DL — SIGNIFICANT CHANGE UP (ref 7–23)
CALCIUM SERPL-MCNC: 9.9 MG/DL — SIGNIFICANT CHANGE UP (ref 8.4–10.5)
CAST: 1 /LPF — SIGNIFICANT CHANGE UP (ref 0–4)
CHLORIDE SERPL-SCNC: 100 MMOL/L — SIGNIFICANT CHANGE UP (ref 96–108)
CO2 SERPL-SCNC: 24 MMOL/L — SIGNIFICANT CHANGE UP (ref 22–31)
COLOR SPEC: YELLOW — SIGNIFICANT CHANGE UP
CREAT SERPL-MCNC: 0.88 MG/DL — SIGNIFICANT CHANGE UP (ref 0.5–1.3)
DIFF PNL FLD: ABNORMAL
EGFR: 66 ML/MIN/1.73M2 — SIGNIFICANT CHANGE UP
EOSINOPHIL # BLD AUTO: 0.05 K/UL — SIGNIFICANT CHANGE UP (ref 0–0.5)
EOSINOPHIL NFR BLD AUTO: 0.5 % — SIGNIFICANT CHANGE UP (ref 0–6)
GLUCOSE SERPL-MCNC: 115 MG/DL — HIGH (ref 70–99)
GLUCOSE UR QL: NEGATIVE MG/DL — SIGNIFICANT CHANGE UP
HCT VFR BLD CALC: 35.4 % — SIGNIFICANT CHANGE UP (ref 34.5–45)
HGB BLD-MCNC: 11.5 G/DL — SIGNIFICANT CHANGE UP (ref 11.5–15.5)
IMM GRANULOCYTES NFR BLD AUTO: 0.2 % — SIGNIFICANT CHANGE UP (ref 0–0.9)
KETONES UR-MCNC: NEGATIVE MG/DL — SIGNIFICANT CHANGE UP
LEUKOCYTE ESTERASE UR-ACNC: ABNORMAL
LYMPHOCYTES # BLD AUTO: 0.97 K/UL — LOW (ref 1–3.3)
LYMPHOCYTES # BLD AUTO: 10.5 % — LOW (ref 13–44)
MCHC RBC-ENTMCNC: 30.6 PG — SIGNIFICANT CHANGE UP (ref 27–34)
MCHC RBC-ENTMCNC: 32.5 GM/DL — SIGNIFICANT CHANGE UP (ref 32–36)
MCV RBC AUTO: 94.1 FL — SIGNIFICANT CHANGE UP (ref 80–100)
MONOCYTES # BLD AUTO: 0.58 K/UL — SIGNIFICANT CHANGE UP (ref 0–0.9)
MONOCYTES NFR BLD AUTO: 6.3 % — SIGNIFICANT CHANGE UP (ref 2–14)
NEUTROPHILS # BLD AUTO: 7.53 K/UL — HIGH (ref 1.8–7.4)
NEUTROPHILS NFR BLD AUTO: 81.8 % — HIGH (ref 43–77)
NITRITE UR-MCNC: NEGATIVE — SIGNIFICANT CHANGE UP
NRBC # BLD: 0 /100 WBCS — SIGNIFICANT CHANGE UP (ref 0–0)
PH UR: 7.5 — SIGNIFICANT CHANGE UP (ref 5–8)
PLATELET # BLD AUTO: 295 K/UL — SIGNIFICANT CHANGE UP (ref 150–400)
POTASSIUM SERPL-MCNC: 3.6 MMOL/L — SIGNIFICANT CHANGE UP (ref 3.5–5.3)
POTASSIUM SERPL-SCNC: 3.6 MMOL/L — SIGNIFICANT CHANGE UP (ref 3.5–5.3)
PROT SERPL-MCNC: 7.4 G/DL — SIGNIFICANT CHANGE UP (ref 6–8.3)
PROT UR-MCNC: 30 MG/DL
RBC # BLD: 3.76 M/UL — LOW (ref 3.8–5.2)
RBC # FLD: 14.4 % — SIGNIFICANT CHANGE UP (ref 10.3–14.5)
RBC CASTS # UR COMP ASSIST: 40 /HPF — HIGH (ref 0–4)
SODIUM SERPL-SCNC: 138 MMOL/L — SIGNIFICANT CHANGE UP (ref 135–145)
SP GR SPEC: 1 — SIGNIFICANT CHANGE UP (ref 1–1.03)
SQUAMOUS # UR AUTO: 0 /HPF — SIGNIFICANT CHANGE UP (ref 0–5)
UROBILINOGEN FLD QL: 0.2 MG/DL — SIGNIFICANT CHANGE UP (ref 0.2–1)
WBC # BLD: 9.21 K/UL — SIGNIFICANT CHANGE UP (ref 3.8–10.5)
WBC # FLD AUTO: 9.21 K/UL — SIGNIFICANT CHANGE UP (ref 3.8–10.5)
WBC UR QL: 284 /HPF — HIGH (ref 0–5)

## 2024-03-28 PROCEDURE — 99285 EMERGENCY DEPT VISIT HI MDM: CPT

## 2024-03-28 PROCEDURE — 74177 CT ABD & PELVIS W/CONTRAST: CPT | Mod: 26,MC

## 2024-03-28 RX ORDER — BUPROPION HYDROCHLORIDE 150 MG/1
1 TABLET, EXTENDED RELEASE ORAL
Qty: 0 | Refills: 0 | DISCHARGE

## 2024-03-28 RX ORDER — ACETAMINOPHEN 500 MG
650 TABLET ORAL EVERY 6 HOURS
Refills: 0 | Status: DISCONTINUED | OUTPATIENT
Start: 2024-03-28 | End: 2024-04-02

## 2024-03-28 RX ORDER — LEVOTHYROXINE SODIUM 125 MCG
75 TABLET ORAL DAILY
Refills: 0 | Status: DISCONTINUED | OUTPATIENT
Start: 2024-03-28 | End: 2024-04-02

## 2024-03-28 RX ORDER — CARVEDILOL PHOSPHATE 80 MG/1
25 CAPSULE, EXTENDED RELEASE ORAL EVERY 12 HOURS
Refills: 0 | Status: DISCONTINUED | OUTPATIENT
Start: 2024-03-28 | End: 2024-04-02

## 2024-03-28 RX ORDER — ATORVASTATIN CALCIUM 80 MG/1
10 TABLET, FILM COATED ORAL AT BEDTIME
Refills: 0 | Status: DISCONTINUED | OUTPATIENT
Start: 2024-03-28 | End: 2024-04-02

## 2024-03-28 RX ORDER — ACETAMINOPHEN 500 MG
1 TABLET ORAL
Qty: 0 | Refills: 0 | DISCHARGE

## 2024-03-28 RX ORDER — DILTIAZEM HCL 120 MG
120 CAPSULE, EXT RELEASE 24 HR ORAL DAILY
Refills: 0 | Status: DISCONTINUED | OUTPATIENT
Start: 2024-03-28 | End: 2024-03-30

## 2024-03-28 RX ORDER — CARVEDILOL PHOSPHATE 80 MG/1
1 CAPSULE, EXTENDED RELEASE ORAL
Qty: 60 | Refills: 0

## 2024-03-28 RX ORDER — PANTOPRAZOLE SODIUM 20 MG/1
40 TABLET, DELAYED RELEASE ORAL
Refills: 0 | Status: DISCONTINUED | OUTPATIENT
Start: 2024-03-28 | End: 2024-04-02

## 2024-03-28 RX ORDER — ALPRAZOLAM 0.25 MG
1 TABLET ORAL DAILY
Refills: 0 | Status: DISCONTINUED | OUTPATIENT
Start: 2024-03-28 | End: 2024-04-02

## 2024-03-28 RX ORDER — CEFTRIAXONE 500 MG/1
1000 INJECTION, POWDER, FOR SOLUTION INTRAMUSCULAR; INTRAVENOUS EVERY 24 HOURS
Refills: 0 | Status: COMPLETED | OUTPATIENT
Start: 2024-03-28 | End: 2024-04-02

## 2024-03-28 RX ORDER — METRONIDAZOLE 500 MG
500 TABLET ORAL EVERY 8 HOURS
Refills: 0 | Status: DISCONTINUED | OUTPATIENT
Start: 2024-03-28 | End: 2024-03-29

## 2024-03-28 RX ORDER — ACETAMINOPHEN 500 MG
2 TABLET ORAL
Refills: 0 | DISCHARGE

## 2024-03-28 RX ORDER — HYDRALAZINE HCL 50 MG
50 TABLET ORAL
Refills: 0 | Status: DISCONTINUED | OUTPATIENT
Start: 2024-03-28 | End: 2024-03-30

## 2024-03-28 RX ORDER — OMEPRAZOLE 10 MG/1
1 CAPSULE, DELAYED RELEASE ORAL
Refills: 0 | DISCHARGE

## 2024-03-28 RX ORDER — BUPROPION HYDROCHLORIDE 150 MG/1
150 TABLET, EXTENDED RELEASE ORAL DAILY
Refills: 0 | Status: DISCONTINUED | OUTPATIENT
Start: 2024-03-28 | End: 2024-04-02

## 2024-03-28 RX ORDER — AMITRIPTYLINE HCL 25 MG
25 TABLET ORAL AT BEDTIME
Refills: 0 | Status: DISCONTINUED | OUTPATIENT
Start: 2024-03-28 | End: 2024-04-02

## 2024-03-28 RX ORDER — CEFTRIAXONE 500 MG/1
1000 INJECTION, POWDER, FOR SOLUTION INTRAMUSCULAR; INTRAVENOUS ONCE
Refills: 0 | Status: COMPLETED | OUTPATIENT
Start: 2024-03-28 | End: 2024-03-28

## 2024-03-28 RX ORDER — LOSARTAN POTASSIUM 100 MG/1
100 TABLET, FILM COATED ORAL DAILY
Refills: 0 | Status: DISCONTINUED | OUTPATIENT
Start: 2024-03-28 | End: 2024-03-30

## 2024-03-28 RX ORDER — HEPARIN SODIUM 5000 [USP'U]/ML
5000 INJECTION INTRAVENOUS; SUBCUTANEOUS EVERY 8 HOURS
Refills: 0 | Status: DISCONTINUED | OUTPATIENT
Start: 2024-03-28 | End: 2024-04-02

## 2024-03-28 RX ORDER — LOVASTATIN 20 MG
1 TABLET ORAL
Refills: 0 | DISCHARGE

## 2024-03-28 RX ORDER — ALPRAZOLAM 0.25 MG
1 TABLET ORAL
Qty: 0 | Refills: 0 | DISCHARGE

## 2024-03-28 RX ORDER — LEVOTHYROXINE SODIUM 125 MCG
1 TABLET ORAL
Qty: 0 | Refills: 0 | DISCHARGE

## 2024-03-28 RX ORDER — ASCORBIC ACID 60 MG
1 TABLET,CHEWABLE ORAL
Qty: 0 | Refills: 0 | DISCHARGE

## 2024-03-28 RX ADMIN — Medication 25 MILLIGRAM(S): at 23:38

## 2024-03-28 RX ADMIN — Medication 1 MILLIGRAM(S): at 23:39

## 2024-03-28 RX ADMIN — Medication 50 MILLIGRAM(S): at 21:56

## 2024-03-28 RX ADMIN — ATORVASTATIN CALCIUM 10 MILLIGRAM(S): 80 TABLET, FILM COATED ORAL at 21:56

## 2024-03-28 RX ADMIN — CARVEDILOL PHOSPHATE 25 MILLIGRAM(S): 80 CAPSULE, EXTENDED RELEASE ORAL at 20:37

## 2024-03-28 RX ADMIN — CEFTRIAXONE 100 MILLIGRAM(S): 500 INJECTION, POWDER, FOR SOLUTION INTRAMUSCULAR; INTRAVENOUS at 16:36

## 2024-03-28 RX ADMIN — Medication 100 MILLIGRAM(S): at 21:55

## 2024-03-28 NOTE — ED PROVIDER NOTE - NSICDXPASTMEDICALHX_GEN_ALL_CORE_FT
PAST MEDICAL HISTORY:  Arteriosclerotic heart disease (ASHD)     Constipation     COVID-19 vaccination declined     Fe deficiency anemia     Female bladder prolapse     GERD (gastroesophageal reflux disease)     H/O mitral valve prolapse     Hiatal hernia     Hiatal hernia with GERD     HLD (hyperlipidemia)     Hypertension     Hypothyroid     Macular degeneration     Osteoarthritis     Osteoarthritis     Papillary carcinoma     Primary hypertension     Tracheal nodule     Uterine prolapse

## 2024-03-28 NOTE — ED PROVIDER NOTE - OBJECTIVE STATEMENT
83-year-old female patient past medical history aneurysm, prolapsed bladder brought in by son complains of urinary incontinence and dysuria since last night.  Patient follows up with uro-– GYN and has multiple pessaries that do not work.  Patient had pessary removed yesterday.  Patient also noticed a few drops of blood in urine yesterday.  As per son patient was admitted here 1.5 months ago for sepsis in the setting of UTI.

## 2024-03-28 NOTE — H&P ADULT - NSHPPHYSICALEXAM_GEN_ALL_CORE
PHYSICAL EXAMINATION:  Vital Signs Last 24 Hrs  T(C): 36.3 (28 Mar 2024 17:06), Max: 36.7 (28 Mar 2024 15:13)  T(F): 97.3 (28 Mar 2024 17:06), Max: 98.1 (28 Mar 2024 15:13)  HR: 59 (28 Mar 2024 17:06) (59 - 73)  BP: 116/62 (28 Mar 2024 17:06) (116/62 - 161/93)  BP(mean): 100 (28 Mar 2024 15:13) (100 - 100)  RR: 18 (28 Mar 2024 17:06) (16 - 19)  SpO2: 96% (28 Mar 2024 17:06) (96% - 98%)    Parameters below as of 28 Mar 2024 17:06  Patient On (Oxygen Delivery Method): room air      CAPILLARY BLOOD GLUCOSE          GENERAL: NAD   HEAD:  atraumatic, normocephalic  EYES: sclera anicteric  ENMT: mucous membranes moist  NECK: supple, No JVD  CHEST/LUNG: clear to auscultation bilaterally; no rales, rhonchi, or wheezing b/l  HEART: normal S1, S2  ABDOMEN: BS+, soft, ND, suprapubic tender    EXTREMITIES:  pulses palpable; no clubbing, cyanosis, or edema b/l LEs  NEURO: awake, alert, interactive; moves all extremities  SKIN: no rashes or lesions

## 2024-03-28 NOTE — H&P ADULT - NSICDXPASTMEDICALHX_GEN_ALL_CORE_FT
PAST MEDICAL HISTORY:  Arteriosclerotic heart disease (ASHD)     Constipation     COVID-19 vaccination declined     Fe deficiency anemia     Female bladder prolapse     GERD (gastroesophageal reflux disease)     H/O mitral valve prolapse     Hiatal hernia     Hiatal hernia with GERD     History of celiac disease     HLD (hyperlipidemia)     Hypertension     Hypothyroid     Macular degeneration     Osteoarthritis     Osteoarthritis     Papillary carcinoma     Primary hypertension     Tracheal nodule     Uterine prolapse

## 2024-03-28 NOTE — ED PROVIDER NOTE - PHYSICAL EXAMINATION
GENERAL: NAD  HEENT:  Atraumatic  CHEST/LUNG: Chest rise equal bilaterally  HEART: Regular rate and rhythm  ABDOMEN: Soft, Nontender, Nondistended. No flank tenderness.  EXTREMITIES:  Extremities warm  PSYCH: A&Ox3  SKIN: No obvious rashes or lesions GENERAL: NAD  HEENT:  Atraumatic  CHEST/LUNG: Chest rise equal bilaterally  HEART: Regular rate and rhythm  ABDOMEN: Soft, Nontender, Nondistended. No flank tenderness.  EXTREMITIES:  Extremities warm  PSYCH: A&Ox3  SKIN: No obvious rashes or lesions      Attestation Attending MD Anand : Gu exam performed with HAL villagomez. Bilateral adnexal tenderness. Mild erythema at introitus without pain, swelling or discharge. No discharge from vaginal canal. Cervix normal without TTP. Speculum exam deferred.   Lower abdominal TTP.

## 2024-03-28 NOTE — ED PROVIDER NOTE - NSICDXPASTSURGICALHX_GEN_ALL_CORE_FT
PAST SURGICAL HISTORY:  Closed right hip fracture     H/O ovarian cystectomy     H/O thyroidectomy     H/O thyroidectomy     History of hip surgery

## 2024-03-28 NOTE — H&P ADULT - HISTORY OF PRESENT ILLNESS
83-year-old female patient past medical history aneurysm, prolapsed bladder brought in by daughter complains of urinary incontinence and dysuria since last night.  Patient follows up with uro-– GYN and has multiple pessaries that do not work.  Patient had pessary removed yesterday.  Patient also noticed a few drops of blood in urine yesterday.  As per patient was admitted here 1.5 months ago for sepsis in the setting of UTI.

## 2024-03-28 NOTE — ED PROVIDER NOTE - NS ED ATTENDING STATEMENT MOD
I have seen and examined this patient and fully participated in the care of this patient as the teaching attending.  The service was shared with the MAGDA.  I reviewed and verified the documentation.

## 2024-03-28 NOTE — H&P ADULT - NSHPSOCIALHISTORY_GEN_ALL_CORE
Social History:    Marital Status:  (   )    (   ) Single    (   )    ( x )   Occupation:   Lives with: (  ) alone  (x  ) children   (  ) spouse   (  ) parents  (  ) other    Substance Use (street drugs): (  x) never used  (  ) other:  Tobacco Usage:  (x   ) never smoked   (   ) former smoker   (   ) current smoker  (     ) pack years  (        ) last cigarette date  Alcohol Usage:denies     (     ) Advanced Directives: (     ) None    (      ) DNR    (     ) DNI    (     ) Health Care Proxy:

## 2024-03-28 NOTE — ED PROVIDER NOTE - CARE PLAN
Chief Complaint   Patient presents with   • Gyn Exam              This is a 62 year old  postmenopausal who presents today for an annual gyn exam.  She is doing overall well.       ROS:  Constitutional:  Denies F/C/fatigue/unexpected change in weight  Eyes:  Denies change in vision/blurred vision  HEENT:  Denies HA/change in hearing/runny nose/sore throat  Cardiac:  Denies CP/palpitations  Resp:  Denies SOB/cough/wheeze/dyspnea  GI:  Denies N/V/diarrhea/constipation/blood in stool  :  Denies unusual vaginal discharge/vaginal bleeding/dysuria/change in urinary frequency/urinary incontinence  MSK:  Denies mm aches/mm atrophy/joint pains/joint effusions  Neuro:  Denies seizures/blackouts/numbness/tingling  Skin:  Denies rashes/lesions  Psych:  Denies depression/anxiety  Endo:  Denies heat/cold, increased sweating  Heme/Lymph:  Denies easy bruising/bleeding/swollen glands    Past Medical History:   Diagnosis Date   • Alcohol use disorder, severe, in sustained remission, dependence (CMS/[facility]) 2021   • Anxiety 2019   • Benign essential HTN 2019   • Bilateral carpal tunnel syndrome 10/15/2021   • Bipolar 1 disorder, depressed, severe (CMS/[facility]) 2020   • Cannabis use disorder, moderate, in sustained remission (CMS/[facility]) 2021   • Chronic pain 11/10/2016   • Cocaine use disorder, severe, in sustained remission, dependence (CMS/[facility]) 2021   • GERD without esophagitis 2021   • IBS (irritable bowel syndrome) 2021   • Moderate persistent asthma without complication 2012   • Neuropathic pain 2021   • PTSD (post-traumatic stress disorder) 2019   • Tobacco use 2019       Past Surgical History:   Procedure Laterality Date   •  section, low transverse     • Laparoscopy, cholecystectomy         OB History    Para Term  AB Living   9 6 6   3 6   SAB IAB Ectopic Molar Multiple Live Births             6      # Outcome Date GA Lbr Dino/2nd  Weight Sex Delivery Anes PTL Lv   9 AB            8 AB            7 AB            6 Term      CS-LTranv   JOSE A      Complications: Arrest of dilation, delivered, current hospitalization   5 Term      Vag-Spont   JOSE A   4 Term      Vag-Spont   JOSE A   3 Term      Vag-Spont   JOSE A   2 Term      Vag-Spont   JOSE A   1 Term      Vag-Spont   JOSE A      Obstetric Comments   GYN: Menopause 52.  +Remote h/o abnormal Pap smears->normal colpo.  Last Pap smear 7/16/21 Neg/HPV neg.  +syphilis 1991.  Has sex with men only.  Been in a monogamous relationship since 12/2022        Social History     Tobacco Use   • Smoking status: Every Day     Packs/day: 0.50     Types: Cigarettes   • Smokeless tobacco: Never   Vaping Use   • Vaping Use: never used   Substance Use Topics   • Alcohol use: Not Currently     Comment: hx alcoholic per patient   • Drug use: Not Currently     Frequency: 1.0 times per week     Types: Cannabinols       Family History   Problem Relation Age of Onset   • Anxiety disorder Mother    • Bipolar disorder Mother    • Leukemia Mother    • Dementia/Alzheimers Father    • Schizophrenia Daughter    • Bipolar disorder Daughter    • Bipolar disorder Daughter        Current Outpatient Medications   Medication Sig Dispense Refill   • acetaminophen (Tylenol 8 Hour) 650 MG CR tablet Take 1 tablet by mouth every 6 hours as needed for Pain. 270 tablet 1   • amLODIPine (NORVASC) 10 MG tablet Take 1 tablet by mouth daily. 90 tablet 1   • busPIRone (BUSPAR) 10 MG tablet Take 1 tablet by mouth in the morning and 1 tablet at noon and 1 tablet in the evening. 90 tablet 1   • cyclobenzaprine (FLEXERIL) 5 MG tablet Take 1 tablet by mouth 3 times daily as needed for Muscle spasms. 90 tablet 0   • loratadine (CLARITIN) 10 MG tablet Take 1 tablet by mouth daily as needed for Allergies. 30 tablet 0   • pantoprazole (Protonix) 40 MG tablet Take 1 tablet by mouth daily. 90 tablet 1   • sertraline (ZOLOFT) 50 MG tablet Take 1 tablet by mouth daily.  90 tablet 1   • lidocaine (LIDODERM) 5 % patch Place 1 patch onto the skin every 24 hours. Remove patch 12 hours after applying 30 patch 1   • ipratropium-albuterol (DUONEB) 0.5-2.5 (3) MG/3ML nebulizer solution USE 3 ML VIA NEBULIZER EVERY 6 HOURS AS NEEDED FOR WHEEZING 90 mL 1   • clobetasol (TEMOVATE) 0.05 % ointment Apply topically 2 times daily. To itchy/scaly areas on the hands. Occlude with white cotton gloves if possible 45 g 3   • ipratropium (ATROVENT) 0.06 % nasal spray Spray 2 sprays in each nostril in the morning and 2 sprays at noon and 2 sprays in the evening. 15 mL 0   • colchicine 0.6 MG capsule Take 2 pills now, and then 1 pill after 1 hour.  24 hours later begin once daily dosing for up to 4 days as needed if pain persists. 7 capsule 0   • gabapentin (NEURONTIN) 100 MG capsule Take 1 capsule by mouth 3 times daily. 90 capsule 0   • albuterol 108 (90 Base) MCG/ACT inhaler Inhale 2 puffs into the lungs every 4 hours as needed for Shortness of Breath or Wheezing. 1 each 0     No current facility-administered medications for this visit.       ALLERGIES:  Aspirin    Physical Exam: Chaperone: SADIA Maldonado  Visit Vitals  /79   Pulse 69   Ht 5' 8\" (1.727 m)   Wt 76.7 kg (169 lb 3.2 oz)   BMI 25.73 kg/m²       GEN:  No acute distress, adequate nutritional status  EYES:  Extraocular muscles intact.  No conjunctival erythema or scleral icterus.    HEENT:  Normocephalic, atraumatic.  Moist mucous membranes.    RESPIRATORY:  Nonlabored breathing without use of accessory muscles.   ABDOMEN:  No masses on examination.  Soft, nondistended, nontender.    MUSCULOSKELETAL:  Normal stance and gait.  No clubbing, cyanosis or edema.  NEUROLOGIC:  Cranial nerves 2-12 grossly intact.  SKIN:  Skin peeling on hands bilaterally. Warm and dry to palpation.   PSYCHIATRIC:  Mood stable, affect congruent.  Alert and oriented x3.  Intact recent and remote history.  GENITOURINARY:  Inspection of external genitalia reveals  normal mons pubis, pubic hair distribution, labia minora, majora and clitoris.   Urethra:  No masses, tenderness, or scarring.  Sterile speculum exam reveals pink and moist vaginal mucosa.  No unusual vaginal discharge.  Cervix is free from any lesions.  No cervical motion tenderness.    Uterus normal in size, contour.  No uterine tenderness.  No adnexal tenderness or masses palpated.    Anus free from lesions or hemorrhoids.   BREASTS:  Soft, symmetrical, and nontender without discrete masses, dimpling or nodularity.  Nipples are free from lesions or discharge.  Axillary and supraclavicular regions are free from masses, fullness or tenderness or lymphadenopathy.    A/P:   This is a 62 year old  postmenopausal who presents today for an annual gyn exam.   1. Annual gyn exam  -Complete breast and pelvic exam performed today  -Last Pap smear: 21 Neg/HPV neg.  Repeated today given history of abnormal  -STI testing: obtained  -Immunization: Completed flu vaccine   -Mammogram 2021 BI-RADS 1.  Scheduled 2023  -Colonoscopy:  2021 Cologuard.  Repeat   -DXA when 64yo OR FRAX 10-year risk of major osteoporotic fracture of 9.3%   -FRAX 10%.  Those women with a FRAX 10-year risk of major osteoporotic fracture of 9.3% could justifiably be referred for DXA. Ordered  -Hep C screen if 1945 to 1965 birth cohort:  10/15/21Neg  -Calcium minimum recommendations: 19-51yo =1000 mg/day, 51 and over=1200 mg/day  -Vit D minimum recommendations: 19-69yo =600 international units/day, 71 and over =800 international units/day    2. Dispo: RTC in 1 year     Principal Discharge DX:	Acute UTI   1

## 2024-03-28 NOTE — CONSULT NOTE ADULT - SUBJECTIVE AND OBJECTIVE BOX
Impression: Diabetes mellitus Type 2 without mention of complication: B71.1. Plan: Diabetes type II: No background retinopathy, no signs of neovascularization noted. Discussed ocular and systemic benefits of blood sugar control. Discussed risks of progression. Patient to call if signs are symptoms should arise. CARDIOLOGY CONSULT NOTE - DR. STREET        Date of Service: 03-28-24 @ 11:49      HPI:      83F w/ DM2, HTN, HLD, OA, Hiatal Hernia , PUD,  Uterine Prolapse, Papillary thyroid CA, s/p thyroidectomy who presented with weakness/fatigue/dysuria after recent pessary placement, removal   no fever, chills, n, v, abd pain  no cp, sob        PAST MEDICAL & SURGICAL HISTORY:  Hypertension      GERD (gastroesophageal reflux disease)      Osteoarthritis      Macular degeneration      Hypothyroid      Female bladder prolapse      H/O mitral valve prolapse      Hiatal hernia with GERD      Fe deficiency anemia      Tracheal nodule      Arteriosclerotic heart disease (ASHD)      COVID-19 vaccination declined      Primary hypertension      HLD (hyperlipidemia)      Hiatal hernia      Osteoarthritis      Uterine prolapse      Constipation      Papillary carcinoma      H/O thyroidectomy      Closed right hip fracture      H/O ovarian cystectomy      History of hip surgery      H/O thyroidectomy            PREVIOUS DIAGNOSTIC TESTING:    [ ] Echocardiogram:  [ ]  Catheterization:  [ ] Stress Test:  	    MEDICATIONS:    Home Medications:  acetaminophen 500 mg oral tablet: 2 tab(s) orally every 6 hours as needed for  mild pain (10 Oct 2023 01:31)  ALPRAZolam 1 mg oral tablet: 1 tab(s) orally once a day (at bedtime)  NOTE: pt uses to sleep (01 Jul 2022 10:43)  amitriptyline 25 mg oral tablet: 1 tab(s) orally once a day (at bedtime) (01 Jul 2022 10:43)  ascorbic acid 500 mg oral tablet: 1 tab(s) orally 2 times a day (08 Mar 2024 06:09)  buPROPion 100 mg/12 hours (SR) oral tablet, extended release: 1 tab(s) orally 2 times a day (08 Mar 2024 06:09)  Caltrate 600 + D oral tablet: 1 tab(s) orally 2 times a day (08 Mar 2024 06:09)  Caltrate 600 + D oral tablet: 1 tab(s) orally 2 times a day (01 Jul 2022 10:43)  Centrum Silver oral tablet: 1 tab(s) orally once a day (08 Mar 2024 06:09)  dilTIAZem 120 mg/24 hours oral capsule, extended release: 1 cap(s) orally once a day (23 Feb 2023 22:54)  hydrALAZINE 50 mg oral tablet: 50 milligram(s) orally 2 times a day (23 Feb 2023 22:53)  Laxative magnesium 500mg caplet: 1-2 caplets at night as needed (08 Mar 2024 06:09)  lidocaine 5% topical film: Apply topically to affected area 3 times a day as needed for pain as per pt (08 Mar 2024 06:09)  lovastatin 20 mg oral tablet: 1 tab(s) orally once a day (in the evening) - with dinner (01 Jul 2022 10:43)  lovastatin 20 mg oral tablet: 1 tab(s) orally once a day (08 Mar 2024 06:09)  Multiple Vitamins oral tablet: 1 tab(s) orally once a day (lunch) (01 Jul 2022 10:43)  olmesartan: 40 milligram(s) orally once a day (08 Mar 2024 06:09)  olmesartan 40 mg oral tablet: 1 tab(s) orally once a day (23 Feb 2023 22:54)  omeprazole 20 mg oral delayed release capsule: 1 cap(s) orally once a day (08 Mar 2024 06:09)  omeprazole 40 mg oral delayed release capsule: 1 cap(s) orally once a day (10 Oct 2023 01:33)  polyethylene glycol 3350 oral powder for reconstitution: 17 gram(s) orally once a day (at bedtime) (27 Feb 2023 15:44)  senna leaf extract oral tablet: 2 tab(s) orally once a day (at bedtime) (27 Feb 2023 15:44)  Synthroid 75 mcg (0.075 mg) oral tablet: 1 tab(s) orally once a day (Mon - Sat) and 1.5 tab on Sundays  (07 Jul 2022 11:06)  Synthroid 75 mcg (0.075 mg) oral tablet: 1 tab(s) orally once a day monday through saturday and 1.5 tablets on sunday. (08 Mar 2024 06:09)  Tylenol Extra Strength 500 mg oral tablet: 1 tab(s) orally every 8 hours as needed for pain (08 Mar 2024 06:09)  Vitamin C 500 mg oral tablet: 1 tab(s) orally 2 times a day (01 Jul 2022 10:43)  Wellbutrin  mg/12 hours oral tablet, extended release: 1 tab(s) orally 2 times a day (01 Jul 2022 10:43)  Xanax 1 mg oral tablet: 1 tab(s) orally once a day (at bedtime) as needed for  anxiety note: pharmacy has 1-2 prn. (08 Mar 2024 06:09)      MEDICATIONS  (STANDING):      FAMILY HISTORY:  FH: HTN (hypertension) (Father, Mother)        SOCIAL HISTORY:    [x ] Non-smoker  [ ] Smoker  [ ] Alcohol    Allergies    NSAIDs (Rash)  amoxicillin (Other)  penicillin (Anaphylaxis)  penicillin (Other)  hydrocortisone (Unknown)    Intolerances    	    REVIEW OF SYSTEMS:  CONSTITUTIONAL: No fever, weight loss, or fatigue  EYES: No eye pain, visual disturbances, or discharge  ENMT:  No difficulty hearing, tinnitus, vertigo; No sinus or throat pain  NECK: No pain or stiffness  RESPIRATORY: No cough, wheezing, chills or hemoptysis; No Shortness of Breath  CARDIOVASCULAR: as HPI  GASTROINTESTINAL: No abdominal or epigastric pain. No nausea, vomiting, or hematemesis; No diarrhea or constipation. No melena or hematochezia.  GENITOURINARY: + dysuria, frequency, hematuria, or incontinence  NEUROLOGICAL: No headaches, memory loss, loss of strength, numbness, or tremors  SKIN: No itching, burning, rashes, or lesions   	  [ ] All others negative	  [ ] Unable to obtain    PHYSICAL EXAM:    T(C): 36.6 (03-28-24 @ 10:51), Max: 36.6 (03-28-24 @ 10:51)  HR: 73 (03-28-24 @ 10:51) (73 - 73)  BP: 161/93 (03-28-24 @ 10:51) (161/93 - 161/93)  RR: 19 (03-28-24 @ 10:51) (19 - 19)  SpO2: 98% (03-28-24 @ 10:51) (98% - 98%)  Wt(kg): --  I&O's Summary    Daily Height in cm: 149.86 (28 Mar 2024 10:51)    Daily     Appearance: Normal	  Psychiatry: A & O x 3, Mood & affect appropriate  HEENT:   Normal oral mucosa, PERRL, EOMI	  Lymphatic: No lymphadenopathy  Cardiovascular: Normal S1 S2,RRR, No JVD, No murmurs  Respiratory: Lungs clear to auscultation	  Gastrointestinal:  Soft, Non-tender, + BS	  Skin: No rashes, No ecchymoses, No cyanosis	  Neurologic: Non-focal  Extremities: Normal range of motion, min edema b/l  Vascular: Peripheral pulses palpable 2+ bilaterally    TELEMETRY: 	    ECG:  	pending   RADIOLOGY:  OTHER: 	  	  LABS:	 	    CARDIAC MARKERS:        proBNP:     Lipid Profile:   HgA1c:   TSH:                       ASSESSMENT/PLAN:

## 2024-03-28 NOTE — H&P ADULT - NSHPLABSRESULTS_GEN_ALL_CORE
11.5   9.21  )-----------( 295      ( 28 Mar 2024 12:21 )             35.4           138  |  100  |  13  ----------------------------<  115<H>  3.6   |  24  |  0.88    Ca    9.9      28 Mar 2024 12:21    TPro  7.4  /  Alb  3.9  /  TBili  0.5  /  DBili  x   /  AST  16  /  ALT  14  /  AlkPhos  81                Urinalysis Basic - ( 28 Mar 2024 13:48 )    Color: Yellow / Appearance: Cloudy / S.005 / pH: x  Gluc: x / Ketone: Negative mg/dL  / Bili: Negative / Urobili: 0.2 mg/dL   Blood: x / Protein: 30 mg/dL / Nitrite: Negative   Leuk Esterase: Large / RBC: 40 /HPF /  /HPF   Sq Epi: x / Non Sq Epi: 0 /HPF / Bacteria: Many /HPF    < from: CT Abdomen and Pelvis w/ IV Cont (24 @ 14:04) >    IMPRESSION:  Diffuse bladder wall thickening with acute perivesicular inflammation in   keeping with cystitis. Correlate with urinalysis    Smooth urothelial thickening and enhancement of the ureters suggest   ascending tract infection.    Subtle wedge-shaped areas of bilateral renal hypo enhancement suspicious   for acute pyelonephritis    Wall thickening of transverse and descending colon suggests acute colitis    Large hiatal hernia

## 2024-03-28 NOTE — PATIENT PROFILE ADULT - FUNCTIONAL ASSESSMENT - BASIC MOBILITY 6.
3-calculated by average/Not able to assess (calculate score using [facility] averaging method)

## 2024-03-28 NOTE — ED ADULT TRIAGE NOTE - CHIEF COMPLAINT QUOTE
hx of prolapsed bladder- has recurring UTI's- started having burning on urination and bouts of incontinence starting two days ago. Pt denies fevers at home.

## 2024-03-28 NOTE — ED ADULT NURSE NOTE - OBJECTIVE STATEMENT
82 y/o female came to the ED with complaints of urinary symptoms, dysuria, urinary frequency and new onset urinary incontinence. Has a prolapsed bladder. Does see a urogynecologist and OB GYN and has multiple pessaries placed but have never been one that works for her. Denies hematuria, abdominal pains and distention, flank pains, fevers, chills, CP, SOB.

## 2024-03-28 NOTE — H&P ADULT - ASSESSMENT
83 f with    Pyelonephritis/ UTI  - urine culture   - antibiotic  - ID evaluation called     Colitis  - stool studies  - Gastroenterology evaluation     Celiac disease  - gluten free diet     Hypothyroidism.   - Synthroid  - TSH    HLD (hyperlipidemia).   - Lovastatin changed to Atorvastatin.    Hiatal hernia.   - Large Hiatal hernia on CT --> fup with GI.    Primary hypertension.   - Coreg and Olmesartan     Bladder prolapse  - follow with Gyn Urogynecology UTP    R Iliac artery aneurism  - was seen by Vascular. No intervention. Follow as OTP     DVT prophylaxis    Further action as per clinical course     d/w patient and daughter    Paulie Moore MD phone [phone]

## 2024-03-28 NOTE — ED ADULT NURSE NOTE - NSFALLHARMRISKINTERV_ED_ALL_ED

## 2024-03-28 NOTE — ED ADULT TRIAGE NOTE - WEIGHT IN LBS
DR AMAYA [facility] AT BEDSIDE TO DEEE PT PT STILL HAS EXPIRATORY WHEEZES R>L ADDITIONAL DUO NEB ORDERED BY MD AND ICS STARTED 130

## 2024-03-28 NOTE — ED PROVIDER NOTE - CLINICAL SUMMARY MEDICAL DECISION MAKING FREE TEXT BOX
UA, urine culture to r/o UTI.   Pt is currently afebrile, well appearing, no flank tenderness. Low concern for pyelonephritis.   If UA is pos for UTI most likely admit for IV abxs due to pt's complicated hx UA, urine culture to r/o UTI.   Pt is currently afebrile, well appearing, no flank tenderness. Low concern for pyelonephritis.   If UA is pos for UTI most likely admit for IV abxs due to pt's complicated hx    Attending MD Michel :  Elderly patient with hx pansensitive ecoli UTI pyelo with recent hospital admission. Currently with abdominal pain, dysuria but without fevers or other systemic symptoms that led to her last hospitalization. Labs to assess WBC, Cr, CTAP to assess intrabdominal pathology, straight cath for UA/UCx, dispo pending the above.

## 2024-03-28 NOTE — ED ADULT NURSE NOTE - PATIENT'S PREFERRED PRONOUN
Service Date: 09/05/2018      REFERRING PHYSICIAN:  Dr. Margarita Mcelroy.      HISTORY OF PRESENT ILLNESS:  It was my pleasure to see your patient, Bernard Dodge, who is a pleasant 63-year-old patient with a history of an ischemic cardiomyopathy and past history of an anterior ST elevation myocardial infarct.  On 08/22/2016 she had a drug-eluting stent placed in the mid left anterior descending artery.  The patient has proximal 50% circumflex disease and 60% disease in the distal circumflex.  The right coronary artery has proximal 20%-30% disease.  Echocardiography was performed approximately a week ago, and this showed that her ejection fraction is in the 40%-45% range.  Regional wall motion abnormalities are consistent with a prior LAD territory infarct.  No significant valvular heart disease is present.  Her lipid profile is not as good as it was previously, and the main reason for this is that she broke her ankle during the wintertime and cannot exercise near as much as she did in the past.  Her LDL cholesterol has risen from 49 to 73, her HDL has dropped from 75 to 58, and her triglycerides have risen from 59 to 89.  Despite this, however, this is a reasonably good lipid profile.  She has no symptoms of congestive heart failure but has a lung condition from radiation for breast cancer.  She is due to see her pulmonologist in the near future.  Blood pressure was normal today at 118/62.      IMPRESSION:   1.  Ischemic cardiomyopathy.  The ejection fraction is similar to what it was previously with evidence of a prior anterior myocardial infarct.   2.  Asymptomatic with respect to coronary artery disease with no symptoms of angina pectoris.   3.  Normotensive.   4.  Lipids are not as good as previously due to lack of exercise and weight gain, with her weight going from 172 pounds in 04/2017 to 187 pounds today.      PLAN:   1.  The patient will try to lose weight and adhere to a heart-healthy, low-cholesterol diet.   2.   We will recheck her lipids in 3 months' time with increased exercise.  If her lipids still remain the same or get worse, I would switch her to rosuvastatin 40 mg per day.  I will see her again in 1 year's time and will repeat her echocardiogram to follow her ischemic cardiomyopathy.  As always, the patient has been told to contact us if she has any questions or any concerns.      It has been my pleasure to be involved in the care of this very nice patient.      Tim Heredia MD, FACC       cc:   Margarita Mcelroy MD    [facility] [address] [place] [address]         TIM GUSTAFSON MD, FACC             D: 2018   T: 2018   MT: KAVON      Name:     AVERY RICKETTS   MRN:      [id number]        Account:      [id number]   :      1954           Service Date: 2018      Document: [id number]     Her/She

## 2024-03-28 NOTE — CONSULT NOTE ADULT - SUBJECTIVE AND OBJECTIVE BOX
Chief Complaint:  Patient is a 83y old  Female who presents with a chief complaint of abd pain pain upon urination, she is well known to the gi group she is an office pt  History of Present Illness:   83-year-old female patient past medical history aneurysm, prolapsed bladder brought in by daughter complains of urinary incontinence and dysuria since last night.  Patient follows up with uro-– GYN and has multiple pessaries that do not work.  Patient had pessary removed yesterday.  Patient also noticed a few drops of blood in urine yesterday.  As per patient was admitted here 1.5 months ago for sepsis in the setting of UTI.       Review of Systems:  Review of Systems: REVIEW OF SYSTEMS:    CONSTITUTIONAL: + weakness, + fevers + chills  EYES/ENT: No visual changes;  No vertigo or throat pain   NECK: No pain or stiffness  RESPIRATORY: No cough, wheezing, hemoptysis; No shortness of breath  CARDIOVASCULAR: No chest pain or palpitations  GASTROINTESTINAL: + abdominal pain. No nausea, vomiting, or hematemesis; No diarrhea or constipation. No melena or hematochezia.  GENITOURINARY: No dysuria, frequency or hematuria  NEUROLOGICAL: No numbness or weakness  SKIN: No itching, burning, rashes, or lesions   All other review of systems is negative unless indicated above.      Allergies:  NSAIDs (Rash)  amoxicillin (Other)  penicillin (Anaphylaxis)  penicillin (Other)  hydrocortisone (Unknown)      Medications:  acetaminophen     Tablet .. 650 milliGRAM(s) Oral every 6 hours PRN  ALPRAZolam 1 milliGRAM(s) Oral daily PRN  amitriptyline 25 milliGRAM(s) Oral at bedtime  atorvastatin 10 milliGRAM(s) Oral at bedtime  buPROPion XL (24-Hour) . 150 milliGRAM(s) Oral daily  calcium carbonate 1250 mG  + Vitamin D (OsCal 500 + D) 1 Tablet(s) Oral daily  carvedilol 25 milliGRAM(s) Oral every 12 hours  cefTRIAXone   IVPB 1000 milliGRAM(s) IV Intermittent every 24 hours  diltiazem    milliGRAM(s) Oral daily  heparin   Injectable 5000 Unit(s) SubCutaneous every 8 hours  hydrALAZINE 50 milliGRAM(s) Oral two times a day  levothyroxine 75 MICROGram(s) Oral daily  losartan 100 milliGRAM(s) Oral daily  metroNIDAZOLE  IVPB 500 milliGRAM(s) IV Intermittent every 8 hours  pantoprazole    Tablet 40 milliGRAM(s) Oral before breakfast      PMHX/PSHX:  Hypertension    GERD (gastroesophageal reflux disease)    Diverticulitis    Osteoarthritis    Macular degeneration    Hypothyroid    Stage 2 chronic kidney disease    Female bladder prolapse    H/O mitral valve prolapse    Hiatal hernia with GERD    Fe deficiency anemia    Tracheal nodule    Arteriosclerotic heart disease (ASHD)    Gastrointestinal bleed    COVID-19 vaccination declined    Primary hypertension    HLD (hyperlipidemia)    Hiatal hernia    Osteoarthritis    Uterine prolapse    Constipation    Papillary carcinoma    History of celiac disease    No significant past surgical history    H/O thyroidectomy    Closed right hip fracture    H/O ovarian cystectomy    History of hip surgery    H/O thyroidectomy        Family history:  No pertinent family history in first degree relatives    No pertinent family history in first degree relatives    No pertinent family history in first degree relatives    FH: HTN (hypertension) (Father, Mother)      Social History:   no etoh no cigs lives at home with family  ROS:     General:  No wt loss, fevers, chills, night sweats, fatigue,   Eyes:  Good vision, no reported pain  ENT:  No sore throat, pain, runny nose, dysphagia  CV:  No pain, palpitations, hypo/hypertension  Resp:  No dyspnea, cough, tachypnea, wheezing  GI:  No pain, No nausea, No vomiting, No diarrhea, No constipation, No weight loss, No fever, No pruritis, No rectal bleeding, No tarry stools, No dysphagia,  :  No pain, bleeding, incontinence, nocturia  Muscle:  No pain, weakness  Neuro:  No weakness, tingling, memory problems  Psych:  No fatigue, insomnia, mood problems, depression  Endocrine:  No polyuria, polydipsia, cold/heat intolerance  Heme:  No petechiae, ecchymosis, easy bruisability  Skin:  No rash, tattoos, scars, edema      PHYSICAL EXAM:   Vital Signs:  Vital Signs Last 24 Hrs  T(C): 36.6 (28 Mar 2024 19:49), Max: 36.7 (28 Mar 2024 15:13)  T(F): 97.8 (28 Mar 2024 19:49), Max: 98.1 (28 Mar 2024 15:13)  HR: 64 (28 Mar 2024 19:49) (59 - 73)  BP: 178/86 (28 Mar 2024 19:49) (116/62 - 178/86)  BP(mean): 100 (28 Mar 2024 15:13) (100 - 100)  RR: 18 (28 Mar 2024 19:49) (16 - 19)  SpO2: 98% (28 Mar 2024 19:49) (96% - 98%)    Parameters below as of 28 Mar 2024 19:49  Patient On (Oxygen Delivery Method): room air      Daily Height in cm: 149.86 (28 Mar 2024 10:51)    Daily     GENERAL:  Appears stated age, well-groomed, well-nourished, no distress  HEENT:  NC/AT,  conjunctivae clear and pink, no thyromegaly, nodules, adenopathy, no JVD, sclera -anicteric  CHEST:  Full & symmetric excursion, no increased effort, breath sounds clear  HEART:  Regular rhythm, S1, S2, no murmur/rub/S3/S4, no abdominal bruit, no edema  ABDOMEN:  Soft, non-tender, non-distended, normoactive bowel sounds,  no masses ,no hepato-splenomegaly, no signs of chronic liver disease  EXTEREMITIES:  no cyanosis,clubbing or edema  SKIN:  No rash/erythema/ecchymoses/petechiae/wounds/abscess/warm/dry  NEURO:  Alert, oriented, no asterixis, no tremor, no encephalopathy    LABS:                        11.5   9.21  )-----------( 295      ( 28 Mar 2024 12:21 )             35.4     -28    138  |  100  |  13  ----------------------------<  115<H>  3.6   |  24  |  0.88    Ca    9.9      28 Mar 2024 12:21    TPro  7.4  /  Alb  3.9  /  TBili  0.5  /  DBili  x   /  AST  16  /  ALT  14  /  AlkPhos  81  -    LIVER FUNCTIONS - ( 28 Mar 2024 12:21 )  Alb: 3.9 g/dL / Pro: 7.4 g/dL / ALK PHOS: 81 U/L / ALT: 14 U/L / AST: 16 U/L / GGT: x             Urinalysis Basic - ( 28 Mar 2024 13:48 )    Color: Yellow / Appearance: Cloudy / S.005 / pH: x  Gluc: x / Ketone: Negative mg/dL  / Bili: Negative / Urobili: 0.2 mg/dL   Blood: x / Protein: 30 mg/dL / Nitrite: Negative   Leuk Esterase: Large / RBC: 40 /HPF /  /HPF   Sq Epi: x / Non Sq Epi: 0 /HPF / Bacteria: Many /HPF          Imaging:

## 2024-03-28 NOTE — H&P ADULT - NSHPREVIEWOFSYSTEMS_GEN_ALL_CORE
REVIEW OF SYSTEMS:    CONSTITUTIONAL: + weakness, + fevers + chills  EYES/ENT: No visual changes;  No vertigo or throat pain   NECK: No pain or stiffness  RESPIRATORY: No cough, wheezing, hemoptysis; No shortness of breath  CARDIOVASCULAR: No chest pain or palpitations  GASTROINTESTINAL: + abdominal pain. No nausea, vomiting, or hematemesis; No diarrhea or constipation. No melena or hematochezia.  GENITOURINARY: No dysuria, frequency or hematuria  NEUROLOGICAL: No numbness or weakness  SKIN: No itching, burning, rashes, or lesions   All other review of systems is negative unless indicated above.

## 2024-03-28 NOTE — PATIENT PROFILE ADULT - FALL HARM RISK - HARM RISK INTERVENTIONS

## 2024-03-29 LAB
ANION GAP SERPL CALC-SCNC: 14 MMOL/L — SIGNIFICANT CHANGE UP (ref 5–17)
BUN SERPL-MCNC: 10 MG/DL — SIGNIFICANT CHANGE UP (ref 7–23)
CALCIUM SERPL-MCNC: 9.4 MG/DL — SIGNIFICANT CHANGE UP (ref 8.4–10.5)
CHLORIDE SERPL-SCNC: 103 MMOL/L — SIGNIFICANT CHANGE UP (ref 96–108)
CO2 SERPL-SCNC: 22 MMOL/L — SIGNIFICANT CHANGE UP (ref 22–31)
CREAT SERPL-MCNC: 0.84 MG/DL — SIGNIFICANT CHANGE UP (ref 0.5–1.3)
EGFR: 70 ML/MIN/1.73M2 — SIGNIFICANT CHANGE UP
GLUCOSE SERPL-MCNC: 79 MG/DL — SIGNIFICANT CHANGE UP (ref 70–99)
HCT VFR BLD CALC: 34.4 % — LOW (ref 34.5–45)
HGB BLD-MCNC: 11.2 G/DL — LOW (ref 11.5–15.5)
MCHC RBC-ENTMCNC: 30.9 PG — SIGNIFICANT CHANGE UP (ref 27–34)
MCHC RBC-ENTMCNC: 32.6 GM/DL — SIGNIFICANT CHANGE UP (ref 32–36)
MCV RBC AUTO: 94.8 FL — SIGNIFICANT CHANGE UP (ref 80–100)
NRBC # BLD: 0 /100 WBCS — SIGNIFICANT CHANGE UP (ref 0–0)
PLATELET # BLD AUTO: 259 K/UL — SIGNIFICANT CHANGE UP (ref 150–400)
POTASSIUM SERPL-MCNC: 3.4 MMOL/L — LOW (ref 3.5–5.3)
POTASSIUM SERPL-SCNC: 3.4 MMOL/L — LOW (ref 3.5–5.3)
RBC # BLD: 3.63 M/UL — LOW (ref 3.8–5.2)
RBC # FLD: 14.4 % — SIGNIFICANT CHANGE UP (ref 10.3–14.5)
SODIUM SERPL-SCNC: 139 MMOL/L — SIGNIFICANT CHANGE UP (ref 135–145)
TSH SERPL-MCNC: 4.68 UIU/ML — HIGH (ref 0.27–4.2)
WBC # BLD: 5.94 K/UL — SIGNIFICANT CHANGE UP (ref 3.8–10.5)
WBC # FLD AUTO: 5.94 K/UL — SIGNIFICANT CHANGE UP (ref 3.8–10.5)

## 2024-03-29 PROCEDURE — 99223 1ST HOSP IP/OBS HIGH 75: CPT

## 2024-03-29 RX ORDER — CHLORHEXIDINE GLUCONATE 213 G/1000ML
1 SOLUTION TOPICAL DAILY
Refills: 0 | Status: DISCONTINUED | OUTPATIENT
Start: 2024-03-29 | End: 2024-04-02

## 2024-03-29 RX ORDER — ONDANSETRON 8 MG/1
4 TABLET, FILM COATED ORAL ONCE
Refills: 0 | Status: DISCONTINUED | OUTPATIENT
Start: 2024-03-29 | End: 2024-03-29

## 2024-03-29 RX ORDER — SENNA PLUS 8.6 MG/1
2 TABLET ORAL AT BEDTIME
Refills: 0 | Status: DISCONTINUED | OUTPATIENT
Start: 2024-03-29 | End: 2024-04-02

## 2024-03-29 RX ORDER — LORATADINE 10 MG/1
10 TABLET ORAL DAILY
Refills: 0 | Status: DISCONTINUED | OUTPATIENT
Start: 2024-03-29 | End: 2024-04-02

## 2024-03-29 RX ADMIN — ATORVASTATIN CALCIUM 10 MILLIGRAM(S): 80 TABLET, FILM COATED ORAL at 21:21

## 2024-03-29 RX ADMIN — Medication 1 TABLET(S): at 13:33

## 2024-03-29 RX ADMIN — BUPROPION HYDROCHLORIDE 150 MILLIGRAM(S): 150 TABLET, EXTENDED RELEASE ORAL at 12:03

## 2024-03-29 RX ADMIN — CARVEDILOL PHOSPHATE 25 MILLIGRAM(S): 80 CAPSULE, EXTENDED RELEASE ORAL at 09:54

## 2024-03-29 RX ADMIN — Medication 100 MILLIGRAM(S): at 13:32

## 2024-03-29 RX ADMIN — Medication 100 MILLIGRAM(S): at 05:52

## 2024-03-29 RX ADMIN — Medication 50 MILLIGRAM(S): at 21:21

## 2024-03-29 RX ADMIN — CEFTRIAXONE 100 MILLIGRAM(S): 500 INJECTION, POWDER, FOR SOLUTION INTRAMUSCULAR; INTRAVENOUS at 16:37

## 2024-03-29 RX ADMIN — SENNA PLUS 2 TABLET(S): 8.6 TABLET ORAL at 21:21

## 2024-03-29 RX ADMIN — Medication 120 MILLIGRAM(S): at 05:52

## 2024-03-29 RX ADMIN — Medication 50 MILLIGRAM(S): at 09:54

## 2024-03-29 RX ADMIN — CARVEDILOL PHOSPHATE 25 MILLIGRAM(S): 80 CAPSULE, EXTENDED RELEASE ORAL at 21:21

## 2024-03-29 RX ADMIN — PANTOPRAZOLE SODIUM 40 MILLIGRAM(S): 20 TABLET, DELAYED RELEASE ORAL at 05:52

## 2024-03-29 RX ADMIN — LOSARTAN POTASSIUM 100 MILLIGRAM(S): 100 TABLET, FILM COATED ORAL at 05:52

## 2024-03-30 LAB
ANION GAP SERPL CALC-SCNC: 14 MMOL/L — SIGNIFICANT CHANGE UP (ref 5–17)
BUN SERPL-MCNC: 23 MG/DL — SIGNIFICANT CHANGE UP (ref 7–23)
CALCIUM SERPL-MCNC: 9.5 MG/DL — SIGNIFICANT CHANGE UP (ref 8.4–10.5)
CHLORIDE SERPL-SCNC: 100 MMOL/L — SIGNIFICANT CHANGE UP (ref 96–108)
CO2 SERPL-SCNC: 23 MMOL/L — SIGNIFICANT CHANGE UP (ref 22–31)
CREAT SERPL-MCNC: 1.19 MG/DL — SIGNIFICANT CHANGE UP (ref 0.5–1.3)
EGFR: 46 ML/MIN/1.73M2 — LOW
GI PCR PANEL: DETECTED
GLUCOSE SERPL-MCNC: 109 MG/DL — HIGH (ref 70–99)
HCT VFR BLD CALC: 34.1 % — LOW (ref 34.5–45)
HGB BLD-MCNC: 11.3 G/DL — LOW (ref 11.5–15.5)
MCHC RBC-ENTMCNC: 31 PG — SIGNIFICANT CHANGE UP (ref 27–34)
MCHC RBC-ENTMCNC: 33.1 GM/DL — SIGNIFICANT CHANGE UP (ref 32–36)
MCV RBC AUTO: 93.4 FL — SIGNIFICANT CHANGE UP (ref 80–100)
NRBC # BLD: 0 /100 WBCS — SIGNIFICANT CHANGE UP (ref 0–0)
PLATELET # BLD AUTO: 287 K/UL — SIGNIFICANT CHANGE UP (ref 150–400)
POTASSIUM SERPL-MCNC: 3.9 MMOL/L — SIGNIFICANT CHANGE UP (ref 3.5–5.3)
POTASSIUM SERPL-SCNC: 3.9 MMOL/L — SIGNIFICANT CHANGE UP (ref 3.5–5.3)
RBC # BLD: 3.65 M/UL — LOW (ref 3.8–5.2)
RBC # FLD: 14.6 % — HIGH (ref 10.3–14.5)
SALMONELLA DNA STL QL NAA+PROBE: DETECTED
SODIUM SERPL-SCNC: 137 MMOL/L — SIGNIFICANT CHANGE UP (ref 135–145)
WBC # BLD: 6.84 K/UL — SIGNIFICANT CHANGE UP (ref 3.8–10.5)
WBC # FLD AUTO: 6.84 K/UL — SIGNIFICANT CHANGE UP (ref 3.8–10.5)

## 2024-03-30 RX ADMIN — CEFTRIAXONE 100 MILLIGRAM(S): 500 INJECTION, POWDER, FOR SOLUTION INTRAMUSCULAR; INTRAVENOUS at 15:26

## 2024-03-30 RX ADMIN — HEPARIN SODIUM 5000 UNIT(S): 5000 INJECTION INTRAVENOUS; SUBCUTANEOUS at 22:53

## 2024-03-30 RX ADMIN — PANTOPRAZOLE SODIUM 40 MILLIGRAM(S): 20 TABLET, DELAYED RELEASE ORAL at 05:56

## 2024-03-30 RX ADMIN — Medication 1 MILLIGRAM(S): at 05:49

## 2024-03-30 RX ADMIN — Medication 25 MILLIGRAM(S): at 22:52

## 2024-03-30 RX ADMIN — CARVEDILOL PHOSPHATE 25 MILLIGRAM(S): 80 CAPSULE, EXTENDED RELEASE ORAL at 22:19

## 2024-03-30 RX ADMIN — Medication 1 TABLET(S): at 11:01

## 2024-03-30 RX ADMIN — ATORVASTATIN CALCIUM 10 MILLIGRAM(S): 80 TABLET, FILM COATED ORAL at 22:19

## 2024-03-30 RX ADMIN — CARVEDILOL PHOSPHATE 25 MILLIGRAM(S): 80 CAPSULE, EXTENDED RELEASE ORAL at 10:08

## 2024-03-30 RX ADMIN — HEPARIN SODIUM 5000 UNIT(S): 5000 INJECTION INTRAVENOUS; SUBCUTANEOUS at 13:10

## 2024-03-30 RX ADMIN — LOSARTAN POTASSIUM 100 MILLIGRAM(S): 100 TABLET, FILM COATED ORAL at 05:49

## 2024-03-30 RX ADMIN — Medication 75 MICROGRAM(S): at 05:56

## 2024-03-30 RX ADMIN — CHLORHEXIDINE GLUCONATE 1 APPLICATION(S): 213 SOLUTION TOPICAL at 11:03

## 2024-03-30 RX ADMIN — Medication 1 TABLET(S): at 11:00

## 2024-03-30 RX ADMIN — BUPROPION HYDROCHLORIDE 150 MILLIGRAM(S): 150 TABLET, EXTENDED RELEASE ORAL at 11:01

## 2024-03-30 RX ADMIN — SENNA PLUS 2 TABLET(S): 8.6 TABLET ORAL at 22:20

## 2024-03-30 RX ADMIN — Medication 120 MILLIGRAM(S): at 08:15

## 2024-03-30 NOTE — PROVIDER CONTACT NOTE (CRITICAL VALUE NOTIFICATION) - BACKGROUND
Pt has hx of HTN, HLD, uterine prolapse, ASHD, OA, GERD, who presented with dysuria and urinary incontinence, found to have UTI/pyelonephritis

## 2024-03-30 NOTE — CONSULT NOTE ADULT - SUBJECTIVE AND OBJECTIVE BOX
Gyn Consult Note  JOE URIOSTEGUI  83y  Female [id number]    HPI:  83-year-old female patient past medical history aneurysm, prolapsed bladder brought in by daughter complains of urinary incontinence and dysuria since last night.  Patient follows up with uro-– GYN and has multiple pessaries that do not work.  Patient had pessary removed yesterday.  Patient also noticed a few drops of blood in urine yesterday.  As per patient was admitted here 1.5 months ago for sepsis in the setting of UTI. (28 Mar 2024 17:58)      Name of GYN Physician:     OBHx:    GYNHx: Denies fibroids, cysts, endometriosis, STI's, Abnormal pap smears     PAST MEDICAL & SURGICAL HISTORY:  Hypertension      GERD (gastroesophageal reflux disease)      Osteoarthritis      Macular degeneration      Hypothyroid      Female bladder prolapse      H/O mitral valve prolapse      Hiatal hernia with GERD      Fe deficiency anemia      Tracheal nodule      Arteriosclerotic heart disease (ASHD)      COVID-19 vaccination declined      Primary hypertension      HLD (hyperlipidemia)      Hiatal hernia      Osteoarthritis      Uterine prolapse      Constipation      Papillary carcinoma      History of celiac disease      H/O thyroidectomy      Closed right hip fracture      H/O ovarian cystectomy      History of hip surgery      H/O thyroidectomy          Meds:     Allergies    NSAIDs (Rash)  amoxicillin (Other)  penicillin (Anaphylaxis)  penicillin (Other)  hydrocortisone (Unknown)    Intolerances        FAMILY HISTORY:  FH: HTN (hypertension) (Father, Mother)        Social:     Vital Signs Last 24 Hrs  T(C): 36.6 (30 Mar 2024 08:25), Max: 36.8 (29 Mar 2024 17:38)  T(F): 97.8 (30 Mar 2024 08:25), Max: 98.2 (29 Mar 2024 17:38)  HR: 86 (30 Mar 2024 10:05) (64 - 86)  BP: 126/78 (30 Mar 2024 10:05) (91/58 - 128/74)  BP(mean): --  RR: 18 (30 Mar 2024 08:25) (18 - 18)  SpO2: 97% (30 Mar 2024 08:25) (96% - 97%)    Parameters below as of 30 Mar 2024 08:25  Patient On (Oxygen Delivery Method): room air        Physical Exam:   General: sitting comfortably in bed, NAD   CV: RRR  Lungs: CTAB  Back: No CVA tenderness  Abd: Soft, non-tender, non-distended.  Bowel sounds present.    : No bleeding on pad. External labia wnl. Uterus wnl, nontender. Adnexa non palpable b/l. No CMT. Cervix closed.   Speculum Exam: No active bleeding from os.  Physiologic discharge.    Ext: non-tender b/l, no edema     LABS:                              11.3   6.84  )-----------( 287      ( 30 Mar 2024 07:12 )             34.1     03-30    137  |  100  |  23  ----------------------------<  109<H>  3.9   |  23  |  1.19    Ca    9.5      30 Mar 2024 07:12        Urinalysis Basic - ( 30 Mar 2024 07:12 )    Color: x / Appearance: x / SG: x / pH: x  Gluc: 109 mg/dL / Ketone: x  / Bili: x / Urobili: x   Blood: x / Protein: x / Nitrite: x   Leuk Esterase: x / RBC: x / WBC x   Sq Epi: x / Non Sq Epi: x / Bacteria: x        RADIOLOGY & ADDITIONAL STUDIES:  < from: CT Abdomen and Pelvis w/ IV Cont (03.28.24 @ 14:04) >    ACC: [id number] EXAM:  CT ABDOMEN AND PELVIS IC   ORDERED BY: SIRISHA HERNANDEZ     PROCEDURE DATE:  03/28/2024          INTERPRETATION:  CLINICAL INFORMATION: Abdominal pain    COMPARISON: CT abdomen and pelvis 2/12/2024    CONTRAST/COMPLICATIONS:  IV Contrast: Omnipaque 350  90 cc administered   10 cc discarded  Oral Contrast: NONE  Complications: None reported at time of study completion    PROCEDURE:  CT of the Abdomen and Pelvis was performed.  Sagittal and coronal reformats were performed.    FINDINGS: Some images are degraded by artifacts from the patient's arms   within the scanning field of view.  LOWER CHEST: Large hiatal hernia containing at least proximal half of the   stomach. Mild dependent atelectasis.    LIVER: Within normal limits.  BILE DUCTS: Normal caliber.  GALLBLADDER: Nondistended. Folded fundal configuration.  SPLEEN: Within normal limits.  PANCREAS: Stable 5 mm hypodensity likely a cyst at the pancreatic   head/neck junction  ADRENALS: Within normal limits.  KIDNEYS/URETERS: No hydronephrosis or or renal calculus.  Smooth urothelial thickening and enhancement of the ureters suggest   ascending tract infection.  Subtle wedge-shaped areas of bilateral renal hypoenhancement may   correspond with pyelonephritis  Small bilateral probable cysts some of which may contain internal   septation and hyperdense material incompletely characterized on this exam    BLADDER: Diffuse wall thickening with perivesicular inflammation  REPRODUCTIVE ORGANS: Uterus and adnexa within normal limits.    BOWEL: No bowel obstruction. Wall thickening of transverse and descending   colon.  PERITONEUM: No ascites.  VESSELS: Atherosclerotic changes.  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  ABDOMINAL WALL: Tiny fat-containing umbilical hernia.  BONES: Multilevel degenerative changes throughout thespine. Right   femoral neck pins. Left total hip arthroplasty    IMPRESSION:  Diffuse bladder wall thickening with acute perivesicular inflammation in   keeping with cystitis. Correlate with urinalysis    Smooth urothelial thickening and enhancement of the ureters suggest   ascending tract infection.    Subtle wedge-shaped areas of bilateral renal hypoenhancement suspicious   for acute pyelonephritis    Wall thickening of transverse and descending colon suggests acute colitis    Large hiatal hernia    < end of copied text >   Gyn Consult Note  JOE URIOSTEGUI  83y  Female [id number]    HPI: 83y  postmenopausal female with h/o pelvic organ prolapse with frequent pessary usage admitted to medicine  urosepsis in setting of E Coli UTI. Pt reports she has been using various pessaries for several years. Pt reports over the last week she began to experience vaginal discomfort and on 3/27 reports the pessary was removed by her GYN. Pt was encouraged to schedule an appt when able for a new pessary fitting. Pt reports on 3/27 she began to experience RLQ pain radiating to her R flank and dysuria, and on 3/28 she was brought to the ED by her daughter. Currently, pt reports pain is well controlled. Pt reports occasional leakage of urine with urgency or when walking, denies leakage with cough/sneeze. Pt reports she is able to void and defecate spontaneously. Pt reports frequent constipation but reports she was able to have a BM today. Pt reports her prolapse has been "about the same" for the last few years and hasn't been bothering her significantly. Of note, pt was previously admitted on - with urosepsis/pyelonephritis and was treated for E coli bacteremia.    Name of GYN Physician: Dr. Stacy  Name of Urogynecologist: Dr. Ramirez ([facility])    OBHx:  x3, uncomplicated per pt  GYNHx: h/o uterovaginal prolapse, h/o b/l ovarian cysts, denies fibroids, endometriosis, STI's, Abnormal pap smears   PMH: HTN, HLD, anemia, GERD, MVP, hypothyroidism, celiac disease, macular degeneration, hiatal hernia, thyroid papillary carcinoma, osteoarthritis, iliac artery aneurysm  PSH: b/l ovarian cystectomy, b/l hip replacement, thyroidectomy  Meds: Alprazolam PRN, Amitriptyline, Bupropion, Cetirizine, Diltiazem, Hydralazine, Linzess, Lovastatin, Olmesartan, Omeprazole, Synthroid  Allergies:  NSAIDs (Rash)  amoxicillin (Other)  penicillin (Anaphylaxis)  penicillin (Other)  hydrocortisone (Unknown)    Vital Signs Last 24 Hrs  T(C): 36.6 (30 Mar 2024 08:25), Max: 36.8 (29 Mar 2024 17:38)  T(F): 97.8 (30 Mar 2024 08:25), Max: 98.2 (29 Mar 2024 17:38)  HR: 86 (30 Mar 2024 10:05) (64 - 86)  BP: 126/78 (30 Mar 2024 10:05) (91/58 - 128/74)  BP(mean): --  RR: 18 (30 Mar 2024 08:25) (18 - 18)  SpO2: 97% (30 Mar 2024 08:25) (96% - 97%)    Parameters below as of 30 Mar 2024 08:25  Patient On (Oxygen Delivery Method): room air        Physical Exam:   General: sitting comfortably in bed, NAD, A&Ox4  CV: clinically well perfused  Lungs: respiring comfortably on room air  Abd: Soft, non-tender, non-distended.  Bowel sounds present.    : No bleeding on pad. External labia wnl. Uterus wnl, nontender. Adnexa non palpable b/l. No CMT. Cervix closed.   Speculum Exam: No active bleeding from os. No pessary in vaginal vault. Incomplete uterovaginal prolapse extending to approx 4cm internal to the level of the introitus with Valsalva  Ext: non-tender b/l, no edema     LABS:                              11.3   6.84  )-----------( 287      ( 30 Mar 2024 07:12 )             34.1     03-30    137  |  100  |  23  ----------------------------<  109<H>  3.9   |  23  |  1.19    Ca    9.5      30 Mar 2024 07:12        Urinalysis Basic - ( 30 Mar 2024 07:12 )    Color: x / Appearance: x / SG: x / pH: x  Gluc: 109 mg/dL / Ketone: x  / Bili: x / Urobili: x   Blood: x / Protein: x / Nitrite: x   Leuk Esterase: x / RBC: x / WBC x   Sq Epi: x / Non Sq Epi: x / Bacteria: x        RADIOLOGY & ADDITIONAL STUDIES:  < from: CT Abdomen and Pelvis w/ IV Cont (24 @ 14:04) >    ACC: [id number] EXAM:  CT ABDOMEN AND PELVIS IC   ORDERED BY: SIRISHA HERNANDEZ     PROCEDURE DATE:  2024          INTERPRETATION:  CLINICAL INFORMATION: Abdominal pain    COMPARISON: CT abdomen and pelvis 2024    CONTRAST/COMPLICATIONS:  IV Contrast: Omnipaque 350  90 cc administered   10 cc discarded  Oral Contrast: NONE  Complications: None reported at time of study completion    PROCEDURE:  CT of the Abdomen and Pelvis was performed.  Sagittal and coronal reformats were performed.    FINDINGS: Some images are degraded by artifacts from the patient's arms   within the scanning field of view.  LOWER CHEST: Large hiatal hernia containing at least proximal half of the   stomach. Mild dependent atelectasis.    LIVER: Within normal limits.  BILE DUCTS: Normal caliber.  GALLBLADDER: Nondistended. Folded fundal configuration.  SPLEEN: Within normal limits.  PANCREAS: Stable 5 mm hypodensity likely a cyst at the pancreatic   head/neck junction  ADRENALS: Within normal limits.  KIDNEYS/URETERS: No hydronephrosis or or renal calculus.  Smooth urothelial thickening and enhancement of the ureters suggest   ascending tract infection.  Subtle wedge-shaped areas of bilateral renal hypoenhancement may   correspond with pyelonephritis  Small bilateral probable cysts some of which may contain internal   septation and hyperdense material incompletely characterized on this exam    BLADDER: Diffuse wall thickening with perivesicular inflammation  REPRODUCTIVE ORGANS: Uterus and adnexa within normal limits.    BOWEL: No bowel obstruction. Wall thickening of transverse and descending   colon.  PERITONEUM: No ascites.  VESSELS: Atherosclerotic changes.  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  ABDOMINAL WALL: Tiny fat-containing umbilical hernia.  BONES: Multilevel degenerative changes throughout thespine. Right   femoral neck pins. Left total hip arthroplasty    IMPRESSION:  Diffuse bladder wall thickening with acute perivesicular inflammation in   keeping with cystitis. Correlate with urinalysis    Smooth urothelial thickening and enhancement of the ureters suggest   ascending tract infection.    Subtle wedge-shaped areas of bilateral renal hypoenhancement suspicious   for acute pyelonephritis    Wall thickening of transverse and descending colon suggests acute colitis    Large hiatal hernia    < end of copied text >

## 2024-03-31 LAB
-  AMOXICILLIN/CLAVULANIC ACID: SIGNIFICANT CHANGE UP
-  AMPICILLIN/SULBACTAM: SIGNIFICANT CHANGE UP
-  AMPICILLIN: SIGNIFICANT CHANGE UP
-  AZTREONAM: SIGNIFICANT CHANGE UP
-  CEFAZOLIN: SIGNIFICANT CHANGE UP
-  CEFEPIME: SIGNIFICANT CHANGE UP
-  CEFOXITIN: SIGNIFICANT CHANGE UP
-  CEFTRIAXONE: SIGNIFICANT CHANGE UP
-  CEFUROXIME: SIGNIFICANT CHANGE UP
-  CIPROFLOXACIN: SIGNIFICANT CHANGE UP
-  ERTAPENEM: SIGNIFICANT CHANGE UP
-  GENTAMICIN: SIGNIFICANT CHANGE UP
-  IMIPENEM: SIGNIFICANT CHANGE UP
-  LEVOFLOXACIN: SIGNIFICANT CHANGE UP
-  MEROPENEM: SIGNIFICANT CHANGE UP
-  NITROFURANTOIN: SIGNIFICANT CHANGE UP
-  PIPERACILLIN/TAZOBACTAM: SIGNIFICANT CHANGE UP
-  TOBRAMYCIN: SIGNIFICANT CHANGE UP
-  TRIMETHOPRIM/SULFAMETHOXAZOLE: SIGNIFICANT CHANGE UP
CULTURE RESULTS: ABNORMAL
METHOD TYPE: SIGNIFICANT CHANGE UP
ORGANISM # SPEC MICROSCOPIC CNT: ABNORMAL
ORGANISM # SPEC MICROSCOPIC CNT: ABNORMAL
SPECIMEN SOURCE: SIGNIFICANT CHANGE UP

## 2024-03-31 PROCEDURE — 99232 SBSQ HOSP IP/OBS MODERATE 35: CPT

## 2024-03-31 RX ORDER — LEVOTHYROXINE SODIUM 125 MCG
37.5 TABLET ORAL ONCE
Refills: 0 | Status: COMPLETED | OUTPATIENT
Start: 2024-03-31 | End: 2024-03-31

## 2024-03-31 RX ADMIN — Medication 1 TABLET(S): at 12:55

## 2024-03-31 RX ADMIN — Medication 75 MICROGRAM(S): at 06:43

## 2024-03-31 RX ADMIN — CARVEDILOL PHOSPHATE 25 MILLIGRAM(S): 80 CAPSULE, EXTENDED RELEASE ORAL at 21:47

## 2024-03-31 RX ADMIN — CHLORHEXIDINE GLUCONATE 1 APPLICATION(S): 213 SOLUTION TOPICAL at 12:57

## 2024-03-31 RX ADMIN — HEPARIN SODIUM 5000 UNIT(S): 5000 INJECTION INTRAVENOUS; SUBCUTANEOUS at 06:43

## 2024-03-31 RX ADMIN — ATORVASTATIN CALCIUM 10 MILLIGRAM(S): 80 TABLET, FILM COATED ORAL at 21:47

## 2024-03-31 RX ADMIN — HEPARIN SODIUM 5000 UNIT(S): 5000 INJECTION INTRAVENOUS; SUBCUTANEOUS at 21:46

## 2024-03-31 RX ADMIN — Medication 37.5 MICROGRAM(S): at 11:40

## 2024-03-31 RX ADMIN — CARVEDILOL PHOSPHATE 25 MILLIGRAM(S): 80 CAPSULE, EXTENDED RELEASE ORAL at 11:35

## 2024-03-31 RX ADMIN — CEFTRIAXONE 100 MILLIGRAM(S): 500 INJECTION, POWDER, FOR SOLUTION INTRAMUSCULAR; INTRAVENOUS at 17:00

## 2024-03-31 RX ADMIN — HEPARIN SODIUM 5000 UNIT(S): 5000 INJECTION INTRAVENOUS; SUBCUTANEOUS at 15:03

## 2024-03-31 RX ADMIN — PANTOPRAZOLE SODIUM 40 MILLIGRAM(S): 20 TABLET, DELAYED RELEASE ORAL at 06:42

## 2024-03-31 RX ADMIN — Medication 1 MILLIGRAM(S): at 00:54

## 2024-03-31 RX ADMIN — SENNA PLUS 2 TABLET(S): 8.6 TABLET ORAL at 21:47

## 2024-03-31 RX ADMIN — BUPROPION HYDROCHLORIDE 150 MILLIGRAM(S): 150 TABLET, EXTENDED RELEASE ORAL at 12:54

## 2024-04-01 ENCOUNTER — TRANSCRIPTION ENCOUNTER (OUTPATIENT)
Age: 84
End: 2024-04-01

## 2024-04-01 LAB
HCT VFR BLD CALC: 32.8 % — LOW (ref 34.5–45)
HGB BLD-MCNC: 10.6 G/DL — LOW (ref 11.5–15.5)
MCHC RBC-ENTMCNC: 31 PG — SIGNIFICANT CHANGE UP (ref 27–34)
MCHC RBC-ENTMCNC: 32.3 GM/DL — SIGNIFICANT CHANGE UP (ref 32–36)
MCV RBC AUTO: 95.9 FL — SIGNIFICANT CHANGE UP (ref 80–100)
NRBC # BLD: 0 /100 WBCS — SIGNIFICANT CHANGE UP (ref 0–0)
PLATELET # BLD AUTO: 243 K/UL — SIGNIFICANT CHANGE UP (ref 150–400)
RBC # BLD: 3.42 M/UL — LOW (ref 3.8–5.2)
RBC # FLD: 14.3 % — SIGNIFICANT CHANGE UP (ref 10.3–14.5)
WBC # BLD: 6.08 K/UL — SIGNIFICANT CHANGE UP (ref 3.8–10.5)
WBC # FLD AUTO: 6.08 K/UL — SIGNIFICANT CHANGE UP (ref 3.8–10.5)

## 2024-04-01 PROCEDURE — 93010 ELECTROCARDIOGRAM REPORT: CPT

## 2024-04-01 PROCEDURE — 99232 SBSQ HOSP IP/OBS MODERATE 35: CPT

## 2024-04-01 RX ORDER — POLYETHYLENE GLYCOL 3350 17 G/17G
17 POWDER, FOR SOLUTION ORAL DAILY
Refills: 0 | Status: DISCONTINUED | OUTPATIENT
Start: 2024-04-01 | End: 2024-04-02

## 2024-04-01 RX ORDER — DILTIAZEM HCL 120 MG
120 CAPSULE, EXT RELEASE 24 HR ORAL DAILY
Refills: 0 | Status: DISCONTINUED | OUTPATIENT
Start: 2024-04-01 | End: 2024-04-02

## 2024-04-01 RX ADMIN — Medication 25 MILLIGRAM(S): at 23:57

## 2024-04-01 RX ADMIN — BUPROPION HYDROCHLORIDE 150 MILLIGRAM(S): 150 TABLET, EXTENDED RELEASE ORAL at 11:01

## 2024-04-01 RX ADMIN — CEFTRIAXONE 100 MILLIGRAM(S): 500 INJECTION, POWDER, FOR SOLUTION INTRAMUSCULAR; INTRAVENOUS at 16:38

## 2024-04-01 RX ADMIN — Medication 1 MILLIGRAM(S): at 00:02

## 2024-04-01 RX ADMIN — CARVEDILOL PHOSPHATE 25 MILLIGRAM(S): 80 CAPSULE, EXTENDED RELEASE ORAL at 21:08

## 2024-04-01 RX ADMIN — Medication 1 TABLET(S): at 11:01

## 2024-04-01 RX ADMIN — Medication 75 MICROGRAM(S): at 05:01

## 2024-04-01 RX ADMIN — Medication 25 MILLIGRAM(S): at 00:02

## 2024-04-01 RX ADMIN — CHLORHEXIDINE GLUCONATE 1 APPLICATION(S): 213 SOLUTION TOPICAL at 11:05

## 2024-04-01 RX ADMIN — HEPARIN SODIUM 5000 UNIT(S): 5000 INJECTION INTRAVENOUS; SUBCUTANEOUS at 13:34

## 2024-04-01 RX ADMIN — Medication 120 MILLIGRAM(S): at 12:23

## 2024-04-01 RX ADMIN — SENNA PLUS 2 TABLET(S): 8.6 TABLET ORAL at 21:09

## 2024-04-01 RX ADMIN — PANTOPRAZOLE SODIUM 40 MILLIGRAM(S): 20 TABLET, DELAYED RELEASE ORAL at 05:01

## 2024-04-01 RX ADMIN — POLYETHYLENE GLYCOL 3350 17 GRAM(S): 17 POWDER, FOR SOLUTION ORAL at 11:02

## 2024-04-01 RX ADMIN — CARVEDILOL PHOSPHATE 25 MILLIGRAM(S): 80 CAPSULE, EXTENDED RELEASE ORAL at 09:04

## 2024-04-01 RX ADMIN — ATORVASTATIN CALCIUM 10 MILLIGRAM(S): 80 TABLET, FILM COATED ORAL at 21:09

## 2024-04-01 RX ADMIN — Medication 1 MILLIGRAM(S): at 23:57

## 2024-04-01 NOTE — PROGRESS NOTE ADULT - TIME BILLING
Agree with above ACP note.  cv stable  atypical chest pain  f/u ecg  sbp elevated  cont bb  add back dilt   next will add back arb

## 2024-04-01 NOTE — DISCHARGE NOTE NURSING/CASE MANAGEMENT/SOCIAL WORK - PATIENT PORTAL LINK FT
You can access the FollowMyHealth Patient Portal offered by [facility] by registering at the following website: [email or web address]. By joining [employer]’s FollowMyHealth portal, you will also be able to view your health information using other applications (apps) compatible with our system.

## 2024-04-02 ENCOUNTER — TRANSCRIPTION ENCOUNTER (OUTPATIENT)
Age: 84
End: 2024-04-02

## 2024-04-02 VITALS
HEART RATE: 80 BPM | OXYGEN SATURATION: 97 % | DIASTOLIC BLOOD PRESSURE: 73 MMHG | SYSTOLIC BLOOD PRESSURE: 126 MMHG | RESPIRATION RATE: 18 BRPM | TEMPERATURE: 98 F

## 2024-04-02 LAB
CULTURE RESULTS: SIGNIFICANT CHANGE UP
SPECIMEN SOURCE: SIGNIFICANT CHANGE UP

## 2024-04-02 PROCEDURE — 82947 ASSAY GLUCOSE BLOOD QUANT: CPT

## 2024-04-02 PROCEDURE — 87086 URINE CULTURE/COLONY COUNT: CPT

## 2024-04-02 PROCEDURE — 82803 BLOOD GASES ANY COMBINATION: CPT

## 2024-04-02 PROCEDURE — 84295 ASSAY OF SERUM SODIUM: CPT

## 2024-04-02 PROCEDURE — 85018 HEMOGLOBIN: CPT

## 2024-04-02 PROCEDURE — 87040 BLOOD CULTURE FOR BACTERIA: CPT

## 2024-04-02 PROCEDURE — 99285 EMERGENCY DEPT VISIT HI MDM: CPT

## 2024-04-02 PROCEDURE — 82330 ASSAY OF CALCIUM: CPT

## 2024-04-02 PROCEDURE — 85027 COMPLETE CBC AUTOMATED: CPT

## 2024-04-02 PROCEDURE — 85014 HEMATOCRIT: CPT

## 2024-04-02 PROCEDURE — 96375 TX/PRO/DX INJ NEW DRUG ADDON: CPT

## 2024-04-02 PROCEDURE — 96374 THER/PROPH/DIAG INJ IV PUSH: CPT

## 2024-04-02 PROCEDURE — 87077 CULTURE AEROBIC IDENTIFY: CPT

## 2024-04-02 PROCEDURE — 87186 SC STD MICRODIL/AGAR DIL: CPT

## 2024-04-02 PROCEDURE — 85025 COMPLETE CBC W/AUTO DIFF WBC: CPT

## 2024-04-02 PROCEDURE — 81001 URINALYSIS AUTO W/SCOPE: CPT

## 2024-04-02 PROCEDURE — 80053 COMPREHEN METABOLIC PANEL: CPT

## 2024-04-02 PROCEDURE — 80048 BASIC METABOLIC PNL TOTAL CA: CPT

## 2024-04-02 PROCEDURE — 83605 ASSAY OF LACTIC ACID: CPT

## 2024-04-02 PROCEDURE — 87507 IADNA-DNA/RNA PROBE TQ 12-25: CPT

## 2024-04-02 PROCEDURE — 74177 CT ABD & PELVIS W/CONTRAST: CPT | Mod: MC

## 2024-04-02 PROCEDURE — 82435 ASSAY OF BLOOD CHLORIDE: CPT

## 2024-04-02 PROCEDURE — 87045 FECES CULTURE AEROBIC BACT: CPT

## 2024-04-02 PROCEDURE — 84443 ASSAY THYROID STIM HORMONE: CPT

## 2024-04-02 PROCEDURE — 93005 ELECTROCARDIOGRAM TRACING: CPT

## 2024-04-02 PROCEDURE — 84132 ASSAY OF SERUM POTASSIUM: CPT

## 2024-04-02 RX ORDER — BUPROPION HYDROCHLORIDE 150 MG/1
1 TABLET, EXTENDED RELEASE ORAL
Qty: 0 | Refills: 0 | DISCHARGE
Start: 2024-04-02

## 2024-04-02 RX ORDER — CARVEDILOL PHOSPHATE 80 MG/1
1 CAPSULE, EXTENDED RELEASE ORAL
Qty: 0 | Refills: 0 | DISCHARGE
Start: 2024-04-02

## 2024-04-02 RX ORDER — LIDOCAINE 4 G/100G
1 CREAM TOPICAL
Refills: 0 | DISCHARGE

## 2024-04-02 RX ORDER — HYDRALAZINE HCL 50 MG
50 TABLET ORAL
Qty: 0 | Refills: 0 | DISCHARGE

## 2024-04-02 RX ORDER — DILTIAZEM HCL 120 MG
1 CAPSULE, EXT RELEASE 24 HR ORAL
Qty: 0 | Refills: 0 | DISCHARGE

## 2024-04-02 RX ORDER — DILTIAZEM HCL 120 MG
1 CAPSULE, EXT RELEASE 24 HR ORAL
Qty: 0 | Refills: 0 | DISCHARGE
Start: 2024-04-02

## 2024-04-02 RX ORDER — BUPROPION HYDROCHLORIDE 150 MG/1
1 TABLET, EXTENDED RELEASE ORAL
Refills: 0 | DISCHARGE

## 2024-04-02 RX ADMIN — Medication 650 MILLIGRAM(S): at 02:00

## 2024-04-02 RX ADMIN — Medication 650 MILLIGRAM(S): at 01:24

## 2024-04-02 RX ADMIN — CHLORHEXIDINE GLUCONATE 1 APPLICATION(S): 213 SOLUTION TOPICAL at 11:20

## 2024-04-02 RX ADMIN — POLYETHYLENE GLYCOL 3350 17 GRAM(S): 17 POWDER, FOR SOLUTION ORAL at 11:15

## 2024-04-02 RX ADMIN — BUPROPION HYDROCHLORIDE 150 MILLIGRAM(S): 150 TABLET, EXTENDED RELEASE ORAL at 11:15

## 2024-04-02 RX ADMIN — Medication 1 TABLET(S): at 11:15

## 2024-04-02 RX ADMIN — Medication 75 MICROGRAM(S): at 05:24

## 2024-04-02 RX ADMIN — CARVEDILOL PHOSPHATE 25 MILLIGRAM(S): 80 CAPSULE, EXTENDED RELEASE ORAL at 09:02

## 2024-04-02 RX ADMIN — PANTOPRAZOLE SODIUM 40 MILLIGRAM(S): 20 TABLET, DELAYED RELEASE ORAL at 06:12

## 2024-04-02 RX ADMIN — Medication 120 MILLIGRAM(S): at 06:12

## 2024-04-02 RX ADMIN — HEPARIN SODIUM 5000 UNIT(S): 5000 INJECTION INTRAVENOUS; SUBCUTANEOUS at 13:02

## 2024-04-02 RX ADMIN — CEFTRIAXONE 100 MILLIGRAM(S): 500 INJECTION, POWDER, FOR SOLUTION INTRAMUSCULAR; INTRAVENOUS at 13:02

## 2024-04-02 NOTE — DISCHARGE NOTE PROVIDER - HOSPITAL COURSE
HPI:  83-year-old female patient past medical history aneurysm, prolapsed bladder brought in by daughter complains of urinary incontinence and dysuria since last night.  Patient follows up with uro-– GYN and has multiple pessaries that do not work.  Patient had pessary removed yesterday.  Patient also noticed a few drops of blood in urine yesterday.  As per patient was admitted here 1.5 months ago for sepsis in the setting of UTI. (28 Mar 2024 17:58)    Hospital Course: P: 83y  postmenopausal female with h/o pelvic organ prolapse with frequent pessary usage admitted to medicine 2/2 urosepsis in setting of E Coli UTI. Pt afebrile and hemodynamically stable. CT Abdomen and pelvis  demonstrating diffuse bladder wall thickening consistent with cystitis, smooth urothelial thickening and enhancement of the ureters suggesting ascending tract infection, subtle wedge-shaped b/l renal hypoenhancement suspicious for acute pyelonephritis, acute colitis, large hiatal hernia.  Blood cultures  negative to date, Urine cultures demonstrating >100k E coli. Pelvic exam demonstrating incomplete uterovaginal prolapse.  Patient was seen by GYN for evaluation of pessary replacement ; however was not performed since patient offered no complaints and was advised by GYN to follow up with private GYN -Urologist Patient was seen by infectious disease for treatment of urianry treact infection . She was treated with intravenous Rocephin, her symptoms improved . Mrs Yost complained of Abdomen pain seen by Gastroenterology and stool cultures were obtained , stool positive for salmonella , seen by infectious disease and managed as well with intravenous ceftriaxone.     Important Medication Changes and Reason: none     Active or Pending Issues Requiring Follow-up [facility]        Advanced Directives:   [ x] Full code  [ ] DNR  [ ] Hospice    Discharge Diagnoses:  UTI   salmonella infection  bladder prolapsed

## 2024-04-02 NOTE — PROGRESS NOTE ADULT - PROVIDER SPECIALTY LIST ADULT
Cardiology
Gastroenterology
Infectious Disease
Infectious Disease
Internal Medicine
Internal Medicine
Cardiology
Cardiology
Infectious Disease
Internal Medicine
Cardiology
Cardiology
Gastroenterology
Internal Medicine
Internal Medicine

## 2024-04-02 NOTE — DISCHARGE NOTE PROVIDER - NSDCMRMEDTOKEN_GEN_ALL_CORE_FT
acetaminophen 325 mg oral tablet: 1 tab(s) orally as needed for pain  ALPRAZolam 1 mg oral tablet: 1 tab(s) orally once a day AS NEEDED  amitriptyline 25 mg oral tablet: 1 tab(s) orally once a day (at bedtime)  ascorbic acid 500 mg oral tablet: 1 tab(s) orally 2 times a day  buPROPion 150 mg/24 hours (XL) oral tablet, extended release: 1 tab(s) orally once a day  calcium-vitamin D 500 mg-5 mcg (200 intl units) oral tablet: 1 tab(s) orally once a day  carvedilol 25 mg oral tablet: 1 tab(s) orally every 12 hours  Centrum Silver oral tablet: 1 tab(s) orally once a day  cetirizine 10 mg oral tablet: 1 tab(s) orally once a day  dilTIAZem 120 mg/24 hours oral capsule, extended release: 1 cap(s) orally once a day  Linzess 290 mcg oral capsule: 1 cap(s) orally once a day as needed for - as needed  lovastatin 20 mg oral tablet: 1 tab(s) orally once a day (in the evening) - with dinner  olmesartan 40 mg oral tablet: 1 tab(s) orally once a day  omeprazole 20 mg oral delayed release capsule: 1 cap(s) orally once a day  Synthroid 75 mcg (0.075 mg) oral tablet: 1 tab(s) orally once a day monday through saturday and 1.5 tablets on sunday.

## 2024-04-02 NOTE — DISCHARGE NOTE PROVIDER - CARE PROVIDERS DIRECT ADDRESSES
,[email or web address]

## 2024-04-02 NOTE — PROGRESS NOTE ADULT - SUBJECTIVE AND OBJECTIVE BOX
CARDIOLOGY FOLLOW UP - Dr. Calle  DATE OF SERVICE: 4/1/24    CC  Endorsing L sided chest/shoulder pain that radiates to the back since breakfast this am  No SOB  No other cv complaints       REVIEW OF SYSTEMS:  CONSTITUTIONAL: No fever, weight loss, or fatigue  RESPIRATORY: No cough, wheezing, chills or hemoptysis; No Shortness of Breath  CARDIOVASCULAR: No chest pain, palpitations, passing out, dizziness, or leg swelling  GASTROINTESTINAL: No abdominal or epigastric pain. No nausea, vomiting, or hematemesis; No diarrhea or constipation. No melena or hematochezia.  VASCULAR: No edema     PHYSICAL EXAM:  T(C): 36.2 (04-01-24 @ 09:02), Max: 37.2 (04-01-24 @ 00:00)  HR: 68 (04-01-24 @ 09:02) (63 - 68)  BP: 154/81 (04-01-24 @ 09:02) (138/84 - 154/81)  RR: 18 (04-01-24 @ 09:02) (17 - 18)  SpO2: 97% (04-01-24 @ 09:02) (95% - 100%)  Wt(kg): --  I&O's Summary    31 Mar 2024 07:01  -  01 Apr 2024 07:00  --------------------------------------------------------  IN: 50 mL / OUT: 0 mL / NET: 50 mL    01 Apr 2024 07:01  -  01 Apr 2024 11:51  --------------------------------------------------------  IN: 240 mL / OUT: 0 mL / NET: 240 mL        Appearance: Elderly female 	  Cardiovascular: Normal S1 S2,RRR, No JVD, No murmurs  Respiratory: Lungs clear to auscultation b/l   Gastrointestinal:  Soft, Non-tender, + BS	  Extremities: Normal range of motion, No clubbing, cyanosis or edema      Home Medications:  acetaminophen 325 mg oral tablet: 1 tab(s) orally as needed for pain (28 Mar 2024 15:34)  ALPRAZolam 1 mg oral tablet: 1 tab(s) orally once a day AS NEEDED (28 Mar 2024 13:52)  amitriptyline 25 mg oral tablet: 1 tab(s) orally once a day (at bedtime) (28 Mar 2024 14:07)  ascorbic acid 500 mg oral tablet: 1 tab(s) orally 2 times a day (28 Mar 2024 13:55)  AZO-D-MANNOSE: 4 caps orally once daily (28 Mar 2024 15:34)  buPROPion 100 mg/12 hours (SR) oral tablet, extended release: 1 tab(s) orally 2 times a day (28 Mar 2024 13:55)  Caltrate 600 + D oral tablet: 2 tab(s) orally every other day (28 Mar 2024 13:53)  Centrum Silver oral tablet: 1 tab(s) orally once a day (28 Mar 2024 13:55)  cetirizine 10 mg oral tablet: 1 tab(s) orally once a day (28 Mar 2024 15:34)  dilTIAZem 120 mg/24 hours oral capsule, extended release: 1 cap(s) orally once a day (28 Mar 2024 13:55)  hydrALAZINE 50 mg oral tablet: 50 milligram(s) orally 2 times a day (28 Mar 2024 13:55)  Laxative magnesium 500mg caplet: 1-2 caplets at night as needed (28 Mar 2024 13:55)  lidocaine 5% topical film: Apply topically to affected area 3 times a day as needed for pain as per pt (28 Mar 2024 13:55)  Linzess 290 mcg oral capsule: 1 cap(s) orally once a day as needed for - as needed (28 Mar 2024 15:34)  lovastatin 20 mg oral tablet: 1 tab(s) orally once a day (in the evening) - with dinner (28 Mar 2024 13:55)  olmesartan 40 mg oral tablet: 1 tab(s) orally once a day (28 Mar 2024 13:55)  omeprazole 20 mg oral delayed release capsule: 1 cap(s) orally once a day (28 Mar 2024 13:55)  Synthroid 75 mcg (0.075 mg) oral tablet: 1 tab(s) orally once a day monday through saturday and 1.5 tablets on sunday. (28 Mar 2024 13:55)      MEDICATIONS  (STANDING):  amitriptyline 25 milliGRAM(s) Oral at bedtime  atorvastatin 10 milliGRAM(s) Oral at bedtime  buPROPion XL (24-Hour) . 150 milliGRAM(s) Oral daily  calcium carbonate 1250 mG  + Vitamin D (OsCal 500 + D) 1 Tablet(s) Oral daily  carvedilol 25 milliGRAM(s) Oral every 12 hours  cefTRIAXone   IVPB 1000 milliGRAM(s) IV Intermittent every 24 hours  chlorhexidine 2% Cloths 1 Application(s) Topical daily  heparin   Injectable 5000 Unit(s) SubCutaneous every 8 hours  levothyroxine 75 MICROGram(s) Oral daily  multivitamin 1 Tablet(s) Oral daily  pantoprazole    Tablet 40 milliGRAM(s) Oral before breakfast  polyethylene glycol 3350 17 Gram(s) Oral daily  senna 2 Tablet(s) Oral at bedtime      TELEMETRY: 	    ECG:  	  RADIOLOGY:   DIAGNOSTIC TESTING:  [ ] Echocardiogram:  [ ]  Catheterization:  [ ] Stress Test:    OTHER: 	    LABS:	 	                            10.6   6.08  )-----------( 243      ( 01 Apr 2024 07:38 )             32.8                   
CARDIOLOGY FOLLOW UP NOTE - DR. STREET    Patient Name: JOE URIOSTEGUI    Date of Service: 03-30-24 @ 12:06    Patient seen and examined  bp lower    Subjective:    cv: denies chest pain, dyspnea, palpitations, dizziness  pulmonary: denies cough  GI: denies abdominal pain, nausea, vomiting  vascular/legs: no edema   skin: no rash  ROS: otherwise negative   overnight events:      PHYSICAL EXAM:  T(C): 36.6 (03-30-24 @ 08:25), Max: 36.8 (03-29-24 @ 17:38)  HR: 86 (03-30-24 @ 10:05) (64 - 86)  BP: 126/78 (03-30-24 @ 10:05) (91/58 - 128/74)  RR: 18 (03-30-24 @ 08:25) (18 - 18)  SpO2: 97% (03-30-24 @ 08:25) (96% - 97%)  Wt(kg): --  I&O's Summary    29 Mar 2024 07:01  -  30 Mar 2024 07:00  --------------------------------------------------------  IN: 240 mL / OUT: 0 mL / NET: 240 mL      Daily     Daily     Appearance: Normal	  Cardiovascular: Normal S1 S2,RRR, No JVD, No murmurs  Respiratory: Lungs clear to auscultation	  Gastrointestinal:  Soft, Non-tender, + BS	  Extremities: Normal range of motion, No clubbing, cyanosis or edema      Home Medications:  acetaminophen 325 mg oral tablet: 1 tab(s) orally as needed for pain (28 Mar 2024 15:34)  ALPRAZolam 1 mg oral tablet: 1 tab(s) orally once a day AS NEEDED (28 Mar 2024 13:52)  amitriptyline 25 mg oral tablet: 1 tab(s) orally once a day (at bedtime) (28 Mar 2024 14:07)  ascorbic acid 500 mg oral tablet: 1 tab(s) orally 2 times a day (28 Mar 2024 13:55)  AZO-D-MANNOSE: 4 caps orally once daily (28 Mar 2024 15:34)  buPROPion 100 mg/12 hours (SR) oral tablet, extended release: 1 tab(s) orally 2 times a day (28 Mar 2024 13:55)  Caltrate 600 + D oral tablet: 2 tab(s) orally every other day (28 Mar 2024 13:53)  Centrum Silver oral tablet: 1 tab(s) orally once a day (28 Mar 2024 13:55)  cetirizine 10 mg oral tablet: 1 tab(s) orally once a day (28 Mar 2024 15:34)  dilTIAZem 120 mg/24 hours oral capsule, extended release: 1 cap(s) orally once a day (28 Mar 2024 13:55)  hydrALAZINE 50 mg oral tablet: 50 milligram(s) orally 2 times a day (28 Mar 2024 13:55)  Laxative magnesium 500mg caplet: 1-2 caplets at night as needed (28 Mar 2024 13:55)  lidocaine 5% topical film: Apply topically to affected area 3 times a day as needed for pain as per pt (28 Mar 2024 13:55)  Linzess 290 mcg oral capsule: 1 cap(s) orally once a day as needed for - as needed (28 Mar 2024 15:34)  lovastatin 20 mg oral tablet: 1 tab(s) orally once a day (in the evening) - with dinner (28 Mar 2024 13:55)  olmesartan 40 mg oral tablet: 1 tab(s) orally once a day (28 Mar 2024 13:55)  omeprazole 20 mg oral delayed release capsule: 1 cap(s) orally once a day (28 Mar 2024 13:55)  Synthroid 75 mcg (0.075 mg) oral tablet: 1 tab(s) orally once a day monday through saturday and 1.5 tablets on sunday. (28 Mar 2024 13:55)      MEDICATIONS  (STANDING):  amitriptyline 25 milliGRAM(s) Oral at bedtime  atorvastatin 10 milliGRAM(s) Oral at bedtime  buPROPion XL (24-Hour) . 150 milliGRAM(s) Oral daily  calcium carbonate 1250 mG  + Vitamin D (OsCal 500 + D) 1 Tablet(s) Oral daily  carvedilol 25 milliGRAM(s) Oral every 12 hours  cefTRIAXone   IVPB 1000 milliGRAM(s) IV Intermittent every 24 hours  chlorhexidine 2% Cloths 1 Application(s) Topical daily  heparin   Injectable 5000 Unit(s) SubCutaneous every 8 hours  levothyroxine 75 MICROGram(s) Oral daily  multivitamin 1 Tablet(s) Oral daily  pantoprazole    Tablet 40 milliGRAM(s) Oral before breakfast  senna 2 Tablet(s) Oral at bedtime      TELEMETRY: 	    ECG:  	  RADIOLOGY:   DIAGNOSTIC TESTING:  [ ] Echocardiogram:  [ ] Catheterization:  [ ] Stress Test:    OTHER: 	    LABS:	 	    CARDIAC MARKERS:                                      11.3   6.84  )-----------( 287      ( 30 Mar 2024 07:12 )             34.1     03-30    137  |  100  |  23  ----------------------------<  109<H>  3.9   |  23  |  1.19    Ca    9.5      30 Mar 2024 07:12    TPro  7.4  /  Alb  3.9  /  TBili  0.5  /  DBili  x   /  AST  16  /  ALT  14  /  AlkPhos  81  03-28    proBNP:     Lipid Profile:   HgA1c:     Creatinine: 1.19 mg/dL (03-30-24 @ 07:12)  Creatinine: 0.84 mg/dL (03-29-24 @ 06:51)  Creatinine: 0.88 mg/dL (03-28-24 @ 12:21)            
Patient is a 83y old  Female who presents with a chief complaint of dysuria (29 Mar 2024 14:32)      SUBJECTIVE / OVERNIGHT EVENTS: Comfortable without new complaints. Feels better.  Review of Systems  chest pain no  palpitations no  sob no  nausea no  headache no    MEDICATIONS  (STANDING):  amitriptyline 25 milliGRAM(s) Oral at bedtime  atorvastatin 10 milliGRAM(s) Oral at bedtime  buPROPion XL (24-Hour) . 150 milliGRAM(s) Oral daily  calcium carbonate 1250 mG  + Vitamin D (OsCal 500 + D) 1 Tablet(s) Oral daily  carvedilol 25 milliGRAM(s) Oral every 12 hours  cefTRIAXone   IVPB 1000 milliGRAM(s) IV Intermittent every 24 hours  chlorhexidine 2% Cloths 1 Application(s) Topical daily  heparin   Injectable 5000 Unit(s) SubCutaneous every 8 hours  levothyroxine 75 MICROGram(s) Oral daily  multivitamin 1 Tablet(s) Oral daily  pantoprazole    Tablet 40 milliGRAM(s) Oral before breakfast  senna 2 Tablet(s) Oral at bedtime    MEDICATIONS  (PRN):  acetaminophen     Tablet .. 650 milliGRAM(s) Oral every 6 hours PRN Temp greater or equal to 38.5C (101.3F), Mild Pain (1 - 3)  ALPRAZolam 1 milliGRAM(s) Oral daily PRN anxiety  loratadine 10 milliGRAM(s) Oral daily PRN itching      Vital Signs Last 24 Hrs  T(C): 36.8 (30 Mar 2024 16:16), Max: 36.8 (29 Mar 2024 17:38)  T(F): 98.3 (30 Mar 2024 16:16), Max: 98.3 (30 Mar 2024 16:16)  HR: 65 (30 Mar 2024 16:16) (64 - 86)  BP: 118/68 (30 Mar 2024 16:16) (91/58 - 128/74)  BP(mean): --  RR: 18 (30 Mar 2024 16:16) (18 - 18)  SpO2: 97% (30 Mar 2024 16:16) (96% - 97%)    Parameters below as of 30 Mar 2024 16:16  Patient On (Oxygen Delivery Method): room air        PHYSICAL EXAM:  GENERAL: NAD  HEAD:  Atraumatic, Normocephalic  EYES: EOMI, PERRLA, conjunctiva and sclera clear  NECK: Supple, No JVD  CHEST/LUNG: Clear to auscultation bilaterally; No wheeze  HEART: Regular rate and rhythm; No murmurs, rubs, or gallops  ABDOMEN: Soft, Nontender, Nondistended; Bowel sounds present  EXTREMITIES:  2+ Peripheral Pulses, No clubbing, cyanosis, or edema  PSYCH: AAOx3  NEUROLOGY: non-focal  SKIN: No rashes or lesions    LABS:                        11.3   6.84  )-----------( 287      ( 30 Mar 2024 07:12 )             34.1     03-30    137  |  100  |  23  ----------------------------<  109<H>  3.9   |  23  |  1.19    Ca    9.5      30 Mar 2024 07:12            Urinalysis Basic - ( 30 Mar 2024 07:12 )    Color: x / Appearance: x / SG: x / pH: x  Gluc: 109 mg/dL / Ketone: x  / Bili: x / Urobili: x   Blood: x / Protein: x / Nitrite: x   Leuk Esterase: x / RBC: x / WBC x   Sq Epi: x / Non Sq Epi: x / Bacteria: x        Culture - Blood (collected 28 Mar 2024 16:40)  Source: .Blood Blood-Venous  Preliminary Report (29 Mar 2024 23:01):    No growth at 24 hours    Culture - Blood (collected 28 Mar 2024 16:10)  Source: .Blood Blood-Peripheral  Preliminary Report (29 Mar 2024 23:01):    No growth at 24 hours    Culture - Urine (collected 28 Mar 2024 13:48)  Source: Clean Catch Clean Catch (Midstream)  Preliminary Report (29 Mar 2024 16:01):    >100,000 CFU/ml Escherichia coli        RADIOLOGY & ADDITIONAL TESTS:    Imaging Personally Reviewed:    Consultant(s) Notes Reviewed:      Care Discussed with Consultants/Other Providers:  
CARDIOLOGY FOLLOW UP - Dr. Calle  DATE OF SERVICE: 3/29/24    CC  No CV complaints     REVIEW OF SYSTEMS:  CONSTITUTIONAL: No fever, weight loss, or fatigue  RESPIRATORY: No cough, wheezing, chills or hemoptysis; No Shortness of Breath  CARDIOVASCULAR: No chest pain, palpitations, passing out, dizziness, or leg swelling  GASTROINTESTINAL: No abdominal or epigastric pain. No nausea, vomiting, or hematemesis; No diarrhea or constipation. No melena or hematochezia.  VASCULAR: No edema     PHYSICAL EXAM:  T(C): 36.7 (03-29-24 @ 08:41), Max: 36.7 (03-28-24 @ 15:13)  HR: 75 (03-29-24 @ 08:41) (59 - 75)  BP: 132/82 (03-29-24 @ 08:41) (116/62 - 178/86)  RR: 18 (03-29-24 @ 08:41) (16 - 18)  SpO2: 96% (03-29-24 @ 08:41) (96% - 98%)  Wt(kg): --  I&O's Summary      Appearance: Elderly female 	  Cardiovascular: Normal S1 S2,RRR, No JVD, No murmurs  Respiratory: Lungs clear to auscultation b/l   Gastrointestinal:  Soft, Non-tender, + BS	  Extremities: Normal range of motion, No clubbing, cyanosis or edema      Home Medications:  acetaminophen 325 mg oral tablet: 1 tab(s) orally as needed for pain (28 Mar 2024 15:34)  ALPRAZolam 1 mg oral tablet: 1 tab(s) orally once a day AS NEEDED (28 Mar 2024 13:52)  amitriptyline 25 mg oral tablet: 1 tab(s) orally once a day (at bedtime) (28 Mar 2024 14:07)  ascorbic acid 500 mg oral tablet: 1 tab(s) orally 2 times a day (28 Mar 2024 13:55)  AZO-D-MANNOSE: 4 caps orally once daily (28 Mar 2024 15:34)  buPROPion 100 mg/12 hours (SR) oral tablet, extended release: 1 tab(s) orally 2 times a day (28 Mar 2024 13:55)  Caltrate 600 + D oral tablet: 2 tab(s) orally every other day (28 Mar 2024 13:53)  Centrum Silver oral tablet: 1 tab(s) orally once a day (28 Mar 2024 13:55)  cetirizine 10 mg oral tablet: 1 tab(s) orally once a day (28 Mar 2024 15:34)  dilTIAZem 120 mg/24 hours oral capsule, extended release: 1 cap(s) orally once a day (28 Mar 2024 13:55)  hydrALAZINE 50 mg oral tablet: 50 milligram(s) orally 2 times a day (28 Mar 2024 13:55)  Laxative magnesium 500mg caplet: 1-2 caplets at night as needed (28 Mar 2024 13:55)  lidocaine 5% topical film: Apply topically to affected area 3 times a day as needed for pain as per pt (28 Mar 2024 13:55)  Linzess 290 mcg oral capsule: 1 cap(s) orally once a day as needed for - as needed (28 Mar 2024 15:34)  lovastatin 20 mg oral tablet: 1 tab(s) orally once a day (in the evening) - with dinner (28 Mar 2024 13:55)  olmesartan 40 mg oral tablet: 1 tab(s) orally once a day (28 Mar 2024 13:55)  omeprazole 20 mg oral delayed release capsule: 1 cap(s) orally once a day (28 Mar 2024 13:55)  Synthroid 75 mcg (0.075 mg) oral tablet: 1 tab(s) orally once a day monday through saturday and 1.5 tablets on sunday. (28 Mar 2024 13:55)      MEDICATIONS  (STANDING):  amitriptyline 25 milliGRAM(s) Oral at bedtime  atorvastatin 10 milliGRAM(s) Oral at bedtime  buPROPion XL (24-Hour) . 150 milliGRAM(s) Oral daily  calcium carbonate 1250 mG  + Vitamin D (OsCal 500 + D) 1 Tablet(s) Oral daily  carvedilol 25 milliGRAM(s) Oral every 12 hours  cefTRIAXone   IVPB 1000 milliGRAM(s) IV Intermittent every 24 hours  chlorhexidine 2% Cloths 1 Application(s) Topical daily  diltiazem    milliGRAM(s) Oral daily  heparin   Injectable 5000 Unit(s) SubCutaneous every 8 hours  hydrALAZINE 50 milliGRAM(s) Oral two times a day  levothyroxine 75 MICROGram(s) Oral daily  losartan 100 milliGRAM(s) Oral daily  metroNIDAZOLE  IVPB 500 milliGRAM(s) IV Intermittent every 8 hours  pantoprazole    Tablet 40 milliGRAM(s) Oral before breakfast      TELEMETRY: 	    ECG:  	  RADIOLOGY:   < from: CT Abdomen and Pelvis w/ IV Cont (03.28.24 @ 14:04) >  IMPRESSION:  Diffuse bladder wall thickening with acute perivesicular inflammation in   keeping with cystitis. Correlate with urinalysis    Smooth urothelial thickening and enhancement of the ureters suggest   ascending tract infection.    Subtle wedge-shaped areas of bilateral renal hypoenhancement suspicious   for acute pyelonephritis    Wall thickening of transverse and descending colon suggests acute colitis    Large hiatal hernia        --- End of Report ---    < end of copied text >    DIAGNOSTIC TESTING:  [ ] Echocardiogram:  [ ]  Catheterization:  [ ] Stress Test:    OTHER: 	    LABS:	 	                            11.2   5.94  )-----------( 259      ( 29 Mar 2024 06:51 )             34.4     03-29    139  |  103  |  10  ----------------------------<  79  3.4<L>   |  22  |  0.84    Ca    9.4      29 Mar 2024 06:51    TPro  7.4  /  Alb  3.9  /  TBili  0.5  /  DBili  x   /  AST  16  /  ALT  14  /  AlkPhos  81  03-28            
CARDIOLOGY FOLLOW UP - Dr. Calle  DATE OF SERVICE: 4/2/24    CC  No acute cv events     REVIEW OF SYSTEMS:  CONSTITUTIONAL: No fever, weight loss, or fatigue  RESPIRATORY: No cough, wheezing, chills or hemoptysis; No Shortness of Breath  CARDIOVASCULAR: No chest pain, palpitations, passing out, dizziness, or leg swelling  GASTROINTESTINAL: No abdominal or epigastric pain. No nausea, vomiting, or hematemesis; No diarrhea or constipation. No melena or hematochezia.  VASCULAR: No edema     PHYSICAL EXAM:  T(C): 36.8 (04-02-24 @ 08:57), Max: 36.9 (04-02-24 @ 00:00)  HR: 76 (04-02-24 @ 08:57) (60 - 76)  BP: 158/95 (04-02-24 @ 08:57) (136/86 - 158/95)  RR: 18 (04-02-24 @ 08:57) (18 - 18)  SpO2: 99% (04-02-24 @ 08:57) (95% - 99%)  Wt(kg): --  I&O's Summary    01 Apr 2024 07:01  -  02 Apr 2024 07:00  --------------------------------------------------------  IN: 480 mL / OUT: 0 mL / NET: 480 mL        Appearance: Elderly female 	  Cardiovascular: Normal S1 S2,RRR, No JVD, No murmurs  Respiratory: Lungs clear to auscultation b/l   Gastrointestinal:  Soft, Non-tender, + BS	  Extremities: Normal range of motion, No clubbing, cyanosis or edema      Home Medications:  acetaminophen 325 mg oral tablet: 1 tab(s) orally as needed for pain (28 Mar 2024 15:34)  ALPRAZolam 1 mg oral tablet: 1 tab(s) orally once a day AS NEEDED (28 Mar 2024 13:52)  amitriptyline 25 mg oral tablet: 1 tab(s) orally once a day (at bedtime) (28 Mar 2024 14:07)  ascorbic acid 500 mg oral tablet: 1 tab(s) orally 2 times a day (28 Mar 2024 13:55)  AZO-D-MANNOSE: 4 caps orally once daily (28 Mar 2024 15:34)  buPROPion 100 mg/12 hours (SR) oral tablet, extended release: 1 tab(s) orally 2 times a day (28 Mar 2024 13:55)  Caltrate 600 + D oral tablet: 2 tab(s) orally every other day (28 Mar 2024 13:53)  Centrum Silver oral tablet: 1 tab(s) orally once a day (28 Mar 2024 13:55)  cetirizine 10 mg oral tablet: 1 tab(s) orally once a day (28 Mar 2024 15:34)  dilTIAZem 120 mg/24 hours oral capsule, extended release: 1 cap(s) orally once a day (28 Mar 2024 13:55)  hydrALAZINE 50 mg oral tablet: 50 milligram(s) orally 2 times a day (28 Mar 2024 13:55)  Laxative magnesium 500mg caplet: 1-2 caplets at night as needed (28 Mar 2024 13:55)  lidocaine 5% topical film: Apply topically to affected area 3 times a day as needed for pain as per pt (28 Mar 2024 13:55)  Linzess 290 mcg oral capsule: 1 cap(s) orally once a day as needed for - as needed (28 Mar 2024 15:34)  lovastatin 20 mg oral tablet: 1 tab(s) orally once a day (in the evening) - with dinner (28 Mar 2024 13:55)  olmesartan 40 mg oral tablet: 1 tab(s) orally once a day (28 Mar 2024 13:55)  omeprazole 20 mg oral delayed release capsule: 1 cap(s) orally once a day (28 Mar 2024 13:55)  Synthroid 75 mcg (0.075 mg) oral tablet: 1 tab(s) orally once a day monday through saturday and 1.5 tablets on sunday. (28 Mar 2024 13:55)      MEDICATIONS  (STANDING):  amitriptyline 25 milliGRAM(s) Oral at bedtime  atorvastatin 10 milliGRAM(s) Oral at bedtime  bisacodyl 5 milliGRAM(s) Oral at bedtime  buPROPion XL (24-Hour) . 150 milliGRAM(s) Oral daily  calcium carbonate 1250 mG  + Vitamin D (OsCal 500 + D) 1 Tablet(s) Oral daily  carvedilol 25 milliGRAM(s) Oral every 12 hours  cefTRIAXone   IVPB 1000 milliGRAM(s) IV Intermittent every 24 hours  chlorhexidine 2% Cloths 1 Application(s) Topical daily  diltiazem    milliGRAM(s) Oral daily  heparin   Injectable 5000 Unit(s) SubCutaneous every 8 hours  levothyroxine 75 MICROGram(s) Oral daily  multivitamin 1 Tablet(s) Oral daily  pantoprazole    Tablet 40 milliGRAM(s) Oral before breakfast  polyethylene glycol 3350 17 Gram(s) Oral daily  senna 2 Tablet(s) Oral at bedtime      TELEMETRY: 	    ECG:  	  RADIOLOGY:   DIAGNOSTIC TESTING:  [ ] Echocardiogram:  [ ]  Catheterization:  [ ] Stress Test:    OTHER: 	    LABS:	 	                            10.6   6.08  )-----------( 243      ( 01 Apr 2024 07:38 )             32.8                   
CARDIOLOGY FOLLOW UP NOTE - DR. STREET    Patient Name: JOE URIOSTEGUI    Date of Service: 03-31-24 @ 10:50    Patient seen and examined    Subjective:    cv: denies chest pain, dyspnea, palpitations, dizziness  pulmonary: denies cough  GI: denies abdominal pain, nausea, vomiting  vascular/legs: no edema   skin: no rash  ROS: otherwise negative   overnight events:      PHYSICAL EXAM:  T(C): 36.7 (03-31-24 @ 00:00), Max: 36.8 (03-30-24 @ 16:16)  HR: 70 (03-31-24 @ 00:00) (65 - 70)  BP: 130/66 (03-31-24 @ 00:00) (118/68 - 130/66)  RR: 18 (03-31-24 @ 00:00) (18 - 18)  SpO2: 97% (03-31-24 @ 00:00) (97% - 97%)  Wt(kg): --  I&O's Summary    Daily     Daily     Appearance: Normal	  Cardiovascular: Normal S1 S2,RRR, No JVD, No murmurs  Respiratory: Lungs clear to auscultation	  Gastrointestinal:  Soft, Non-tender, + BS	  Extremities: Normal range of motion, No clubbing, cyanosis or edema      Home Medications:  acetaminophen 325 mg oral tablet: 1 tab(s) orally as needed for pain (28 Mar 2024 15:34)  ALPRAZolam 1 mg oral tablet: 1 tab(s) orally once a day AS NEEDED (28 Mar 2024 13:52)  amitriptyline 25 mg oral tablet: 1 tab(s) orally once a day (at bedtime) (28 Mar 2024 14:07)  ascorbic acid 500 mg oral tablet: 1 tab(s) orally 2 times a day (28 Mar 2024 13:55)  AZO-D-MANNOSE: 4 caps orally once daily (28 Mar 2024 15:34)  buPROPion 100 mg/12 hours (SR) oral tablet, extended release: 1 tab(s) orally 2 times a day (28 Mar 2024 13:55)  Caltrate 600 + D oral tablet: 2 tab(s) orally every other day (28 Mar 2024 13:53)  Centrum Silver oral tablet: 1 tab(s) orally once a day (28 Mar 2024 13:55)  cetirizine 10 mg oral tablet: 1 tab(s) orally once a day (28 Mar 2024 15:34)  dilTIAZem 120 mg/24 hours oral capsule, extended release: 1 cap(s) orally once a day (28 Mar 2024 13:55)  hydrALAZINE 50 mg oral tablet: 50 milligram(s) orally 2 times a day (28 Mar 2024 13:55)  Laxative magnesium 500mg caplet: 1-2 caplets at night as needed (28 Mar 2024 13:55)  lidocaine 5% topical film: Apply topically to affected area 3 times a day as needed for pain as per pt (28 Mar 2024 13:55)  Linzess 290 mcg oral capsule: 1 cap(s) orally once a day as needed for - as needed (28 Mar 2024 15:34)  lovastatin 20 mg oral tablet: 1 tab(s) orally once a day (in the evening) - with dinner (28 Mar 2024 13:55)  olmesartan 40 mg oral tablet: 1 tab(s) orally once a day (28 Mar 2024 13:55)  omeprazole 20 mg oral delayed release capsule: 1 cap(s) orally once a day (28 Mar 2024 13:55)  Synthroid 75 mcg (0.075 mg) oral tablet: 1 tab(s) orally once a day monday through saturday and 1.5 tablets on sunday. (28 Mar 2024 13:55)      MEDICATIONS  (STANDING):  amitriptyline 25 milliGRAM(s) Oral at bedtime  atorvastatin 10 milliGRAM(s) Oral at bedtime  buPROPion XL (24-Hour) . 150 milliGRAM(s) Oral daily  calcium carbonate 1250 mG  + Vitamin D (OsCal 500 + D) 1 Tablet(s) Oral daily  carvedilol 25 milliGRAM(s) Oral every 12 hours  cefTRIAXone   IVPB 1000 milliGRAM(s) IV Intermittent every 24 hours  chlorhexidine 2% Cloths 1 Application(s) Topical daily  heparin   Injectable 5000 Unit(s) SubCutaneous every 8 hours  levothyroxine 75 MICROGram(s) Oral daily  multivitamin 1 Tablet(s) Oral daily  pantoprazole    Tablet 40 milliGRAM(s) Oral before breakfast  senna 2 Tablet(s) Oral at bedtime      TELEMETRY: 	    ECG:  	  RADIOLOGY:   DIAGNOSTIC TESTING:  [ ] Echocardiogram:  [ ] Catheterization:  [ ] Stress Test:    OTHER: 	    LABS:	 	    CARDIAC MARKERS:                                      11.3   6.84  )-----------( 287      ( 30 Mar 2024 07:12 )             34.1     03-30    137  |  100  |  23  ----------------------------<  109<H>  3.9   |  23  |  1.19    Ca    9.5      30 Mar 2024 07:12      proBNP:     Lipid Profile:   HgA1c:     Creatinine: 1.19 mg/dL (03-30-24 @ 07:12)  Creatinine: 0.84 mg/dL (03-29-24 @ 06:51)  Creatinine: 0.88 mg/dL (03-28-24 @ 12:21)            
[facility]  Roc Ziegler PA-C  [address] [place] [address]  [phone]      INTERVAL HPI/OVERNIGHT EVENTS:  pt seen and examined, no new events  denies abdominal pain, reports dysuria   Pos BM   no N/V/D        MEDICATIONS  (STANDING):  amitriptyline 25 milliGRAM(s) Oral at bedtime  atorvastatin 10 milliGRAM(s) Oral at bedtime  buPROPion XL (24-Hour) . 150 milliGRAM(s) Oral daily  calcium carbonate 1250 mG  + Vitamin D (OsCal 500 + D) 1 Tablet(s) Oral daily  carvedilol 25 milliGRAM(s) Oral every 12 hours  cefTRIAXone   IVPB 1000 milliGRAM(s) IV Intermittent every 24 hours  chlorhexidine 2% Cloths 1 Application(s) Topical daily  heparin   Injectable 5000 Unit(s) SubCutaneous every 8 hours  levothyroxine 75 MICROGram(s) Oral daily  multivitamin 1 Tablet(s) Oral daily  pantoprazole    Tablet 40 milliGRAM(s) Oral before breakfast  senna 2 Tablet(s) Oral at bedtime    MEDICATIONS  (PRN):  acetaminophen     Tablet .. 650 milliGRAM(s) Oral every 6 hours PRN Temp greater or equal to 38.5C (101.3F), Mild Pain (1 - 3)  ALPRAZolam 1 milliGRAM(s) Oral daily PRN anxiety  loratadine 10 milliGRAM(s) Oral daily PRN itching      Allergies    NSAIDs (Rash)  amoxicillin (Other)  penicillin (Anaphylaxis)  penicillin (Other)  hydrocortisone (Unknown)    Intolerances          PHYSICAL EXAM  Vital Signs Last 24 Hrs  T(C): 36.6 (30 Mar 2024 08:25), Max: 36.8 (29 Mar 2024 17:38)  T(F): 97.8 (30 Mar 2024 08:25), Max: 98.2 (29 Mar 2024 17:38)  HR: 86 (30 Mar 2024 10:05) (64 - 86)  BP: 126/78 (30 Mar 2024 10:05) (91/58 - 128/74)  BP(mean): --  RR: 18 (30 Mar 2024 08:25) (18 - 18)  SpO2: 97% (30 Mar 2024 08:25) (96% - 97%)    Parameters below as of 30 Mar 2024 08:25  Patient On (Oxygen Delivery Method): room air          GENERAL:  Appears stated age  HEENT:  NC/AT  CHEST:  Full & symmetric excursion  HEART:  Regular rhythm  ABDOMEN:  Soft, non-tender, non-distended  EXTEREMITIES:  no cyanosis  SKIN:  No rash  NEURO:  Alert            LABS:                        11.3   6.84  )-----------( 287      ( 30 Mar 2024 07:12 )             34.1     03-30    137  |  100  |  23  ----------------------------<  109<H>  3.9   |  23  |  1.19    Ca    9.5      30 Mar 2024 07:12    TPro  7.4  /  Alb  3.9  /  TBili  0.5  /  DBili  x   /  AST  16  /  ALT  14  /  AlkPhos  81  03-28 03-29-24 @ 07:01  -  03-30-24 @ 07:00  --------------------------------------------------------  IN: 240 mL / OUT: 0 mL / NET: 240 mL                Culture - Blood (collected 28 Mar 2024 16:40)  Source: .Blood Blood-Venous  Preliminary Report (29 Mar 2024 23:01):    No growth at 24 hours    Culture - Blood (collected 28 Mar 2024 16:10)  Source: .Blood Blood-Peripheral  Preliminary Report (29 Mar 2024 23:01):    No growth at 24 hours    Culture - Urine (collected 28 Mar 2024 13:48)  Source: Clean Catch Clean Catch (Midstream)  Preliminary Report (29 Mar 2024 16:01):    >100,000 CFU/ml Escherichia coli                RADIOLOGY & ADDITIONAL TESTS:  
Patient is a 83y old  Female who presents with a chief complaint of dysuria (29 Mar 2024 14:32)      SUBJECTIVE / OVERNIGHT EVENTS: Comfortable without new complaints.   Review of Systems  chest pain no  palpitations no  sob no  nausea no  headache no    MEDICATIONS  (STANDING):  amitriptyline 25 milliGRAM(s) Oral at bedtime  atorvastatin 10 milliGRAM(s) Oral at bedtime  buPROPion XL (24-Hour) . 150 milliGRAM(s) Oral daily  calcium carbonate 1250 mG  + Vitamin D (OsCal 500 + D) 1 Tablet(s) Oral daily  carvedilol 25 milliGRAM(s) Oral every 12 hours  cefTRIAXone   IVPB 1000 milliGRAM(s) IV Intermittent every 24 hours  chlorhexidine 2% Cloths 1 Application(s) Topical daily  heparin   Injectable 5000 Unit(s) SubCutaneous every 8 hours  levothyroxine 75 MICROGram(s) Oral daily  multivitamin 1 Tablet(s) Oral daily  pantoprazole    Tablet 40 milliGRAM(s) Oral before breakfast  senna 2 Tablet(s) Oral at bedtime    MEDICATIONS  (PRN):  acetaminophen     Tablet .. 650 milliGRAM(s) Oral every 6 hours PRN Temp greater or equal to 38.5C (101.3F), Mild Pain (1 - 3)  ALPRAZolam 1 milliGRAM(s) Oral daily PRN anxiety  loratadine 10 milliGRAM(s) Oral daily PRN itching      Vital Signs Last 24 Hrs  T(C): 36.9 (31 Mar 2024 10:00), Max: 36.9 (31 Mar 2024 10:00)  T(F): 98.4 (31 Mar 2024 10:00), Max: 98.4 (31 Mar 2024 10:00)  HR: 67 (31 Mar 2024 10:00) (65 - 70)  BP: 125/74 (31 Mar 2024 10:00) (118/68 - 130/66)  BP(mean): --  RR: 18 (31 Mar 2024 10:00) (18 - 18)  SpO2: 95% (31 Mar 2024 10:00) (95% - 97%)    Parameters below as of 31 Mar 2024 10:00  Patient On (Oxygen Delivery Method): room air        PHYSICAL EXAM:  GENERAL: NAD, well-developed  HEAD:  Atraumatic, Normocephalic  EYES: EOMI, PERRLA, conjunctiva and sclera clear  NECK: Supple, No JVD  CHEST/LUNG: Clear to auscultation bilaterally; No wheeze  HEART: Regular rate and rhythm; No murmurs, rubs, or gallops  ABDOMEN: Soft, Nontender, Nondistended; Bowel sounds present  EXTREMITIES:  2+ Peripheral Pulses, No clubbing, cyanosis, or edema  PSYCH: AAOx3  NEUROLOGY: non-focal  SKIN: No rashes or lesions    LABS:                        11.3   6.84  )-----------( 287      ( 30 Mar 2024 07:12 )             34.1     03-30    137  |  100  |  23  ----------------------------<  109<H>  3.9   |  23  |  1.19    Ca    9.5      30 Mar 2024 07:12            Urinalysis Basic - ( 30 Mar 2024 07:12 )    Color: x / Appearance: x / SG: x / pH: x  Gluc: 109 mg/dL / Ketone: x  / Bili: x / Urobili: x   Blood: x / Protein: x / Nitrite: x   Leuk Esterase: x / RBC: x / WBC x   Sq Epi: x / Non Sq Epi: x / Bacteria: x        Culture - Reflex Stool (collected 30 Mar 2024 06:54)  Source: .Stool  Preliminary Report (31 Mar 2024 08:19):    Culture in progress    Culture - Blood (collected 28 Mar 2024 16:40)  Source: .Blood Blood-Venous  Preliminary Report (30 Mar 2024 23:01):    No growth at 48 Hours    Culture - Blood (collected 28 Mar 2024 16:10)  Source: .Blood Blood-Peripheral  Preliminary Report (30 Mar 2024 23:01):    No growth at 48 Hours    Salmonella: Detected: Test results will be confirmed by culture, but susceptibilities will be   performed for S. typhi and paratyphi only. (03.30.24 @ 06:54)       RADIOLOGY & ADDITIONAL TESTS:    Imaging Personally Reviewed:    Consultant(s) Notes Reviewed:      Care Discussed with Consultants/Other Providers:  
Patient is a 83y old  Female who presents with a chief complaint of pyelonephritis (28 Mar 2024 23:25)      SUBJECTIVE / OVERNIGHT EVENTS: feels better.  Review of Systems  chest pain no  palpitations no  sob no  nausea no  headache no    MEDICATIONS  (STANDING):  amitriptyline 25 milliGRAM(s) Oral at bedtime  atorvastatin 10 milliGRAM(s) Oral at bedtime  buPROPion XL (24-Hour) . 150 milliGRAM(s) Oral daily  calcium carbonate 1250 mG  + Vitamin D (OsCal 500 + D) 1 Tablet(s) Oral daily  carvedilol 25 milliGRAM(s) Oral every 12 hours  cefTRIAXone   IVPB 1000 milliGRAM(s) IV Intermittent every 24 hours  chlorhexidine 2% Cloths 1 Application(s) Topical daily  diltiazem    milliGRAM(s) Oral daily  heparin   Injectable 5000 Unit(s) SubCutaneous every 8 hours  hydrALAZINE 50 milliGRAM(s) Oral two times a day  levothyroxine 75 MICROGram(s) Oral daily  losartan 100 milliGRAM(s) Oral daily  metroNIDAZOLE  IVPB 500 milliGRAM(s) IV Intermittent every 8 hours  pantoprazole    Tablet 40 milliGRAM(s) Oral before breakfast    MEDICATIONS  (PRN):  acetaminophen     Tablet .. 650 milliGRAM(s) Oral every 6 hours PRN Temp greater or equal to 38.5C (101.3F), Mild Pain (1 - 3)  ALPRAZolam 1 milliGRAM(s) Oral daily PRN anxiety      Vital Signs Last 24 Hrs  T(C): 36.7 (29 Mar 2024 08:41), Max: 36.7 (28 Mar 2024 15:13)  T(F): 98 (29 Mar 2024 08:41), Max: 98.1 (28 Mar 2024 15:13)  HR: 75 (29 Mar 2024 08:41) (59 - 75)  BP: 132/82 (29 Mar 2024 08:41) (116/62 - 178/86)  BP(mean): 100 (28 Mar 2024 15:13) (100 - 100)  RR: 18 (29 Mar 2024 08:41) (16 - 18)  SpO2: 96% (29 Mar 2024 08:41) (96% - 98%)    Parameters below as of 29 Mar 2024 08:41  Patient On (Oxygen Delivery Method): room air        PHYSICAL EXAM:  GENERAL: NAD   HEAD:  Atraumatic, Normocephalic  EYES: EOMI, PERRLA, conjunctiva and sclera clear   NECK: Supple, No JVD  CHEST/LUNG: Clear to auscultation bilaterally; No wheeze  HEART: Regular rate and rhythm; No murmurs, rubs, or gallops  ABDOMEN: Soft, Nontender, Nondistended; Bowel sounds present  EXTREMITIES:  2+ Peripheral Pulses, No clubbing, cyanosis, or edema  PSYCH: AAOx3  NEUROLOGY: non-focal  SKIN: No rashes or lesions    LABS:                        11.2   5.94  )-----------( 259      ( 29 Mar 2024 06:51 )             34.4     03-29    139  |  103  |  10  ----------------------------<  79  3.4<L>   |  22  |  0.84    Ca    9.4      29 Mar 2024 06:51    TPro  7.4  /  Alb  3.9  /  TBili  0.5  /  DBili  x   /  AST  16  /  ALT  14  /  AlkPhos  81  03-28          Urinalysis Basic - ( 29 Mar 2024 06:51 )    Color: x / Appearance: x / SG: x / pH: x  Gluc: 79 mg/dL / Ketone: x  / Bili: x / Urobili: x   Blood: x / Protein: x / Nitrite: x   Leuk Esterase: x / RBC: x / WBC x   Sq Epi: x / Non Sq Epi: x / Bacteria: x          RADIOLOGY & ADDITIONAL TESTS:    Imaging Personally Reviewed:    Consultant(s) Notes Reviewed:      Care Discussed with Consultants/Other Providers:  
[facility]  Roc Ziegler PA-C  [address] [place] [address]  [phone]      INTERVAL HPI/OVERNIGHT EVENTS:  pt seen and examined, no new events  denies abdominal pain, still some dysuria   no N/V/D        MEDICATIONS  (STANDING):  amitriptyline 25 milliGRAM(s) Oral at bedtime  atorvastatin 10 milliGRAM(s) Oral at bedtime  buPROPion XL (24-Hour) . 150 milliGRAM(s) Oral daily  calcium carbonate 1250 mG  + Vitamin D (OsCal 500 + D) 1 Tablet(s) Oral daily  carvedilol 25 milliGRAM(s) Oral every 12 hours  cefTRIAXone   IVPB 1000 milliGRAM(s) IV Intermittent every 24 hours  chlorhexidine 2% Cloths 1 Application(s) Topical daily  heparin   Injectable 5000 Unit(s) SubCutaneous every 8 hours  levothyroxine 75 MICROGram(s) Oral daily  multivitamin 1 Tablet(s) Oral daily  pantoprazole    Tablet 40 milliGRAM(s) Oral before breakfast  senna 2 Tablet(s) Oral at bedtime    MEDICATIONS  (PRN):  acetaminophen     Tablet .. 650 milliGRAM(s) Oral every 6 hours PRN Temp greater or equal to 38.5C (101.3F), Mild Pain (1 - 3)  ALPRAZolam 1 milliGRAM(s) Oral daily PRN anxiety  loratadine 10 milliGRAM(s) Oral daily PRN itching      Allergies    NSAIDs (Rash)  amoxicillin (Other)  penicillin (Anaphylaxis)  penicillin (Other)  hydrocortisone (Unknown)    Intolerances          PHYSICAL EXAM  Vital Signs Last 24 Hrs  T(C): 36.2 (01 Apr 2024 09:02), Max: 37.2 (01 Apr 2024 00:00)  T(F): 97.2 (01 Apr 2024 09:02), Max: 98.9 (01 Apr 2024 00:00)  HR: 68 (01 Apr 2024 09:02) (63 - 68)  BP: 154/81 (01 Apr 2024 09:02) (138/84 - 154/81)  BP(mean): --  RR: 18 (01 Apr 2024 09:02) (17 - 18)  SpO2: 97% (01 Apr 2024 09:02) (95% - 100%)    Parameters below as of 01 Apr 2024 09:02  Patient On (Oxygen Delivery Method): room air          GENERAL:  Appears stated age  HEENT:  NC/AT  CHEST:  Full & symmetric excursion  HEART:  Regular rhythm  ABDOMEN:  Soft, non-tender, non-distended  EXTEREMITIES:  no cyanosis  SKIN:  No rash  NEURO:  Alert            LABS:                                   10.6   6.08  )-----------( 243      ( 01 Apr 2024 07:38 )             32.8                     Culture - Reflex Stool (collected 30 Mar 2024 06:54)  Source: .Stool  Preliminary Report (31 Mar 2024 08:19):    Culture in progress    Culture - Blood (collected 28 Mar 2024 16:40)  Source: .Blood Blood-Venous  Preliminary Report (30 Mar 2024 23:01):    No growth at 48 Hours    Culture - Blood (collected 28 Mar 2024 16:10)  Source: .Blood Blood-Peripheral  Preliminary Report (30 Mar 2024 23:01):    No growth at 48 Hours    Culture - Urine (collected 28 Mar 2024 13:48)  Source: Clean Catch Clean Catch (Midstream)  Preliminary Report (29 Mar 2024 16:01):    >100,000 CFU/ml Escherichia coli                  RADIOLOGY & ADDITIONAL TESTS:  
  Follow Up:  uti    Interval History/ROS:  abd discomfort, constipation    Allergies  NSAIDs (Rash)  amoxicillin (Other)  penicillin (Anaphylaxis)  penicillin (Other)  hydrocortisone (Unknown)    ANTIMICROBIALS:  cefTRIAXone   IVPB 1000 every 24 hours      OTHER MEDS:  MEDICATIONS  (STANDING):  acetaminophen     Tablet .. 650 every 6 hours PRN  ALPRAZolam 1 daily PRN  amitriptyline 25 at bedtime  atorvastatin 10 at bedtime  buPROPion XL (24-Hour) . 150 daily  carvedilol 25 every 12 hours  diltiazem    daily  heparin   Injectable 5000 every 8 hours  levothyroxine 75 daily  loratadine 10 daily PRN  pantoprazole    Tablet 40 before breakfast  polyethylene glycol 3350 17 daily  senna 2 at bedtime      Vital Signs Last 24 Hrs  T(C): 36.7 (01 Apr 2024 16:31), Max: 37.2 (01 Apr 2024 00:00)  T(F): 98.1 (01 Apr 2024 16:31), Max: 98.9 (01 Apr 2024 00:00)  HR: 60 (01 Apr 2024 16:31) (60 - 68)  BP: 136/86 (01 Apr 2024 16:31) (136/86 - 154/81)  BP(mean): --  RR: 18 (01 Apr 2024 16:31) (18 - 18)  SpO2: 95% (01 Apr 2024 16:31) (95% - 100%)    Parameters below as of 01 Apr 2024 16:31  Patient On (Oxygen Delivery Method): room air        PHYSICAL EXAM:  General: WN/WD NAD, Non-toxic  Neurology: A&Ox3, nonfocal  Respiratory: Clear to auscultation bilaterally  CV: RRR, S1S2, no murmurs, rubs or gallops  Abdominal: Soft, Non-tender, non-distended, normal bowel sounds  Extremities: No edema  Line Sites: Clear  Skin: No rash                          10.6   6.08  )-----------( 243      ( 01 Apr 2024 07:38 )             32.8   WBC Count: 6.08 (04-01 @ 07:38)  WBC Count: 6.84 (03-30 @ 07:12)  WBC Count: 5.94 (03-29 @ 06:51)  WBC Count: 9.21 (03-28 @ 12:21)      Creatinine: 1.19 (03-30 @ 07:12)    Creatinine: 0.84 (03-29 @ 06:51)    Creatinine: 0.88 (03-28 @ 12:21)      MICROBIOLOGY:  .Stool  03-30-24   Culture in progress  --  --      .Blood Blood-Venous  03-28-24   No growth at 72 Hours  --  --      .Blood Blood-Peripheral  03-28-24   No growth at 72 Hours  --  --      Clean Catch Clean Catch (Midstream)  03-28-24   >100,000 CFU/ml Escherichia coli  --  Escherichia coli  Culture - Urine (03.28.24 @ 13:48)   - Tobramycin: S <=2  - Trimethoprim/Sulfamethoxazole: S <=0.5/9.5  - Nitrofurantoin: S <=32 Should not be used to treat pyelonephritis  - Piperacillin/Tazobactam: S <=8  - Ertapenem: S <=0.5  - Gentamicin: S <=2  - Imipenem: S <=1  - Levofloxacin: S <=0.5  - Meropenem: S <=1  - Amoxicillin/Clavulanic Acid: S <=8/4  - Ampicillin: S <=8 These ampicillin results predict results for amoxicillin  - Ampicillin/Sulbactam: S <=4/2  - Aztreonam: S <=4  - Cefazolin: S <=2 For uncomplicated UTI with K. pneumoniae, E. coli, or P. mirablis: OSMIN <=16 is sensitive and OSMIN >=32 is resistant. This also predicts results for oral agents cefaclor, cefdinir, cefpodoxime, cefprozil, cefuroxime axetil, cephalexin and locarbef for uncomplicated UTI. Note that some isolates may be susceptible to these agents while testing resistant to cefazolin.  - Cefepime: S <=2  - Cefoxitin: S <=8  - Cefuroxime: S 8  - Ceftriaxone: S <=1  - Ciprofloxacin: S <=0.25  Specimen Source: Clean Catch [place]      RADIOLOGY:  < from: CT Abdomen and Pelvis w/ IV Cont (03.28.24 @ 14:04) >  PROCEDURE:  CT of the Abdomen and Pelvis was performed.  Sagittal and coronal reformats were performed.    FINDINGS: Some images are degraded by artifacts from the patient's arms   within the scanning field of view.  LOWER CHEST: Large hiatal hernia containing at least proximal half of the   stomach. Mild dependent atelectasis.    LIVER: Within normal limits.  BILE DUCTS: Normal caliber.  GALLBLADDER: Nondistended. Folded fundal configuration.  SPLEEN: Within normal limits.  PANCREAS: Stable 5 mm hypodensity likely a cyst at the pancreatic   head/neck junction  ADRENALS: Within normal limits.  KIDNEYS/URETERS: No hydronephrosis or or renal calculus.  Smooth urothelial thickening and enhancement of the ureters suggest   ascending tract infection.  Subtle wedge-shaped areas of bilateral renal hypoenhancement may   correspond with pyelonephritis  Small bilateral probable cysts some of which may contain internal   septation and hyperdense material incompletely characterized on this exam    BLADDER: Diffuse wall thickening with perivesicular inflammation  REPRODUCTIVE ORGANS: Uterus and adnexa within normal limits.    BOWEL: No bowel obstruction. Wall thickening of transverse and descending   colon.  PERITONEUM: No ascites.  VESSELS: Atherosclerotic changes.  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  ABDOMINAL WALL: Tiny fat-containing umbilical hernia.  BONES: Multilevel degenerative changes throughout thespine. Right   femoral neck pins. Left total hip arthroplasty    IMPRESSION:  Diffuse bladder wall thickening with acute perivesicular inflammation in   keeping with cystitis. Correlate with urinalysis    Smooth urothelial thickening and enhancement of the ureters suggest   ascending tract infection.    Subtle wedge-shaped areas of bilateral renal hypoenhancement suspicious   for acute pyelonephritis    Wall thickening of transverse and descending colon suggests acute colitis    Large hiatal hernia    < end of copied text >    Archie Bell MD; Division of Infectious Disease; Pager: [phone]; nights and weekends: [phone]
[facility]  Roc Ziegler PA-C  [address] [place] [address]  [phone]      INTERVAL HPI/OVERNIGHT EVENTS:  pt seen and examined, no new events  denies abdominal pain   no N/V/D      MEDICATIONS  (STANDING):  amitriptyline 25 milliGRAM(s) Oral at bedtime  atorvastatin 10 milliGRAM(s) Oral at bedtime  buPROPion XL (24-Hour) . 150 milliGRAM(s) Oral daily  calcium carbonate 1250 mG  + Vitamin D (OsCal 500 + D) 1 Tablet(s) Oral daily  carvedilol 25 milliGRAM(s) Oral every 12 hours  cefTRIAXone   IVPB 1000 milliGRAM(s) IV Intermittent every 24 hours  chlorhexidine 2% Cloths 1 Application(s) Topical daily  diltiazem    milliGRAM(s) Oral daily  heparin   Injectable 5000 Unit(s) SubCutaneous every 8 hours  hydrALAZINE 50 milliGRAM(s) Oral two times a day  levothyroxine 75 MICROGram(s) Oral daily  losartan 100 milliGRAM(s) Oral daily  metroNIDAZOLE  IVPB 500 milliGRAM(s) IV Intermittent every 8 hours  pantoprazole    Tablet 40 milliGRAM(s) Oral before breakfast    MEDICATIONS  (PRN):  acetaminophen     Tablet .. 650 milliGRAM(s) Oral every 6 hours PRN Temp greater or equal to 38.5C (101.3F), Mild Pain (1 - 3)  ALPRAZolam 1 milliGRAM(s) Oral daily PRN anxiety      Allergies    NSAIDs (Rash)  amoxicillin (Other)  penicillin (Anaphylaxis)  penicillin (Other)  hydrocortisone (Unknown)    Intolerances        ROS:   General:  No wt loss, fevers, chills, night sweats, fatigue,   Eyes:  Good vision, no reported pain  ENT:  No sore throat, pain, runny nose, dysphagia  CV:  No pain, palpitations, hypo/hypertension  Resp:  No dyspnea, cough, tachypnea, wheezing  GI:  No pain, No nausea, No vomiting, No diarrhea, No constipation, No weight loss, No fever, No pruritis, No rectal bleeding, No tarry stools, No dysphagia,  :  No pain, bleeding, incontinence, nocturia  Muscle:  No pain, weakness  Neuro:  No weakness, tingling, memory problems  Psych:  No fatigue, insomnia, mood problems, depression  Endocrine:  No polyuria, polydipsia, cold/heat intolerance  Heme:  No petechiae, ecchymosis, easy bruisability  Skin:  No rash, tattoos, scars, edema      PHYSICAL EXAM:   Vital Signs:  Vital Signs Last 24 Hrs  T(C): 36.7 (29 Mar 2024 08:41), Max: 36.7 (28 Mar 2024 15:13)  T(F): 98 (29 Mar 2024 08:41), Max: 98.1 (28 Mar 2024 15:13)  HR: 75 (29 Mar 2024 08:41) (59 - 75)  BP: 132/82 (29 Mar 2024 08:41) (116/62 - 178/86)  BP(mean): 100 (28 Mar 2024 15:13) (100 - 100)  RR: 18 (29 Mar 2024 08:41) (16 - 18)  SpO2: 96% (29 Mar 2024 08:41) (96% - 98%)    Parameters below as of 29 Mar 2024 08:41  Patient On (Oxygen Delivery Method): room air      Daily     Daily     GENERAL:  Appears stated age,   HEENT:  NC/AT,    CHEST:  Full & symmetric excursion,   HEART:  Regular rhythm,  ABDOMEN:  Soft, non-tender, non-distended,  EXTEREMITIES:  no cyanosis  SKIN:  No rash  NEURO:  Alert,       LABS:                        11.2   5.94  )-----------( 259      ( 29 Mar 2024 06:51 )             34.4     03-29    139  |  103  |  10  ----------------------------<  79  3.4<L>   |  22  |  0.84    Ca    9.4      29 Mar 2024 06:51    TPro  7.4  /  Alb  3.9  /  TBili  0.5  /  DBili  x   /  AST  16  /  ALT  14  /  AlkPhos  81  03-28      Urinalysis Basic - ( 29 Mar 2024 06:51 )    Color: x / Appearance: x / SG: x / pH: x  Gluc: 79 mg/dL / Ketone: x  / Bili: x / Urobili: x   Blood: x / Protein: x / Nitrite: x   Leuk Esterase: x / RBC: x / WBC x   Sq Epi: x / Non Sq Epi: x / Bacteria: x        RADIOLOGY & ADDITIONAL TESTS:  
[facility]  Roc Ziegler PA-C  [address] [place] [address]  [phone]      INTERVAL HPI/OVERNIGHT EVENTS:  pt seen and examined, no new events  denies abdominal pain, reports dysuria   GI pcr pos salmonella   no N/V/D        MEDICATIONS  (STANDING):  amitriptyline 25 milliGRAM(s) Oral at bedtime  atorvastatin 10 milliGRAM(s) Oral at bedtime  buPROPion XL (24-Hour) . 150 milliGRAM(s) Oral daily  calcium carbonate 1250 mG  + Vitamin D (OsCal 500 + D) 1 Tablet(s) Oral daily  carvedilol 25 milliGRAM(s) Oral every 12 hours  cefTRIAXone   IVPB 1000 milliGRAM(s) IV Intermittent every 24 hours  chlorhexidine 2% Cloths 1 Application(s) Topical daily  heparin   Injectable 5000 Unit(s) SubCutaneous every 8 hours  levothyroxine 75 MICROGram(s) Oral daily  multivitamin 1 Tablet(s) Oral daily  pantoprazole    Tablet 40 milliGRAM(s) Oral before breakfast  senna 2 Tablet(s) Oral at bedtime    MEDICATIONS  (PRN):  acetaminophen     Tablet .. 650 milliGRAM(s) Oral every 6 hours PRN Temp greater or equal to 38.5C (101.3F), Mild Pain (1 - 3)  ALPRAZolam 1 milliGRAM(s) Oral daily PRN anxiety  loratadine 10 milliGRAM(s) Oral daily PRN itching      Allergies    NSAIDs (Rash)  amoxicillin (Other)  penicillin (Anaphylaxis)  penicillin (Other)  hydrocortisone (Unknown)    Intolerances          PHYSICAL EXAM  Vital Signs Last 24 Hrs  T(C): 36.7 (31 Mar 2024 00:00), Max: 36.8 (30 Mar 2024 16:16)  T(F): 98 (31 Mar 2024 00:00), Max: 98.3 (30 Mar 2024 16:16)  HR: 70 (31 Mar 2024 00:00) (65 - 70)  BP: 130/66 (31 Mar 2024 00:00) (118/68 - 130/66)  BP(mean): --  RR: 18 (31 Mar 2024 00:00) (18 - 18)  SpO2: 97% (31 Mar 2024 00:00) (97% - 97%)    Parameters below as of 31 Mar 2024 00:00  Patient On (Oxygen Delivery Method): room air          GENERAL:  Appears stated age  HEENT:  NC/AT  CHEST:  Full & symmetric excursion  HEART:  Regular rhythm  ABDOMEN:  Soft, non-tender, non-distended  EXTEREMITIES:  no cyanosis  SKIN:  No rash  NEURO:  Alert            LABS:                        11.3   6.84  )-----------( 287      ( 30 Mar 2024 07:12 )             34.1     03-30    137  |  100  |  23  ----------------------------<  109<H>  3.9   |  23  |  1.19    Ca    9.5      30 Mar 2024 07:12                      Culture - Reflex Stool (collected 30 Mar 2024 06:54)  Source: .Stool  Preliminary Report (31 Mar 2024 08:19):    Culture in progress    Culture - Blood (collected 28 Mar 2024 16:40)  Source: .Blood Blood-Venous  Preliminary Report (30 Mar 2024 23:01):    No growth at 48 Hours    Culture - Blood (collected 28 Mar 2024 16:10)  Source: .Blood Blood-Peripheral  Preliminary Report (30 Mar 2024 23:01):    No growth at 48 Hours    Culture - Urine (collected 28 Mar 2024 13:48)  Source: Clean Catch Clean Catch (Midstream)  Preliminary Report (29 Mar 2024 16:01):    >100,000 CFU/ml Escherichia coli                  RADIOLOGY & ADDITIONAL TESTS:  
[facility]  Roc Ziegler PA-C  [address] [place], [place] [id number] [phone]      INTERVAL HPI/OVERNIGHT EVENTS:  pt seen and examined, no new events  c/o constipation         MEDICATIONS  (STANDING):  amitriptyline 25 milliGRAM(s) Oral at bedtime  atorvastatin 10 milliGRAM(s) Oral at bedtime  buPROPion XL (24-Hour) . 150 milliGRAM(s) Oral daily  calcium carbonate 1250 mG  + Vitamin D (OsCal 500 + D) 1 Tablet(s) Oral daily  carvedilol 25 milliGRAM(s) Oral every 12 hours  cefTRIAXone   IVPB 1000 milliGRAM(s) IV Intermittent every 24 hours  chlorhexidine 2% Cloths 1 Application(s) Topical daily  heparin   Injectable 5000 Unit(s) SubCutaneous every 8 hours  levothyroxine 75 MICROGram(s) Oral daily  multivitamin 1 Tablet(s) Oral daily  pantoprazole    Tablet 40 milliGRAM(s) Oral before breakfast  senna 2 Tablet(s) Oral at bedtime    MEDICATIONS  (PRN):  acetaminophen     Tablet .. 650 milliGRAM(s) Oral every 6 hours PRN Temp greater or equal to 38.5C (101.3F), Mild Pain (1 - 3)  ALPRAZolam 1 milliGRAM(s) Oral daily PRN anxiety  loratadine 10 milliGRAM(s) Oral daily PRN itching      Allergies    NSAIDs (Rash)  amoxicillin (Other)  penicillin (Anaphylaxis)  penicillin (Other)  hydrocortisone (Unknown)    Intolerances          PHYSICAL EXAM  Vital Signs Last 24 Hrs  T(C): 36.8 (02 Apr 2024 08:57), Max: 36.9 (02 Apr 2024 00:00)  T(F): 98.3 (02 Apr 2024 08:57), Max: 98.5 (02 Apr 2024 00:00)  HR: 76 (02 Apr 2024 08:57) (60 - 76)  BP: 158/95 (02 Apr 2024 08:57) (136/86 - 158/95)  BP(mean): --  RR: 18 (02 Apr 2024 08:57) (18 - 18)  SpO2: 99% (02 Apr 2024 08:57) (95% - 99%)    Parameters below as of 02 Apr 2024 08:57  Patient On (Oxygen Delivery Method): room air              GENERAL:  Appears stated age  HEENT:  NC/AT  CHEST:  Full & symmetric excursion  HEART:  Regular rhythm  ABDOMEN:  Soft, non-tender, non-distended  EXTEREMITIES:  no cyanosis  SKIN:  No rash  NEURO:  Alert            LABS:                                   10.6   6.08  )-----------( 243      ( 01 Apr 2024 07:38 )             32.8           Culture - Reflex Stool (collected 30 Mar 2024 06:54)  Source: .Stool  Preliminary Report (31 Mar 2024 08:19):    Culture in progress    Culture - Blood (collected 28 Mar 2024 16:40)  Source: .Blood Blood-Venous  Preliminary Report (30 Mar 2024 23:01):    No growth at 48 Hours    Culture - Blood (collected 28 Mar 2024 16:10)  Source: .Blood Blood-Peripheral  Preliminary Report (30 Mar 2024 23:01):    No growth at 48 Hours    Culture - Urine (collected 28 Mar 2024 13:48)  Source: Clean Catch Clean Catch (Midstream)  Preliminary Report (29 Mar 2024 16:01):    >100,000 CFU/ml Escherichia coli                  RADIOLOGY & ADDITIONAL TESTS:  
Patient is a 83y old  Female who presents with a chief complaint of blood in urine  (02 Apr 2024 11:25)      SUBJECTIVE / OVERNIGHT EVENTS: Comfortable without new complaints.   Review of Systems  chest pain no  palpitations no  sob no  nausea no  headache no    MEDICATIONS  (STANDING):  amitriptyline 25 milliGRAM(s) Oral at bedtime  atorvastatin 10 milliGRAM(s) Oral at bedtime  bisacodyl 5 milliGRAM(s) Oral at bedtime  buPROPion XL (24-Hour) . 150 milliGRAM(s) Oral daily  calcium carbonate 1250 mG  + Vitamin D (OsCal 500 + D) 1 Tablet(s) Oral daily  carvedilol 25 milliGRAM(s) Oral every 12 hours  chlorhexidine 2% Cloths 1 Application(s) Topical daily  diltiazem    milliGRAM(s) Oral daily  heparin   Injectable 5000 Unit(s) SubCutaneous every 8 hours  levothyroxine 75 MICROGram(s) Oral daily  multivitamin 1 Tablet(s) Oral daily  pantoprazole    Tablet 40 milliGRAM(s) Oral before breakfast  polyethylene glycol 3350 17 Gram(s) Oral daily  senna 2 Tablet(s) Oral at bedtime    MEDICATIONS  (PRN):  acetaminophen     Tablet .. 650 milliGRAM(s) Oral every 6 hours PRN Temp greater or equal to 38.5C (101.3F), Mild Pain (1 - 3)  ALPRAZolam 1 milliGRAM(s) Oral daily PRN anxiety  loratadine 10 milliGRAM(s) Oral daily PRN itching      Vital Signs Last 24 Hrs  T(C): 36.4 (02 Apr 2024 15:58), Max: 36.9 (02 Apr 2024 00:00)  T(F): 97.6 (02 Apr 2024 15:58), Max: 98.5 (02 Apr 2024 00:00)  HR: 80 (02 Apr 2024 15:58) (64 - 80)  BP: 126/73 (02 Apr 2024 15:58) (126/73 - 158/95)  BP(mean): --  RR: 18 (02 Apr 2024 15:58) (18 - 18)  SpO2: 97% (02 Apr 2024 15:58) (96% - 99%)    Parameters below as of 02 Apr 2024 15:58  Patient On (Oxygen Delivery Method): room air        PHYSICAL EXAM:  GENERAL: NAD   HEAD:  Atraumatic, Normocephalic  EYES: EOMI, PERRLA, conjunctiva and sclera clear  NECK: Supple, No JVD  CHEST/LUNG: Clear to auscultation bilaterally; No wheeze  HEART: Regular rate and rhythm; No murmurs, rubs, or gallops  ABDOMEN: Soft, Nontender, Nondistended; Bowel sounds present  EXTREMITIES:  2+ Peripheral Pulses, No clubbing, cyanosis, or edema  PSYCH: AAOx3  NEUROLOGY: non-focal  SKIN: No rashes or lesions    LABS:                        10.6   6.08  )-----------( 243      ( 01 Apr 2024 07:38 )             32.8                       RADIOLOGY & ADDITIONAL TESTS:    Imaging Personally Reviewed:    Consultant(s) Notes Reviewed:      Care Discussed with Consultants/Other Providers:  
Patient is a 83y old  Female who presents with a chief complaint of dysuria (29 Mar 2024 14:32)      SUBJECTIVE / OVERNIGHT EVENTS: Comfortable without new complaints.   Review of Systems  chest pain no  palpitations no  sob no  nausea no  headache no    MEDICATIONS  (STANDING):  amitriptyline 25 milliGRAM(s) Oral at bedtime  atorvastatin 10 milliGRAM(s) Oral at bedtime  buPROPion XL (24-Hour) . 150 milliGRAM(s) Oral daily  calcium carbonate 1250 mG  + Vitamin D (OsCal 500 + D) 1 Tablet(s) Oral daily  carvedilol 25 milliGRAM(s) Oral every 12 hours  cefTRIAXone   IVPB 1000 milliGRAM(s) IV Intermittent every 24 hours  chlorhexidine 2% Cloths 1 Application(s) Topical daily  diltiazem    milliGRAM(s) Oral daily  heparin   Injectable 5000 Unit(s) SubCutaneous every 8 hours  levothyroxine 75 MICROGram(s) Oral daily  multivitamin 1 Tablet(s) Oral daily  pantoprazole    Tablet 40 milliGRAM(s) Oral before breakfast  polyethylene glycol 3350 17 Gram(s) Oral daily  senna 2 Tablet(s) Oral at bedtime    MEDICATIONS  (PRN):  acetaminophen     Tablet .. 650 milliGRAM(s) Oral every 6 hours PRN Temp greater or equal to 38.5C (101.3F), Mild Pain (1 - 3)  ALPRAZolam 1 milliGRAM(s) Oral daily PRN anxiety  loratadine 10 milliGRAM(s) Oral daily PRN itching      Vital Signs Last 24 Hrs  T(C): 36.7 (01 Apr 2024 16:31), Max: 37.2 (01 Apr 2024 00:00)  T(F): 98.1 (01 Apr 2024 16:31), Max: 98.9 (01 Apr 2024 00:00)  HR: 60 (01 Apr 2024 16:31) (60 - 68)  BP: 136/86 (01 Apr 2024 16:31) (136/86 - 154/81)  BP(mean): --  RR: 18 (01 Apr 2024 16:31) (18 - 18)  SpO2: 95% (01 Apr 2024 16:31) (95% - 100%)    Parameters below as of 01 Apr 2024 16:31  Patient On (Oxygen Delivery Method): room air        PHYSICAL EXAM:  GENERAL: NAD   HEAD:  Atraumatic, Normocephalic  EYES: EOMI, PERRLA, conjunctiva and sclera clear  NECK: Supple, No JVD  CHEST/LUNG: Clear to auscultation bilaterally; No wheeze  HEART: Regular rate and rhythm; No murmurs, rubs, or gallops  ABDOMEN: Soft, Nontender, Nondistended; Bowel sounds present  EXTREMITIES:  2+ Peripheral Pulses, No clubbing, cyanosis, or edema  PSYCH: AAOx3  NEUROLOGY: non-focal  SKIN: No rashes or lesions    LABS:                        10.6   6.08  )-----------( 243      ( 01 Apr 2024 07:38 )             32.8                     Culture - Reflex Stool (collected 30 Mar 2024 06:54)  Source: .Stool  Preliminary Report (31 Mar 2024 08:19):    Culture in progress        RADIOLOGY & ADDITIONAL TESTS:    Imaging Personally Reviewed:    Consultant(s) Notes Reviewed:      Care Discussed with Consultants/Other Providers:  
Patient is a 83y old  Female who presents with a chief complaint of dysuria (29 Mar 2024 14:32)    Being followed by ID for UTI        Interval history:  Pt denies diarrhea- notes constipation  feeling better  No other acute events        PAST MEDICAL & SURGICAL HISTORY:  Hypertension      GERD (gastroesophageal reflux disease)      Osteoarthritis      Macular degeneration      Hypothyroid      Female bladder prolapse      H/O mitral valve prolapse      Hiatal hernia with GERD      Fe deficiency anemia      Tracheal nodule      Arteriosclerotic heart disease (ASHD)      COVID-19 vaccination declined      Primary hypertension      HLD (hyperlipidemia)      Hiatal hernia      Osteoarthritis      Uterine prolapse      Constipation      Papillary carcinoma      History of celiac disease      H/O thyroidectomy      Closed right hip fracture      H/O ovarian cystectomy      History of hip surgery      H/O thyroidectomy        Allergies    NSAIDs (Rash)  amoxicillin (Other)  penicillin (Anaphylaxis)  penicillin (Other)  hydrocortisone (Unknown)    Intolerances      Antimicrobials:    cefTRIAXone   IVPB 1000 milliGRAM(s) IV Intermittent every 24 hours    MEDICATIONS  (STANDING):  amitriptyline 25 milliGRAM(s) Oral at bedtime  atorvastatin 10 milliGRAM(s) Oral at bedtime  buPROPion XL (24-Hour) . 150 milliGRAM(s) Oral daily  calcium carbonate 1250 mG  + Vitamin D (OsCal 500 + D) 1 Tablet(s) Oral daily  carvedilol 25 milliGRAM(s) Oral every 12 hours  cefTRIAXone   IVPB 1000 milliGRAM(s) IV Intermittent every 24 hours  chlorhexidine 2% Cloths 1 Application(s) Topical daily  heparin   Injectable 5000 Unit(s) SubCutaneous every 8 hours  levothyroxine 75 MICROGram(s) Oral daily  multivitamin 1 Tablet(s) Oral daily  pantoprazole    Tablet 40 milliGRAM(s) Oral before breakfast  senna 2 Tablet(s) Oral at bedtime      Vital Signs Last 24 Hrs  T(C): 36.7 (03-31-24 @ 00:00), Max: 36.8 (03-30-24 @ 16:16)  T(F): 98 (03-31-24 @ 00:00), Max: 98.3 (03-30-24 @ 16:16)  HR: 70 (03-31-24 @ 00:00) (65 - 70)  BP: 130/66 (03-31-24 @ 00:00) (118/68 - 130/66)  BP(mean): --  RR: 18 (03-31-24 @ 00:00) (18 - 18)  SpO2: 97% (03-31-24 @ 00:00) (97% - 97%)    Physical Exam:    Constitutional well preserved,comfortable,pleasant    HEENT PERRLA EOMI,No pallor or icterus    No oral exudate or erythema    Neck supple no JVD or LN    Chest Good AE,CTA    CVS  S1 S2    Abd soft BS normal No tenderness  Ext No cyanosis clubbing or edema    IV site no erythema tenderness or discharge    Joints no swelling or LOM    CNS AAO X 3 no focal    Lab Data:                          11.3   6.84  )-----------( 287      ( 30 Mar 2024 07:12 )             34.1       03-30    137  |  100  |  23  ----------------------------<  109<H>  3.9   |  23  |  1.19    Ca    9.5      30 Mar 2024 07:12        Urinalysis + Microscopic Examination (03.28.24 @ 13:48)    pH Urine: 7.5   Urine Appearance: Cloudy   Color: Yellow   Specific Gravity: 1.005   Protein, Urine: 30 mg/dL   Glucose Qualitative, Urine: Negative mg/dL   Ketone - Urine: Negative mg/dL   Blood, Urine: Moderate   Bilirubin: Negative   Urobilinogen: 0.2 mg/dL   Leukocyte Esterase Concentration: Large   Nitrite: Negative   White Blood Cell - Urine: 284 /HPF   Red Blood Cell - Urine: 40 /HPF   Bacteria: Many /HPF   Cast: 1 /LPF   Epithelial Cells: 0 /HPF      .Stool  03-30-24   Culture in progress  --  --      .Blood Blood-Venous  03-28-24   No growth at 48 Hours  --  --      .Blood Blood-Peripheral  03-28-24   No growth at 48 Hours  --  --      Clean Catch Clean Catch (Midstream)  03-28-24   >100,000 CFU/ml Escherichia coli  --  Escherichia coli          GI PCR Panel Stool (03.30.24 @ 06:54)    GI PCR Panel: Detected: GI Panel PCR evaluates for:  Campylobacter, Plesiomonas shigelloides, Salmonella, Vibrio, Yersinia  enterocolitica, Enteroaggregative Escherichia (EAEC), Enteropathogenic E.  coli (EPEC), Enterotoxigenic E. coli (ETEC), Shiga-like toxin producing  E.coli (STEC), E. coli O157, Shigella/Enteroinvasive E. coli (EIEC),  Adenovirus, Astrovirus, Norovirus, Rotavirus, Sapovirus, Cryptosporidium,  Cyclospora cayetanensis, Entamoeba histolytica, Giardia lamblia.  For culture and susceptibility reports refer to "reflex stool culture".   Salmonella: Detected: Test results will be confirmed by culture, but susceptibilities will be  performed for S. typhi and paratyphi only.              WBC Count: 6.84 (03-30-24 @ 07:12)  WBC Count: 5.94 (03-29-24 @ 06:51)  WBC Count: 9.21 (03-28-24 @ 12:21)       Bilirubin Total: 0.5 mg/dL (03-28-24 @ 12:21)  Aspartate Aminotransferase (AST/SGOT): 16 U/L (03-28-24 @ 12:21)  Alanine Aminotransferase (ALT/SGPT): 14 U/L (03-28-24 @ 12:21)  Alkaline Phosphatase: 81 U/L (03-28-24 @ 12:21)      < from: CT Abdomen and Pelvis w/ IV Cont (03.28.24 @ 14:04) >    IMPRESSION:  Diffuse bladder wall thickening with acute perivesicular inflammation in   keeping with cystitis. Correlate with urinalysis    Smooth urothelial thickening and enhancement of the ureters suggest   ascending tract infection.    Subtle wedge-shaped areas of bilateral renal hypoenhancement suspicious   for acute pyelonephritis    Wall thickening of transverse and descending colon suggests acute colitis    Large hiatal hernia    < end of copied text >    
Patient is a 83y old  Female who presents with a chief complaint of pyelonephritis (28 Mar 2024 23:25)    HPI:  83-year-old female patient past medical history aneurysm, prolapsed bladder brought in by daughter complains of urinary incontinence and dysuria since last night.  Admitted 3/28/24 Patient follows up with uro-– GYN and has multiple pessaries that do not work.  Patient had pessary removed yesterday.  Patient also noticed a few drops of blood in urine yesterday.  As per patient was admitted here 1.5 months ago for sepsis in the setting of UTI. (28 Mar 2024 17:58)    Recurrent UTIs  She notes that she was doing well until abrupt onset of abd pain, severe urinary frequency and burning. She notes that she improved after pessary removed on 3/27.  She currently feels ok    She complains of chronic constipation - She noted improvement during previous hospitalization when she was using Ducolax and Lactulose  Hospitalized 2/12 --> 2/17/24 with E coli bacteremic pyelo  Rx: Ceftriaxone 2/11; Cefepime 2/12 --> 2/14; Ceftriaxone 2/14 --> 2/16; Levofloxacin 2/17 --> 2/20/24 2/19/24 ED visit for weakness. "code stroke"  attributed to deconditioning  notes development of general pruritus after exposure to soaps  - undergoing evaluation with Allergists - she has stopped using hair color      PAST MEDICAL & SURGICAL HISTORY:  Hypertension  GERD (gastroesophageal reflux disease  Osteoarthritis  Macular degeneration  Hypothyroid  Female bladder prolapse  H/O mitral valve prolapse  Hiatal hernia with GERD  Fe deficiency anemia  Tracheal nodule  Arteriosclerotic heart disease (ASHD)  COVID-19 vaccination declined  Primary hypertension  HLD (hyperlipidemia)  Hiatal hernia  Osteoarthritis  Uterine prolapse  Constipation  Papillary carcinoma  History of celiac disease  H/O thyroidectomy  Closed right hip fracture  H/O ovarian cystectomy  History of hip surgery    Social history:  ,  son lives with her, never smoker    FAMILY HISTORY:  FH: HTN (hypertension) (Father, Mother)    REVIEW OF SYSTEMS:  CONSTITUTIONAL: No weakness, fevers or chills  EYES/ENT: No visual changes;  No vertigo or throat pain   NECK: No pain or stiffness  RESPIRATORY: No cough, wheezing, hemoptysis; No shortness of breath  CARDIOVASCULAR: No chest pain or palpitations  GASTROINTESTINAL: RIGHT  abdominal pain - NOW IMPROVED. No nausea, vomiting, or hematemesis; No diarrhea +constipation. No melena or hematochezia.  GENITOURINARY: + dysuria, frequency  NEUROLOGICAL: No numbness or weakness  SKIN: No itching, burning, rashes, or lesions   All other review of systems is negative unless indicated above    Allergies  NSAIDs (Rash)  amoxicillin (Other)  LOW RISK PCN ALLERGY - had vomiting over 20  years ago  penicillin (Anaphylaxis)  penicillin (Other)  hydrocortisone (Unknown)    Antimicrobials:  cefTRIAXone   IVPB 1000 milliGRAM(s) IV Intermittent every 24 hours  metroNIDAZOLE  IVPB 500 milliGRAM(s) IV Intermittent every 8 hours      Vital Signs Last 24 Hrs  T(C): 36.7 (29 Mar 2024 08:41), Max: 36.7 (28 Mar 2024 15:13)  T(F): 98 (29 Mar 2024 08:41), Max: 98.1 (28 Mar 2024 15:13)  HR: 75 (29 Mar 2024 08:41) (59 - 75)  BP: 132/82 (29 Mar 2024 08:41) (116/62 - 178/86)  BP(mean): 100 (28 Mar 2024 15:13) (100 - 100)  RR: 18 (29 Mar 2024 08:41) (16 - 18)  SpO2: 96% (29 Mar 2024 08:41) (96% - 98%)    Parameters below as of 29 Mar 2024 08:41  Patient On (Oxygen Delivery Method): room air        PHYSICAL EXAM:  General: WN/WD NAD, Non-toxic  Neurology: A&Ox3, nonfocal  Respiratory: Clear to auscultation bilaterally  CV: RRR, S1S2, no murmurs, rubs or gallops  Abdominal: Soft, Non-tender, non-distended, normal bowel sounds  Extremities: No edema,   Line Sites: Clear  Skin: No rash                        11.2   5.94  )-----------( 259      ( 29 Mar 2024 06:51 )             34.4   WBC Count: 5.94 (03-29 @ 06:51)  WBC Count: 9.21 (03-28 @ 12:21)      Creatinine: 0.84 (03-29 @ 06:51)    Creatinine: 0.88 (03-28 @ 12:21)        03-29    139  |  103  |  10  ----------------------------<  79  3.4<L>   |  22  |  0.84    Ca    9.4      29 Mar 2024 06:51    TPro  7.4  /  Alb  3.9  /  TBili  0.5  /  DBili  x   /  AST  16  /  ALT  14  /  AlkPhos  81  03-28    Urinalysis + Microscopic Examination (03.28.24 @ 13:48)   pH Urine: 7.5  Urine Appearance: Cloudy  Color: Yellow  Specific Gravity: 1.005  Protein, Urine: 30 mg/dL  Glucose Qualitative, Urine: Negative mg/dL  Ketone - Urine: Negative mg/dL  Blood, Urine: Moderate  Bilirubin: Negative  Urobilinogen: 0.2 mg/dL  Leukocyte Esterase Concentration: Large  Nitrite: Negative  White Blood Cell - Urine: 284 /HPF  Red Blood Cell - Urine: 40 /HPF  Bacteria: Many /HPF  Cast: 1 /LPF  Epithelial Cells: 0 /HPF    MICROBIOLOGY:    Radiology:  < from: CT Abdomen and Pelvis w/ IV Cont (03.28.24 @ 14:04) >  IMPRESSION:  Diffuse bladder wall thickening with acute perivesicular inflammation in   keeping with cystitis. Correlate with urinalysis    Smooth urothelial thickening and enhancement of the ureters suggest   ascending tract infection.    Subtle wedge-shaped areas of bilateral renal hypoenhancement suspicious   for acute pyelonephritis    Wall thickening of transverse and descending colon suggests acute colitis    Large hiatal hernia    < end of copied text >  < from: US Urinary Bladder (03.06.24 @ 19:50) >  IMPRESSION:  1.  Negative abdominal ultrasound.  2.  Large/244 cc post void urinary bladder residual volume (PVR).    < end of copied text >      Archie Bell MD; Division of Infectious Disease; Pager: [phone]; nights and weekends: [phone]

## 2024-04-02 NOTE — PROGRESS NOTE ADULT - ASSESSMENT
83 f with    Pyelonephritis/ UTI  -  last dose antibiotic today  - Gyn Urology evaluation for bladder residue noted   - ID follow     Colitis  - stool studies noted. + Salmonella   - ID follow. Continue Ceftriaxone.   - Gastroenterology evaluation noted    Celiac disease  - gluten free diet     Hypothyroidism.   - Synthroid  - TSH    HLD (hyperlipidemia).   - Lovastatin changed to Atorvastatin.    Hiatal hernia.   - Large Hiatal hernia on CT --> fup with GI.    Primary hypertension.   - Coreg and Olmesartan     Bladder prolapse  - follow with Gyn Urogynecology OTP    R Iliac artery aneurism  - was seen by Vascular. No intervention. Follow as OTP     DVT prophylaxis    DC home. Follow with PMD/ Urogynecology/ Gastroenterology in 3-4 days.      d/w patient, ACP and consultant.      Paulie Moore MD phone [phone] 
83 f with    Pyelonephritis/ UTI  - urine culture   - antibiotic  - Urology evaluation for bladder residue   - ID evaluation pending     Colitis  - stool studies  - Gastroenterology evaluation noted    Celiac disease  - gluten free diet     Hypothyroidism.   - Synthroid  - TSH    HLD (hyperlipidemia).   - Lovastatin changed to Atorvastatin.    Hiatal hernia.   - Large Hiatal hernia on CT --> fup with GI.    Primary hypertension.   - Coreg and Olmesartan     Bladder prolapse  - follow with Gyn Urogynecology UTP    R Iliac artery aneurism  - was seen by Vascular. No intervention. Follow as OTP     DVT prophylaxis    d/w patient and ACP   Paulie Moore MD phone [phone] 
A/P    83F w/ DM2, HTN, HLD, OA, Hiatal Hernia , PUD,  Uterine Prolapse, Papillary thyroid CA, s/p thyroidectomy who presented with weakness/fatigue/dysuria after recent pessary placement/removal     #Pyelonephritis  -recent admit for urosepsis, CTAP with cystitis with bilateral ascending ureteritis and pyelonephritis  -Abx per med  -F/u urology    #Chest pain  -Dull pain not associated w/ sob  -Ecg SR - no ischemic changes    #HTN  -bp stable  -cont coreg only for now   -Continue cardizem 120 ER   -Resume olmesartan 40mg  -HOLD hydral    #MAT  -off ASA for gi bleed  -Cont coreg    #Aortic/Mitral Valve Disease  -No overload on exam  -Echo with nml lv fxn, moderate diastolic dysfxn, moderate MR    #Right Common Iliac Artery Aneurysm  -vasc outpt f/u      dvt ppx    
A/P    83F w/ DM2, HTN, HLD, OA, Hiatal Hernia , PUD,  Uterine Prolapse, Papillary thyroid CA, s/p thyroidectomy who presented with weakness/fatigue/dysuria after recent pessary placement/removal     #Pyelonephritis  -recent admit for urosepsis, CTAP with cystitis with bilateral ascending ureteritis and pyelonephritis  -Abx per med  -F/u urology    #HTN  -Continue coreg, dilt, hydral, losartan    #MAT  -off ASA for gi bleed  -Cont coreg    #Aortic/Mitral Valve Disease  -No overload on exam  -Echo with nml lv fxn, moderate diastolic dysfxn, moderate MR    #Right Common Iliac Artery Aneurysm  -vasc outpt f/u      dvt ppx  
pyelonephritis  colitis?  ;large hiatal hernia      CT noted  GI PCR   adv diet as tolerated  gerd and aspiration precautions  ppi once a day  iv abx   urlogy to see pt  miralax daily   to follow up stool outpt  outpatient follow up with primary GI (Haroon horotn)    
83 f with    Pyelonephritis/ UTI  - antibiotic  - Gyn Urology evaluation for bladder residue noted  - ID follow     Colitis  - stool studies  - Gastroenterology evaluation noted    Celiac disease  - gluten free diet     Hypothyroidism.   - Synthroid  - TSH    HLD (hyperlipidemia).   - Lovastatin changed to Atorvastatin.    Hiatal hernia.   - Large Hiatal hernia on CT --> fup with GI.    Primary hypertension.   - Coreg and Olmesartan     Bladder prolapse  - follow with Gyn Urogynecology OTP    R Iliac artery aneurism  - was seen by Vascular. No intervention. Follow as OTP     DVT prophylaxis    d/w patient and ACP     Paulie Moore MD phone [phone] 
83 f with    Pyelonephritis/ UTI  - antibiotic  - Gyn Urology evaluation for bladder residue noted  - ID follow     Colitis  - stool studies noted. + Salmonella   - ID follow. Continue Ceftriaxone.   - Gastroenterology evaluation noted    Celiac disease  - gluten free diet     Hypothyroidism.   - Synthroid  - TSH    HLD (hyperlipidemia).   - Lovastatin changed to Atorvastatin.    Hiatal hernia.   - Large Hiatal hernia on CT --> fup with GI.    Primary hypertension.   - Coreg and Olmesartan     Bladder prolapse  - follow with Gyn Urogynecology OTP    R Iliac artery aneurism  - was seen by Vascular. No intervention. Follow as OTP     DVT prophylaxis    DCP home.     d/w patient, daughter over phone, ACP and consultant.      Paulie Moore MD phone [phone] 
83-year-old woman with  past medical history aneurysm, prolapsed bladder, previous UTIs, recent hospitalization 2/12 --> 2/17/24 with E coli bacteremic pyelo dmitted 3/28/24 - pessary removed 3/27/24.    Recurrent UTIs  She notes that she was doing well until abrupt onset of abd pain, severe urinary frequency and burning. She notes that she improved after pessary removed on 3/27.  She currently feels ok  She complains of chronic constipation -    Dysuria, pyuria  - cystits, pyelonephritis  afebrile, keisha wbc, non toxic appearance, rapid clinical improvement  chronic constipation  - no symptoms suggestive of colitis    Suggest  await cultures of blood and urine  Continue Ceftriaxone 3/28 -->   STOP Metronidazole 3/8 --> 3/29    - await sensitivities of the E Coli  unclear signficnace of the salmonella, sent for colitis on CT. The ceftriaxone will address this anyway      Berkley Marie M.D. ,   please reach via teams   If no answer, or after 5PM/ weekends,  then please call  [phone]    Assessment and plan discussed with the primary team .  
83-year-old woman with  past medical history aneurysm, prolapsed bladder, previous UTIs, recent hospitalization 2/12 --> 2/17/24 with E coli bacteremic pyelo dmitted 3/28/24 - pessary removed 3/27/24.    Recurrent UTIs  She notes that she was doing well until abrupt onset of abd pain, severe urinary frequency and burning. She notes that she improved after pessary removed on 3/27.  She currently feels ok  She complains of chronic constipation -    Dysuria, pyuria  - cystits, pyelonephritis  afebrile, keisha wbc, non toxic appearance, rapid clinical improvement  chronic constipation  - no symptoms suggestive of colitis    Suggest  await cultures of blood and urine  Continue Ceftriaxone 3/28 -->   STOP Metronidazole 3/8 --> 3/29    ID will see over weekend to assess cultures and focus antibiotic therapy  may be able to be changed to oral antibiotics shortly
pyelonephritis  colitis?  ;large hiatal hernia      CT noted  GI PCR   adv diet as tolerated  gerd and aspiration precautions  ppi once a day  iv abx   urlogy to see pt  miralax daily   to follow up stool outpt  outpatient follow up with primary GI (Haroon horton)    
pyelonephritis  colitis?  ;large hiatal hernia      CT noted  GI PCR   adv diet as tolerated  gerd and aspiration precautions  ppi once a day  iv abx   urlogy to see pt  miralax daily   to follow up stool outpt  outpatient follow up with primary GI (Haroon horton)    
pyelonephritis  colitis?  ;large hiatal hernia      CT noted  adv diet as tolerated  gerd and aspiration precautions  ppi once a day  iv abx   urlogy to see pt  miralax daily   to follow up stool outpt  outpatient follow up with primary GI (Haroon horton)    
83 f with    Pyelonephritis/ UTI  - antibiotic  - Gyn Urology evaluation for bladder residue noted  - ID follow     Colitis  - stool studies noted. + Salmonella   - ID follow. Continue Ceftriaxone.   - Gastroenterology evaluation noted    Celiac disease  - gluten free diet     Hypothyroidism.   - Synthroid  - TSH    HLD (hyperlipidemia).   - Lovastatin changed to Atorvastatin.    Hiatal hernia.   - Large Hiatal hernia on CT --> fup with GI.    Primary hypertension.   - Coreg and Olmesartan     Bladder prolapse  - follow with Gyn Urogynecology OTP    R Iliac artery aneurism  - was seen by Vascular. No intervention. Follow as OTP     DVT prophylaxis    d/w patient, ACP and consultant.      Paulie Moore MD phone [phone] 
A/P    83F w/ DM2, HTN, HLD, OA, Hiatal Hernia , PUD,  Uterine Prolapse, Papillary thyroid CA, s/p thyroidectomy who presented with weakness/fatigue/dysuria after recent pessary placement/removal     #Pyelonephritis  -recent admit for urosepsis, CTAP with cystitis with bilateral ascending ureteritis and pyelonephritis  -Abx per med  -F/u urology    #Chest pain  -Dull pain not associated w/ sob  -Check ecg    #HTN  -bp stable  -cont coreg only for now   -Resume cardizem 120 ER   -HOLD hydral, losartan for now     #MAT  -off ASA for gi bleed  -Cont coreg    #Aortic/Mitral Valve Disease  -No overload on exam  -Echo with nml lv fxn, moderate diastolic dysfxn, moderate MR    #Right Common Iliac Artery Aneurysm  -vasc outpt f/u      dvt ppx    
A/P    83F w/ DM2, HTN, HLD, OA, Hiatal Hernia , PUD,  Uterine Prolapse, Papillary thyroid CA, s/p thyroidectomy who presented with weakness/fatigue/dysuria after recent pessary placement/removal     #Pyelonephritis  -recent admit for urosepsis, CTAP with cystitis with bilateral ascending ureteritis and pyelonephritis  -Abx per med  -F/u urology    #HTN  -bp lower  -cont coreg  -HOLD dilt, hydral, losartan for now     #MAT  -off ASA for gi bleed  -Cont coreg    #Aortic/Mitral Valve Disease  -No overload on exam  -Echo with nml lv fxn, moderate diastolic dysfxn, moderate MR    #Right Common Iliac Artery Aneurysm  -vasc outpt f/u      dvt ppx    35 minutes spent on total encounter; more than 50% of the visit was spent counseling and/or coordinating care by the attending physician.    
A/P    83F w/ DM2, HTN, HLD, OA, Hiatal Hernia , PUD,  Uterine Prolapse, Papillary thyroid CA, s/p thyroidectomy who presented with weakness/fatigue/dysuria after recent pessary placement/removal     #Pyelonephritis  -recent admit for urosepsis, CTAP with cystitis with bilateral ascending ureteritis and pyelonephritis  -Abx per med  -F/u urology    #HTN  -bp stable  -cont coreg only for now   -HOLD dilt, hydral, losartan for now     #MAT  -off ASA for gi bleed  -Cont coreg    #Aortic/Mitral Valve Disease  -No overload on exam  -Echo with nml lv fxn, moderate diastolic dysfxn, moderate MR    #Right Common Iliac Artery Aneurysm  -vasc outpt f/u      dvt ppx    38 minutes spent on total encounter; more than 50% of the visit was spent counseling and/or coordinating care by the attending physician.    
pyelonephritis  colitis?  ;large hiatal hernia      CT noted  adv diet as tolerated  gerd and aspiration precautions  ppi once a day  iv abx   urlogy to see pt  miralax daily   to follow up stool outpt  outpatient follow up with primary GI (Haroon horton)    
83-year-old woman with  past medical history aneurysm, prolapsed bladder, previous UTIs, recent hospitalization 2/12 --> 2/17/24 with E coli bacteremic pyelo dmitted 3/28/24 - pessary removed 3/27/24.    Recurrent UTIs  She notes that she was doing well until abrupt onset of abd pain, severe urinary frequency and burning. She notes that she improved after pessary removed on 3/27.  She currently feels ok  She complains of chronic constipation -    Dysuria, pyuria  - cystits, pyelonephritis  afebrile, keisha wbc, non toxic appearance, rapid clinical improvement  chronic constipation  - no symptoms suggestive of colitis  GI PCR Salmonella of unclear signficiance    Suggest  Continue Ceftriaxone 3/28 -->   if stable can stop after 4/2 dose  - covering pyelo and Salmonella infection    discussed with primary medical attending

## 2024-04-02 NOTE — DISCHARGE NOTE PROVIDER - CARE PROVIDER_API CALL
Haroon Franklin  Gastroenterology  [address] [place] [address]  Phone: [phone]  Fax: [phone]  Follow Up Time: 1 week    Nahum Ibrahim  Urogyn and Reconst Pelvic Surg  [address] [place] [address], [address]  [place] [address]  Phone: [phone]  Fax: [phone]  Follow Up Time:

## 2024-04-02 NOTE — DISCHARGE NOTE PROVIDER - NSDCCPCAREPLAN_GEN_ALL_CORE_FT
PRINCIPAL DISCHARGE DIAGNOSIS  Diagnosis: Acute UTI  Assessment and Plan of Treatment: HOME CARE INSTRUCTIONS  f you were prescribed antibiotics, take them exactly as your caregiver instructs you. Finish the medication even if you feel better after you have only taken some of the medication.  Drink enough water and fluids to keep your urine clear or pale yellow.  Avoid caffeine, tea, and carbonated beverages. They tend to irritate your bladder.  Empty your bladder often. Avoid holding urine for long periods of time.  Empty your bladder before and after sexual intercourse.  After a bowel movement, women should cleanse from front to back. Use each tissue only once.  SEEK MEDICAL CARE IF:  You have back pain.  You develop a fever.  Your symptoms do not begin to resolve within 3 days.  SEEK IMMEDIATE MEDICAL CARE IF:  You have severe back pain or lower abdominal pain.  You develop chills.  You have nausea or vomiting.  You have continued burning or discomfort with urination.        SECONDARY DISCHARGE DIAGNOSES  Diagnosis: Pelvic prolapse  Assessment and Plan of Treatment: please follow up with gyn outpatient for ongoing care    Diagnosis: Hypertension  Assessment and Plan of Treatment: hold hydralazine and c/w olmesarten and carvedilol, follow up with PCP

## 2024-04-03 ENCOUNTER — APPOINTMENT (OUTPATIENT)
Dept: ENDOCRINOLOGY | Facility: CLINIC | Age: 84
End: 2024-04-03

## 2024-04-09 ENCOUNTER — EMERGENCY (EMERGENCY)
Facility: HOSPITAL | Age: 84
LOS: 1 days | Discharge: HOME | End: 2024-04-09
Attending: EMERGENCY MEDICINE
Payer: MEDICARE

## 2024-04-09 VITALS
DIASTOLIC BLOOD PRESSURE: 82 MMHG | WEIGHT: 126.99 LBS | TEMPERATURE: 98 F | SYSTOLIC BLOOD PRESSURE: 171 MMHG | RESPIRATION RATE: 17 BRPM | HEIGHT: 59 IN | HEART RATE: 67 BPM | OXYGEN SATURATION: 98 %

## 2024-04-09 VITALS
SYSTOLIC BLOOD PRESSURE: 150 MMHG | OXYGEN SATURATION: 100 % | RESPIRATION RATE: 16 BRPM | HEART RATE: 62 BPM | DIASTOLIC BLOOD PRESSURE: 74 MMHG

## 2024-04-09 DIAGNOSIS — E89.0 POSTPROCEDURAL HYPOTHYROIDISM: Chronic | ICD-10-CM

## 2024-04-09 DIAGNOSIS — Z98.890 OTHER SPECIFIED POSTPROCEDURAL STATES: Chronic | ICD-10-CM

## 2024-04-09 DIAGNOSIS — S72.001A FRACTURE OF UNSPECIFIED PART OF NECK OF RIGHT FEMUR, INITIAL ENCOUNTER FOR CLOSED FRACTURE: Chronic | ICD-10-CM

## 2024-04-09 LAB
ALBUMIN SERPL ELPH-MCNC: 3.7 G/DL — SIGNIFICANT CHANGE UP (ref 3.5–5)
ALP SERPL-CCNC: 71 U/L — SIGNIFICANT CHANGE UP (ref 40–120)
ALT FLD-CCNC: 22 U/L DA — SIGNIFICANT CHANGE UP (ref 10–60)
ANION GAP SERPL CALC-SCNC: 6 MMOL/L — SIGNIFICANT CHANGE UP (ref 5–17)
AST SERPL-CCNC: 25 U/L — SIGNIFICANT CHANGE UP (ref 10–40)
BASOPHILS # BLD AUTO: 0.06 K/UL — SIGNIFICANT CHANGE UP (ref 0–0.2)
BASOPHILS NFR BLD AUTO: 0.5 % — SIGNIFICANT CHANGE UP (ref 0–2)
BILIRUB SERPL-MCNC: 0.3 MG/DL — SIGNIFICANT CHANGE UP (ref 0.2–1.2)
BLD GP AB SCN SERPL QL: SIGNIFICANT CHANGE UP
BUN SERPL-MCNC: 18 MG/DL — SIGNIFICANT CHANGE UP (ref 7–18)
CALCIUM SERPL-MCNC: 9.8 MG/DL — SIGNIFICANT CHANGE UP (ref 8.4–10.5)
CHLORIDE SERPL-SCNC: 104 MMOL/L — SIGNIFICANT CHANGE UP (ref 96–108)
CO2 SERPL-SCNC: 26 MMOL/L — SIGNIFICANT CHANGE UP (ref 22–31)
CREAT SERPL-MCNC: 0.94 MG/DL — SIGNIFICANT CHANGE UP (ref 0.5–1.3)
EGFR: 61 ML/MIN/1.73M2 — SIGNIFICANT CHANGE UP
EOSINOPHIL # BLD AUTO: 0.04 K/UL — SIGNIFICANT CHANGE UP (ref 0–0.5)
EOSINOPHIL NFR BLD AUTO: 0.3 % — SIGNIFICANT CHANGE UP (ref 0–6)
GLUCOSE SERPL-MCNC: 154 MG/DL — HIGH (ref 70–99)
HCT VFR BLD CALC: 38.3 % — SIGNIFICANT CHANGE UP (ref 34.5–45)
HGB BLD-MCNC: 12.6 G/DL — SIGNIFICANT CHANGE UP (ref 11.5–15.5)
HIV 1 & 2 AB SERPL IA.RAPID: SIGNIFICANT CHANGE UP
IMM GRANULOCYTES NFR BLD AUTO: 0.4 % — SIGNIFICANT CHANGE UP (ref 0–0.9)
INR BLD: 1.12 RATIO — SIGNIFICANT CHANGE UP (ref 0.85–1.18)
LYMPHOCYTES # BLD AUTO: 0.92 K/UL — LOW (ref 1–3.3)
LYMPHOCYTES # BLD AUTO: 7.7 % — LOW (ref 13–44)
MAGNESIUM SERPL-MCNC: 2.1 MG/DL — SIGNIFICANT CHANGE UP (ref 1.6–2.6)
MCHC RBC-ENTMCNC: 30.8 PG — SIGNIFICANT CHANGE UP (ref 27–34)
MCHC RBC-ENTMCNC: 32.9 GM/DL — SIGNIFICANT CHANGE UP (ref 32–36)
MCV RBC AUTO: 93.6 FL — SIGNIFICANT CHANGE UP (ref 80–100)
MONOCYTES # BLD AUTO: 0.47 K/UL — SIGNIFICANT CHANGE UP (ref 0–0.9)
MONOCYTES NFR BLD AUTO: 4 % — SIGNIFICANT CHANGE UP (ref 2–14)
NEUTROPHILS # BLD AUTO: 10.35 K/UL — HIGH (ref 1.8–7.4)
NEUTROPHILS NFR BLD AUTO: 87.1 % — HIGH (ref 43–77)
NRBC # BLD: 0 /100 WBCS — SIGNIFICANT CHANGE UP (ref 0–0)
PHOSPHATE SERPL-MCNC: 2.8 MG/DL — SIGNIFICANT CHANGE UP (ref 2.5–4.5)
PLATELET # BLD AUTO: 328 K/UL — SIGNIFICANT CHANGE UP (ref 150–400)
POTASSIUM SERPL-MCNC: 3.4 MMOL/L — LOW (ref 3.5–5.3)
POTASSIUM SERPL-SCNC: 3.4 MMOL/L — LOW (ref 3.5–5.3)
PROT SERPL-MCNC: 7.7 G/DL — SIGNIFICANT CHANGE UP (ref 6–8.3)
PROTHROM AB SERPL-ACNC: 12.7 SEC — SIGNIFICANT CHANGE UP (ref 9.5–13)
RBC # BLD: 4.09 M/UL — SIGNIFICANT CHANGE UP (ref 3.8–5.2)
RBC # FLD: 14.2 % — SIGNIFICANT CHANGE UP (ref 10.3–14.5)
SODIUM SERPL-SCNC: 136 MMOL/L — SIGNIFICANT CHANGE UP (ref 135–145)
WBC # BLD: 11.89 K/UL — HIGH (ref 3.8–10.5)
WBC # FLD AUTO: 11.89 K/UL — HIGH (ref 3.8–10.5)

## 2024-04-09 PROCEDURE — 99285 EMERGENCY DEPT VISIT HI MDM: CPT | Mod: 25,57

## 2024-04-09 PROCEDURE — 80053 COMPREHEN METABOLIC PANEL: CPT

## 2024-04-09 PROCEDURE — 73080 X-RAY EXAM OF ELBOW: CPT | Mod: 26,LT

## 2024-04-09 PROCEDURE — 73030 X-RAY EXAM OF SHOULDER: CPT | Mod: 26,LT

## 2024-04-09 PROCEDURE — 86901 BLOOD TYPING SEROLOGIC RH(D): CPT

## 2024-04-09 PROCEDURE — 73030 X-RAY EXAM OF SHOULDER: CPT

## 2024-04-09 PROCEDURE — 96374 THER/PROPH/DIAG INJ IV PUSH: CPT | Mod: XU

## 2024-04-09 PROCEDURE — 86703 HIV-1/HIV-2 1 RESULT ANTBDY: CPT

## 2024-04-09 PROCEDURE — 86850 RBC ANTIBODY SCREEN: CPT

## 2024-04-09 PROCEDURE — 99152 MOD SED SAME PHYS/QHP 5/>YRS: CPT

## 2024-04-09 PROCEDURE — 73060 X-RAY EXAM OF HUMERUS: CPT

## 2024-04-09 PROCEDURE — 73080 X-RAY EXAM OF ELBOW: CPT

## 2024-04-09 PROCEDURE — 23650 CLTX SHO DSLC W/MNPJ WO ANES: CPT | Mod: 54,LT

## 2024-04-09 PROCEDURE — 85025 COMPLETE CBC W/AUTO DIFF WBC: CPT

## 2024-04-09 PROCEDURE — 36415 COLL VENOUS BLD VENIPUNCTURE: CPT

## 2024-04-09 PROCEDURE — 86900 BLOOD TYPING SEROLOGIC ABO: CPT

## 2024-04-09 PROCEDURE — 73030 X-RAY EXAM OF SHOULDER: CPT | Mod: 26,LT,77

## 2024-04-09 PROCEDURE — 23650 CLTX SHO DSLC W/MNPJ WO ANES: CPT | Mod: LT

## 2024-04-09 PROCEDURE — 84100 ASSAY OF PHOSPHORUS: CPT

## 2024-04-09 PROCEDURE — 85610 PROTHROMBIN TIME: CPT

## 2024-04-09 PROCEDURE — 83735 ASSAY OF MAGNESIUM: CPT

## 2024-04-09 PROCEDURE — 73060 X-RAY EXAM OF HUMERUS: CPT | Mod: 26,LT

## 2024-04-09 PROCEDURE — 99285 EMERGENCY DEPT VISIT HI MDM: CPT | Mod: 25

## 2024-04-09 RX ORDER — MORPHINE SULFATE 50 MG/1
4 CAPSULE, EXTENDED RELEASE ORAL ONCE
Refills: 0 | Status: DISCONTINUED | OUTPATIENT
Start: 2024-04-09 | End: 2024-04-09

## 2024-04-09 RX ORDER — PROPOFOL 10 MG/ML
100 INJECTION, EMULSION INTRAVENOUS ONCE
Refills: 0 | Status: COMPLETED | OUTPATIENT
Start: 2024-04-09 | End: 2024-04-09

## 2024-04-09 RX ADMIN — MORPHINE SULFATE 4 MILLIGRAM(S): 50 CAPSULE, EXTENDED RELEASE ORAL at 16:40

## 2024-04-09 RX ADMIN — MORPHINE SULFATE 4 MILLIGRAM(S): 50 CAPSULE, EXTENDED RELEASE ORAL at 16:10

## 2024-04-09 RX ADMIN — PROPOFOL 100 MILLIGRAM(S): 10 INJECTION, EMULSION INTRAVENOUS at 18:16

## 2024-04-09 NOTE — ED PROCEDURE NOTE - NS ED PERI VASCULAR NEG
fingers/toes warm to touch/no swelling/no cyanosis of extremity/capillary refill time < 2 seconds
fingers/toes warm to touch/no swelling/no cyanosis of extremity/capillary refill time < 2 seconds

## 2024-04-09 NOTE — ED PROVIDER NOTE - OBJECTIVE STATEMENT
83 female with hx of HTN, HLD, MAT, prior GI bleed, & prior intracranial aneurysm.   Pt presenting to the ED reporting loss of balance while walking with walker & fall with injury to left upper extremity.  No head/neck trauma.  No LOC.  No blood thinner use.

## 2024-04-09 NOTE — ED PROVIDER NOTE - CLINICAL SUMMARY MEDICAL DECISION MAKING FREE TEXT BOX
83 female with hx of HTN, HLD, MAT, prior GI bleed, & prior intracranial aneurysm.   Pt presenting to the ED reporting loss of balance while walking with walker & fall with injury to left upper extremity.  No head/neck trauma.  No LOC.  No blood thinner use.    Vitals with elevated BP.  Airway intact, no respiratory distress, no hypoxia.  No abdominal or CVA tenderness.  Reports decreased sensation to the left upper extremity.  Left upper extremity with deformity concerning for fracture.    Plan to obtain:  -Labs, XR, analgesia as needed, possible orthopedics consult, observe/reassess    ED Course:  Lab values demonstrate no acute/emergent pathology.  My independent interpretation of the EKG: Sinus @ [], normal axis, normal intervals, normal ST/T  My independent interpretation of XR: [No consolidation/effusion/pntx]    [***]    [Patient advised regarding need for close outpatient follow up.  Patient stable for further care in outpatient setting. No indication for inpatient admission at this time. Patient advised regarding symptomatic & supportive care and symptoms to prompt ED return. Strict return precautions provided.]    [Patient requires inpatient admission for further care & stabilization. Care signed out to inpatient team.] 83 female with hx of HTN, HLD, MAT, prior GI bleed, & prior intracranial aneurysm.   Pt presenting to the ED reporting loss of balance while walking with walker & fall with injury to left upper extremity.  No head/neck trauma.  No LOC.  No blood thinner use.    Vitals with elevated BP.  Airway intact, no respiratory distress, no hypoxia.  No abdominal or CVA tenderness.  Reports decreased sensation/weakness to the left upper extremity.  Left upper extremity with deformity concerning for fracture vs dislocation.    Plan to obtain:  -Labs, XR, analgesia as needed, possible orthopedics consult, observe/reassess    ED Course:  Lab values demonstrate no acute/emergent pathology.  My independent interpretation of XR: anterior shoulder dislocation  Conscious sedation performed and shoulder reduced successfully.  Pt still reporting LUE weakness/numbness. Care discussed w Pierre CAZARES of Orthopedics service. Concern for possible nerve injury now w wrist drop. No indication for operative intervention at this time. No indication for inpatient admit at this time. Recommending wrist splint in addition to shoulder immobilizer and close outpatient Orthopedics follow up.  Post-reduction XR successfully reduced  Pt awake and tolerating PO intake in ED  Patient/Family advised regarding need for close outpatient follow up.  Patient stable for further care in outpatient setting. No indication for inpatient admission at this time. Patient advised regarding symptomatic & supportive care and symptoms to prompt ED return. Strict return precautions provided.

## 2024-04-09 NOTE — ED PROCEDURE NOTE - NS_POSTPROCCAREGUIDE_ED_ALL_ED
Patient is now fully awake, with vital signs and temperature stable, hydration is adequate, patients Evelia’s  score is at baseline (or greater than 8), patient and escort has received  discharge education.

## 2024-04-09 NOTE — ED PROVIDER NOTE - NS ED ROS FT
Constitutional: (-) fever (-) chills  Respiratory: (-) shortness of breath   Cardiovascular: (-) chest pain (-) palpitations   Gastrointestinal: (-) abdominal pain (-) diarrhea (-) vomiting  Musculoskeletal: (+) LUE pain   Skin: (-) wound  Neurological: (+) LUE weakness (+) LUE numbness   Psychiatric/Behavioral: (-) confusion

## 2024-04-09 NOTE — ED PROVIDER NOTE - PHYSICAL EXAMINATION
Gen:  Awake, alert, NAD, WDWN, NCAT, non-toxic appearing. No skull/facial tender, no step-offs, no raccoon/horta.  Eyes:  PERRL, EOMI, no icterus, normal lids/lashes, normal conjunctivae.  ENT:  External inspection normal, pink/moist membranes.   CV:  S1S2, regular rate and rhythm, no murmur/gallops/rubs, no JVD, 2+ pulses b/l rad/ulnar, no edema/cords/homans, warm/well-perfused.  Resp:  Normal respiratory rate/effort, no respiratory distress, normal voice, speaking full sentences, lungs clear to auscultation b/l, no wheezing/rales/rhonchi, no retractions, no stridor.  Abd:  Soft abdomen, no tender/distended/guarding/rebound, no pulsatile mass, no CVA tender.   Musculoskeletal:  Pelvis stable, no TLS spinal tender/deformity/step-offs. L shoulder/proximal humerus deformity. L elbow/forearm/wrist/hand: stable joints, no effusion/swelling, no jose juan tender/deformity.   Neck:  FROM neck, supple, trachea midline, no meningismus. No C-spine tender/deformity/step-offs.   Skin:  Color normal for race, warm and dry, no rash. No lacerations, abrasions, or ecchymosis.   Neuro:  Oriented x3, CN 2-12 intact (grossly), RUE/RLE/LLE normal motor (grossly), RUE/RLE/LLE normal sensory (grossly), decreased sensation/motor and weakness LUE, GCS 15  Psych:  Attention normal. Affect normal. Behavior normal. Judgment normal. Gen:  Awake, alert, NAD, WDWN, NCAT, non-toxic appearing. No skull/facial tender, no step-offs, no raccoon/horta.  Eyes:  PERRL, EOMI, no icterus, normal lids/lashes, normal conjunctivae.  ENT:  External inspection normal, pink/moist membranes.   CV:  S1S2, regular rate and rhythm, no murmur/gallops/rubs, no JVD, 2+ pulses b/l rad/ulnar, no edema/cords/homans, warm/well-perfused.  Resp:  Normal respiratory rate/effort, no respiratory distress, normal voice, speaking full sentences, lungs clear to auscultation b/l, no wheezing/rales/rhonchi, no retractions, no stridor.  Abd:  Soft abdomen, no tender/distended/guarding/rebound, no pulsatile mass, no CVA tender.   Musculoskeletal:  Pelvis stable, no TLS spinal tender/deformity/step-offs. L shoulder/proximal humerus deformity. L elbow/forearm/wrist/hand: stable joints, no effusion/swelling, no jose juan tender/deformity.   Neck:  FROM neck, supple, trachea midline, no meningismus. No C-spine tender/deformity/step-offs.   Skin:  Color normal for race, warm and dry, no rash. No lacerations, abrasions, or ecchymosis.   Neuro:  Oriented x3, CN 2-12 intact (grossly), RUE/RLE/LLE normal motor (grossly), RUE/RLE/LLE normal sensory (grossly), decreased sensation/motor and weakness to distal LUE, GCS 15  Psych:  Attention normal. Affect normal. Behavior normal. Judgment normal.

## 2024-04-09 NOTE — ED PROVIDER NOTE - NSFOLLOWUPCLINICS_GEN_ALL_ED_FT
[facility]  Internal Medicine  [address] [place] [address]  Phone: [phone]  Fax: [phone]  Follow Up Time: Routine    [facility]  Orthopedics  [address] [place] [address]  Phone: [phone]  Fax: [phone]  Follow Up Time: 1-3 Days

## 2024-04-09 NOTE — ED PROVIDER NOTE - NSICDXPASTMEDICALHX_GEN_ALL_CORE_FT
PAST MEDICAL HISTORY:  Diverticulosis     GI bleed     Hiatal hernia     HLD (hyperlipidemia)     HTN (hypertension)     Multifocal atrial tachycardia     Prediabetes

## 2024-04-09 NOTE — ED PROCEDURE NOTE - NS ED PERI NEURO POS
Pre-application: Motor, sensory, and vascular responses NOT intact in the injured extremity./Post-application: Motor, sensory, and vascular responses NOT intact in the injured extremity.
Pre-application: Motor, sensory, and vascular responses NOT intact in the injured extremity./Post-application: Motor, sensory, and vascular responses NOT intact in the injured extremity.

## 2024-04-09 NOTE — ED PROVIDER NOTE - PROGRESS NOTE DETAILS
Pt reports feeling better. Fully awake after sedation.   Results reviewed. XR w successful reduction  Tolerating PO intake.  Pt still w RUE weakness/numbness. Care d/w Orthopedics and concern for possible nerve injury dislocation. Recommending add on wrist splint. No indication for operative intervention at this time. No indication for inpatient admit at this time. Recommending close outpatient follow up.  Patient/Family advised regarding symptomatic/supportive care, importance of outpatient follow up, and symptoms to prompt ED return.

## 2024-04-09 NOTE — ED PROVIDER NOTE - PATIENT PORTAL LINK FT
You can access the FollowMyHealth Patient Portal offered by [facility] by registering at the following website: [email or web address]. By joining [facility]’s FollowMyHealth portal, you will also be able to view your health information using other applications (apps) compatible with our system.

## 2024-04-09 NOTE — ED ADULT NURSE NOTE - NSFALLRISKINTERV_ED_ALL_ED

## 2024-04-09 NOTE — ED PROVIDER NOTE - NSFOLLOWUPINSTRUCTIONS_ED_ALL_ED_FT
Please follow up with your PMD or Medicine Clinic in 2-3 days.  Follow up with Orthopedics this week.  Return to the ER for worsening or concerning symptoms.  Keep splint & sling on until seen by Orthopedics.  Take Acetaminophen (Tylenol) 650mg every 6 hours as needed for pain or fever.    - - - - - - - - - - - - -  Shoulder Dislocation  Three images of the anatomy of the shoulder. One is normal. The other two are dislocated.  Your shoulder joint is made up of 3 bones:  The upper arm bone (humerus).  The shoulder blade (scapula).  The collarbone (clavicle).  A shoulder dislocation happens when your upper arm bone moves out of its normal place in your shoulder joint.    What are the causes?  This condition is often caused by:  A fall.  A hard, direct hit to the shoulder.  A forceful movement of the shoulder.  What increases the risk?  You are more likely to develop this condition if you play sports.    What are the signs or symptoms?  The upper body showing a dislocated shoulder.  Bad shape (deformity) of the shoulder.  Very bad pain.  A shoulder that you cannot move.  Numbness, weakness, or tingling in your neck or down your arm.  Bruising or swelling around your shoulder.  How is this treated?  This condition is treated with a procedure called a reduction. This is done to place the upper arm bone back in the joint. There are two types of reduction:  Closed reduction. The upper arm bone is placed back in the joint without surgery. The doctor uses his or her hands to guide the bone back into place.  Open reduction. Surgery is done to place the upper arm bone back in the joint. This may be needed if:  You have a weak shoulder joint or weak tissues that connect bones to each other (ligaments).  You have had more than one shoulder dislocation.  The nerves or blood vessels around your shoulder have been damaged.  After the procedure, you will wear a brace or sling to prevent the arm from moving.    After the brace or sling is removed, you will have physical therapy to help improve movement (range of motion) in your shoulder joint.    Follow these instructions at home:  Medicines    Take over-the-counter and prescription medicines only as told by your doctor.  Ask your doctor if the medicine prescribed to you:  Requires you to avoid driving or using machinery.  Can cause trouble pooping (constipation). You may need to take steps to prevent or treat trouble pooping:  Drink enough fluid to keep your pee (urine) pale yellow.  Take over-the-counter or prescription medicines.  Eat foods that are high in fiber. These include beans, whole grains, and fresh fruits and vegetables.  Limit foods that are high in fat and sugar. These include fried or sweet foods.  If you have a brace or sling:    Wear the brace or sling as told by your doctor. Remove it only as told by your doctor.  Loosen the brace or sling if your fingers:  Tingle.  Become numb.  Turn cold or blue.  Keep the brace or sling clean.  If the brace or sling is not waterproof:  Do not let it get wet.  Cover it with a watertight covering when you take a bath or shower.  Managing pain, stiffness, and swelling    Bag of ice on a towel on the skin.  If told, put ice on the injured area.  If you can remove your brace or sling, remove it as told by your doctor.  Put ice in a plastic bag.  Place a towel between your skin and the bag.  Leave the ice on for 20 minutes, 2–3 times per day.  Move your fingers often.  Raise (elevate) the injured area above the level of your heart while you are sitting or lying down.  Activity    Do not lift your arm above shoulder level until your doctor approves.  Do not lift anything until your doctor says that it is safe.  Do not push or pull things until your doctor approves.  Return to your normal activities as told by your doctor. Ask your doctor what activities are safe for you.  Do range-of-motion exercises only as told by your doctor.  Exercise your hand by squeezing a soft ball. This keeps your hand and wrist from getting stiff and swollen.  General instructions    Do not drive while you are wearing a brace or sling on a hand that you use for driving.  Do not take baths, swim, or use a hot tub until your doctor approves. Ask your doctor if you may take showers. You may only be allowed to take sponge baths.  Do not use any tobacco products, including cigarettes, chewing tobacco, or e-cigarettes. These can delay healing. If you need help quitting, ask your doctor.  Keep all follow-up visits as told by your doctor. This is important.  Contact a doctor if:  Your brace or sling gets damaged.  Get help right away if:  Your pain gets worse, not better.  You lose feeling in your arm or hand.  Your arm or hand turns white and cold.  Summary  A shoulder dislocation happens when your upper arm bone moves out of its normal place in your shoulder joint.  It is often caused by a fall, a strong hit to the shoulder, or a forceful movement of the shoulder.  It causes very bad pain. You may not be able to move your shoulder.  This condition is treated with either closed or open reduction. You will also be given a brace or sling. You will do exercises to improve movement in your shoulder joint.  Contact a doctor if your brace or sling gets damaged. Get help right away if your pain gets worse, you lose feeling in your arm or hand, or your arm or hand turns white or cold.  This information is not intended to replace advice given to you by your health care provider. Make sure you discuss any questions you have with your health care provider.

## 2024-04-11 PROBLEM — Z87.19 PERSONAL HISTORY OF OTHER DISEASES OF THE DIGESTIVE SYSTEM: Chronic | Status: ACTIVE | Noted: 2024-03-28

## 2024-04-11 NOTE — ED ADULT TRIAGE NOTE - ISOLATION TYPE:
None
I performed a history and physical exam of the patient and discussed their management with the resident and /or advanced care provider. I reviewed the resident and /or ACP's note and agree with the documented findings and plan of care. My medical decision making and observations are found above.  Lungs clear, abd soft

## 2024-04-13 ENCOUNTER — EMERGENCY (EMERGENCY)
Facility: HOSPITAL | Age: 84
LOS: 1 days | Discharge: HOME | End: 2024-04-13
Attending: STUDENT IN AN ORGANIZED HEALTH CARE EDUCATION/TRAINING PROGRAM
Payer: MEDICARE

## 2024-04-13 VITALS
SYSTOLIC BLOOD PRESSURE: 170 MMHG | RESPIRATION RATE: 18 BRPM | WEIGHT: 130.07 LBS | HEART RATE: 70 BPM | HEIGHT: 59 IN | TEMPERATURE: 97 F | OXYGEN SATURATION: 97 % | DIASTOLIC BLOOD PRESSURE: 88 MMHG

## 2024-04-13 DIAGNOSIS — S72.001A FRACTURE OF UNSPECIFIED PART OF NECK OF RIGHT FEMUR, INITIAL ENCOUNTER FOR CLOSED FRACTURE: Chronic | ICD-10-CM

## 2024-04-13 DIAGNOSIS — Z98.890 OTHER SPECIFIED POSTPROCEDURAL STATES: Chronic | ICD-10-CM

## 2024-04-13 DIAGNOSIS — E89.0 POSTPROCEDURAL HYPOTHYROIDISM: Chronic | ICD-10-CM

## 2024-04-13 PROCEDURE — 99283 EMERGENCY DEPT VISIT LOW MDM: CPT

## 2024-04-13 NOTE — ED ADULT TRIAGE NOTE - CHIEF COMPLAINT QUOTE
As per pt she had a left shoulder dislocated last Tuesday afternoon, that was place back in c/o left shoulder pain going down left arm to the fingers in her left hand

## 2024-04-14 PROBLEM — K57.90 DIVERTICULOSIS OF INTESTINE, PART UNSPECIFIED, WITHOUT PERFORATION OR ABSCESS WITHOUT BLEEDING: Chronic | Status: ACTIVE | Noted: 2024-04-09

## 2024-04-14 PROBLEM — K92.2 GASTROINTESTINAL HEMORRHAGE, UNSPECIFIED: Chronic | Status: ACTIVE | Noted: 2024-04-09

## 2024-04-14 PROBLEM — I47.19 OTHER SUPRAVENTRICULAR TACHYCARDIA: Chronic | Status: ACTIVE | Noted: 2024-04-09

## 2024-04-14 PROBLEM — I10 ESSENTIAL (PRIMARY) HYPERTENSION: Chronic | Status: ACTIVE | Noted: 2024-04-09

## 2024-04-14 PROBLEM — E78.5 HYPERLIPIDEMIA, UNSPECIFIED: Chronic | Status: ACTIVE | Noted: 2024-04-09

## 2024-04-14 PROBLEM — K44.9 DIAPHRAGMATIC HERNIA WITHOUT OBSTRUCTION OR GANGRENE: Chronic | Status: ACTIVE | Noted: 2024-04-09

## 2024-04-14 PROBLEM — R73.03 PREDIABETES: Chronic | Status: ACTIVE | Noted: 2024-04-09

## 2024-04-14 NOTE — ED PROVIDER NOTE - CLINICAL SUMMARY MEDICAL DECISION MAKING FREE TEXT BOX
83-year-old female presenting with left arm pain and swelling after shoulder dislocation and reduction.  Upper extremity is well-perfused with normal pulses and normal color.  Patient has decreased sensation below her elbow but that has been present since the dislocation/reduction.  Low concern for compartment syndrome.  Symptoms likely sequelae from dislocation/reduction.  Patient plans to take physical therapy and has already seen a neurosurgeon.  Plan for increased pain control.
full weight-bearing

## 2024-04-14 NOTE — ED PROVIDER NOTE - PHYSICAL EXAMINATION
General: well appearing female, no acute distress   HEENT: normocephalic, atraumatic   Respiratory: normal work of breathing  Cardiac: regular rate and rhythm, 2+ bilateral radial pulses, LUE warm  Abdomen: soft, non-tender, no guarding or rebound   MSK: left hand edema  Skin: warm, dry   Neuro: A&Ox3  Psych: appropriate affect

## 2024-04-14 NOTE — ED ADULT NURSE NOTE - NSFALLHARMRISKINTERV_ED_ALL_ED
Assistance OOB with selected safe patient handling equipment if applicable/Communicate risk of Fall with Harm to all staff, patient, and family/Monitor gait and stability/Provide patient with walking aids/Provide visual cue: red socks, yellow wristband, yellow gown, etc/Reinforce activity limits and safety measures with patient and family/Bed in lowest position, wheels locked, appropriate side rails in place/Call bell, personal items and telephone in reach/Instruct patient to call for assistance before getting out of bed/chair/stretcher/Non-slip footwear applied when patient is off stretcher/[place] to call system/Physically safe environment - no spills, clutter or unnecessary equipment/Purposeful Proactive Rounding/Room/bathroom lighting operational, light cord in reach

## 2024-04-14 NOTE — ED PROVIDER NOTE - PATIENT PORTAL LINK FT
You can access the FollowMyHealth Patient Portal offered by [facility] by registering at the following website: [email or web address]. By joining [employer]’s FollowMyHealth portal, you will also be able to view your health information using other applications (apps) compatible with our system.

## 2024-04-14 NOTE — ED PROVIDER NOTE - NSFOLLOWUPINSTRUCTIONS_ED_ALL_ED_FT
You were seen in the emergency department for left hand pain and swelling after a shoulder dislocation and reduction.      Please follow-up with your primary care doctor within the next 24-48 hours.    If you have any worsening symptoms, severe arm pain, swelling, numbness or tingling, please return to the emergency department.

## 2024-04-14 NOTE — ED PROVIDER NOTE - OBJECTIVE STATEMENT
83-year female presenting with left arm pain and swelling.  Patient reports earlier in the week she dislocated her shoulder and had it reduced in the emergency department.  After the dislocation she was unable to move her arm.  Saw a neurosurgeon the next day who told her she would need physical therapy and that she would likely regain use of her left arm.  Since then the patient has had pain in her left arm and numbness below the elbow.  Today had some worsening pain and swelling in her left hand.  Patient reports she felt her hand turned blue and that she had poor circulation

## 2024-04-23 ENCOUNTER — INPATIENT (INPATIENT)
Facility: HOSPITAL | Age: 84
LOS: 5 days | Discharge: HOME HEALTH | DRG: 690 | End: 2024-04-29
Attending: INTERNAL MEDICINE | Admitting: INTERNAL MEDICINE
Payer: MEDICARE

## 2024-04-23 VITALS
RESPIRATION RATE: 18 BRPM | DIASTOLIC BLOOD PRESSURE: 76 MMHG | OXYGEN SATURATION: 98 % | WEIGHT: 125 LBS | TEMPERATURE: 98 F | HEIGHT: 59 IN | HEART RATE: 85 BPM | SYSTOLIC BLOOD PRESSURE: 112 MMHG

## 2024-04-23 DIAGNOSIS — Z98.890 OTHER SPECIFIED POSTPROCEDURAL STATES: Chronic | ICD-10-CM

## 2024-04-23 DIAGNOSIS — R29.6 REPEATED FALLS: ICD-10-CM

## 2024-04-23 DIAGNOSIS — E89.0 POSTPROCEDURAL HYPOTHYROIDISM: Chronic | ICD-10-CM

## 2024-04-23 DIAGNOSIS — S72.001A FRACTURE OF UNSPECIFIED PART OF NECK OF RIGHT FEMUR, INITIAL ENCOUNTER FOR CLOSED FRACTURE: Chronic | ICD-10-CM

## 2024-04-23 LAB
ALBUMIN SERPL ELPH-MCNC: 3.5 G/DL — SIGNIFICANT CHANGE UP (ref 3.3–5)
ALP SERPL-CCNC: 109 U/L — SIGNIFICANT CHANGE UP (ref 40–120)
ALT FLD-CCNC: 32 U/L — SIGNIFICANT CHANGE UP (ref 10–45)
ANION GAP SERPL CALC-SCNC: 14 MMOL/L — SIGNIFICANT CHANGE UP (ref 5–17)
APPEARANCE UR: CLEAR — SIGNIFICANT CHANGE UP
APTT BLD: 24.4 SEC — LOW (ref 24.5–35.6)
AST SERPL-CCNC: 57 U/L — HIGH (ref 10–40)
BACTERIA # UR AUTO: ABNORMAL /HPF
BASE EXCESS BLDV CALC-SCNC: 2.4 MMOL/L — SIGNIFICANT CHANGE UP (ref -2–3)
BASOPHILS # BLD AUTO: 0.05 K/UL — SIGNIFICANT CHANGE UP (ref 0–0.2)
BASOPHILS NFR BLD AUTO: 0.5 % — SIGNIFICANT CHANGE UP (ref 0–2)
BILIRUB SERPL-MCNC: 0.6 MG/DL — SIGNIFICANT CHANGE UP (ref 0.2–1.2)
BILIRUB UR-MCNC: NEGATIVE — SIGNIFICANT CHANGE UP
BUN SERPL-MCNC: 28 MG/DL — HIGH (ref 7–23)
CA-I SERPL-SCNC: 1.25 MMOL/L — SIGNIFICANT CHANGE UP (ref 1.15–1.33)
CALCIUM SERPL-MCNC: 9.4 MG/DL — SIGNIFICANT CHANGE UP (ref 8.4–10.5)
CAST: 0 /LPF — SIGNIFICANT CHANGE UP (ref 0–4)
CHLORIDE BLDV-SCNC: 99 MMOL/L — SIGNIFICANT CHANGE UP (ref 96–108)
CHLORIDE SERPL-SCNC: 98 MMOL/L — SIGNIFICANT CHANGE UP (ref 96–108)
CK MB CFR SERPL CALC: 1.8 NG/ML — SIGNIFICANT CHANGE UP (ref 0–3.8)
CK SERPL-CCNC: 40 U/L — SIGNIFICANT CHANGE UP (ref 25–170)
CO2 BLDV-SCNC: 28 MMOL/L — HIGH (ref 22–26)
CO2 SERPL-SCNC: 21 MMOL/L — LOW (ref 22–31)
COLOR SPEC: YELLOW — SIGNIFICANT CHANGE UP
CREAT SERPL-MCNC: 0.87 MG/DL — SIGNIFICANT CHANGE UP (ref 0.5–1.3)
DIFF PNL FLD: NEGATIVE — SIGNIFICANT CHANGE UP
EGFR: 67 ML/MIN/1.73M2 — SIGNIFICANT CHANGE UP
EOSINOPHIL # BLD AUTO: 0.07 K/UL — SIGNIFICANT CHANGE UP (ref 0–0.5)
EOSINOPHIL NFR BLD AUTO: 0.8 % — SIGNIFICANT CHANGE UP (ref 0–6)
FLUAV AG NPH QL: SIGNIFICANT CHANGE UP
FLUBV AG NPH QL: SIGNIFICANT CHANGE UP
GAS PNL BLDV: 132 MMOL/L — LOW (ref 136–145)
GAS PNL BLDV: SIGNIFICANT CHANGE UP
GLUCOSE BLDV-MCNC: 137 MG/DL — HIGH (ref 70–99)
GLUCOSE SERPL-MCNC: 138 MG/DL — HIGH (ref 70–99)
GLUCOSE UR QL: NEGATIVE MG/DL — SIGNIFICANT CHANGE UP
HCO3 BLDV-SCNC: 27 MMOL/L — SIGNIFICANT CHANGE UP (ref 22–29)
HCT VFR BLD CALC: 34.8 % — SIGNIFICANT CHANGE UP (ref 34.5–45)
HCT VFR BLDA CALC: 36 % — SIGNIFICANT CHANGE UP (ref 34.5–46.5)
HGB BLD CALC-MCNC: 12 G/DL — SIGNIFICANT CHANGE UP (ref 11.7–16.1)
HGB BLD-MCNC: 11.5 G/DL — SIGNIFICANT CHANGE UP (ref 11.5–15.5)
IMM GRANULOCYTES NFR BLD AUTO: 0.4 % — SIGNIFICANT CHANGE UP (ref 0–0.9)
INR BLD: 1.25 RATIO — HIGH (ref 0.85–1.18)
KETONES UR-MCNC: ABNORMAL MG/DL
LACTATE BLDV-MCNC: 0.9 MMOL/L — SIGNIFICANT CHANGE UP (ref 0.5–2)
LEUKOCYTE ESTERASE UR-ACNC: ABNORMAL
LIDOCAIN IGE QN: 11 U/L — SIGNIFICANT CHANGE UP (ref 7–60)
LYMPHOCYTES # BLD AUTO: 1.21 K/UL — SIGNIFICANT CHANGE UP (ref 1–3.3)
LYMPHOCYTES # BLD AUTO: 13.3 % — SIGNIFICANT CHANGE UP (ref 13–44)
MCHC RBC-ENTMCNC: 30.8 PG — SIGNIFICANT CHANGE UP (ref 27–34)
MCHC RBC-ENTMCNC: 33 GM/DL — SIGNIFICANT CHANGE UP (ref 32–36)
MCV RBC AUTO: 93.3 FL — SIGNIFICANT CHANGE UP (ref 80–100)
MONOCYTES # BLD AUTO: 1.32 K/UL — HIGH (ref 0–0.9)
MONOCYTES NFR BLD AUTO: 14.5 % — HIGH (ref 2–14)
NEUTROPHILS # BLD AUTO: 6.44 K/UL — SIGNIFICANT CHANGE UP (ref 1.8–7.4)
NEUTROPHILS NFR BLD AUTO: 70.5 % — SIGNIFICANT CHANGE UP (ref 43–77)
NITRITE UR-MCNC: NEGATIVE — SIGNIFICANT CHANGE UP
NRBC # BLD: 0 /100 WBCS — SIGNIFICANT CHANGE UP (ref 0–0)
PCO2 BLDV: 42 MMHG — SIGNIFICANT CHANGE UP (ref 39–42)
PH BLDV: 7.42 — SIGNIFICANT CHANGE UP (ref 7.32–7.43)
PH UR: 6 — SIGNIFICANT CHANGE UP (ref 5–8)
PLATELET # BLD AUTO: 261 K/UL — SIGNIFICANT CHANGE UP (ref 150–400)
PO2 BLDV: 51 MMHG — HIGH (ref 25–45)
POTASSIUM BLDV-SCNC: 3.2 MMOL/L — LOW (ref 3.5–5.1)
POTASSIUM SERPL-MCNC: 3.2 MMOL/L — LOW (ref 3.5–5.3)
POTASSIUM SERPL-SCNC: 3.2 MMOL/L — LOW (ref 3.5–5.3)
PROT SERPL-MCNC: 6.8 G/DL — SIGNIFICANT CHANGE UP (ref 6–8.3)
PROT UR-MCNC: SIGNIFICANT CHANGE UP MG/DL
PROTHROM AB SERPL-ACNC: 13.7 SEC — HIGH (ref 9.5–13)
RBC # BLD: 3.73 M/UL — LOW (ref 3.8–5.2)
RBC # FLD: 14.1 % — SIGNIFICANT CHANGE UP (ref 10.3–14.5)
RBC CASTS # UR COMP ASSIST: 2 /HPF — SIGNIFICANT CHANGE UP (ref 0–4)
RSV RNA NPH QL NAA+NON-PROBE: SIGNIFICANT CHANGE UP
SAO2 % BLDV: 84.7 % — SIGNIFICANT CHANGE UP (ref 67–88)
SARS-COV-2 RNA SPEC QL NAA+PROBE: SIGNIFICANT CHANGE UP
SODIUM SERPL-SCNC: 133 MMOL/L — LOW (ref 135–145)
SP GR SPEC: >1.03 — HIGH (ref 1–1.03)
SQUAMOUS # UR AUTO: 2 /HPF — SIGNIFICANT CHANGE UP (ref 0–5)
TROPONIN T, HIGH SENSITIVITY RESULT: 19 NG/L — SIGNIFICANT CHANGE UP (ref 0–51)
TROPONIN T, HIGH SENSITIVITY RESULT: 20 NG/L — SIGNIFICANT CHANGE UP (ref 0–51)
TROPONIN T, HIGH SENSITIVITY RESULT: 20 NG/L — SIGNIFICANT CHANGE UP (ref 0–51)
UROBILINOGEN FLD QL: 0.2 MG/DL — SIGNIFICANT CHANGE UP (ref 0.2–1)
WBC # BLD: 9.13 K/UL — SIGNIFICANT CHANGE UP (ref 3.8–10.5)
WBC # FLD AUTO: 9.13 K/UL — SIGNIFICANT CHANGE UP (ref 3.8–10.5)
WBC UR QL: 8 /HPF — HIGH (ref 0–5)

## 2024-04-23 PROCEDURE — 70496 CT ANGIOGRAPHY HEAD: CPT | Mod: 26,MC

## 2024-04-23 PROCEDURE — 73030 X-RAY EXAM OF SHOULDER: CPT | Mod: 26,LT

## 2024-04-23 PROCEDURE — 74177 CT ABD & PELVIS W/CONTRAST: CPT | Mod: 26,MC

## 2024-04-23 PROCEDURE — 99223 1ST HOSP IP/OBS HIGH 75: CPT

## 2024-04-23 PROCEDURE — 71045 X-RAY EXAM CHEST 1 VIEW: CPT | Mod: 26

## 2024-04-23 PROCEDURE — 71260 CT THORAX DX C+: CPT | Mod: 26,MC

## 2024-04-23 PROCEDURE — 70498 CT ANGIOGRAPHY NECK: CPT | Mod: 26,MC

## 2024-04-23 PROCEDURE — 99285 EMERGENCY DEPT VISIT HI MDM: CPT

## 2024-04-23 PROCEDURE — 93010 ELECTROCARDIOGRAM REPORT: CPT

## 2024-04-23 RX ORDER — CARVEDILOL PHOSPHATE 80 MG/1
25 CAPSULE, EXTENDED RELEASE ORAL EVERY 12 HOURS
Refills: 0 | Status: DISCONTINUED | OUTPATIENT
Start: 2024-04-23 | End: 2024-04-29

## 2024-04-23 RX ORDER — ACETAMINOPHEN 500 MG
650 TABLET ORAL EVERY 6 HOURS
Refills: 0 | Status: DISCONTINUED | OUTPATIENT
Start: 2024-04-23 | End: 2024-04-29

## 2024-04-23 RX ORDER — POTASSIUM CHLORIDE 20 MEQ
40 PACKET (EA) ORAL ONCE
Refills: 0 | Status: COMPLETED | OUTPATIENT
Start: 2024-04-23 | End: 2024-04-23

## 2024-04-23 RX ORDER — LEVOTHYROXINE SODIUM 125 MCG
75 TABLET ORAL DAILY
Refills: 0 | Status: DISCONTINUED | OUTPATIENT
Start: 2024-04-23 | End: 2024-04-29

## 2024-04-23 RX ORDER — MULTIVIT-MIN/FERROUS GLUCONATE 9 MG/15 ML
1 LIQUID (ML) ORAL DAILY
Refills: 0 | Status: DISCONTINUED | OUTPATIENT
Start: 2024-04-23 | End: 2024-04-29

## 2024-04-23 RX ORDER — LORATADINE 10 MG/1
10 TABLET ORAL DAILY
Refills: 0 | Status: DISCONTINUED | OUTPATIENT
Start: 2024-04-23 | End: 2024-04-29

## 2024-04-23 RX ORDER — LOSARTAN POTASSIUM 100 MG/1
100 TABLET, FILM COATED ORAL DAILY
Refills: 0 | Status: DISCONTINUED | OUTPATIENT
Start: 2024-04-23 | End: 2024-04-29

## 2024-04-23 RX ORDER — PANTOPRAZOLE SODIUM 20 MG/1
40 TABLET, DELAYED RELEASE ORAL
Refills: 0 | Status: DISCONTINUED | OUTPATIENT
Start: 2024-04-23 | End: 2024-04-29

## 2024-04-23 RX ORDER — AMITRIPTYLINE HCL 25 MG
25 TABLET ORAL AT BEDTIME
Refills: 0 | Status: DISCONTINUED | OUTPATIENT
Start: 2024-04-23 | End: 2024-04-29

## 2024-04-23 RX ORDER — BUPROPION HYDROCHLORIDE 150 MG/1
150 TABLET, EXTENDED RELEASE ORAL DAILY
Refills: 0 | Status: DISCONTINUED | OUTPATIENT
Start: 2024-04-23 | End: 2024-04-29

## 2024-04-23 RX ORDER — ATORVASTATIN CALCIUM 80 MG/1
10 TABLET, FILM COATED ORAL AT BEDTIME
Refills: 0 | Status: DISCONTINUED | OUTPATIENT
Start: 2024-04-23 | End: 2024-04-29

## 2024-04-23 RX ORDER — DILTIAZEM HCL 120 MG
120 CAPSULE, EXT RELEASE 24 HR ORAL DAILY
Refills: 0 | Status: DISCONTINUED | OUTPATIENT
Start: 2024-04-23 | End: 2024-04-29

## 2024-04-23 RX ORDER — ASCORBIC ACID 60 MG
500 TABLET,CHEWABLE ORAL DAILY
Refills: 0 | Status: DISCONTINUED | OUTPATIENT
Start: 2024-04-23 | End: 2024-04-29

## 2024-04-23 RX ORDER — ALPRAZOLAM 0.25 MG
1 TABLET ORAL DAILY
Refills: 0 | Status: DISCONTINUED | OUTPATIENT
Start: 2024-04-23 | End: 2024-04-29

## 2024-04-23 RX ADMIN — ATORVASTATIN CALCIUM 10 MILLIGRAM(S): 80 TABLET, FILM COATED ORAL at 21:17

## 2024-04-23 RX ADMIN — Medication 25 MILLIGRAM(S): at 21:17

## 2024-04-23 RX ADMIN — Medication 40 MILLIEQUIVALENT(S): at 14:47

## 2024-04-23 NOTE — CONSULT NOTE ADULT - SUBJECTIVE AND OBJECTIVE BOX
Neurology - Consult Note    -  Spectra: [phone] ([facility]), [phone] ([facility])  -    HPI: Patient JOE URIOSTEGUI is a 83y (1940) years old woman with a PMH of groin aneurysm, brain aneurysm x 2, hypertension, mitral valve prolapse, hyperlipidemia, prolapsed bladder, cataracts, arthritis, right hip replacement, duodenal laceration, prediabetes, GERD, diverticulitis, hiatal hernia presents status post 2 falls this morning. She was found after the fall epr daughter at 3 am and has had a fall earlier this month. Per daughter this has been recurrent and per patient she has been clumsy all her life. This has worsened after surgeries in the hip per son. Patient otherwise has been experiencing hallucinations and confusion per daughter. She was seeing a photo that was not really present. She thought that [employer] fell and expressed this fact to her daughter. She has been thinking that she will be going to [facility] for evaluation and claimed that her son informed her that he will take her. When her daughter confronted her and denied this information, she said that this was not a true story. She was evaluated in the ED for a fall back in April 9th for which she was evaluated and was found to have an anterior shoulder disclocation. She was diagnosed with brachial plexus injury. She was admitted earlier March 28 to April 2 for sepsis i/s/o UTI and was treated with antibiotics. Patient ambulates with a walker at baseline otherwise for the last year. Patient has been reportedly having recurrent falls over the past year that worsened after bilateral hips surgeries. Patient has been receiving rehab for the falls.    Review of Systems:      All other review of systems is negative unless indicated above.    Allergies:  NSAIDs (Rash)  hydrocortisone (Unknown)  amoxicillin (Other)  penicillin (Anaphylaxis)  penicillin (Other)      PMHx/PSHx/Family Hx: As above, otherwise see below   Hypertension    GERD (gastroesophageal reflux disease)    Diverticulitis    Osteoarthritis    Macular degeneration    Hypothyroid    Stage 2 chronic kidney disease    Female bladder prolapse    H/O mitral valve prolapse    Hiatal hernia with GERD    Fe deficiency anemia    Tracheal nodule    Arteriosclerotic heart disease (ASHD)    Gastrointestinal bleed    COVID-19 vaccination declined    Primary hypertension    HLD (hyperlipidemia)    Hiatal hernia    Osteoarthritis    Uterine prolapse    Constipation    Papillary carcinoma    History of celiac disease    HLD (hyperlipidemia)    GI bleed    HTN (hypertension)    Multifocal atrial tachycardia    Prediabetes    Diverticulosis    Hiatal hernia        Social Hx:  No current use of tobacco, alcohol, or illicit drugs  Lives with ***    Medications:  MEDICATIONS  (STANDING):    MEDICATIONS  (PRN):      Vitals:  T(C): 36.5 (04-23-24 @ 12:36), Max: 37.2 (04-23-24 @ 09:32)  HR: 93 (04-23-24 @ 12:36) (85 - 93)  BP: 146/71 (04-23-24 @ 12:36) (112/76 - 146/71)  RR: 16 (04-23-24 @ 12:36) (16 - 18)  SpO2: 95% (04-23-24 @ 12:36) (95% - 98%)    Physical Examination:   General - NAD    Neurologic Exam:  Mental status - Awake, Alert, Oriented to person, place, and time. Speech fluent, repetition and naming intact. Follows simple and complex commands.     Cranial nerves - PERRLA, VFF, EOMI, face sensation (V1-V3) intact b/l, facial strength intact without asymmetry b/l, hearing intact b/l, palate with symmetric elevation. tongue normal protrusion    Motor - Normal bulk and tone throughout. No pronator drift.  Strength testing            Deltoid      Biceps      Triceps     Wrist Extension    Wrist Flexion     Interossei         R            5                 5               5                     5                              5                        5                 5  there is absent movement from the deltoid all the way down to the distal fingers. There was slight internal and external rotation movement and slight abduction movement.              Hip Flexion    Hip Extension    Knee Flexion    Knee Extension    Dorsiflexion    Plantar Flexion  R              5                           5                       5                           5                            5                          5  L              5                           5                        5                           5                            5                          5    Sensation - Light touch pinprick vibration and proprioception intact throughout. there is decreased pinprick sensation left compared to right    DTR's -             Biceps      Triceps     Brachioradialis            Patellar    Ankle    Toes/plantar response  R            0                0                       0                      0+            0+                 Down  L              2+             2+                 2+                        0+           0+                 Down    Coordination - Finger to Nose intact b/l. No tremors appreciated. HTS intact bilaterally.    Gait and station -avoided for safety and unable     Labs:                        11.5   9.13  )-----------( 261      ( 23 Apr 2024 09:50 )             34.8     04-23    133<L>  |  98  |  28<H>  ----------------------------<  138<H>  3.2<L>   |  21<L>  |  0.87    Ca    9.4      23 Apr 2024 09:50    TPro  6.8  /  Alb  3.5  /  TBili  0.6  /  DBili  x   /  AST  57<H>  /  ALT  32  /  AlkPhos  109  04-23    CAPILLARY BLOOD GLUCOSE        LIVER FUNCTIONS - ( 23 Apr 2024 09:50 )  Alb: 3.5 g/dL / Pro: 6.8 g/dL / ALK PHOS: 109 U/L / ALT: 32 U/L / AST: 57 U/L / GGT: x             PT/INR - ( 23 Apr 2024 09:50 )   PT: 13.7 sec;   INR: 1.25 ratio         PTT - ( 23 Apr 2024 09:50 )  PTT:24.4 sec  CSF:                  Radiology:  CT Head No Cont:  (23 Apr 2024 12:13)    < from: CT Angio Neck w/ IV Cont (04.23.24 @ 12:28) >    1.  BRAIN:  No evidence of acute intracranial injury.    2.  RIGHT CAROTID SYSTEM:    Carotid bulb intact. Internal carotid   proximal focal defect does not cause critical narrowing and is associated   with mild distal mural irregularity. The findings may reflect carotid web   and/or fibromuscular dysplasia, not typical for post traumatic injury    3   LEFT CAROTID SYSTEM:     Carotid bulb intact. Internal carotid focal   caliber change and mild mural irregularity to suggest fibromuscular   dysplasia, not typical for post traumatic injury    4.   VERTEBRAL CIRCULATION:    Patent.  Spiral tortuosity of the proximal   left vertebral artery may suggest atherosclerosis and/or fibromuscular   dysplasia, not typical for post traumatic injury    5.  ANTERIOR INTRACRANIAL CIRCULATION:     Intracranial atherosclerosis   cavernous and clinoid segments of the internal carotid arteries, mild. In   each distal internal carotid artery tiny focal protuberances are present   at the expected origin of the posterior communicating arteries without   identification of these vessels ; the internal carotid protuberances may   reflect tiny saccular aneurysms versus infundibula of the posterior   communicating arteries otherwise not identified by this technique.    6.  POSTERIOR INTRACRANIAL CIRCULATION:    Unremarkable.    7. Consider follow-up vascular imaging evaluation of the abnormalities   noted above.    < end of copied text >

## 2024-04-23 NOTE — H&P ADULT - NSHPLABSRESULTS_GEN_ALL_CORE
11.5   9.13  )-----------( 261      ( 23 Apr 2024 09:50 )             34.8       04-23    133<L>  |  98  |  28<H>  ----------------------------<  138<H>  3.2<L>   |  21<L>  |  0.87    Ca    9.4      23 Apr 2024 09:50    TPro  6.8  /  Alb  3.5  /  TBili  0.6  /  DBili  x   /  AST  57<H>  /  ALT  32  /  AlkPhos  109  04-23              Urinalysis Basic - ( 23 Apr 2024 13:54 )    Color: Yellow / Appearance: Clear / SG: >1.030 / pH: x  Gluc: x / Ketone: Trace mg/dL  / Bili: Negative / Urobili: 0.2 mg/dL   Blood: x / Protein: Trace mg/dL / Nitrite: Negative   Leuk Esterase: Small / RBC: 2 /HPF / WBC 8 /HPF   Sq Epi: x / Non Sq Epi: 2 /HPF / Bacteria: Many /HPF        PT/INR - ( 23 Apr 2024 09:50 )   PT: 13.7 sec;   INR: 1.25 ratio         PTT - ( 23 Apr 2024 09:50 )  PTT:24.4 sec    < from: CT Abdomen and Pelvis w/ IV Cont (04.23.24 @ 12:28) >      IMPRESSION:  No acute traumatic injury in the chest abdomen or pelvis.    < end of copied text >    < from: CT Angio Head w/ IV Cont (04.23.24 @ 12:14) >    IMPRESSION:    1.  BRAIN:  No evidence of acute intracranial injury.    2.  RIGHT CAROTID SYSTEM:    Carotid bulb intact. Internal carotid   proximal focal defect does not cause critical narrowing and is associated   with mild distal mural irregularity. The findings may reflect carotid web   and/or fibromuscular dysplasia, not typical for post traumatic injury    3   LEFT CAROTID SYSTEM:     Carotid bulb intact. Internal carotid focal   caliber change and mild mural irregularity to suggest fibromuscular   dysplasia, not typical for post traumatic injury    4.   VERTEBRAL CIRCULATION:    Patent.  Spiral tortuosity of the proximal   left vertebral artery may suggest atherosclerosis and/or fibromuscular   dysplasia, not typical for post traumatic injury    5.  ANTERIOR INTRACRANIAL CIRCULATION:     Intracranial atherosclerosis   cavernous and clinoid segments of the internal carotid arteries, mild. In   each distal internal carotid artery tiny focal protuberances are present   at the expected origin of the posterior communicating arteries without   identification of these vessels ; the internal carotid protuberances may   reflect tiny saccular aneurysms versus infundibula of the posterior   communicating arteries otherwise not identified by this technique.    6.  POSTERIOR INTRACRANIAL CIRCULATION:    Unremarkable.    7. Consider follow-up vascular imaging evaluation of the abnormalities   noted above.    < end of copied text >    EKG SR/ MAT NSST/T

## 2024-04-23 NOTE — CONSULT NOTE ADULT - ASSESSMENT
A/p  83-year-old female past medical history of groin aneurysm, brain aneurysm x 2, hypertension, mitral valve prolapse, hyperlipidemia, prolapsed bladder, cataracts, arthritis, right hip replacement, duodenal laceration, prediabetes, GERD, diverticulitis, hiatal hernia presents status post 2 falls this morning.    A/p  83-year-old female past medical history of groin aneurysm, brain aneurysm x 2, hypertension, mitral valve prolapse, hyperlipidemia, prolapsed bladder, cataracts, arthritis, right hip replacement, duodenal laceration, prediabetes, GERD, diverticulitis, hiatal hernia presents status post 2 falls this morning.     #Chest pain  -History of multiple reports of chest pain with negative workup  -Ecg SR - no ischemic changes  -HST negative  -Does not endorse chest pain on my exam  -No concern for ACS  -CXR clear  -Recent echo with nml lv fxn, moderate diastolic dysfxn, moderate MR    #Falls  -History of repetative mechanical falls   -PT eval  -Can check orthostatics  -R/o infectious cause - has hx of repeated UTI    #HTN  -Takes coreg, cardizem and olmesartan as outpt  -bp stable off meds  -Will re-introduce bp meds as able    #MAT  -off ASA for gi bleed  -Eventually resume coreg     #Aortic/Mitral Valve Disease  -No overload on exam  -Recent echo with nml lv fxn, moderate diastolic dysfxn, moderate MR        dvt ppx

## 2024-04-23 NOTE — PATIENT PROFILE ADULT - HAVE YOU BEEN EATING POORLY BECAUSE OF A DECREASED APPETITE?
CIRCUMCISION PHYSICIAN PROCEDURE NOTE    Pre-op Diagnosis: Elective Male Circumcision    Procedure: Elective Male Circumcision  Risks and benefits of procedure discussed with parent(s) prior to procedure per physician  Signed, informed consent for circumcision on chart  Identification bands checked    Positioning of Baby: On back - legs immobilized    Site prepared with: Betadine    \"Time Out\" completed and agreed upon by all team members present for correct patient identification, circumcision procedure (removal of penile foreskin), signed informed consent and provider performing procedure: Done     Anesthetic agent used: Ring block with 1% Lidocaine    Equipment for circumcision procedure: Gomco 1.3 cm    Specimens: Not applicable    Estimated blood loss: < 1 ml    Hemostasis: adequate    Complications: None     Dressing: Petroleum jelly     Procedure findings: Normal Genitalia    Post-op diagnosis: Elective Circumcision    Additional findings: Normal appearing  male external genitalia    Asia Seymour MD  Obstetrician/Gynecologist       
No (0)

## 2024-04-23 NOTE — ED PROVIDER NOTE - CHIEF COMPLAINT
The patient is a [AgeY] [Gender] complaining of [CCCP trg chief cmplnt]. The patient is an 82 yo F c/o fall

## 2024-04-23 NOTE — CONSULT NOTE ADULT - SUBJECTIVE AND OBJECTIVE BOX
CARDIOLOGY CONSULT - Dr. Calle         HPI: 83-year-old female past medical history of groin aneurysm, brain aneurysm x 2, hypertension, mitral valve prolapse, hyperlipidemia, prolapsed bladder, cataracts, arthritis, right hip replacement, duodenal laceration, prediabetes, GERD, diverticulitis, hiatal hernia presents status post 2 falls this morning.  Patient fell in the bathroom and fell in the bedroom because she states she is "unsteady" on her feet.  She states that she did hit her head.  She is not on any blood thinners.  She is complaining of neck, suprapubic pain, and chest pain.  Patient states that the chest pain started prior to the fall.  Qualifies it as squeezing and had some nausea, no vomiting.  Patient lives with her son.        PAST MEDICAL & SURGICAL HISTORY:  Hypertension      GERD (gastroesophageal reflux disease)      Osteoarthritis      Macular degeneration      Hypothyroid      Female bladder prolapse      H/O mitral valve prolapse      Hiatal hernia with GERD      Fe deficiency anemia      Tracheal nodule      Arteriosclerotic heart disease (ASHD)      COVID-19 vaccination declined      Primary hypertension      HLD (hyperlipidemia)      Hiatal hernia      Osteoarthritis      Uterine prolapse      Constipation      Papillary carcinoma      History of celiac disease      HLD (hyperlipidemia)      GI bleed      HTN (hypertension)      Multifocal atrial tachycardia      Prediabetes      Diverticulosis      Hiatal hernia      H/O thyroidectomy      Closed right hip fracture      H/O ovarian cystectomy      History of hip surgery      H/O thyroidectomy              PREVIOUS DIAGNOSTIC TESTING:    [ ] Echocardiogram:  [ ]  Catheterization:  [ ] Stress Test:  	    MEDICATIONS:  Home Medications:  acetaminophen 325 mg oral tablet: 1 tab(s) orally as needed for pain (28 Mar 2024 15:34)  ALPRAZolam 1 mg oral tablet: 1 tab(s) orally once a day AS NEEDED (28 Mar 2024 13:52)  amitriptyline 25 mg oral tablet: 1 tab(s) orally once a day (at bedtime) (28 Mar 2024 14:07)  ascorbic acid 500 mg oral tablet: 1 tab(s) orally 2 times a day (28 Mar 2024 13:55)  buPROPion 150 mg/24 hours (XL) oral tablet, extended release: 1 tab(s) orally once a day (02 Apr 2024 13:00)  calcium-vitamin D 500 mg-5 mcg (200 intl units) oral tablet: 1 tab(s) orally once a day (02 Apr 2024 13:00)  carvedilol 25 mg oral tablet: 1 tab(s) orally every 12 hours (02 Apr 2024 13:00)  Centrum Silver oral tablet: 1 tab(s) orally once a day (28 Mar 2024 13:55)  cetirizine 10 mg oral tablet: 1 tab(s) orally once a day (28 Mar 2024 15:34)  dilTIAZem 120 mg/24 hours oral capsule, extended release: 1 cap(s) orally once a day (02 Apr 2024 13:00)  Linzess 290 mcg oral capsule: 1 cap(s) orally once a day as needed for - as needed (28 Mar 2024 15:34)  lovastatin 20 mg oral tablet: 1 tab(s) orally once a day (in the evening) - with dinner (28 Mar 2024 13:55)  olmesartan 40 mg oral tablet: 1 tab(s) orally once a day (28 Mar 2024 13:55)  omeprazole 20 mg oral delayed release capsule: 1 cap(s) orally once a day (28 Mar 2024 13:55)  Synthroid 75 mcg (0.075 mg) oral tablet: 1 tab(s) orally once a day monday through saturday and 1.5 tablets on sunday. (28 Mar 2024 13:55)      MEDICATIONS  (STANDING):  potassium chloride   Powder 40 milliEquivalent(s) Oral Once      FAMILY HISTORY:  FH: HTN (hypertension) (Father, Mother)        SOCIAL HISTORY:    [ ] Non-smoker  [ ] Smoker  [ ] Alcohol    Allergies    NSAIDs (Rash)  hydrocortisone (Unknown)  amoxicillin (Other)  penicillin (Anaphylaxis)  penicillin (Other)    Intolerances    	    REVIEW OF SYSTEMS:  CONSTITUTIONAL: No fever, weight loss, or fatigue  EYES: No eye pain, visual disturbances, or discharge  ENMT:  No difficulty hearing, tinnitus, vertigo; No sinus or throat pain  NECK: No pain or stiffness  RESPIRATORY: No cough, wheezing, chills or hemoptysis; No Shortness of Breath  CARDIOVASCULAR: No chest pain, palpitations, passing out, dizziness, or leg swelling  GASTROINTESTINAL: No abdominal or epigastric pain. No nausea, vomiting, or hematemesis; No diarrhea or constipation. No melena or hematochezia.  GENITOURINARY: No dysuria, frequency, hematuria, or incontinence  NEUROLOGICAL: No headaches, memory loss, loss of strength, numbness, or tremors  SKIN: No itching, burning, rashes, or lesions   	    [ ] All others negative	  [ ] Unable to obtain    PHYSICAL EXAM:  T(C): 36.5 (04-23-24 @ 12:36), Max: 37.2 (04-23-24 @ 09:32)  HR: 93 (04-23-24 @ 12:36) (85 - 93)  BP: 146/71 (04-23-24 @ 12:36) (112/76 - 146/71)  RR: 16 (04-23-24 @ 12:36) (16 - 18)  SpO2: 95% (04-23-24 @ 12:36) (95% - 98%)  Wt(kg): --  I&O's Summary      Appearance: Normal	  Psychiatry: A & O x 3, Mood & affect appropriate  HEENT:   Normal oral mucosa, PERRL, EOMI	  Lymphatic: No lymphadenopathy  Cardiovascular: Normal S1 S2,RRR, No JVD, No murmurs  Respiratory: Lungs clear to auscultation	  Gastrointestinal:  Soft, Non-tender, + BS	  Skin: No rashes, No ecchymoses, No cyanosis	  Neurologic: Non-focal  Extremities: Normal range of motion, No clubbing, cyanosis or edema  Vascular: Peripheral pulses palpable 2+ bilaterally    TELEMETRY: 	    ECG:  	  RADIOLOGY:  OTHER: 	  	  LABS:	 	    CARDIAC MARKERS:  Troponin T, High Sensitivity Result: 19 ng/L (04-23 @ 09:50)                                  11.5   9.13  )-----------( 261      ( 23 Apr 2024 09:50 )             34.8     04-23    133<L>  |  98  |  28<H>  ----------------------------<  138<H>  3.2<L>   |  21<L>  |  0.87    Ca    9.4      23 Apr 2024 09:50    TPro  6.8  /  Alb  3.5  /  TBili  0.6  /  DBili  x   /  AST  57<H>  /  ALT  32  /  AlkPhos  109  04-23    PT/INR - ( 23 Apr 2024 09:50 )   PT: 13.7 sec;   INR: 1.25 ratio         PTT - ( 23 Apr 2024 09:50 )  PTT:24.4 sec  proBNP:   Lipid Profile:   HgA1c:   TSH:        CARDIOLOGY CONSULT - Dr. Calle         HPI: 83-year-old female past medical history of groin aneurysm, brain aneurysm x 2, hypertension, mitral valve prolapse, hyperlipidemia, prolapsed bladder, cataracts, arthritis, right hip replacement, duodenal laceration, prediabetes, GERD, diverticulitis, hiatal hernia presents status post 2 falls this morning.  Patient fell in the bathroom and fell in the bedroom because she states she is "unsteady" on her feet.  She states that she did hit her head.  She is not on any blood thinners.  She is complaining of neck, suprapubic pain, and chest pain.  Patient states that the chest pain started prior to the fall.  Qualifies it as squeezing and had some nausea, no vomiting.  Patient lives with her son.        PAST MEDICAL & SURGICAL HISTORY:  Hypertension      GERD (gastroesophageal reflux disease)      Osteoarthritis      Macular degeneration      Hypothyroid      Female bladder prolapse      H/O mitral valve prolapse      Hiatal hernia with GERD      Fe deficiency anemia      Tracheal nodule      Arteriosclerotic heart disease (ASHD)      COVID-19 vaccination declined      Primary hypertension      HLD (hyperlipidemia)      Hiatal hernia      Osteoarthritis      Uterine prolapse      Constipation      Papillary carcinoma      History of celiac disease      HLD (hyperlipidemia)      GI bleed      HTN (hypertension)      Multifocal atrial tachycardia      Prediabetes      Diverticulosis      Hiatal hernia      H/O thyroidectomy      Closed right hip fracture      H/O ovarian cystectomy      History of hip surgery      H/O thyroidectomy              PREVIOUS DIAGNOSTIC TESTING:    [x] Echocardiogram:  < from: TTE W or WO Ultrasound Enhancing Agent (02.13.24 @ 08:57) >  CONCLUSIONS:      1. Left ventricular systolic function is normal with an ejection fraction of 68 % by Marroquin's method of disks.   2. Left ventricular global longitudinal strain is -23.1 % which is normal (< -18%). Images were acquired on a [employer] ultrasound system and processed on the ultrasound machine with a heart rate of 69 bpm and a blood pressure of 125/71 mmHg.   3. There is moderate (grade 2) left ventricular diastolic dysfunction.   4. Normal rightventricular cavity size, wall thickness, and normal systolic function.   5. The left atrium is moderately dilated.   6. The right atrium is dilated.   7. Moderate mitral regurgitation.   8. Mild to moderate aortic regurgitation.   9. Mild tricuspid regurgitation.  10. No prior echocardiogram is available for comparison.    < end of copied text >    [ ]  Catheterization:  [ ] Stress Test:  	    MEDICATIONS:  Home Medications:  acetaminophen 325 mg oral tablet: 1 tab(s) orally as needed for pain (28 Mar 2024 15:34)  ALPRAZolam 1 mg oral tablet: 1 tab(s) orally once a day AS NEEDED (28 Mar 2024 13:52)  amitriptyline 25 mg oral tablet: 1 tab(s) orally once a day (at bedtime) (28 Mar 2024 14:07)  ascorbic acid 500 mg oral tablet: 1 tab(s) orally 2 times a day (28 Mar 2024 13:55)  buPROPion 150 mg/24 hours (XL) oral tablet, extended release: 1 tab(s) orally once a day (02 Apr 2024 13:00)  calcium-vitamin D 500 mg-5 mcg (200 intl units) oral tablet: 1 tab(s) orally once a day (02 Apr 2024 13:00)  carvedilol 25 mg oral tablet: 1 tab(s) orally every 12 hours (02 Apr 2024 13:00)  Centrum Silver oral tablet: 1 tab(s) orally once a day (28 Mar 2024 13:55)  cetirizine 10 mg oral tablet: 1 tab(s) orally once a day (28 Mar 2024 15:34)  dilTIAZem 120 mg/24 hours oral capsule, extended release: 1 cap(s) orally once a day (02 Apr 2024 13:00)  Linzess 290 mcg oral capsule: 1 cap(s) orally once a day as needed for - as needed (28 Mar 2024 15:34)  lovastatin 20 mg oral tablet: 1 tab(s) orally once a day (in the evening) - with dinner (28 Mar 2024 13:55)  olmesartan 40 mg oral tablet: 1 tab(s) orally once a day (28 Mar 2024 13:55)  omeprazole 20 mg oral delayed release capsule: 1 cap(s) orally once a day (28 Mar 2024 13:55)  Synthroid 75 mcg (0.075 mg) oral tablet: 1 tab(s) orally once a day monday through saturday and 1.5 tablets on sunday. (28 Mar 2024 13:55)      MEDICATIONS  (STANDING):  potassium chloride   Powder 40 milliEquivalent(s) Oral Once      FAMILY HISTORY:  FH: HTN (hypertension) (Father, Mother)        SOCIAL HISTORY:    [x] Non-smoker  [ ] Smoker  [ ] Alcohol    Allergies    NSAIDs (Rash)  hydrocortisone (Unknown)  amoxicillin (Other)  penicillin (Anaphylaxis)  penicillin (Other)    Intolerances    	    REVIEW OF SYSTEMS:  CONSTITUTIONAL: No fever, weight loss, or fatigue  EYES: No eye pain, visual disturbances, or discharge  ENMT:  No difficulty hearing, tinnitus, vertigo; No sinus or throat pain  NECK: No pain or stiffness  RESPIRATORY: No cough, wheezing, chills or hemoptysis; No Shortness of Breath  CARDIOVASCULAR: No chest pain, palpitations, passing out, dizziness, or leg swelling  GASTROINTESTINAL: No abdominal or epigastric pain. No nausea, vomiting, or hematemesis; No diarrhea or constipation. No melena or hematochezia.  GENITOURINARY: No dysuria, frequency, hematuria, or incontinence  NEUROLOGICAL: No headaches, memory loss, numbness, or tremors. +LUE weakness/loss of strength  SKIN: No itching, burning, rashes, or lesions   	    [x] All others negative	  [ ] Unable to obtain    PHYSICAL EXAM:  T(C): 36.5 (04-23-24 @ 12:36), Max: 37.2 (04-23-24 @ 09:32)  HR: 93 (04-23-24 @ 12:36) (85 - 93)  BP: 146/71 (04-23-24 @ 12:36) (112/76 - 146/71)  RR: 16 (04-23-24 @ 12:36) (16 - 18)  SpO2: 95% (04-23-24 @ 12:36) (95% - 98%)  Wt(kg): --  I&O's Summary      Appearance: Elderly female 	  Psychiatry: A & O x 3, Mood & affect appropriate  HEENT:   Normal oral mucosa, PERRL, EOMI	  Lymphatic: No lymphadenopathy  Cardiovascular: Normal S1 S2,RRR, No JVD, No murmurs  Respiratory: Lungs clear to auscultation b/l   Gastrointestinal:  Soft, Non-tender, + BS	  Skin: No rashes, No ecchymoses, No cyanosis	  Neurologic: Non-focal  Extremities: Normal range of motion, No clubbing, cyanosis or edema  Vascular: Peripheral pulses palpable 2+ bilaterally    TELEMETRY: SR    ECG:  SR 81bpm	  RADIOLOGY:  < from: CT Chest w/ IV Cont (04.23.24 @ 12:15) >  IMPRESSION:  No acute traumatic injury in the chest abdomen or pelvis.        --- End of Report ---    < end of copied text >    OTHER: 	  	  LABS:	 	    CARDIAC MARKERS:  Troponin T, High Sensitivity Result: 19 ng/L (04-23 @ 09:50)                                  11.5   9.13  )-----------( 261      ( 23 Apr 2024 09:50 )             34.8     04-23    133<L>  |  98  |  28<H>  ----------------------------<  138<H>  3.2<L>   |  21<L>  |  0.87    Ca    9.4      23 Apr 2024 09:50    TPro  6.8  /  Alb  3.5  /  TBili  0.6  /  DBili  x   /  AST  57<H>  /  ALT  32  /  AlkPhos  109  04-23    PT/INR - ( 23 Apr 2024 09:50 )   PT: 13.7 sec;   INR: 1.25 ratio         PTT - ( 23 Apr 2024 09:50 )  PTT:24.4 sec  proBNP:   Lipid Profile:   HgA1c:   TSH:

## 2024-04-23 NOTE — ED ADULT NURSE NOTE - NSFALLRISKINTERV_ED_ALL_ED

## 2024-04-23 NOTE — H&P ADULT - NSHPSOCIALHISTORY_GEN_ALL_CORE
Social History:    Marital Status:  (   )    (   ) Single    (   )    ( x )   Occupation:   Lives with: (  ) alone  (x  ) children   (  ) spouse   (  ) parents  (  ) other    Substance Use (street drugs): ( x ) never used  (  ) other:  Tobacco Usage:  ( x  ) never smoked   (   ) former smoker   (   ) current smoker  (     ) pack years  (        ) last cigarette date  Alcohol Usage: no    (     ) Advanced Directives: (     ) None    (      ) DNR    (     ) DNI    (     ) Health Care Proxy:

## 2024-04-23 NOTE — ED PROVIDER NOTE - NSICDXPASTMEDICALHX_GEN_ALL_CORE_FT
PAST MEDICAL HISTORY:  Arteriosclerotic heart disease (ASHD)     Constipation     COVID-19 vaccination declined     Diverticulosis     Fe deficiency anemia     Female bladder prolapse     GERD (gastroesophageal reflux disease)     GI bleed     H/O mitral valve prolapse     Hiatal hernia     Hiatal hernia     Hiatal hernia with GERD     History of celiac disease     HLD (hyperlipidemia)     HLD (hyperlipidemia)     HTN (hypertension)     Hypertension     Hypothyroid     Macular degeneration     Multifocal atrial tachycardia     Osteoarthritis     Osteoarthritis     Papillary carcinoma     Prediabetes     Primary hypertension     Tracheal nodule     Uterine prolapse

## 2024-04-23 NOTE — PATIENT PROFILE ADULT - NSPROPTRIGHTREPPHONE_GEN_A_NUR
[id number] Cough, congestion, weakness, lethargy, sore throat, body aches since 5/26 after DC from hospital  HX epilepsy, Asthma, HTN, IBS, gastritis  Last Tylenol 4am

## 2024-04-23 NOTE — H&P ADULT - HISTORY OF PRESENT ILLNESS
83y (1940) years old woman with a PMH of groin aneurysm, brain aneurysm x 2, hypertension, mitral valve prolapse, hyperlipidemia, prolapsed bladder, cataracts, arthritis, right hip replacement, duodenal laceration, prediabetes, GERD, diverticulitis, hiatal hernia presents status post 2 falls this morning. She was found after the fall epr daughter at 3 am and has had a fall earlier this month. Per daughter this has been recurrent and per patient she has been clumsy all her life. This has worsened after surgeries in the hip per son. Patient otherwise has been experiencing hallucinations and confusion per daughter. She was seeing a photo that was not really present. She thought that President Enio fell and expressed this fact to her daughter. She has been thinking that she will be going to [facility] for evaluation and claimed that her son informed her that he will take her. When her daughter confronted her and denied this information, she said that this was not a true story. She was evaluated in the ED for a fall back in April 9th for which she was evaluated and was found to have an anterior shoulder disclocation. She was diagnosed with brachial plexus injury. She was admitted earlier March 28 to April 2 for sepsis i/s/o UTI and was treated with antibiotics. Patient ambulates with a walker at baseline otherwise for the last year. Patient has been reportedly having recurrent falls over the past year that worsened after bilateral hips surgeries. Patient has been receiving rehab for the falls.

## 2024-04-23 NOTE — H&P ADULT - NSHPPHYSICALEXAM_GEN_ALL_CORE
PHYSICAL EXAMINATION:  Vital Signs Last 24 Hrs  T(C): 36.5 (23 Apr 2024 16:59), Max: 37.2 (23 Apr 2024 09:32)  T(F): 97.7 (23 Apr 2024 16:59), Max: 99 (23 Apr 2024 09:32)  HR: 78 (23 Apr 2024 16:59) (78 - 93)  BP: 151/95 (23 Apr 2024 16:59) (112/76 - 151/95)  BP(mean): --  RR: 18 (23 Apr 2024 16:59) (16 - 18)  SpO2: 100% (23 Apr 2024 16:59) (95% - 100%)    Parameters below as of 23 Apr 2024 16:59  Patient On (Oxygen Delivery Method): room air      CAPILLARY BLOOD GLUCOSE          GENERAL: NAD   HEAD:  atraumatic, normocephalic  EYES: sclera anicteric  ENMT: mucous membranes moist  NECK: supple, No JVD  CHEST/LUNG: clear to auscultation bilaterally; no rales, rhonchi, or wheezing b/l  HEART: normal S1, S2  ABDOMEN: BS+, soft, ND, NT   EXTREMITIES:  pulses palpable; no clubbing, cyanosis, or edema b/l LEs  NEURO: awake, alert, interactive; LUE weak   SKIN: no rashes or lesions

## 2024-04-23 NOTE — ED PROVIDER NOTE - CLINICAL SUMMARY MEDICAL DECISION MAKING FREE TEXT BOX
83-year-old female past medical history of groin aneurysm, brain aneurysm x 2, hypertension, mitral valve prolapse, hyperlipidemia, prolapsed bladder, cataracts, arthritis, right hip replacement, duodenal laceration, prediabetes, GERD, diverticulitis, hiatal hernia presents status post 2 falls this morning.  Patient fell in the bathroom and fell in the bedroom because she states she is "unsteady" on her feet.  She states that she did hit her head.  She is not on any blood thinners.  She is complaining of neck, suprapubic pain, and chest pain.  Patient states that the chest pain started prior to the fall.  Qualifies it as squeezing and had some nausea, no vomiting.  Patient lives with her son.  She was seen here on April 9 for another fall and a left anterior dislocation.  She was reduced in the ER and placed in a sling and had a wrist splint due to concern for nerve injury.  Patient has removed sling and splint and has not yet followed up with orthopedics.  Patient was also admitted from March 28, 2024 to April 2, 2024 for urosepsis.  As per daughter she has had frequent falls in the last few week and seems disoriented.  Daughter states that this is acute and that normally she is able to answer questions appropriately and not as forgetful.  Patient walks with a walker at baseline.    GENERAL: no acute distress, non-toxic appearing  HEAD: normocephalic, atraumatic  HEENT: +midline cervical spine tenderness, normal conjunctiva, oral mucosa moist, neck supple  CARDIAC: regular rate and rhythm, normal S1 and S2,  no appreciable murmurs  PULM: clear to ascultation bilaterally, no crackles, rales, rhonchi, or wheezing  GI: abdomen nondistended, soft, nontender, no guarding or rebound tenderness  : no suprapubic tenderness  NEURO: +LUE sensation deficit to touch, 0/5 strength - 5/5 strength and intact sensation to light touch in RUE/RLE/LLE, CN2-12 intact   MSK: +LUE rounded appearance to shoulder, +mid chest tenderness, no instability in pelvis, no peripheral edema, calf tenderness/redness/swelling  SKIN: no visible rashes, dry, well-perfused     84 yo F with multiple comorbidities, no anticoagulation with chest, neck, suprapubic pain and a LUE with prior anterior dislocation now with neuro deficits in LUE - concern for brachial plexus, nerve injury. Encephalopathy may be due to infectious, metabolic or intracranial pathology (will rule out ICH given recent trauma). Labs, UA, CXR, flu/covid pending. Will rule out acute fractures with CXR, pelvis XR. Given chest pain that preceded fall, will also work up ACS. Patient will be admitted.

## 2024-04-23 NOTE — H&P ADULT - NSHPREVIEWOFSYSTEMS_GEN_ALL_CORE
REVIEW OF SYSTEMS:    CONSTITUTIONAL: No weakness, fevers or chills  EYES/ENT: No visual changes;  No vertigo or throat pain   NECK: No pain or stiffness  RESPIRATORY: No cough, wheezing, hemoptysis; No shortness of breath  CARDIOVASCULAR: No chest pain or palpitations  GASTROINTESTINAL: No abdominal or epigastric pain. No nausea, vomiting, or hematemesis; No diarrhea or constipation. No melena or hematochezia.  GENITOURINARY: No dysuria, frequency or hematuria  NEUROLOGICAL: L arm weakness and numbness   SKIN: No itching, burning, rashes, or lesions   All other review of systems is negative unless indicated above.

## 2024-04-23 NOTE — CONSULT NOTE ADULT - ASSESSMENT
83y (1940) years old woman with a PMH of groin aneurysm, brain aneurysm x 2, hypertension, mitral valve prolapse, hyperlipidemia, prolapsed bladder, cataracts, arthritis, right hip replacement, duodenal laceration, prediabetes, GERD, diverticulitis, hiatal hernia presents status post 2 falls this morning. She also had episodes of hallucinations. Patient has been as well experiencing weakness in her left upper extremity after a fall she sustained earlier this month. Exam revealing for weakness on the left upper extremity with spared proximal shoulder muscles innervated by the suprascapular nerve.    impression:  1) left UE weakness and sensory loss, motor distribution is suggestive of brachial plexus injury as suprascapular nerve lightly involved compared to other nerves which localizes to a lesion slightly beyond the upper cord  2) recurrent falls despite hip surgeries r/o MSK causes  3) hallucinations r/o metbolic causes    plan:  [] investigate for infectious etiology and metabolic causes and treat with antibiotics accordingly  [] obtain MRI brachial plexus with and without  [] check UA, TSH, T3/T4, vitamin B1, B6, B12, D, E folate, homocysteine, methylmalonic acid, lactate, creatnine kinase, ammonia, Cu, SPEP, ESR, CRP, Zn  [] PT OT  [] hallucinations will resolve after treating primary etiology, hold off treatment with antipsychotics at this point     case to be seen with Dr. Brody

## 2024-04-23 NOTE — ED PROVIDER NOTE - ATTENDING CONTRIBUTION TO CARE
Attending MD Bolden:  I have seen and examined this patient and fully participated in the care of this patient as the teaching attending. I personally made/approved the management plan and take responsibility for the patient management.      83-year-old woman with history of urinary tract infection, frequent falls recently, recent left shoulder dislocation on 4/9 with reduction is presenting for a fall at her home today.  Patient states that she has not been using her walker because her left arm has been weak since she had the reduction of the left shoulder on 4/9.  She thinks she tripped on something.  She did hit her head but did not lose consciousness.  Patient is complaining of central chest pain.  Is nonradiating, has been constant since this morning, there is no shortness of breath nausea or vomiting.  Patient's daughter at the bedside provides some collateral information that also states patient has been confused for some period of time as well.  Patient has not seen any practitioners for the left arm weakness.    Patient's vital signs are within normal limits.  The patient is awake and alert GCS is 15 the head is atraumatic, cervical spine is nontender.  Breath sounds are symmetric regular heart sounds nontender chest wall abdomen is nontender.  Patient has 5/5 strength right upper extremity, 5/5 strength right lower extremity left lower extremity, patient has 0/5 strength in all segmental muscle groups of the left upper extremity.  Palpable radial pulse in the left wrist.  Extremities warm and well-perfused.    ECG recorded at 859 independently interpreted by me , Dr Jerman Bolden,  at 910 shows normal sinus rhythm normal axis normal intervals, T wave inversions lead V3 no ST elevation or ST depression elsewhere    Patient is presenting for confusion, acute chest pain, fall from standing today and several weeks of left upper extremity paralysis after shoulder dislocation and reduction at outside hospital.  Plan to obtain CT head CT angiogram head and neck to exclude IPH mass lesion vascular abnormality, screening labs EKG cardiac biomarkers chest x-ray, shoulder films.  Regarding the patient's paralysis left upper extremity, it is possible that since this occurred after dislocation she could have a brachial plexopathy.  Patient will likely need admission to the hospital for evaluation of this and placement as she is unsafe for discharge because she cannot use her left arm to use her walker for assistance with gait      *The above represents an initial assessment/impression. Please refer to progress notes for potential changes in patient clinical course*

## 2024-04-23 NOTE — CONSULT NOTE ADULT - TIME BILLING
agree with above  83-year-old female past medical history of groin aneurysm, brain aneurysm x 2, hypertension, mitral valve prolapse, hyperlipidemia, prolapsed bladder, cataracts, arthritis, right hip replacement, duodenal laceration, prediabetes, GERD, diverticulitis, hiatal hernia presents status post 2 falls this morning.     #Chest pain  -History of multiple reports of chest pain with negative workup  -Ecg SR - no ischemic changes  -HST negative  -Does not endorse chest pain on my exam  -No concern for ACS  -CXR clear  -Recent echo with nml lv fxn, moderate diastolic dysfxn, moderate MR    #Falls  -History of repetative mechanical falls   -PT eval  -Can check orthostatics  -R/o infectious cause - has hx of repeated UTI    #HTN  -Takes coreg, cardizem and olmesartan as outpt  -bp stable off meds  -Will re-introduce bp meds as able    #MAT  -off ASA for gi bleed  -Eventually resume coreg     #Aortic/Mitral Valve Disease  -No overload on exam  -Recent echo with nml lv fxn, moderate diastolic dysfxn, moderate MR        dvt ppx

## 2024-04-23 NOTE — ED ADULT NURSE REASSESSMENT NOTE - NS ED NURSE REASSESS COMMENT FT1
pt resting comfortably in bed, c-collar maintained. daughter at bedside. denies any needs or complaints at this time. given blanket.

## 2024-04-23 NOTE — ED ADULT NURSE NOTE - OBJECTIVE STATEMENT
82 y/o female presents to ED c/o multiple falls at home and frequent UTIs. C/o L sided neck pain, chest pain, and L arm pain. Unable to move L arm and has decreased sensation to L arm. Denies n/v/d, recent illness, LOC, use of blood thinners. A&Ox4, breathing unlabored, NSR on cardiac monitor, pupils 4mm equal round and briskly reactive b/l, sensation intact R arm and b/l lower extremities. Abd soft nontender nondistended. Skin warm dry and intact. Daughter at bedside.

## 2024-04-23 NOTE — H&P ADULT - ASSESSMENT
83 f with    s/p Fall  - telemetry  - cardiac enzymes  - orthostatic VS  - PT    MAT  - BB  -  cardiology follow     L arm weakness  - MRI L brachial plexus  - Neurology evaluation  - PT     Confusion  - B12, TSH    HTN  - control    Chest pain  - cardiology evaluation     Celiac disease  - gluten free diet     Hypothyroidism.   - Synthroid  - TSH    HLD (hyperlipidemia).   - stable     Hiatal hernia.   - Large Hiatal hernia on CT --> fup with GI.    Primary hypertension.   - Coreg and Olmesartan     Bladder prolapse  - follow with Gyn Urogynecology OTP  - continue Pessary     R Iliac artery aneurism  - was seen by Vascular. No intervention. Follow as OTP     DVT prophylaxis  - PAS    Further action as per clinical course     d/w patient and daughter    Paulie Moore MD phone [phone]

## 2024-04-23 NOTE — PATIENT PROFILE ADULT - FALL HARM RISK - HARM RISK INTERVENTIONS
Assistance with ambulation/Assistance OOB with selected safe patient handling equipment/Communicate Risk of Fall with Harm to all staff/Discuss with provider need for PT consult/Monitor gait and stability/Reinforce activity limits and safety measures with patient and family/Tailored Fall Risk Interventions/Use of alarms - bed, chair and/or voice tab/Visual Cue: Yellow wristband and red socks/Bed in lowest position, wheels locked, appropriate side rails in place/Call bell, personal items and telephone in reach/Instruct patient to call for assistance before getting out of bed or chair/Non-slip footwear when patient is out of bed/[place] to call system/Physically safe environment - no spills, clutter or unnecessary equipment/Purposeful Proactive Rounding/Room/bathroom lighting operational, light cord in reach

## 2024-04-24 LAB
AMMONIA BLD-MCNC: 30 UMOL/L — SIGNIFICANT CHANGE UP (ref 11–55)
ANION GAP SERPL CALC-SCNC: 14 MMOL/L — SIGNIFICANT CHANGE UP (ref 5–17)
BUN SERPL-MCNC: 22 MG/DL — SIGNIFICANT CHANGE UP (ref 7–23)
CALCIUM SERPL-MCNC: 9.5 MG/DL — SIGNIFICANT CHANGE UP (ref 8.4–10.5)
CHLORIDE SERPL-SCNC: 101 MMOL/L — SIGNIFICANT CHANGE UP (ref 96–108)
CK SERPL-CCNC: 54 U/L — SIGNIFICANT CHANGE UP (ref 25–170)
CO2 SERPL-SCNC: 22 MMOL/L — SIGNIFICANT CHANGE UP (ref 22–31)
CREAT SERPL-MCNC: 0.74 MG/DL — SIGNIFICANT CHANGE UP (ref 0.5–1.3)
CRP SERPL-MCNC: 192 MG/L — HIGH (ref 0–4)
EGFR: 81 ML/MIN/1.73M2 — SIGNIFICANT CHANGE UP
FOLATE SERPL-MCNC: >20 NG/ML — SIGNIFICANT CHANGE UP
GLUCOSE SERPL-MCNC: 128 MG/DL — HIGH (ref 70–99)
HCYS SERPL-MCNC: 8.9 UMOL/L — SIGNIFICANT CHANGE UP
MRSA PCR RESULT.: SIGNIFICANT CHANGE UP
POTASSIUM SERPL-MCNC: 4.1 MMOL/L — SIGNIFICANT CHANGE UP (ref 3.5–5.3)
POTASSIUM SERPL-SCNC: 4.1 MMOL/L — SIGNIFICANT CHANGE UP (ref 3.5–5.3)
PROT SERPL-MCNC: 6.1 G/DL — SIGNIFICANT CHANGE UP (ref 6–8.3)
S AUREUS DNA NOSE QL NAA+PROBE: DETECTED
SODIUM SERPL-SCNC: 137 MMOL/L — SIGNIFICANT CHANGE UP (ref 135–145)
TROPONIN T, HIGH SENSITIVITY RESULT: 21 NG/L — SIGNIFICANT CHANGE UP (ref 0–51)
TSH SERPL-MCNC: 4.89 UIU/ML — HIGH (ref 0.27–4.2)
VIT B12 SERPL-MCNC: >2000 PG/ML — HIGH (ref 232–1245)

## 2024-04-24 PROCEDURE — 71552 MRI CHEST W/O & W/DYE: CPT | Mod: 26

## 2024-04-24 PROCEDURE — 99221 1ST HOSP IP/OBS SF/LOW 40: CPT

## 2024-04-24 RX ORDER — DOCUSATE SODIUM 100 MG
1 CAPSULE ORAL
Refills: 0 | DISCHARGE

## 2024-04-24 RX ORDER — LEVOTHYROXINE SODIUM 125 MCG
1 TABLET ORAL
Refills: 0 | DISCHARGE

## 2024-04-24 RX ORDER — POLYETHYLENE GLYCOL 3350 17 G/17G
17 POWDER, FOR SOLUTION ORAL
Refills: 0 | DISCHARGE

## 2024-04-24 RX ORDER — OLMESARTAN MEDOXOMIL 5 MG/1
1 TABLET, FILM COATED ORAL
Qty: 0 | Refills: 0 | DISCHARGE

## 2024-04-24 RX ORDER — CHLORHEXIDINE GLUCONATE 213 G/1000ML
1 SOLUTION TOPICAL DAILY
Refills: 0 | Status: DISCONTINUED | OUTPATIENT
Start: 2024-04-24 | End: 2024-04-29

## 2024-04-24 RX ORDER — CETIRIZINE HYDROCHLORIDE 10 MG/1
1 TABLET ORAL
Refills: 0 | DISCHARGE

## 2024-04-24 RX ORDER — OMEPRAZOLE 10 MG/1
1 CAPSULE, DELAYED RELEASE ORAL
Refills: 0 | DISCHARGE

## 2024-04-24 RX ORDER — ALPRAZOLAM 0.25 MG
1 TABLET ORAL
Refills: 0 | DISCHARGE

## 2024-04-24 RX ORDER — LOVASTATIN 20 MG
1 TABLET ORAL
Qty: 0 | Refills: 0 | DISCHARGE

## 2024-04-24 RX ORDER — LINACLOTIDE 145 UG/1
1 CAPSULE, GELATIN COATED ORAL
Refills: 0 | DISCHARGE

## 2024-04-24 RX ORDER — ASCORBIC ACID 60 MG
1 TABLET,CHEWABLE ORAL
Refills: 0 | DISCHARGE

## 2024-04-24 RX ORDER — AMITRIPTYLINE HCL 25 MG
1 TABLET ORAL
Qty: 0 | Refills: 0 | DISCHARGE

## 2024-04-24 RX ORDER — LIDOCAINE 4 G/100G
1 CREAM TOPICAL
Refills: 0 | DISCHARGE

## 2024-04-24 RX ORDER — MULTIVIT-MIN/FERROUS GLUCONATE 9 MG/15 ML
1 LIQUID (ML) ORAL
Refills: 0 | DISCHARGE

## 2024-04-24 RX ORDER — LANOLIN ALCOHOL/MO/W.PET/CERES
3 CREAM (GRAM) TOPICAL ONCE
Refills: 0 | Status: COMPLETED | OUTPATIENT
Start: 2024-04-24 | End: 2024-04-24

## 2024-04-24 RX ADMIN — Medication 3 MILLIGRAM(S): at 00:55

## 2024-04-24 RX ADMIN — BUPROPION HYDROCHLORIDE 150 MILLIGRAM(S): 150 TABLET, EXTENDED RELEASE ORAL at 13:09

## 2024-04-24 RX ADMIN — PANTOPRAZOLE SODIUM 40 MILLIGRAM(S): 20 TABLET, DELAYED RELEASE ORAL at 06:32

## 2024-04-24 RX ADMIN — Medication 1 TABLET(S): at 13:09

## 2024-04-24 RX ADMIN — LOSARTAN POTASSIUM 100 MILLIGRAM(S): 100 TABLET, FILM COATED ORAL at 06:28

## 2024-04-24 RX ADMIN — CHLORHEXIDINE GLUCONATE 1 APPLICATION(S): 213 SOLUTION TOPICAL at 12:33

## 2024-04-24 RX ADMIN — Medication 500 MILLIGRAM(S): at 13:08

## 2024-04-24 RX ADMIN — ATORVASTATIN CALCIUM 10 MILLIGRAM(S): 80 TABLET, FILM COATED ORAL at 22:11

## 2024-04-24 RX ADMIN — LORATADINE 10 MILLIGRAM(S): 10 TABLET ORAL at 13:09

## 2024-04-24 RX ADMIN — CARVEDILOL PHOSPHATE 25 MILLIGRAM(S): 80 CAPSULE, EXTENDED RELEASE ORAL at 17:46

## 2024-04-24 RX ADMIN — CARVEDILOL PHOSPHATE 25 MILLIGRAM(S): 80 CAPSULE, EXTENDED RELEASE ORAL at 06:28

## 2024-04-24 RX ADMIN — Medication 120 MILLIGRAM(S): at 06:29

## 2024-04-24 RX ADMIN — Medication 25 MILLIGRAM(S): at 22:11

## 2024-04-24 RX ADMIN — Medication 1 MILLIGRAM(S): at 00:54

## 2024-04-24 RX ADMIN — Medication 75 MICROGRAM(S): at 06:29

## 2024-04-24 NOTE — PROGRESS NOTE ADULT - SUBJECTIVE AND OBJECTIVE BOX
Tolerated injection well and two hour observation period. Discharge instructions given on Xolair as well as severe side effects, adverse effects and mechanism of action. Patient and  understand about use of epi-pen and all questions were answered to the patients satisfaction. Patient discharged in stable condition. CARDIOLOGY FOLLOW UP - Dr. Calle  DATE OF SERVICE: 4/24/24    CC  No cv complaints     REVIEW OF SYSTEMS:  CONSTITUTIONAL: No fever, weight loss, or fatigue  RESPIRATORY: No cough, wheezing, chills or hemoptysis; No Shortness of Breath  CARDIOVASCULAR: No chest pain, palpitations, passing out, dizziness, or leg swelling  GASTROINTESTINAL: No abdominal or epigastric pain. No nausea, vomiting, or hematemesis; No diarrhea or constipation. No melena or hematochezia.  VASCULAR: No edema     PHYSICAL EXAM:  T(C): 36.8 (04-24-24 @ 04:27), Max: 36.8 (04-23-24 @ 21:12)  HR: 79 (04-24-24 @ 04:27) (78 - 93)  BP: 143/79 (04-24-24 @ 04:27) (120/79 - 151/95)  RR: 18 (04-24-24 @ 04:27) (16 - 18)  SpO2: 96% (04-24-24 @ 04:27) (95% - 100%)  Wt(kg): --  I&O's Summary      Appearance: Elderly female 	  Cardiovascular: Normal S1 S2,RRR, No JVD, No murmurs  Respiratory: Lungs clear to auscultation b/l   Gastrointestinal:  Soft, Non-tender, + BS	  Extremities: Normal range of motion, No clubbing, cyanosis or edema      Home Medications:  acetaminophen 325 mg oral tablet: 1 tab(s) orally as needed for pain (28 Mar 2024 15:34)  ALPRAZolam 1 mg oral tablet: 1 tab(s) orally once a day AS NEEDED (28 Mar 2024 13:52)  amitriptyline 25 mg oral tablet: 1 tab(s) orally once a day (at bedtime) (28 Mar 2024 14:07)  ascorbic acid 500 mg oral tablet: 1 tab(s) orally 2 times a day (28 Mar 2024 13:55)  buPROPion 150 mg/24 hours (XL) oral tablet, extended release: 1 tab(s) orally once a day (02 Apr 2024 13:00)  calcium-vitamin D 500 mg-5 mcg (200 intl units) oral tablet: 1 tab(s) orally once a day (02 Apr 2024 13:00)  carvedilol 25 mg oral tablet: 1 tab(s) orally every 12 hours (02 Apr 2024 13:00)  Centrum Silver oral tablet: 1 tab(s) orally once a day (28 Mar 2024 13:55)  cetirizine 10 mg oral tablet: 1 tab(s) orally once a day (28 Mar 2024 15:34)  dilTIAZem 120 mg/24 hours oral capsule, extended release: 1 cap(s) orally once a day (02 Apr 2024 13:00)  Linzess 290 mcg oral capsule: 1 cap(s) orally once a day as needed for - as needed (28 Mar 2024 15:34)  lovastatin 20 mg oral tablet: 1 tab(s) orally once a day (in the evening) - with dinner (28 Mar 2024 13:55)  olmesartan 40 mg oral tablet: 1 tab(s) orally once a day (28 Mar 2024 13:55)  omeprazole 20 mg oral delayed release capsule: 1 cap(s) orally once a day (28 Mar 2024 13:55)  Synthroid 75 mcg (0.075 mg) oral tablet: 1 tab(s) orally once a day monday through saturday and 1.5 tablets on sunday. (28 Mar 2024 13:55)      MEDICATIONS  (STANDING):  amitriptyline 25 milliGRAM(s) Oral at bedtime  ascorbic acid 500 milliGRAM(s) Oral daily  atorvastatin 10 milliGRAM(s) Oral at bedtime  buPROPion XL (24-Hour) . 150 milliGRAM(s) Oral daily  calcium carbonate 1250 mG  + Vitamin D (OsCal 500 + D) 1 Tablet(s) Oral daily  carvedilol 25 milliGRAM(s) Oral every 12 hours  diltiazem    milliGRAM(s) Oral daily  levothyroxine 75 MICROGram(s) Oral daily  loratadine 10 milliGRAM(s) Oral daily  losartan 100 milliGRAM(s) Oral daily  multivitamin/minerals 1 Tablet(s) Oral daily  pantoprazole    Tablet 40 milliGRAM(s) Oral before breakfast      TELEMETRY: SR, 2 seconds PAT  	    ECG:  	  RADIOLOGY:   DIAGNOSTIC TESTING:  [ ] Echocardiogram:  [ ]  Catheterization:  [ ] Stress Test:    OTHER: 	    LABS:	 	    Creatine Kinase, Serum: 40 U/L [25 - 170] (04-23 @ 18:47)  CKMB Units: 1.8 ng/mL [0.0 - 3.8] (04-23 @ 18:47)  Troponin T, High Sensitivity Result: 20 ng/L [0 - 51] (04-23 @ 18:47)  Troponin T, High Sensitivity Result: 20 ng/L [0 - 51] (04-23 @ 18:47)  Troponin T, High Sensitivity Result: 19 ng/L [0 - 51] (04-23 @ 09:50)                          11.5   9.13  )-----------( 261      ( 23 Apr 2024 09:50 )             34.8     04-23    133<L>  |  98  |  28<H>  ----------------------------<  138<H>  3.2<L>   |  21<L>  |  0.87    Ca    9.4      23 Apr 2024 09:50    TPro  6.8  /  Alb  3.5  /  TBili  0.6  /  DBili  x   /  AST  57<H>  /  ALT  32  /  AlkPhos  109  04-23    PT/INR - ( 23 Apr 2024 09:50 )   PT: 13.7 sec;   INR: 1.25 ratio         PTT - ( 23 Apr 2024 09:50 )  PTT:24.4 sec

## 2024-04-24 NOTE — CONSULT NOTE ADULT - SUBJECTIVE AND OBJECTIVE BOX
ORTHO CONSULT NOTE:    83F RHD hx bilateral hip CRPP and L hip efren on Feb 2023 p/w L shoulder pain. Hx of multiple falls. Presented w/ L anterior shoulder dislocation and associated LUE weakness 2 weeks ago, closed reduction performed by ED. ED suspected L brachial plexus injury. She was DC'd to follow up immediately the next day w/ Dr. Shelton at [facility] who recommended PT/conservative tx for injury and associated weakness. Yesterday was admitted for another mechanical fall. +HS but no LOC or other injuries. Currently endorsing mild pain at the anterior shoulder. Reports numbness throughout the LUE but improving from prior. Still unable to move the LUE.    Vital Signs (24 Hrs):  T(C): 36.7 (04-24-24 @ 15:03), Max: 36.7 (04-24-24 @ 15:03)  HR: 100 (04-24-24 @ 15:03) (100 - 100)  BP: 122/74 (04-24-24 @ 15:03) (122/74 - 122/74)  RR: 20 (04-24-24 @ 15:03) (20 - 20)  SpO2: 100% (04-24-24 @ 15:03) (100% - 100%)  Wt(kg): --    LABS:                          14.0   11.55 )-----------( 285      ( 24 Apr 2024 16:22 )             39.9     04-24    137  |  98  |  7   ----------------------------<  103<H>  3.9   |  21<L>  |  0.56    Ca    8.4      24 Apr 2024 16:22    TPro  7.1  /  Alb  4.6  /  TBili  0.5  /  DBili  x   /  AST  45<H>  /  ALT  23  /  AlkPhos  78  04-24    LIVER FUNCTIONS - ( 24 Apr 2024 16:22 )  Alb: 4.6 g/dL / Pro: 7.1 g/dL / ALK PHOS: 78 U/L / ALT: 23 U/L / AST: 45 U/L / GGT: x           PT/INR - ( 24 Apr 2024 16:22 )   PT: 10.5 sec;   INR: 1.00 ratio         PTT - ( 24 Apr 2024 16:22 )  PTT:31.1 sec    EXAM:    Gen: NAD, awake and alert    LUE:  Closed skin, no ecchymosis or swelling  +Mild anterior shoulder TTP, otherwise NTTP  Compartments soft and compressible  Motor: can shrug shoulder, 0/5 Ax/Msc/Med/Rad/PIN/AIN/Uln  Sensory: 1/2 sensory Ax/Msc/Med/Rad/Uln  + Radial pulse, capillary refill less than 2 sec    Secondary Assessment:  NC/AT, NTTP of clavicles, NTTP of C-,T-,L-Spine, NTTP of Pelvis  RUE: NTTP of Shoulder, Elbow, Wrist, Hand; NT with AROM/PROM of Shoulder, Elbow, Wrist, Hand; AIN/PIN/Med/Uln/Msc/Rad/Ax intact  LEs: Able to SLR, NT with Log Roll, NT with Heel Strike, NTTP of Hips, Knees, Ankles, Feet; NT with AROM/PROM of Hips, Knees, Ankles, Feet; Q/H/Gsc/TA/EHL/FHL intact      IMAGING:    XR L Shoulder: No acute obvious fx observed.    MR L Shoulder: Showing impaction fx of greater tuberosity. Edema of brachial plexus below clavicle. No other acute obvious injuries.      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 Shelton or Dr. Lopez 2 weeks after DC  - D/w Dr. Lopez who agrees

## 2024-04-24 NOTE — PHARMACOTHERAPY INTERVENTION NOTE - COMMENTS
Performed medication reconciliation and home medication list updated in prescription writer/ outpatient medication review. Medications verified with son who provided the medication list and pharmacy.     Home medications:  acetaminophen 325 mg oral tablet: 1 tab(s) orally as needed for pain  ALPRAZolam 1 mg oral tablet: 1 tab(s) orally once a day AS NEEDED  amitriptyline 25 mg oral tablet: 1 tab(s) orally once a day (at bedtime)  ascorbic acid 500 mg oral tablet: 1 tab(s) orally 2 times a day  buPROPion 100 mg oral tablet: 1 tab(s) orally 1 tablet in the morning and 1 tablet in the afternoon  calcium-vitamin D 500 mg-5 mcg (200 intl units) oral tablet: 1 tab(s) orally once a day  carvedilol 25 mg oral tablet: 1 tab(s) orally every 12 hours  Centrum Silver oral tablet: 1 tab(s) orally once a day  cetirizine 10 mg oral tablet: 1 tab(s) orally once a day  Colace 100 mg oral capsule: 1 cap(s) orally once a day  dilTIAZem 120 mg/24 hours oral capsule, extended release: 1 cap(s) orally once a day  estradiol vaginal cream: apply every day for the first 2 weeks, then 2x/week thereafter  hydrALAZINE 50 mg oral tablet: 1 tab(s) orally 2 times a day at lunch and dinner  lidocaine 5% topical film: Apply topically to affected area 3 times a day as needed for  mild pain  lovastatin 20 mg oral tablet: 1 tab(s) orally once a day (in the evening) - with dinner  MiraLax oral powder for reconstitution: 17 gram(s) orally once a day  olmesartan 40 mg oral tablet: 1 tab(s) orally once a day  omeprazole 20 mg oral delayed release capsule: 1 cap(s) orally once a day  Synthroid 75 mcg (0.075 mg) oral tablet: 1 tab(s) orally once a day 1 tablet M/T/Th/Sat and 1.5 tablets on Wed/Sun    Gladys AlmanzarD  PGY1 Pharmacy Resident  Available on Teams  Performed medication reconciliation and home medication list updated in prescription writer/ outpatient medication review. Medications verified with son who provided the medication list and pharmacy.     Home medications:  acetaminophen 325 mg oral tablet: 1 tab(s) orally as needed for pain  ALPRAZolam 1 mg oral tablet: 1 tab(s) orally once a day AS NEEDED  amitriptyline 25 mg oral tablet: 1 tab(s) orally once a day (at bedtime)  ascorbic acid 500 mg oral tablet: 1 tab(s) orally 2 times a day  buPROPion 100 mg oral tablet: 1 tab(s) orally 1 tablet in the morning and 1 tablet in the afternoon  calcium-vitamin D 500 mg-5 mcg (200 intl units) oral tablet: 1 tab(s) orally once a day  carvedilol 25 mg oral tablet: 1 tab(s) orally every 12 hours  Centrum Silver oral tablet: 1 tab(s) orally once a day  cetirizine 10 mg oral tablet: 1 tab(s) orally once a day  Colace 100 mg oral capsule: 1 cap(s) orally once a day  dilTIAZem 120 mg/24 hours oral capsule, extended release: 1 cap(s) orally once a day  estradiol vaginal cream: apply every day for the first 2 weeks, then 2x/week thereafter  hydrALAZINE 50 mg oral tablet: 1 tab(s) orally 2 times a day at lunch and dinner  lidocaine 5% topical film: Apply topically to affected area 3 times a day as needed for  mild pain  lovastatin 20 mg oral tablet: 1 tab(s) orally once a day (in the evening) - with dinner  MiraLax oral powder for reconstitution: 17 gram(s) orally once a day  olmesartan 40 mg oral tablet: 1 tab(s) orally once a day  omeprazole 20 mg oral delayed release capsule: 1 cap(s) orally once a day  Synthroid 75 mcg (0.075 mg) oral tablet: 1 tab(s) orally once a day 1 tablet M/T/Th/Sat and 1.5 tablets on Wed/Sun    Recommendation:  -Consider adjusting levothyroxine dose to 1 tab(s) orally once a day 1 tablet (75 mcg) M/T/Th/Sat and 1.5 tablets (112.5 mcg) on Wed/Sun as this was patient's home dose and was confirmed by endocrinologist office, Dr. Julio Cesar Sheth (phone # [phone])    Rigoberto Peguero, PharmD  PGY1 Pharmacy Resident  Available on Teams  Performed medication reconciliation and home medication list updated in prescription writer/ outpatient medication review. Medications verified with son who provided the medication list and pharmacy.     Home medications:  acetaminophen 325 mg oral tablet: 1 tab(s) orally as needed for pain  ALPRAZolam 1 mg oral tablet: 1 tab(s) orally once a day AS NEEDED  amitriptyline 25 mg oral tablet: 1 tab(s) orally once a day (at bedtime)  ascorbic acid 500 mg oral tablet: 1 tab(s) orally 2 times a day  buPROPion 100 mg oral tablet: 1 tab(s) orally 1 tablet in the morning and 1 tablet in the afternoon  calcium-vitamin D 500 mg-5 mcg (200 intl units) oral tablet: 1 tab(s) orally once a day  carvedilol 25 mg oral tablet: 1 tab(s) orally every 12 hours  Centrum Silver oral tablet: 1 tab(s) orally once a day  cetirizine 10 mg oral tablet: 1 tab(s) orally once a day  Colace 100 mg oral capsule: 1 cap(s) orally once a day  dilTIAZem 120 mg/24 hours oral capsule, extended release: 1 cap(s) orally once a day  estradiol vaginal cream: apply every day for the first 2 weeks, then 2x/week thereafter  hydrALAZINE 50 mg oral tablet: 1 tab(s) orally 2 times a day at lunch and dinner  lidocaine 5% topical film: Apply topically to affected area 3 times a day as needed for  mild pain  lovastatin 20 mg oral tablet: 1 tab(s) orally once a day (in the evening) - with dinner  MiraLax oral powder for reconstitution: 17 gram(s) orally once a day  olmesartan 40 mg oral tablet: 1 tab(s) orally once a day  omeprazole 20 mg oral delayed release capsule: 1 cap(s) orally once a day  Synthroid 75 mcg (0.075 mg) oral tablet: 1 tablet (75 mcg) M/T/Th/Sat and 1.5 tablets (112.5 mcg) on Wed/Sun    Recommendation:  -Consider adjusting levothyroxine dose to 1 tablet (75 mcg) M/T/Th/Sat and 1.5 tablets (112.5 mcg) on Wed/Sun as this was patient's home dose and was confirmed by endocrinologist office, Dr. Julio Cesar Sheth (phone # [phone])    Rigoberto Peguero, PharmD  PGY1 Pharmacy Resident  Available on Teams  Performed medication reconciliation and home medication list updated in prescription writer/ outpatient medication review. Medications verified with son who provided the medication list and pharmacy.     Home medications:  acetaminophen 325 mg oral tablet: 1 tab(s) orally as needed for pain  ALPRAZolam 1 mg oral tablet: 1 tab(s) orally once a day AS NEEDED  amitriptyline 25 mg oral tablet: 1 tab(s) orally once a day (at bedtime)  ascorbic acid 500 mg oral tablet: 1 tab(s) orally 2 times a day  buPROPion 100 mg oral tablet: 1 tab(s) orally 1 tablet in the morning and 1 tablet in the afternoon  calcium-vitamin D 500 mg-5 mcg (200 intl units) oral tablet: 1 tab(s) orally once a day  carvedilol 25 mg oral tablet: 1 tab(s) orally every 12 hours  Centrum Silver oral tablet: 1 tab(s) orally once a day  cetirizine 10 mg oral tablet: 1 tab(s) orally once a day  Colace 100 mg oral capsule: 1 cap(s) orally once a day  dilTIAZem 120 mg/24 hours oral capsule, extended release: 1 cap(s) orally once a day  estradiol vaginal cream: apply every day for the first 2 weeks, then 2x/week thereafter  hydrALAZINE 50 mg oral tablet: 1 tab(s) orally 2 times a day at lunch and dinner  lidocaine 5% topical film: Apply topically to affected area 3 times a day as needed for  mild pain  lovastatin 20 mg oral tablet: 1 tab(s) orally once a day (in the evening) - with dinner  MiraLax oral powder for reconstitution: 17 gram(s) orally once a day  olmesartan 40 mg oral tablet: 1 tab(s) orally once a day  omeprazole 20 mg oral delayed release capsule: 1 cap(s) orally once a day  Synthroid 75 mcg (0.075 mg) oral tablet: 1 tablet (75 mcg) M/T/Th/F/Sat and 1.5 tablets (112.5 mcg) on Wed/Sun    Recommendation:  -Consider adjusting levothyroxine dose to 1 tablet (75 mcg) M/T/Th/F/Sat and 1.5 tablets (112.5 mcg) on Wed/Sun as this was patient's home dose and was confirmed by endocrinologist office, Dr. Julio Cesar Sheth (phone # [phone])    Rigoberto Peguero, PharmD  PGY1 Pharmacy Resident  Available on Teams  Performed medication reconciliation and home medication list updated in prescription writer/ outpatient medication review. Medications verified with son who provided the medication list and pharmacy.     Home medications:  acetaminophen 325 mg oral tablet: 1 tab(s) orally as needed for pain  ALPRAZolam 1 mg oral tablet: 1 tab(s) orally once a day AS NEEDED  amitriptyline 25 mg oral tablet: 1 tab(s) orally once a day (at bedtime)  ascorbic acid 500 mg oral tablet: 1 tab(s) orally 2 times a day  buPROPion 100 mg oral tablet: 1 tab(s) orally 1 tablet in the morning and 1 tablet in the afternoon  calcium-vitamin D 500 mg-5 mcg (200 intl units) oral tablet: 1 tab(s) orally once a day  carvedilol 25 mg oral tablet: 1 tab(s) orally every 12 hours  Centrum Silver oral tablet: 1 tab(s) orally once a day  cetirizine 10 mg oral tablet: 1 tab(s) orally once a day  Colace 100 mg oral capsule: 1 cap(s) orally once a day  dilTIAZem 120 mg/24 hours oral capsule, extended release: 1 cap(s) orally once a day  estradiol vaginal cream: apply every day for the first 2 weeks, then 2x/week thereafter  hydrALAZINE 50 mg oral tablet: 1 tab(s) orally 2 times a day at lunch and dinner  lidocaine 5% topical film: Apply topically to affected area 3 times a day as needed for  mild pain  lovastatin 20 mg oral tablet: 1 tab(s) orally once a day (in the evening) - with dinner  MiraLax oral powder for reconstitution: 17 gram(s) orally once a day  olmesartan 40 mg oral tablet: 1 tab(s) orally once a day  omeprazole 20 mg oral delayed release capsule: 1 cap(s) orally once a day  Synthroid 75 mcg (0.075 mg) oral tablet: 1 tablet (75 mcg) M/T/Th/F/Sat and 1.5 tablets (112.5 mcg) on Wed/Sun    Recommendation:  -Consider adjusting levothyroxine dose to 1 tablet (75 mcg) M/T/Th/F/Sat and 1.5 tablets (112.5 mcg) on Wed/Sun as this was patient's home dose and was confirmed by endocrinologist office, Dr. Julio Cesar Sheth (phone # [phone])    Rigoberto Peguero, PharmD  PGY1 Pharmacy Resident  Available on Teams     Shae Vasques PharmD, BCPS  Clinical Pharmacy Specialist  Teams (preferred) or [phone]

## 2024-04-24 NOTE — PROGRESS NOTE ADULT - SUBJECTIVE AND OBJECTIVE BOX
Patient is a 83y old  Female who presents with a chief complaint of     SUBJECTIVE / OVERNIGHT EVENTS: No new complaints. Son at bedside.   Review of Systems  chest pain no  palpitations no  sob no  nausea no  headache no    MEDICATIONS  (STANDING):  amitriptyline 25 milliGRAM(s) Oral at bedtime  ascorbic acid 500 milliGRAM(s) Oral daily  atorvastatin 10 milliGRAM(s) Oral at bedtime  buPROPion XL (24-Hour) . 150 milliGRAM(s) Oral daily  calcium carbonate 1250 mG  + Vitamin D (OsCal 500 + D) 1 Tablet(s) Oral daily  carvedilol 25 milliGRAM(s) Oral every 12 hours  chlorhexidine 2% Cloths 1 Application(s) Topical daily  diltiazem    milliGRAM(s) Oral daily  levothyroxine 75 MICROGram(s) Oral daily  loratadine 10 milliGRAM(s) Oral daily  losartan 100 milliGRAM(s) Oral daily  multivitamin/minerals 1 Tablet(s) Oral daily  pantoprazole    Tablet 40 milliGRAM(s) Oral before breakfast    MEDICATIONS  (PRN):  acetaminophen     Tablet .. 650 milliGRAM(s) Oral every 6 hours PRN Temp greater or equal to 38.5C (101.3F), Mild Pain (1 - 3)  ALPRAZolam 1 milliGRAM(s) Oral daily PRN anxiety      Vital Signs Last 24 Hrs  T(C): 36.3 (24 Apr 2024 11:16), Max: 36.8 (23 Apr 2024 21:12)  T(F): 97.3 (24 Apr 2024 11:16), Max: 98.3 (24 Apr 2024 04:27)  HR: 70 (24 Apr 2024 17:20) (65 - 88)  BP: 127/73 (24 Apr 2024 17:20) (107/62 - 143/79)  BP(mean): --  RR: 18 (24 Apr 2024 17:20) (18 - 18)  SpO2: 97% (24 Apr 2024 17:20) (96% - 97%)    Parameters below as of 24 Apr 2024 17:20  Patient On (Oxygen Delivery Method): room air        PHYSICAL EXAM:  GENERAL: NAD   HEAD:  Atraumatic, Normocephalic  EYES: EOMI, PERRLA, conjunctiva and sclera clear   NECK: Supple, No JVD  CHEST/LUNG: Clear to auscultation bilaterally; No wheeze   HEART: Regular rate and rhythm; No murmurs, rubs, or gallops   ABDOMEN: Soft, Nontender, Nondistended; Bowel sounds present   EXTREMITIES:  L arm weakness    PSYCH: AAOx3   NEUROLOGY: non-focal  SKIN: No rashes or lesions    LABS:                        11.5   9.13  )-----------( 261      ( 23 Apr 2024 09:50 )             34.8     04-24    137  |  101  |  22  ----------------------------<  128<H>  4.1   |  22  |  0.74    Ca    9.5      24 Apr 2024 11:23    TPro  6.1  /  Alb  x   /  TBili  x   /  DBili  x   /  AST  x   /  ALT  x   /  AlkPhos  x   04-24    PT/INR - ( 23 Apr 2024 09:50 )   PT: 13.7 sec;   INR: 1.25 ratio         PTT - ( 23 Apr 2024 09:50 )  PTT:24.4 sec  CARDIAC MARKERS ( 24 Apr 2024 11:23 )  x     / x     / 54 U/L / x     / x      CARDIAC MARKERS ( 23 Apr 2024 18:47 )  x     / x     / 40 U/L / x     / 1.8 ng/mL      Urinalysis Basic - ( 24 Apr 2024 11:23 )    Color: x / Appearance: x / SG: x / pH: x  Gluc: 128 mg/dL / Ketone: x  / Bili: x / Urobili: x   Blood: x / Protein: x / Nitrite: x   Leuk Esterase: x / RBC: x / WBC x   Sq Epi: x / Non Sq Epi: x / Bacteria: x          RADIOLOGY & ADDITIONAL TESTS:    Imaging Personally Reviewed:  < from: MR Brachial Plexus w/wo IV Cont, Left (04.24.24 @ 09:37) >  IMPRESSION:  *  Edema along the RIGHT cervical paraspinal musculature with edema in   the right facets, which may represent sequela of trauma and/or advanced   facet arthrosis with question of widening of the right C4/C5 facet   articulation. Recommend dedicated cervical spine MRI and/or bony cervical   CT for further evaluation.  *  Increased signal along the left brachial plexus within the   infraclavicular region extending towards the axilla, which may represent   sequela of a stretch injury in the setting of trauma and/or surrounding   edema in relation to glenohumeral dislocation. Interval follow-up MRI of   the brachial plexus can be performed if concern for   pseudomeningoceles/root avulsions as none are apparent at this time.  *  Sequela of left glenohumeral dislocation extensive surrounding soft   tissue edema, glenohumeral joint effusion, fluid within the   subacromial/subdeltoid bursa and impaction fracture of the greater   tuberosity.    < end of copied text >    Consultant(s) Notes Reviewed:      Care Discussed with Consultants/Other Providers:

## 2024-04-25 LAB
% ALBUMIN: 46.8 % — SIGNIFICANT CHANGE UP
% ALPHA 1: 7.9 % — SIGNIFICANT CHANGE UP
% ALPHA 2: 16 % — SIGNIFICANT CHANGE UP
% BETA: 14 % — SIGNIFICANT CHANGE UP
% GAMMA: 15.3 % — SIGNIFICANT CHANGE UP
ALBUMIN SERPL ELPH-MCNC: 2.9 G/DL — LOW (ref 3.6–5.5)
ALBUMIN/GLOB SERPL ELPH: 0.9 RATIO — SIGNIFICANT CHANGE UP
ALPHA1 GLOB SERPL ELPH-MCNC: 0.5 G/DL — HIGH (ref 0.1–0.4)
ALPHA2 GLOB SERPL ELPH-MCNC: 1 G/DL — SIGNIFICANT CHANGE UP (ref 0.5–1)
ANION GAP SERPL CALC-SCNC: 12 MMOL/L — SIGNIFICANT CHANGE UP (ref 5–17)
B-GLOBULIN SERPL ELPH-MCNC: 0.9 G/DL — SIGNIFICANT CHANGE UP (ref 0.5–1)
BUN SERPL-MCNC: 25 MG/DL — HIGH (ref 7–23)
CALCIUM SERPL-MCNC: 9.7 MG/DL — SIGNIFICANT CHANGE UP (ref 8.4–10.5)
CHLORIDE SERPL-SCNC: 100 MMOL/L — SIGNIFICANT CHANGE UP (ref 96–108)
CO2 SERPL-SCNC: 23 MMOL/L — SIGNIFICANT CHANGE UP (ref 22–31)
CREAT SERPL-MCNC: 0.94 MG/DL — SIGNIFICANT CHANGE UP (ref 0.5–1.3)
EGFR: 61 ML/MIN/1.73M2 — SIGNIFICANT CHANGE UP
GAMMA GLOBULIN: 0.9 G/DL — SIGNIFICANT CHANGE UP (ref 0.6–1.6)
GLUCOSE SERPL-MCNC: 119 MG/DL — HIGH (ref 70–99)
HCT VFR BLD CALC: 36 % — SIGNIFICANT CHANGE UP (ref 34.5–45)
HGB BLD-MCNC: 11.9 G/DL — SIGNIFICANT CHANGE UP (ref 11.5–15.5)
INTERPRETATION SERPL IFE-IMP: SIGNIFICANT CHANGE UP
MCHC RBC-ENTMCNC: 30.8 PG — SIGNIFICANT CHANGE UP (ref 27–34)
MCHC RBC-ENTMCNC: 33.1 GM/DL — SIGNIFICANT CHANGE UP (ref 32–36)
MCV RBC AUTO: 93.3 FL — SIGNIFICANT CHANGE UP (ref 80–100)
NRBC # BLD: 0 /100 WBCS — SIGNIFICANT CHANGE UP (ref 0–0)
PLATELET # BLD AUTO: 336 K/UL — SIGNIFICANT CHANGE UP (ref 150–400)
POTASSIUM SERPL-MCNC: 3.9 MMOL/L — SIGNIFICANT CHANGE UP (ref 3.5–5.3)
POTASSIUM SERPL-SCNC: 3.9 MMOL/L — SIGNIFICANT CHANGE UP (ref 3.5–5.3)
PROT PATTERN SERPL ELPH-IMP: SIGNIFICANT CHANGE UP
PROT SERPL-MCNC: 6.1 G/DL — SIGNIFICANT CHANGE UP (ref 6–8.3)
RBC # BLD: 3.86 M/UL — SIGNIFICANT CHANGE UP (ref 3.8–5.2)
RBC # FLD: 14 % — SIGNIFICANT CHANGE UP (ref 10.3–14.5)
SODIUM SERPL-SCNC: 135 MMOL/L — SIGNIFICANT CHANGE UP (ref 135–145)
WBC # BLD: 10.3 K/UL — SIGNIFICANT CHANGE UP (ref 3.8–10.5)
WBC # FLD AUTO: 10.3 K/UL — SIGNIFICANT CHANGE UP (ref 3.8–10.5)

## 2024-04-25 PROCEDURE — 99222 1ST HOSP IP/OBS MODERATE 55: CPT

## 2024-04-25 PROCEDURE — 72141 MRI NECK SPINE W/O DYE: CPT | Mod: 26

## 2024-04-25 PROCEDURE — 70551 MRI BRAIN STEM W/O DYE: CPT | Mod: 26

## 2024-04-25 RX ORDER — AMPICILLIN TRIHYDRATE 250 MG
1 CAPSULE ORAL EVERY 8 HOURS
Refills: 0 | Status: DISCONTINUED | OUTPATIENT
Start: 2024-04-25 | End: 2024-04-29

## 2024-04-25 RX ADMIN — LORATADINE 10 MILLIGRAM(S): 10 TABLET ORAL at 11:19

## 2024-04-25 RX ADMIN — CHLORHEXIDINE GLUCONATE 1 APPLICATION(S): 213 SOLUTION TOPICAL at 11:19

## 2024-04-25 RX ADMIN — Medication 75 MICROGRAM(S): at 06:45

## 2024-04-25 RX ADMIN — Medication 650 MILLIGRAM(S): at 03:22

## 2024-04-25 RX ADMIN — Medication 1 MILLIGRAM(S): at 23:20

## 2024-04-25 RX ADMIN — Medication 25 MILLIGRAM(S): at 23:16

## 2024-04-25 RX ADMIN — Medication 1 TABLET(S): at 11:19

## 2024-04-25 RX ADMIN — LOSARTAN POTASSIUM 100 MILLIGRAM(S): 100 TABLET, FILM COATED ORAL at 06:44

## 2024-04-25 RX ADMIN — Medication 650 MILLIGRAM(S): at 07:42

## 2024-04-25 RX ADMIN — Medication 120 MILLIGRAM(S): at 06:44

## 2024-04-25 RX ADMIN — Medication 108 GRAM(S): at 21:16

## 2024-04-25 RX ADMIN — ATORVASTATIN CALCIUM 10 MILLIGRAM(S): 80 TABLET, FILM COATED ORAL at 21:18

## 2024-04-25 RX ADMIN — CARVEDILOL PHOSPHATE 25 MILLIGRAM(S): 80 CAPSULE, EXTENDED RELEASE ORAL at 06:45

## 2024-04-25 RX ADMIN — CARVEDILOL PHOSPHATE 25 MILLIGRAM(S): 80 CAPSULE, EXTENDED RELEASE ORAL at 19:47

## 2024-04-25 RX ADMIN — BUPROPION HYDROCHLORIDE 150 MILLIGRAM(S): 150 TABLET, EXTENDED RELEASE ORAL at 11:19

## 2024-04-25 RX ADMIN — PANTOPRAZOLE SODIUM 40 MILLIGRAM(S): 20 TABLET, DELAYED RELEASE ORAL at 06:44

## 2024-04-25 RX ADMIN — Medication 500 MILLIGRAM(S): at 11:19

## 2024-04-25 NOTE — PROGRESS NOTE ADULT - SUBJECTIVE AND OBJECTIVE BOX
Patient is a 83y old  Female who presents with a chief complaint of lethargy (25 Apr 2024 15:19)      SUBJECTIVE / OVERNIGHT EVENTS: weak. Family at bedside.   Review of Systems  chest pain no  palpitations no  sob no  nausea no  headache no    MEDICATIONS  (STANDING):  amitriptyline 25 milliGRAM(s) Oral at bedtime  ampicillin  IVPB 1 Gram(s) IV Intermittent every 8 hours  ascorbic acid 500 milliGRAM(s) Oral daily  atorvastatin 10 milliGRAM(s) Oral at bedtime  buPROPion XL (24-Hour) . 150 milliGRAM(s) Oral daily  calcium carbonate 1250 mG  + Vitamin D (OsCal 500 + D) 1 Tablet(s) Oral daily  carvedilol 25 milliGRAM(s) Oral every 12 hours  chlorhexidine 2% Cloths 1 Application(s) Topical daily  diltiazem    milliGRAM(s) Oral daily  levothyroxine 75 MICROGram(s) Oral daily  loratadine 10 milliGRAM(s) Oral daily  losartan 100 milliGRAM(s) Oral daily  multivitamin/minerals 1 Tablet(s) Oral daily  pantoprazole    Tablet 40 milliGRAM(s) Oral before breakfast    MEDICATIONS  (PRN):  acetaminophen     Tablet .. 650 milliGRAM(s) Oral every 6 hours PRN Temp greater or equal to 38.5C (101.3F), Mild Pain (1 - 3)  ALPRAZolam 1 milliGRAM(s) Oral daily PRN anxiety      Vital Signs Last 24 Hrs  T(C): 36.4 (25 Apr 2024 11:26), Max: 37.3 (25 Apr 2024 05:30)  T(F): 97.6 (25 Apr 2024 11:26), Max: 99.2 (25 Apr 2024 05:30)  HR: 68 (25 Apr 2024 11:26) (68 - 76)  BP: 114/77 (25 Apr 2024 11:26) (101/64 - 138/84)  BP(mean): --  RR: 18 (25 Apr 2024 11:26) (18 - 18)  SpO2: 96% (25 Apr 2024 11:26) (95% - 99%)    Parameters below as of 25 Apr 2024 11:26  Patient On (Oxygen Delivery Method): room air        PHYSICAL EXAM:  GENERAL: NAD   HEAD:  Atraumatic, Normocephalic  EYES: EOMI, PERRLA, conjunctiva and sclera clear  NECK: Supple, No JVD  CHEST/LUNG: Clear to auscultation bilaterally; No wheeze  HEART: Regular rate and rhythm; No murmurs, rubs, or gallops  ABDOMEN: Soft, Nontender, Nondistended; Bowel sounds present  EXTREMITIES:  L arm with sling and wrist support.    PSYCH: AAOx2, periods of confusion   NEUROLOGY: non-focal  SKIN: No rashes or lesions    LABS:                        11.9   10.30 )-----------( 336      ( 25 Apr 2024 06:58 )             36.0     04-25    135  |  100  |  25<H>  ----------------------------<  119<H>  3.9   |  23  |  0.94    Ca    9.7      25 Apr 2024 06:58    TPro  6.1  /  Alb  x   /  TBili  x   /  DBili  x   /  AST  x   /  ALT  x   /  AlkPhos  x   04-24      CARDIAC MARKERS ( 24 Apr 2024 11:23 )  x     / x     / 54 U/L / x     / x      CARDIAC MARKERS ( 23 Apr 2024 18:47 )  x     / x     / 40 U/L / x     / 1.8 ng/mL      Urinalysis Basic - ( 25 Apr 2024 06:58 )    Color: x / Appearance: x / SG: x / pH: x  Gluc: 119 mg/dL / Ketone: x  / Bili: x / Urobili: x   Blood: x / Protein: x / Nitrite: x   Leuk Esterase: x / RBC: x / WBC x   Sq Epi: x / Non Sq Epi: x / Bacteria: x        Culture - Urine (collected 23 Apr 2024 13:54)  Source: Clean Catch Clean Catch (Midstream)  Preliminary Report (25 Apr 2024 09:51):    >100,000 CFU/ml Enterococcus species        RADIOLOGY & ADDITIONAL TESTS:    Imaging Personally Reviewed:    Consultant(s) Notes Reviewed:      Care Discussed with Consultants/Other Providers:

## 2024-04-25 NOTE — PROGRESS NOTE ADULT - SUBJECTIVE AND OBJECTIVE BOX
CARDIOLOGY FOLLOW UP - Dr. Calle  DATE OF SERVICE: 4/25/24    CC  No cv complaints     REVIEW OF SYSTEMS:  CONSTITUTIONAL: No fever, weight loss, or fatigue  RESPIRATORY: No cough, wheezing, chills or hemoptysis; No Shortness of Breath  CARDIOVASCULAR: No chest pain, palpitations, passing out, dizziness, or leg swelling  GASTROINTESTINAL: No abdominal or epigastric pain. No nausea, vomiting, or hematemesis; No diarrhea or constipation. No melena or hematochezia.  VASCULAR: No edema     PHYSICAL EXAM:  T(C): 37.3 (04-25-24 @ 05:30), Max: 37.3 (04-25-24 @ 05:30)  HR: 76 (04-25-24 @ 05:30) (65 - 76)  BP: 138/84 (04-25-24 @ 05:30) (101/64 - 138/84)  RR: 18 (04-25-24 @ 05:30) (18 - 18)  SpO2: 99% (04-25-24 @ 05:30) (95% - 99%)  Wt(kg): --  I&O's Summary    24 Apr 2024 07:01  -  25 Apr 2024 07:00  --------------------------------------------------------  IN: 360 mL / OUT: 0 mL / NET: 360 mL        Appearance: Elderly female 	  Cardiovascular: Normal S1 S2,RRR, No JVD, No murmurs  Respiratory: Lungs clear to auscultation	  Gastrointestinal:  Soft, Non-tender, + BS	  Extremities: Normal range of motion, No clubbing, cyanosis or edema      Home Medications:  acetaminophen 325 mg oral tablet: 1 tab(s) orally as needed for pain (24 Apr 2024 14:07)  ALPRAZolam 1 mg oral tablet: 1 tab(s) orally once a day AS NEEDED (24 Apr 2024 14:07)  amitriptyline 25 mg oral tablet: 1 tab(s) orally once a day (at bedtime) (24 Apr 2024 14:07)  ascorbic acid 500 mg oral tablet: 1 tab(s) orally 2 times a day (24 Apr 2024 14:07)  buPROPion 100 mg oral tablet: 1 tab(s) orally 1 tablet in the morning and 1 tablet in the afternoon (24 Apr 2024 14:00)  calcium-vitamin D 500 mg-5 mcg (200 intl units) oral tablet: 1 tab(s) orally once a day (24 Apr 2024 14:07)  carvedilol 25 mg oral tablet: 1 tab(s) orally every 12 hours (24 Apr 2024 14:07)  Centrum Silver oral tablet: 1 tab(s) orally once a day (24 Apr 2024 14:07)  cetirizine 10 mg oral tablet: 1 tab(s) orally once a day (24 Apr 2024 14:07)  Colace 100 mg oral capsule: 1 cap(s) orally once a day (24 Apr 2024 14:06)  dilTIAZem 120 mg/24 hours oral capsule, extended release: 1 cap(s) orally once a day (24 Apr 2024 14:07)  estradiol vaginal cream: apply every day for the first 2 weeks, then 2x/week thereafter (24 Apr 2024 14:06)  hydrALAZINE 50 mg oral tablet: 1 tab(s) orally 2 times a day at lunch and dinner (24 Apr 2024 14:05)  lidocaine 5% topical film: Apply topically to affected area 3 times a day as needed for  mild pain (24 Apr 2024 14:06)  lovastatin 20 mg oral tablet: 1 tab(s) orally once a day (in the evening) - with dinner (24 Apr 2024 14:07)  MiraLax oral powder for reconstitution: 17 gram(s) orally once a day (24 Apr 2024 14:06)  olmesartan 40 mg oral tablet: 1 tab(s) orally once a day (24 Apr 2024 14:07)  omeprazole 20 mg oral delayed release capsule: 1 cap(s) orally once a day (24 Apr 2024 14:07)  Synthroid 75 mcg (0.075 mg) oral tablet: 1 tab(s) orally once a day 1 tablet M/T/Th/F/Sat and 1.5 tablets on Wed/Sun (24 Apr 2024 15:06)      MEDICATIONS  (STANDING):  amitriptyline 25 milliGRAM(s) Oral at bedtime  ascorbic acid 500 milliGRAM(s) Oral daily  atorvastatin 10 milliGRAM(s) Oral at bedtime  buPROPion XL (24-Hour) . 150 milliGRAM(s) Oral daily  calcium carbonate 1250 mG  + Vitamin D (OsCal 500 + D) 1 Tablet(s) Oral daily  carvedilol 25 milliGRAM(s) Oral every 12 hours  chlorhexidine 2% Cloths 1 Application(s) Topical daily  diltiazem    milliGRAM(s) Oral daily  levothyroxine 75 MICROGram(s) Oral daily  loratadine 10 milliGRAM(s) Oral daily  losartan 100 milliGRAM(s) Oral daily  multivitamin/minerals 1 Tablet(s) Oral daily  pantoprazole    Tablet 40 milliGRAM(s) Oral before breakfast      TELEMETRY: SR 	    ECG:  	  RADIOLOGY:   < from: MR Brachial Plexus w/wo IV Cont, Left (04.24.24 @ 09:37) >  IMPRESSION:  *  Edema along the RIGHT cervical paraspinal musculature with edema in   the right facets, which may represent sequela of trauma and/or advanced   facet arthrosis with question of widening of the right C4/C5 facet   articulation. Recommend dedicated cervical spine MRI and/or bony cervical   CT for further evaluation.  *  Increased signal along the left brachial plexus within the   infraclavicular region extending towards the axilla, which may represent   sequela of a stretch injury in the setting of trauma and/or surrounding   edema in relation to glenohumeral dislocation. Interval follow-up MRI of   the brachial plexus can be performed if concern for   pseudomeningoceles/root avulsions as none are apparent at this time.  *  Sequela of left glenohumeral dislocation extensive surrounding soft   tissue edema, glenohumeral joint effusion, fluid within the   subacromial/subdeltoid bursa and impaction fracture of the greater   tuberosity.        --- End of Report ---    < end of copied text >    DIAGNOSTIC TESTING:  [ ] Echocardiogram:  [ ]  Catheterization:  [ ] Stress Test:    OTHER: 	    LABS:	 	    Creatine Kinase, Serum: 54 U/L [25 - 170] (04-24 @ 11:23)  Troponin T, High Sensitivity Result: 21 ng/L [0 - 51] (04-24 @ 11:23)  Creatine Kinase, Serum: 40 U/L [25 - 170] (04-23 @ 18:47)  CKMB Units: 1.8 ng/mL [0.0 - 3.8] (04-23 @ 18:47)  Troponin T, High Sensitivity Result: 20 ng/L [0 - 51] (04-23 @ 18:47)  Troponin T, High Sensitivity Result: 20 ng/L [0 - 51] (04-23 @ 18:47)  Troponin T, High Sensitivity Result: 19 ng/L [0 - 51] (04-23 @ 09:50)                          11.9   10.30 )-----------( 336      ( 25 Apr 2024 06:58 )             36.0     04-25    135  |  100  |  25<H>  ----------------------------<  119<H>  3.9   |  23  |  0.94    Ca    9.7      25 Apr 2024 06:58    TPro  6.1  /  Alb  x   /  TBili  x   /  DBili  x   /  AST  x   /  ALT  x   /  AlkPhos  x   04-24

## 2024-04-25 NOTE — CONSULT NOTE ADULT - ASSESSMENT
83 year old  with Fall  Bladder prolapse  GERd  Multiple falls  Hip replacement  shoulder dislocation after fall    pt does complain of dysuria at times and has enterococcus in the uirne  allergy to pcn was over 25 years ago and was not anaphylaxis  will start ampicillin and plan a 5 day course      83 year old  with Fall  Bladder prolapse  GERd  Multiple falls  Hip replacement  shoulder dislocation after fall    pt does complain of dysuria at times and has enterococcus in the uirne  allergy to pcn was over 25 years ago and was not anaphylaxis  will start ampicillin and plan a 5 day course     if she is otherwise ready to go home, she can be switched to amoxicillin 500 bid to finish the 5 days

## 2024-04-25 NOTE — CONSULT NOTE ADULT - SUBJECTIVE AND OBJECTIVE BOX
Patient is a 83y old  Female who presents with a chief complaint of   HPI:  83y (1940) years old woman with a PMH of groin aneurysm, brain aneurysm x 2, hypertension, mitral valve prolapse, hyperlipidemia, prolapsed bladder, cataracts, arthritis, right hip replacement, duodenal laceration, prediabetes, GERD, diverticulitis, hiatal hernia presents status post 2 falls this morning. She was found after the fall epr daughter at 3 am and has had a fall earlier this month. Per daughter this has been recurrent and per patient she has been clumsy all her life. This has worsened after surgeries in the hip per son. Patient otherwise has been experiencing hallucinations and confusion per daughter. She was seeing a photo that was not really present. She thought that [employer] fell and expressed this fact to her daughter. She has been thinking that she will be going to [facility] for evaluation and claimed that her son informed her that he will take her. When her daughter confronted her and denied this information, she said that this was not a true story. She was evaluated in the ED for a fall back in April 9th for which she was evaluated and was found to have an anterior shoulder disclocation. She was diagnosed with brachial plexus injury. She was admitted earlier March 28 to April 2 for sepsis i/s/o UTI and was treated with antibiotics. Patient ambulates with a walker at baseline otherwise for the last year. Patient has been reportedly having recurrent falls over the past year that worsened after bilateral hips surgeries. Patient has been receiving rehab for the falls. (23 Apr 2024 18:01)      PAST MEDICAL & SURGICAL HISTORY:  Hypertension      GERD (gastroesophageal reflux disease)      Osteoarthritis      Macular degeneration      Hypothyroid      Female bladder prolapse      H/O mitral valve prolapse      Hiatal hernia with GERD      Fe deficiency anemia      Tracheal nodule      Arteriosclerotic heart disease (ASHD)      COVID-19 vaccination declined      Primary hypertension      HLD (hyperlipidemia)      Hiatal hernia      Osteoarthritis      Uterine prolapse      Constipation      Papillary carcinoma      History of celiac disease      HLD (hyperlipidemia)      GI bleed      HTN (hypertension)      Multifocal atrial tachycardia      Prediabetes      Diverticulosis      Hiatal hernia      H/O thyroidectomy      Closed right hip fracture      H/O ovarian cystectomy      History of hip surgery      H/O thyroidectomy          Social history:    FAMILY HISTORY:  FH: HTN (hypertension) (Father, Mother)         Allergic/Immunologic:	No hives or rash   Allergies    NSAIDs (Rash)  hydrocortisone (Unknown)  amoxicillin (Other)  penicillin (Anaphylaxis)  penicillin (Other)    Intolerances        Antimicrobials:          Vital Signs Last 24 Hrs  T(C): 36.4 (25 Apr 2024 11:26), Max: 37.3 (25 Apr 2024 05:30)  T(F): 97.6 (25 Apr 2024 11:26), Max: 99.2 (25 Apr 2024 05:30)  HR: 68 (25 Apr 2024 11:26) (68 - 76)  BP: 114/77 (25 Apr 2024 11:26) (101/64 - 138/84)  BP(mean): --  RR: 18 (25 Apr 2024 11:26) (18 - 18)  SpO2: 96% (25 Apr 2024 11:26) (95% - 99%)    Parameters below as of 25 Apr 2024 11:26  Patient On (Oxygen Delivery Method): room air             Eyes:PERRL EOMI.NO discharge or conjunctival injection    ENMT:No sinus tenderness.No thrush.No pharyngeal exudate or erythema.Fair dental hygiene    Neck:Supple,No LN,no JVD      Respiratory:Good air entry bilaterally,CTA    Cardiovascular:S1 S2 wnl, No murmurs,rub or gallops    Gastrointestinal:Soft BS(+) no tenderness no masses ,No rebound or guarding    Genitourinary:No CVA tendereness     Rectal:    Extremities:No cyanosis,clubbing or edema.    Vascular:peripheral pulses felt    Neurological:AAO X 3,No grossly focal deficits    Skin:No rash                                    11.9   10.30 )-----------( 336      ( 25 Apr 2024 06:58 )             36.0         04-25    135  |  100  |  25<H>  ----------------------------<  119<H>  3.9   |  23  |  0.94    Ca    9.7      25 Apr 2024 06:58    TPro  6.1  /  Alb  x   /  TBili  x   /  DBili  x   /  AST  x   /  ALT  x   /  AlkPhos  x   04-24      RECENT CULTURES:  04-23 @ 13:54  Clean Catch Clean Catch (Midstream)  --  --  --    >100,000 CFU/ml Enterococcus species  --      MICROBIOLOGY:  Culture Results:   >100,000 CFU/ml Enterococcus species (04-23 @ 13:54)          Radiology:      Assessment:        Recommendations and Plan:    Pager [phone]  After 5 pm/weekends or if no response :[phone]

## 2024-04-26 LAB
-  AMPICILLIN: SIGNIFICANT CHANGE UP
-  CIPROFLOXACIN: SIGNIFICANT CHANGE UP
-  LEVOFLOXACIN: SIGNIFICANT CHANGE UP
-  NITROFURANTOIN: SIGNIFICANT CHANGE UP
-  TETRACYCLINE: SIGNIFICANT CHANGE UP
-  VANCOMYCIN: SIGNIFICANT CHANGE UP
CRP SERPL-MCNC: 152 MG/L — HIGH (ref 0–4)
CULTURE RESULTS: ABNORMAL
METHOD TYPE: SIGNIFICANT CHANGE UP
ORGANISM # SPEC MICROSCOPIC CNT: ABNORMAL
ORGANISM # SPEC MICROSCOPIC CNT: ABNORMAL
SPECIMEN SOURCE: SIGNIFICANT CHANGE UP

## 2024-04-26 PROCEDURE — 99232 SBSQ HOSP IP/OBS MODERATE 35: CPT

## 2024-04-26 PROCEDURE — 93010 ELECTROCARDIOGRAM REPORT: CPT

## 2024-04-26 RX ORDER — BENZOCAINE AND MENTHOL 5; 1 G/100ML; G/100ML
1 LIQUID ORAL THREE TIMES A DAY
Refills: 0 | Status: DISCONTINUED | OUTPATIENT
Start: 2024-04-26 | End: 2024-04-29

## 2024-04-26 RX ADMIN — Medication 75 MICROGRAM(S): at 06:10

## 2024-04-26 RX ADMIN — Medication 108 GRAM(S): at 06:16

## 2024-04-26 RX ADMIN — Medication 108 GRAM(S): at 14:37

## 2024-04-26 RX ADMIN — Medication 1 TABLET(S): at 12:14

## 2024-04-26 RX ADMIN — LOSARTAN POTASSIUM 100 MILLIGRAM(S): 100 TABLET, FILM COATED ORAL at 06:10

## 2024-04-26 RX ADMIN — Medication 120 MILLIGRAM(S): at 06:10

## 2024-04-26 RX ADMIN — BENZOCAINE AND MENTHOL 1 LOZENGE: 5; 1 LIQUID ORAL at 18:43

## 2024-04-26 RX ADMIN — Medication 108 GRAM(S): at 21:57

## 2024-04-26 RX ADMIN — Medication 650 MILLIGRAM(S): at 18:13

## 2024-04-26 RX ADMIN — BUPROPION HYDROCHLORIDE 150 MILLIGRAM(S): 150 TABLET, EXTENDED RELEASE ORAL at 12:13

## 2024-04-26 RX ADMIN — PANTOPRAZOLE SODIUM 40 MILLIGRAM(S): 20 TABLET, DELAYED RELEASE ORAL at 06:10

## 2024-04-26 RX ADMIN — Medication 25 MILLIGRAM(S): at 21:57

## 2024-04-26 RX ADMIN — Medication 500 MILLIGRAM(S): at 12:13

## 2024-04-26 RX ADMIN — CARVEDILOL PHOSPHATE 25 MILLIGRAM(S): 80 CAPSULE, EXTENDED RELEASE ORAL at 06:10

## 2024-04-26 RX ADMIN — CHLORHEXIDINE GLUCONATE 1 APPLICATION(S): 213 SOLUTION TOPICAL at 12:21

## 2024-04-26 RX ADMIN — Medication 650 MILLIGRAM(S): at 19:13

## 2024-04-26 RX ADMIN — CARVEDILOL PHOSPHATE 25 MILLIGRAM(S): 80 CAPSULE, EXTENDED RELEASE ORAL at 18:13

## 2024-04-26 RX ADMIN — ATORVASTATIN CALCIUM 10 MILLIGRAM(S): 80 TABLET, FILM COATED ORAL at 21:58

## 2024-04-26 RX ADMIN — LORATADINE 10 MILLIGRAM(S): 10 TABLET ORAL at 12:13

## 2024-04-26 NOTE — PROGRESS NOTE ADULT - SUBJECTIVE AND OBJECTIVE BOX
*************************************  NEUROLOGY PROGRESS NOTE  **************************************    JOE URIOSTEGUI  Female  MRN-[id number]    Subjective: Patient seen and examined at bedside with Neurology team and attending     Interval History:      patient seen today and examined during rounds. She is still stable, currently being treated for UTI. She still has left UE weakness. She has is disoriented to place.     VITAL SIGNS:  Vital Signs Last 24 Hrs  T(C): 36.8 (26 Apr 2024 11:37), Max: 36.8 (26 Apr 2024 11:37)  T(F): 98.2 (26 Apr 2024 11:37), Max: 98.2 (26 Apr 2024 11:37)  HR: 92 (26 Apr 2024 11:37) (73 - 92)  BP: 139/81 (26 Apr 2024 11:37) (139/81 - 150/79)  BP(mean): --  RR: 18 (26 Apr 2024 11:37) (18 - 18)  SpO2: 97% (26 Apr 2024 11:37) (96% - 98%)    Parameters below as of 26 Apr 2024 11:37  Patient On (Oxygen Delivery Method): room air    PHYSICAL EXAMINATION:  INCOMPLETE  General - NAD    Neurologic Exam:  Mental status - Awake, Alert, Oriented to person (president name) and time (day time month and year).     Cranial nerves - PERRLA, VFF, EOMI, face sensation (V1-V3) intact b/l, facial strength intact without asymmetry b/l, hearing intact b/l, palate with symmetric elevation, trapezius OR sternocleidomastiod 5/5 strength b/l, tongue midline on protrusion with full lateral movement    Motor - Normal bulk and tone throughout. No pronator drift.  Strength testing            Deltoid      Biceps      Triceps     Wrist Extension    Wrist Flexion     Interossei         R            5                 5               5                     5                              5                        5                 5  L             5                 5               5                     5                              5                        5                 5              Hip Flexion    Hip Extension    Knee Flexion    Knee Extension    Dorsiflexion    Plantar Flexion  R              5                           5                       5                           5                            5                          5  L              5                           5                        5                           5                            5                          5    Sensation - Light touch/temperature OR pain/vibration intact throughout    DTR's -             Biceps      Triceps     Brachioradialis      Patellar    Ankle    Toes/plantar response  R             2+             2+                  2+                       2+            2+                 Down  L              2+             2+                 2+                        2+           2+                 Down    Coordination - Finger to Nose intact b/l. No tremors appreciated    Gait and station - Normal casual gait. Romberg (-)      LABS:    CBC                       11.9   10.30 )-----------( 336      ( 25 Apr 2024 06:58 )             36.0     Chem 04-25    135  |  100  |  25<H>  ----------------------------<  119<H>  3.9   |  23  |  0.94    Ca    9.7      25 Apr 2024 06:58      LFTs   Coagulopathy   Lipid Panel   A1c   Cardiac enzymes     U/A Urinalysis Basic - ( 25 Apr 2024 06:58 )    Color: x / Appearance: x / SG: x / pH: x  Gluc: 119 mg/dL / Ketone: x  / Bili: x / Urobili: x   Blood: x / Protein: x / Nitrite: x   Leuk Esterase: x / RBC: x / WBC x   Sq Epi: x / Non Sq Epi: x / Bacteria: x      CSF  Immunological  Other      RADIOLOGY & ADDITIONAL STUDIES:           *************************************  NEUROLOGY PROGRESS NOTE  **************************************    JOE URIOSTEGUI  Female  MRN-[id number]    Subjective: Patient seen and examined at bedside with Neurology team and attending     Interval History:      patient seen today and examined during rounds. She is still stable, currently being treated for UTI. She still has left UE weakness. She has is disoriented to place.     VITAL SIGNS:  Vital Signs Last 24 Hrs  T(C): 36.8 (26 Apr 2024 11:37), Max: 36.8 (26 Apr 2024 11:37)  T(F): 98.2 (26 Apr 2024 11:37), Max: 98.2 (26 Apr 2024 11:37)  HR: 92 (26 Apr 2024 11:37) (73 - 92)  BP: 139/81 (26 Apr 2024 11:37) (139/81 - 150/79)  BP(mean): --  RR: 18 (26 Apr 2024 11:37) (18 - 18)  SpO2: 97% (26 Apr 2024 11:37) (96% - 98%)    Parameters below as of 26 Apr 2024 11:37  Patient On (Oxygen Delivery Method): room air    PHYSICAL EXAMINATION:  INCOMPLETE  General - NAD    Neurologic Exam:  Mental status - Awake, Alert, Oriented to person (president name) and time (day time month and year).     Cranial nerves - PERRLA, VFF, EOMI, face sensation (V1-V3) intact b/l, facial strength intact without asymmetry b/l, hearing intact b/l, palate with symmetric elevation, trapezius OR sternocleidomastiod 5/5 strength b/l, tongue midline on protrusion with full lateral movement    Motor - Normal bulk and tone throughout. No pronator drift.  Strength testing            Deltoid      Biceps      Triceps     Wrist Extension                     Wrist Flexion     Interossei         R            5                 5               5                     5                              5                        5                 5  L             0                0               2                     0                              0                        0                 0        there is still abduction mildly present about 2/5              Hip Flexion    Hip Extension    Knee Flexion    Knee Extension    Dorsiflexion              Plantar Flexion  R              5                           5                       5                           5                            5                          5  L              5                           5                        5                           5                            5                          5    Sensation - Light touch intact throughout    Gait and station - ROSEY      LABS:    CBC                       11.9   10.30 )-----------( 336      ( 25 Apr 2024 06:58 )             36.0     Chem 04-25    135  |  100  |  25<H>  ----------------------------<  119<H>  3.9   |  23  |  0.94    Ca    9.7      25 Apr 2024 06:58      LFTs   Coagulopathy   Lipid Panel   A1c   Cardiac enzymes     U/A Urinalysis Basic - ( 25 Apr 2024 06:58 )    Color: x / Appearance: x / SG: x / pH: x  Gluc: 119 mg/dL / Ketone: x  / Bili: x / Urobili: x   Blood: x / Protein: x / Nitrite: x   Leuk Esterase: x / RBC: x / WBC x   Sq Epi: x / Non Sq Epi: x / Bacteria: x      CSF  Immunological  Other      RADIOLOGY & ADDITIONAL STUDIES:        < from: MR Head No Cont (04.25.24 @ 18:54) >    IMPRESSION:    MRI BRAIN: No acute infarction.    MRI CERVICAL SPINE: Mild spondylosis. No evidence of cord compression or   cord signal abnormality.    < end of copied text >  < from: MR Brachial Plexus w/wo IV Cont, Left (04.24.24 @ 09:37) >  IMPRESSION:  *  Edema along the RIGHT cervical paraspinal musculature with edema in   the right facets, which may represent sequela of trauma and/or advanced   facet arthrosis with question of widening of the right C4/C5 facet   articulation. Recommend dedicated cervical spine MRI and/or bony cervical   CT for further evaluation.  *  Increased signal along the left brachial plexus within the   infraclavicular region extending towards the axilla, which may represent   sequela of a stretch injury in the setting of trauma and/or surrounding   edema in relation to glenohumeral dislocation. Interval follow-up MRI of   the brachial plexus can be performed if concern for   pseudomeningoceles/root avulsions as none are apparent at this time.  *  Sequela of left glenohumeral dislocation extensive surrounding soft   tissue edema, glenohumeral joint effusion, fluid within the   subacromial/subdeltoid bursa and impaction fracture of the greater   tuberosity.    < end of copied text >

## 2024-04-26 NOTE — PROGRESS NOTE ADULT - SUBJECTIVE AND OBJECTIVE BOX
CARDIOLOGY FOLLOW UP - Dr. Calle  DATE OF SERVICE: 4/26/24    CC  No cv complaints     REVIEW OF SYSTEMS:  CONSTITUTIONAL: No fever, weight loss, or fatigue  RESPIRATORY: No cough, wheezing, chills or hemoptysis; No Shortness of Breath  CARDIOVASCULAR: No chest pain, palpitations, passing out, dizziness, or leg swelling  GASTROINTESTINAL: No abdominal or epigastric pain. No nausea, vomiting, or hematemesis; No diarrhea or constipation. No melena or hematochezia.  VASCULAR: No edema     PHYSICAL EXAM:  T(C): 36.7 (04-26-24 @ 04:41), Max: 36.7 (04-25-24 @ 20:40)  HR: 73 (04-26-24 @ 04:41) (68 - 74)  BP: 147/83 (04-26-24 @ 04:41) (114/77 - 150/79)  RR: 18 (04-26-24 @ 04:41) (18 - 18)  SpO2: 96% (04-26-24 @ 04:41) (96% - 98%)  Wt(kg): --  I&O's Summary    25 Apr 2024 07:01  -  26 Apr 2024 07:00  --------------------------------------------------------  IN: 240 mL / OUT: 500 mL / NET: -260 mL    26 Apr 2024 07:01  -  26 Apr 2024 10:50  --------------------------------------------------------  IN: 120 mL / OUT: 0 mL / NET: 120 mL        Appearance: Elderly female 	  Cardiovascular: Normal S1 S2,RRR, No JVD, No murmurs  Respiratory: Lungs clear to auscultation b/l   Gastrointestinal:  Soft, Non-tender, + BS	  Extremities: Normal range of motion, No clubbing, cyanosis or edema      Home Medications:  acetaminophen 325 mg oral tablet: 1 tab(s) orally as needed for pain (24 Apr 2024 14:07)  ALPRAZolam 1 mg oral tablet: 1 tab(s) orally once a day AS NEEDED (24 Apr 2024 14:07)  amitriptyline 25 mg oral tablet: 1 tab(s) orally once a day (at bedtime) (24 Apr 2024 14:07)  ascorbic acid 500 mg oral tablet: 1 tab(s) orally 2 times a day (24 Apr 2024 14:07)  buPROPion 100 mg oral tablet: 1 tab(s) orally 1 tablet in the morning and 1 tablet in the afternoon (24 Apr 2024 14:00)  calcium-vitamin D 500 mg-5 mcg (200 intl units) oral tablet: 1 tab(s) orally once a day (24 Apr 2024 14:07)  carvedilol 25 mg oral tablet: 1 tab(s) orally every 12 hours (24 Apr 2024 14:07)  Centrum Silver oral tablet: 1 tab(s) orally once a day (24 Apr 2024 14:07)  cetirizine 10 mg oral tablet: 1 tab(s) orally once a day (24 Apr 2024 14:07)  Colace 100 mg oral capsule: 1 cap(s) orally once a day (24 Apr 2024 14:06)  dilTIAZem 120 mg/24 hours oral capsule, extended release: 1 cap(s) orally once a day (24 Apr 2024 14:07)  estradiol vaginal cream: apply every day for the first 2 weeks, then 2x/week thereafter (24 Apr 2024 14:06)  hydrALAZINE 50 mg oral tablet: 1 tab(s) orally 2 times a day at lunch and dinner (24 Apr 2024 14:05)  lidocaine 5% topical film: Apply topically to affected area 3 times a day as needed for  mild pain (24 Apr 2024 14:06)  lovastatin 20 mg oral tablet: 1 tab(s) orally once a day (in the evening) - with dinner (24 Apr 2024 14:07)  MiraLax oral powder for reconstitution: 17 gram(s) orally once a day (24 Apr 2024 14:06)  olmesartan 40 mg oral tablet: 1 tab(s) orally once a day (24 Apr 2024 14:07)  omeprazole 20 mg oral delayed release capsule: 1 cap(s) orally once a day (24 Apr 2024 14:07)  Synthroid 75 mcg (0.075 mg) oral tablet: 1 tab(s) orally once a day 1 tablet M/T/Th/F/Sat and 1.5 tablets on Wed/Sun (24 Apr 2024 15:06)      MEDICATIONS  (STANDING):  amitriptyline 25 milliGRAM(s) Oral at bedtime  ampicillin  IVPB 1 Gram(s) IV Intermittent every 8 hours  ascorbic acid 500 milliGRAM(s) Oral daily  atorvastatin 10 milliGRAM(s) Oral at bedtime  buPROPion XL (24-Hour) . 150 milliGRAM(s) Oral daily  calcium carbonate 1250 mG  + Vitamin D (OsCal 500 + D) 1 Tablet(s) Oral daily  carvedilol 25 milliGRAM(s) Oral every 12 hours  chlorhexidine 2% Cloths 1 Application(s) Topical daily  diltiazem    milliGRAM(s) Oral daily  levothyroxine 75 MICROGram(s) Oral daily  loratadine 10 milliGRAM(s) Oral daily  losartan 100 milliGRAM(s) Oral daily  multivitamin/minerals 1 Tablet(s) Oral daily  pantoprazole    Tablet 40 milliGRAM(s) Oral before breakfast      TELEMETRY: SR	    ECG:  	  RADIOLOGY:   < from: MR Cervical Spine No Cont (04.25.24 @ 18:56) >    IMPRESSION:    MRI BRAIN: No acute infarction.    MRI CERVICAL SPINE: Mild spondylosis. No evidence of cord compression or   cord signal abnormality.    --- End of Report ---    < end of copied text >    DIAGNOSTIC TESTING:  [ ] Echocardiogram:  [ ]  Catheterization:  [ ] Stress Test:    OTHER: 	    LABS:	 	    Creatine Kinase, Serum: 54 U/L [25 - 170] (04-24 @ 11:23)  Troponin T, High Sensitivity Result: 21 ng/L [0 - 51] (04-24 @ 11:23)  Creatine Kinase, Serum: 40 U/L [25 - 170] (04-23 @ 18:47)  CKMB Units: 1.8 ng/mL [0.0 - 3.8] (04-23 @ 18:47)  Troponin T, High Sensitivity Result: 20 ng/L [0 - 51] (04-23 @ 18:47)  Troponin T, High Sensitivity Result: 20 ng/L [0 - 51] (04-23 @ 18:47)  Troponin T, High Sensitivity Result: 19 ng/L [0 - 51] (04-23 @ 09:50)                          11.9   10.30 )-----------( 336      ( 25 Apr 2024 06:58 )             36.0     04-25    135  |  100  |  25<H>  ----------------------------<  119<H>  3.9   |  23  |  0.94    Ca    9.7      25 Apr 2024 06:58    TPro  6.1  /  Alb  2.9<L>  /  TBili  x   /  DBili  x   /  AST  x   /  ALT  x   /  AlkPhos  x   04-24

## 2024-04-26 NOTE — PROGRESS NOTE ADULT - SUBJECTIVE AND OBJECTIVE BOX
infectious diseases progress note:    Patient is a 83y old  Female who presents with a chief complaint of lethargy (25 Apr 2024 15:19)        Repeated falls               Allergies    NSAIDs (Rash)  hydrocortisone (Unknown)  amoxicillin (Other)  penicillin (Anaphylaxis)  penicillin (Other)    Intolerances        ANTIBIOTICS/RELEVANT:  antimicrobials  ampicillin  IVPB 1 Gram(s) IV Intermittent every 8 hours    immunologic:    OTHER:  acetaminophen     Tablet .. 650 milliGRAM(s) Oral every 6 hours PRN  ALPRAZolam 1 milliGRAM(s) Oral daily PRN  amitriptyline 25 milliGRAM(s) Oral at bedtime  ascorbic acid 500 milliGRAM(s) Oral daily  atorvastatin 10 milliGRAM(s) Oral at bedtime  buPROPion XL (24-Hour) . 150 milliGRAM(s) Oral daily  calcium carbonate 1250 mG  + Vitamin D (OsCal 500 + D) 1 Tablet(s) Oral daily  carvedilol 25 milliGRAM(s) Oral every 12 hours  chlorhexidine 2% Cloths 1 Application(s) Topical daily  diltiazem    milliGRAM(s) Oral daily  levothyroxine 75 MICROGram(s) Oral daily  loratadine 10 milliGRAM(s) Oral daily  losartan 100 milliGRAM(s) Oral daily  multivitamin/minerals 1 Tablet(s) Oral daily  pantoprazole    Tablet 40 milliGRAM(s) Oral before breakfast      Objective:  Vital Signs Last 24 Hrs  T(C): 36.7 (26 Apr 2024 04:41), Max: 36.7 (25 Apr 2024 20:40)  T(F): 98 (26 Apr 2024 04:41), Max: 98 (25 Apr 2024 20:40)  HR: 73 (26 Apr 2024 04:41) (68 - 74)  BP: 147/83 (26 Apr 2024 04:41) (114/77 - 150/79)  BP(mean): --  RR: 18 (26 Apr 2024 04:41) (18 - 18)  SpO2: 96% (26 Apr 2024 04:41) (96% - 98%)    Parameters below as of 26 Apr 2024 04:41  Patient On (Oxygen Delivery Method): room air         Eyes:XIOMARA, EOMI  Ear/Nose/Throat: no oral lesion, no sinus tenderness on percussion	  Neck:no JVD, no lymphadenopathy, supple  Respiratory: CTA satnam  Cardiovascular: S1S2 RRR, no murmurs  Gastrointestinal:soft, (+) BS, no HSM  Extremities:no e/e/c        LABS:                        11.9   10.30 )-----------( 336      ( 25 Apr 2024 06:58 )             36.0     04-25    135  |  100  |  25<H>  ----------------------------<  119<H>  3.9   |  23  |  0.94    Ca    9.7      25 Apr 2024 06:58    TPro  6.1  /  Alb  2.9<L>  /  TBili  x   /  DBili  x   /  AST  x   /  ALT  x   /  AlkPhos  x   04-24      Urinalysis Basic - ( 25 Apr 2024 06:58 )    Color: x / Appearance: x / SG: x / pH: x  Gluc: 119 mg/dL / Ketone: x  / Bili: x / Urobili: x   Blood: x / Protein: x / Nitrite: x   Leuk Esterase: x / RBC: x / WBC x   Sq Epi: x / Non Sq Epi: x / Bacteria: x          MICROBIOLOGY:    RECENT CULTURES:  04-23 @ 13:54 Clean Catch Clean Catch (Midstream)                >100,000 CFU/ml Enterococcus faecalis          RESPIRATORY CULTURES:              RADIOLOGY & ADDITIONAL STUDIES:        Pager [phone]  After 5 pm/weekends or if no response :[phone]

## 2024-04-26 NOTE — PROGRESS NOTE ADULT - ATTENDING COMMENTS
84 yo female w. hx of fall and L shoulder dislocation 2 weeks ago p/w left arm weakness-     S/O: Patient stable. Possibly slight improvement in LUE strength at triceps (2/5). She is currently being treated for UTI. MR imaging completed and reviewed.     Neurological exam significant for L arm flaccid weakness with only mild movement at deltoid and triceps muscles (2/5). Motor strength in RUE and b/l LE 5/5. Sensation exam to LT intact over b/l UE. Unable to illicit any deep tendon reflexes in LUE, reflexes intact RUE. Patient is alert and oriented to self, time and president. but not place. She is able to spell "WORLD" backwards.     CTH w/o acute pathology  CTA head w/ intracranial athero  CTA neck w/o any flow limiting stenosis.  MR brachial plexus- Increased signal along the left brachial plexus within the   infraclavicular region extending towards the axilla, which may represent   sequela of a stretch injury in the setting of trauma and/or surrounding   edema in relation to glenohumeral dislocation.   MRI brain- mild microvascular ischemic changes, no acute pathology  MR C spine- no cord abnormality,. mild spondylosis.     Imp-   # LUE weakness- 2/2 brachial plexus stretch injury i/s/o recent shoulder trauma. Ortho following. PT/OT.   # Mental status change- likely infectious/metabolic encephalopathy (+UTI) +/- hospital induced delirium. Continue medical management as per primary team    No further neurological work needed at this time.   Pt should follow up with neurology as outpatient to monitor recover of LUE weakness.

## 2024-04-26 NOTE — PROGRESS NOTE ADULT - SUBJECTIVE AND OBJECTIVE BOX
Patient is a 83y old  Female who presents with a chief complaint of uti (26 Apr 2024 08:11)      SUBJECTIVE / OVERNIGHT EVENTS: less confused. Daughter at bedside.  Review of Systems  chest pain no  palpitations no  sob no  nausea no  headache no    MEDICATIONS  (STANDING):  amitriptyline 25 milliGRAM(s) Oral at bedtime  ampicillin  IVPB 1 Gram(s) IV Intermittent every 8 hours  ascorbic acid 500 milliGRAM(s) Oral daily  atorvastatin 10 milliGRAM(s) Oral at bedtime  buPROPion XL (24-Hour) . 150 milliGRAM(s) Oral daily  calcium carbonate 1250 mG  + Vitamin D (OsCal 500 + D) 1 Tablet(s) Oral daily  carvedilol 25 milliGRAM(s) Oral every 12 hours  chlorhexidine 2% Cloths 1 Application(s) Topical daily  diltiazem    milliGRAM(s) Oral daily  levothyroxine 75 MICROGram(s) Oral daily  loratadine 10 milliGRAM(s) Oral daily  losartan 100 milliGRAM(s) Oral daily  multivitamin/minerals 1 Tablet(s) Oral daily  pantoprazole    Tablet 40 milliGRAM(s) Oral before breakfast    MEDICATIONS  (PRN):  acetaminophen     Tablet .. 650 milliGRAM(s) Oral every 6 hours PRN Temp greater or equal to 38.5C (101.3F), Mild Pain (1 - 3)  ALPRAZolam 1 milliGRAM(s) Oral daily PRN anxiety  benzocaine/menthol Lozenge 1 Lozenge Oral three times a day PRN Sore Throat      Vital Signs Last 24 Hrs  T(C): 36.7 (26 Apr 2024 16:36), Max: 36.8 (26 Apr 2024 11:37)  T(F): 98 (26 Apr 2024 16:36), Max: 98.2 (26 Apr 2024 11:37)  HR: 81 (26 Apr 2024 18:13) (73 - 92)  BP: 156/82 (26 Apr 2024 18:13) (139/81 - 169/84)  BP(mean): --  RR: 18 (26 Apr 2024 18:13) (18 - 18)  SpO2: 98% (26 Apr 2024 18:13) (96% - 100%)    Parameters below as of 26 Apr 2024 18:13  Patient On (Oxygen Delivery Method): room air        PHYSICAL EXAM:  GENERAL: NAD  HEAD:  Atraumatic, Normocephalic  EYES: EOMI, PERRLA, conjunctiva and sclera clear  NECK: Supple, No JVD  CHEST/LUNG: Clear to auscultation bilaterally; No wheeze  HEART: Regular rate and rhythm; No murmurs, rubs, or gallops  ABDOMEN: Soft, Nontender, Nondistended; Bowel sounds present  EXTREMITIES:  L arm in sling    PSYCH: AAOx2  NEUROLOGY: non-focal  SKIN: No rashes or lesions    LABS:                        11.9   10.30 )-----------( 336      ( 25 Apr 2024 06:58 )             36.0     04-25    135  |  100  |  25<H>  ----------------------------<  119<H>  3.9   |  23  |  0.94    Ca    9.7      25 Apr 2024 06:58            Urinalysis Basic - ( 25 Apr 2024 06:58 )    Color: x / Appearance: x / SG: x / pH: x  Gluc: 119 mg/dL / Ketone: x  / Bili: x / Urobili: x   Blood: x / Protein: x / Nitrite: x   Leuk Esterase: x / RBC: x / WBC x   Sq Epi: x / Non Sq Epi: x / Bacteria: x          RADIOLOGY & ADDITIONAL TESTS:    Imaging Personally Reviewed:  < from: MR Cervical Spine No Cont (04.25.24 @ 18:56) >  IMPRESSION:    MRI BRAIN: No acute infarction.    MRI CERVICAL SPINE: Mild spondylosis. No evidence of cord compression or   cord signal abnormality.    < end of copied text >    Consultant(s) Notes Reviewed:      Care Discussed with Consultants/Other Providers:

## 2024-04-27 LAB
COPPER SERPL-MCNC: 127 UG/DL — SIGNIFICANT CHANGE UP (ref 80–158)
ERYTHROCYTE [SEDIMENTATION RATE] IN BLOOD: 93 MM/HR — HIGH (ref 0–20)
ZINC SERPL-MCNC: 65 UG/DL — SIGNIFICANT CHANGE UP (ref 44–115)

## 2024-04-27 PROCEDURE — 99223 1ST HOSP IP/OBS HIGH 75: CPT | Mod: GC

## 2024-04-27 RX ADMIN — Medication 108 GRAM(S): at 06:12

## 2024-04-27 RX ADMIN — Medication 108 GRAM(S): at 22:18

## 2024-04-27 RX ADMIN — Medication 650 MILLIGRAM(S): at 20:25

## 2024-04-27 RX ADMIN — CARVEDILOL PHOSPHATE 25 MILLIGRAM(S): 80 CAPSULE, EXTENDED RELEASE ORAL at 18:08

## 2024-04-27 RX ADMIN — Medication 75 MICROGRAM(S): at 06:11

## 2024-04-27 RX ADMIN — LOSARTAN POTASSIUM 100 MILLIGRAM(S): 100 TABLET, FILM COATED ORAL at 06:12

## 2024-04-27 RX ADMIN — Medication 650 MILLIGRAM(S): at 21:25

## 2024-04-27 RX ADMIN — Medication 1 MILLIGRAM(S): at 22:16

## 2024-04-27 RX ADMIN — CHLORHEXIDINE GLUCONATE 1 APPLICATION(S): 213 SOLUTION TOPICAL at 12:46

## 2024-04-27 RX ADMIN — PANTOPRAZOLE SODIUM 40 MILLIGRAM(S): 20 TABLET, DELAYED RELEASE ORAL at 06:12

## 2024-04-27 RX ADMIN — Medication 25 MILLIGRAM(S): at 22:16

## 2024-04-27 RX ADMIN — LORATADINE 10 MILLIGRAM(S): 10 TABLET ORAL at 12:42

## 2024-04-27 RX ADMIN — CARVEDILOL PHOSPHATE 25 MILLIGRAM(S): 80 CAPSULE, EXTENDED RELEASE ORAL at 06:11

## 2024-04-27 RX ADMIN — Medication 120 MILLIGRAM(S): at 06:12

## 2024-04-27 RX ADMIN — Medication 500 MILLIGRAM(S): at 12:42

## 2024-04-27 RX ADMIN — Medication 1 TABLET(S): at 12:42

## 2024-04-27 RX ADMIN — ATORVASTATIN CALCIUM 10 MILLIGRAM(S): 80 TABLET, FILM COATED ORAL at 22:15

## 2024-04-27 RX ADMIN — Medication 108 GRAM(S): at 13:47

## 2024-04-27 RX ADMIN — BUPROPION HYDROCHLORIDE 150 MILLIGRAM(S): 150 TABLET, EXTENDED RELEASE ORAL at 14:45

## 2024-04-27 NOTE — OCCUPATIONAL THERAPY INITIAL EVALUATION ADULT - PERTINENT HX OF CURRENT PROBLEM, REHAB EVAL
83y (1940) years old woman with a PMH of groin aneurysm, brain aneurysm x 2, hypertension, mitral valve prolapse, hyperlipidemia, prolapsed bladder, cataracts, arthritis, right hip replacement, duodenal laceration, prediabetes, GERD, diverticulitis, hiatal hernia presents status post 2 falls this morning. She also had episodes of hallucinations. Patient has been as well experiencing weakness in her left upper extremity after a fall she sustained earlier this month. Exam revealing for weakness on the left upper extremity with spared proximal shoulder muscles innervated by the suprascapular nerve.

## 2024-04-27 NOTE — PHYSICAL THERAPY INITIAL EVALUATION ADULT - PERTINENT HX OF CURRENT PROBLEM, REHAB EVAL
Pt is 83F PMH groin aneurysm, brain aneurysm x2, HTN, mitral valve prolapse, HLD, prolapsed bladder, cataracts, hip replacement, s/p falls. She was evaluated in the ED for a fall back in April 9th for which she was evaluated and was found to have an anterior shoulder disclocation. She was diagnosed with brachial plexus injury. She was admitted earlier March 28 to April 2 for sepsis i/s/o UTI and was treated with antibiotics. Patient ambulates with a walker at baseline otherwise for the last year. Patient has been reportedly having recurrent falls over the past year that worsened after bilateral hips surgeries. Patient has been receiving rehab for the falls.
82 y/o F w/ the below PMH here s/p fall, pt w/ multiple falls in the past, pt was found to have L brachial plexus injury s/p fall 2 weeks ago, and imaging w/ impaction fx of greater tuberosity WBAT of the LUE as per ortho, L cock up splint to prevent contractures. MRB (-) for acute changes, MR CS (-) for acute changes. Urine cx w/ ecoli.

## 2024-04-27 NOTE — CONSULT NOTE ADULT - SUBJECTIVE AND OBJECTIVE BOX
incomplete JOE URIOSTEGUI  [id number]    HPI: 82 yo F PMH MAT, hypothyroid (on synthroid), HTN, HLD, UTI, R Iliac artery aneurism, GERD, diverticulitis, hip replacement, multiple falls p/w confusion and fall    VSS  Labs: ESR 93,  to 152, normal hgb/plt/wbc, creatinine 0.9, UA 8 wbc, 2 epithelial cells, small leuk esterase w/ culture showing enterococcus facaelis  Hospital course: On ampicillin for possible UTI. Sent for JERE, DS DNA, C3, C4 per primary team for elevated esr/crp        Meds: Coreg, Olmesartan, diltiazem, bupropion, PPI, linzess, synthroid, olmesartan     MEDICATIONS  (STANDING):  amitriptyline 25 milliGRAM(s) Oral at bedtime  ampicillin  IVPB 1 Gram(s) IV Intermittent every 8 hours  ascorbic acid 500 milliGRAM(s) Oral daily  atorvastatin 10 milliGRAM(s) Oral at bedtime  buPROPion XL (24-Hour) . 150 milliGRAM(s) Oral daily  calcium carbonate 1250 mG  + Vitamin D (OsCal 500 + D) 1 Tablet(s) Oral daily  carvedilol 25 milliGRAM(s) Oral every 12 hours  chlorhexidine 2% Cloths 1 Application(s) Topical daily  diltiazem    milliGRAM(s) Oral daily  levothyroxine 75 MICROGram(s) Oral daily  loratadine 10 milliGRAM(s) Oral daily  losartan 100 milliGRAM(s) Oral daily  multivitamin/minerals 1 Tablet(s) Oral daily  pantoprazole    Tablet 40 milliGRAM(s) Oral before breakfast    MEDICATIONS  (PRN):  acetaminophen     Tablet .. 650 milliGRAM(s) Oral every 6 hours PRN Temp greater or equal to 38.5C (101.3F), Mild Pain (1 - 3)  ALPRAZolam 1 milliGRAM(s) Oral daily PRN anxiety  benzocaine/menthol Lozenge 1 Lozenge Oral three times a day PRN Sore Throat      Allergies    NSAIDs (Rash)  hydrocortisone (Unknown)  penicillin (Other)    Intolerances        PERTINENT MEDICATION HISTORY:    MEDICATIONS:    ALLERGIES:    SURGERIES:    HOSPITALIZATIONS:    FAMILY HISTORY:    TRAVEL HISTORY:    SOCIAL HISTORY:    FAMILY HISTORY:  FH: HTN (hypertension) (Father, Mother)        Vital Signs Last 24 Hrs  T(C): 36.5 (27 Apr 2024 04:50), Max: 37.3 (26 Apr 2024 21:05)  T(F): 97.7 (27 Apr 2024 04:50), Max: 99.2 (26 Apr 2024 21:05)  HR: 95 (27 Apr 2024 13:14) (75 - 95)  BP: 135/78 (27 Apr 2024 13:14) (132/84 - 169/84)  BP(mean): --  RR: 18 (27 Apr 2024 04:50) (18 - 18)  SpO2: 98% (27 Apr 2024 13:14) (96% - 100%)    Parameters below as of 27 Apr 2024 13:14  Patient On (Oxygen Delivery Method): room air        Physical Exam:  General: Breathing comfortably on room air, no distress  HEENT: EOMI, MMM, no oral ulcers  CVS: +S1/S2, RRR  Resp: CTA b/l. No crackles/wheezing  GI: Soft, NT/ND +BS  MSK: 5/5 muscle strength in arms and legs. B/l shoulder, elbow, wrist, MCP/PIP/DIP, Knee, ankle w/o swelling/erythema/or tenderness  Skin: no visible rashes    LABS:                RADIOLOGY & ADDITIONAL STUDIES: Reviewed     JOE URIOSTEGUI  [id number]    84 yo F PMH MAT, hypothyroid (on synthroid), HTN, HLD, UTI, R Iliac artery aneurism, GERD, diverticulitis, hip replacement, knee OA multiple falls p/w confusion and fall.    HPI: Pt, w/ daughter at bedside, says she does not remember how she fell or if she was confused. She does not remember the day of admission at all. Today she feels much better, A+Ox3, and memory improved. Admits to occasional joint pain 2/2 arthritis in knees/hands. Denies hx gout/pseudgout. Has arthritic changes in knees, however no pain and able to ambulate.     ROS:   -Denies fever, chills, chest pain, sob, rash, current joint pain, headache, jaw claudication, scalp tenderness, weight loss    VSS  Labs: ESR 93,  to 152, normal hgb/plt/wbc, creatinine 0.9, UA 8 wbc, 2 epithelial cells, small leuk esterase w/ culture showing enterococcus facaelis  Hospital course: On ampicillin for possible UTI. Sent for JERE, DS DNA, C3, C4 per primary team for elevated esr/crp        Meds: Coreg, Olmesartan, diltiazem, bupropion, PPI, linzess, synthroid, olmesartan   Family hx: denies rheumatologic hx     MEDICATIONS  (STANDING):  amitriptyline 25 milliGRAM(s) Oral at bedtime  ampicillin  IVPB 1 Gram(s) IV Intermittent every 8 hours  ascorbic acid 500 milliGRAM(s) Oral daily  atorvastatin 10 milliGRAM(s) Oral at bedtime  buPROPion XL (24-Hour) . 150 milliGRAM(s) Oral daily  calcium carbonate 1250 mG  + Vitamin D (OsCal 500 + D) 1 Tablet(s) Oral daily  carvedilol 25 milliGRAM(s) Oral every 12 hours  chlorhexidine 2% Cloths 1 Application(s) Topical daily  diltiazem    milliGRAM(s) Oral daily  levothyroxine 75 MICROGram(s) Oral daily  loratadine 10 milliGRAM(s) Oral daily  losartan 100 milliGRAM(s) Oral daily  multivitamin/minerals 1 Tablet(s) Oral daily  pantoprazole    Tablet 40 milliGRAM(s) Oral before breakfast    MEDICATIONS  (PRN):  acetaminophen     Tablet .. 650 milliGRAM(s) Oral every 6 hours PRN Temp greater or equal to 38.5C (101.3F), Mild Pain (1 - 3)  ALPRAZolam 1 milliGRAM(s) Oral daily PRN anxiety  benzocaine/menthol Lozenge 1 Lozenge Oral three times a day PRN Sore Throat      Allergies    NSAIDs (Rash)  hydrocortisone (Unknown)  penicillin (Other)    Intolerances       FAMILY HISTORY:  FH: HTN (hypertension) (Father, Mother)        Vital Signs Last 24 Hrs  T(C): 36.5 (27 Apr 2024 04:50), Max: 37.3 (26 Apr 2024 21:05)  T(F): 97.7 (27 Apr 2024 04:50), Max: 99.2 (26 Apr 2024 21:05)  HR: 95 (27 Apr 2024 13:14) (75 - 95)  BP: 135/78 (27 Apr 2024 13:14) (132/84 - 169/84)  BP(mean): --  RR: 18 (27 Apr 2024 04:50) (18 - 18)  SpO2: 98% (27 Apr 2024 13:14) (96% - 100%)    Parameters below as of 27 Apr 2024 13:14  Patient On (Oxygen Delivery Method): room air        Physical Exam:  General: Breathing comfortably on room air, no distress  HEENT: EOMI, MMM, no oral ulcers  CVS: +S1/S2, RRR  Resp: CTA b/l. No crackles/wheezing  GI: Soft, NT/ND +BS  MSK: no synovitis. +tip/heberdens nodes b/l. B/l knee's w/ OA changes, mild effusion R knee w/ intact ROM and nontender  Skin: no visible rashes  Neuro: A+Ox3 (self, place, year)    LABS:                RADIOLOGY & ADDITIONAL STUDIES: Reviewed     JOE URIOSTEGUI  [id number]    84 yo F PMH MAT, hypothyroid (on synthroid), HTN, HLD, UTI, R Iliac artery aneurism, GERD, diverticulitis, hip replacement, knee OA multiple falls p/w confusion and fall.    HPI: Pt, w/ daughter at bedside, says she does not remember how she fell or if she was confused. She does not remember the day of admission at all. Today she feels much better, A+Ox3, and memory improved. Admits to occasional joint pain 2/2 arthritis in knees/hands. Denies hx gout/pseudgout. Has arthritic changes in knees, however no pain and able to ambulate.     ROS:   -Denies fever, chills, chest pain, sob, rash, current joint pain, headache, jaw claudication, scalp tenderness, weight loss    VSS  Labs: ESR 93,  to 152, normal hgb/plt/wbc, creatinine 0.9, UA 8 wbc, 2 epithelial cells, small leuk esterase w/ culture showing enterococcus facaelis  Hospital course: On ampicillin for possible UTI. Sent for JERE, DS DNA, C3, C4 per primary team for elevated esr/crp        Meds: Coreg, Olmesartan, diltiazem, bupropion, PPI, linzess, synthroid, olmesartan   Family hx: denies rheumatologic hx     MEDICATIONS  (STANDING):  amitriptyline 25 milliGRAM(s) Oral at bedtime  ampicillin  IVPB 1 Gram(s) IV Intermittent every 8 hours  ascorbic acid 500 milliGRAM(s) Oral daily  atorvastatin 10 milliGRAM(s) Oral at bedtime  buPROPion XL (24-Hour) . 150 milliGRAM(s) Oral daily  calcium carbonate 1250 mG  + Vitamin D (OsCal 500 + D) 1 Tablet(s) Oral daily  carvedilol 25 milliGRAM(s) Oral every 12 hours  chlorhexidine 2% Cloths 1 Application(s) Topical daily  diltiazem    milliGRAM(s) Oral daily  levothyroxine 75 MICROGram(s) Oral daily  loratadine 10 milliGRAM(s) Oral daily  losartan 100 milliGRAM(s) Oral daily  multivitamin/minerals 1 Tablet(s) Oral daily  pantoprazole    Tablet 40 milliGRAM(s) Oral before breakfast    MEDICATIONS  (PRN):  acetaminophen     Tablet .. 650 milliGRAM(s) Oral every 6 hours PRN Temp greater or equal to 38.5C (101.3F), Mild Pain (1 - 3)  ALPRAZolam 1 milliGRAM(s) Oral daily PRN anxiety  benzocaine/menthol Lozenge 1 Lozenge Oral three times a day PRN Sore Throat      Allergies    NSAIDs (Rash)  hydrocortisone (Unknown)  penicillin (Other)    Intolerances       FAMILY HISTORY:  FH: HTN (hypertension) (Father, Mother)        Vital Signs Last 24 Hrs  T(C): 36.5 (27 Apr 2024 04:50), Max: 37.3 (26 Apr 2024 21:05)  T(F): 97.7 (27 Apr 2024 04:50), Max: 99.2 (26 Apr 2024 21:05)  HR: 95 (27 Apr 2024 13:14) (75 - 95)  BP: 135/78 (27 Apr 2024 13:14) (132/84 - 169/84)  BP(mean): --  RR: 18 (27 Apr 2024 04:50) (18 - 18)  SpO2: 98% (27 Apr 2024 13:14) (96% - 100%)    Parameters below as of 27 Apr 2024 13:14  Patient On (Oxygen Delivery Method): room air        Physical Exam:  General: Breathing comfortably on room air, no distress  HEENT: EOMI, MMM, no oral ulcers  CVS: +S1/S2, RRR  Resp: CTA b/l. No crackles/wheezing  GI: Soft, NT/ND +BS  MSK: no synovitis. +tip/heberdens nodes b/l. B/l knee's w/ OA changes, +palpable mild- moderate effusion R knee w/ intact ROM and nontender  Skin: no visible rashes  Neuro: A+Ox3 (self, place, year)    LABS:                RADIOLOGY & ADDITIONAL STUDIES: Reviewed

## 2024-04-27 NOTE — CONSULT NOTE ADULT - CONSULT REASON
Fall
elevated inflammatory markers
lethargy
L shoulder pain  time consulted: 1600  time seen: 1605
brachial plexus injury

## 2024-04-27 NOTE — PROGRESS NOTE ADULT - SUBJECTIVE AND OBJECTIVE BOX
CARDIOLOGY FOLLOW UP NOTE - DR. [address]RE[address]    Patient Name: JOE URIOSTEGUI    Date of Service: 04-27-24 @ 10:06    Patient seen and examined  sundowned last night     Subjective:    cv: denies chest pain, dyspnea, palpitations, dizziness  pulmonary: denies cough  GI: denies abdominal pain, nausea, vomiting  vascular/legs: no edema   skin: no rash  ROS: otherwise negative   overnight events:      PHYSICAL EXAM:  T(C): 36.5 (04-27-24 @ 04:50), Max: 37.3 (04-26-24 @ 21:05)  HR: 80 (04-27-24 @ 04:50) (75 - 92)  BP: 132/84 (04-27-24 @ 04:50) (132/84 - 169/84)  RR: 18 (04-27-24 @ 04:50) (18 - 18)  SpO2: 98% (04-27-24 @ 04:50) (96% - 100%)  Wt(kg): --  I&O's Summary    26 Apr 2024 07:01  -  27 Apr 2024 07:00  --------------------------------------------------------  IN: 120 mL / OUT: 0 mL / NET: 120 mL      Daily     Daily     Appearance: Normal	  Cardiovascular: Normal S1 S2,RRR, No JVD, No murmurs  Respiratory: Lungs clear to auscultation	  Gastrointestinal:  Soft, Non-tender, + BS	  Extremities: Normal range of motion, No clubbing, cyanosis or edema      Home Medications:  acetaminophen 325 mg oral tablet: 1 tab(s) orally as needed for pain (24 Apr 2024 14:07)  ALPRAZolam 1 mg oral tablet: 1 tab(s) orally once a day AS NEEDED (24 Apr 2024 14:07)  amitriptyline 25 mg oral tablet: 1 tab(s) orally once a day (at bedtime) (24 Apr 2024 14:07)  ascorbic acid 500 mg oral tablet: 1 tab(s) orally 2 times a day (24 Apr 2024 14:07)  buPROPion 100 mg oral tablet: 1 tab(s) orally 1 tablet in the morning and 1 tablet in the afternoon (24 Apr 2024 14:00)  calcium-vitamin D 500 mg-5 mcg (200 intl units) oral tablet: 1 tab(s) orally once a day (24 Apr 2024 14:07)  carvedilol 25 mg oral tablet: 1 tab(s) orally every 12 hours (24 Apr 2024 14:07)  Centrum Silver oral tablet: 1 tab(s) orally once a day (24 Apr 2024 14:07)  cetirizine 10 mg oral tablet: 1 tab(s) orally once a day (24 Apr 2024 14:07)  Colace 100 mg oral capsule: 1 cap(s) orally once a day (24 Apr 2024 14:06)  dilTIAZem 120 mg/24 hours oral capsule, extended release: 1 cap(s) orally once a day (24 Apr 2024 14:07)  estradiol vaginal cream: apply every day for the first 2 weeks, then 2x/week thereafter (24 Apr 2024 14:06)  hydrALAZINE 50 mg oral tablet: 1 tab(s) orally 2 times a day at lunch and dinner (24 Apr 2024 14:05)  lidocaine 5% topical film: Apply topically to affected area 3 times a day as needed for  mild pain (24 Apr 2024 14:06)  lovastatin 20 mg oral tablet: 1 tab(s) orally once a day (in the evening) - with dinner (24 Apr 2024 14:07)  MiraLax oral powder for reconstitution: 17 gram(s) orally once a day (24 Apr 2024 14:06)  olmesartan 40 mg oral tablet: 1 tab(s) orally once a day (24 Apr 2024 14:07)  omeprazole 20 mg oral delayed release capsule: 1 cap(s) orally once a day (24 Apr 2024 14:07)  Synthroid 75 mcg (0.075 mg) oral tablet: 1 tab(s) orally once a day 1 tablet M/T/Th/F/Sat and 1.5 tablets on Wed/Sun (24 Apr 2024 15:06)      MEDICATIONS  (STANDING):  amitriptyline 25 milliGRAM(s) Oral at bedtime  ampicillin  IVPB 1 Gram(s) IV Intermittent every 8 hours  ascorbic acid 500 milliGRAM(s) Oral daily  atorvastatin 10 milliGRAM(s) Oral at bedtime  buPROPion XL (24-Hour) . 150 milliGRAM(s) Oral daily  calcium carbonate 1250 mG  + Vitamin D (OsCal 500 + D) 1 Tablet(s) Oral daily  carvedilol 25 milliGRAM(s) Oral every 12 hours  chlorhexidine 2% Cloths 1 Application(s) Topical daily  diltiazem    milliGRAM(s) Oral daily  levothyroxine 75 MICROGram(s) Oral daily  loratadine 10 milliGRAM(s) Oral daily  losartan 100 milliGRAM(s) Oral daily  multivitamin/minerals 1 Tablet(s) Oral daily  pantoprazole    Tablet 40 milliGRAM(s) Oral before breakfast      TELEMETRY: 	    ECG:  	  RADIOLOGY:   DIAGNOSTIC TESTING:  [ ] Echocardiogram:  [ ] Catheterization:  [ ] Stress Test:    OTHER: 	    LABS:	 	    CARDIAC MARKERS:        Troponin T, High Sensitivity Result: 21 ng/L (04-24 @ 11:23)  Troponin T, High Sensitivity Result: 20 ng/L (04-23 @ 18:47)  Troponin T, High Sensitivity Result: 20 ng/L (04-23 @ 18:47)  Troponin T, High Sensitivity Result: 19 ng/L (04-23 @ 09:50)                  proBNP:     Lipid Profile:   HgA1c:     Creatinine: 0.94 mg/dL (04-25-24 @ 06:58)  Creatinine: 0.74 mg/dL (04-24-24 @ 11:23)

## 2024-04-27 NOTE — CONSULT NOTE ADULT - ASSESSMENT
82 yo F PMH MAT, hypothyroid (on synthroid), HTN, HLD, UTI, R Iliac artery aneurism, GERD, diverticulitis, hip replacement, knee OA multiple falls p/w confusion and fall being treated for enterococcus faecalis UTI. Rheumatology consulted for elevated ESR/CRP    #Elevated inflammatory markers: ESR 93,  downtrending to 152, no prior markers in system to compare to.   =inflammatory markers are non-specific and can be from various etiologies such as infection, malignancy, rheumatologic, ect.  =currently being treated for UTI infection w/ improvement in mental status per pt and daughter  =no stigmata or features of rheumatologic disease on history or exam   =suspect that elevated inflammatory markers are likely 2/2 UTI, can consider repeating in future once infection resolved to monitor trend    #R knee mild effusion:  =hx of b/l knee OA  =mild R knee effusion on exam, however intact range of motion and nontender to touch    Plan:  -would not pursue further work up of rheumatologic serologies  -if patient develops knee pain and unable to ambulate, please call back rheumatology for evaluation    Will sign off. Please call/TEAMS if any questions     Discussed with Attending Dr. Hemant Winston, DO  Rheumatology Fellow  Available on TEAMS 82 yo F PMH MAT, hypothyroid (on synthroid), HTN, HLD, UTI, R Iliac artery aneurism, GERD, diverticulitis, hip replacement, knee OA multiple falls p/w confusion and fall being treated for enterococcus faecalis UTI. Rheumatology consulted for elevated ESR/CRP    #Elevated inflammatory markers: ESR 93,  downtrending to 152, no prior markers in system to compare to.   =inflammatory markers are non-specific and can be from various etiologies such as infection, malignancy, rheumatologic, ect.  =currently being treated for UTI infection w/ improvement in mental status per pt and daughter  =no stigmata or features of rheumatologic disease on history or exam   =suspect that elevated inflammatory markers are likely 2/2 UTI, can consider repeating in future once infection resolved to monitor trend    #R knee mild effusion:  =hx of b/l knee OA  =mild R knee effusion on exam, however intact range of motion and nontender to touch    Plan:  -would not pursue further work up of rheumatologic serologies  -if patient develops knee pain and unable to ambulate, please call back rheumatology for evaluation    Will sign off. Please call/TEAMS if any questions   Discussed with Attending Dr. Hemant Winston, DO  Rheumatology Fellow  Available on TEAMS 84 yo F PMH MAT, hypothyroid (on synthroid), HTN, HLD, UTI, R Iliac artery aneurism, GERD, diverticulitis, hip replacement, knee OA multiple falls p/w confusion and fall being treated for enterococcus faecalis UTI. Rheumatology consulted for elevated ESR/CRP    #Elevated inflammatory markers: ESR 93,  downtrending to 152, no prior markers in system to compare to.   =inflammatory markers are non-specific and can be from various etiologies such as infection, malignancy, rheumatologic, ect.  =currently being treated for UTI infection w/ improvement in mental status per pt and daughter  =no stigmata or features of rheumatologic disease on history or exam   =suspect that elevated inflammatory markers are likely 2/2 UTI, can consider repeating in future once infection resolved to monitor trend    #R knee mild- moderate effusion:  =hx of b/l knee OA  =mild R knee effusion on exam, however intact range of motion and nontender to touch    Plan:  - no need for further work up at this time, no indications for serological evaluation  -if patient develops knee pain and unable to ambulate, please call back rheumatology for evaluation    Will sign off. Please call/TEAMS if any questions   Discussed with Attending Dr. Hemant Winston, DO  Rheumatology Fellow  Available on TEAMS

## 2024-04-27 NOTE — PHYSICAL THERAPY INITIAL EVALUATION ADULT - ADDITIONAL COMMENTS
Pt lives in a pvt home w/ her son has 1 step at entry and stair lift, ambulated w/ a RW. Family assisted w/ ADLs.

## 2024-04-27 NOTE — PROGRESS NOTE ADULT - SUBJECTIVE AND OBJECTIVE BOX
Patient is a 83y old  Female who presents with a chief complaint of uti (26 Apr 2024 08:11)      SUBJECTIVE / OVERNIGHT EVENTS: Comfortable without new complaints. Was agitated and confused during night. Daughter at bedside.  Review of Systems  chest pain no  palpitations no  sob no  nausea no  headache no    MEDICATIONS  (STANDING):  amitriptyline 25 milliGRAM(s) Oral at bedtime  ampicillin  IVPB 1 Gram(s) IV Intermittent every 8 hours  ascorbic acid 500 milliGRAM(s) Oral daily  atorvastatin 10 milliGRAM(s) Oral at bedtime  buPROPion XL (24-Hour) . 150 milliGRAM(s) Oral daily  calcium carbonate 1250 mG  + Vitamin D (OsCal 500 + D) 1 Tablet(s) Oral daily  carvedilol 25 milliGRAM(s) Oral every 12 hours  chlorhexidine 2% Cloths 1 Application(s) Topical daily  diltiazem    milliGRAM(s) Oral daily  levothyroxine 75 MICROGram(s) Oral daily  loratadine 10 milliGRAM(s) Oral daily  losartan 100 milliGRAM(s) Oral daily  multivitamin/minerals 1 Tablet(s) Oral daily  pantoprazole    Tablet 40 milliGRAM(s) Oral before breakfast    MEDICATIONS  (PRN):  acetaminophen     Tablet .. 650 milliGRAM(s) Oral every 6 hours PRN Temp greater or equal to 38.5C (101.3F), Mild Pain (1 - 3)  ALPRAZolam 1 milliGRAM(s) Oral daily PRN anxiety  benzocaine/menthol Lozenge 1 Lozenge Oral three times a day PRN Sore Throat      Vital Signs Last 24 Hrs  T(C): 36.5 (27 Apr 2024 04:50), Max: 37.3 (26 Apr 2024 21:05)  T(F): 97.7 (27 Apr 2024 04:50), Max: 99.2 (26 Apr 2024 21:05)  HR: 80 (27 Apr 2024 04:50) (75 - 81)  BP: 132/84 (27 Apr 2024 04:50) (132/84 - 169/84)  BP(mean): --  RR: 18 (27 Apr 2024 04:50) (18 - 18)  SpO2: 98% (27 Apr 2024 04:50) (96% - 100%)    Parameters below as of 27 Apr 2024 04:50  Patient On (Oxygen Delivery Method): room air        PHYSICAL EXAM:  GENERAL: NAD  HEAD:  Atraumatic, Normocephalic  EYES: EOMI, PERRLA, conjunctiva and sclera clear  NECK: Supple, No JVD  CHEST/LUNG: Clear to auscultation bilaterally; No wheeze  HEART: Regular rate and rhythm; No murmurs, rubs, or gallops  ABDOMEN: Soft, Nontender, Nondistended; Bowel sounds present  EXTREMITIES:  2+ Peripheral Pulses, No clubbing, cyanosis, or edema  PSYCH: AAOx2  NEUROLOGY: non-focal, L arm weak in splint.  SKIN: No rashes or lesions    LABS:                      RADIOLOGY & ADDITIONAL TESTS:    Imaging Personally Reviewed:    Consultant(s) Notes Reviewed:      Care Discussed with Consultants/Other Providers:

## 2024-04-28 ENCOUNTER — TRANSCRIPTION ENCOUNTER (OUTPATIENT)
Age: 84
End: 2024-04-28

## 2024-04-28 LAB
A-TOCOPHEROL VIT E SERPL-MCNC: 10.6 MG/L — SIGNIFICANT CHANGE UP (ref 9–29)
BETA+GAMMA TOCOPHEROL SERPL-MCNC: 0.3 MG/L — LOW (ref 0.5–4.9)

## 2024-04-28 RX ADMIN — BUPROPION HYDROCHLORIDE 150 MILLIGRAM(S): 150 TABLET, EXTENDED RELEASE ORAL at 11:56

## 2024-04-28 RX ADMIN — Medication 1 TABLET(S): at 11:56

## 2024-04-28 RX ADMIN — Medication 1 MILLIGRAM(S): at 22:44

## 2024-04-28 RX ADMIN — Medication 75 MICROGRAM(S): at 05:28

## 2024-04-28 RX ADMIN — LOSARTAN POTASSIUM 100 MILLIGRAM(S): 100 TABLET, FILM COATED ORAL at 06:18

## 2024-04-28 RX ADMIN — Medication 108 GRAM(S): at 15:32

## 2024-04-28 RX ADMIN — Medication 1 TABLET(S): at 11:57

## 2024-04-28 RX ADMIN — ATORVASTATIN CALCIUM 10 MILLIGRAM(S): 80 TABLET, FILM COATED ORAL at 22:44

## 2024-04-28 RX ADMIN — Medication 108 GRAM(S): at 05:30

## 2024-04-28 RX ADMIN — CARVEDILOL PHOSPHATE 25 MILLIGRAM(S): 80 CAPSULE, EXTENDED RELEASE ORAL at 17:52

## 2024-04-28 RX ADMIN — Medication 120 MILLIGRAM(S): at 06:19

## 2024-04-28 RX ADMIN — PANTOPRAZOLE SODIUM 40 MILLIGRAM(S): 20 TABLET, DELAYED RELEASE ORAL at 06:18

## 2024-04-28 RX ADMIN — Medication 108 GRAM(S): at 22:45

## 2024-04-28 RX ADMIN — Medication 25 MILLIGRAM(S): at 22:44

## 2024-04-28 RX ADMIN — Medication 500 MILLIGRAM(S): at 11:56

## 2024-04-28 RX ADMIN — Medication 650 MILLIGRAM(S): at 04:19

## 2024-04-28 RX ADMIN — CHLORHEXIDINE GLUCONATE 1 APPLICATION(S): 213 SOLUTION TOPICAL at 11:57

## 2024-04-28 RX ADMIN — CARVEDILOL PHOSPHATE 25 MILLIGRAM(S): 80 CAPSULE, EXTENDED RELEASE ORAL at 06:18

## 2024-04-28 RX ADMIN — Medication 650 MILLIGRAM(S): at 03:19

## 2024-04-28 RX ADMIN — LORATADINE 10 MILLIGRAM(S): 10 TABLET ORAL at 11:57

## 2024-04-28 NOTE — DISCHARGE NOTE PROVIDER - NSFOLLOWUPCLINICS_GEN_ALL_ED_FT
[facility]  Neurology  [address] [place] [address] - [address] [place] [address]  Phone: [phone]  Fax:   Follow Up Time: 1 week    [facility]  Rheumatology  [address] [place] [id number]  Phone: [phone]  Fax:   Follow Up Time: 1 week

## 2024-04-28 NOTE — PROGRESS NOTE ADULT - SUBJECTIVE AND OBJECTIVE BOX
CARDIOLOGY FOLLOW UP NOTE - DR[address]RE[address]    Patient Name: JOE URIOSTEGUI    Date of Service: 04-28-24 @ 10:47    events noted      Subjective:    cv: denies chest pain, dyspnea, palpitations, dizziness  pulmonary: denies cough  GI: denies abdominal pain, nausea, vomiting  vascular/legs: no edema   skin: no rash  ROS: otherwise negative   overnight events:      PHYSICAL EXAM:  T(C): 36.2 (04-28-24 @ 04:45), Max: 37.4 (04-27-24 @ 20:26)  HR: 76 (04-28-24 @ 04:45) (61 - 95)  BP: 161/76 (04-28-24 @ 04:45) (135/78 - 161/76)  RR: 18 (04-28-24 @ 04:45) (18 - 18)  SpO2: 96% (04-28-24 @ 04:45) (96% - 98%)  Wt(kg): --  I&O's Summary    27 Apr 2024 07:01  -  28 Apr 2024 07:00  --------------------------------------------------------  IN: 420 mL / OUT: 2000 mL / NET: -1580 mL      Daily     Daily     Appearance: Normal	  Cardiovascular: Normal S1 S2,RRR, No JVD, No murmurs  Respiratory: Lungs clear to auscultation	  Gastrointestinal:  Soft, Non-tender, + BS	  Extremities: Normal range of motion, No clubbing, cyanosis or edema      Home Medications:  acetaminophen 325 mg oral tablet: 1 tab(s) orally as needed for pain (24 Apr 2024 14:07)  ALPRAZolam 1 mg oral tablet: 1 tab(s) orally once a day AS NEEDED (24 Apr 2024 14:07)  amitriptyline 25 mg oral tablet: 1 tab(s) orally once a day (at bedtime) (24 Apr 2024 14:07)  ascorbic acid 500 mg oral tablet: 1 tab(s) orally 2 times a day (24 Apr 2024 14:07)  buPROPion 100 mg oral tablet: 1 tab(s) orally 1 tablet in the morning and 1 tablet in the afternoon (24 Apr 2024 14:00)  calcium-vitamin D 500 mg-5 mcg (200 intl units) oral tablet: 1 tab(s) orally once a day (24 Apr 2024 14:07)  carvedilol 25 mg oral tablet: 1 tab(s) orally every 12 hours (24 Apr 2024 14:07)  Centrum Silver oral tablet: 1 tab(s) orally once a day (24 Apr 2024 14:07)  cetirizine 10 mg oral tablet: 1 tab(s) orally once a day (24 Apr 2024 14:07)  Colace 100 mg oral capsule: 1 cap(s) orally once a day (24 Apr 2024 14:06)  dilTIAZem 120 mg/24 hours oral capsule, extended release: 1 cap(s) orally once a day (24 Apr 2024 14:07)  estradiol vaginal cream: apply every day for the first 2 weeks, then 2x/week thereafter (24 Apr 2024 14:06)  hydrALAZINE 50 mg oral tablet: 1 tab(s) orally 2 times a day at lunch and dinner (24 Apr 2024 14:05)  lidocaine 5% topical film: Apply topically to affected area 3 times a day as needed for  mild pain (24 Apr 2024 14:06)  lovastatin 20 mg oral tablet: 1 tab(s) orally once a day (in the evening) - with dinner (24 Apr 2024 14:07)  MiraLax oral powder for reconstitution: 17 gram(s) orally once a day (24 Apr 2024 14:06)  olmesartan 40 mg oral tablet: 1 tab(s) orally once a day (24 Apr 2024 14:07)  omeprazole 20 mg oral delayed release capsule: 1 cap(s) orally once a day (24 Apr 2024 14:07)  Synthroid 75 mcg (0.075 mg) oral tablet: 1 tab(s) orally once a day 1 tablet M/T/Th/F/Sat and 1.5 tablets on Wed/Sun (24 Apr 2024 15:06)      MEDICATIONS  (STANDING):  amitriptyline 25 milliGRAM(s) Oral at bedtime  ampicillin  IVPB 1 Gram(s) IV Intermittent every 8 hours  ascorbic acid 500 milliGRAM(s) Oral daily  atorvastatin 10 milliGRAM(s) Oral at bedtime  buPROPion XL (24-Hour) . 150 milliGRAM(s) Oral daily  calcium carbonate 1250 mG  + Vitamin D (OsCal 500 + D) 1 Tablet(s) Oral daily  carvedilol 25 milliGRAM(s) Oral every 12 hours  chlorhexidine 2% Cloths 1 Application(s) Topical daily  diltiazem    milliGRAM(s) Oral daily  levothyroxine 75 MICROGram(s) Oral daily  loratadine 10 milliGRAM(s) Oral daily  losartan 100 milliGRAM(s) Oral daily  multivitamin/minerals 1 Tablet(s) Oral daily  pantoprazole    Tablet 40 milliGRAM(s) Oral before breakfast      TELEMETRY: 	    ECG:  	  RADIOLOGY:   DIAGNOSTIC TESTING:  [ ] Echocardiogram:  [ ] Catheterization:  [ ] Stress Test:    OTHER: 	    LABS:	 	    CARDIAC MARKERS:        Troponin T, High Sensitivity Result: 21 ng/L (04-24 @ 11:23)  Troponin T, High Sensitivity Result: 20 ng/L (04-23 @ 18:47)  Troponin T, High Sensitivity Result: 20 ng/L (04-23 @ 18:47)                  proBNP:     Lipid Profile:   HgA1c:

## 2024-04-28 NOTE — DISCHARGE NOTE PROVIDER - NSDCCPCAREPLAN_GEN_ALL_CORE_FT
PRINCIPAL DISCHARGE DIAGNOSIS  Diagnosis: Frequent falls  Assessment and Plan of Treatment: Change positions slowly.  Use ambulation aides as needed.  Follow PT/OT regimen.  Make sure paths are clear and well-lit for walking.  Wear non-skid shoes or socks.  Please seek immediate medical attention for any fall.  Please follow up with primary care, cardiology, and orthopedics.      SECONDARY DISCHARGE DIAGNOSES  Diagnosis: Acute UTI  Assessment and Plan of Treatment: HOME CARE INSTRUCTIONS  You were prescribed antibiotics, so please take them exactly as they are prescribed. Finish the medication even if you feel better after you have only taken some of the medication.  Drink enough water and fluids to keep your urine clear or pale yellow.  Avoid caffeine, tea, and carbonated beverages. They tend to irritate your bladder.  Empty your bladder often. Avoid holding urine for long periods of time.  After a bowel movement, women should cleanse from front to back. Use each tissue only once.  SEEK MEDICAL CARE IF:  You have back pain.  You develop a fever.  Your symptoms do not begin to resolve within 3 days.  SEEK IMMEDIATE MEDICAL CARE IF:  You have severe back pain or lower abdominal pain.  You develop chills.  You have nausea or vomiting.  You have continued burning or discomfort with urination.  Please follow up with primary care.    Diagnosis: Confusion  Assessment and Plan of Treatment: HOME CARE INSTRUCTIONS  What family and friends can do:  To find out if someone is confused ask him or their name, age, and the date. If the person is unsure or answers incorrectly, he or she is confused.  Always introduce yourself, no matter how well the person knows you.  Often remind the person of his or her location.  Place a calendar and clock near the confused person.  Talk about current events and plans for the day.  Try to keep the environment calm, quiet and peaceful.  Make sure the patient keeps follow up appointments with their physician.  PREVENTION  Ways to prevent confusion:  Avoid alcohol.  Eat a balanced diet.  Get enough sleep.  Do not become isolated. Spend time with other people and make plans for your days.  Keep careful watch on your blood sugar levels if you are diabetic.  SEEK IMMEDIATE MEDICAL CARE IF:  You develop severe headaches, repeated vomiting, seizures, blackouts or slurred speech.  There is increasing confusion, weakness, numbness, restlessness or personality changes.  You develop a loss of balance, have marked dizziness, feel uncoordinated or fall.  You have delusions, hallucinations or develop severe anxiety.  Your family members think you need to be rechecked.  Please follow up with neurology and rheumatology.

## 2024-04-28 NOTE — DISCHARGE NOTE PROVIDER - CARE PROVIDERS DIRECT ADDRESSES
,DirectAddress_Unknown,[email or web address]

## 2024-04-28 NOTE — DISCHARGE NOTE PROVIDER - HOSPITAL COURSE
HPI:  83y (1940) years old woman with a PMH of groin aneurysm, brain aneurysm x 2, hypertension, mitral valve prolapse, hyperlipidemia, prolapsed bladder, cataracts, arthritis, right hip replacement, duodenal laceration, prediabetes, GERD, diverticulitis, hiatal hernia presents status post 2 falls this morning. She was found after the fall per daughter at 3 am and has had a fall earlier this month. Per daughter this has been recurrent and per patient she has been clumsy all her life. This has worsened after surgeries in the hip per son. Patient otherwise has been experiencing hallucinations and confusion per daughter. She was seeing a photo that was not really present. She thought that President Enio fell and expressed this fact to her daughter. She has been thinking that she will be going to [facility] for evaluation and claimed that her son informed her that he will take her. When her daughter confronted her and denied this information, she said that this was not a true story. She was evaluated in the ED for a fall back in April 9th for which she was evaluated and was found to have an anterior shoulder dislocation. She was diagnosed with brachial plexus injury. She was admitted earlier March 28 to April 2 for sepsis i/s/o UTI and was treated with antibiotics. Patient ambulates with a walker at baseline otherwise for the last year. Patient has been reportedly having recurrent falls over the past year that worsened after bilateral hips surgeries. Patient has been receiving rehab for the falls. (23 Apr 2024 18:01)      Hospital Course:  Patient came in s/p 2 falls. She was found by her daughter who also noted that the patient was having confusion and hallucinations. At the time of the fall she complained of neck pain, suprapubic pain, and chest pain. MRI c-spine showed Cervical spine facet arthrosis with question of widening of the right C4/C5 facet. Neuro recc no intervention at this time. She also suffered a brachial plexus injury after left shoulder dislocation and humerus impaction fx on a previous fall. Ortho saw her this admission and recommended a sling. PT and OT also evaluated the patient. OT recommended outpatient, and PT recommended JUAN. However, patient's son wants to take her home, so outpatient PT was offered for dc.  Patient was seen by cardio for chest pain complaint, and was cleared for dc with no acute findings or concern for ACS.  Patient was found to have UTI on admission. Was treated with IV ampicillin and given PO Amoxicillin per ID recc to finish outpatient. CRP was elevated on admission, for which rheum was consulted. Inflammatory lab work up was drawn, but rheum signed off with no concern for acute etiology, likely from UTI.  The patient was seen by neurology this admission for the confusion and hx of aneurysm. MRI showed stability and neuro cleared patient for dc with outpatient follow up. No neuro cause was found to contribute to confusion. More likely hospital delirium + UTI.      Important Medication Changes and Reason:  See medication reconciliation.      Active or Pending Issues Requiring Follow-up:  Follow up with:  PCP  Rheum  Cards  Neuro  Ortho      Advanced Directives:   [X] Full code  [ ] DNR  [ ] Hospice      Discharge Diagnoses:  AMS  UTI  Fall HPI:  83y (1940) years old woman with a PMH of groin aneurysm, brain aneurysm x 2, hypertension, mitral valve prolapse, hyperlipidemia, prolapsed bladder, cataracts, arthritis, right hip replacement, duodenal laceration, prediabetes, GERD, diverticulitis, hiatal hernia presents status post 2 falls this morning. She was found after the fall per daughter at 3 am and has had a fall earlier this month. Per daughter this has been recurrent and per patient she has been clumsy all her life. This has worsened after surgeries in the hip per son. Patient otherwise has been experiencing hallucinations and confusion per daughter. She was seeing a photo that was not really present. She thought that President Enio fell and expressed this fact to her daughter. She has been thinking that she will be going to [facility] for evaluation and claimed that her son informed her that he will take her. When her daughter confronted her and denied this information, she said that this was not a true story. She was evaluated in the ED for a fall back in April 9th for which she was evaluated and was found to have an anterior shoulder dislocation. She was diagnosed with brachial plexus injury. She was admitted earlier March 28 to April 2 for sepsis i/s/o UTI and was treated with antibiotics. Patient ambulates with a walker at baseline otherwise for the last year. Patient has been reportedly having recurrent falls over the past year that worsened after bilateral hips surgeries. Patient has been receiving rehab for the falls. (23 Apr 2024 18:01)      Hospital Course:  Patient came in s/p 2 falls. She was found by her daughter who also noted that the patient was having confusion and hallucinations. At the time of the fall she complained of neck pain, suprapubic pain, and chest pain. MRI c-spine showed Cervical spine facet arthrosis with question of widening of the right C4/C5 facet. Neuro recc no intervention at this time. She also suffered a brachial plexus injury after left shoulder dislocation and humerus impaction fx on a previous fall. Ortho saw her this admission and recommended a sling. PT and OT also evaluated the patient. OT recommended outpatient, and PT recommended JUAN. However, patient's son wants to take her home, so home PT was offered for dc.  Patient was seen by cardio for chest pain complaint, and was cleared for dc with no acute findings or concern for ACS.  Patient was found to have UTI on admission. Was treated with IV ampicillin and given PO Amoxicillin per ID recc to finish outpatient. CRP was elevated on admission, for which rheum was consulted. Inflammatory lab work up was drawn, but rheum signed off with no concern for acute etiology, likely from UTI.  The patient was seen by neurology this admission for the confusion and hx of aneurysm. MRI showed stability and neuro cleared patient for dc with outpatient follow up. No neuro cause was found to contribute to confusion. More likely hospital delirium + UTI.      Important Medication Changes and Reason:  See medication reconciliation.      Active or Pending Issues Requiring Follow-up:  Follow up with:  PCP  Rheum  Cards  Neuro  Ortho      Advanced Directives:   [X] Full code  [ ] DNR  [ ] Hospice      Discharge Diagnoses:  AMS  UTI  Fall

## 2024-04-28 NOTE — DISCHARGE NOTE PROVIDER - NSDCMRMEDTOKEN_GEN_ALL_CORE_FT
acetaminophen 325 mg oral tablet: 1 tab(s) orally as needed for pain  ALPRAZolam 1 mg oral tablet: 1 tab(s) orally once a day AS NEEDED  amitriptyline 25 mg oral tablet: 1 tab(s) orally once a day (at bedtime)  ascorbic acid 500 mg oral tablet: 1 tab(s) orally 2 times a day  buPROPion 100 mg oral tablet: 1 tab(s) orally 1 tablet in the morning and 1 tablet in the afternoon  calcium-vitamin D 500 mg-5 mcg (200 intl units) oral tablet: 1 tab(s) orally once a day  carvedilol 25 mg oral tablet: 1 tab(s) orally every 12 hours  Centrum Silver oral tablet: 1 tab(s) orally once a day  cetirizine 10 mg oral tablet: 1 tab(s) orally once a day  Colace 100 mg oral capsule: 1 cap(s) orally once a day  dilTIAZem 120 mg/24 hours oral capsule, extended release: 1 cap(s) orally once a day  estradiol vaginal cream: apply every day for the first 2 weeks, then 2x/week thereafter  hydrALAZINE 50 mg oral tablet: 1 tab(s) orally 2 times a day at lunch and dinner  lidocaine 5% topical film: Apply topically to affected area 3 times a day as needed for  mild pain  lovastatin 20 mg oral tablet: 1 tab(s) orally once a day (in the evening) - with dinner  MiraLax oral powder for reconstitution: 17 gram(s) orally once a day  olmesartan 40 mg oral tablet: 1 tab(s) orally once a day  omeprazole 20 mg oral delayed release capsule: 1 cap(s) orally once a day  Physical Therapy: Evaluate and treat  Synthroid 75 mcg (0.075 mg) oral tablet: 1 tab(s) orally once a day 1 tablet M/T/Th/F/Sat and 1.5 tablets on Wed/Sun   acetaminophen 325 mg oral tablet: 1 tab(s) orally every 6 hours as needed for pain  ALPRAZolam 1 mg oral tablet: 1 tab(s) orally once a day AS NEEDED  amitriptyline 25 mg oral tablet: 1 tab(s) orally once a day (at bedtime)  amoxicillin 500 mg oral capsule: 1 cap(s) orally 2 times a day  ascorbic acid 500 mg oral tablet: 1 tab(s) orally 2 times a day  buPROPion 100 mg oral tablet: 1 tab(s) orally twice a day with breakfast and lunch 1 tablet in the morning and 1 tablet in the afternoon  calcium-vitamin D 500 mg-5 mcg (200 intl units) oral tablet: 1 tab(s) orally once a day  carvedilol 25 mg oral tablet: 1 tab(s) orally every 12 hours  Centrum Silver oral tablet: 1 tab(s) orally once a day  cetirizine 10 mg oral tablet: 1 tab(s) orally once a day  Colace 100 mg oral capsule: 1 cap(s) orally once a day  dilTIAZem 120 mg/24 hours oral capsule, extended release: 1 cap(s) orally once a day  estradiol vaginal cream: apply every day for the first 2 weeks, then 2x/week thereafter  lidocaine 5% topical film: Apply topically to affected area 3 times a day as needed for  mild pain  lovastatin 20 mg oral tablet: 1 tab(s) orally once a day (in the evening) - with dinner  MiraLax oral powder for reconstitution: 17 gram(s) orally once a day  olmesartan 40 mg oral tablet: 1 tab(s) orally once a day  omeprazole 20 mg oral delayed release capsule: 1 cap(s) orally once a day  Synthroid 75 mcg (0.075 mg) oral tablet: 1 tab(s) orally once a day 1 tablet M/T/Th/F/Sat and 1.5 tablets on Wed/Sun

## 2024-04-28 NOTE — DISCHARGE NOTE PROVIDER - CARE PROVIDER_API CALL
Marline Bear  [facility]  [address] [place] [address]  Phone: [phone]  Fax: [phone]  Established Patient  Follow Up Time: 1 week    Teddy Lopez  Orthopaedic Surgery  [address] [place] [address]  Phone: [phone]  Fax: [phone]  Follow Up Time: 2 weeks    Lyndon Calle  Cardiovascular Disease  [address], [address]  [place] [address]  Phone: [phone]  Fax: [phone]  Follow Up Time: 1 week

## 2024-04-28 NOTE — DISCHARGE NOTE PROVIDER - NSDCFUADDAPPT_GEN_ALL_CORE_FT
APPTS ARE READY TO BE MADE: [X] YES    Best Family or Patient Contact (if needed):  SonBran [phone]   or daughter, Shae Laguna [phone]    Additional Information about above appointments (if needed):    1: Please follow up with primary care.  2: Please follow up with orthopedics.  3: Please follow up with neurology.  4: Please follow up with cardiology.  5: Please follow up with rheumatology.    Other comments or requests:    APPTS ARE READY TO BE MADE: [X] YES    Best Family or Patient Contact (if needed):  SonBran [phone]   or daughter, Shae Laguna [phone]    Additional Information about above appointments (if needed):    1: Please follow up with primary care.  2: Please follow up with orthopedics.  3: Please follow up with neurology.  4: Please follow up with cardiology.  5: Please follow up with rheumatology.    Other comments or requests:       4/29, 4/30, 5/1 Patient was outreached but did not answer. A voicemail was left for the patient to return our call.

## 2024-04-28 NOTE — DISCHARGE NOTE PROVIDER - PROVIDER TOKENS
PROVIDER:[TOKEN:[[id number]],FOLLOWUP:[1 week],ESTABLISHEDPATIENT:[T]],PROVIDER:[TOKEN:[[id number]],FOLLOWUP:[2 weeks]],PROVIDER:[TOKEN:[[id number]],FOLLOWUP:[1 week]]

## 2024-04-28 NOTE — PROGRESS NOTE ADULT - SUBJECTIVE AND OBJECTIVE BOX
Patient is a 83y old  Female who presents with a chief complaint of fall (27 Apr 2024 14:43)      SUBJECTIVE / OVERNIGHT EVENTS: Comfortable without new complaints. Daughter at bedside.  Review of Systems  chest pain no  palpitations no  sob no  nausea no  headache no    MEDICATIONS  (STANDING):  amitriptyline 25 milliGRAM(s) Oral at bedtime  ampicillin  IVPB 1 Gram(s) IV Intermittent every 8 hours  ascorbic acid 500 milliGRAM(s) Oral daily  atorvastatin 10 milliGRAM(s) Oral at bedtime  buPROPion XL (24-Hour) . 150 milliGRAM(s) Oral daily  calcium carbonate 1250 mG  + Vitamin D (OsCal 500 + D) 1 Tablet(s) Oral daily  carvedilol 25 milliGRAM(s) Oral every 12 hours  chlorhexidine 2% Cloths 1 Application(s) Topical daily  diltiazem    milliGRAM(s) Oral daily  levothyroxine 75 MICROGram(s) Oral daily  loratadine 10 milliGRAM(s) Oral daily  losartan 100 milliGRAM(s) Oral daily  multivitamin/minerals 1 Tablet(s) Oral daily  pantoprazole    Tablet 40 milliGRAM(s) Oral before breakfast    MEDICATIONS  (PRN):  acetaminophen     Tablet .. 650 milliGRAM(s) Oral every 6 hours PRN Temp greater or equal to 38.5C (101.3F), Mild Pain (1 - 3)  ALPRAZolam 1 milliGRAM(s) Oral daily PRN anxiety  benzocaine/menthol Lozenge 1 Lozenge Oral three times a day PRN Sore Throat      Vital Signs Last 24 Hrs  T(C): 36.6 (28 Apr 2024 11:32), Max: 37.4 (27 Apr 2024 20:26)  T(F): 97.9 (28 Apr 2024 11:32), Max: 99.3 (27 Apr 2024 20:26)  HR: 69 (28 Apr 2024 11:32) (61 - 76)  BP: 152/77 (28 Apr 2024 11:32) (152/77 - 161/76)  BP(mean): --  RR: 18 (28 Apr 2024 11:32) (18 - 18)  SpO2: 95% (28 Apr 2024 11:32) (95% - 96%)    Parameters below as of 28 Apr 2024 11:32  Patient On (Oxygen Delivery Method): room air        PHYSICAL EXAM:  GENERAL: NAD   HEAD:  Atraumatic, Normocephalic  EYES: EOMI, PERRLA, conjunctiva and sclera clear  NECK: Supple, No JVD  CHEST/LUNG: Clear to auscultation bilaterally; No wheeze  HEART: Regular rate and rhythm; No murmurs, rubs, or gallops  ABDOMEN: Soft, Nontender, Nondistended; Bowel sounds present  EXTREMITIES:  2+ Peripheral Pulses, No clubbing, cyanosis, or edema  PSYCH: AAOx2  NEUROLOGY: non-focal, L arm weakness   SKIN: No rashes or lesions    LABS:                      RADIOLOGY & ADDITIONAL TESTS:    Imaging Personally Reviewed:    Consultant(s) Notes Reviewed:      Care Discussed with Consultants/Other Providers:

## 2024-04-29 ENCOUNTER — TRANSCRIPTION ENCOUNTER (OUTPATIENT)
Age: 84
End: 2024-04-29

## 2024-04-29 VITALS
SYSTOLIC BLOOD PRESSURE: 148 MMHG | HEART RATE: 71 BPM | OXYGEN SATURATION: 97 % | TEMPERATURE: 98 F | DIASTOLIC BLOOD PRESSURE: 84 MMHG | RESPIRATION RATE: 18 BRPM

## 2024-04-29 LAB
ANA TITR SER: NEGATIVE — SIGNIFICANT CHANGE UP
ANION GAP SERPL CALC-SCNC: 12 MMOL/L — SIGNIFICANT CHANGE UP (ref 5–17)
BUN SERPL-MCNC: 11 MG/DL — SIGNIFICANT CHANGE UP (ref 7–23)
C3 SERPL-MCNC: 193 MG/DL — HIGH (ref 81–157)
C4 SERPL-MCNC: 33 MG/DL — SIGNIFICANT CHANGE UP (ref 13–39)
CALCIUM SERPL-MCNC: 10.2 MG/DL — SIGNIFICANT CHANGE UP (ref 8.4–10.5)
CHLORIDE SERPL-SCNC: 95 MMOL/L — LOW (ref 96–108)
CO2 SERPL-SCNC: 26 MMOL/L — SIGNIFICANT CHANGE UP (ref 22–31)
CREAT SERPL-MCNC: 0.62 MG/DL — SIGNIFICANT CHANGE UP (ref 0.5–1.3)
DSDNA AB SER-ACNC: <1 IU/ML — SIGNIFICANT CHANGE UP
EGFR: 89 ML/MIN/1.73M2 — SIGNIFICANT CHANGE UP
GLUCOSE SERPL-MCNC: 123 MG/DL — HIGH (ref 70–99)
HCT VFR BLD CALC: 36.5 % — SIGNIFICANT CHANGE UP (ref 34.5–45)
HGB BLD-MCNC: 12.2 G/DL — SIGNIFICANT CHANGE UP (ref 11.5–15.5)
MCHC RBC-ENTMCNC: 30.6 PG — SIGNIFICANT CHANGE UP (ref 27–34)
MCHC RBC-ENTMCNC: 33.4 GM/DL — SIGNIFICANT CHANGE UP (ref 32–36)
MCV RBC AUTO: 91.5 FL — SIGNIFICANT CHANGE UP (ref 80–100)
NRBC # BLD: 0 /100 WBCS — SIGNIFICANT CHANGE UP (ref 0–0)
PLATELET # BLD AUTO: 423 K/UL — HIGH (ref 150–400)
POTASSIUM SERPL-MCNC: 3.9 MMOL/L — SIGNIFICANT CHANGE UP (ref 3.5–5.3)
POTASSIUM SERPL-SCNC: 3.9 MMOL/L — SIGNIFICANT CHANGE UP (ref 3.5–5.3)
RBC # BLD: 3.99 M/UL — SIGNIFICANT CHANGE UP (ref 3.8–5.2)
RBC # FLD: 13.3 % — SIGNIFICANT CHANGE UP (ref 10.3–14.5)
SODIUM SERPL-SCNC: 133 MMOL/L — LOW (ref 135–145)
WBC # BLD: 10.02 K/UL — SIGNIFICANT CHANGE UP (ref 3.8–10.5)
WBC # FLD AUTO: 10.02 K/UL — SIGNIFICANT CHANGE UP (ref 3.8–10.5)

## 2024-04-29 PROCEDURE — 82435 ASSAY OF BLOOD CHLORIDE: CPT

## 2024-04-29 PROCEDURE — 82550 ASSAY OF CK (CPK): CPT

## 2024-04-29 PROCEDURE — 70498 CT ANGIOGRAPHY NECK: CPT | Mod: MC

## 2024-04-29 PROCEDURE — 82330 ASSAY OF CALCIUM: CPT

## 2024-04-29 PROCEDURE — 97162 PT EVAL MOD COMPLEX 30 MIN: CPT

## 2024-04-29 PROCEDURE — 82947 ASSAY GLUCOSE BLOOD QUANT: CPT

## 2024-04-29 PROCEDURE — 86038 ANTINUCLEAR ANTIBODIES: CPT

## 2024-04-29 PROCEDURE — 83605 ASSAY OF LACTIC ACID: CPT

## 2024-04-29 PROCEDURE — 86225 DNA ANTIBODY NATIVE: CPT

## 2024-04-29 PROCEDURE — 70450 CT HEAD/BRAIN W/O DYE: CPT | Mod: MC

## 2024-04-29 PROCEDURE — 84443 ASSAY THYROID STIM HORMONE: CPT

## 2024-04-29 PROCEDURE — 87086 URINE CULTURE/COLONY COUNT: CPT

## 2024-04-29 PROCEDURE — 82803 BLOOD GASES ANY COMBINATION: CPT

## 2024-04-29 PROCEDURE — 85610 PROTHROMBIN TIME: CPT

## 2024-04-29 PROCEDURE — 86140 C-REACTIVE PROTEIN: CPT

## 2024-04-29 PROCEDURE — 87637 SARSCOV2&INF A&B&RSV AMP PRB: CPT

## 2024-04-29 PROCEDURE — 82553 CREATINE MB FRACTION: CPT

## 2024-04-29 PROCEDURE — 85652 RBC SED RATE AUTOMATED: CPT

## 2024-04-29 PROCEDURE — 82140 ASSAY OF AMMONIA: CPT

## 2024-04-29 PROCEDURE — 70551 MRI BRAIN STEM W/O DYE: CPT | Mod: MC

## 2024-04-29 PROCEDURE — 74177 CT ABD & PELVIS W/CONTRAST: CPT | Mod: MC

## 2024-04-29 PROCEDURE — 84155 ASSAY OF PROTEIN SERUM: CPT

## 2024-04-29 PROCEDURE — 82607 VITAMIN B-12: CPT

## 2024-04-29 PROCEDURE — 84132 ASSAY OF SERUM POTASSIUM: CPT

## 2024-04-29 PROCEDURE — 80048 BASIC METABOLIC PNL TOTAL CA: CPT

## 2024-04-29 PROCEDURE — 87077 CULTURE AEROBIC IDENTIFY: CPT

## 2024-04-29 PROCEDURE — 84446 ASSAY OF VITAMIN E: CPT

## 2024-04-29 PROCEDURE — 71552 MRI CHEST W/O & W/DYE: CPT | Mod: MC

## 2024-04-29 PROCEDURE — 87641 MR-STAPH DNA AMP PROBE: CPT

## 2024-04-29 PROCEDURE — 80053 COMPREHEN METABOLIC PANEL: CPT

## 2024-04-29 PROCEDURE — 87640 STAPH A DNA AMP PROBE: CPT

## 2024-04-29 PROCEDURE — 85730 THROMBOPLASTIN TIME PARTIAL: CPT

## 2024-04-29 PROCEDURE — 71045 X-RAY EXAM CHEST 1 VIEW: CPT

## 2024-04-29 PROCEDURE — 72141 MRI NECK SPINE W/O DYE: CPT | Mod: MC

## 2024-04-29 PROCEDURE — 83090 ASSAY OF HOMOCYSTEINE: CPT

## 2024-04-29 PROCEDURE — 84207 ASSAY OF VITAMIN B-6: CPT

## 2024-04-29 PROCEDURE — 99285 EMERGENCY DEPT VISIT HI MDM: CPT

## 2024-04-29 PROCEDURE — 82525 ASSAY OF COPPER: CPT

## 2024-04-29 PROCEDURE — 70496 CT ANGIOGRAPHY HEAD: CPT | Mod: MC

## 2024-04-29 PROCEDURE — 71260 CT THORAX DX C+: CPT | Mod: MC

## 2024-04-29 PROCEDURE — 85018 HEMOGLOBIN: CPT

## 2024-04-29 PROCEDURE — 86334 IMMUNOFIX E-PHORESIS SERUM: CPT

## 2024-04-29 PROCEDURE — 36415 COLL VENOUS BLD VENIPUNCTURE: CPT

## 2024-04-29 PROCEDURE — 84484 ASSAY OF TROPONIN QUANT: CPT

## 2024-04-29 PROCEDURE — A9585: CPT

## 2024-04-29 PROCEDURE — 97165 OT EVAL LOW COMPLEX 30 MIN: CPT

## 2024-04-29 PROCEDURE — 85025 COMPLETE CBC W/AUTO DIFF WBC: CPT

## 2024-04-29 PROCEDURE — 73030 X-RAY EXAM OF SHOULDER: CPT

## 2024-04-29 PROCEDURE — 84295 ASSAY OF SERUM SODIUM: CPT

## 2024-04-29 PROCEDURE — 87186 SC STD MICRODIL/AGAR DIL: CPT

## 2024-04-29 PROCEDURE — 83921 ORGANIC ACID SINGLE QUANT: CPT

## 2024-04-29 PROCEDURE — 84165 PROTEIN E-PHORESIS SERUM: CPT

## 2024-04-29 PROCEDURE — 84630 ASSAY OF ZINC: CPT

## 2024-04-29 PROCEDURE — 82746 ASSAY OF FOLIC ACID SERUM: CPT

## 2024-04-29 PROCEDURE — 85027 COMPLETE CBC AUTOMATED: CPT

## 2024-04-29 PROCEDURE — 81001 URINALYSIS AUTO W/SCOPE: CPT

## 2024-04-29 PROCEDURE — 93005 ELECTROCARDIOGRAM TRACING: CPT

## 2024-04-29 PROCEDURE — 86160 COMPLEMENT ANTIGEN: CPT

## 2024-04-29 PROCEDURE — 83690 ASSAY OF LIPASE: CPT

## 2024-04-29 PROCEDURE — 85014 HEMATOCRIT: CPT

## 2024-04-29 RX ORDER — AMOXICILLIN 250 MG/5ML
500 SUSPENSION, RECONSTITUTED, ORAL (ML) ORAL
Refills: 0 | Status: DISCONTINUED | OUTPATIENT
Start: 2024-04-29 | End: 2024-04-29

## 2024-04-29 RX ORDER — ACETAMINOPHEN 500 MG
1 TABLET ORAL
Qty: 0 | Refills: 0 | DISCHARGE

## 2024-04-29 RX ORDER — LANOLIN ALCOHOL/MO/W.PET/CERES
3 CREAM (GRAM) TOPICAL ONCE
Refills: 0 | Status: COMPLETED | OUTPATIENT
Start: 2024-04-29 | End: 2024-04-29

## 2024-04-29 RX ORDER — BUPROPION HYDROCHLORIDE 150 MG/1
1 TABLET, EXTENDED RELEASE ORAL
Qty: 0 | Refills: 0 | DISCHARGE

## 2024-04-29 RX ORDER — AMOXICILLIN 250 MG/5ML
1 SUSPENSION, RECONSTITUTED, ORAL (ML) ORAL
Qty: 4 | Refills: 0
Start: 2024-04-29 | End: 2024-04-30

## 2024-04-29 RX ORDER — HYDRALAZINE HCL 50 MG
1 TABLET ORAL
Refills: 0 | DISCHARGE

## 2024-04-29 RX ADMIN — PANTOPRAZOLE SODIUM 40 MILLIGRAM(S): 20 TABLET, DELAYED RELEASE ORAL at 06:38

## 2024-04-29 RX ADMIN — Medication 120 MILLIGRAM(S): at 06:38

## 2024-04-29 RX ADMIN — Medication 500 MILLIGRAM(S): at 13:10

## 2024-04-29 RX ADMIN — Medication 75 MICROGRAM(S): at 05:38

## 2024-04-29 RX ADMIN — CARVEDILOL PHOSPHATE 25 MILLIGRAM(S): 80 CAPSULE, EXTENDED RELEASE ORAL at 17:23

## 2024-04-29 RX ADMIN — Medication 500 MILLIGRAM(S): at 17:24

## 2024-04-29 RX ADMIN — CHLORHEXIDINE GLUCONATE 1 APPLICATION(S): 213 SOLUTION TOPICAL at 13:11

## 2024-04-29 RX ADMIN — Medication 1 TABLET(S): at 13:11

## 2024-04-29 RX ADMIN — Medication 108 GRAM(S): at 06:40

## 2024-04-29 RX ADMIN — BUPROPION HYDROCHLORIDE 150 MILLIGRAM(S): 150 TABLET, EXTENDED RELEASE ORAL at 13:11

## 2024-04-29 RX ADMIN — CARVEDILOL PHOSPHATE 25 MILLIGRAM(S): 80 CAPSULE, EXTENDED RELEASE ORAL at 06:38

## 2024-04-29 RX ADMIN — LOSARTAN POTASSIUM 100 MILLIGRAM(S): 100 TABLET, FILM COATED ORAL at 06:38

## 2024-04-29 RX ADMIN — Medication 3 MILLIGRAM(S): at 02:13

## 2024-04-29 RX ADMIN — LORATADINE 10 MILLIGRAM(S): 10 TABLET ORAL at 13:11

## 2024-04-29 NOTE — PROGRESS NOTE ADULT - SUBJECTIVE AND OBJECTIVE BOX
CARDIOLOGY FOLLOW UP - Dr. Calle  DATE OF SERVICE: 4/29/24    CC  No acute cv events    REVIEW OF SYSTEMS:  CONSTITUTIONAL: No fever, weight loss, or fatigue  RESPIRATORY: No cough, wheezing, chills or hemoptysis; No Shortness of Breath  CARDIOVASCULAR: No chest pain, palpitations, passing out, dizziness, or leg swelling  GASTROINTESTINAL: No abdominal or epigastric pain. No nausea, vomiting, or hematemesis; No diarrhea or constipation. No melena or hematochezia.  VASCULAR: No edema     PHYSICAL EXAM:  T(C): 36.9 (04-29-24 @ 05:40), Max: 37.2 (04-28-24 @ 20:58)  HR: 65 (04-29-24 @ 05:40) (65 - 75)  BP: 131/88 (04-29-24 @ 05:40) (131/88 - 167/95)  RR: 18 (04-29-24 @ 05:40) (18 - 18)  SpO2: 95% (04-29-24 @ 05:40) (94% - 96%)  Wt(kg): --  I&O's Summary    28 Apr 2024 07:01  -  29 Apr 2024 07:00  --------------------------------------------------------  IN: 1080 mL / OUT: 450 mL / NET: 630 mL        Appearance: Elderly female 	  Cardiovascular: Normal S1 S2,RRR, No JVD, No murmurs  Respiratory: Lungs clear to auscultation b/l   Gastrointestinal:  Soft, Non-tender, + BS	  Extremities: Normal range of motion, No clubbing, cyanosis or edema      Home Medications:  acetaminophen 325 mg oral tablet: 1 tab(s) orally as needed for pain (24 Apr 2024 14:07)  ALPRAZolam 1 mg oral tablet: 1 tab(s) orally once a day AS NEEDED (24 Apr 2024 14:07)  amitriptyline 25 mg oral tablet: 1 tab(s) orally once a day (at bedtime) (24 Apr 2024 14:07)  ascorbic acid 500 mg oral tablet: 1 tab(s) orally 2 times a day (24 Apr 2024 14:07)  buPROPion 100 mg oral tablet: 1 tab(s) orally 1 tablet in the morning and 1 tablet in the afternoon (24 Apr 2024 14:00)  calcium-vitamin D 500 mg-5 mcg (200 intl units) oral tablet: 1 tab(s) orally once a day (24 Apr 2024 14:07)  carvedilol 25 mg oral tablet: 1 tab(s) orally every 12 hours (24 Apr 2024 14:07)  Centrum Silver oral tablet: 1 tab(s) orally once a day (24 Apr 2024 14:07)  cetirizine 10 mg oral tablet: 1 tab(s) orally once a day (24 Apr 2024 14:07)  Colace 100 mg oral capsule: 1 cap(s) orally once a day (24 Apr 2024 14:06)  dilTIAZem 120 mg/24 hours oral capsule, extended release: 1 cap(s) orally once a day (24 Apr 2024 14:07)  estradiol vaginal cream: apply every day for the first 2 weeks, then 2x/week thereafter (24 Apr 2024 14:06)  hydrALAZINE 50 mg oral tablet: 1 tab(s) orally 2 times a day at lunch and dinner (24 Apr 2024 14:05)  lidocaine 5% topical film: Apply topically to affected area 3 times a day as needed for  mild pain (24 Apr 2024 14:06)  lovastatin 20 mg oral tablet: 1 tab(s) orally once a day (in the evening) - with dinner (24 Apr 2024 14:07)  MiraLax oral powder for reconstitution: 17 gram(s) orally once a day (24 Apr 2024 14:06)  olmesartan 40 mg oral tablet: 1 tab(s) orally once a day (24 Apr 2024 14:07)  omeprazole 20 mg oral delayed release capsule: 1 cap(s) orally once a day (24 Apr 2024 14:07)  Synthroid 75 mcg (0.075 mg) oral tablet: 1 tab(s) orally once a day 1 tablet M/T/Th/F/Sat and 1.5 tablets on Wed/Sun (24 Apr 2024 15:06)      MEDICATIONS  (STANDING):  amitriptyline 25 milliGRAM(s) Oral at bedtime  ampicillin  IVPB 1 Gram(s) IV Intermittent every 8 hours  ascorbic acid 500 milliGRAM(s) Oral daily  atorvastatin 10 milliGRAM(s) Oral at bedtime  buPROPion XL (24-Hour) . 150 milliGRAM(s) Oral daily  calcium carbonate 1250 mG  + Vitamin D (OsCal 500 + D) 1 Tablet(s) Oral daily  carvedilol 25 milliGRAM(s) Oral every 12 hours  chlorhexidine 2% Cloths 1 Application(s) Topical daily  diltiazem    milliGRAM(s) Oral daily  levothyroxine 75 MICROGram(s) Oral daily  loratadine 10 milliGRAM(s) Oral daily  losartan 100 milliGRAM(s) Oral daily  multivitamin/minerals 1 Tablet(s) Oral daily  pantoprazole    Tablet 40 milliGRAM(s) Oral before breakfast      TELEMETRY: 	    ECG:  	  RADIOLOGY:   DIAGNOSTIC TESTING:  [ ] Echocardiogram:  [ ]  Catheterization:  [ ] Stress Test:    OTHER: 	    LABS:	 	    Creatine Kinase, Serum: 54 U/L [25 - 170] (04-24 @ 11:23)  Troponin T, High Sensitivity Result: 21 ng/L [0 - 51] (04-24 @ 11:23)  Creatine Kinase, Serum: 40 U/L [25 - 170] (04-23 @ 18:47)  CKMB Units: 1.8 ng/mL [0.0 - 3.8] (04-23 @ 18:47)  Troponin T, High Sensitivity Result: 20 ng/L [0 - 51] (04-23 @ 18:47)  Troponin T, High Sensitivity Result: 20 ng/L [0 - 51] (04-23 @ 18:47)  Troponin T, High Sensitivity Result: 19 ng/L [0 - 51] (04-23 @ 09:50)                          12.2   10.02 )-----------( 423      ( 29 Apr 2024 07:00 )             36.5     04-29    133<L>  |  95<L>  |  11  ----------------------------<  123<H>  3.9   |  26  |  0.62    Ca    10.2      29 Apr 2024 07:04

## 2024-04-29 NOTE — PROGRESS NOTE ADULT - SUBJECTIVE AND OBJECTIVE BOX
Patient is a 83y old  Female who presents with a chief complaint of fall (27 Apr 2024 14:43)      SUBJECTIVE / OVERNIGHT EVENTS: Comfortable without new complaints. Family at bedside.   Review of Systems  chest pain no  palpitations no  sob no  nausea no  headache no    MEDICATIONS  (STANDING):  amitriptyline 25 milliGRAM(s) Oral at bedtime  amoxicillin 500 milliGRAM(s) Oral two times a day  ascorbic acid 500 milliGRAM(s) Oral daily  atorvastatin 10 milliGRAM(s) Oral at bedtime  buPROPion XL (24-Hour) . 150 milliGRAM(s) Oral daily  calcium carbonate 1250 mG  + Vitamin D (OsCal 500 + D) 1 Tablet(s) Oral daily  carvedilol 25 milliGRAM(s) Oral every 12 hours  chlorhexidine 2% Cloths 1 Application(s) Topical daily  diltiazem    milliGRAM(s) Oral daily  levothyroxine 75 MICROGram(s) Oral daily  loratadine 10 milliGRAM(s) Oral daily  losartan 100 milliGRAM(s) Oral daily  multivitamin/minerals 1 Tablet(s) Oral daily  pantoprazole    Tablet 40 milliGRAM(s) Oral before breakfast    MEDICATIONS  (PRN):  acetaminophen     Tablet .. 650 milliGRAM(s) Oral every 6 hours PRN Temp greater or equal to 38.5C (101.3F), Mild Pain (1 - 3)  ALPRAZolam 1 milliGRAM(s) Oral daily PRN anxiety  benzocaine/menthol Lozenge 1 Lozenge Oral three times a day PRN Sore Throat      Vital Signs Last 24 Hrs  T(C): 36.8 (29 Apr 2024 17:21), Max: 36.9 (29 Apr 2024 05:40)  T(F): 98.3 (29 Apr 2024 17:21), Max: 98.4 (29 Apr 2024 05:40)  HR: 71 (29 Apr 2024 17:21) (56 - 71)  BP: 148/84 (29 Apr 2024 17:21) (124/65 - 148/84)  BP(mean): --  RR: 18 (29 Apr 2024 17:21) (18 - 18)  SpO2: 97% (29 Apr 2024 17:21) (93% - 97%)    Parameters below as of 29 Apr 2024 17:21  Patient On (Oxygen Delivery Method): room air        PHYSICAL EXAM:  GENERAL: NAD   HEAD:  Atraumatic, Normocephalic  EYES: EOMI, PERRLA, conjunctiva and sclera clear  NECK: Supple, No JVD  CHEST/LUNG: Clear to auscultation bilaterally; No wheeze  HEART: Regular rate and rhythm; No murmurs, rubs, or gallops  ABDOMEN: Soft, Nontender, Nondistended; Bowel sounds present  EXTREMITIES: L arm weakness   PSYCH: AAOx2  NEUROLOGY: non-focal  SKIN: No rashes or lesions    LABS:                        12.2   10.02 )-----------( 423      ( 29 Apr 2024 07:00 )             36.5     04-29    133<L>  |  95<L>  |  11  ----------------------------<  123<H>  3.9   |  26  |  0.62    Ca    10.2      29 Apr 2024 07:04            Urinalysis Basic - ( 29 Apr 2024 07:04 )    Color: x / Appearance: x / SG: x / pH: x  Gluc: 123 mg/dL / Ketone: x  / Bili: x / Urobili: x   Blood: x / Protein: x / Nitrite: x   Leuk Esterase: x / RBC: x / WBC x   Sq Epi: x / Non Sq Epi: x / Bacteria: x          RADIOLOGY & ADDITIONAL TESTS:    Imaging Personally Reviewed:    Consultant(s) Notes Reviewed:      Care Discussed with Consultants/Other Providers:

## 2024-04-29 NOTE — PROVIDER CONTACT NOTE (OTHER) - SITUATION
Increased restlessness overnight
pt constantly tries to get out of bed despite being on bedrest.
Pt c/o of left sided chest pain 7/10. Per pt has been going on all day however did not want to mention.

## 2024-04-29 NOTE — DISCHARGE NOTE NURSING/CASE MANAGEMENT/SOCIAL WORK - PATIENT PORTAL LINK FT
You can access the FollowMyHealth Patient Portal offered by [facility] by registering at the following website: [email or web address]. By joining [employer]’s FollowMyHealth portal, you will also be able to view your health information using other applications (apps) compatible with our system.

## 2024-04-29 NOTE — PHARMACOTHERAPY INTERVENTION NOTE - COMMENTS
Counseled patient and patient’s caregiver on the following inpatient/discharge medications names (brand/generic), indication, and possible side effects:    Amoxicillin 500 mg twice daily    Caregiver's questions and concerns were answered and addressed. Patient demonstrated understanding.  Explained that the night dose of antibiotic will be taken tonight, then twice daily Tuesday, then final dose Wednesday morning. Explained that she is free to follow up with PCP any time upon finishing antibiotics, the patient will gauge by their symptoms in order to "know" if the UTI is gone.    Thien Murray  PharmD Candidate 2024  [facility]   Counseled patient and patient’s caregiver on the following inpatient/discharge medications names (brand/generic), indication, and possible side effects:    Amoxicillin 500 mg twice daily, 4 tablet. Patient received ampicillin dose this morning, will start amoxicillin in the evening.     Caregiver's questions and concerns were answered and addressed. Patient demonstrated understanding.  Explained that the night dose of antibiotic will be taken tonight, then twice daily Tuesday, then final dose Wednesday morning.     Thien Murray  PharmD Candidate 2024  [facility]

## 2024-04-29 NOTE — PROVIDER CONTACT NOTE (OTHER) - ASSESSMENT
Patient AOx2, disoriented to situation & time. Patient received PRN Xanax at 11pm, but continues to be restless throughout the night, pulling at telemetry leads, & attempting to get out of the bed. Patient attempted to be reoriented multiple times but unsuccessful. Son is at the bedside.
Pt AAOx2 VSS on RA; denies SOB or palpitations. No acute events noted on tele.
pt at baseline is aox3, but is currently aox1 disoriented to time, place, and situation. pt is on RA saturating well. pt is on tele going at sinus rhythm in the 70s. pt is incontinent x2 and wears a primafit. pt is on bedrest s/p multiple falls. pt has intact skin. pt tries to get out of bed despite being on bedrest and does not understand the education provided on the importance of bedrest.

## 2024-04-29 NOTE — PROVIDER CONTACT NOTE (OTHER) - ACTION/TREATMENT ORDERED:
Provider at bedside, EKG reviewed. Tylenol prn given x1.
provider is notified and aware. pt is going to be placed on a 1 to 1. RN continues to monitor the patient
Son asked for "a medication to calm her down"  ACP Evelin Simon aware of situation and what son said- could be related to hospital delirium   1x dose of 3mg Melatonin ordered & administered.

## 2024-04-29 NOTE — PROGRESS NOTE ADULT - PROVIDER SPECIALTY LIST ADULT
Cardiology
Cardiology
Internal Medicine
Neurology
Cardiology
Infectious Disease
Internal Medicine
Cardiology
Internal Medicine

## 2024-04-29 NOTE — OCCUPATIONAL THERAPY INITIAL EVALUATION ADULT - GENERAL OBSERVATIONS, REHAB EVAL
None known
paxil
Pt received semi-supine, +abduction pillow, +primafit, +IVL with son and daughter at bedside

## 2024-04-29 NOTE — PROVIDER CONTACT NOTE (OTHER) - RECOMMENDATIONS
notify provider. place pt on a 1:1. give the pt medication to calm the pt down.
Patient's son states that he's concerned his mother's mental status hasn't improved "The last time she had a UTI, she became less confused quickly into treatment while this time it seems worse".
Notify Provider. EKG done.

## 2024-04-29 NOTE — PROVIDER CONTACT NOTE (OTHER) - REASON
Increased restlessness overnight
pt constantly tries to get out of bed despite being on bedrest.
Pt c/o of left sided chest pain 7/10

## 2024-04-29 NOTE — PROGRESS NOTE ADULT - TIME BILLING
Agree with above ACP note.  cv stable  events noted  w/u per primary team/neuro for ams  bp control
agree with above  cv stable  ECG- no ischemic changes  no concern for ACS  recent echo with nml lv fxn, moderate diastolic dysfxn, moderate MR  continue bp meds as ordered
Agree with above ACP note.  cv stable  events noted  w/u per primary team/neuro for ams  bp control
Agree with above ACP note.  cv stable  events noted  w/u per primary team/neuro for ams  bp controlled

## 2024-04-29 NOTE — PROGRESS NOTE ADULT - ASSESSMENT
A/p  83-year-old female past medical history of groin aneurysm, brain aneurysm x 2, hypertension, mitral valve prolapse, hyperlipidemia, prolapsed bladder, cataracts, arthritis, right hip replacement, duodenal laceration, prediabetes, GERD, diverticulitis, hiatal hernia presents status post 2 falls this morning.     #Chest pain  -History of multiple reports of chest pain with negative workup  -Ecg SR - no ischemic changes  -HST negative  -Does not endorse chest pain on my exam  -No concern for ACS  -CXR clear  -Recent echo with nml lv fxn, moderate diastolic dysfxn, moderate MR    #Falls  -History of repetitive mechanical falls   -PT eval  -R/o infectious cause - has hx of repeated UTI    #HTN  -Continue bp meds as ordered    #MAT  -off ASA for gi bleed  -Cont coreg    #Aortic/Mitral Valve Disease  -No overload on exam  -Recent echo with nml lv fxn, moderate diastolic dysfxn, moderate MR    #LUE weakness  -F/u MRI  -F/u neuro    dvt ppx  
  A/p  83-year-old female past medical history of groin aneurysm, brain aneurysm x 2, hypertension, mitral valve prolapse, hyperlipidemia, prolapsed bladder, cataracts, arthritis, right hip replacement, duodenal laceration, prediabetes, GERD, diverticulitis, hiatal hernia presents status post 2 falls this morning.     #Chest pain  -History of multiple reports of chest pain with negative workup  -Ecg SR - no ischemic changes  -HST negative  -Does not endorse chest pain on my exam  -No concern for ACS  -CXR clear  -Recent echo with nml lv fxn, moderate diastolic dysfxn, moderate MR    #Falls  -History of repetitive mechanical falls   -PT eval  -R/o infectious cause - has hx of repeated UTI    #HTN  -Continue bp meds as ordered    #MAT  -off ASA for gi bleed  -Cont coreg    #Aortic/Mitral Valve Disease  -No overload on exam  -Recent echo with nml lv fxn, moderate diastolic dysfxn, moderate MR    #LUE weakness  -MRI noted   -Ortho following  -Neuro following      dvt ppx  
  A/p  83-year-old female past medical history of groin aneurysm, brain aneurysm x 2, hypertension, mitral valve prolapse, hyperlipidemia, prolapsed bladder, cataracts, arthritis, right hip replacement, duodenal laceration, prediabetes, GERD, diverticulitis, hiatal hernia presents status post 2 falls this morning.     #Chest pain  -History of multiple reports of chest pain with negative workup  -Ecg SR - no ischemic changes  -HST negative  -Does not endorse chest pain on my exam  -No concern for ACS  -CXR clear  -Recent echo with nml lv fxn, moderate diastolic dysfxn, moderate MR    #Falls  -History of repetitive mechanical falls   -PT eval  -R/o infectious cause - has hx of repeated UTI    #HTN  -Continue bp meds as ordered    #MAT  -off ASA for gi bleed  -Cont coreg    #Aortic/Mitral Valve Disease  -No overload on exam  -Recent echo with nml lv fxn, moderate diastolic dysfxn, moderate MR    #LUE weakness  -MRI noted   -Ortho following  -Neuro following      dvt ppx    35 minutes spent on total encounter; more than 50% of the visit was spent counseling and/or coordinating care by the attending physician.    d/w dtr at bedside
  A/p  83-year-old female past medical history of groin aneurysm, brain aneurysm x 2, hypertension, mitral valve prolapse, hyperlipidemia, prolapsed bladder, cataracts, arthritis, right hip replacement, duodenal laceration, prediabetes, GERD, diverticulitis, hiatal hernia presents status post 2 falls this morning.     #Chest pain  -History of multiple reports of chest pain with negative workup  -Ecg SR - no ischemic changes  -HST negative  -Does not endorse chest pain on my exam  -No concern for ACS  -CXR clear  -Recent echo with nml lv fxn, moderate diastolic dysfxn, moderate MR    #Falls  -History of repetitive mechanical falls   -PT eval  -R/o infectious cause - has hx of repeated UTI    #HTN  -Continue bp meds as ordered    #MAT  -off ASA for gi bleed  -Cont coreg    #Aortic/Mitral Valve Disease  -No overload on exam  -Recent echo with nml lv fxn, moderate diastolic dysfxn, moderate MR    #LUE weakness  -MRI noted   -Ortho following  -Pending MRI c-spine  -Neuro following      dvt ppx  
83 f with    Confusion  - Neurology reevaluation noted    Elevated CRP  - ESR, JERE, DS DNA, C3, C4   - Rheum evaluation    UTI  - ID evaluation noted  - Ampicillin     s/p Fall  - telemetry  - orthostatic VS   - PT    MAT  - BB  -  cardiology follow     L arm weakness  - MRI L brachial plexus noted   - Neurology follow   - PT     Cervical spine facet arthrosis with question of widening of the right C4/C5 facet   - MR C spine noted    L humerus impaction fracture  - Ortho evaluation called    Confusion  - improving     HTN  - control    Chest pain  - cardiology evaluation     Celiac disease  - gluten free diet     Hypothyroidism.   - Synthroid  - TSH    HLD (hyperlipidemia).   - stable     Hiatal hernia.   - Large Hiatal hernia on CT --> fup with GI.    Primary hypertension.   - Coreg and Olmesartan     Bladder prolapse  - follow with Gyn Urogynecology OTP  - continue Pessary     R Iliac artery aneurism  - was seen by Vascular. No intervention. Follow as OTP     DVT prophylaxis  - PAS    d/w patient , daughter and ACP     Paulie Moore MD phone [phone] 
83 f with    Confusion  - Neurology reevaluation noted    Elevated CRP  - ESR, JERE, DS DNA, C3, C4   - Rheum evaluation noted    UTI  - ID evaluation noted  - Ampicillin for 5 days    s/p Fall  - telemetry  - orthostatic VS   - PT    MAT  - BB  -  cardiology follow     L arm weakness  - MRI L brachial plexus noted   - Neurology follow   - PT     Cervical spine facet arthrosis with question of widening of the right C4/C5 facet   - MR C spine noted    L humerus impaction fracture  - Ortho evaluation called    Confusion  - improving     HTN  - control    Chest pain  - cardiology evaluation     Celiac disease  - gluten free diet     Hypothyroidism.   - Synthroid  - TSH    HLD (hyperlipidemia).   - stable     Hiatal hernia.   - Large Hiatal hernia on CT --> fup with GI.    Primary hypertension.   - Coreg and Olmesartan     Bladder prolapse  - follow with Gyn Urogynecology OTP  - continue Pessary     R Iliac artery aneurism  - was seen by Vascular. No intervention. Follow as OTP     DVT prophylaxis  - PAS    DCP home in am if stable.    d/w patient , daughter and ACP     Paulie Moore MD phone [phone] 
83 f with    s/p Fall  - telemetry  - orthostatic VS  - PT    MAT  - BB  -  cardiology follow     L arm weakness  - MRI L brachial plexus noted   - Neurology follow   - PT     Cervical spine facet arthrosis with question of widening of the right C4/C5 facet   - MR C spine    L humerus impaction fracture  - Ortho evaluation called    Confusion  - improving     HTN  - control    Chest pain  - cardiology evaluation     Celiac disease  - gluten free diet     Hypothyroidism.   - Synthroid  - TSH    HLD (hyperlipidemia).   - stable     Hiatal hernia.   - Large Hiatal hernia on CT --> fup with GI.    Primary hypertension.   - Coreg and Olmesartan     Bladder prolapse  - follow with Gyn Urogynecology OTP  - continue Pessary     R Iliac artery aneurism  - was seen by Vascular. No intervention. Follow as OTP     DVT prophylaxis  - PAS    d/w patient and son     Paulie Moore MD phone [phone] 
  A/p  83-year-old female past medical history of groin aneurysm, brain aneurysm x 2, hypertension, mitral valve prolapse, hyperlipidemia, prolapsed bladder, cataracts, arthritis, right hip replacement, duodenal laceration, prediabetes, GERD, diverticulitis, hiatal hernia presents status post 2 falls this morning.     #Chest pain  -History of multiple reports of chest pain with negative workup  -Ecg SR - no ischemic changes  -HST negative  -Does not endorse chest pain on my exam  -No concern for ACS  -CXR clear  -Recent echo with nml lv fxn, moderate diastolic dysfxn, moderate MR    #Falls  -History of repetitive mechanical falls   -PT eval  -Abx for UTI per med     #HTN  -Continue bp meds as ordered  -if sbp remains elevated, can start hydral 10mg bid     #MAT  -off ASA for gi bleed  -Cont coreg    #Aortic/Mitral Valve Disease  -No overload on exam  -Recent echo with nml lv fxn, moderate diastolic dysfxn, moderate MR    #LUE weakness  -MRI noted   -Ortho following  -Neuro following      dvt ppx  
  A/p  83-year-old female past medical history of groin aneurysm, brain aneurysm x 2, hypertension, mitral valve prolapse, hyperlipidemia, prolapsed bladder, cataracts, arthritis, right hip replacement, duodenal laceration, prediabetes, GERD, diverticulitis, hiatal hernia presents status post 2 falls this morning.     #Chest pain  -History of multiple reports of chest pain with negative workup  -Ecg SR - no ischemic changes  -HST negative  -Does not endorse chest pain on my exam  -No concern for ACS  -CXR clear  -Recent echo with nml lv fxn, moderate diastolic dysfxn, moderate MR    #Falls  -History of repetitive mechanical falls   -PT eval  -R/o infectious cause - has hx of repeated UTI    #HTN  -Continue bp meds as ordered  -if sbp remains elevated, can start hydral 10mg bid     #MAT  -off ASA for gi bleed  -Cont coreg    #Aortic/Mitral Valve Disease  -No overload on exam  -Recent echo with nml lv fxn, moderate diastolic dysfxn, moderate MR    #LUE weakness  -MRI noted   -Ortho following  -Neuro following      dvt ppx    35 minutes spent on total encounter; more than 50% of the visit was spent counseling and/or coordinating care by the attending physician.  
83 f with    Confusion  - Neurology reevaluation noted    Elevated CRP  - ESR, JERE, DS DNA, C3, C4   - Rheum evaluation    UTI  - ID evaluation noted  - Ampicillin     s/p Fall  - telemetry  - orthostatic VS   - PT    MAT  - BB  -  cardiology follow     L arm weakness  - MRI L brachial plexus noted   - Neurology follow   - PT     Cervical spine facet arthrosis with question of widening of the right C4/C5 facet   - MR C spine noted    L humerus impaction fracture  - Ortho evaluation called    Confusion  - improving     HTN  - control    Chest pain  - cardiology evaluation     Celiac disease  - gluten free diet     Hypothyroidism.   - Synthroid  - TSH    HLD (hyperlipidemia).   - stable     Hiatal hernia.   - Large Hiatal hernia on CT --> fup with GI.    Primary hypertension.   - Coreg and Olmesartan     Bladder prolapse  - follow with Gyn Urogynecology OTP  - continue Pessary     R Iliac artery aneurism  - was seen by Vascular. No intervention. Follow as OTP     DVT prophylaxis  - PAS    d/w patient , daughter and ACP     Paulie Moore MD phone [phone] 
83 f with    Confusion  - Neurology reevaluation    UTI  - ID evaluation noted  - Ampicillin     s/p Fall  - telemetry  - orthostatic VS   - PT    MAT  - BB  -  cardiology follow     L arm weakness  - MRI L brachial plexus noted   - Neurology follow   - PT     Cervical spine facet arthrosis with question of widening of the right C4/C5 facet   - MR C spine    L humerus impaction fracture  - Ortho evaluation called    Confusion  - improving     HTN  - control    Chest pain  - cardiology evaluation     Celiac disease  - gluten free diet     Hypothyroidism.   - Synthroid  - TSH    HLD (hyperlipidemia).   - stable     Hiatal hernia.   - Large Hiatal hernia on CT --> fup with GI.    Primary hypertension.   - Coreg and Olmesartan     Bladder prolapse  - follow with Gyn Urogynecology OTP  - continue Pessary     R Iliac artery aneurism  - was seen by Vascular. No intervention. Follow as OTP     DVT prophylaxis  - PAS    d/w patient , daughter, son and ACP     Paulie Moore MD phone [phone] 
83 f with    Confusion  - Neurology reevaluation noted    Elevated CRP  - ESR, JERE, DS DNA, C3, C4   - Rheum evaluation noted    UTI  - ID evaluation noted  - Ampicillin for 5 days    s/p Fall  - telemetry  - orthostatic VS   - PT    MAT  - BB  -  cardiology follow     L arm weakness  - MRI L brachial plexus noted   - Neurology follow   - PT     Cervical spine facet arthrosis with question of widening of the right C4/C5 facet   - MR C spine noted    L humerus impaction fracture  - Ortho evaluation called    Chest pain  - cardiology evaluation     Celiac disease  - gluten free diet     Hypothyroidism.   - Synthroid  - TSH    HLD (hyperlipidemia).   - stable     Hiatal hernia.   - Large Hiatal hernia on CT --> fup with GI.    Primary hypertension.   - Coreg and Olmesartan     Bladder prolapse  - follow with Gyn Urogynecology OTP  - continue Pessary     R Iliac artery aneurism  - was seen by Vascular. No intervention. Follow as OTP     DVT prophylaxis  - PAS    DC home. Follow with PMD/ Neurology/ Cardiology/ Gyn in 3-4 days.      d/w patient , son, daughter and ACP     Paulie Moore MD phone [phone] 
83 year old  with Fall  Bladder prolapse  GERd  Multiple falls  Hip replacement  shoulder dislocation after fall    pt does complain of dysuria at times and has enterococcus in the uirne  allergy to pcn was over 25 years ago and was not anaphylaxis  will start ampicillin and plan a 5 day course     if she is otherwise ready to go home, she can be switched to amoxicillin 500 bid to finish the 5 days   discussed floresita h daughter in detail  Unclear that the treatement of her UTI will improve her mental status     
83y (1940) years old woman with a PMH of groin aneurysm, brain aneurysm x 2, hypertension, mitral valve prolapse, hyperlipidemia, prolapsed bladder, cataracts, arthritis, right hip replacement, duodenal laceration, prediabetes, GERD, diverticulitis, hiatal hernia presents status post 2 falls this morning. She also had episodes of hallucinations. Patient has been as well experiencing weakness in her left upper extremity after a fall she sustained earlier this month. Exam revealing for weakness on the left upper extremity with spared proximal shoulder muscles innervated by the suprascapular nerve.    impression:  1) left UE weakness and sensory loss, motor distribution is suggestive of brachial plexus injury as suprascapular nerve lightly involved compared to other nerves which localizes to a lesion slightly beyond the upper cord  2) recurrent falls despite hip surgeries r/o MSK causes  3) hallucinations r/o metbolic causes    plan:  [] continue UTI treatment  [] obtain MRI brachial plexus with and without  [] please obtain the remaining labs for dementia/delirium assessment: check UA (UTI entertained at this point), TSH(4.89), T3/T4, vitamin B1, B6, B12 (2000), D, E folate, homocysteine (8.9), methylmalonic acid, lactate, creatnine kinase, ammonia, Cu, SPEP, ESR, CRP, Zn  [] per above T3/T4 for elevated TSH, consider endocrinology consult if deemed necessary at this point per primary team or treating hospitalist at this point  [] PT OT  [] hallucinations will resolve after treating primary etiology, hold off treatment with antipsychotics at this point  [] outpatient follow up with cognitive neurology specialist for further evaluation of dementia as an outpatient   [] neurology to sign off    case seen with Dr. Brody

## 2024-04-29 NOTE — DISCHARGE NOTE NURSING/CASE MANAGEMENT/SOCIAL WORK - NSDCFUADDAPPT_GEN_ALL_CORE_FT
APPTS ARE READY TO BE MADE: [X] YES    Best Family or Patient Contact (if needed):  SonBran [phone]   or daughter, Shae Laguna [phone]    Additional Information about above appointments (if needed):    1: Please follow up with primary care.  2: Please follow up with orthopedics.  3: Please follow up with neurology.  4: Please follow up with cardiology.  5: Please follow up with rheumatology.    Other comments or requests:

## 2024-04-30 LAB
METHYLMALONATE SERPL-SCNC: 165 NMOL/L — SIGNIFICANT CHANGE UP (ref 0–378)
PYRIDOXAL PHOS SERPL-MCNC: 9.3 UG/L — SIGNIFICANT CHANGE UP (ref 3.4–65.2)

## 2024-04-30 NOTE — CHART NOTE - NSCHARTNOTEFT_GEN_A_CORE
Neurology team re-called for pt w/ persistent confusion and recc MRI non-con r/o CVA. Neurology will f/u to re-eval pt.
Patient was outreached but did not answer. A voicemail was left for the patient to return our call.
Request from Dr. Moore to facilitate patient discharge. Medication reconciliation reviewed, revised, and resolved with Dr. Moore, who has medically cleared patient for discharge with follow up as advised. Please refer to discharge note for detailed hospital course.
4/29 Patient was outreached but did not answer. A voicemail was left for the patient to return our call.
Spoke to daughter Shae at bedside yesterday and today. Explained that patient is medically cleared for dc.  Yesterday, daughter deferred decision making to take patient home to her brother.   Brother, came overnight and said he would take patient home in AM. Overnight, he then was uncomfortable with delirium the patient experiences each evening.  Pt. cleared for dc by medicine and neuro attendings.  Spoke to Shae again today, who is still deferring decision making to brother.  Told her that they need to discuss if they are uncomfortable with taking the patient home, they need to consider LTC.  Will continue to follow.

## 2024-05-01 ENCOUNTER — APPOINTMENT (OUTPATIENT)
Dept: ULTRASOUND IMAGING | Facility: IMAGING CENTER | Age: 84
End: 2024-05-01

## 2024-05-01 ENCOUNTER — APPOINTMENT (OUTPATIENT)
Dept: MAMMOGRAPHY | Facility: IMAGING CENTER | Age: 84
End: 2024-05-01

## 2024-05-02 NOTE — ED ADULT TRIAGE NOTE - GLASGOW COMA SCALE: BEST MOTOR RESPONSE, MLM
RX PROGRESS NOTE: Aminoglycoside Therapeutic Drug Monitoring    Day of therapy: 1    Indication and target levels: Sepsis-Unknown Source and  extended interval dosing    Current tobramycin dosing regimen: 522 mg (7 mg/kg) IVPB every 24 hours    Most recent height and weight information:  Weight: 80.3 kg (04/30/24 1625)  Height: 5' 9\" (175.3 cm) (04/30/24 1324)    The Following are the Calculated  Current Weights for Goldy Ramos       Adjusted Ideal    74.5 kg 70.7 kg            Pediatric Age       Gestational Age Postmenstrual Age Corrected Age Chronological Age Days of Life       3398w 6d [id number]            Labs:  Serum Creatinine and Creatinine Clearance:  Serum creatinine: 0.69 mg/dL 05/01/24 0603  Estimated creatinine clearance: 106.7 mL/min    Intake/Output         04/29 0000  04/29 2359 04/30 0000  04/30 2359 05/01 0000  05/01 2359    P.O.  240 1040    I.V.  963.9 5413.4    IV Piggyback  6832.9 2429.6    Total Intake  8036.8 8883    Urine  1100 1965    Stool   0    Total Output  1100 1965    Net  +6936.8 +6918            Microbiology Results  (Last 10 results in the past 7 days)      Specimen   Gram Smear   Culture Result   Status       04/30/24  1453         Gram negative bacilli.  Comment: Detected from Anaerobic bottle after 13 hours  [P]            Gram negative bacilli.  Comment:  Detected from aerobic bottle after    17 hours.    [P]           04/30/24  1446         Gram negative bacilli.  Comment:  Detected from aerobic bottle after    17 hours.    [P]                   [P] - Preliminary Result               Aminoglycoside Serum Concentrations:  Tobramycin, Random (mcg/mL)   Date/Time Value   05/01/2024 1738 6.7       Assessment:  Serum concentration of 6.7 mcg/mL at 8.5 hours after the first dose.    Based on the serum concentration, will adjust regimen to tobramycin 522 mg (7 mg/kg) IVPB every 36 hours.    Additional serum concentrations may be necessary depending on pathogen identified, risk  factors for adverse events, and/or duration of therapy.  Pharmacy will continue to follow and adjust as needed.    Thank you,    Lucina Laureano RPH  5/1/2024 7:59 PM  Contact # [phone]       (M6) obeys commands

## 2024-05-14 ENCOUNTER — EMERGENCY (EMERGENCY)
Facility: HOSPITAL | Age: 84
LOS: 1 days | Discharge: HOME | End: 2024-05-14
Attending: EMERGENCY MEDICINE
Payer: MEDICARE

## 2024-05-14 VITALS
RESPIRATION RATE: 20 BRPM | SYSTOLIC BLOOD PRESSURE: 138 MMHG | HEIGHT: 71 IN | OXYGEN SATURATION: 99 % | HEART RATE: 83 BPM | TEMPERATURE: 99 F | DIASTOLIC BLOOD PRESSURE: 79 MMHG

## 2024-05-14 VITALS
DIASTOLIC BLOOD PRESSURE: 75 MMHG | SYSTOLIC BLOOD PRESSURE: 125 MMHG | TEMPERATURE: 98 F | RESPIRATION RATE: 20 BRPM | HEART RATE: 97 BPM | OXYGEN SATURATION: 97 %

## 2024-05-14 DIAGNOSIS — S72.001A FRACTURE OF UNSPECIFIED PART OF NECK OF RIGHT FEMUR, INITIAL ENCOUNTER FOR CLOSED FRACTURE: Chronic | ICD-10-CM

## 2024-05-14 DIAGNOSIS — Z98.890 OTHER SPECIFIED POSTPROCEDURAL STATES: Chronic | ICD-10-CM

## 2024-05-14 DIAGNOSIS — E89.0 POSTPROCEDURAL HYPOTHYROIDISM: Chronic | ICD-10-CM

## 2024-05-14 PROCEDURE — 73060 X-RAY EXAM OF HUMERUS: CPT

## 2024-05-14 PROCEDURE — 73070 X-RAY EXAM OF ELBOW: CPT | Mod: 26,LT

## 2024-05-14 PROCEDURE — 73070 X-RAY EXAM OF ELBOW: CPT

## 2024-05-14 PROCEDURE — 73090 X-RAY EXAM OF FOREARM: CPT | Mod: 26,LT

## 2024-05-14 PROCEDURE — 93971 EXTREMITY STUDY: CPT | Mod: 26,LT

## 2024-05-14 PROCEDURE — 73060 X-RAY EXAM OF HUMERUS: CPT | Mod: 26,LT

## 2024-05-14 PROCEDURE — 73030 X-RAY EXAM OF SHOULDER: CPT | Mod: 26,LT

## 2024-05-14 PROCEDURE — 73090 X-RAY EXAM OF FOREARM: CPT

## 2024-05-14 PROCEDURE — 93971 EXTREMITY STUDY: CPT

## 2024-05-14 PROCEDURE — 73030 X-RAY EXAM OF SHOULDER: CPT

## 2024-05-14 PROCEDURE — 99284 EMERGENCY DEPT VISIT MOD MDM: CPT | Mod: 25

## 2024-05-14 PROCEDURE — 99285 EMERGENCY DEPT VISIT HI MDM: CPT

## 2024-05-14 NOTE — ED PROVIDER NOTE - OBJECTIVE STATEMENT
83-year-old female history of hypertension, hyperlipidemia, MAT, aneurysms presents to the ED with left shoulder pain.  Patient states April 9 she had a left shoulder dislocation after a fall.  After self hospital it was reduced.  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  She is unable to move it.  She has extreme weakness in the arm.  She has tried to schedule appointments with surgeons but has not been able to successfully make 1.  She states this morning she felt a burning sensation in her arm which is what prompted her to come to the ED.  She denies any recent fevers, chest pain shortness of breath, nausea, vomiting.
Stable

## 2024-05-14 NOTE — ED PROVIDER NOTE - NSFOLLOWUPINSTRUCTIONS_ED_ALL_ED_FT
You have been evaluated in the Emergency Department today for left arm pain. Your evaluation, suggest that you do not warrant emergent intervention at this time.    Please follow up with Dr. Lopez outpatient.    Please follow up with your primary care physician within two days.    Return to the Emergency Department if you experience fevers greater then 100.6 not relieved by tylenol, chest pain, shortness of breath, redness in your arm, blue discoloration to your arm, swelling in your arm or any worsening symptoms.

## 2024-05-14 NOTE — ED PROVIDER NOTE - PATIENT PORTAL LINK FT
You can access the FollowMyHealth Patient Portal offered by [facility] by registering at the following website: [email or web address]. By joining [facility]’s FollowMyHealth portal, you will also be able to view your health information using other applications (apps) compatible with our system.

## 2024-05-14 NOTE — ED ADULT NURSE NOTE - COVID-19 RESULT
Render Risk Assessment In Note?: no Detail Level: Simple Comment: This is stable and chronic. Recommend monitoring for change. Recommend biopsy with change. NEGATIVE

## 2024-05-14 NOTE — ED ADULT NURSE NOTE - CAS ELECT INFOMATION PROVIDED
pt to f/u with out pt appt, if s/s worsen, return back to the ED, pt and family member verbalized understanding/DC instructions

## 2024-05-14 NOTE — ED ADULT NURSE NOTE - CHIEF COMPLAINT QUOTE
pt states left shoulder pain rad to arm pt had fall recently was in [place] for dislocated shoulder since episode pt has been uanble to move extremity or have feeling in the extremity swelling noted pt unable to move fingers skin warm positive radial pulse fall was April 9th 2024

## 2024-05-14 NOTE — ED PROVIDER NOTE - PHYSICAL EXAMINATION
Patient Education     Bacterial Conjunctivitis    You have an infection in the membranes covering the white part of the eye. This part of the eye is called the conjunctiva. The infection is called conjunctivitis. The most common symptoms of conjunctivitis include a thick, pus-like discharge from the eye, swollen eyelids, redness, eyelids sticking together upon awakening, and a gritty or scratchy feeling in the eye. Your infection was caused by bacteria. It may be treated with medicine. With treatment, the infection takes about 7 to 10 days to resolve.  Home care  · Use prescribed antibiotic eye drops or ointment as directed to treat the infection.  · Apply a warm compress (towel soaked in warm water) to the affected eye 3 to 4 times a day. Do this just before applying medicine to the eye.  · Use a warm, wet cloth to wipe away crusting of the eyelids in the morning. This is caused by mucus drainage during the night. You may also use saline irrigating solution or artificial tears to rinse away mucus in the eye. Do not put a patch over the eye.  · Wash your hands before and after touching the infected eye. This is to prevent spreading the infection to the other eye, and to other people. Don't share your towels or washcloths with others.  · You may use acetaminophen or ibuprofen to control pain, unless another medicine was prescribed. (Note: If you have chronic liver or kidney disease or have ever had a stomach ulcer or gastrointestinal bleeding, talk with your doctor before using these medicines.)  · Don't wear contact lenses until your eyes have healed and all symptoms are gone.  Follow-up care  Follow up with your healthcare provider, or as advised.  When to seek medical advice  Call your healthcare provider right away if any of these occur:  · Worsening vision  · Increasing pain in the eye  · Increasing swelling or redness of the eyelid  · Redness spreading around the eye  Date Last Reviewed: 7/1/2017  © [id number]  Physical Exam:  Gen:  Well appearing, awake  Head: NCAT  HEENT: Normal conjunctiva, trachea midline, moist mucous membranes  Lung:  no respiratory distress,  speaking in full sentences  CV: RRR, no murmurs, rubs or gallops  Abd: Soft, non-tender, non-distended,   MSK: unable to move the left arm. minimal sensation in the ulnar distribution. no rom.   Neuro: AXOx3  Skin: Warm, well perfused,  Psych: appropriate affect and mood [employer] [address] [place] [address], [place] [address]. All rights reserved. This information is not intended as a substitute for professional medical care. Always follow your healthcare professional's instructions.

## 2024-05-14 NOTE — ED PROVIDER NOTE - CLINICAL SUMMARY MEDICAL DECISION MAKING FREE TEXT BOX
83-year-old female history of hypertension, hyperlipidemia, MAT, aneurysms presents to the ED with left shoulder pain.  Patient states April 9 she had a left shoulder dislocation after a fall.  After self hospital it was reduced.  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  She is unable to move it.  She has extreme weakness in the arm.  She has tried to schedule appointments with surgeons but has not been able to successfully make 1.  She states this morning she felt a burning sensation in her arm which is what prompted her to come to the ED.  She denies any recent fevers, chest pain shortness of breath, nausea, vomiting. 83-year-old female history of hypertension, hyperlipidemia, MAT, aneurysms presents to the ED with left shoulder pain.  Patient states April 9 she had a left shoulder dislocation after a fall.  After self hospital it was reduced.  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  She is unable to move it.  She has extreme weakness in the arm.  She has tried to schedule appointments with surgeons but has not been able to successfully make 1.  She states this morning she felt a burning sensation in her arm which is what prompted her to come to the ED.  She denies any recent fevers, chest pain shortness of breath, nausea, vomiting.  RGUJRAL 83-year-old female with history listed presents with left upper extremity swelling.  Pain.  Patient states she was seen at outside hospital about a month ago for left shoulder dislocation and since then has not been able to move her left upper extremity and was told that she may have a brachial plexus injury.  On chart review patient was evaluated by orthopedics during her last admission for UTI, patient states she has not been able to follow-up outpatient with orthopedics.  States she is scheduled to start home PT soon.  States that she has been having increasing swelling and burning to the extremity and is unclear how to help her self.  Denies any other acute complaints of chest pain neck pain headache.  On exam patient is well-appearing no acute distress oriented x 3 no posterior midline cervical tenderness no chest wall tenderness no left shoulder tenderness hand with mild swelling.  Patient unable to move fingers positive sensation.  Will obtain screening x-rays of patient to rule out DVT.  Will consult orthopedics.  Patient lives with her son and is right-hand dominant. Pt declined pain meds. Daughter at bedside. 83-year-old female history of hypertension, hyperlipidemia, MAT, aneurysms presents to the ED with left shoulder pain.  Patient states April 9 she had a left shoulder dislocation after a fall.  After self hospital it was reduced.  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  She is unable to move it.  She has extreme weakness in the arm.  She has tried to schedule appointments with surgeons but has not been able to successfully make 1.  She states this morning she felt a burning sensation in her arm which is what prompted her to come to the ED.  She denies any recent fevers, chest pain shortness of breath, nausea, vomiting. will get xray, and consult ortho  RGUJRAL 83-year-old female with history listed presents with left upper extremity swelling.  Pain.  Patient states she was seen at outside hospital about a month ago for left shoulder dislocation and since then has not been able to move her left upper extremity and was told that she may have a brachial plexus injury.  On chart review patient was evaluated by orthopedics during her last admission for UTI, patient states she has not been able to follow-up outpatient with orthopedics.  States she is scheduled to start home PT soon.  States that she has been having increasing swelling and burning to the extremity and is unclear how to help her self.  Denies any other acute complaints of chest pain neck pain headache.  On exam patient is well-appearing no acute distress oriented x 3 no posterior midline cervical tenderness no chest wall tenderness no left shoulder tenderness hand with mild swelling.  Patient unable to move fingers positive sensation.  Will obtain screening x-rays of patient to rule out DVT.  Will consult orthopedics.  Patient lives with her son and is right-hand dominant. Pt declined pain meds. Daughter at bedside.

## 2024-05-14 NOTE — ED ADULT NURSE REASSESSMENT NOTE - NS ED NURSE REASSESS COMMENT FT1
report received from HAL Zapien. Pt A&Ox4, able to follow commands, speech is clear. Pt denies, cp, sob, dizziness, changes in vision, headache, numbness / tingling.  LUE ROM remains limited due to prior chief complaints. Upon assessment, LUE warm / dry to touch, radial pulse +2, sensation intact, pt unable to move arm on her own / unable to move fingers, per pt, this has been ongoing. Plan of care discussed with family and pt. comfort and safety measures maintained.

## 2024-05-14 NOTE — ED ADULT NURSE NOTE - OBJECTIVE STATEMENT
83y Female PMHx DM, HTN, HLD and OA presenting to ED via walk-in triage for L shoulder and arm pain. Pt states she had a fall 4/9 and went to  treated for dislocated shoulder. Since then pt states she has been unable to move arm or fingers and her arm has been swollen. Pt with positive radial pulses. Stretcher in lowest position and locked, call bell within reach.

## 2024-05-14 NOTE — ED ADULT TRIAGE NOTE - CHIEF COMPLAINT QUOTE
pt states left shoulder pain rad to arm pt had fall recently was in [place] for dislocated shoulder since episode pt has been uanble to move extremity or have feeling in the extremity swelling noted pt unable to move fingers skin warm positive radial pulse fall was April 9th 2024

## 2024-05-14 NOTE — ED PROVIDER NOTE - PROGRESS NOTE DETAILS
Case discussed with orthopedic resident.  States patient has had an MRI with this injury which showed brachial plexus injury. Patient should follow-up outpatient at this time.  States on chart review patient attempted to be contacted and was not able to contact, will confirm with daughter at bedside.  Place best contact information. RGUJRAL

## 2024-07-03 ENCOUNTER — EMERGENCY (EMERGENCY)
Facility: HOSPITAL | Age: 84
LOS: 1 days | Discharge: HOME | End: 2024-07-03
Attending: EMERGENCY MEDICINE
Payer: COMMERCIAL

## 2024-07-03 VITALS
RESPIRATION RATE: 18 BRPM | DIASTOLIC BLOOD PRESSURE: 67 MMHG | SYSTOLIC BLOOD PRESSURE: 112 MMHG | HEIGHT: 71 IN | OXYGEN SATURATION: 96 % | HEART RATE: 73 BPM | TEMPERATURE: 98 F

## 2024-07-03 DIAGNOSIS — E89.0 POSTPROCEDURAL HYPOTHYROIDISM: Chronic | ICD-10-CM

## 2024-07-03 DIAGNOSIS — S72.001A FRACTURE OF UNSPECIFIED PART OF NECK OF RIGHT FEMUR, INITIAL ENCOUNTER FOR CLOSED FRACTURE: Chronic | ICD-10-CM

## 2024-07-03 DIAGNOSIS — Z98.890 OTHER SPECIFIED POSTPROCEDURAL STATES: Chronic | ICD-10-CM

## 2024-07-03 LAB
ALBUMIN SERPL ELPH-MCNC: 3.4 G/DL — SIGNIFICANT CHANGE UP (ref 3.3–5)
ALP SERPL-CCNC: 80 U/L — SIGNIFICANT CHANGE UP (ref 40–120)
ALT FLD-CCNC: 9 U/L — LOW (ref 10–45)
ANION GAP SERPL CALC-SCNC: 15 MMOL/L — SIGNIFICANT CHANGE UP (ref 5–17)
AST SERPL-CCNC: 14 U/L — SIGNIFICANT CHANGE UP (ref 10–40)
BASOPHILS # BLD AUTO: 0.07 K/UL — SIGNIFICANT CHANGE UP (ref 0–0.2)
BASOPHILS NFR BLD AUTO: 1.1 % — SIGNIFICANT CHANGE UP (ref 0–2)
BILIRUB SERPL-MCNC: 0.2 MG/DL — SIGNIFICANT CHANGE UP (ref 0.2–1.2)
BUN SERPL-MCNC: 17 MG/DL — SIGNIFICANT CHANGE UP (ref 7–23)
CALCIUM SERPL-MCNC: 9.5 MG/DL — SIGNIFICANT CHANGE UP (ref 8.4–10.5)
CHLORIDE SERPL-SCNC: 102 MMOL/L — SIGNIFICANT CHANGE UP (ref 96–108)
CO2 SERPL-SCNC: 23 MMOL/L — SIGNIFICANT CHANGE UP (ref 22–31)
CREAT SERPL-MCNC: 0.94 MG/DL — SIGNIFICANT CHANGE UP (ref 0.5–1.3)
EGFR: 61 ML/MIN/1.73M2 — SIGNIFICANT CHANGE UP
EGFR: 61 ML/MIN/1.73M2 — SIGNIFICANT CHANGE UP
EOSINOPHIL # BLD AUTO: 0.21 K/UL — SIGNIFICANT CHANGE UP (ref 0–0.5)
EOSINOPHIL NFR BLD AUTO: 3.3 % — SIGNIFICANT CHANGE UP (ref 0–6)
GLUCOSE SERPL-MCNC: 81 MG/DL — SIGNIFICANT CHANGE UP (ref 70–99)
HCT VFR BLD CALC: 33.7 % — LOW (ref 34.5–45)
HGB BLD-MCNC: 11.1 G/DL — LOW (ref 11.5–15.5)
IMM GRANULOCYTES NFR BLD AUTO: 0.2 % — SIGNIFICANT CHANGE UP (ref 0–0.9)
LYMPHOCYTES # BLD AUTO: 1.91 K/UL — SIGNIFICANT CHANGE UP (ref 1–3.3)
LYMPHOCYTES # BLD AUTO: 29.6 % — SIGNIFICANT CHANGE UP (ref 13–44)
MCHC RBC-ENTMCNC: 31.3 PG — SIGNIFICANT CHANGE UP (ref 27–34)
MCHC RBC-ENTMCNC: 32.9 GM/DL — SIGNIFICANT CHANGE UP (ref 32–36)
MCV RBC AUTO: 94.9 FL — SIGNIFICANT CHANGE UP (ref 80–100)
MONOCYTES # BLD AUTO: 0.8 K/UL — SIGNIFICANT CHANGE UP (ref 0–0.9)
MONOCYTES NFR BLD AUTO: 12.4 % — SIGNIFICANT CHANGE UP (ref 2–14)
NEUTROPHILS # BLD AUTO: 3.45 K/UL — SIGNIFICANT CHANGE UP (ref 1.8–7.4)
NEUTROPHILS NFR BLD AUTO: 53.4 % — SIGNIFICANT CHANGE UP (ref 43–77)
NRBC # BLD: 0 /100 WBCS — SIGNIFICANT CHANGE UP (ref 0–0)
NRBC BLD-RTO: 0 /100 WBCS — SIGNIFICANT CHANGE UP (ref 0–0)
PLATELET # BLD AUTO: 269 K/UL — SIGNIFICANT CHANGE UP (ref 150–400)
POTASSIUM SERPL-MCNC: 3.5 MMOL/L — SIGNIFICANT CHANGE UP (ref 3.5–5.3)
POTASSIUM SERPL-SCNC: 3.5 MMOL/L — SIGNIFICANT CHANGE UP (ref 3.5–5.3)
PROT SERPL-MCNC: 6.9 G/DL — SIGNIFICANT CHANGE UP (ref 6–8.3)
RBC # BLD: 3.55 M/UL — LOW (ref 3.8–5.2)
RBC # FLD: 15.6 % — HIGH (ref 10.3–14.5)
SODIUM SERPL-SCNC: 140 MMOL/L — SIGNIFICANT CHANGE UP (ref 135–145)
TROPONIN T, HIGH SENSITIVITY RESULT: 24 NG/L — SIGNIFICANT CHANGE UP (ref 0–51)
WBC # BLD: 6.45 K/UL — SIGNIFICANT CHANGE UP (ref 3.8–10.5)
WBC # FLD AUTO: 6.45 K/UL — SIGNIFICANT CHANGE UP (ref 3.8–10.5)

## 2024-07-03 PROCEDURE — 80053 COMPREHEN METABOLIC PANEL: CPT

## 2024-07-03 PROCEDURE — 85025 COMPLETE CBC W/AUTO DIFF WBC: CPT

## 2024-07-03 PROCEDURE — 99285 EMERGENCY DEPT VISIT HI MDM: CPT | Mod: GC

## 2024-07-03 PROCEDURE — 99285 EMERGENCY DEPT VISIT HI MDM: CPT | Mod: 25

## 2024-07-03 PROCEDURE — 36415 COLL VENOUS BLD VENIPUNCTURE: CPT

## 2024-07-03 PROCEDURE — 71046 X-RAY EXAM CHEST 2 VIEWS: CPT | Mod: 26

## 2024-07-03 PROCEDURE — 93005 ELECTROCARDIOGRAM TRACING: CPT

## 2024-07-03 PROCEDURE — 71046 X-RAY EXAM CHEST 2 VIEWS: CPT

## 2024-07-03 PROCEDURE — 84484 ASSAY OF TROPONIN QUANT: CPT

## 2024-07-03 RX ORDER — ACETAMINOPHEN 500 MG/5ML
975 LIQUID (ML) ORAL ONCE
Refills: 0 | Status: COMPLETED | OUTPATIENT
Start: 2024-07-03 | End: 2024-07-03

## 2024-07-03 RX ADMIN — Medication 975 MILLIGRAM(S): at 23:01

## 2024-07-04 VITALS
TEMPERATURE: 98 F | OXYGEN SATURATION: 99 % | SYSTOLIC BLOOD PRESSURE: 145 MMHG | HEART RATE: 88 BPM | DIASTOLIC BLOOD PRESSURE: 88 MMHG | RESPIRATION RATE: 19 BRPM

## 2024-07-19 NOTE — PATIENT PROFILE ADULT - FUNCTIONAL SCREEN CURRENT LEVEL: BATHING, MLM
Preventive Care Visit  [facility] YAQUELIN Santo MD, Internal Medicine - Pediatrics  Jul 19, 2024      Assessment & Plan   Problem List Items Addressed This Visit       CAD (coronary artery disease) - Primary (Chronic)    Relevant Medications    metoprolol succinate ER (TOPROL XL) 25 MG 24 hr tablet    Other Relevant Orders    Lipid panel reflex to direct LDL Non-fasting (Completed)    Cardiomyopathy (H) (Chronic)    Essential hypertension with goal blood pressure less than 140/90 (Chronic)    Relevant Medications    metoprolol succinate ER (TOPROL XL) 25 MG 24 hr tablet    Hyperlipidemia LDL goal <70 (Chronic)    HFrEF (heart failure with reduced ejection fraction) (H)    Relevant Medications    metoprolol succinate ER (TOPROL XL) 25 MG 24 hr tablet    Other Relevant Orders    TSH with free T4 reflex (Completed)    Weak urinary stream    Relevant Medications    tamsulosin (FLOMAX) 0.4 MG capsule    Other Relevant Orders    REVIEW OF HEALTH MAINTENANCE PROTOCOL ORDERS (Completed)     Other Visit Diagnoses       Peripheral polyneuropathy        Relevant Orders    Vitamin B12 (Completed)    Comprehensive metabolic panel (BMP + Alb, Alk Phos, ALT, AST, Total. Bili, TP) (Completed)    Folate (Completed)    CBC with platelets and differential (Completed)           Patient stopped all medications -  had several posterior circulation strokes   Had cardiomyopathy that almost completely resloved with b -blocker     Has intentionally lost weight   Feels he does not need all  that medication    Patient with some cognitive processing issues evident on this evaluation   Will review previous diagnosis and options for  treatment                Counseling  Appropriate preventive services were addressed with this patient via screening, questionnaire, or discussion as appropriate for fall prevention, nutrition, physical activity, Tobacco-use cessation, weight loss and cognition.  Checklist reviewing  preventive services available has been given to the patient.  Reviewed patient's diet, addressing concerns and/or questions.   He is at risk for lack of exercise and has been provided with information to increase physical activity for the benefit of his well-being.   Discussed possible causes of fatigue. The patient was provided with written information regarding signs of hearing loss.   Information on urinary incontinence and treatment options given to patient.     Work on weight loss  Regular exercise      Subjective   Ricky is a 73 year old, presenting for the following:  Medicare Visit        7/19/2024     2:53 PM   Additional Questions   Roomed by Arren   Accompanied by Wife         7/19/2024     2:53 PM   Patient Reported Additional Medications   Patient reports taking the following new medications cialis         Health Care Directive  Patient has a Health Care Directive on file  Advance care planning document is on file but is outdated.  Patient encouraged to updated.    HPI  Alcohol significant reductions   156.       Urinates often low, volume   Urinates and dribbles - taking the flomax.    Detrol  , in the vertebral artery     When ran out   Flomax , in for colonoscopy    Has a history of low blood pressure  Feeeling hand went numb -   Last   Neurologist,, came on all of a sudden                   7/19/2024   General Health   How would you rate your overall physical health? Good   Feel stress (tense, anxious, or unable to sleep) Not at all            7/19/2024   Nutrition   Diet: Regular (no restrictions)            7/19/2024   Exercise   Days per week of moderate/strenous exercise 2 days      (!) EXERCISE CONCERN      7/19/2024   Social Factors   Frequency of gathering with friends or relatives More than three times a week   Worry food won't last until get money to buy more No   Food not last or not have enough money for food? No   Do you have housing? (Housing is defined as stable permanent housing and  does not include staying ouside in a car, in a tent, in an abandoned building, in an overnight shelter, or couch-surfing.) Yes   Are you worried about losing your housing? No   Lack of transportation? No   Unable to get utilities (heat,electricity)? No            7/19/2024   Fall Risk   Fallen 2 or more times in the past year? Yes   Trouble with walking or balance? Yes   Gait Speed Test (Document in seconds) 4.09             7/19/2024   Activities of Daily Living- Home Safety   Needs help with the following daily activites None of the above   Safety concerns in the home None of the above            7/19/2024   Dental   Dentist two times every year? Yes            7/19/2024   Hearing Screening   Hearing concerns? (!) I FEEL THAT PEOPLE ARE MUMBLING OR NOT SPEAKING CLEARLY.    (!) I NEED TO ASK PEOPLE TO SPEAK UP OR REPEAT THEMSELVES.    (!) IT'S HARD TO FOLLOW A CONVERSATION IN A NOISY RESTAURANT OR CROWDED ROOM.    (!) TROUBLE UNDERSTANDING SOFT OR WHISPERED SPEECH.       Multiple values from one day are sorted in reverse-chronological order         7/19/2024   Driving Risk Screening   Patient/family members have concerns about driving No            7/19/2024   General Alertness/Fatigue Screening   Have you been more tired than usual lately? (!) YES            7/19/2024   Urinary Incontinence Screening   Bothered by leaking urine in past 6 months Yes            7/19/2024   TB Screening   Were you born outside of the [place]? No            Today's PHQ-2 Score:       7/19/2024     2:34 PM   PHQ-2 ( 1999 [employer])   Q1: Little interest or pleasure in doing things 0   Q2: Feeling down, depressed or hopeless 0   PHQ-2 Score 0   Q1: Little interest or pleasure in doing things Not at all   Q2: Feeling down, depressed or hopeless Not at all   PHQ-2 Score 0           7/19/2024   Substance Use   Alcohol more than 3/day or more than 7/wk No   Do you have a current opioid prescription? No   How severe/bad is pain from 1 to 10? 7/10    Do you use any other substances recreationally? No        Social History     Tobacco Use    Smoking status: Former     Current packs/day: 0.00     Average packs/day: 0.5 packs/day for 2.0 years (1.0 ttl pk-yrs)     Types: Cigarettes     Start date: 1994     Quit date: 1996     Years since quittin.6    Smokeless tobacco: Never   Vaping Use    Vaping status: Never Used   Substance Use Topics    Alcohol use: Yes     Alcohol/week: 8.3 standard drinks of alcohol     Comment: Evening Marie    Drug use: No       ASCVD Risk   The ASCVD Risk score (Antoinette BETANCOURT, et al., 2019) failed to calculate for the following reasons:    The patient has a prior MI or stroke diagnosis            Reviewed and updated as needed this visit by Provider                    Lab work is in process  Labs reviewed in EPIC  BP Readings from Last 3 Encounters:   24 (!) 143/70   24 138/66   24 (!) 146/78    Wt Readings from Last 3 Encounters:   24 75 kg (165 lb 4.8 oz)   24 70.4 kg (155 lb 3.2 oz)   24 71.7 kg (158 lb)                  Patient Active Problem List   Diagnosis    HL (hearing loss)    Erectile dysfunction    Pseudophakia, sutured PCL, od; ACL, os - hx of IOL dislocation, ou    Posterior vitreous detachment, od    Epiretinal membrane, mild, od    JOSE JUAN (obstructive sleep apnea)-severe (AHI 61)    BCC (basal cell carcinoma)    Cardiomyopathy (H)    RCT (rotator cuff tear)    Essential hypertension with goal blood pressure less than 140/90    Hyperlipidemia LDL goal <70    Megalocornea    Macular edema, od    Elevated LFTs    Fatty liver    Right carpal tunnel syndrome    CAD (coronary artery disease)    S/P carpal tunnel release    Pillar pain of extremity    Folic acid deficiency (non anemic)    Cerebrovascular accident (CVA) due to embolism of posterior cerebral artery with infarctions of both occipital lobes (H)    HFrEF (heart failure with reduced ejection fraction) (H)     Nonrheumatic aortic valve insufficiency    ETOH abuse    Partial symptomatic epilepsy with complex partial seizures, not intractable, without status epilepticus (H)    Memory impairment    History of cerebrovascular accident    Weak urinary stream     Past Surgical History:   Procedure Laterality Date    ARTHROSCOPY SHOULDER BICEPS TENODESIS REPAIR  02/27/2014    Procedure: ARTHROSCOPY SHOULDER BICEPS TENODESIS REPAIR;;  Surgeon: Michael Hagen MD;  [facility]    ARTHROSCOPY SHOULDER ROTATOR CUFF REPAIR  02/27/2014    Procedure: ARTHROSCOPY SHOULDER ROTATOR CUFF REPAIR;  Right Shoulder Arthroscopic Rotator Cuff Repair,  Subacromial Decompression, Biceps Tenodesis, Distal Clavicle Excision   ;  Surgeon: Michael Hagen MD;  Location: [facility]    BIOPSY      COLONOSCOPY  06/12/2018    COLONOSCOPY N/A 10/5/2023    Procedure: Colonoscopy;  Surgeon: Mary Floyd MD;  Location:  GI    CV CORONARY ANGIOGRAM N/A 08/08/2022    Procedure: Coronary Angiogram;  Surgeon: Ida Goddard MD;  Location: [facility] CV    CV LEFT HEART CATH N/A 08/08/2022    Procedure: Left Heart Catheterization;  Surgeon: Ida Goddard MD;  Location: [facility] CATH LAB CV    ENDOSCOPY DRUG INDUCED SLEEP N/A 09/16/2022    Procedure: DRUG INDUCED SLEEP ENDOSCOPY;  Surgeon: Dashawn Tanner MD;  Location: [facility] OR    EXCHANGE INTRAOCULAR LENS IMPLANT  2005    left eye - first, recentered PCL with iris fixation; then IOLX with ACL    EXTRACAPSULAR CATARACT EXTRATION WITH INTRAOCULAR LENS IMPLANT  2005    both eyes (elsewhere)    IMPLANT GENERATOR STIMULATOR (LOCATION) N/A 11/3/2022    Procedure: INSERTION, PULSE GENERATOR, NEUROSTIMULATOR;  Surgeon: Dashawn Tanner MD;  Location: [facility] OR    RELEASE CARPAL TUNNEL Right 08/13/2021    Procedure: RELEASE, RIGHT CARPAL TUNNEL;  Surgeon: Tony Bravo MD;  Location: [facility] OR    RELEASE CARPAL TUNNEL Right 10/26/2022    Procedure: RIGHT REVISION OPEN CARPAL TUNNEL  RELEASE, HYPOTHENAR FAT FLAP,;  Surgeon: Vignesh Rhodes MD;  Location: [facility] OR    TRANSPOSITION ULNAR NERVE (ELBOW) Right 10/26/2022    Procedure: RIGHT ULNAR NERVE DECOMPRESSION AT THE ELBOW;  Surgeon: Vignesh Rhodes MD;  Location: [facility]    TURBINOPLASTY  2013    Procedure: TURBINOPLASTY;;  Surgeon: Lisset Parsons MD;  Location: [facility]    UVULOPALATOPHARYNGOPLASTY  2013    Procedure: UVULOPALATOPHARYNGOPLASTY;  Uvulopalatopharyngoplasty, Turbiante Reduction;  Surgeon: Lisset Parsons MD;  Location: [facility] OR       Social History     Tobacco Use    Smoking status: Former     Current packs/day: 0.00     Average packs/day: 0.5 packs/day for 2.0 years (1.0 ttl pk-yrs)     Types: Cigarettes     Start date: 1994     Quit date: 1996     Years since quittin.6    Smokeless tobacco: Never   Substance Use Topics    Alcohol use: Yes     Alcohol/week: 8.3 standard drinks of alcohol     Comment: Evening Marie     Family History   Problem Relation Age of Onset    Diabetes Father         Old age Diabetes    Cerebrovascular Disease Father     Obesity Father     Heart Disease Father     Coronary Artery Disease Father     Hypertension Father     Lipids Sister     C.A.D. No family hx of     Cancer No family hx of     Glaucoma No family hx of     Macular Degeneration No family hx of     Anesthesia Reaction No family hx of     Deep Vein Thrombosis (DVT) No family hx of          Current Outpatient Medications   Medication Sig Dispense Refill    metoprolol succinate ER (TOPROL XL) 25 MG 24 hr tablet Take 1 tablet (25 mg) by mouth daily 90 tablet 2    tamsulosin (FLOMAX) 0.4 MG capsule Take 2 capsules (0.8 mg) by mouth daily 180 capsule 4    sildenafil (VIAGRA) 50 MG tablet Take 2 tablets (100 mg) by mouth daily as needed (Patient not taking: Reported on 2024) 6 tablet 12     Allergies   Allergen Reactions    Lisinopril Cough    Perfume Other (See Comments)     Current providers  sharing in care for this patient include:  Patient Care Team:  Holland Blunt MD as PCP - General (Internal Medicine)  Lisset Cedillo, RN as Specialty Care Coordinator (Cardiology)  Teresa Morris NP as Nurse Practitioner (Nurse Practitioner - Gerontology)  Carroll Ortiz MD as MD (Cardiology)  Seema Olsen MD as MD (Clinical Cardiac Electrophysiology)  Holland Blunt MD as Assigned PCP  Nathen Ruiz MD as MD (Neurology)  Nathen Ruiz MD as MD (Neurology)  Dashawn Tanner MD as MD (Otolaryngology)  Holland Blunt MD as Referring Physician (Internal Medicine)  Hilario Meza MD as Assigned Heart and Vascular Provider  Vignesh Rhodes MD as MD (Surgery)  Mickie Pfeiffer MD as MD (Physical Medicine and Rehabilitation)  Robbin Chand MD as MD (Urology)  Yves Fontana MD as Assigned Neuroscience Provider  Robbin Chand MD as Assigned Surgical Provider    The following health maintenance items are reviewed in Epic and correct as of today:  Health Maintenance   Topic Date Due    HF ACTION PLAN  03/28/2022    COVID-19 Vaccine (4 - 2023-24 season) 09/01/2023    BMP  05/03/2024    LIPID  06/02/2024    ANNUAL REVIEW OF HM ORDERS  06/02/2024    MEDICARE ANNUAL WELLNESS VISIT  07/13/2024    EYE EXAM  08/02/2024    INFLUENZA VACCINE (1) 09/01/2024    ALT  11/03/2024    CBC  11/03/2024    FALL RISK ASSESSMENT  07/19/2025    GLUCOSE  11/03/2026    ADVANCE CARE PLANNING  07/13/2028    COLORECTAL CANCER SCREENING  10/05/2028    DTAP/TDAP/TD IMMUNIZATION (3 - Td or Tdap) 11/01/2028    TSH W/FREE T4 REFLEX  Completed    HEPATITIS C SCREENING  Completed    PHQ-2 (once per calendar year)  Completed    Pneumococcal Vaccine: 65+ Years  Completed    ZOSTER IMMUNIZATION  Completed    RSV VACCINE (Pregnancy & 60+)  Completed    AORTIC ANEURYSM SCREENING (SYSTEM ASSIGNED)  Completed    IPV IMMUNIZATION  Aged Out    HPV IMMUNIZATION  Aged Out    MENINGITIS IMMUNIZATION  Aged Out     "RSV MONOCLONAL ANTIBODY  Aged Out         Review of Systems  Constitutional, HEENT, cardiovascular, pulmonary, gi and gu systems are negative, except as otherwise noted.     Objective    Exam  BP (!) 150/65 (BP Location: Right arm, Patient Position: Sitting, Cuff Size: Adult Regular)   Pulse 68   Temp 98.5  F (36.9  C) (Temporal)   Resp 24   Ht 1.702 m (5' 7\")   Wt 75 kg (165 lb 4.8 oz)   SpO2 97%   BMI 25.89 kg/m     Estimated body mass index is 25.89 kg/m  as calculated from the following:    Height as of this encounter: 1.702 m (5' 7\").    Weight as of this encounter: 75 kg (165 lb 4.8 oz).    Physical Exam  GENERAL: alert and no distress  EYES: Eyes grossly normal to inspection, PERRL and conjunctivae and sclerae normal  HENT: ear canals and TM's normal, nose and mouth without ulcers or lesions  NECK: no adenopathy, no asymmetry, masses, or scars  RESP: lungs clear to auscultation - no rales, rhonchi or wheezes  CV: regular rate and rhythm, normal S1 S2, no S3 or S4, no murmur, click or rub, no peripheral edema  ABDOMEN: soft, nontender, no hepatosplenomegaly, no masses and bowel sounds normal  MS: no gross musculoskeletal defects noted, no edema  SKIN: no suspicious lesions or rashes  NEURO: Normal strength and tone, mentation intact and speech normal  BACK: no CVA tenderness, no paralumbar tenderness  PSYCH: mentation appears normal, affect normal/bright  LYMPH: no cervical, supraclavicular, axillary, or inguinal adenopathy        7/19/2024   Mini Cog   Clock Draw Score 2 Normal   3 Item Recall 1 object recalled   Mini Cog Total Score 3                 Signed Electronically by: Anant Santo MD    " 2 = assistive person

## 2024-08-12 ENCOUNTER — EMERGENCY (EMERGENCY)
Facility: HOSPITAL | Age: 84
LOS: 1 days | Discharge: HOME | End: 2024-08-12
Attending: STUDENT IN AN ORGANIZED HEALTH CARE EDUCATION/TRAINING PROGRAM
Payer: MEDICARE

## 2024-08-12 VITALS
OXYGEN SATURATION: 97 % | TEMPERATURE: 98 F | DIASTOLIC BLOOD PRESSURE: 97 MMHG | WEIGHT: 117.95 LBS | HEIGHT: 59 IN | SYSTOLIC BLOOD PRESSURE: 176 MMHG | HEART RATE: 68 BPM | RESPIRATION RATE: 17 BRPM

## 2024-08-12 DIAGNOSIS — E89.0 POSTPROCEDURAL HYPOTHYROIDISM: Chronic | ICD-10-CM

## 2024-08-12 DIAGNOSIS — S72.001A FRACTURE OF UNSPECIFIED PART OF NECK OF RIGHT FEMUR, INITIAL ENCOUNTER FOR CLOSED FRACTURE: Chronic | ICD-10-CM

## 2024-08-12 DIAGNOSIS — Z98.890 OTHER SPECIFIED POSTPROCEDURAL STATES: Chronic | ICD-10-CM

## 2024-08-12 PROCEDURE — 99285 EMERGENCY DEPT VISIT HI MDM: CPT

## 2024-08-12 NOTE — ED ADULT TRIAGE NOTE - CHIEF COMPLAINT QUOTE
states high blood pressure, "I do not feel right", hx of "frozen hand" from a fall a few months ago endorsing left hand discomfort   hx of HTN, did take medications today

## 2024-08-13 VITALS
HEART RATE: 63 BPM | RESPIRATION RATE: 17 BRPM | OXYGEN SATURATION: 96 % | SYSTOLIC BLOOD PRESSURE: 164 MMHG | TEMPERATURE: 98 F | DIASTOLIC BLOOD PRESSURE: 74 MMHG

## 2024-08-13 LAB
ALBUMIN SERPL ELPH-MCNC: 4 G/DL — SIGNIFICANT CHANGE UP (ref 3.3–5)
ALP SERPL-CCNC: 76 U/L — SIGNIFICANT CHANGE UP (ref 40–120)
ALT FLD-CCNC: 17 U/L — SIGNIFICANT CHANGE UP (ref 10–45)
ANION GAP SERPL CALC-SCNC: 14 MMOL/L — SIGNIFICANT CHANGE UP (ref 5–17)
APPEARANCE UR: CLEAR — SIGNIFICANT CHANGE UP
AST SERPL-CCNC: 22 U/L — SIGNIFICANT CHANGE UP (ref 10–40)
BACTERIA # UR AUTO: NEGATIVE /HPF — SIGNIFICANT CHANGE UP
BASOPHILS # BLD AUTO: 0.03 K/UL — SIGNIFICANT CHANGE UP (ref 0–0.2)
BASOPHILS NFR BLD AUTO: 0.3 % — SIGNIFICANT CHANGE UP (ref 0–2)
BILIRUB SERPL-MCNC: 0.2 MG/DL — SIGNIFICANT CHANGE UP (ref 0.2–1.2)
BILIRUB UR-MCNC: NEGATIVE — SIGNIFICANT CHANGE UP
BUN SERPL-MCNC: 16 MG/DL — SIGNIFICANT CHANGE UP (ref 7–23)
CALCIUM SERPL-MCNC: 9.8 MG/DL — SIGNIFICANT CHANGE UP (ref 8.4–10.5)
CAST: 0 /LPF — SIGNIFICANT CHANGE UP (ref 0–4)
CHLORIDE SERPL-SCNC: 99 MMOL/L — SIGNIFICANT CHANGE UP (ref 96–108)
CO2 SERPL-SCNC: 22 MMOL/L — SIGNIFICANT CHANGE UP (ref 22–31)
COLOR SPEC: YELLOW — SIGNIFICANT CHANGE UP
CREAT SERPL-MCNC: 0.73 MG/DL — SIGNIFICANT CHANGE UP (ref 0.5–1.3)
DIFF PNL FLD: NEGATIVE — SIGNIFICANT CHANGE UP
EGFR: 83 ML/MIN/1.73M2 — SIGNIFICANT CHANGE UP
EOSINOPHIL # BLD AUTO: 0.08 K/UL — SIGNIFICANT CHANGE UP (ref 0–0.5)
EOSINOPHIL NFR BLD AUTO: 0.9 % — SIGNIFICANT CHANGE UP (ref 0–6)
GLUCOSE SERPL-MCNC: 102 MG/DL — HIGH (ref 70–99)
GLUCOSE UR QL: NEGATIVE MG/DL — SIGNIFICANT CHANGE UP
HCT VFR BLD CALC: 38 % — SIGNIFICANT CHANGE UP (ref 34.5–45)
HGB BLD-MCNC: 12.4 G/DL — SIGNIFICANT CHANGE UP (ref 11.5–15.5)
IMM GRANULOCYTES NFR BLD AUTO: 0.2 % — SIGNIFICANT CHANGE UP (ref 0–0.9)
KETONES UR-MCNC: NEGATIVE MG/DL — SIGNIFICANT CHANGE UP
LEUKOCYTE ESTERASE UR-ACNC: ABNORMAL
LYMPHOCYTES # BLD AUTO: 2.56 K/UL — SIGNIFICANT CHANGE UP (ref 1–3.3)
LYMPHOCYTES # BLD AUTO: 28.5 % — SIGNIFICANT CHANGE UP (ref 13–44)
MCHC RBC-ENTMCNC: 30.6 PG — SIGNIFICANT CHANGE UP (ref 27–34)
MCHC RBC-ENTMCNC: 32.6 GM/DL — SIGNIFICANT CHANGE UP (ref 32–36)
MCV RBC AUTO: 93.8 FL — SIGNIFICANT CHANGE UP (ref 80–100)
MONOCYTES # BLD AUTO: 1.02 K/UL — HIGH (ref 0–0.9)
MONOCYTES NFR BLD AUTO: 11.4 % — SIGNIFICANT CHANGE UP (ref 2–14)
NEUTROPHILS # BLD AUTO: 5.26 K/UL — SIGNIFICANT CHANGE UP (ref 1.8–7.4)
NEUTROPHILS NFR BLD AUTO: 58.7 % — SIGNIFICANT CHANGE UP (ref 43–77)
NITRITE UR-MCNC: NEGATIVE — SIGNIFICANT CHANGE UP
NRBC # BLD: 0 /100 WBCS — SIGNIFICANT CHANGE UP (ref 0–0)
PH UR: 7.5 — SIGNIFICANT CHANGE UP (ref 5–8)
PLATELET # BLD AUTO: 246 K/UL — SIGNIFICANT CHANGE UP (ref 150–400)
POTASSIUM SERPL-MCNC: 3.7 MMOL/L — SIGNIFICANT CHANGE UP (ref 3.5–5.3)
POTASSIUM SERPL-SCNC: 3.7 MMOL/L — SIGNIFICANT CHANGE UP (ref 3.5–5.3)
PROT SERPL-MCNC: 7.2 G/DL — SIGNIFICANT CHANGE UP (ref 6–8.3)
PROT UR-MCNC: NEGATIVE MG/DL — SIGNIFICANT CHANGE UP
RBC # BLD: 4.05 M/UL — SIGNIFICANT CHANGE UP (ref 3.8–5.2)
RBC # FLD: 14.1 % — SIGNIFICANT CHANGE UP (ref 10.3–14.5)
RBC CASTS # UR COMP ASSIST: 1 /HPF — SIGNIFICANT CHANGE UP (ref 0–4)
SODIUM SERPL-SCNC: 135 MMOL/L — SIGNIFICANT CHANGE UP (ref 135–145)
SP GR SPEC: <1.005 — LOW (ref 1–1.03)
SQUAMOUS # UR AUTO: 0 /HPF — SIGNIFICANT CHANGE UP (ref 0–5)
UROBILINOGEN FLD QL: 0.2 MG/DL — SIGNIFICANT CHANGE UP (ref 0.2–1)
WBC # BLD: 8.97 K/UL — SIGNIFICANT CHANGE UP (ref 3.8–10.5)
WBC # FLD AUTO: 8.97 K/UL — SIGNIFICANT CHANGE UP (ref 3.8–10.5)
WBC UR QL: 54 /HPF — HIGH (ref 0–5)

## 2024-08-13 PROCEDURE — 87086 URINE CULTURE/COLONY COUNT: CPT

## 2024-08-13 PROCEDURE — 81001 URINALYSIS AUTO W/SCOPE: CPT

## 2024-08-13 PROCEDURE — 73030 X-RAY EXAM OF SHOULDER: CPT | Mod: 26,LT

## 2024-08-13 PROCEDURE — 73030 X-RAY EXAM OF SHOULDER: CPT

## 2024-08-13 PROCEDURE — 93971 EXTREMITY STUDY: CPT

## 2024-08-13 PROCEDURE — 85025 COMPLETE CBC W/AUTO DIFF WBC: CPT

## 2024-08-13 PROCEDURE — 99285 EMERGENCY DEPT VISIT HI MDM: CPT | Mod: 25

## 2024-08-13 PROCEDURE — 93971 EXTREMITY STUDY: CPT | Mod: 26,LT

## 2024-08-13 PROCEDURE — 36415 COLL VENOUS BLD VENIPUNCTURE: CPT

## 2024-08-13 PROCEDURE — 80053 COMPREHEN METABOLIC PANEL: CPT

## 2024-08-13 RX ORDER — NITROFURANTOIN (MACROCRYSTALS) 50 MG/1
100 CAPSULE ORAL ONCE
Refills: 0 | Status: COMPLETED | OUTPATIENT
Start: 2024-08-13 | End: 2024-08-13

## 2024-08-13 RX ORDER — NITROFURANTOIN (MACROCRYSTALS) 50 MG/1
1 CAPSULE ORAL
Qty: 10 | Refills: 0
Start: 2024-08-13 | End: 2024-08-17

## 2024-08-13 RX ADMIN — NITROFURANTOIN (MACROCRYSTALS) 100 MILLIGRAM(S): 50 CAPSULE ORAL at 06:10

## 2024-08-13 NOTE — ED PROVIDER NOTE - NSFOLLOWUPINSTRUCTIONS_ED_ALL_ED_FT
You have been evaluated in the Emergency Department today. Your labs show that you have a urinary tract infection. Please take your prescribed antibiotics (Macrobid 100mg two times/day for 5 days) for the full course of the medication as directed.    Please follow up with your primary care physician within two days.    Return to the Emergency Department if you experience fevers 100.4° or greater, worsening or uncontrolled pain, vomiting, flank pain, or for any other concerning symptoms.    Thank you for choosing us for your care.    --------    Please below for more information about urinary tract infections.    A urinary tract infection (UTI) is an infection of any part of the urinary tract. The urinary tract includes the kidneys, ureters, bladder, and urethra. These organs make, store, and get rid of urine in the body.    An upper UTI affects the ureters and kidneys. A lower UTI affects the bladder and urethra.    What are the causes?  Most urinary tract infections are caused by bacteria in your genital area around your urethra, where urine leaves your body. These bacteria grow and cause inflammation of your urinary tract.    What increases the risk?  You are more likely to develop this condition if:  You have a urinary catheter that stays in place.  You are not able to control when you urinate or have a bowel movement (incontinence).  You are female and you:  Use a spermicide or diaphragm for birth control.  Have low estrogen levels.  Are pregnant.  You have certain genes that increase your risk.  You are sexually active.  You take antibiotic medicines.  You have a condition that causes your flow of urine to slow down, such as:  An enlarged prostate, if you are male.  Blockage in your urethra.  A kidney stone.  A nerve condition that affects your bladder control (neurogenic bladder).  Not getting enough to drink, or not urinating often.  You have certain medical conditions, such as:  Diabetes.  A weak disease-fighting system (immunesystem).  Sickle cell disease.  Gout.  Spinal cord injury.    What are the signs or symptoms?  Symptoms of this condition include:  Needing to urinate right away (urgency).  Frequent urination. This may include small amounts of urine each time you urinate.  Pain or burning with urination.  Blood in the urine.  Urine that smells bad or unusual.  Trouble urinating.  Cloudy urine.  Vaginal discharge, if you are female.  Pain in the abdomen or the lower back.  You may also have:  Vomiting or a decreased appetite.  Confusion.  Irritability or tiredness.  A fever or chills.  Diarrhea.  The first symptom in older adults may be confusion. In some cases, they may not have any symptoms until the infection has worsened.    How is this diagnosed?  This condition is diagnosed based on your medical history and a physical exam. You may also have other tests, including:  Urine tests.  Blood tests.  Tests for STIs (sexually transmitted infections).  If you have had more than one UTI, a cystoscopy or imaging studies may be done to determine the cause of the infections.    How is this treated?  Treatment for this condition includes:  Antibiotic medicine.  Over-the-counter medicines to treat discomfort.  Drinking enough water to stay hydrated.  If you have frequent infections or have other conditions such as a kidney stone, you may need to see a health care provider who specializes in the urinary tract (urologist).    In rare cases, urinary tract infections can cause sepsis. Sepsis is a life-threatening condition that occurs when the body responds to an infection. Sepsis is treated in the hospital with IV antibiotics, fluids, and other medicines.    Follow these instructions at home:    Medicines:  Take over-the-counter and prescription medicines only as told by your health care provider.  If you were prescribed an antibiotic medicine, take it as told by your health care provider. Do not stop using the antibiotic even if you start to feel better.  General instructions    Make sure you:  Empty your bladder often and completely. Do not hold urine for long periods of time.  Empty your bladder after sex.  Wipe from front to back after urinating or having a bowel movement if you are female. Use each tissue only one time when you wipe.  Drink enough fluid to keep your urine pale yellow.  Keep all follow-up visits. This is important.  Contact a health care provider if:  Your symptoms do not get better after 1–2 days.  Your symptoms go away and then return.  Get help right away if:  You have severe pain in your back or your lower abdomen.  You have a fever or chills.  You have nausea or vomiting.

## 2024-08-13 NOTE — ED PROVIDER NOTE - ATTENDING CONTRIBUTION TO CARE
83F here with acute on chronic left shoulder pain, sharp, radiating to back of left neck and left occiput w/o paresthesias, coolness to extremity, chest pain, shortness of breath, infx sxs. atrumatic. Hx complicated by prior left shoulder dislocation.    hemodynam stable, NAD, Patient aaox4 to person, place, time, event. CNs intact w/ symm face, PERRL 3mm, EOMI w/o nystagmus, normal phonation. 5/5 str all 4 extrem w/ intact sensation to touch. Well coordinated. No drift. Steady gait. 2+ distal pulses, equal b/l. +dec rom left shoulder to abduction, flexion 2/2 pain. No e/o edema. regular rate, no inc wob. benign abd.    No e/o acute intracranial pathology, acute spinal cord pathology. No clonus, no weakness. Suspect chronic pain. Check labs, shoulder xray. declined CTH.    -> I, Dr. Romel Bernal, independently interpreted the : shoulder xray, L  no acute fracture or dislocation

## 2024-08-13 NOTE — ED PROVIDER NOTE - PROGRESS NOTE DETAILS
Saint Zachary, DO (PGY2): Patient deferring CT head at this time, states that she has gotten too many of them and would like to minimize the amount of radiation.  I explained to the patient that without the CT scan would not be able to rule out any possible bleed from her aneurysms.  Patient verbalizes understanding. Saint Zachary, DO (PGY2): patient feeling well. Got Macrobid for sterile pyuria. Will dc with remaining course of abx. Patient cleared for discharge. She feels comfortable going home. All questions answered.

## 2024-08-13 NOTE — ED ADULT NURSE NOTE - NSSUHOSCREENINGYN_ED_ALL_ED
"  Reason for Disposition    Age > 60 years    Additional Information    Negative: Shock suspected (e.g., cold/pale/clammy skin, too weak to stand, low BP, rapid pulse)    Negative: Difficult to awaken or acting confused  (e.g., disoriented, slurred speech)    Negative: Passed out (i.e., lost consciousness, collapsed and was not responding)    Negative: Sounds like a life-threatening emergency to the triager    Negative: Chest pain    Negative: Pain is mainly in upper abdomen  (if needed ask: \"is it mainly above the belly button?\")    Negative: Followed an abdomen (stomach) injury    Negative: [1] SEVERE pain (e.g., excruciating) AND [2] present > 1 hour    Negative: [1] SEVERE pain AND [2] age > 60    Negative: [1] Vomiting AND [2] contains red blood or black (\"coffee ground\") material  (Exception: few red streaks in vomit that only happened once)    Negative: Blood in bowel movements  (Exception: Blood on surface of BM with constipation)    Negative: Black or tarry bowel movements  (Exception: chronic-unchanged  black-grey bowel movements AND is taking iron pills or Pepto-bismol)    Negative: [1] Unable to urinate (or only a few drops) > 4 hours AND     [2] bladder feels very full (e.g., palpable bladder or strong urge to urinate)    Negative: [1] Pain in the scrotum or testicle AND [2] present > 1 hour    Negative: Patient sounds very sick or weak to the triager    Negative: [1] MILD-MODERATE pain AND [2] constant AND [3] present > 2 hours    Negative: [1] Vomiting AND [2] abdomen looks much more swollen than usual    Negative: [1] Vomiting AND [2] contains bile (green color)    Negative: White of the eyes have turned yellow (i.e., jaundice)    Negative: Fever > 103 F (39.4 C)    Negative: [1] Fever > 101 F (38.3 C) AND [2] age > 60    Negative: [1] Fever > 101 F (38.3 C) AND [2] bedridden (e.g., nursing home patient, CVA, chronic illness, recovering from surgery)    Negative: [1] Fever > 100.5 F (38.1 C) AND [2] " diabetes mellitus or weak immune system (e.g., HIV positive, cancer chemo, splenectomy, organ transplant, chronic steroids)    Negative: [1] SEVERE abdominal pain AND [2] present < 1 hour    Negative: [1] MODERATE pain (e.g., interferes with normal activities) AND [2] pain comes and goes (cramps) AND [3] present > 24 hours  (Exception: pain with Vomiting or Diarrhea - see that Guideline)    Negative: [1] MILD pain (e.g., does not interfere with normal activities) AND [2] pain comes and goes (cramps) [3] present > 48 hours    Protocols used: ABDOMINAL PAIN - MALE-ADULT-SHERRY    Humaira (wife) calls and says that her  was doing his stretches and developed a stomach bulge. Humaira says that the bulge may be from pt's gas. Pt. Also has gas pains. Humaira says that she is unsure if pt. Will se a Dr. Within 24 hours.   Yes - the patient is able to be screened

## 2024-08-13 NOTE — ED ADULT NURSE NOTE - OBJECTIVE STATEMENT
82 y/o F with PMH of HTN presents to ED complaining of high blood pressure. Pt reports she had a fall 3 months ago and dislocated her left shoulder. Since then she has had decreased sensation and range of motion over left upper extremity. Pt also took blood pressure at home and noticed it was high which was not abnormal for her. Upon arrival, pt is A&OX4, satting well RA. Afebrile orally. Well appearing. Pt has decreased sensation on left side. Full strength in all extremities besides left arm due to the fall. Pt goes to PT for it. Left upper extremity also appears to be swollen. Pt complaining of left arm pain radiating toward left neck and left head. Denies dizziness, vision changes, chest pain, shortness of breath, abdominal pain, nausea, vomiting, diarrhea, fevers, chills, dysuria, hematuria, recent illness travel

## 2024-08-13 NOTE — ED PROVIDER NOTE - PATIENT PORTAL LINK FT
You can access the FollowMyHealth Patient Portal offered by [facility] by registering at the following website: [email or web address]. By joining [facility]’s FollowMyHealth portal, you will also be able to view your health information using other applications (apps) compatible with our system.

## 2024-08-13 NOTE — ED ADULT NURSE NOTE - NSFALLRISKINTERV_ED_ALL_ED
Assistance OOB with selected safe patient handling equipment if applicable/Assistance with ambulation/Communicate fall risk and risk factors to all staff, patient, and family/Monitor gait and stability/Provide visual cue: yellow wristband, yellow gown, etc/Reinforce activity limits and safety measures with patient and family/Call bell, personal items and telephone in reach/Instruct patient to call for assistance before getting out of bed/chair/stretcher/Non-slip footwear applied when patient is off stretcher/[place] to call system/Physically safe environment - no spills, clutter or unnecessary equipment/Purposeful Proactive Rounding/Room/bathroom lighting operational, light cord in reach

## 2024-08-13 NOTE — ED PROVIDER NOTE - PHYSICAL EXAMINATION
GENERAL: no acute distress, non-toxic appearing  HEAD: normocephalic, atraumatic  HEENT: EOMI, normal conjunctiva, oral mucosa moist, full ROM of neck,  no neck swelling, no midline tenderness  CARDIAC: regular rate and rhythm  PULM: clear to ascultation bilaterally  GI: abdomen nondistended, soft, nontender  NEURO: alert and oriented x 3, normal speech, no focal motor or sensory deficits, gait normal, no gross neurologic deficit  MSK: no visible deformities, no peripheral edema, calf tenderness/redness/swelling. Decreased ROM of left shoulder  SKIN: no visible rashes, dry, well-perfused  PSYCH: appropriate mood and affect

## 2024-08-13 NOTE — ED PROVIDER NOTE - CLINICAL SUMMARY MEDICAL DECISION MAKING FREE TEXT BOX
Saint Zachary, DO (PGY2):  83-year-old female with history of brain aneurysm x 2, history of multifocal atrial tachycardia, left shoulder dislocation with residual left arm minimal function, presentign for left shoulder pain radiatiing to neck and top of head Saint Zachary, DO (PGY2):  83-year-old female with history of brain aneurysm x 2, history of multifocal atrial tachycardia, left shoulder dislocation with residual left arm minimal function, presenting for left shoulder pain radiating to back of neck, back of head, and top of head. No other symptoms including fever, lightheadedness, dizziness, neurological issues, chest pain, SOB, abdominal pain. Patient does report swelling in her left arm.  Also states that she has been experiencing some dysuria, further explaining that she has frequent UTIs.    - Vital signs stable. Patient is afebrile, not tachycardic  - Lying on stretcher in NAD  - A&Ox3 and is well-appearing  - Head is atraumatic, normal conjunctiva  - Neck is supple with normal ROM   - Mucous membranes are moist  - Lungs are clear to auscultation b/l, no wheezing, rales, or rhonchi  - Normal S1, S2, no murmurs, rubs or gallops  - Abdomen is soft and nontender with normal bowel sounds in all 4 quadrants  - Decreased ROM of left shoulder  - No focal deficits  - No lower extremity edema noted    Given history of aneurysms, concerns for intracranial pathology such as hemorrhage, stroke.  Given dysuria, will obtain urinalysis and urine culture to assess.  Will obtain CBC and CMP to rule out electrolyte abnormality.

## 2024-08-13 NOTE — ED PROVIDER NOTE - IN ACCORDANCE WITH [place] STATE LAW, WE OFFER EVERY PATIENT A HEPATITIS C TEST. WOULD YOU LIKE TO BE TESTED TODAY?
MG tablet  Last Written Prescription Date:  12/4/18  Last Fill Quantity: 90,   # refills: 1  Last Office Visit: 3/13/19  Future Office visit:    Next 5 appointments (look out 90 days)    May 30, 2019 10:20 AM CDT  (Arrive by 10:05 AM)  SHORT with JAI Bella  [facility] ([facility] ) [address]  [place] [address]  [phone]         
Opt out

## 2024-08-13 NOTE — ED PROVIDER NOTE - CARE PLAN
1 Principal Discharge DX:	Sterile pyuria   Principal Discharge DX:	Sterile pyuria  Secondary Diagnosis:	Left shoulder pain

## 2024-08-14 LAB
CULTURE RESULTS: SIGNIFICANT CHANGE UP
SPECIMEN SOURCE: SIGNIFICANT CHANGE UP

## 2024-08-23 NOTE — ED ADULT NURSE NOTE - PAIN RATING/NUMBER SCALE (0-10): ACTIVITY
0 Pinch Graft Text: The defect edges were debeveled with a #15 scalpel blade. Given the location of the defect, shape of the defect and the proximity to free margins a pinch graft was deemed most appropriate. Using a sterile surgical marker, the primary defect shape was transferred to the donor site. The area thus outlined was incised deep to adipose tissue with a #15 scalpel blade.  The harvested graft was then trimmed of adipose tissue until only dermis and epidermis was left. The skin graft was then placed in the primary defect and oriented appropriately.

## 2024-08-28 NOTE — PROGRESS NOTE ADULT - ASSESSMENT
78 f with  HTN- controlled  Nephrology follow noted  Cardiology follow  Hypothyroidism- follow TSH  UTI-urine culture, Ceftin..  Anxiety control  DC home. Follow with PMD/Cardiology/Nephrology in 3 days. QA  Paulie Moore MD pager [phone] Quality 226: Preventive Care And Screening: Tobacco Use: Screening And Cessation Intervention: Patient screened for tobacco use and is an ex/non-smoker Detail Level: Detailed

## 2024-09-09 ENCOUNTER — APPOINTMENT (OUTPATIENT)
Dept: VASCULAR SURGERY | Facility: CLINIC | Age: 84
End: 2024-09-09
Payer: MEDICARE

## 2024-09-09 PROCEDURE — 93978 VASCULAR STUDY: CPT

## 2024-09-09 PROCEDURE — 93926 LOWER EXTREMITY STUDY: CPT

## 2024-09-25 NOTE — ED ADULT NURSE NOTE - CAS TRG GEN SKIN COLOR
[No] : No [0] : 2) Feeling down, depressed, or hopeless: Not at all (0) [PHQ-2 Negative - No further assessment needed] : PHQ-2 Negative - No further assessment needed [Never] : Never [Hepatitis C test offered] : Hepatitis C test offered [Alone] : lives alone [Employed] : employed [] :  [de-identified] : 5 min [[id number]] : 0 [Change in mental status noted] : No change in mental status noted [MammogramDate] : 2024 [PapSmearDate] : 2024 [BoneDensityDate] : 2023 [ColonoscopyDate] : 2022 [de-identified] :  Normal for race

## 2024-09-30 ENCOUNTER — APPOINTMENT (OUTPATIENT)
Dept: CT IMAGING | Facility: IMAGING CENTER | Age: 84
End: 2024-09-30

## 2024-09-30 ENCOUNTER — OUTPATIENT (OUTPATIENT)
Dept: OUTPATIENT SERVICES | Facility: HOSPITAL | Age: 84
LOS: 1 days | End: 2024-09-30
Payer: MEDICARE

## 2024-09-30 DIAGNOSIS — Z98.890 OTHER SPECIFIED POSTPROCEDURAL STATES: Chronic | ICD-10-CM

## 2024-09-30 DIAGNOSIS — J39.8 OTHER SPECIFIED DISEASES OF UPPER RESPIRATORY TRACT: ICD-10-CM

## 2024-09-30 DIAGNOSIS — E89.0 POSTPROCEDURAL HYPOTHYROIDISM: Chronic | ICD-10-CM

## 2024-09-30 DIAGNOSIS — S72.001A FRACTURE OF UNSPECIFIED PART OF NECK OF RIGHT FEMUR, INITIAL ENCOUNTER FOR CLOSED FRACTURE: Chronic | ICD-10-CM

## 2024-09-30 PROCEDURE — 71250 CT THORAX DX C-: CPT | Mod: 26

## 2024-09-30 PROCEDURE — 71250 CT THORAX DX C-: CPT

## 2024-10-04 ENCOUNTER — APPOINTMENT (OUTPATIENT)
Dept: VASCULAR SURGERY | Facility: CLINIC | Age: 84
End: 2024-10-04

## 2024-10-14 ENCOUNTER — APPOINTMENT (OUTPATIENT)
Dept: VASCULAR SURGERY | Facility: CLINIC | Age: 84
End: 2024-10-14
Payer: MEDICARE

## 2024-10-14 VITALS
HEIGHT: 59 IN | HEART RATE: 88 BPM | BODY MASS INDEX: 26.21 KG/M2 | WEIGHT: 130 LBS | DIASTOLIC BLOOD PRESSURE: 99 MMHG | SYSTOLIC BLOOD PRESSURE: 184 MMHG

## 2024-10-14 PROCEDURE — 99213 OFFICE O/P EST LOW 20 MIN: CPT

## 2024-10-17 NOTE — PROGRESS NOTE ADULT - ASSESSMENT
Presentation of patient with suspected upper, although possible component of lower GI bleed with melena and burgundy BM with undifferentiated syncopal episode from [facility] and with second ER evaluation at [place] sent from the patient's GI physician.   Upgraded to telemetry due to history of multifocal atrial tachycardia to exclude cardiac equivalent.   Will follow H/H and orthostatics.    discontinue the ASA for now.     Patient apparently with difficult to control essential HTN, and will cautiously continue with Diltiazem, Coreg, hydralazine, ARB.    Anxiety disorder with patient on Wellbutrin, Xanax.    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 Hope    Pharmacologic DVT prophylaxis precluded with GI bleed.    Upper gastrointestinal bleeding.   - Follow H/H.   Orthostatics.   ASA stopped.  - PPI    - GI follow  - s/p EGD with large hiatal hernia, Dany ulcers, duodenal angiodysplastic bleeding lesion clipped   - regular diet  - if bleeding continues will need capsule endoscopy    Anemia posthemorrhagic  - Iron infusion   - follow Hct  - refuses transfusion PRBC      Hiatal hernia  - surgical evaluation     Multifocal atrial tachycardia.   - telemetry.  - continue Coreg with parameters   - cardiology follow Dr. Gaitan     Essential hypertension.   - control  - hold Hydralazine for hypotension    Anxiety disorder.   - Wellbutrin and Xanax.     Postoperative hypothyroidism.   - TSH.  Followed by her endocrinologist as above.    Neuropathy.   - SPEP / IPEP / UPEP and HgbA1C  pending     Need for prophylactic measure.   - GI bleeding precludes pharmacologic DVT prophylaxis.   MIGUEL A for now.    d/w patient     Paluie Moore MD phone [phone]  Procedure on snapboard 84S Dr. Hernandez 1/17/25.  Prep letter sent

## 2024-10-18 ENCOUNTER — APPOINTMENT (OUTPATIENT)
Dept: PULMONOLOGY | Facility: CLINIC | Age: 84
End: 2024-10-18

## 2024-10-30 ENCOUNTER — APPOINTMENT (OUTPATIENT)
Dept: MRI IMAGING | Facility: IMAGING CENTER | Age: 84
End: 2024-10-30
Payer: MEDICARE

## 2024-10-30 ENCOUNTER — OUTPATIENT (OUTPATIENT)
Dept: OUTPATIENT SERVICES | Facility: HOSPITAL | Age: 84
LOS: 1 days | End: 2024-10-30
Payer: MEDICARE

## 2024-10-30 DIAGNOSIS — E89.0 POSTPROCEDURAL HYPOTHYROIDISM: Chronic | ICD-10-CM

## 2024-10-30 DIAGNOSIS — Z98.890 OTHER SPECIFIED POSTPROCEDURAL STATES: Chronic | ICD-10-CM

## 2024-10-30 DIAGNOSIS — I67.1 CEREBRAL ANEURYSM, NONRUPTURED: ICD-10-CM

## 2024-10-30 DIAGNOSIS — S72.001A FRACTURE OF UNSPECIFIED PART OF NECK OF RIGHT FEMUR, INITIAL ENCOUNTER FOR CLOSED FRACTURE: Chronic | ICD-10-CM

## 2024-10-30 PROCEDURE — 70544 MR ANGIOGRAPHY HEAD W/O DYE: CPT

## 2024-10-30 PROCEDURE — 70544 MR ANGIOGRAPHY HEAD W/O DYE: CPT | Mod: 26

## 2024-10-31 NOTE — ED PROVIDER NOTE - NEUROLOGICAL, MLM
Alert and oriented, no focal deficits, no motor or sensory deficits. cn2-12 grossly intact, normal speech and tone, normal gait. 122

## 2024-11-04 NOTE — PROGRESS NOTE ADULT - PROBLEM SELECTOR PLAN 1
Dr. Villeda ok to discharge patient with elevated BP. Pt will return tomorrow to have tubes removed. 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 On admission /106 mmHg controlled after IV meds, now /72 mmHg. Continue spironolactone 25 mg, continue carvedilol. Pt to f/u as outpatient with nephrologist Dr Melton.

## 2024-11-13 ENCOUNTER — APPOINTMENT (OUTPATIENT)
Dept: NEUROSURGERY | Facility: CLINIC | Age: 84
End: 2024-11-13

## 2024-11-16 NOTE — ED PROVIDER NOTE - NIH STROKE SCALE: 8. SENSORY, QM
[Yes] : Yes [Monthly or less (1 pt)] : Monthly or less (1 point) [1 or 2 (0 pts)] : 1 or 2 (0 points) [Never (0 pts)] : Never (0 points) [No] : In the past 12 months have you used drugs other than those required for medical reasons? No [0] : 2) Feeling down, depressed, or hopeless: Not at all (0) [PHQ-2 Negative - No further assessment needed] : PHQ-2 Negative - No further assessment needed [Audit-CScore] : 0 [[id number]] : 0 [Never] : Never (1) Mild-to-moderate sensory loss; patient feels pinprick is less sharp or is dull on the affected side; or there is a loss of superficial pain with pinprick, but patient is aware of being touched

## 2024-11-18 ENCOUNTER — APPOINTMENT (OUTPATIENT)
Dept: MRI IMAGING | Facility: IMAGING CENTER | Age: 84
End: 2024-11-18

## 2024-11-20 ENCOUNTER — APPOINTMENT (OUTPATIENT)
Dept: NEUROSURGERY | Facility: CLINIC | Age: 84
End: 2024-11-20
Payer: MEDICARE

## 2024-11-20 DIAGNOSIS — I67.1 CEREBRAL ANEURYSM, NONRUPTURED: ICD-10-CM

## 2024-11-20 PROCEDURE — 99443: CPT

## 2024-12-17 ENCOUNTER — APPOINTMENT (OUTPATIENT)
Dept: PULMONOLOGY | Facility: CLINIC | Age: 84
End: 2024-12-17

## 2024-12-23 ENCOUNTER — NON-APPOINTMENT (OUTPATIENT)
Age: 84
End: 2024-12-23

## 2024-12-25 NOTE — ED PROVIDER NOTE - OBJECTIVE STATEMENT
PROCEDURES:  Right salpingectomy 25-Dec-2024 18:00:01  Ernestina Murcia  Diagnostic laparoscopy 25-Dec-2024 18:00:21  Ernestina Murcia   see MDM and progress notes

## 2025-01-16 NOTE — ED ADULT NURSE NOTE - NS PRO AD NO ADVANCE DIRECTIVE
OF UTERUS      OTHER SURGICAL HISTORY           CURRENT MEDICATIONS       Previous Medications    ACETAMINOPHEN (TYLENOL) 500 MG TABLET    Take 2 tablets by mouth 3 times daily as needed for Pain    ACETAZOLAMIDE (DIAMOX) 250 MG TABLET    Take 1 tablet by mouth 2 times daily    ALBUTEROL SULFATE HFA (VENTOLIN HFA) 108 (90 BASE) MCG/ACT INHALER    Inhale 2 puffs into the lungs 4 times daily as needed for Wheezing    DULAGLUTIDE (TRULICITY) 0.75 MG/0.5ML SOAJ SC INJECTION    Inject 0.5 mLs into the skin once a week    FLUTICASONE-SALMETEROL (ADVAIR HFA) 45-21 MCG/ACT INHALER    Inhale 2 puffs into the lungs 2 times daily    IBUPROFEN (ADVIL;MOTRIN) 600 MG TABLET    Take 1 tablet by mouth every 8 hours as needed for Pain    LISINOPRIL (PRINIVIL;ZESTRIL) 5 MG TABLET    Take 1 tablet by mouth once daily    METFORMIN (GLUCOPHAGE) 500 MG TABLET    Take 1 tablet by mouth 2 times daily (with meals)    MONTELUKAST (SINGULAIR) 10 MG TABLET    Take 1 tablet by mouth nightly    OMEPRAZOLE (PRILOSEC) 40 MG DELAYED RELEASE CAPSULE    Take 1 capsule by mouth in the morning and at bedtime for 14 days    PROPRANOLOL (INDERAL) 40 MG TABLET    Take 1 tablet by mouth 2 times daily    SUMATRIPTAN (IMITREX) 50 MG TABLET    Take 1 tablet by mouth as needed for Migraine (Take 1 tab at onset of headache. May take another 1 tab after 2 hrs. Max 2 tabs/ 24 hrs)    TRAZODONE (DESYREL) 50 MG TABLET    Take 1 tablet by mouth nightly as needed for Sleep    VENLAFAXINE (EFFEXOR) 37.5 MG TABLET    Take 1 tablet by mouth daily    VITAMIN D (ERGOCALCIFEROL) 1.25 MG (64177 UT) CAPS CAPSULE    Take 1 capsule by mouth once a week       ALLERGIES     Acetazolamide, Cortisone, Adhesive tape, and Other    FAMILY HISTORY       Family History   Problem Relation Age of Onset    Hypertension Father           SOCIAL HISTORY       Social History     Socioeconomic History    Marital status:    Tobacco Use    Smoking status: Never    Smokeless tobacco: Never 
No

## 2025-02-03 NOTE — ED ADULT TRIAGE NOTE - NS ED NOTE AC HIGH RISK COUNTRIES
[FreeTextEntry3] : Cerumen impaction removed with curette, hook, suction and docusate. After removal of cerumen canal noted to be normal without edema, purulence or inflammation. Patient tolerated procedure well.  No

## 2025-02-18 ENCOUNTER — APPOINTMENT (OUTPATIENT)
Dept: ULTRASOUND IMAGING | Facility: IMAGING CENTER | Age: 85
End: 2025-02-18

## 2025-02-18 ENCOUNTER — APPOINTMENT (OUTPATIENT)
Dept: MAMMOGRAPHY | Facility: IMAGING CENTER | Age: 85
End: 2025-02-18

## 2025-02-19 ENCOUNTER — APPOINTMENT (OUTPATIENT)
Dept: PULMONOLOGY | Facility: CLINIC | Age: 85
End: 2025-02-19
Payer: MEDICARE

## 2025-02-19 VITALS
HEART RATE: 67 BPM | SYSTOLIC BLOOD PRESSURE: 156 MMHG | OXYGEN SATURATION: 95 % | TEMPERATURE: 97.2 F | DIASTOLIC BLOOD PRESSURE: 53 MMHG | HEIGHT: 59 IN | BODY MASS INDEX: 24.6 KG/M2 | WEIGHT: 122 LBS

## 2025-02-19 DIAGNOSIS — R53.83 OTHER FATIGUE: ICD-10-CM

## 2025-02-19 DIAGNOSIS — K21.9 GASTRO-ESOPHAGEAL REFLUX DISEASE W/OUT ESOPHAGITIS: ICD-10-CM

## 2025-02-19 DIAGNOSIS — J39.8 OTHER SPECIFIED DISEASES OF UPPER RESPIRATORY TRACT: ICD-10-CM

## 2025-02-19 DIAGNOSIS — J90 PLEURAL EFFUSION, NOT ELSEWHERE CLASSIFIED: ICD-10-CM

## 2025-02-19 PROCEDURE — 71046 X-RAY EXAM CHEST 2 VIEWS: CPT

## 2025-02-19 PROCEDURE — 99214 OFFICE O/P EST MOD 30 MIN: CPT | Mod: 25

## 2025-04-04 NOTE — H&P PST ADULT - EXTREMITIES
Graciela Kelly is a 75 year old female here for  Chief Complaint   Patient presents with    Office Visit     F/U POST HOSP; CBC CMP Mg TODAY; FULVESTRANT q 4WKS     Denies latex allergy or sensitivity.    Medication verified, no changes.  PCP and Pharmacy verified.    Social History     Tobacco Use   Smoking Status Former    Current packs/day: 0.00    Types: Cigarettes    Quit date: 1977    Years since quittin.4   Smokeless Tobacco Never     Advance Directives Filed: Yes    ECOG:   ECOG [25 1032]   ECOG Performance Status 1       Vitals:    Visit Vitals  BP (!) 90/62   Pulse (!) 101   Temp 98.7 °F (37.1 °C) (Oral)   Resp 17   Ht 5' 5\" (1.651 m)   Wt 60.3 kg (133 lb)   SpO2 98%   BMI 22.13 kg/m²       These vital signs are:  Within defined parameters (Per Reference \"Defined Limits Hospital Outpatient Department (HOD)\")    Height: No.  Ht Readings from Last 1 Encounters:   25 5' 5\" (1.651 m)     Weight:Yes, shoes off.  Wt Readings from Last 3 Encounters:   25 60.3 kg (133 lb)   25 68 kg (150 lb)   25 68 kg (150 lb)       BMI: Body mass index is 22.13 kg/m².    REVIEW OF SYSTEMS  GENERAL:  Patient denies headache, fevers, chills, night sweats, change in appetite, weight loss, dizziness, but complains of: excessive fatigue  ALLERGIC/IMMUNOLOGIC: Verified allergies: Yes  EYES:  Patient denies significant visual difficulties, double vision, blurred vision  ENT/MOUTH: Patient denies problems with hearing, sore throat, sinus drainage, mouth sores  ENDOCRINE:  Patient denies diabetes, thyroid disease, hormone replacement, hot flashes  HEMATOLOGIC/LYMPHATIC: Patient denies easy bruising, bleeding, tender lymph nodes, swollen lymph nodes  BREASTS: Patient denies abnormal masses of breast, nipple discharge, pain  RESPIRATORY:  Patient denies lung pain with breathing, cough, coughing up blood, shortness of breath  CARDIOVASCULAR:  Patient denies anginal chest pain, palpitations,  shortness of breath when lying flat, peripheral edema  GASTROINTESTINAL: Patient denies abdominal pain , nausea, vomiting, GI bleeding, constipation, change in bowel habits, heartburn, sensation of feeling full, difficulty swallowing, but complains of: nausea, vomiting, and diarrhea  : Patient denies abnormal genital masses, blood in the urine, frequency, urgency, burning with urination, hesitancy, incontinence, vaginal bleeding, discharge  MUSCULOSKELETAL:  Patient denies joint pain, bone pain, joint swelling, redness, decreased range of motion  SKIN:  Patient denies chronic rashes, inflammation, ulcerations, skin changes, itching  NEUROLOGIC:  Patient denies loss of balance, areas of focal weakness, abnormal gait, sensory problems, numbness, tingling  PSYCHIATRIC: Patient denies insomnia, depression, anxiety    This patient reported abnormal symptoms that needed immediate verbal communication: No     No cyanosis, clubbing or edema

## 2025-04-08 ENCOUNTER — APPOINTMENT (OUTPATIENT)
Dept: ULTRASOUND IMAGING | Facility: IMAGING CENTER | Age: 85
End: 2025-04-08

## 2025-04-08 ENCOUNTER — APPOINTMENT (OUTPATIENT)
Dept: MAMMOGRAPHY | Facility: IMAGING CENTER | Age: 85
End: 2025-04-08

## 2025-04-15 NOTE — PROVIDER CONTACT NOTE (OTHER) - REASON
CC:  Vanessa Jewell is here today for   Chief Complaint   Patient presents with    Office Visit     Lost IUD strings / had ultrasound before visit    .   Medications: medications verified, no change  Refills needed today?  NO    Patient would like communication of their results via: Mattie  Patient's current [facility] status: Active.  Patient's [facility]      LMP- 3/10/25    If your visit includes an exam today, would you like an assistant to be present in the room during that time? NO    
patient right forearm noted to have swelling, hot, and painful to touch
pt daughter states that she believes her mom just hallucinated?
/65, HR 68

## 2025-04-30 ENCOUNTER — APPOINTMENT (OUTPATIENT)
Dept: ULTRASOUND IMAGING | Facility: IMAGING CENTER | Age: 85
End: 2025-04-30

## 2025-04-30 ENCOUNTER — OUTPATIENT (OUTPATIENT)
Dept: OUTPATIENT SERVICES | Facility: HOSPITAL | Age: 85
LOS: 1 days | End: 2025-04-30
Payer: MEDICARE

## 2025-04-30 ENCOUNTER — APPOINTMENT (OUTPATIENT)
Dept: MAMMOGRAPHY | Facility: IMAGING CENTER | Age: 85
End: 2025-04-30
Payer: MEDICARE

## 2025-04-30 DIAGNOSIS — E89.0 POSTPROCEDURAL HYPOTHYROIDISM: Chronic | ICD-10-CM

## 2025-04-30 DIAGNOSIS — Z98.890 OTHER SPECIFIED POSTPROCEDURAL STATES: Chronic | ICD-10-CM

## 2025-04-30 DIAGNOSIS — S72.001A FRACTURE OF UNSPECIFIED PART OF NECK OF RIGHT FEMUR, INITIAL ENCOUNTER FOR CLOSED FRACTURE: Chronic | ICD-10-CM

## 2025-04-30 DIAGNOSIS — Z00.8 ENCOUNTER FOR OTHER GENERAL EXAMINATION: ICD-10-CM

## 2025-04-30 PROCEDURE — 77067 SCR MAMMO BI INCL CAD: CPT

## 2025-04-30 PROCEDURE — 76641 ULTRASOUND BREAST COMPLETE: CPT

## 2025-04-30 PROCEDURE — 77063 BREAST TOMOSYNTHESIS BI: CPT | Mod: 26

## 2025-04-30 PROCEDURE — 76641 ULTRASOUND BREAST COMPLETE: CPT | Mod: 26,50

## 2025-04-30 PROCEDURE — 77063 BREAST TOMOSYNTHESIS BI: CPT

## 2025-04-30 PROCEDURE — 77067 SCR MAMMO BI INCL CAD: CPT | Mod: 26

## 2025-05-19 ENCOUNTER — APPOINTMENT (OUTPATIENT)
Dept: VASCULAR SURGERY | Facility: CLINIC | Age: 85
End: 2025-05-19

## 2025-06-02 NOTE — OCCUPATIONAL THERAPY INITIAL EVALUATION ADULT - PHYSICAL ASSIST/NONPHYSICAL ASSIST: CHAIR TO BED, REHAB EVAL
X Size Of Lesion In Cm (Optional): 0 Introduction Text (Please End With A Colon): The following procedure was deferred: Detail Level: Detailed Procedure To Be Performed At Next Visit: Excision verbal cues

## 2025-06-13 ENCOUNTER — INPATIENT (INPATIENT)
Facility: HOSPITAL | Age: 85
LOS: 6 days | Discharge: HOME | DRG: 690 | End: 2025-06-20
Attending: INTERNAL MEDICINE | Admitting: STUDENT IN AN ORGANIZED HEALTH CARE EDUCATION/TRAINING PROGRAM
Payer: MEDICARE

## 2025-06-13 VITALS
TEMPERATURE: 98 F | HEART RATE: 84 BPM | HEIGHT: 59 IN | RESPIRATION RATE: 20 BRPM | DIASTOLIC BLOOD PRESSURE: 117 MMHG | OXYGEN SATURATION: 99 % | SYSTOLIC BLOOD PRESSURE: 176 MMHG | WEIGHT: 119.93 LBS

## 2025-06-13 DIAGNOSIS — E89.0 POSTPROCEDURAL HYPOTHYROIDISM: Chronic | ICD-10-CM

## 2025-06-13 DIAGNOSIS — Z98.890 OTHER SPECIFIED POSTPROCEDURAL STATES: Chronic | ICD-10-CM

## 2025-06-13 DIAGNOSIS — S72.001A FRACTURE OF UNSPECIFIED PART OF NECK OF RIGHT FEMUR, INITIAL ENCOUNTER FOR CLOSED FRACTURE: Chronic | ICD-10-CM

## 2025-06-13 PROCEDURE — 99285 EMERGENCY DEPT VISIT HI MDM: CPT

## 2025-06-13 NOTE — ED ADULT TRIAGE NOTE - CHIEF COMPLAINT QUOTE
Dysuria associated with urinary frequency starting in the evening. Endorses recent course of abx, finished last week had missed dose 2-3 days. Denies HA, n/v and blurry vision. pmh: HTN, frequent UTIs

## 2025-06-13 NOTE — ED PROVIDER NOTE - ATTENDING CONTRIBUTION TO CARE
Attending MD Bullard: I personally have seen and examined this patient.  Resident note reviewed and agree on plan of care except where noted.  See below for details.     Seen in [place], accompanied by daughter    84F with PMH/PSH including brain aneurysm x 2, multifocal atrial tachycardia, L shoulder dislocation with residual decreased ROM/function, bladder prolapse s/p recent pessary exchange 3 d ago presents to the ED with urinary burning and urgency.  Reports about two weeks ago had urinary symptoms, does not remember exactly what the symptoms were at that time.  Reports called her UroGyn office and was Rx'ed Macrobid.  Reports she took some pills (unclear maybe 2 or 3 days) and then "dropped them".  Reports unclear amount of time between taking initial pills and then going to pharmacy and getting more pills.  Unclear report as to how many pill she actually took upon resuming.  Reports now presents with urinary burning and urinary urgency, denies hematuria.  Denies fevers, chills, flank pain.  Reports some abdominal discomfort, indicates suprapubic area.  Denies chest pain, shortness of breath, vomiting, diarrhea, bloody or black stools.      Exam:   General: calm  HENT: head NCAT, airway patent  Eyes: anicteric, no conjunctival injection   Lungs: lungs CTAB with good inspiratory effort, no wheezing, no rhonchi, no rales  Cardiac: +S1S2, no obvious m/r/g  GI: abdomen soft with +BS, mild suprapubic tenderness, no rebound, no guarding, ND  : no CVAT  MSK: ranging neck and extremities freely  Neuro: moving all extremities spontaneously, nonfocal    A/P: 84F with mild suprapubic tenderness, +urinary complaints, will obtain labs, UA/UrCx, will likely tx for UTI

## 2025-06-13 NOTE — ED PROVIDER NOTE - CLINICAL SUMMARY MEDICAL DECISION MAKING FREE TEXT BOX
83-year-old female with history of brain aneurysm x 2, history of multifocal atrial tachycardia, left shoulder dislocation with residual left arm minimal function, bladder prolapse with pessary last changed 3 days ago presenting for urinary burning and urgency. S/p macrobid 2 weeks ago, did not take full course properly. No fevers or back pain.   Patient afebrile, hypertensive, exam significant for suprapubic tenderness, no CVA tenderness.  Of note patient states she is due for her antihypertensives around this time, she is finding out which medication.  Will obtain catheterized urine for UA and U culture.  Basic labs.  Anticipate discharge with antibiotics and PCP follow-up. 83-year-old female with history of brain aneurysm x 2, history of multifocal atrial tachycardia, left shoulder dislocation with residual left arm minimal function, bladder prolapse with pessary last changed 3 days ago presenting for urinary burning and urgency. S/p macrobid 2 weeks ago. No fevers or back pain.   Patient afebrile, hypertensive, exam significant for suprapubic tenderness, no CVA tenderness.  Of note patient states she is due for her antihypertensives around this time, she is finding out which medication.  Will obtain catheterized urine for UA and U culture.  Basic labs.  Anticipate discharge with antibiotics and PCP follow-up.

## 2025-06-13 NOTE — ED PROVIDER NOTE - PROGRESS NOTE DETAILS
Olivia Interiano D.O.  EM PGY-1: Pt now c/o chest pain and tingling down L arm. Will add trop, EKG, CXR and place on monitor Olivia Interiano D.O.   PGY-1: Pt now c/o chest pain and tingling down L arm. Will add trop, EKG, CXR and place on monitor    Attending MD Bullard: As above, given reported chest pain, will obtain EKG, add trop, CXR

## 2025-06-13 NOTE — ED PROVIDER NOTE - NSFOLLOWUPINSTRUCTIONS_ED_ALL_ED_FT
While you were evaluated in the emergency room your blood pressure was elevated. Please follow up with your primary care doctor in 1-3 days for further management.

## 2025-06-13 NOTE — ED PROVIDER NOTE - PHYSICAL EXAMINATION
Gen: NAD, non-toxic appearing  Head: NCAT  HEENT: normal conjunctiva, oral mucosa moist  Lung: no respiratory distress, speaking in full sentences, CTA b/l     CV: regular rate and rhythm  Abd: soft, non distended, suprapubic TTP, no CVA tenderness  MSK: no visible deformities  Neuro:  AAOx3  Skin: Warm, appropriate coloring

## 2025-06-13 NOTE — ED PROVIDER NOTE - OBJECTIVE STATEMENT
83-year-old female with history of brain aneurysm x 2, history of multifocal atrial tachycardia, left shoulder dislocation with residual left arm minimal function, bladder prolapse with pessary last changed 3 days ago presenting for urinary burning and urgency. Patient states that 2 weeks ago she started having urinary symptoms, was prescribed a 3-day course of Macrobid however due to her son being in the hospital she had several days in between taking each pill.  Felt that her symptoms resolved and then approx 4 days ago returned.  No fevers, chills, chest pain, SOB, NVD, back pain. Pt states that she is due for a BP med at 11pm however unsure which one.

## 2025-06-14 DIAGNOSIS — N39.0 URINARY TRACT INFECTION, SITE NOT SPECIFIED: ICD-10-CM

## 2025-06-14 LAB
ALBUMIN SERPL ELPH-MCNC: 4.2 G/DL — SIGNIFICANT CHANGE UP (ref 3.3–5)
ALP SERPL-CCNC: 94 U/L — SIGNIFICANT CHANGE UP (ref 40–120)
ALT FLD-CCNC: 14 U/L — SIGNIFICANT CHANGE UP (ref 10–45)
ANION GAP SERPL CALC-SCNC: 17 MMOL/L — SIGNIFICANT CHANGE UP (ref 5–17)
APPEARANCE UR: ABNORMAL
AST SERPL-CCNC: 23 U/L — SIGNIFICANT CHANGE UP (ref 10–40)
BACTERIA # UR AUTO: NEGATIVE /HPF — SIGNIFICANT CHANGE UP
BASOPHILS # BLD AUTO: 0.07 K/UL — SIGNIFICANT CHANGE UP (ref 0–0.2)
BASOPHILS NFR BLD AUTO: 0.7 % — SIGNIFICANT CHANGE UP (ref 0–2)
BILIRUB SERPL-MCNC: 0.3 MG/DL — SIGNIFICANT CHANGE UP (ref 0.2–1.2)
BILIRUB UR-MCNC: NEGATIVE — SIGNIFICANT CHANGE UP
BUN SERPL-MCNC: 13 MG/DL — SIGNIFICANT CHANGE UP (ref 7–23)
CALCIUM SERPL-MCNC: 9.2 MG/DL — SIGNIFICANT CHANGE UP (ref 8.4–10.5)
CAST: 2 /LPF — SIGNIFICANT CHANGE UP (ref 0–4)
CHLORIDE SERPL-SCNC: 100 MMOL/L — SIGNIFICANT CHANGE UP (ref 96–108)
CO2 SERPL-SCNC: 21 MMOL/L — LOW (ref 22–31)
COLOR SPEC: YELLOW — SIGNIFICANT CHANGE UP
CREAT SERPL-MCNC: 0.84 MG/DL — SIGNIFICANT CHANGE UP (ref 0.5–1.3)
DIFF PNL FLD: ABNORMAL
EGFR: 69 ML/MIN/1.73M2 — SIGNIFICANT CHANGE UP
EGFR: 69 ML/MIN/1.73M2 — SIGNIFICANT CHANGE UP
EOSINOPHIL # BLD AUTO: 0.25 K/UL — SIGNIFICANT CHANGE UP (ref 0–0.5)
EOSINOPHIL NFR BLD AUTO: 2.4 % — SIGNIFICANT CHANGE UP (ref 0–6)
GLUCOSE SERPL-MCNC: 104 MG/DL — HIGH (ref 70–99)
GLUCOSE UR QL: NEGATIVE MG/DL — SIGNIFICANT CHANGE UP
HCT VFR BLD CALC: 38.5 % — SIGNIFICANT CHANGE UP (ref 34.5–45)
HGB BLD-MCNC: 13 G/DL — SIGNIFICANT CHANGE UP (ref 11.5–15.5)
IMM GRANULOCYTES NFR BLD AUTO: 0.4 % — SIGNIFICANT CHANGE UP (ref 0–0.9)
KETONES UR QL: NEGATIVE MG/DL — SIGNIFICANT CHANGE UP
LEUKOCYTE ESTERASE UR-ACNC: ABNORMAL
LYMPHOCYTES # BLD AUTO: 19.5 % — SIGNIFICANT CHANGE UP (ref 13–44)
LYMPHOCYTES # BLD AUTO: 2.02 K/UL — SIGNIFICANT CHANGE UP (ref 1–3.3)
MCHC RBC-ENTMCNC: 31.9 PG — SIGNIFICANT CHANGE UP (ref 27–34)
MCHC RBC-ENTMCNC: 33.8 G/DL — SIGNIFICANT CHANGE UP (ref 32–36)
MCV RBC AUTO: 94.4 FL — SIGNIFICANT CHANGE UP (ref 80–100)
MONOCYTES # BLD AUTO: 0.68 K/UL — SIGNIFICANT CHANGE UP (ref 0–0.9)
MONOCYTES NFR BLD AUTO: 6.6 % — SIGNIFICANT CHANGE UP (ref 2–14)
NEUTROPHILS # BLD AUTO: 7.32 K/UL — SIGNIFICANT CHANGE UP (ref 1.8–7.4)
NEUTROPHILS NFR BLD AUTO: 70.4 % — SIGNIFICANT CHANGE UP (ref 43–77)
NITRITE UR-MCNC: NEGATIVE — SIGNIFICANT CHANGE UP
NRBC BLD AUTO-RTO: 0 /100 WBCS — SIGNIFICANT CHANGE UP (ref 0–0)
PH UR: 6 — SIGNIFICANT CHANGE UP (ref 5–8)
PLATELET # BLD AUTO: 235 K/UL — SIGNIFICANT CHANGE UP (ref 150–400)
POTASSIUM SERPL-MCNC: 4.2 MMOL/L — SIGNIFICANT CHANGE UP (ref 3.5–5.3)
POTASSIUM SERPL-SCNC: 4.2 MMOL/L — SIGNIFICANT CHANGE UP (ref 3.5–5.3)
PROT SERPL-MCNC: 7.1 G/DL — SIGNIFICANT CHANGE UP (ref 6–8.3)
PROT UR-MCNC: NEGATIVE MG/DL — SIGNIFICANT CHANGE UP
RBC # BLD: 4.08 M/UL — SIGNIFICANT CHANGE UP (ref 3.8–5.2)
RBC # FLD: 13.4 % — SIGNIFICANT CHANGE UP (ref 10.3–14.5)
RBC CASTS # UR COMP ASSIST: 1 /HPF — SIGNIFICANT CHANGE UP (ref 0–4)
SODIUM SERPL-SCNC: 138 MMOL/L — SIGNIFICANT CHANGE UP (ref 135–145)
SP GR SPEC: 1.01 — SIGNIFICANT CHANGE UP (ref 1–1.03)
SQUAMOUS # UR AUTO: 2 /HPF — SIGNIFICANT CHANGE UP (ref 0–5)
TROPONIN T, HIGH SENSITIVITY RESULT: 15 NG/L — SIGNIFICANT CHANGE UP (ref 0–51)
UROBILINOGEN FLD QL: 0.2 MG/DL — SIGNIFICANT CHANGE UP (ref 0.2–1)
WBC # BLD: 10.38 K/UL — SIGNIFICANT CHANGE UP (ref 3.8–10.5)
WBC # FLD AUTO: 10.38 K/UL — SIGNIFICANT CHANGE UP (ref 3.8–10.5)
WBC UR QL: 253 /HPF — HIGH (ref 0–5)

## 2025-06-14 PROCEDURE — 99223 1ST HOSP IP/OBS HIGH 75: CPT | Mod: GC

## 2025-06-14 PROCEDURE — G0545: CPT

## 2025-06-14 PROCEDURE — 93010 ELECTROCARDIOGRAM REPORT: CPT

## 2025-06-14 PROCEDURE — 71045 X-RAY EXAM CHEST 1 VIEW: CPT | Mod: 26

## 2025-06-14 RX ORDER — CEFTRIAXONE 500 MG/1
1000 INJECTION, POWDER, FOR SOLUTION INTRAMUSCULAR; INTRAVENOUS EVERY 24 HOURS
Refills: 0 | Status: DISCONTINUED | OUTPATIENT
Start: 2025-06-14 | End: 2025-06-14

## 2025-06-14 RX ORDER — PIPERACILLIN-TAZO-DEXTROSE,ISO 3.375G/5
3.38 IV SOLUTION, PIGGYBACK PREMIX FROZEN(ML) INTRAVENOUS EVERY 8 HOURS
Refills: 0 | Status: DISCONTINUED | OUTPATIENT
Start: 2025-06-15 | End: 2025-06-20

## 2025-06-14 RX ORDER — AMITRIPTYLINE HYDROCHLORIDE 25 MG/1
25 TABLET, FILM COATED ORAL AT BEDTIME
Refills: 0 | Status: DISCONTINUED | OUTPATIENT
Start: 2025-06-14 | End: 2025-06-20

## 2025-06-14 RX ORDER — PIPERACILLIN-TAZO-DEXTROSE,ISO 3.375G/5
3.38 IV SOLUTION, PIGGYBACK PREMIX FROZEN(ML) INTRAVENOUS ONCE
Refills: 0 | Status: COMPLETED | OUTPATIENT
Start: 2025-06-14 | End: 2025-06-14

## 2025-06-14 RX ORDER — CARVEDILOL 3.12 MG/1
1 TABLET, FILM COATED ORAL
Refills: 0 | DISCHARGE

## 2025-06-14 RX ORDER — ATORVASTATIN CALCIUM 80 MG/1
10 TABLET, FILM COATED ORAL AT BEDTIME
Refills: 0 | Status: DISCONTINUED | OUTPATIENT
Start: 2025-06-14 | End: 2025-06-20

## 2025-06-14 RX ORDER — ALPRAZOLAM 0.5 MG
1 TABLET, EXTENDED RELEASE 24 HR ORAL DAILY
Refills: 0 | Status: DISCONTINUED | OUTPATIENT
Start: 2025-06-14 | End: 2025-06-20

## 2025-06-14 RX ORDER — ACETAMINOPHEN 500 MG/5ML
650 LIQUID (ML) ORAL EVERY 6 HOURS
Refills: 0 | Status: DISCONTINUED | OUTPATIENT
Start: 2025-06-14 | End: 2025-06-20

## 2025-06-14 RX ORDER — BUPROPION HYDROBROMIDE 522 MG/1
1 TABLET, EXTENDED RELEASE ORAL
Refills: 0 | DISCHARGE

## 2025-06-14 RX ORDER — DILTIAZEM HYDROCHLORIDE 240 MG/1
120 TABLET, EXTENDED RELEASE ORAL DAILY
Refills: 0 | Status: DISCONTINUED | OUTPATIENT
Start: 2025-06-14 | End: 2025-06-14

## 2025-06-14 RX ORDER — LOVASTATIN 20 MG/1
1 TABLET ORAL
Refills: 0 | DISCHARGE

## 2025-06-14 RX ORDER — CARVEDILOL 3.12 MG/1
25 TABLET, FILM COATED ORAL EVERY 12 HOURS
Refills: 0 | Status: DISCONTINUED | OUTPATIENT
Start: 2025-06-14 | End: 2025-06-20

## 2025-06-14 RX ORDER — AMITRIPTYLINE HYDROCHLORIDE 25 MG/1
1 TABLET, FILM COATED ORAL
Refills: 0 | DISCHARGE

## 2025-06-14 RX ORDER — CALCIUM CARBONATE/VITAMIN D3 500MG-5MCG
1 TABLET ORAL DAILY
Refills: 0 | Status: DISCONTINUED | OUTPATIENT
Start: 2025-06-14 | End: 2025-06-20

## 2025-06-14 RX ORDER — OLMESARTAN MEDOXOMIL 5 MG/1
1 TABLET, FILM COATED ORAL
Refills: 0 | DISCHARGE

## 2025-06-14 RX ORDER — POLYETHYLENE GLYCOL 3350 17 G/17G
17 POWDER, FOR SOLUTION ORAL DAILY
Refills: 0 | Status: DISCONTINUED | OUTPATIENT
Start: 2025-06-14 | End: 2025-06-19

## 2025-06-14 RX ORDER — ALPRAZOLAM 0.5 MG
1 TABLET, EXTENDED RELEASE 24 HR ORAL
Refills: 0 | DISCHARGE

## 2025-06-14 RX ORDER — CEFTRIAXONE 500 MG/1
1000 INJECTION, POWDER, FOR SOLUTION INTRAMUSCULAR; INTRAVENOUS ONCE
Refills: 0 | Status: COMPLETED | OUTPATIENT
Start: 2025-06-14 | End: 2025-06-14

## 2025-06-14 RX ORDER — METHENAMINE MANDELATE 1 G
1 TABLET ORAL
Refills: 0 | DISCHARGE

## 2025-06-14 RX ORDER — LOSARTAN POTASSIUM 100 MG/1
100 TABLET, FILM COATED ORAL DAILY
Refills: 0 | Status: DISCONTINUED | OUTPATIENT
Start: 2025-06-14 | End: 2025-06-15

## 2025-06-14 RX ORDER — BUPROPION HYDROBROMIDE 522 MG/1
150 TABLET, EXTENDED RELEASE ORAL DAILY
Refills: 0 | Status: DISCONTINUED | OUTPATIENT
Start: 2025-06-14 | End: 2025-06-20

## 2025-06-14 RX ORDER — ESTRADIOL 25 MCG
1 TABLET VAGINAL
Refills: 0 | DISCHARGE

## 2025-06-14 RX ORDER — LEVOTHYROXINE SODIUM 300 MCG
1 TABLET ORAL
Refills: 0 | DISCHARGE

## 2025-06-14 RX ORDER — LEVOTHYROXINE SODIUM 300 MCG
75 TABLET ORAL DAILY
Refills: 0 | Status: DISCONTINUED | OUTPATIENT
Start: 2025-06-14 | End: 2025-06-15

## 2025-06-14 RX ADMIN — CARVEDILOL 25 MILLIGRAM(S): 3.12 TABLET, FILM COATED ORAL at 13:38

## 2025-06-14 RX ADMIN — Medication 200 GRAM(S): at 18:13

## 2025-06-14 RX ADMIN — ATORVASTATIN CALCIUM 10 MILLIGRAM(S): 80 TABLET, FILM COATED ORAL at 21:17

## 2025-06-14 RX ADMIN — BUPROPION HYDROBROMIDE 150 MILLIGRAM(S): 522 TABLET, EXTENDED RELEASE ORAL at 15:11

## 2025-06-14 RX ADMIN — Medication 25 GRAM(S): at 21:19

## 2025-06-14 RX ADMIN — AMITRIPTYLINE HYDROCHLORIDE 25 MILLIGRAM(S): 25 TABLET, FILM COATED ORAL at 21:18

## 2025-06-14 RX ADMIN — CEFTRIAXONE 100 MILLIGRAM(S): 500 INJECTION, POWDER, FOR SOLUTION INTRAMUSCULAR; INTRAVENOUS at 02:40

## 2025-06-14 NOTE — H&P ADULT - ASSESSMENT
84 f with    UTI  - UC ( was taking Macrobid)  - Ceftriaxone   - ID evaluation    MAT  - BB  -  cardiology evaluation Dr Alva LEAL arm weakness  - PT     Celiac disease  - gluten free diet     Hypothyroidism.   - Synthroid  - TSH    HLD (hyperlipidemia).   - stable     Hiatal hernia.   - fup with GI.    Primary hypertension.   - Coreg and Olmesartan     Bladder prolapse  - follow with Gyn Urogynecology OTP  - continue Pessary     R Iliac artery aneurism  - Follow as OTP     Depression  - continue Rx    Anxiety  - continue Rx    DVT prophylaxis  - PAS     Further action as per clinical course     d/w patient, daughter, consultant, ACP    Paulie Moore MD phone [phone]

## 2025-06-14 NOTE — PATIENT PROFILE ADULT - FALL HARM RISK - HARM RISK INTERVENTIONS
Assistance with ambulation/Assistance OOB with selected safe patient handling equipment/Communicate Risk of Fall with Harm to all staff/Discuss with provider need for PT consult/Monitor gait and stability/Reinforce activity limits and safety measures with patient and family/Tailored Fall Risk Interventions/Use of alarms - bed, chair and/or voice tab/Visual Cue: Yellow wristband and red socks/Bed in lowest position, wheels locked, appropriate side rails in place/Call bell, personal items and telephone in reach/Instruct patient to call for assistance before getting out of bed or chair/Non-slip footwear when patient is out of bed/[place] to call system/Physically safe environment - no spills, clutter or unnecessary equipment/Purposeful Proactive Rounding/Room/bathroom lighting operational, light cord in reach

## 2025-06-14 NOTE — CONSULT NOTE ADULT - SUBJECTIVE AND OBJECTIVE BOX
CARDIOLOGY CONSULT NOTE - DR. STREET        Date of Service: 06-14-25 @ 11:13      HPI:    83-year-old female with history of brain aneurysm x 2, history of multifocal atrial tachycardia, left shoulder dislocation with residual left arm minimal function, bladder prolapse with pessary last changed 3 days ago presenting for urinary burning and urgency. Patient states that 2 weeks ago she started having urinary symptoms, was prescribed a 3-day course of Macrobid however due to her son being in the hospital she had several days in between taking each pill.  Felt that her symptoms resolved and then approx 4 days ago returned.  No fevers, chills, chest pain, SOB, NVD, back pain. Pt states that she is due for a BP med at 11pm however unsure which one    no cp, sob, back pain  c/.o dysuria     PAST MEDICAL & SURGICAL HISTORY:  Hypertension      GERD (gastroesophageal reflux disease)      Osteoarthritis      Macular degeneration      Hypothyroid      Female bladder prolapse      H/O mitral valve prolapse      Hiatal hernia with GERD      Fe deficiency anemia      Tracheal nodule      Arteriosclerotic heart disease (ASHD)      COVID-19 vaccination declined      Primary hypertension      HLD (hyperlipidemia)      Hiatal hernia      Osteoarthritis      Uterine prolapse      Constipation      Papillary carcinoma      History of celiac disease      HLD (hyperlipidemia)      GI bleed      HTN (hypertension)      Multifocal atrial tachycardia      Prediabetes      Diverticulosis      Hiatal hernia      H/O thyroidectomy      Closed right hip fracture      H/O ovarian cystectomy      History of hip surgery      H/O thyroidectomy            PREVIOUS DIAGNOSTIC TESTING:    [ ] Echocardiogram:  [ ]  Catheterization:  [ ] Stress Test:  	    MEDICATIONS:    Home Medications:  acetaminophen 325 mg oral tablet: 1 tab(s) orally every 6 hours as needed for pain (29 Apr 2024 15:04)  ALPRAZolam 1 mg oral tablet: 1 tab(s) orally once a day AS NEEDED (24 Apr 2024 14:07)  amitriptyline 25 mg oral tablet: 1 tab(s) orally once a day (at bedtime) (24 Apr 2024 14:07)  ascorbic acid 500 mg oral tablet: 1 tab(s) orally 2 times a day (24 Apr 2024 14:07)  buPROPion 100 mg oral tablet: 1 tab(s) orally twice a day with breakfast and lunch 1 tablet in the morning and 1 tablet in the afternoon (29 Apr 2024 15:04)  calcium-vitamin D 500 mg-5 mcg (200 intl units) oral tablet: 1 tab(s) orally once a day (24 Apr 2024 14:07)  carvedilol 25 mg oral tablet: 1 tab(s) orally every 12 hours (24 Apr 2024 14:07)  Centrum Silver oral tablet: 1 tab(s) orally once a day (24 Apr 2024 14:07)  cetirizine 10 mg oral tablet: 1 tab(s) orally once a day (24 Apr 2024 14:07)  Colace 100 mg oral capsule: 1 cap(s) orally once a day (24 Apr 2024 14:06)  dilTIAZem 120 mg/24 hours oral capsule, extended release: 1 cap(s) orally once a day (24 Apr 2024 14:07)  estradiol vaginal cream: apply every day for the first 2 weeks, then 2x/week thereafter (24 Apr 2024 14:06)  lidocaine 5% topical film: Apply topically to affected area 3 times a day as needed for  mild pain (24 Apr 2024 14:06)  lovastatin 20 mg oral tablet: 1 tab(s) orally once a day (in the evening) - with dinner (24 Apr 2024 14:07)  MiraLax oral powder for reconstitution: 17 gram(s) orally once a day (24 Apr 2024 14:06)  olmesartan 40 mg oral tablet: 1 tab(s) orally once a day (24 Apr 2024 14:07)  omeprazole 20 mg oral delayed release capsule: 1 cap(s) orally once a day (24 Apr 2024 14:07)  Synthroid 75 mcg (0.075 mg) oral tablet: 1 tab(s) orally once a day 1 tablet M/T/Th/F/Sat and 1.5 tablets on Wed/Sun (24 Apr 2024 15:06)      MEDICATIONS  (STANDING):      FAMILY HISTORY:  FH: HTN (hypertension) (Father, Mother)        SOCIAL HISTORY:    [x ] Non-smoker  [ ] Smoker  [ ] Alcohol    Allergies    NSAIDs (Rash)  penicillin (Other)  hydrocortisone (Unknown)    Intolerances    	    REVIEW OF SYSTEMS:  CONSTITUTIONAL: No fever, weight loss, or fatigue  EYES: No eye pain, visual disturbances, or discharge  ENMT:  No difficulty hearing, tinnitus, vertigo; No sinus or throat pain  NECK: No pain or stiffness  RESPIRATORY: No cough, wheezing, chills or hemoptysis; No Shortness of Breath  CARDIOVASCULAR: as HPI  GASTROINTESTINAL: No abdominal or epigastric pain. No nausea, vomiting, or hematemesis; No diarrhea or constipation. No melena or hematochezia.  GENITOURINARY: No dysuria, frequency, hematuria, or incontinence  NEUROLOGICAL: No headaches, memory loss, loss of strength, numbness, or tremors  SKIN: No itching, burning, rashes, or lesions   	  [ ] All others negative	  [ ] Unable to obtain    PHYSICAL EXAM:    T(C): 36.7 (06-14-25 @ 09:01), Max: 36.8 (06-13-25 @ 20:00)  HR: 69 (06-14-25 @ 09:01) (62 - 84)  BP: 154/72 (06-14-25 @ 09:01) (132/64 - 176/117)  RR: 18 (06-14-25 @ 09:01) (18 - 20)  SpO2: 97% (06-14-25 @ 09:01) (97% - 99%)  Wt(kg): --  I&O's Summary    Daily Height in cm: 149.86 (13 Jun 2025 20:00)    Daily     Appearance: Normal	  Psychiatry: A & O x 3, Mood & affect appropriate  HEENT:   Normal oral mucosa, PERRL, EOMI	  Lymphatic: No lymphadenopathy  Cardiovascular: Normal S1 S2,RRR, No JVD, No murmurs  Respiratory: Lungs clear to auscultation	  Gastrointestinal:  Soft, Non-tender, + BS	  Skin: No rashes, No ecchymoses, No cyanosis	  Neurologic: Non-focal  Extremities: Normal range of motion, No clubbing, cyanosis or edema  Vascular: Peripheral pulses palpable 2+ bilaterally    TELEMETRY: 	    ECG:  	not in chart   RADIOLOGY:  OTHER: 	  	  LABS:	 	    CARDIAC MARKERS:        proBNP:     Lipid Profile:   HgA1c:   TSH:                           13.0   10.38 )-----------( 235      ( 14 Jun 2025 01:24 )             38.5     06-14    138  |  100  |  13  ----------------------------<  104[H]  4.2   |  21[L]  |  0.84    Ca    9.2      14 Jun 2025 01:24    TPro  7.1  /  Alb  4.2  /  TBili  0.3  /  DBili  x   /  AST  23  /  ALT  14  /  AlkPhos  94  06-14        Creatinine: 0.84 mg/dL (06-14-25 @ 01:24)        ASSESSMENT/PLAN:

## 2025-06-14 NOTE — H&P ADULT - NSHPREVIEWOFSYSTEMS_GEN_ALL_CORE
REVIEW OF SYSTEMS:    CONSTITUTIONAL: No weakness, fevers or chills  EYES/ENT: No visual changes;  No vertigo or throat pain   NECK: No pain or stiffness  RESPIRATORY: No cough, wheezing, hemoptysis; No shortness of breath  CARDIOVASCULAR: No chest pain or palpitations  GASTROINTESTINAL: No abdominal or epigastric pain. No nausea, vomiting, or hematemesis; No diarrhea or constipation. No melena or hematochezia.  GENITOURINARY: + dysuria,+ frequency no hematuria  NEUROLOGICAL: No numbness or weakness  SKIN: No itching, burning, rashes, or lesions   All other review of systems is negative unless indicated above.

## 2025-06-14 NOTE — H&P ADULT - HISTORY OF PRESENT ILLNESS
83-year-old female with history of brain aneurysm x 2, history of multifocal atrial tachycardia, left shoulder dislocation with residual left arm minimal function, bladder prolapse with pessary last changed 3 days ago presenting for urinary burning and urgency. Patient states that 2 weeks ago she started having urinary symptoms, was prescribed a 3-day course of Macrobid however due to her son being in the hospital she had several days in between taking each pill.  Felt that her symptoms resolved and then approx 4 days ago returned.  No fevers, chills, chest pain, SOB, NVD, back pain.

## 2025-06-14 NOTE — H&P ADULT - NSHPLABSRESULTS_GEN_ALL_CORE
13.0   10.38 )-----------( 235      ( 2025 01:24 )             38.5       06-14    138  |  100  |  13  ----------------------------<  104[H]  4.2   |  21[L]  |  0.84    Ca    9.2      2025 01:24    TPro  7.1  /  Alb  4.2  /  TBili  0.3  /  DBili  x   /  AST  23  /  ALT  14  /  AlkPhos  94  06-14              Urinalysis Basic - ( 2025 01:29 )    Color: Yellow / Appearance: Cloudy / S.006 / pH: x  Gluc: x / Ketone: x  / Bili: Negative / Urobili: 0.2 mg/dL   Blood: x / Protein: Negative mg/dL / Nitrite: Negative   Leuk Esterase: Large / RBC: 1 /HPF /  /HPF   Sq Epi: x / Non Sq Epi: 2 /HPF / Bacteria: Negative /HPF    < from: Xray Chest 1 View- PORTABLE-Urgent (Xray Chest 1 View- PORTABLE-Urgent .) (06.14.25 @ 02:06) >    IMPRESSION:  No focal consolidation.  Retrocardiac opacity likely reflecting large hiatal hernia seen on CT   chest 2024.    < end of copied text >

## 2025-06-14 NOTE — H&P ADULT - NSHPSOCIALHISTORY_GEN_ALL_CORE
Social History:    Marital Status:  (   )    (   ) Single    (   )    (x  )   Occupation: retired  Lives with: (  ) alone  ( x ) children   (  ) spouse   (  ) parents  (  ) other    Substance Use (street drugs): (x  ) never used  (  ) other:  Tobacco Usage:  (  x ) never smoked   (   ) former smoker   (   ) current smoker  (     ) pack years  (        ) last cigarette date  Alcohol Usage: no    (     ) Advanced Directives: (     ) None    (      ) DNR    (     ) DNI    (     ) Health Care Proxy:

## 2025-06-14 NOTE — H&P ADULT - NSHPPHYSICALEXAM_GEN_ALL_CORE
PHYSICAL EXAMINATION:  Vital Signs Last 24 Hrs  T(C): 36.7 (14 Jun 2025 09:01), Max: 36.8 (13 Jun 2025 20:00)  T(F): 98 (14 Jun 2025 09:01), Max: 98.2 (13 Jun 2025 20:00)  HR: 69 (14 Jun 2025 09:01) (62 - 84)  BP: 154/72 (14 Jun 2025 09:01) (132/64 - 176/117)  BP(mean): 89 (14 Jun 2025 05:00) (89 - 89)  RR: 18 (14 Jun 2025 09:01) (18 - 20)  SpO2: 97% (14 Jun 2025 09:01) (97% - 99%)    Parameters below as of 14 Jun 2025 09:01  Patient On (Oxygen Delivery Method): room air      CAPILLARY BLOOD GLUCOSE          GENERAL: NAD, frail  HEAD:  atraumatic, normocephalic  EYES: sclera anicteric  ENMT: mucous membranes moist  NECK: supple, No JVD  CHEST/LUNG: clear to auscultation bilaterally; no rales, rhonchi, or wheezing b/l  HEART: normal S1, S2  ABDOMEN: BS+, soft, ND, NT   EXTREMITIES:  pulses palpable; no clubbing, cyanosis, or edema b/l LEs  L arm weakness  NEURO: awake, alert, interactive; moves all extremities  SKIN: no rashes or lesions

## 2025-06-14 NOTE — CONSULT NOTE ADULT - ASSESSMENT
83-year-old female with history of brain aneurysm x 2, history of multifocal atrial tachycardia, left shoulder dislocation with residual left arm minimal function, bladder prolapse with pessary last changed 3 days ago presenting for urinary burning and urgency. Patient states that 2 weeks ago she started having urinary symptoms, was prescribed a 3-day course of Macrobid however due to her son being in the hospital she had several days in between taking each pill.  Felt that her symptoms resolved and then approx 4 days ago returned.  No fevers, chills, chest pain, SOB, NVD, back pain.      WBC, LE large Nitrite Neg  Ucx pending    Prior Ucx with E fecalis, E coli         # Acute cystitis  # h/o bladder prolapse, recurrent UTI    - with pyuria and symptoms, recently treated outside with macrobid  - Would switch ceftriaxone to zosyn 3.375gm Q8h given prior h/o E fecalis, Neg nitrite  - follow up urine cultures        All recommendations are tentative pending Attending Attestation.    Johny Umaña MD, PGY-5  ID Fellow  [employer] Teams Preferred  After 5pm/weekends call [phone]   83-year-old female with history of brain aneurysm x 2, history of multifocal atrial tachycardia, left shoulder dislocation with residual left arm minimal function, bladder prolapse with pessary last changed 3 days ago presenting for urinary burning and urgency. Patient states that 2 weeks ago she started having urinary symptoms, was prescribed a 3-day course of Macrobid however due to her son being in the hospital she had several days in between taking each pill.  Felt that her symptoms resolved and then approx 4 days ago returned.  No fevers, chills, chest pain, SOB, NVD, back pain.      WBC, LE large Nitrite Neg  Ucx pending    Prior Ucx with E fecalis, E coli         # Acute cystitis  # h/o bladder/uterine prolapse, recurrent UTI/urosepsis    - with pyuria and symptoms, recently treated outside with macrobid  - Would switch ceftriaxone to zosyn 3.375gm Q8h given prior h/o E fecalis  - follow up urine cultures  - continue to trend CBC/fever curve/ close clinical monitoring        Case d/w Attending and Primary team.     Johny Umaña MD, PGY-5  ID Fellow  [employer] Teams Preferred  After 5pm/weekends call [phone]

## 2025-06-14 NOTE — CONSULT NOTE ADULT - SUBJECTIVE AND OBJECTIVE BOX
Patient is a 84y old  Female who presents with a chief complaint of     HPI:  83-year-old female with history of brain aneurysm x 2, history of multifocal atrial tachycardia, left shoulder dislocation with residual left arm minimal function, bladder prolapse with pessary last changed 3 days ago presenting for urinary burning and urgency. Patient states that 2 weeks ago she started having urinary symptoms, was prescribed a 3-day course of Macrobid however due to her son being in the hospital she had several days in between taking each pill.  Felt that her symptoms resolved and then approx 4 days ago returned.  No fevers, chills, chest pain, SOB, NVD, back pain.      WBC, LE large Nitrite Neg  Ucx pending    Prior Ucx with E fecalis, E coli       prior hospital charts reviewed [ x ]  primary team notes reviewed [ x ]  other consultant notes reviewed [ x ]    PAST MEDICAL & SURGICAL HISTORY:  Hypertension      GERD (gastroesophageal reflux disease)      Osteoarthritis      Macular degeneration      Hypothyroid      Female bladder prolapse      H/O mitral valve prolapse      Hiatal hernia with GERD      Fe deficiency anemia      Tracheal nodule      Arteriosclerotic heart disease (ASHD)      COVID-19 vaccination declined      Primary hypertension      HLD (hyperlipidemia)      Hiatal hernia      Osteoarthritis      Uterine prolapse      Constipation      Papillary carcinoma      History of celiac disease      HLD (hyperlipidemia)      GI bleed      HTN (hypertension)      Multifocal atrial tachycardia      Prediabetes      Diverticulosis      Hiatal hernia      H/O thyroidectomy      Closed right hip fracture      H/O ovarian cystectomy      History of hip surgery      H/O thyroidectomy          Allergies  NSAIDs (Rash)  penicillin (Other)  hydrocortisone (Unknown)    ANTIMICROBIALS (past 90 days)  MEDICATIONS  (STANDING):  cefTRIAXone   IVPB   100 mL/Hr IV Intermittent (25 @ 02:40)        cefTRIAXone   IVPB 1000 every 24 hours    MEDICATIONS  (STANDING):  acetaminophen     Tablet .. 650 every 6 hours PRN  ALPRAZolam 1 daily PRN  amitriptyline 25 at bedtime  atorvastatin 10 at bedtime  buPROPion XL (24-Hour) . 150 daily  carvedilol 25 every 12 hours  cetirizine 10 daily  hydrALAZINE 50 every 12 hours  levothyroxine 75 daily  losartan 100 daily  pantoprazole    Tablet 40 before breakfast  polyethylene glycol 3350 17 daily    SOCIAL HISTORY:       FAMILY HISTORY:  FH: HTN (hypertension) (Father, Mother)      REVIEW OF SYSTEMS  [  ] ROS unobtainable because:    [  ] All other systems negative except as noted below:	    Constitutional:  [ ] fever [ ] chills  [ ] weight loss  [ ] weakness  Skin:  [ ] rash [ ] phlebitis	  Eyes: [ ] icterus [ ] pain  [ ] discharge	  ENMT: [ ] sore throat  [ ] thrush [ ] ulcers [ ] exudates  Respiratory: [ ] dyspnea [ ] hemoptysis [ ] cough [ ] sputum	  Cardiovascular:  [ ] chest pain [ ] palpitations [ ] edema	  Gastrointestinal:  [ ] nausea [ ] vomiting [ ] diarrhea [ ] constipation [ ] pain	  Genitourinary:  [ ] dysuria [ ] frequency [ ] hematuria [ ] discharge [ ] flank pain  [ ] incontinence  Musculoskeletal:  [ ] myalgias [ ] arthralgias [ ] arthritis  [ ] back pain  Neurological:  [ ] headache [ ] seizures  [ ] confusion/altered mental status  Psychiatric:  [ ] anxiety [ ] depression	  Hematology/Lymphatics:  [ ] lymphadenopathy  Endocrine:  [ ] adrenal [ ] thyroid  Allergic/Immunologic:	 [ ] transplant [ ] seasonal    Vital Signs Last 24 Hrs  T(F): 98.1 (25 @ 12:00), Max: 98.2 (25 @ 20:00)  Vital Signs Last 24 Hrs  HR: 78 (25 @ 12:00) (62 - 84)  BP: 145/82 (25 @ 12:00) (132/64 - 176/117)  RR: 18 (25 @ 12:00)  SpO2: 98% (25 @ 12:00) (97% - 99%)  Wt(kg): --    PHYSICAL EXAM:                            13.0   10.38 )-----------( 235      ( 2025 01:24 )             38.5   -14    138  |  100  |  13  ----------------------------<  104[H]  4.2   |  21[L]  |  0.84    Ca    9.2      2025 01:24    TPro  7.1  /  Alb  4.2  /  TBili  0.3  /  DBili  x   /  AST  23  /  ALT  14  /  AlkPhos  94  06-14    Urinalysis Basic - ( 2025 01:29 )    Color: Yellow / Appearance: Cloudy / S.006 / pH: x  Gluc: x / Ketone: x  / Bili: Negative / Urobili: 0.2 mg/dL   Blood: x / Protein: Negative mg/dL / Nitrite: Negative   Leuk Esterase: Large / RBC: 1 /HPF /  /HPF   Sq Epi: x / Non Sq Epi: 2 /HPF / Bacteria: Negative /HPF    MICROBIOLOGY:              RADIOLOGY:  imaging below personally reviewed and agree with findings     Patient is a 84y old  Female who presents with a chief complaint of     HPI:  83-year-old female with history of brain aneurysm x 2, history of multifocal atrial tachycardia, left shoulder dislocation with residual left arm minimal function, bladder prolapse with pessary last changed 3 days ago presenting for urinary burning and urgency. Patient states that 2 weeks ago she started having urinary symptoms, was prescribed a 3-day course of Macrobid however due to her son being in the hospital she had several days in between taking each pill.  Felt that her symptoms resolved and then approx 4 days ago returned.  No fevers, chills, chest pain, SOB, NVD, back pain.      WBC, LE large Nitrite Neg  Ucx pending    Prior Ucx with E fecalis, E coli    prior hospital charts reviewed [ x ]  primary team notes reviewed [ x ]  other consultant notes reviewed [ x ]    PAST MEDICAL & SURGICAL HISTORY:  Hypertension      GERD (gastroesophageal reflux disease)      Osteoarthritis      Macular degeneration      Hypothyroid      Female bladder prolapse      H/O mitral valve prolapse      Hiatal hernia with GERD      Fe deficiency anemia      Tracheal nodule      Arteriosclerotic heart disease (ASHD)      COVID-19 vaccination declined      Primary hypertension      HLD (hyperlipidemia)      Hiatal hernia      Osteoarthritis      Uterine prolapse      Constipation      Papillary carcinoma      History of celiac disease      HLD (hyperlipidemia)      GI bleed      HTN (hypertension)      Multifocal atrial tachycardia      Prediabetes      Diverticulosis      Hiatal hernia      H/O thyroidectomy      Closed right hip fracture      H/O ovarian cystectomy      History of hip surgery      H/O thyroidectomy          Allergies  NSAIDs (Rash)  penicillin (Other)  hydrocortisone (Unknown)    ANTIMICROBIALS (past 90 days)  MEDICATIONS  (STANDING):  cefTRIAXone   IVPB   100 mL/Hr IV Intermittent (25 @ 02:40)        cefTRIAXone   IVPB 1000 every 24 hours    MEDICATIONS  (STANDING):  acetaminophen     Tablet .. 650 every 6 hours PRN  ALPRAZolam 1 daily PRN  amitriptyline 25 at bedtime  atorvastatin 10 at bedtime  buPROPion XL (24-Hour) . 150 daily  carvedilol 25 every 12 hours  cetirizine 10 daily  hydrALAZINE 50 every 12 hours  levothyroxine 75 daily  losartan 100 daily  pantoprazole    Tablet 40 before breakfast  polyethylene glycol 3350 17 daily    SOCIAL HISTORY:  lives with daughter, no recent travels     FAMILY HISTORY:  FH: HTN (hypertension) (Father, Mother)      REVIEW OF SYSTEMS  [  ] ROS unobtainable because:    [x  ] All other systems negative except as noted below:	    Constitutional:  [ ] fever [ ] chills  [ ] weight loss  [ ] weakness  Skin:  [ ] rash [ ] phlebitis	  Eyes: [ ] icterus [ ] pain  [ ] discharge	  ENMT: [ ] sore throat  [ ] thrush [ ] ulcers [ ] exudates  Respiratory: [ ] dyspnea [ ] hemoptysis [ ] cough [ ] sputum	  Cardiovascular:  [ ] chest pain [ ] palpitations [ ] edema	  Gastrointestinal:  [ ] nausea [ ] vomiting [ ] diarrhea [ ] constipation [ ] pain	  Genitourinary:  [x ] dysuria x[x ] frequency [ ] hematuria [ ] discharge [ ] flank pain  [ ] incontinence  Musculoskeletal:  [ ] myalgias [ ] arthralgias [ ] arthritis  [ ] back pain  Neurological:  [ ] headache [ ] seizures  [ ] confusion/altered mental status  Psychiatric:  [ ] anxiety [ ] depression	  Hematology/Lymphatics:  [ ] lymphadenopathy  Endocrine:  [ ] adrenal [ ] thyroid  Allergic/Immunologic:	 [ ] transplant [ ] seasonal    Vital Signs Last 24 Hrs  T(F): 98.1 (25 @ 12:00), Max: 98.2 (25 @ 20:00)  Vital Signs Last 24 Hrs  HR: 78 (25 @ 12:00) (62 - 84)  BP: 145/82 (25 @ 12:00) (132/64 - 176/117)  RR: 18 (25 @ 12:00)  SpO2: 98% (25 @ 12:00) (97% - 99%)  Wt(kg): --    PHYSICAL EXAM:    General: Patient in NAD  HEENT: NCAT, EOMI, PERRL, no oral lesions  CV: S1+S2, no m/r/g appreciated   Lungs: No respiratory distress, CTAB  Abd: Soft, nontender, no guarding, no rebound tenderness, + bowel sounds   : No suprapubic tenderness  Neuro: Alert and oriented to time, place and person. Moves all extremities against gravity.  Ext: No cyanosis, no edema  Skin: No rash, no phlebitis                        13.0   10.38 )-----------( 235      ( 2025 01:24 )             38.5   06-14    138  |  100  |  13  ----------------------------<  104[H]  4.2   |  21[L]  |  0.84    Ca    9.2      2025 01:24    TPro  7.1  /  Alb  4.2  /  TBili  0.3  /  DBili  x   /  AST  23  /  ALT  14  /  AlkPhos  94  06-14    Urinalysis Basic - ( 2025 01:29 )    Color: Yellow / Appearance: Cloudy / S.006 / pH: x  Gluc: x / Ketone: x  / Bili: Negative / Urobili: 0.2 mg/dL   Blood: x / Protein: Negative mg/dL / Nitrite: Negative   Leuk Esterase: Large / RBC: 1 /HPF /  /HPF   Sq Epi: x / Non Sq Epi: 2 /HPF / Bacteria: Negative /HPF    MICROBIOLOGY:              RADIOLOGY:  imaging below personally reviewed and agree with findings     Patient is a 84y old  Female who presents with a chief complaint of     HPI:  83-year-old female with history of brain aneurysm x 2, history of multifocal atrial tachycardia, left shoulder dislocation with residual left arm minimal function, bladder prolapse with pessary last changed 3 days ago presenting for urinary burning and urgency. Patient states that 2 weeks ago she started having urinary symptoms, was prescribed a 3-day course of Macrobid however due to her son being in the hospital she had several days in between taking each pill.  Felt that her symptoms resolved and then approx 4 days ago returned.  No fevers, chills, chest pain, SOB, NVD, back pain.      WBC, LE large Nitrite Neg  Ucx pending    Prior Ucx with E fecalis, E coli    prior hospital charts reviewed [ x ]  primary team notes reviewed [ x ]  other consultant notes reviewed [ x ]      PAST MEDICAL & SURGICAL HISTORY:  Hypertension      GERD (gastroesophageal reflux disease)      Osteoarthritis      Macular degeneration      Hypothyroid      Female bladder prolapse      H/O mitral valve prolapse      Hiatal hernia with GERD      Fe deficiency anemia      Tracheal nodule      Arteriosclerotic heart disease (ASHD)      COVID-19 vaccination declined      Primary hypertension      HLD (hyperlipidemia)      Hiatal hernia      Osteoarthritis      Uterine prolapse      Constipation      Papillary carcinoma      History of celiac disease      HLD (hyperlipidemia)      GI bleed      HTN (hypertension)      Multifocal atrial tachycardia      Prediabetes      Diverticulosis      Hiatal hernia      H/O thyroidectomy      Closed right hip fracture      H/O ovarian cystectomy      History of hip surgery      H/O thyroidectomy          Allergies  NSAIDs (Rash)  penicillin (Other)  hydrocortisone (Unknown)    ANTIMICROBIALS (past 90 days)  MEDICATIONS  (STANDING):  cefTRIAXone   IVPB   100 mL/Hr IV Intermittent (25 @ 02:40)        cefTRIAXone   IVPB 1000 every 24 hours    MEDICATIONS  (STANDING):  acetaminophen     Tablet .. 650 every 6 hours PRN  ALPRAZolam 1 daily PRN  amitriptyline 25 at bedtime  atorvastatin 10 at bedtime  buPROPion XL (24-Hour) . 150 daily  carvedilol 25 every 12 hours  cetirizine 10 daily  hydrALAZINE 50 every 12 hours  levothyroxine 75 daily  losartan 100 daily  pantoprazole    Tablet 40 before breakfast  polyethylene glycol 3350 17 daily          SOCIAL HISTORY:    lives with daughter, no recent travels           FAMILY HISTORY:  FH: HTN (hypertension) (Father, Mother)          REVIEW OF SYSTEMS  [  ] ROS unobtainable because:    [x  ] All other systems negative except as noted below:	    Constitutional:  [ ] fever [ ] chills   Skin:  [ ] rash	  Eyes: [ ] icterus [ ] pain  	  ENMT: [ ] sore throat    Respiratory: [ ] dyspnea [ ] cough [ ] sputum	  Cardiovascular:  [ ] chest pain   Gastrointestinal:  [ ] nausea [ ] vomiting [ ] diarrhea	  Genitourinary:  [x ] dysuria x[x ] frequency   Musculoskeletal:  [ ] myalgias   [ ] back pain  Neurological:  [ ] headache  [ ] confusion/altered mental status  Allergic/Immunologic:	 [ ] transplant [ ] seasonal  Extremity: no edema       Vital Signs Last 24 Hrs  T(F): 98.1 (25 @ 12:00), Max: 98.2 (25 @ 20:00)  Vital Signs Last 24 Hrs  HR: 78 (25 @ 12:00) (62 - 84)  BP: 145/82 (25 @ 12:00) (132/64 - 176/117)  RR: 18 (25 @ 12:00)  SpO2: 98% (25 @ 12:00) (97% - 99%)  Wt(kg): --          PHYSICAL EXAM:    General: Patient in NAD  Eyes: No discharge  ENT: no oral lesions  CV: S1+S2, normal  Lungs: No respiratory distress, + air entry b/l  Abd: Soft, nontender,  : resolved suprapubic tenderness  Neuro: Alert and oriented   Ext: No edema  Skin: No rash                            13.0   10.38 )-----------( 235      ( 2025 01:24 )             38.5   06-14    138  |  100  |  13  ----------------------------<  104[H]  4.2   |  21[L]  |  0.84    Ca    9.2      2025 01:24    TPro  7.1  /  Alb  4.2  /  TBili  0.3  /  DBili  x   /  AST  23  /  ALT  14  /  AlkPhos  94  06-14    Urinalysis Basic - ( 2025 01:29 )    Color: Yellow / Appearance: Cloudy / S.006 / pH: x  Gluc: x / Ketone: x  / Bili: Negative / Urobili: 0.2 mg/dL   Blood: x / Protein: Negative mg/dL / Nitrite: Negative   Leuk Esterase: Large / RBC: 1 /HPF /  /HPF   Sq Epi: x / Non Sq Epi: 2 /HPF / Bacteria: Negative /HPF    MICROBIOLOGY:        RADIOLOGY:  imaging below personally reviewed and agree with findings    < from: Xray Chest 1 View- PORTABLE-Urgent (Xray Chest 1 View- PORTABLE-Urgent .) (25 @ 02:06) >  IMPRESSION:  No focal consolidation.  Retrocardiac opacity likely reflecting large hiatal hernia seen on CT   chest 2024.

## 2025-06-14 NOTE — ED ADULT NURSE REASSESSMENT NOTE - NS ED NURSE REASSESS COMMENT FT1
Pt received from [facility] [place], A&Ox3, breathing spontaneously, unlabored & w/o distress on room air. Pt walking in room independently, states bed is wet, linens and gown changed. VSS, Pt admitted to medicine, awaits bed.

## 2025-06-14 NOTE — CONSULT NOTE ADULT - ASSESSMENT
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       #UTI  -abx pe rmed  -f/u ucx    #HTN  -cont home meds  -bp stable    #MAT  -stable, cont BB    #Aortic/Mitral Valve Disease  -No overload on exam  -Recent echo with nml lv fxn, moderate MR    dvt ppx    d/w dtr at bedside    ACP- Advanced Care Planning  -Advanced care planning discussed with patient. Advanced care planning forms discussed with patient and/or family.  Risks, benefits, and alternatives of medical/cardiac procedures were discussed in detail with all questions answered.  30 minutes were spent addressing advance care planning.        78 minutes spent on total encounter; more than 50% of the visit was spent counseling and/or coordinating care by the attending physician.

## 2025-06-14 NOTE — ED ADULT NURSE NOTE - OBJECTIVE STATEMENT
83 y/o F, AXOX4, with a PMH of bladder prolapse, brain aneurysm x 2, L arm disfunction secondary to L arm dislocation, presents to the ED reporting urinary burning and discoloration. Pt recently dx with UTI and on  Macrobid. Pt states she was not taking the med as often as prescribed. Pt reports "darker colored urine." Pt denies fever/chills/bodyaches, chest pain, dyspnea. Pt found  in gown on stretcher, breathing unlabored on room air, speaking in complete sentences, strong and equal strength in all extremities, sensations intact, abd soft non tender non distended, no edema noted. Safety and comfort measures maintained.

## 2025-06-14 NOTE — CONSULT NOTE ADULT - ATTENDING COMMENTS
83-year-old female with history of brain aneurysm x 2, history of multifocal atrial tachycardia, left shoulder dislocation with residual left arm minimal function, bladder prolapse with pessary last changed 3 days ago presenting for urinary burning and urgency. Patient states that 2 weeks ago she started having urinary symptoms, was prescribed a 3-day course of Macrobid however due to her son being in the hospital she had several days in between taking each pill.  Felt that her symptoms resolved and then approx 4 days ago returned.  No fevers, chills, chest pain, SOB, NVD, back pain.      WBC, LE large Nitrite Neg  Ucx pending    Prior Ucx with E fecalis, E coli         # Acute cystitis  # h/o bladder/uterine prolapse, recurrent UTI/urosepsis      PLAN:  - with pyuria and symptoms, recently treated outside with macrobid  - Would switch ceftriaxone to zosyn 3.375gm Q8h given prior h/o E fecalis  - follow up urine cultures  - continue to trend CBC  - check blood cx if spikes.     Scott Jim  Please contact through MS Teams   If no response or past 5 pm/weekend call [phone].

## 2025-06-15 LAB
ANION GAP SERPL CALC-SCNC: 16 MMOL/L — SIGNIFICANT CHANGE UP (ref 5–17)
BASOPHILS # BLD AUTO: 0.07 K/UL — SIGNIFICANT CHANGE UP (ref 0–0.2)
BASOPHILS NFR BLD AUTO: 1 % — SIGNIFICANT CHANGE UP (ref 0–2)
BUN SERPL-MCNC: 12 MG/DL — SIGNIFICANT CHANGE UP (ref 7–23)
CALCIUM SERPL-MCNC: 9.1 MG/DL — SIGNIFICANT CHANGE UP (ref 8.4–10.5)
CHLORIDE SERPL-SCNC: 104 MMOL/L — SIGNIFICANT CHANGE UP (ref 96–108)
CO2 SERPL-SCNC: 21 MMOL/L — LOW (ref 22–31)
CREAT SERPL-MCNC: 1.04 MG/DL — SIGNIFICANT CHANGE UP (ref 0.5–1.3)
EGFR: 54 ML/MIN/1.73M2 — LOW
EGFR: 54 ML/MIN/1.73M2 — LOW
EOSINOPHIL # BLD AUTO: 0.26 K/UL — SIGNIFICANT CHANGE UP (ref 0–0.5)
EOSINOPHIL NFR BLD AUTO: 3.8 % — SIGNIFICANT CHANGE UP (ref 0–6)
GLUCOSE SERPL-MCNC: 99 MG/DL — SIGNIFICANT CHANGE UP (ref 70–99)
HCT VFR BLD CALC: 36.2 % — SIGNIFICANT CHANGE UP (ref 34.5–45)
HGB BLD-MCNC: 11.6 G/DL — SIGNIFICANT CHANGE UP (ref 11.5–15.5)
IMM GRANULOCYTES NFR BLD AUTO: 0.3 % — SIGNIFICANT CHANGE UP (ref 0–0.9)
LYMPHOCYTES # BLD AUTO: 1.94 K/UL — SIGNIFICANT CHANGE UP (ref 1–3.3)
LYMPHOCYTES # BLD AUTO: 28.7 % — SIGNIFICANT CHANGE UP (ref 13–44)
MCHC RBC-ENTMCNC: 30.7 PG — SIGNIFICANT CHANGE UP (ref 27–34)
MCHC RBC-ENTMCNC: 32 G/DL — SIGNIFICANT CHANGE UP (ref 32–36)
MCV RBC AUTO: 95.8 FL — SIGNIFICANT CHANGE UP (ref 80–100)
MONOCYTES # BLD AUTO: 0.7 K/UL — SIGNIFICANT CHANGE UP (ref 0–0.9)
MONOCYTES NFR BLD AUTO: 10.4 % — SIGNIFICANT CHANGE UP (ref 2–14)
NEUTROPHILS # BLD AUTO: 3.77 K/UL — SIGNIFICANT CHANGE UP (ref 1.8–7.4)
NEUTROPHILS NFR BLD AUTO: 55.8 % — SIGNIFICANT CHANGE UP (ref 43–77)
NRBC BLD AUTO-RTO: 0 /100 WBCS — SIGNIFICANT CHANGE UP (ref 0–0)
PLATELET # BLD AUTO: 214 K/UL — SIGNIFICANT CHANGE UP (ref 150–400)
POTASSIUM SERPL-MCNC: 3.5 MMOL/L — SIGNIFICANT CHANGE UP (ref 3.5–5.3)
POTASSIUM SERPL-SCNC: 3.5 MMOL/L — SIGNIFICANT CHANGE UP (ref 3.5–5.3)
RBC # BLD: 3.78 M/UL — LOW (ref 3.8–5.2)
RBC # FLD: 13.3 % — SIGNIFICANT CHANGE UP (ref 10.3–14.5)
SODIUM SERPL-SCNC: 141 MMOL/L — SIGNIFICANT CHANGE UP (ref 135–145)
TSH SERPL-MCNC: 0.35 UIU/ML — SIGNIFICANT CHANGE UP (ref 0.27–4.2)
WBC # BLD: 6.76 K/UL — SIGNIFICANT CHANGE UP (ref 3.8–10.5)
WBC # FLD AUTO: 6.76 K/UL — SIGNIFICANT CHANGE UP (ref 3.8–10.5)

## 2025-06-15 PROCEDURE — G0545: CPT

## 2025-06-15 PROCEDURE — 99232 SBSQ HOSP IP/OBS MODERATE 35: CPT

## 2025-06-15 RX ORDER — LEVOTHYROXINE SODIUM 300 MCG
100 TABLET ORAL DAILY
Refills: 0 | Status: DISCONTINUED | OUTPATIENT
Start: 2025-06-15 | End: 2025-06-20

## 2025-06-15 RX ORDER — OLMESARTAN MEDOXOMIL 5 MG/1
40 TABLET, FILM COATED ORAL DAILY
Refills: 0 | Status: DISCONTINUED | OUTPATIENT
Start: 2025-06-15 | End: 2025-06-20

## 2025-06-15 RX ADMIN — Medication 50 MILLIGRAM(S): at 12:12

## 2025-06-15 RX ADMIN — Medication 25 GRAM(S): at 05:10

## 2025-06-15 RX ADMIN — Medication 25 GRAM(S): at 14:01

## 2025-06-15 RX ADMIN — Medication 40 MILLIGRAM(S): at 09:20

## 2025-06-15 RX ADMIN — ATORVASTATIN CALCIUM 10 MILLIGRAM(S): 80 TABLET, FILM COATED ORAL at 20:48

## 2025-06-15 RX ADMIN — Medication 1 TABLET(S): at 12:12

## 2025-06-15 RX ADMIN — CARVEDILOL 25 MILLIGRAM(S): 3.12 TABLET, FILM COATED ORAL at 20:47

## 2025-06-15 RX ADMIN — CARVEDILOL 25 MILLIGRAM(S): 3.12 TABLET, FILM COATED ORAL at 09:20

## 2025-06-15 RX ADMIN — LOSARTAN POTASSIUM 100 MILLIGRAM(S): 100 TABLET, FILM COATED ORAL at 05:08

## 2025-06-15 RX ADMIN — Medication 500 MILLIGRAM(S): at 12:12

## 2025-06-15 RX ADMIN — POLYETHYLENE GLYCOL 3350 17 GRAM(S): 17 POWDER, FOR SOLUTION ORAL at 12:12

## 2025-06-15 RX ADMIN — Medication 50 MILLIGRAM(S): at 23:01

## 2025-06-15 RX ADMIN — Medication 75 MICROGRAM(S): at 05:08

## 2025-06-15 RX ADMIN — Medication 25 GRAM(S): at 20:52

## 2025-06-15 RX ADMIN — BUPROPION HYDROBROMIDE 150 MILLIGRAM(S): 522 TABLET, EXTENDED RELEASE ORAL at 09:20

## 2025-06-15 NOTE — PROGRESS NOTE ADULT - ATTENDING COMMENTS
83-year-old female with history of brain aneurysm x 2, history of multifocal atrial tachycardia, left shoulder dislocation with residual left arm minimal function, bladder prolapse with pessary last changed 3 days ago presenting for urinary burning and urgency. Patient states that 2 weeks ago she started having urinary symptoms, was prescribed a 3-day course of Macrobid however due to her son being in the hospital she had several days in between taking each pill.  Felt that her symptoms resolved and then approx 4 days ago returned.  No fevers, chills, chest pain, SOB, NVD, back pain.      WBC, LE large Nitrite Neg  Ucx pending    Prior Ucx with E fecalis, E coli         # Acute cystitis  # h/o bladder/uterine prolapse, recurrent UTI/urosepsis      PLAN:  - with pyuria and symptoms, recently treated outside with macrobid  - c/w zosyn 3.375gm Q8h given prior h/o E fecalis  - follow up urine cultures  - continue to trend CBC  - check blood cx if spikes.       Scott Jim  Please contact through MS Teams   If no response or past 5 pm/weekend call [phone].

## 2025-06-15 NOTE — PROGRESS NOTE ADULT - SUBJECTIVE AND OBJECTIVE BOX
CARDIOLOGY FOLLOW UP NOTE - DR. STREET    Patient Name: JOE URIOSTEGUI    Date of Service: 06-15-25 @ 12:28    Patient seen and examined    Subjective:    cv: denies chest pain, dyspnea, palpitations, dizziness  pulmonary: denies cough  GI: denies abdominal pain, nausea, vomiting  vascular/legs: no edema   skin: no rash  ROS: otherwise negative   overnight events:      PHYSICAL EXAM:  T(C): 36.7 (06-15-25 @ 05:00), Max: 36.8 (25 @ 16:34)  HR: 82 (06-15-25 @ 08:48) (61 - 89)  BP: 125/83 (06-15-25 @ 10:42) (115/68 - 169/100)  RR: 18 (06-15-25 @ 05:00) (18 - 18)  SpO2: 97% (06-15-25 @ 05:00) (95% - 97%)  Wt(kg): --  I&O's Summary    2025 07:  -  15 [facility] 2025 07:00  --------------------------------------------------------  IN: 800 mL / OUT: 0 mL / NET: 800 mL    15 Perico 2025 07:  -  15 [facility]ico 2025 12:28  --------------------------------------------------------  IN: 240 mL / OUT: 0 mL / NET: 240 mL      Daily     Daily     Appearance: Normal	  Cardiovascular: Normal S1 S2,RRR, No JVD, No murmurs  Respiratory: Lungs clear to auscultation	  Gastrointestinal:  Soft, Non-tender, + BS	  Extremities: Normal range of motion, No clubbing, cyanosis or edema      Home Medications:  ALPRAZolam 1 mg oral tablet: 1 tab(s) orally once a day (2025 12:51)  amitriptyline 25 mg oral tablet: 1 tab(s) orally once a day (2025 12:52)  buPROPion 100 mg/12 hours (SR) oral tablet, extended release: 1 tab(s) orally 2 times a day (2025 12:52)  carvedilol 25 mg oral tablet: 1 tab(s) orally 2 times a day (2025 12:52)  Estring 2 mg vaginal rin each intravaginally every 60 days (2025 12:52)  hydrALAZINE 50 mg oral tablet: 1 tab(s) orally 2 times a day (2025 12:52)  lovastatin 20 mg oral tablet: 1 tab(s) orally once a day (2025 12:52)  methenamine hippurate 1 g oral tablet: 1 tab(s) orally once a day (2025 12:52)  olmesartan 40 mg oral tablet: 1 tab(s) orally once a day (2025 12:52)  Synthroid 100 mcg (0.1 mg) oral tablet: 1 tab(s) orally once a day (2025 12:52)      MEDICATIONS  (STANDING):  amitriptyline 25 milliGRAM(s) Oral at bedtime  ascorbic acid 500 milliGRAM(s) Oral daily  atorvastatin 10 milliGRAM(s) Oral at bedtime  buPROPion XL (24-Hour) . 150 milliGRAM(s) Oral daily  calcium carbonate 1250 mG  + Vitamin D (OsCal 500 + D) 1 Tablet(s) Oral daily  carvedilol 25 milliGRAM(s) Oral every 12 hours  cetirizine 10 milliGRAM(s) Oral daily  hydrALAZINE 50 milliGRAM(s) Oral every 12 hours  levothyroxine 75 MICROGram(s) Oral daily  losartan 100 milliGRAM(s) Oral daily  pantoprazole    Tablet 40 milliGRAM(s) Oral before breakfast  piperacillin/tazobactam IVPB.. 3.375 Gram(s) IV Intermittent every 8 hours  polyethylene glycol 3350 17 Gram(s) Oral daily      TELEMETRY: 	    ECG:  	  RADIOLOGY:   DIAGNOSTIC TESTING:  [ ] Echocardiogram:  [ ] Catheterization:  [ ] Stress Test:    OTHER: 	    LABS:	 	    CARDIAC MARKERS:        Troponin T, High Sensitivity Result: 15 ng/L ( @ 02:19)                                11.6   6.76  )-----------( 214      ( 15 [address]ico 2025 07:18 )             36.2     06-15    141  |  104  |  12  ----------------------------<  99  3.5   |  21[L]  |  1.04    Ca    9.1      15 [address] 2025 07:18    TPro  7.1  /  Alb  4.2  /  TBili  0.3  /  DBili  x   /  AST  23  /  ALT  14  /  AlkPhos  94      proBNP:     Lipid Profile:   HgA1c:     Creatinine: 1.04 mg/dL (06-15-25 @ 07:18)  Creatinine: 0.84 mg/dL (25 @ 01:24)

## 2025-06-15 NOTE — PHYSICAL THERAPY INITIAL EVALUATION ADULT - ACTIVE RANGE OF MOTION EXAMINATION, REHAB EVAL
L UE shoulder 0-70 d flexion; 3/5 L elbow wrist 2/5. Pt has L claw hand (hyperext of MCP joints)and flexion of the IP joints 2/2 weakness of intrinsic muscles.) Of note, pt had dislocation of L shoulder with relocation by MD last year after a fall/Right UE Active ROM was WFL (within functional limits)/bilateral  lower extremity Active ROM was WFL (within functional limits)

## 2025-06-15 NOTE — PROGRESS NOTE ADULT - SUBJECTIVE AND OBJECTIVE BOX
Follow Up:  UTI    Interval History/ROS: afebrile, WBC trending down, feels better, no new complaints    Allergies  NSAIDs (Rash)  penicillin (Other)  hydrocortisone (Unknown)        ANTIMICROBIALS:  piperacillin/tazobactam IVPB.. 3.375 every 8 hours      OTHER MEDS:  MEDICATIONS  (STANDING):  acetaminophen     Tablet .. 650 every 6 hours PRN  ALPRAZolam 1 daily PRN  amitriptyline 25 at bedtime  atorvastatin 10 at bedtime  buPROPion XL (24-Hour) . 150 daily  carvedilol 25 every 12 hours  cetirizine 10 daily  hydrALAZINE 50 every 12 hours  levothyroxine 75 daily  losartan 100 daily  pantoprazole    Tablet 40 before breakfast  polyethylene glycol 3350 17 daily      Vital Signs Last 24 Hrs  T(C): 36.7 (15 Perico 2025 05:00), Max: 36.8 (14 Jun 2025 16:34)  T(F): 98 (15 Perico 2025 05:00), Max: 98.2 (14 Jun 2025 16:34)  HR: 82 (15 Perico 2025 08:48) (61 - 89)  BP: 125/83 (15 Perico 2025 10:42) (115/68 - 169/100)  BP(mean): --  RR: 18 (15 Perico 2025 05:00) (18 - 18)  SpO2: 97% (15 Perico 2025 05:00) (95% - 98%)    Parameters below as of 15 Perico 2025 05:00  Patient On (Oxygen Delivery Method): room air        PHYSICAL EXAM:    General: Patient in NAD  HEENT: NCAT, EOMI, PERRL, no oral lesions  CV: S1+S2, no m/r/g appreciated   Lungs: No respiratory distress, CTAB  Abd: Soft, nontender, no guarding, no rebound tenderness, + bowel sounds   : No suprapubic tenderness  Neuro: Alert and oriented to time, place and person. Moves all extremities against gravity.  Ext: No cyanosis, no edema  Skin: No rash, no phlebitis                          11.6   6.76  )-----------( 214      ( 15 Perico 2025 07:18 )             36.2       06-15    141  |  104  |  12  ----------------------------<  99  3.5   |  21[L]  |  1.04    Ca    9.1      15 Perico 2025 07:18    TPro  7.1  /  Alb  4.2  /  TBili  0.3  /  DBili  x   /  AST  23  /  ALT  14  /  AlkPhos  94  06-14      Urinalysis Basic - ( 15 Perico 2025 07:18 )    Color: x / Appearance: x / SG: x / pH: x  Gluc: 99 mg/dL / Ketone: x  / Bili: x / Urobili: x   Blood: x / Protein: x / Nitrite: x   Leuk Esterase: x / RBC: x / WBC x   Sq Epi: x / Non Sq Epi: x / Bacteria: x        MICROBIOLOGY:  v                  RADIOLOGY:   Follow Up:  UTI    Interval History/ROS:   afebrile, WBC trending down, feels better, no new complaints        Allergies  NSAIDs (Rash)  penicillin (Other)  hydrocortisone (Unknown)        ANTIMICROBIALS:  piperacillin/tazobactam IVPB.. 3.375 every 8 hours      OTHER MEDS:  MEDICATIONS  (STANDING):  acetaminophen     Tablet .. 650 every 6 hours PRN  ALPRAZolam 1 daily PRN  amitriptyline 25 at bedtime  atorvastatin 10 at bedtime  buPROPion XL (24-Hour) . 150 daily  carvedilol 25 every 12 hours  cetirizine 10 daily  hydrALAZINE 50 every 12 hours  levothyroxine 75 daily  losartan 100 daily  pantoprazole    Tablet 40 before breakfast  polyethylene glycol 3350 17 daily      Vital Signs Last 24 Hrs  T(C): 36.7 (15 Perico 2025 05:00), Max: 36.8 (14 Jun 2025 16:34)  T(F): 98 (15 Perico 2025 05:00), Max: 98.2 (14 Jun 2025 16:34)  HR: 82 (15 Perico 2025 08:48) (61 - 89)  BP: 125/83 (15 Perico 2025 10:42) (115/68 - 169/100)  BP(mean): --  RR: 18 (15 Perico 2025 05:00) (18 - 18)  SpO2: 97% (15 Perico 2025 05:00) (95% - 98%)    Parameters below as of 15 Perico 2025 05:00  Patient On (Oxygen Delivery Method): room air        PHYSICAL EXAM:    General: Patient in NAD, worried about her BP.   CV: S1+S2, normal  Lungs: No respiratory distress, + air entry b/l  Abd: Soft, nontender  : No suprapubic tenderness  Ext: No edema  Skin: No rash                          11.6   6.76  )-----------( 214      ( 15 Perico 2025 07:18 )             36.2       06-15    141  |  104  |  12  ----------------------------<  99  3.5   |  21[L]  |  1.04    Ca    9.1      15 Perico 2025 07:18    TPro  7.1  /  Alb  4.2  /  TBili  0.3  /  DBili  x   /  AST  23  /  ALT  14  /  AlkPhos  94  06-14      Urinalysis Basic - ( 15 Perico 2025 07:18 )    Color: x / Appearance: x / SG: x / pH: x  Gluc: 99 mg/dL / Ketone: x  / Bili: x / Urobili: x   Blood: x / Protein: x / Nitrite: x   Leuk Esterase: x / RBC: x / WBC x   Sq Epi: x / Non Sq Epi: x / Bacteria: x

## 2025-06-15 NOTE — PHYSICAL THERAPY INITIAL EVALUATION ADULT - ADDITIONAL COMMENTS
Pt lives in a private house with1 step enter and +chair lift to second floor bedroom. DME: rollator , RW and cane.

## 2025-06-15 NOTE — PROGRESS NOTE ADULT - SUBJECTIVE AND OBJECTIVE BOX
Patient is a 84y old  Female who presents with a chief complaint of     SUBJECTIVE / OVERNIGHT EVENTS: feels better. Daughter at bedside.  Review of Systems  chest pain no  palpitations no  sob no  nausea no  headache no    MEDICATIONS  (STANDING):  amitriptyline 25 milliGRAM(s) Oral at bedtime  ascorbic acid 500 milliGRAM(s) Oral daily  atorvastatin 10 milliGRAM(s) Oral at bedtime  buPROPion XL (24-Hour) . 150 milliGRAM(s) Oral daily  calcium carbonate 1250 mG  + Vitamin D (OsCal 500 + D) 1 Tablet(s) Oral daily  carvedilol 25 milliGRAM(s) Oral every 12 hours  cetirizine 10 milliGRAM(s) Oral daily  hydrALAZINE 50 milliGRAM(s) Oral every 12 hours  levothyroxine 100 MICROGram(s) Oral daily  olmesartan 40 milliGRAM(s) Oral daily  pantoprazole    Tablet 40 milliGRAM(s) Oral before breakfast  piperacillin/tazobactam IVPB.. 3.375 Gram(s) IV Intermittent every 8 hours  polyethylene glycol 3350 17 Gram(s) Oral daily    MEDICATIONS  (PRN):  acetaminophen     Tablet .. 650 milliGRAM(s) Oral every 6 hours PRN Temp greater or equal to 38.5C (101.3F), Mild Pain (1 - 3)  ALPRAZolam 1 milliGRAM(s) Oral daily PRN anxiety      Vital Signs Last 24 Hrs  T(C): 36.4 (15 Perico 2025 12:00), Max: 36.8 (14 Jun 2025 16:34)  T(F): 97.6 (15 Perico 2025 12:00), Max: 98.2 (14 Jun 2025 16:34)  HR: 70 (15 Perico 2025 12:00) (61 - 89)  BP: 123/78 (15 Perico 2025 12:00) (115/68 - 169/100)  BP(mean): --  RR: 18 (15 Perico 2025 12:00) (18 - 18)  SpO2: 95% (15 Perico 2025 12:00) (95% - 97%)    Parameters below as of 15 Perico 2025 12:00  Patient On (Oxygen Delivery Method): room air        PHYSICAL EXAM:  GENERAL: NAD  HEAD:  Atraumatic, Normocephalic  EYES: EOMI, PERRLA, conjunctiva and sclera clear  NECK: Supple, No JVD  CHEST/LUNG: Clear to auscultation bilaterally; No wheeze  HEART: Regular rate and rhythm; No murmurs, rubs, or gallops  ABDOMEN: Soft, Nontender, Nondistended; Bowel sounds present  EXTREMITIES:  2+ Peripheral Pulses, No clubbing, cyanosis, or edema L arm with decreased strenghth and L hand contracture ( chronic)  PSYCH: AAOx3  NEUROLOGY: non-focal  SKIN: No rashes or lesions    LABS:                        11.6   6.76  )-----------( 214      ( 15 Perico 2025 07:18 )             36.2     06-15    141  |  104  |  12  ----------------------------<  99  3.5   |  21[L]  |  1.04    Ca    9.1      15 Perico 2025 07:18    TPro  7.1  /  Alb  4.2  /  TBili  0.3  /  DBili  x   /  AST  23  /  ALT  14  /  AlkPhos  94  06-14          Urinalysis Basic - ( 15 Perico 2025 07:18 )    Color: x / Appearance: x / SG: x / pH: x  Gluc: 99 mg/dL / Ketone: x  / Bili: x / Urobili: x   Blood: x / Protein: x / Nitrite: x   Leuk Esterase: x / RBC: x / WBC x   Sq Epi: x / Non Sq Epi: x / Bacteria: x          RADIOLOGY & ADDITIONAL TESTS:    Imaging Personally Reviewed:    Consultant(s) Notes Reviewed:      Care Discussed with Consultants/Other Providers:

## 2025-06-15 NOTE — PHYSICAL THERAPY INITIAL EVALUATION ADULT - PERTINENT HX OF CURRENT PROBLEM, REHAB EVAL
83-year-old female with history of brain aneurysm x 2, history of multifocal atrial tachycardia, left shoulder dislocation with residual left arm minimal function, bladder prolapse with pessary last changed 3 days ago presenting for urinary burning and urgency. Patient states that 2 weeks ago she started having urinary symptoms, was prescribed a 3-day course of Macrobid however due to her son being in the hospital she had several days in between taking each pill.  Felt that her symptoms resolved and then approx 4 days ago returned.  No fevers, chills, chest pain, SOB, NVD, back pain. Pt admitted fro UTI.

## 2025-06-15 NOTE — PROGRESS NOTE ADULT - ASSESSMENT
83-year-old female with history of brain aneurysm x 2, history of multifocal atrial tachycardia, left shoulder dislocation with residual left arm minimal function, bladder prolapse with pessary last changed 3 days ago presenting for urinary burning and urgency. Patient states that 2 weeks ago she started having urinary symptoms, was prescribed a 3-day course of Macrobid however due to her son being in the hospital she had several days in between taking each pill.  Felt that her symptoms resolved and then approx 4 days ago returned.  No fevers, chills, chest pain, SOB, NVD, back pain.      WBC, LE large Nitrite Neg  Ucx pending    Prior Ucx with E fecalis, E coli         # Acute cystitis  # h/o bladder/uterine prolapse, recurrent UTI/urosepsis    - with pyuria and symptoms, recently treated outside with macrobid  - Continue zosyn 3.375gm Q8h  - follow up urine cultures  - continue to trend CBC/fever curve      All recommendations are tentative pending Attending Attestation.    Johny Umaña MD, PGY-5  ID Fellow  [employer] Teams Preferred  After 5pm/weekends call [phone]           83-year-old female with history of brain aneurysm x 2, history of multifocal atrial tachycardia, left shoulder dislocation with residual left arm minimal function, bladder prolapse with pessary last changed 3 days ago presenting for urinary burning and urgency. Patient states that 2 weeks ago she started having urinary symptoms, was prescribed a 3-day course of Macrobid however due to her son being in the hospital she had several days in between taking each pill.  Felt that her symptoms resolved and then approx 4 days ago returned.  No fevers, chills, chest pain, SOB, NVD, back pain.      WBC, LE large Nitrite Neg  Ucx pending    Prior Ucx with E fecalis, E coli         # Acute cystitis  # h/o bladder/uterine prolapse, recurrent UTI/urosepsis    - with pyuria and symptoms, recently treated outside with macrobid  - Continue zosyn 3.375gm Q8h; tolerating well  - follow up urine cultures  - continue to trend CBC/fever curve      Case d/w Attending and Primary team.     Johny Umaña MD, PGY-5  ID Fellow  [employer] Teams Preferred  After 5pm/weekends call [phone]

## 2025-06-15 NOTE — PROGRESS NOTE ADULT - ASSESSMENT
84 f with    UTI  - UC pending   - Zosyn   - ID evaluation noted    MAT  - BB  -  cardiology evaluation Dr Alva LEAL arm weakness  - PT     Celiac disease  - gluten free diet     Hypothyroidism.   - Synthroid  - TSH    HLD (hyperlipidemia).   - stable     Hiatal hernia.   - fup with GI.    Primary hypertension.   - Coreg and Olmesartan     Bladder prolapse  - follow with Gyn Urogynecology OTP  - continue Pessary     R Iliac artery aneurism  - Follow as OTP     Depression  - continue Rx    Anxiety  - continue Rx    DVT prophylaxis  - PAS     d/w patient, daughter, ACP    Paulie Moore MD phone [phone]

## 2025-06-15 NOTE — PROGRESS NOTE ADULT - ASSESSMENT
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       #UTI  -abx pe rmed  -f/u ucx    #HTN  -cont home meds  -bp stable    #MAT  -stable, cont BB    #Aortic/Mitral Valve Disease  -No overload on exam  -Recent echo with nml lv fxn, moderate MR    dvt ppx    d/w dtr at bedside      56 minutes spent on total encounter; more than 50% of the visit was spent counseling and/or coordinating care by the attending physician.

## 2025-06-16 LAB
HCT VFR BLD CALC: 40.4 % — SIGNIFICANT CHANGE UP (ref 34.5–45)
HGB BLD-MCNC: 13 G/DL — SIGNIFICANT CHANGE UP (ref 11.5–15.5)
MCHC RBC-ENTMCNC: 30.9 PG — SIGNIFICANT CHANGE UP (ref 27–34)
MCHC RBC-ENTMCNC: 32.2 G/DL — SIGNIFICANT CHANGE UP (ref 32–36)
MCV RBC AUTO: 96 FL — SIGNIFICANT CHANGE UP (ref 80–100)
NRBC BLD AUTO-RTO: 0 /100 WBCS — SIGNIFICANT CHANGE UP (ref 0–0)
PLATELET # BLD AUTO: 237 K/UL — SIGNIFICANT CHANGE UP (ref 150–400)
RBC # BLD: 4.21 M/UL — SIGNIFICANT CHANGE UP (ref 3.8–5.2)
RBC # FLD: 13.6 % — SIGNIFICANT CHANGE UP (ref 10.3–14.5)
WBC # BLD: 6.46 K/UL — SIGNIFICANT CHANGE UP (ref 3.8–10.5)
WBC # FLD AUTO: 6.46 K/UL — SIGNIFICANT CHANGE UP (ref 3.8–10.5)

## 2025-06-16 RX ADMIN — Medication 40 MILLIGRAM(S): at 10:19

## 2025-06-16 RX ADMIN — Medication 100 MICROGRAM(S): at 08:04

## 2025-06-16 RX ADMIN — CARVEDILOL 25 MILLIGRAM(S): 3.12 TABLET, FILM COATED ORAL at 10:19

## 2025-06-16 RX ADMIN — OLMESARTAN MEDOXOMIL 40 MILLIGRAM(S): 5 TABLET, FILM COATED ORAL at 00:05

## 2025-06-16 RX ADMIN — BUPROPION HYDROBROMIDE 150 MILLIGRAM(S): 522 TABLET, EXTENDED RELEASE ORAL at 10:19

## 2025-06-16 RX ADMIN — Medication 1 MILLIGRAM(S): at 01:22

## 2025-06-16 RX ADMIN — Medication 25 GRAM(S): at 21:26

## 2025-06-16 RX ADMIN — AMITRIPTYLINE HYDROCHLORIDE 25 MILLIGRAM(S): 25 TABLET, FILM COATED ORAL at 01:22

## 2025-06-16 RX ADMIN — Medication 25 GRAM(S): at 05:54

## 2025-06-16 RX ADMIN — Medication 10 MILLIGRAM(S): at 11:47

## 2025-06-16 RX ADMIN — POLYETHYLENE GLYCOL 3350 17 GRAM(S): 17 POWDER, FOR SOLUTION ORAL at 11:47

## 2025-06-16 RX ADMIN — Medication 50 MILLIGRAM(S): at 11:47

## 2025-06-16 RX ADMIN — CARVEDILOL 25 MILLIGRAM(S): 3.12 TABLET, FILM COATED ORAL at 21:27

## 2025-06-16 RX ADMIN — Medication 500 MILLIGRAM(S): at 11:47

## 2025-06-16 RX ADMIN — Medication 25 GRAM(S): at 14:12

## 2025-06-16 RX ADMIN — Medication 1 TABLET(S): at 11:47

## 2025-06-16 NOTE — PROGRESS NOTE ADULT - SUBJECTIVE AND OBJECTIVE BOX
Patient is a 84y old  Female who presents with a chief complaint of     SUBJECTIVE / OVERNIGHT EVENTS: feels better. Daughter at bedside.  Review of Systems  chest pain no  palpitations no  sob no  nausea no  headache no    MEDICATIONS  (STANDING):  amitriptyline 25 milliGRAM(s) Oral at bedtime  ascorbic acid 500 milliGRAM(s) Oral daily  atorvastatin 10 milliGRAM(s) Oral at bedtime  buPROPion XL (24-Hour) . 150 milliGRAM(s) Oral daily  calcium carbonate 1250 mG  + Vitamin D (OsCal 500 + D) 1 Tablet(s) Oral daily  carvedilol 25 milliGRAM(s) Oral every 12 hours  cetirizine 10 milliGRAM(s) Oral daily  hydrALAZINE 50 milliGRAM(s) Oral every 12 hours  levothyroxine 100 MICROGram(s) Oral daily  olmesartan 40 milliGRAM(s) Oral daily  pantoprazole    Tablet 40 milliGRAM(s) Oral before breakfast  piperacillin/tazobactam IVPB.. 3.375 Gram(s) IV Intermittent every 8 hours  polyethylene glycol 3350 17 Gram(s) Oral daily    MEDICATIONS  (PRN):  acetaminophen     Tablet .. 650 milliGRAM(s) Oral every 6 hours PRN Temp greater or equal to 38.5C (101.3F), Mild Pain (1 - 3)  ALPRAZolam 1 milliGRAM(s) Oral daily PRN anxiety      Vital Signs Last 24 Hrs  T(C): 36.9 (16 Jun 2025 13:47), Max: 36.9 (16 Jun 2025 09:59)  T(F): 98.5 (16 Jun 2025 13:47), Max: 98.5 (16 Jun 2025 09:59)  HR: 76 (16 Jun 2025 13:47) (60 - 84)  BP: 126/51 (16 Jun 2025 13:47) (126/51 - 155/75)  BP(mean): --  RR: 18 (16 Jun 2025 13:47) (18 - 18)  SpO2: 98% (16 Jun 2025 13:47) (96% - 98%)    Parameters below as of 16 Jun 2025 13:47  Patient On (Oxygen Delivery Method): room air        PHYSICAL EXAM:  GENERAL: NAD, well-developed  HEAD:  Atraumatic, Normocephalic  EYES: EOMI, PERRLA, conjunctiva and sclera clear  NECK: Supple, No JVD  CHEST/LUNG: Clear to auscultation bilaterally; No wheeze  HEART: Regular rate and rhythm; No murmurs, rubs, or gallops  ABDOMEN: Soft, Nontender, Nondistended; Bowel sounds present  EXTREMITIES: L arm decrease ROM   PSYCH: AAOx3  NEUROLOGY: non-focal  SKIN: No rashes or lesions    LABS:                        13.0   6.46  )-----------( 237      ( 16 Jun 2025 11:18 )             40.4     06-15    141  |  104  |  12  ----------------------------<  99  3.5   |  21[L]  |  1.04    Ca    9.1      15 Perico 2025 07:18            Urinalysis Basic - ( 15 Perico 2025 07:18 )    Color: x / Appearance: x / SG: x / pH: x  Gluc: 99 mg/dL / Ketone: x  / Bili: x / Urobili: x   Blood: x / Protein: x / Nitrite: x   Leuk Esterase: x / RBC: x / WBC x   Sq Epi: x / Non Sq Epi: x / Bacteria: x        Culture - Urine (collected 14 Jun 2025 01:29)  Source: Catheterized Catheterized  Preliminary Report (15 Perico 2025 17:28):    10,000 - 49,000 CFU/mL Gram Negative Rods        RADIOLOGY & ADDITIONAL TESTS:    Imaging Personally Reviewed:    Consultant(s) Notes Reviewed:      Care Discussed with Consultants/Other Providers:

## 2025-06-16 NOTE — OCCUPATIONAL THERAPY INITIAL EVALUATION ADULT - PERTINENT HX OF CURRENT PROBLEM, REHAB EVAL
83-year-old female with history of brain aneurysm x 2, history of multifocal atrial tachycardia, left shoulder dislocation with residual left arm minimal function, bladder prolapse with pessary last changed 3 days ago presenting for urinary burning and urgency. Patient states that 2 weeks ago she started having urinary symptoms, was prescribed a 3-day course of Macrobid however due to her son being in the hospital she had several days in between taking each pill.  Felt that her symptoms resolved and then approx 4 days ago returned.  No fevers, chills, chest pain, SOB, NVD, back pain. Pt admitted for UTI.

## 2025-06-16 NOTE — PROGRESS NOTE ADULT - SUBJECTIVE AND OBJECTIVE BOX
CARDIOLOGY FOLLOW UP - Dr. Calle    Patient Name: JOE URIOSTEGUI    DATE OF SERVICE: 25 @ 15:21    Patient seen and examined    CC some +SOB after ambulating earlier today in the ray  +dizziness with standing      REVIEW OF SYSTEMS:  CONSTITUTIONAL: No fever, weight loss, or fatigue  RESPIRATORY: No cough, wheezing, chills or hemoptysis; +Shortness of Breath  CARDIOVASCULAR: No chest pain, palpitations, or passing out, +dizziness  GASTROINTESTINAL: No abdominal or epigastric pain. No nausea, vomiting, or hematemesis; No diarrhea or constipation. No melena or hematochezia.      PHYSICAL EXAM:  T(C): 36.9 (25 @ 13:47), Max: 36.9 (25 @ 09:59)  HR: 76 (25 @ 13:47) (60 - 84)  BP: 126/51 (25 @ 13:47) (126/51 - 155/75)  RR: 18 (25 @ 13:47) (18 - 18)  SpO2: 98% (25 @ 13:47) (94% - 98%)  Wt(kg): --  I&O's Summary    15 Perico 2025 07:  -  2025 07:00  --------------------------------------------------------  IN: 1290 mL / OUT: 0 mL / NET: 1290 mL    2025 07:  -  2025 15:21  --------------------------------------------------------  IN: 440 mL / OUT: 0 mL / NET: 440 mL        Appearance: Normal	  Cardiovascular: Normal S1 S2,RRR, No JVD, No murmurs  Respiratory: Lungs clear to auscultation b/l	  Gastrointestinal:  Soft, Non-tender, + BS	  Extremities: Normal range of motion, No clubbing, cyanosis or edema      Home Medications:  ALPRAZolam 1 mg oral tablet: 1 tab(s) orally once a day (2025 12:51)  amitriptyline 25 mg oral tablet: 1 tab(s) orally once a day (2025 12:52)  buPROPion 100 mg/12 hours (SR) oral tablet, extended release: 1 tab(s) orally 2 times a day (2025 12:52)  carvedilol 25 mg oral tablet: 1 tab(s) orally 2 times a day (2025 12:52)  Estring 2 mg vaginal rin each intravaginally every 60 days (2025 12:52)  hydrALAZINE 50 mg oral tablet: 1 tab(s) orally 2 times a day (2025 12:52)  lovastatin 20 mg oral tablet: 1 tab(s) orally once a day (2025 12:52)  methenamine hippurate 1 g oral tablet: 1 tab(s) orally once a day (2025 12:52)  olmesartan 40 mg oral tablet: 1 tab(s) orally once a day (2025 12:52)  Synthroid 100 mcg (0.1 mg) oral tablet: 1 tab(s) orally once a day (2025 12:52)      MEDICATIONS  (STANDING):  amitriptyline 25 milliGRAM(s) Oral at bedtime  ascorbic acid 500 milliGRAM(s) Oral daily  atorvastatin 10 milliGRAM(s) Oral at bedtime  buPROPion XL (24-Hour) . 150 milliGRAM(s) Oral daily  calcium carbonate 1250 mG  + Vitamin D (OsCal 500 + D) 1 Tablet(s) Oral daily  carvedilol 25 milliGRAM(s) Oral every 12 hours  cetirizine 10 milliGRAM(s) Oral daily  hydrALAZINE 50 milliGRAM(s) Oral every 12 hours  levothyroxine 100 MICROGram(s) Oral daily  olmesartan 40 milliGRAM(s) Oral daily  pantoprazole    Tablet 40 milliGRAM(s) Oral before breakfast  piperacillin/tazobactam IVPB.. 3.375 Gram(s) IV Intermittent every 8 hours  polyethylene glycol 3350 17 Gram(s) Oral daily      TELEMETRY: 	    ECG:  	  RADIOLOGY:   DIAGNOSTIC TESTING:  [ ] Echocardiogram:  [ ]  Catheterization:  [ ] Stress Test:    OTHER: 	    LABS:	 	    Troponin T, High Sensitivity Result: 15 ng/L [0 - 51] ( @ 02:19)                          13.0   6.46  )-----------( 237      ( 2025 11:18 )             40.4     06-15    141  |  104  |  12  ----------------------------<  99  3.5   |  21[L]  |  1.04    Ca    9.1      15 Perico 2025 07:18        Lipid Profile:   Hgb A1C:      BNP:     Creatinine: 1.04 mg/dL (06-15-25 @ 07:18)  Creatinine: 0.84 mg/dL (25 @ 01:24)      Hemoglobin: 13.0 g/dL (25 @ 11:18)  Hemoglobin: 11.6 g/dL (06-15-25 @ 07:18)  Hemoglobin: 13.0 g/dL (25 @ 01:24)

## 2025-06-16 NOTE — PROGRESS NOTE ADULT - ASSESSMENT
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       #UTI  -abx per med  -f/u ucx    #Dizziness   -check orthostatic BP  -check echo     #HTN  -cont home meds  -bp stable    #MAT  -stable, cont BB    #Aortic/Mitral Valve Disease  -No overload on exam  -Recent echo with nml lv fxn, moderate MR  -check repeat echo given MARTINEZ today     dvt ppx    d/w dtr at bedside

## 2025-06-16 NOTE — OCCUPATIONAL THERAPY INITIAL EVALUATION ADULT - MD ORDER
Re-sent Rx over with a max of 100 mg in a 24 hour period for sildenafil.    OT evaluate and treat  OOB to chair

## 2025-06-16 NOTE — PROGRESS NOTE ADULT - ASSESSMENT
84 f with    UTI  - UC sensitivity pending   - Zosyn   - ID evaluation noted    MAT  - BB  -  cardiology evaluation Dr Alva LEAL arm weakness  - PT     Celiac disease  - gluten free diet     Hypothyroidism.   - Synthroid  - TSH    HLD (hyperlipidemia).   - stable     Hiatal hernia.   - fup with GI.    Primary hypertension.   - Coreg and Olmesartan     Bladder prolapse  - follow with Gyn Urogynecology OTP  - continue Pessary     R Iliac artery aneurism  - Follow as OTP     Depression  - continue Rx    Anxiety  - continue Rx    DVT prophylaxis  - PAS     d/w patient, daughter, ACP    Paulie Moore MD phone [phone]

## 2025-06-16 NOTE — OCCUPATIONAL THERAPY INITIAL EVALUATION ADULT - PHYSICAL ASSIST/NONPHYSICAL ASSIST: SCOOT/BRIDGE, REHAB EVAL
verbal cues/nonverbal cues (demo/gestures) steri-strips, mastisol, 4x4's, tape Marjorie Winter (Lot #[id number] , exp 01/31/2024)

## 2025-06-17 LAB
-  AMOXICILLIN/CLAVULANIC ACID: SIGNIFICANT CHANGE UP
-  AMPICILLIN/SULBACTAM: SIGNIFICANT CHANGE UP
-  AMPICILLIN: SIGNIFICANT CHANGE UP
-  AZTREONAM: SIGNIFICANT CHANGE UP
-  CEFAZOLIN: SIGNIFICANT CHANGE UP
-  CEFEPIME: SIGNIFICANT CHANGE UP
-  CEFOXITIN: SIGNIFICANT CHANGE UP
-  CEFTRIAXONE: SIGNIFICANT CHANGE UP
-  CEFUROXIME: SIGNIFICANT CHANGE UP
-  CIPROFLOXACIN: SIGNIFICANT CHANGE UP
-  ERTAPENEM: SIGNIFICANT CHANGE UP
-  GENTAMICIN: SIGNIFICANT CHANGE UP
-  IMIPENEM: SIGNIFICANT CHANGE UP
-  LEVOFLOXACIN: SIGNIFICANT CHANGE UP
-  MEROPENEM: SIGNIFICANT CHANGE UP
-  NITROFURANTOIN: SIGNIFICANT CHANGE UP
-  PIPERACILLIN/TAZOBACTAM: SIGNIFICANT CHANGE UP
-  TIGECYCLINE: SIGNIFICANT CHANGE UP
-  TOBRAMYCIN: SIGNIFICANT CHANGE UP
-  TRIMETHOPRIM/SULFAMETHOXAZOLE: SIGNIFICANT CHANGE UP
CULTURE RESULTS: ABNORMAL
METHOD TYPE: SIGNIFICANT CHANGE UP
ORGANISM # SPEC MICROSCOPIC CNT: ABNORMAL
ORGANISM # SPEC MICROSCOPIC CNT: ABNORMAL
SPECIMEN SOURCE: SIGNIFICANT CHANGE UP

## 2025-06-17 PROCEDURE — 99232 SBSQ HOSP IP/OBS MODERATE 35: CPT

## 2025-06-17 PROCEDURE — G0545: CPT

## 2025-06-17 RX ORDER — POLYETHYLENE GLYCOL 3350 17 G/17G
17 POWDER, FOR SOLUTION ORAL ONCE
Refills: 0 | Status: COMPLETED | OUTPATIENT
Start: 2025-06-17 | End: 2025-06-17

## 2025-06-17 RX ADMIN — CARVEDILOL 25 MILLIGRAM(S): 3.12 TABLET, FILM COATED ORAL at 10:18

## 2025-06-17 RX ADMIN — POLYETHYLENE GLYCOL 3350 17 GRAM(S): 17 POWDER, FOR SOLUTION ORAL at 22:08

## 2025-06-17 RX ADMIN — CARVEDILOL 25 MILLIGRAM(S): 3.12 TABLET, FILM COATED ORAL at 21:13

## 2025-06-17 RX ADMIN — Medication 1 MILLIGRAM(S): at 00:49

## 2025-06-17 RX ADMIN — Medication 100 MICROGRAM(S): at 08:21

## 2025-06-17 RX ADMIN — AMITRIPTYLINE HYDROCHLORIDE 25 MILLIGRAM(S): 25 TABLET, FILM COATED ORAL at 00:49

## 2025-06-17 RX ADMIN — Medication 650 MILLIGRAM(S): at 02:41

## 2025-06-17 RX ADMIN — Medication 50 MILLIGRAM(S): at 00:07

## 2025-06-17 RX ADMIN — BUPROPION HYDROBROMIDE 150 MILLIGRAM(S): 522 TABLET, EXTENDED RELEASE ORAL at 10:18

## 2025-06-17 RX ADMIN — POLYETHYLENE GLYCOL 3350 17 GRAM(S): 17 POWDER, FOR SOLUTION ORAL at 12:31

## 2025-06-17 RX ADMIN — Medication 25 GRAM(S): at 06:16

## 2025-06-17 RX ADMIN — OLMESARTAN MEDOXOMIL 40 MILLIGRAM(S): 5 TABLET, FILM COATED ORAL at 01:26

## 2025-06-17 RX ADMIN — Medication 650 MILLIGRAM(S): at 02:11

## 2025-06-17 RX ADMIN — Medication 25 GRAM(S): at 21:12

## 2025-06-17 RX ADMIN — Medication 25 GRAM(S): at 14:27

## 2025-06-17 RX ADMIN — Medication 500 MILLIGRAM(S): at 12:32

## 2025-06-17 RX ADMIN — OLMESARTAN MEDOXOMIL 40 MILLIGRAM(S): 5 TABLET, FILM COATED ORAL at 23:08

## 2025-06-17 RX ADMIN — ATORVASTATIN CALCIUM 10 MILLIGRAM(S): 80 TABLET, FILM COATED ORAL at 21:13

## 2025-06-17 RX ADMIN — Medication 1 TABLET(S): at 12:32

## 2025-06-17 RX ADMIN — Medication 50 MILLIGRAM(S): at 12:32

## 2025-06-17 NOTE — PROGRESS NOTE ADULT - ASSESSMENT
83-year-old female with history of brain aneurysm x 2, history of multifocal atrial tachycardia, left shoulder dislocation with residual left arm minimal function, bladder prolapse with pessary last changed 3 days ago presenting for urinary burning and urgency. Patient states that 2 weeks ago she started having urinary symptoms, was prescribed a 3-day course of Macrobid however due to her son being in the hospital she had several days in between taking each pill.  Felt that her symptoms resolved and then approx 4 days ago returned.  No fevers, chills, chest pain, SOB, NVD, back pain.      WBC, LE large Nitrite Neg  Ucx pending    Prior Ucx with E fecalis, E coli         # Acute cystitis  # h/o bladder/uterine prolapse, recurrent UTI/urosepsis      PLAN:  - c/w zosyn 3.375gm Q8h given prior h/o E fecalis  - urine cultures with klebsiella   - continue to trend CBC  - plan for 7 days of therapy total until 6/20/25, can transition to po keflex 500 mg q8h on discharge.       Will sign off, please call with questions.       Scott Jim  Please contact through [employer] Teams   If no response or past 5 pm/weekend call [phone].

## 2025-06-17 NOTE — PROGRESS NOTE ADULT - SUBJECTIVE AND OBJECTIVE BOX
CARDIOLOGY FOLLOW UP - Dr. Calle    Patient Name: JOE URIOSTEGUI    DATE OF SERVICE: 25 @ 13:18    Patient seen and examined    CC no CP, dizziness, or SOB       REVIEW OF SYSTEMS:  CONSTITUTIONAL: No fever, weight loss, or fatigue  RESPIRATORY: No cough, wheezing, chills or hemoptysis; No Shortness of Breath  CARDIOVASCULAR: No chest pain, palpitations, passing out, dizziness  GASTROINTESTINAL: No abdominal or epigastric pain. No nausea, vomiting, or hematemesis; No diarrhea or constipation. No melena or hematochezia.      PHYSICAL EXAM:  T(C): 36.5 (25 @ 12:26), Max: 36.9 (25 @ 13:47)  HR: 75 (25 @ 12:26) (60 - 76)  BP: 133/70 (25 @ 12:26) (113/68 - 172/72)  RR: 18 (25 @ 12:26) (18 - 19)  SpO2: 96% (25 @ 12:26) (95% - 99%)  Wt(kg): --  I&O's Summary    2025 07:  -  2025 07:00  --------------------------------------------------------  IN: 440 mL / OUT: 0 mL / NET: 440 mL    2025 07:  -  2025 13:18  --------------------------------------------------------  IN: 200 mL / OUT: 0 mL / NET: 200 mL        Appearance: Normal	  Cardiovascular: Normal S1 S2,RRR, No JVD, No murmurs  Respiratory: Lungs clear to auscultation b/l  Gastrointestinal:  Soft, Non-tender, + BS	  Extremities: Normal range of motion, No clubbing, cyanosis or edema      Home Medications:  ALPRAZolam 1 mg oral tablet: 1 tab(s) orally once a day (2025 12:51)  amitriptyline 25 mg oral tablet: 1 tab(s) orally once a day (2025 12:52)  buPROPion 100 mg/12 hours (SR) oral tablet, extended release: 1 tab(s) orally 2 times a day (2025 12:52)  carvedilol 25 mg oral tablet: 1 tab(s) orally 2 times a day (2025 12:52)  Estring 2 mg vaginal rin each intravaginally every 60 days (2025 12:52)  hydrALAZINE 50 mg oral tablet: 1 tab(s) orally 2 times a day (2025 12:52)  lovastatin 20 mg oral tablet: 1 tab(s) orally once a day (2025 12:52)  methenamine hippurate 1 g oral tablet: 1 tab(s) orally once a day (2025 12:52)  olmesartan 40 mg oral tablet: 1 tab(s) orally once a day (2025 12:52)  Synthroid 100 mcg (0.1 mg) oral tablet: 1 tab(s) orally once a day (2025 12:52)      MEDICATIONS  (STANDING):  amitriptyline 25 milliGRAM(s) Oral at bedtime  ascorbic acid 500 milliGRAM(s) Oral daily  atorvastatin 10 milliGRAM(s) Oral at bedtime  buPROPion XL (24-Hour) . 150 milliGRAM(s) Oral daily  calcium carbonate 1250 mG  + Vitamin D (OsCal 500 + D) 1 Tablet(s) Oral daily  carvedilol 25 milliGRAM(s) Oral every 12 hours  cetirizine 10 milliGRAM(s) Oral daily  hydrALAZINE 50 milliGRAM(s) Oral every 12 hours  levothyroxine 100 MICROGram(s) Oral daily  olmesartan 40 milliGRAM(s) Oral daily  pantoprazole    Tablet 40 milliGRAM(s) Oral before breakfast  piperacillin/tazobactam IVPB.. 3.375 Gram(s) IV Intermittent every 8 hours  polyethylene glycol 3350 17 Gram(s) Oral daily      TELEMETRY: 	    ECG:  	  RADIOLOGY:   DIAGNOSTIC TESTING:  [ ] Echocardiogram:  [ ]  Catheterization:  [ ] Stress Test:    OTHER: 	    LABS:	 	    Troponin T, High Sensitivity Result: 15 ng/L [0 - 51] ( @ 02:19)                          13.0   6.46  )-----------(       ( 2025 11:18 )             40.4             Lipid Profile:   Hgb A1C:      BNP:     Creatinine: 1.04 mg/dL (06-15-25 @ 07:18)  Creatinine: 0.84 mg/dL (25 @ 01:24)      Hemoglobin: 13.0 g/dL (25 @ 11:18)  Hemoglobin: 11.6 g/dL (06-15-25 @ 07:18)  Hemoglobin: 13.0 g/dL (25 @ 01:24)

## 2025-06-17 NOTE — PROGRESS NOTE ADULT - SUBJECTIVE AND OBJECTIVE BOX
84yPatient is a 84y old  Female who presents with a chief complaint of         Interval history:  Afebrile, no pain.       Allergies:   NSAIDs (Rash)  penicillin (Other)  hydrocortisone (Unknown)      Antimicrobials:  piperacillin/tazobactam IVPB.. 3.375 Gram(s) IV Intermittent every 8 hours      REVIEW OF SYSTEMS:  No chest pain   No SOB  No abdominal pain  No rash.         Vital Signs Last 24 Hrs  T(C): 36.6 (06-17-25 @ 16:13), Max: 36.6 (06-17-25 @ 00:09)  T(F): 97.9 (06-17-25 @ 16:13), Max: 97.9 (06-17-25 @ 00:09)  HR: 62 (06-17-25 @ 16:13) (60 - 75)  BP: 140/79 (06-17-25 @ 16:13) (113/68 - 172/72)  BP(mean): --  RR: 18 (06-17-25 @ 16:13) (18 - 19)  SpO2: 98% (06-17-25 @ 16:13) (95% - 99%)        PHYSICAL EXAM:  Patient in NAD, worried about her BP.   No respiratory distress, + air entry b/l  Soft, nontender  No edema  No rash                            13.0   6.46  )-----------( 237      ( 16 Jun 2025 11:18 )             40.4             Specimen Source: Catheterized Catheterized  Culture Results:   10,000 - 49,000 CFU/mL Klebsiella pneumoniae

## 2025-06-17 NOTE — PROGRESS NOTE ADULT - ASSESSMENT
84 f with    UTI  - UC sensitivity   - Zosyn   - ID follow    MAT  - BB  -  cardiology follow Dr Alva LEAL arm weakness  - PT     Celiac disease  - gluten free diet     Hypothyroidism.   - Synthroid  - TSH    HLD (hyperlipidemia).   - stable     Hiatal hernia.   - fup with GI.    Primary hypertension.   - Coreg and Olmesartan     Bladder prolapse  - follow with Gyn Urogynecology OTP  - continue Pessary     R Iliac artery aneurism  - Follow as OTP     Depression  - continue Rx    Anxiety  - continue Rx    DVT prophylaxis  - PAS     d/w patient, daughter, ACP    Paulie Moore MD phone [phone]

## 2025-06-17 NOTE — PROGRESS NOTE ADULT - ASSESSMENT
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       #UTI  -abx per med  -Ucx noted  -tx per med, ID    #Dizziness   -negative orthostatic BP  -dizziness improved per pt   -check echo     #HTN  -cont home meds  -trend bp     #MAT  -stable, cont BB    #Aortic/Mitral Valve Disease  -No overload on exam  -Recent echo with nml lv fxn, moderate MR  -check repeat echo given MARTINEZ 6/16    dvt ppx

## 2025-06-17 NOTE — PROGRESS NOTE ADULT - SUBJECTIVE AND OBJECTIVE BOX
Patient is a 84y old  Female who presents with a chief complaint of     SUBJECTIVE / OVERNIGHT EVENTS: Comfortable without new complaints. Daughter at bedside.   Review of Systems  chest pain no  palpitations no  sob no  nausea no  headache no    MEDICATIONS  (STANDING):  amitriptyline 25 milliGRAM(s) Oral at bedtime  ascorbic acid 500 milliGRAM(s) Oral daily  atorvastatin 10 milliGRAM(s) Oral at bedtime  buPROPion XL (24-Hour) . 150 milliGRAM(s) Oral daily  calcium carbonate 1250 mG  + Vitamin D (OsCal 500 + D) 1 Tablet(s) Oral daily  carvedilol 25 milliGRAM(s) Oral every 12 hours  cetirizine 10 milliGRAM(s) Oral daily  hydrALAZINE 50 milliGRAM(s) Oral every 12 hours  levothyroxine 100 MICROGram(s) Oral daily  olmesartan 40 milliGRAM(s) Oral daily  pantoprazole    Tablet 40 milliGRAM(s) Oral before breakfast  piperacillin/tazobactam IVPB.. 3.375 Gram(s) IV Intermittent every 8 hours  polyethylene glycol 3350 17 Gram(s) Oral daily    MEDICATIONS  (PRN):  acetaminophen     Tablet .. 650 milliGRAM(s) Oral every 6 hours PRN Temp greater or equal to 38.5C (101.3F), Mild Pain (1 - 3)  ALPRAZolam 1 milliGRAM(s) Oral daily PRN anxiety      Vital Signs Last 24 Hrs  T(C): 36.5 (17 Jun 2025 12:26), Max: 36.6 (17 Jun 2025 00:09)  T(F): 97.7 (17 Jun 2025 12:26), Max: 97.9 (17 Jun 2025 00:09)  HR: 75 (17 Jun 2025 12:26) (60 - 75)  BP: 133/70 (17 Jun 2025 12:26) (113/68 - 172/72)  BP(mean): --  RR: 18 (17 Jun 2025 12:26) (18 - 19)  SpO2: 96% (17 Jun 2025 12:26) (95% - 99%)    Parameters below as of 17 Jun 2025 12:26  Patient On (Oxygen Delivery Method): room air        PHYSICAL EXAM:  GENERAL: NAD  HEAD:  Atraumatic, Normocephalic  EYES: EOMI, PERRLA, conjunctiva and sclera clear  NECK: Supple, No JVD  CHEST/LUNG: Clear to auscultation bilaterally; No wheeze  HEART: Regular rate and rhythm; No murmurs, rubs, or gallops  ABDOMEN: Soft, Nontender, Nondistended; Bowel sounds present  EXTREMITIES:  L arm with chronic shoulder subluxation  PSYCH: AAOx3  NEUROLOGY: non-focal  SKIN: No rashes or lesions    LABS:                        13.0   6.46  )-----------( 237      ( 16 Jun 2025 11:18 )             40.4                       RADIOLOGY & ADDITIONAL TESTS:    Imaging Personally Reviewed:    Consultant(s) Notes Reviewed:      Care Discussed with Consultants/Other Providers:

## 2025-06-18 ENCOUNTER — RESULT REVIEW (OUTPATIENT)
Age: 85
End: 2025-06-18

## 2025-06-18 LAB
ANION GAP SERPL CALC-SCNC: 14 MMOL/L — SIGNIFICANT CHANGE UP (ref 5–17)
BASOPHILS # BLD AUTO: 0.05 K/UL — SIGNIFICANT CHANGE UP (ref 0–0.2)
BASOPHILS NFR BLD AUTO: 0.7 % — SIGNIFICANT CHANGE UP (ref 0–2)
BUN SERPL-MCNC: 13 MG/DL — SIGNIFICANT CHANGE UP (ref 7–23)
CALCIUM SERPL-MCNC: 9.1 MG/DL — SIGNIFICANT CHANGE UP (ref 8.4–10.5)
CHLORIDE SERPL-SCNC: 102 MMOL/L — SIGNIFICANT CHANGE UP (ref 96–108)
CO2 SERPL-SCNC: 24 MMOL/L — SIGNIFICANT CHANGE UP (ref 22–31)
CREAT SERPL-MCNC: 1.01 MG/DL — SIGNIFICANT CHANGE UP (ref 0.5–1.3)
EGFR: 56 ML/MIN/1.73M2 — LOW
EGFR: 56 ML/MIN/1.73M2 — LOW
EOSINOPHIL # BLD AUTO: 0.16 K/UL — SIGNIFICANT CHANGE UP (ref 0–0.5)
EOSINOPHIL NFR BLD AUTO: 2.3 % — SIGNIFICANT CHANGE UP (ref 0–6)
GLUCOSE SERPL-MCNC: 147 MG/DL — HIGH (ref 70–99)
HCT VFR BLD CALC: 35.4 % — SIGNIFICANT CHANGE UP (ref 34.5–45)
HGB BLD-MCNC: 11.6 G/DL — SIGNIFICANT CHANGE UP (ref 11.5–15.5)
IMM GRANULOCYTES # BLD AUTO: 0.02 K/UL — SIGNIFICANT CHANGE UP (ref 0–0.07)
IMM GRANULOCYTES NFR BLD AUTO: 0.3 % — SIGNIFICANT CHANGE UP (ref 0–0.9)
LYMPHOCYTES # BLD AUTO: 1.2 K/UL — SIGNIFICANT CHANGE UP (ref 1–3.3)
LYMPHOCYTES NFR BLD AUTO: 17.4 % — SIGNIFICANT CHANGE UP (ref 13–44)
MCHC RBC-ENTMCNC: 31.1 PG — SIGNIFICANT CHANGE UP (ref 27–34)
MCHC RBC-ENTMCNC: 32.8 G/DL — SIGNIFICANT CHANGE UP (ref 32–36)
MCV RBC AUTO: 94.9 FL — SIGNIFICANT CHANGE UP (ref 80–100)
MONOCYTES # BLD AUTO: 0.67 K/UL — SIGNIFICANT CHANGE UP (ref 0–0.9)
MONOCYTES NFR BLD AUTO: 9.7 % — SIGNIFICANT CHANGE UP (ref 2–14)
NEUTROPHILS # BLD AUTO: 4.81 K/UL — SIGNIFICANT CHANGE UP (ref 1.8–7.4)
NEUTROPHILS NFR BLD AUTO: 69.6 % — SIGNIFICANT CHANGE UP (ref 43–77)
NRBC # BLD AUTO: 0 K/UL — SIGNIFICANT CHANGE UP (ref 0–0)
NRBC # FLD: 0 K/UL — SIGNIFICANT CHANGE UP (ref 0–0)
NRBC BLD AUTO-RTO: 0 /100 WBCS — SIGNIFICANT CHANGE UP (ref 0–0)
PLATELET # BLD AUTO: 207 K/UL — SIGNIFICANT CHANGE UP (ref 150–400)
PMV BLD: 9.9 FL — SIGNIFICANT CHANGE UP (ref 7–13)
POTASSIUM SERPL-MCNC: 3.5 MMOL/L — SIGNIFICANT CHANGE UP (ref 3.5–5.3)
POTASSIUM SERPL-SCNC: 3.5 MMOL/L — SIGNIFICANT CHANGE UP (ref 3.5–5.3)
RBC # BLD: 3.73 M/UL — LOW (ref 3.8–5.2)
RBC # FLD: 13.2 % — SIGNIFICANT CHANGE UP (ref 10.3–14.5)
SODIUM SERPL-SCNC: 140 MMOL/L — SIGNIFICANT CHANGE UP (ref 135–145)
WBC # BLD: 6.91 K/UL — SIGNIFICANT CHANGE UP (ref 3.8–10.5)
WBC # FLD AUTO: 6.91 K/UL — SIGNIFICANT CHANGE UP (ref 3.8–10.5)

## 2025-06-18 PROCEDURE — 93356 MYOCRD STRAIN IMG SPCKL TRCK: CPT

## 2025-06-18 PROCEDURE — 93306 TTE W/DOPPLER COMPLETE: CPT | Mod: 26

## 2025-06-18 RX ORDER — CALCIUM CARBONATE 750 MG/1
1 TABLET ORAL ONCE
Refills: 0 | Status: COMPLETED | OUTPATIENT
Start: 2025-06-18 | End: 2025-06-18

## 2025-06-18 RX ADMIN — CARVEDILOL 25 MILLIGRAM(S): 3.12 TABLET, FILM COATED ORAL at 21:58

## 2025-06-18 RX ADMIN — AMITRIPTYLINE HYDROCHLORIDE 25 MILLIGRAM(S): 25 TABLET, FILM COATED ORAL at 01:17

## 2025-06-18 RX ADMIN — BUPROPION HYDROBROMIDE 150 MILLIGRAM(S): 522 TABLET, EXTENDED RELEASE ORAL at 10:17

## 2025-06-18 RX ADMIN — Medication 25 GRAM(S): at 21:58

## 2025-06-18 RX ADMIN — Medication 500 MILLIGRAM(S): at 12:20

## 2025-06-18 RX ADMIN — Medication 50 MILLIGRAM(S): at 12:19

## 2025-06-18 RX ADMIN — Medication 1 MILLIGRAM(S): at 01:17

## 2025-06-18 RX ADMIN — CALCIUM CARBONATE 1 TABLET(S): 750 TABLET ORAL at 23:10

## 2025-06-18 RX ADMIN — CARVEDILOL 25 MILLIGRAM(S): 3.12 TABLET, FILM COATED ORAL at 10:17

## 2025-06-18 RX ADMIN — Medication 1 TABLET(S): at 12:19

## 2025-06-18 RX ADMIN — OLMESARTAN MEDOXOMIL 40 MILLIGRAM(S): 5 TABLET, FILM COATED ORAL at 23:20

## 2025-06-18 RX ADMIN — ATORVASTATIN CALCIUM 10 MILLIGRAM(S): 80 TABLET, FILM COATED ORAL at 21:58

## 2025-06-18 RX ADMIN — Medication 50 MILLIGRAM(S): at 00:18

## 2025-06-18 RX ADMIN — Medication 25 GRAM(S): at 05:10

## 2025-06-18 RX ADMIN — Medication 100 MICROGRAM(S): at 08:37

## 2025-06-18 RX ADMIN — Medication 25 GRAM(S): at 13:15

## 2025-06-18 RX ADMIN — Medication 40 MILLIGRAM(S): at 10:16

## 2025-06-18 NOTE — PROGRESS NOTE ADULT - ASSESSMENT
84 f with    UTI  - UC sensitivity   - Zosyn   - ID follow    MAT  - BB  -  cardiology follow Dr Alva LEAL arm weakness/ subluxation chronic  - PT     Celiac disease  - gluten free diet     Hypothyroidism.   - Synthroid  - TSH    HLD (hyperlipidemia).   - stable     Hiatal hernia.   - fup with GI.    Primary hypertension.   - Coreg and Olmesartan     Bladder prolapse  - follow with Gyn Urogynecology OTP  - continue Pessary     R Iliac artery aneurism  - Follow as OTP     Depression  - continue Rx    Anxiety  - continue Rx    DVT prophylaxis  - PAS     DCP home .     d/w patient, ACP    Paulie Moore MD phone [phone]

## 2025-06-18 NOTE — PROGRESS NOTE ADULT - ASSESSMENT
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       #UTI  -abx per med  -Ucx noted  -tx per med, ID    #Dizziness   -negative orthostatic BP  -dizziness improved per pt   -check echo   -if sbp remains < 130, persistent dizziness, decrease hydral to 25 bid.    #HTN  -cont home meds  -trend bp     #MAT  -stable, cont BB    #Aortic/Mitral Valve Disease  -No overload on exam  -Recent echo with nml lv fxn, moderate MR  -check repeat echo given MARTINEZ 6/16    dvt ppx

## 2025-06-18 NOTE — PROGRESS NOTE ADULT - SUBJECTIVE AND OBJECTIVE BOX
CARDIOLOGY FOLLOW UP - Dr. Calle    Patient Name: JOE URIOSTEGUI    DATE OF SERVICE: 25 @ 14:26    Patient seen and examined    CC no CP, dizziness, or SOB      REVIEW OF SYSTEMS:  CONSTITUTIONAL: No fever, weight loss, or fatigue  RESPIRATORY: No cough, wheezing, chills or hemoptysis; No Shortness of Breath  CARDIOVASCULAR: No chest pain, palpitations, passing out, dizziness  GASTROINTESTINAL: No abdominal or epigastric pain. No nausea, vomiting, or hematemesis; No diarrhea or constipation. No melena or hematochezia.      PHYSICAL EXAM:  T(C): 36.5 (25 @ 11:39), Max: 36.7 (25 @ 19:30)  HR: 82 (25 @ 11:39) (56 - 82)  BP: 119/73 (25 @ 11:39) (114/63 - 140/79)  RR: 18 (25 @ 11:39) (18 - 18)  SpO2: 96% (25 @ 11:39) (93% - 98%)  Wt(kg): --  I&O's Summary    2025 07:  -  2025 07:00  --------------------------------------------------------  IN: 380 mL / OUT: 0 mL / NET: 380 mL    2025 07:  -  2025 14:26  --------------------------------------------------------  IN: 200 mL / OUT: 0 mL / NET: 200 mL        Appearance: Normal	  Cardiovascular: Normal S1 S2,RRR, No JVD, No murmurs  Respiratory: Lungs clear to auscultation	  Gastrointestinal:  Soft, Non-tender, + BS	  Extremities: Normal range of motion, No clubbing, cyanosis or edema      Home Medications:  ALPRAZolam 1 mg oral tablet: 1 tab(s) orally once a day (2025 12:51)  amitriptyline 25 mg oral tablet: 1 tab(s) orally once a day (2025 12:52)  buPROPion 100 mg/12 hours (SR) oral tablet, extended release: 1 tab(s) orally 2 times a day (2025 12:52)  carvedilol 25 mg oral tablet: 1 tab(s) orally 2 times a day (2025 12:52)  Estring 2 mg vaginal rin each intravaginally every 60 days (2025 12:52)  hydrALAZINE 50 mg oral tablet: 1 tab(s) orally 2 times a day (2025 12:52)  lovastatin 20 mg oral tablet: 1 tab(s) orally once a day (2025 12:52)  methenamine hippurate 1 g oral tablet: 1 tab(s) orally once a day (2025 12:52)  olmesartan 40 mg oral tablet: 1 tab(s) orally once a day (2025 12:52)  Synthroid 100 mcg (0.1 mg) oral tablet: 1 tab(s) orally once a day (2025 12:52)      MEDICATIONS  (STANDING):  amitriptyline 25 milliGRAM(s) Oral at bedtime  ascorbic acid 500 milliGRAM(s) Oral daily  atorvastatin 10 milliGRAM(s) Oral at bedtime  buPROPion XL (24-Hour) . 150 milliGRAM(s) Oral daily  calcium carbonate 1250 mG  + Vitamin D (OsCal 500 + D) 1 Tablet(s) Oral daily  carvedilol 25 milliGRAM(s) Oral every 12 hours  cetirizine 10 milliGRAM(s) Oral daily  hydrALAZINE 50 milliGRAM(s) Oral every 12 hours  levothyroxine 100 MICROGram(s) Oral daily  olmesartan 40 milliGRAM(s) Oral daily  pantoprazole    Tablet 40 milliGRAM(s) Oral before breakfast  piperacillin/tazobactam IVPB.. 3.375 Gram(s) IV Intermittent every 8 hours  polyethylene glycol 3350 17 Gram(s) Oral daily      TELEMETRY: 	    ECG:  	  RADIOLOGY:   DIAGNOSTIC TESTING:  [ ] Echocardiogram:  [ ]  Catheterization:  [ ] Stress Test:    OTHER: 	    LABS:	 	    Troponin T, High Sensitivity Result: 15 ng/L [0 - 51] (06-14 @ 02:19)                          11.6   6.91  )-----------( 207      ( 2025 11:40 )             35.4         140  |  102  |  13  ----------------------------<  147[H]  3.5   |  24  |  1.01    Ca    9.1      2025 11:41        Lipid Profile:   Hgb A1C:      BNP:     Creatinine: 1.01 mg/dL (25 @ 11:41)  Creatinine: 1.04 mg/dL (06-15-25 @ 07:18)      Hemoglobin: 11.6 g/dL (25 @ 11:40)  Hemoglobin: 13.0 g/dL (25 @ 11:18)  Hemoglobin: 11.6 g/dL (06-15-25 @ 07:18)

## 2025-06-18 NOTE — PROGRESS NOTE ADULT - SUBJECTIVE AND OBJECTIVE BOX
Patient is a 84y old  Female who presents with a chief complaint of     SUBJECTIVE / OVERNIGHT EVENTS: Comfortable without new complaints. Daughter at bedside.   Review of Systems  chest pain no  palpitations no  sob no  nausea no  headache no    MEDICATIONS  (STANDING):  amitriptyline 25 milliGRAM(s) Oral at bedtime  ascorbic acid 500 milliGRAM(s) Oral daily  atorvastatin 10 milliGRAM(s) Oral at bedtime  buPROPion XL (24-Hour) . 150 milliGRAM(s) Oral daily  calcium carbonate 1250 mG  + Vitamin D (OsCal 500 + D) 1 Tablet(s) Oral daily  carvedilol 25 milliGRAM(s) Oral every 12 hours  cetirizine 10 milliGRAM(s) Oral daily  hydrALAZINE 50 milliGRAM(s) Oral every 12 hours  levothyroxine 100 MICROGram(s) Oral daily  olmesartan 40 milliGRAM(s) Oral daily  pantoprazole    Tablet 40 milliGRAM(s) Oral before breakfast  piperacillin/tazobactam IVPB.. 3.375 Gram(s) IV Intermittent every 8 hours  polyethylene glycol 3350 17 Gram(s) Oral daily    MEDICATIONS  (PRN):  acetaminophen     Tablet .. 650 milliGRAM(s) Oral every 6 hours PRN Temp greater or equal to 38.5C (101.3F), Mild Pain (1 - 3)  ALPRAZolam 1 milliGRAM(s) Oral daily PRN anxiety      Vital Signs Last 24 Hrs  T(C): 36.5 (18 Jun 2025 11:39), Max: 36.7 (17 Jun 2025 19:30)  T(F): 97.7 (18 Jun 2025 11:39), Max: 98.1 (17 Jun 2025 19:30)  HR: 82 (18 Jun 2025 11:39) (56 - 82)  BP: 119/73 (18 Jun 2025 11:39) (114/63 - 140/79)  BP(mean): --  RR: 18 (18 Jun 2025 11:39) (18 - 18)  SpO2: 96% (18 Jun 2025 11:39) (93% - 98%)    Parameters below as of 18 Jun 2025 11:39  Patient On (Oxygen Delivery Method): room air        PHYSICAL EXAM:  GENERAL: NAD   HEAD:  Atraumatic, Normocephalic  EYES: EOMI, PERRLA, conjunctiva and sclera clear  NECK: Supple, No JVD  CHEST/LUNG: Clear to auscultation bilaterally; No wheeze  HEART: Regular rate and rhythm; No murmurs, rubs, or gallops  ABDOMEN: Soft, Nontender, Nondistended; Bowel sounds present  EXTREMITIES:  2+ Peripheral Pulses, No clubbing, cyanosis, or edema L arm chronic subluxation  PSYCH: AAOx3  NEUROLOGY: non-focal  SKIN: No rashes or lesions    LABS:                        11.6   6.91  )-----------( 207      ( 18 Jun 2025 11:40 )             35.4     06-18    140  |  102  |  13  ----------------------------<  147[H]  3.5   |  24  |  1.01    Ca    9.1      18 Jun 2025 11:41            Urinalysis Basic - ( 18 Jun 2025 11:41 )    Color: x / Appearance: x / SG: x / pH: x  Gluc: 147 mg/dL / Ketone: x  / Bili: x / Urobili: x   Blood: x / Protein: x / Nitrite: x   Leuk Esterase: x / RBC: x / WBC x   Sq Epi: x / Non Sq Epi: x / Bacteria: x          RADIOLOGY & ADDITIONAL TESTS:    Imaging Personally Reviewed:    Consultant(s) Notes Reviewed:      Care Discussed with Consultants/Other Providers:

## 2025-06-19 ENCOUNTER — TRANSCRIPTION ENCOUNTER (OUTPATIENT)
Age: 85
End: 2025-06-19

## 2025-06-19 LAB — GI PCR PANEL: SIGNIFICANT CHANGE UP

## 2025-06-19 RX ORDER — CEPHALEXIN 250 MG/1
1 CAPSULE ORAL
Qty: 6 | Refills: 0
Start: 2025-06-19 | End: 2025-06-20

## 2025-06-19 RX ADMIN — CARVEDILOL 25 MILLIGRAM(S): 3.12 TABLET, FILM COATED ORAL at 21:15

## 2025-06-19 RX ADMIN — BUPROPION HYDROBROMIDE 150 MILLIGRAM(S): 522 TABLET, EXTENDED RELEASE ORAL at 09:06

## 2025-06-19 RX ADMIN — Medication 50 MILLIGRAM(S): at 00:19

## 2025-06-19 RX ADMIN — ATORVASTATIN CALCIUM 10 MILLIGRAM(S): 80 TABLET, FILM COATED ORAL at 21:15

## 2025-06-19 RX ADMIN — Medication 25 GRAM(S): at 14:26

## 2025-06-19 RX ADMIN — AMITRIPTYLINE HYDROCHLORIDE 25 MILLIGRAM(S): 25 TABLET, FILM COATED ORAL at 22:59

## 2025-06-19 RX ADMIN — Medication 25 MILLIGRAM(S): at 17:50

## 2025-06-19 RX ADMIN — CARVEDILOL 25 MILLIGRAM(S): 3.12 TABLET, FILM COATED ORAL at 09:05

## 2025-06-19 RX ADMIN — Medication 25 GRAM(S): at 05:18

## 2025-06-19 RX ADMIN — AMITRIPTYLINE HYDROCHLORIDE 25 MILLIGRAM(S): 25 TABLET, FILM COATED ORAL at 01:08

## 2025-06-19 RX ADMIN — Medication 50 MILLIGRAM(S): at 11:17

## 2025-06-19 RX ADMIN — Medication 10 MILLIGRAM(S): at 11:18

## 2025-06-19 RX ADMIN — Medication 40 MILLIGRAM(S): at 09:06

## 2025-06-19 RX ADMIN — Medication 1 TABLET(S): at 11:18

## 2025-06-19 RX ADMIN — Medication 500 MILLIGRAM(S): at 11:17

## 2025-06-19 RX ADMIN — Medication 25 GRAM(S): at 21:14

## 2025-06-19 RX ADMIN — Medication 1 MILLIGRAM(S): at 01:08

## 2025-06-19 RX ADMIN — Medication 100 MICROGRAM(S): at 08:33

## 2025-06-19 NOTE — ED PROVIDER NOTE - MUSCULOSKELETAL NECK EXAM
Pt has a large stye. Needs antibiotic called in to [facility] pharmacy please  
Zpac was sent to pharmacy.  
+pain with lateral rotation of neg bilaterally./no deformity, pain or tenderness. no restriction of movement/PAIN ON MOVEMENT

## 2025-06-19 NOTE — DISCHARGE NOTE PROVIDER - NSDCCPCAREPLAN_GEN_ALL_CORE_FT
PRINCIPAL DISCHARGE DIAGNOSIS  Diagnosis: Acute UTI  Assessment and Plan of Treatment: Finish all your Keflex antibiotic, even if you feel better. Stopping early can lead to the infection returning.  Hydration: Drink plenty of fluids, especially water, to help flush out bacteria from your urinary tract.  Hygiene: Practice good hygiene, including wiping from front to back after using the toilet.  Urination: Don't hold your urine; go when you feel the urge.  Symptoms: Watch for signs of a UTI returning (burning, urgency, frequency, cloudy urine, fever). If these occur, contact your doctor immediately.      SECONDARY DISCHARGE DIAGNOSES  Diagnosis: History of prolapse of bladder  Assessment and Plan of Treatment: Follow your GYN/Urogynecologist's instructions regarding pessary care. Keep your follow-up appointments to ensure proper fit and prevent complications.  Activities: Avoid heavy lifting or straining, which can worsen prolapse.    Diagnosis: Hypothyroidism  Assessment and Plan of Treatment: Take your Synthroid as prescribed every day. Don't stop taking it without talking to your doctor.  Follow-up: Continue regular checkups with your doctor to monitor your thyroid    Diagnosis: Hypertension  Assessment and Plan of Treatment: Take your blood pressure medications as prescribed.  Monitoring: Monitor your blood pressure regularly.  Lifestyle: Maintain a healthy lifestyle with a balanced diet, regular exercise, and stress management techniques.

## 2025-06-19 NOTE — PROGRESS NOTE ADULT - ASSESSMENT
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     #UTI  -abx per med  -Ucx noted  -tx per med, ID    #Dizziness   -negative orthostatic BP  -dizziness improved per pt   -TTE shows normal LVSF with EF of 64%; no reg WMA; no sig valvular disease   -if sbp remains < 130, persistent dizziness, decrease hydral to 25 bid.    #HTN  -cont home meds  -trend bp   -can decrease hydral to 25 mg BID    #MAT  -stable, cont BB    #Aortic/Mitral Valve Disease  -No overload on exam  -Recent echo with nml lv fxn, moderate MR  -repeat echo as above    dvt ppx

## 2025-06-19 NOTE — DISCHARGE NOTE PROVIDER - NSDCFUADDAPPT_GEN_ALL_CORE_FT
APPTS ARE READY TO BE MADE: [x] YES    Best Family or Patient Contact (if needed):    Additional Information about above appointments (if needed):    1: PCP  2: Cardiology  3:     Other comments or requests:    APPTS ARE READY TO BE MADE: [x] YES    Best Family or Patient Contact (if needed):    Additional Information about above appointments (if needed):    1: PCP  2: Cardiology  3: GYN/Urogynecology for pessary management    Other comments or requests:    APPTS ARE READY TO BE MADE: [x] YES    Best Family or Patient Contact (if needed):    Additional Information about above appointments (if needed):    1: PCP  2: Cardiology  3: GYN/Urogynecology for pessary management    Other comments or requests:   Patient advised they did not want to proceed with scheduling appointments with the providers on their referrals. They will coordinate care on their own.

## 2025-06-19 NOTE — PROGRESS NOTE ADULT - ASSESSMENT
84 f with    UTI  - UC sensitivity   - Zosyn   - ID follow    MAT  - BB  -  cardiology follow Dr Alva LEAL arm weakness/ subluxation chronic  - PT     Celiac disease  - gluten free diet     Hypothyroidism.   - Synthroid  - TSH    HLD (hyperlipidemia).   - stable     Hiatal hernia.   - fup with GI.    Primary hypertension.   - Coreg and Olmesartan     Bladder prolapse  - follow with Gyn Urogynecology OTP  - continue Pessary     R Iliac artery aneurism  - Follow as OTP     Depression  - continue Rx    Anxiety  - continue Rx    Diarrhea  - if persistent will check C Diff    DVT prophylaxis  - PAS     DCP home .     d/w patient, ACP    Paulie Moore MD phone [phone]

## 2025-06-19 NOTE — DISCHARGE NOTE PROVIDER - NSDCCPTREATMENT_GEN_ALL_CORE_FT
PRINCIPAL PROCEDURE  Procedure: Complete transthoracic echocardiography (TTE)  Findings and Treatment:   CONCLUSIONS:      1. Left ventricular cavity is small. Left ventricular wall thickness is normal. Left ventricular systolic function is normal with an ejection fraction of 64 % by Marroquin's method of disks. There are no regional wall motion abnormalities seen.   2. Normal left ventricular diastolic function, with normal left ventricular filling pressure.   3. Normal right ventricular cavity size, with normal wall thickness, and normal right ventricular systolic function. Tricuspid annular plane systolic excursion (TAPSE) is 2.0 cm (normal >=1.7 cm).   4. No pericardial effusion seen.   5. Compared to the transthoracic echocardiogram performed on 2/13/2024, there have been no significant interval changes.   6. Left ventricular global longitudinal strain is -23.0 % which is normal (< -18%). Images were acquired on a [employer] ultrasound system and processed on the ultrasound machine with a heart rate of 65 bpm and a blood pressure of 114/63 mmHg.

## 2025-06-19 NOTE — DISCHARGE NOTE PROVIDER - NSDCFUSCHEDAPPT_GEN_ALL_CORE_FT
Sarah Hope  [facility]  ENDOCRIN [id number]  Scheduled Appointment: 06/25/2025    Coy Hurd  [facility]  PULMMED [phone]  Scheduled Appointment: 08/20/2025     Coy Hurd  [facility]  PULED [phone]  Scheduled Appointment: 08/20/2025

## 2025-06-19 NOTE — DISCHARGE NOTE PROVIDER - PROVIDER TOKENS
PROVIDER:[TOKEN:[[id number]],FOLLOWUP:[1 week],ESTABLISHEDPATIENT:[T]] PROVIDER:[TOKEN:[[id number]],FOLLOWUP:[1 week],ESTABLISHEDPATIENT:[T]],PROVIDER:[TOKEN:[[id number]],FOLLOWUP:[1 week]]

## 2025-06-19 NOTE — DISCHARGE NOTE PROVIDER - CARE PROVIDERS DIRECT ADDRESSES
,DirectAddress_Unknown ,DirectAddress_Unknown,[email or web address]

## 2025-06-19 NOTE — PROGRESS NOTE ADULT - SUBJECTIVE AND OBJECTIVE BOX
Patient is a 84y old  Female who presents with a chief complaint of     SUBJECTIVE / OVERNIGHT EVENTS: c/o diarrhea.   Review of Systems  chest pain no  palpitations no  sob no  nausea no  headache no    MEDICATIONS  (STANDING):  amitriptyline 25 milliGRAM(s) Oral at bedtime  ascorbic acid 500 milliGRAM(s) Oral daily  atorvastatin 10 milliGRAM(s) Oral at bedtime  buPROPion XL (24-Hour) . 150 milliGRAM(s) Oral daily  calcium carbonate 1250 mG  + Vitamin D (OsCal 500 + D) 1 Tablet(s) Oral daily  carvedilol 25 milliGRAM(s) Oral every 12 hours  cetirizine 10 milliGRAM(s) Oral daily  hydrALAZINE 50 milliGRAM(s) Oral every 12 hours  levothyroxine 100 MICROGram(s) Oral daily  olmesartan 40 milliGRAM(s) Oral daily  pantoprazole    Tablet 40 milliGRAM(s) Oral before breakfast  piperacillin/tazobactam IVPB.. 3.375 Gram(s) IV Intermittent every 8 hours    MEDICATIONS  (PRN):  acetaminophen     Tablet .. 650 milliGRAM(s) Oral every 6 hours PRN Temp greater or equal to 38.5C (101.3F), Mild Pain (1 - 3)  ALPRAZolam 1 milliGRAM(s) Oral daily PRN anxiety      Vital Signs Last 24 Hrs  T(C): 36.6 (19 Jun 2025 11:36), Max: 36.8 (18 Jun 2025 19:47)  T(F): 97.8 (19 Jun 2025 11:36), Max: 98.2 (18 Jun 2025 19:47)  HR: 63 (19 Jun 2025 11:36) (62 - 73)  BP: 115/75 (19 Jun 2025 11:36) (115/75 - 130/72)  BP(mean): --  RR: 18 (19 Jun 2025 11:36) (18 - 18)  SpO2: 97% (19 Jun 2025 11:36) (94% - 97%)    Parameters below as of 19 Jun 2025 11:36  Patient On (Oxygen Delivery Method): room air        PHYSICAL EXAM:  GENERAL: NAD  HEAD:  Atraumatic, Normocephalic  EYES: EOMI, PERRLA, conjunctiva and sclera clear  NECK: Supple, No JVD  CHEST/LUNG: Clear to auscultation bilaterally; No wheeze  HEART: Regular rate and rhythm; No murmurs, rubs, or gallops  ABDOMEN: Soft, Nontender, Nondistended; Bowel sounds present  EXTREMITIES:  2+ Peripheral Pulses, No clubbing, cyanosis, or edema L arm with chronic shoulder subluxation   PSYCH: AAOx3  NEUROLOGY: non-focal  SKIN: No rashes or lesions    LABS:                        11.6   6.91  )-----------( 207      ( 18 Jun 2025 11:40 )             35.4     06-18    140  |  102  |  13  ----------------------------<  147[H]  3.5   |  24  |  1.01    Ca    9.1      18 Jun 2025 11:41            Urinalysis Basic - ( 18 Jun 2025 11:41 )    Color: x / Appearance: x / SG: x / pH: x  Gluc: 147 mg/dL / Ketone: x  / Bili: x / Urobili: x   Blood: x / Protein: x / Nitrite: x   Leuk Esterase: x / RBC: x / WBC x   Sq Epi: x / Non Sq Epi: x / Bacteria: x          RADIOLOGY & ADDITIONAL TESTS:    Imaging Personally Reviewed:    Consultant(s) Notes Reviewed:      Care Discussed with Consultants/Other Providers:

## 2025-06-19 NOTE — PROGRESS NOTE ADULT - SUBJECTIVE AND OBJECTIVE BOX
CARDIOLOGY FOLLOW UP - Dr. Calle    Patient Name: JOE URIOSTEGUI    DATE OF SERVICE: 25 @ 13:12    Patient seen and examined    CC no CP, dizziness, or SOB      REVIEW OF SYSTEMS:  CONSTITUTIONAL: No fever, weight loss, or fatigue  RESPIRATORY: No cough, wheezing, chills or hemoptysis; No Shortness of Breath  CARDIOVASCULAR: No chest pain, palpitations, passing out, dizziness  GASTROINTESTINAL: No abdominal or epigastric pain. No nausea, vomiting, or hematemesis; No diarrhea or constipation. No melena or hematochezia.      PHYSICAL EXAM:  T(C): 36.6 (25 @ 11:36), Max: 36.8 (25 @ 19:47)  HR: 63 (25 @ 11:36) (62 - 73)  BP: 115/75 (25 @ 11:36) (115/75 - 130/72)  RR: 18 (25 @ 11:36) (18 - 18)  SpO2: 97% (25 @ 11:36) (94% - 97%)  Wt(kg): --  I&O's Summary    2025 07:01  -  2025 07:00  --------------------------------------------------------  IN: 630 mL / OUT: 0 mL / NET: 630 mL        Appearance: Normal	  Cardiovascular: Normal S1 S2,RRR, No JVD, No murmurs  Respiratory: Lungs clear to auscultation	  Gastrointestinal:  Soft, Non-tender, + BS	  Extremities: Normal range of motion, No clubbing, cyanosis or edema      Home Medications:  ALPRAZolam 1 mg oral tablet: 1 tab(s) orally once a day (2025 12:51)  amitriptyline 25 mg oral tablet: 1 tab(s) orally once a day (2025 12:52)  ascorbic acid 500 mg oral tablet: 1 tab(s) orally once a day (2025 10:51)  buPROPion 100 mg/12 hours (SR) oral tablet, extended release: 1 tab(s) orally 2 times a day (2025 12:52)  carvedilol 25 mg oral tablet: 1 tab(s) orally 2 times a day (2025 12:52)  Estring 2 mg vaginal rin each intravaginally every 60 days (2025 12:52)  hydrALAZINE 50 mg oral tablet: 1 tab(s) orally 2 times a day (2025 12:52)  lovastatin 20 mg oral tablet: 1 tab(s) orally once a day (2025 12:52)  methenamine hippurate 1 g oral tablet: 1 tab(s) orally once a day (2025 12:52)  olmesartan 40 mg oral tablet: 1 tab(s) orally once a day (2025 12:52)  pantoprazole 40 mg oral delayed release tablet: 1 tab(s) orally once a day (before a meal) (2025 10:58)  Synthroid 100 mcg (0.1 mg) oral tablet: 1 tab(s) orally once a day (2025 12:52)      MEDICATIONS  (STANDING):  amitriptyline 25 milliGRAM(s) Oral at bedtime  ascorbic acid 500 milliGRAM(s) Oral daily  atorvastatin 10 milliGRAM(s) Oral at bedtime  buPROPion XL (24-Hour) . 150 milliGRAM(s) Oral daily  calcium carbonate 1250 mG  + Vitamin D (OsCal 500 + D) 1 Tablet(s) Oral daily  carvedilol 25 milliGRAM(s) Oral every 12 hours  cetirizine 10 milliGRAM(s) Oral daily  hydrALAZINE 50 milliGRAM(s) Oral every 12 hours  levothyroxine 100 MICROGram(s) Oral daily  olmesartan 40 milliGRAM(s) Oral daily  pantoprazole    Tablet 40 milliGRAM(s) Oral before breakfast  piperacillin/tazobactam IVPB.. 3.375 Gram(s) IV Intermittent every 8 hours      TELEMETRY: 	    ECG:  	  RADIOLOGY:   DIAGNOSTIC TESTING:  [x] Echocardiogram:  < from: TTE W or WO Ultrasound Enhancing Agent (25 @ 15:34) >  CONCLUSIONS:      1. Left ventricular cavity is small. Left ventricular wall thickness is normal. Left ventricular systolic function is normal with an ejection fraction of 64 % by Marroquin's method of disks. There are no regional wall motion abnormalities seen.   2. Normal left ventricular diastolic function, with normal left ventricular filling pressure.   3. Normal right ventricular cavity size, with normal wall thickness, and normal right ventricular systolic function. Tricuspid annular plane systolic excursion (TAPSE) is 2.0 cm (normal >=1.7 cm).   4. No pericardial effusion seen.   5. Compared to the transthoracic echocardiogram performed on 2024, there have been no significant interval changes.   6. Left ventricular global longitudinal strain is -23.0 % which is normal (< -18%). Images were acquired on a [employer] ultrasound system and processed on the ultrasound machine with a heart rate of 65 bpm and a blood pressure of 114/63mmHg.    < end of copied text >    [ ]  Catheterization:  [ ] Stress Test:    OTHER: 	    LABS:	 	    Troponin T, High Sensitivity Result: 15 ng/L [0 - 51] ( @ 02:19)                          11.6   6.91  )-----------( 207      ( 2025 11:40 )             35.4         140  |  102  |  13  ----------------------------<  147[H]  3.5   |  24  |  1.01    Ca    9.1      2025 11:41        Lipid Profile:   Hgb A1C:      BNP:     Creatinine: 1.01 mg/dL (25 @ 11:41)      Hemoglobin: 11.6 g/dL (25 @ 11:40)  Hemoglobin: 13.0 g/dL (25 @ 11:18)

## 2025-06-19 NOTE — DISCHARGE NOTE PROVIDER - CARE PROVIDER_API CALL
[place] [address]  Phone: [phone]  Fax: [phone]  Established Patient  Follow Up Time: 1 week   Marline Bear  [facility]  [address] [place] [address]  Phone: [phone]  Fax: [phone]  Established Patient  Follow Up Time: 1 week    Lyndon Calle  Cardiovascular Disease  [address] [facility], [address]  [place] [address]  Phone: [phone]  Fax: [phone]  Follow Up Time: 1 week

## 2025-06-19 NOTE — DISCHARGE NOTE PROVIDER - NSDCMRMEDTOKEN_GEN_ALL_CORE_FT
ALPRAZolam 1 mg oral tablet: 1 tab(s) orally once a day  amitriptyline 25 mg oral tablet: 1 tab(s) orally once a day  ascorbic acid 500 mg oral tablet: 1 tab(s) orally once a day  buPROPion 100 mg/12 hours (SR) oral tablet, extended release: 1 tab(s) orally 2 times a day  carvedilol 25 mg oral tablet: 1 tab(s) orally 2 times a day  cephalexin 500 mg oral tablet: 1 tab(s) orally every 8 hours  Estring 2 mg vaginal rin each intravaginally every 60 days  hydrALAZINE 50 mg oral tablet: 1 tab(s) orally 2 times a day  lovastatin 20 mg oral tablet: 1 tab(s) orally once a day  methenamine hippurate 1 g oral tablet: 1 tab(s) orally once a day  olmesartan 40 mg oral tablet: 1 tab(s) orally once a day  Synthroid 100 mcg (0.1 mg) oral tablet: 1 tab(s) orally once a day   ALPRAZolam 1 mg oral tablet: 1 tab(s) orally once a day  amitriptyline 25 mg oral tablet: 1 tab(s) orally once a day  ascorbic acid 500 mg oral tablet: 1 tab(s) orally once a day  buPROPion 100 mg/12 hours (SR) oral tablet, extended release: 1 tab(s) orally 2 times a day  carvedilol 25 mg oral tablet: 1 tab(s) orally 2 times a day  cephalexin 500 mg oral tablet: 1 tab(s) orally every 8 hours  Estring 2 mg vaginal rin each intravaginally every 60 days  hydrALAZINE 50 mg oral tablet: 1 tab(s) orally 2 times a day  lovastatin 20 mg oral tablet: 1 tab(s) orally once a day  methenamine hippurate 1 g oral tablet: 1 tab(s) orally once a day  olmesartan 40 mg oral tablet: 1 tab(s) orally once a day  pantoprazole 40 mg oral delayed release tablet: 1 tab(s) orally once a day (before a meal)  Synthroid 100 mcg (0.1 mg) oral tablet: 1 tab(s) orally once a day   ALPRAZolam 1 mg oral tablet: 1 tab(s) orally once a day  amitriptyline 25 mg oral tablet: 1 tab(s) orally once a day  ascorbic acid 500 mg oral tablet: 1 tab(s) orally once a day  buPROPion 100 mg/12 hours (SR) oral tablet, extended release: 1 tab(s) orally 2 times a day  carvedilol 25 mg oral tablet: 1 tab(s) orally 2 times a day  cephalexin 500 mg oral tablet: 1 tab(s) orally every 8 hours  cetirizine 10 mg oral tablet: 1 tab(s) orally once a day  Estring 2 mg vaginal rin each intravaginally every 60 days  hydrALAZINE 25 mg oral tablet: 1 tab(s) orally every 12 hours hold for systolic blood pressure less than 110  lovastatin 20 mg oral tablet: 1 tab(s) orally once a day  methenamine hippurate 1 g oral tablet: 1 tab(s) orally once a day  olmesartan 40 mg oral tablet: 1 tab(s) orally once a day  pantoprazole 40 mg oral delayed release tablet: 1 tab(s) orally once a day (before a meal)  Synthroid 100 mcg (0.1 mg) oral tablet: 1 tab(s) orally once a day  vitamin D-calcium (as carbonate) 5 mcg-500 mg oral tablet: 1 tab(s) orally once a day   ALPRAZolam 1 mg oral tablet: 1 tab(s) orally once a day  amitriptyline 25 mg oral tablet: 1 tab(s) orally once a day  ascorbic acid 500 mg oral tablet: 1 tab(s) orally once a day  buPROPion 100 mg/12 hours (SR) oral tablet, extended release: 1 tab(s) orally 2 times a day  carvedilol 25 mg oral tablet: 1 tab(s) orally 2 times a day  cephalexin 500 mg oral tablet: 1 tab(s) orally every 8 hours  cetirizine 10 mg oral tablet: 1 tab(s) orally once a day  Estring 2 mg vaginal rin each intravaginally every 60 days  hydrALAZINE 25 mg oral tablet: 1 tab(s) orally every 12 hours hold for systolic blood pressure less than 110  lovastatin 20 mg oral tablet: 1 tab(s) orally once a day  methenamine hippurate 1 g oral tablet: 1 tab(s) orally once a day  olmesartan 40 mg oral tablet: 1 tab(s) orally once a day  Outpatient occupational therapy: Evaluate and treat  Outpatient physical therapy: Evaluate and treat  pantoprazole 40 mg oral delayed release tablet: 1 tab(s) orally once a day (before a meal)  Synthroid 100 mcg (0.1 mg) oral tablet: 1 tab(s) orally once a day  vitamin D-calcium (as carbonate) 5 mcg-500 mg oral tablet: 1 tab(s) orally once a day

## 2025-06-19 NOTE — DISCHARGE NOTE PROVIDER - HOSPITAL COURSE
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 Patient states that 2 weeks ago she started having urinary symptoms, was prescribed a 3-day course of Macrobid however due to her son being in the hospital she had several days in between taking each pill.  Felt that her symptoms resolved and then approx 4 days ago returned.  No fevers, chills, chest pain, SOB, NVD, back pain.    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 Patient states that 2 weeks ago she started having urinary symptoms, was prescribed a 3-day course of Macrobid however due to her son being in the hospital she had several days in between taking each pill.  Felt that her symptoms resolved and then approx 4 days ago returned.  No fevers, chills, chest pain, SOB, NVD, back pain.    (14 Jun 2025 12:25)    Hospital Course:  Patient admitted for further medical management. On admission, the patient was found to have significant pyuria on urinalysis (253 WBC, large leukocyte esterase). Given her history of recurrent UTIs with E. coli and E. fecalis, she was started on intravenous Zosyn 3.375gm every 8 hours. Urine cultures with klebsiella . Followed by ID. - c/w zosyn 3.375gm Q8h. Plan for 7 days of therapy total until 6/20/25, can transition to po keflex 500 mg q8h on discharge.    Her blood pressure was monitored closely, and her home medications for hypertension were continued. Her dizziness, present on admission, improved throughout her stay without intervention. Orthostatic negative. Seen by cardiology. An echocardiogram was obtained on 6/18/25 and showed  EF 64%, no regional wall motion abnormalities.     Her other chronic medical conditions (hypothyroidism, hyperlipidemia, hiatal hernia, depression, anxiety) were managed medically and remained stable during her hospital stay. She received Synthroid for hypothyroidism and continued her home medications for depression and anxiety. The patient adhered to a gluten-free diet due to her celiac disease.    Dispo/Discharge/Med Rec discussed with Dr. Moore. Patient is medically stable for discharge home with outpatient follow up.     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

## 2025-06-20 ENCOUNTER — TRANSCRIPTION ENCOUNTER (OUTPATIENT)
Age: 85
End: 2025-06-20

## 2025-06-20 VITALS
DIASTOLIC BLOOD PRESSURE: 68 MMHG | RESPIRATION RATE: 18 BRPM | SYSTOLIC BLOOD PRESSURE: 105 MMHG | TEMPERATURE: 98 F | OXYGEN SATURATION: 98 % | HEART RATE: 79 BPM

## 2025-06-20 LAB
ANION GAP SERPL CALC-SCNC: 10 MMOL/L — SIGNIFICANT CHANGE UP (ref 5–17)
BUN SERPL-MCNC: 15 MG/DL — SIGNIFICANT CHANGE UP (ref 7–23)
CALCIUM SERPL-MCNC: 8.9 MG/DL — SIGNIFICANT CHANGE UP (ref 8.4–10.5)
CHLORIDE SERPL-SCNC: 105 MMOL/L — SIGNIFICANT CHANGE UP (ref 96–108)
CO2 SERPL-SCNC: 23 MMOL/L — SIGNIFICANT CHANGE UP (ref 22–31)
CREAT SERPL-MCNC: 1.12 MG/DL — SIGNIFICANT CHANGE UP (ref 0.5–1.3)
EGFR: 49 ML/MIN/1.73M2 — LOW
EGFR: 49 ML/MIN/1.73M2 — LOW
GLUCOSE SERPL-MCNC: 93 MG/DL — SIGNIFICANT CHANGE UP (ref 70–99)
HCT VFR BLD CALC: 34.7 % — SIGNIFICANT CHANGE UP (ref 34.5–45)
HGB BLD-MCNC: 11.2 G/DL — LOW (ref 11.5–15.5)
MCHC RBC-ENTMCNC: 30.9 PG — SIGNIFICANT CHANGE UP (ref 27–34)
MCHC RBC-ENTMCNC: 32.3 G/DL — SIGNIFICANT CHANGE UP (ref 32–36)
MCV RBC AUTO: 95.9 FL — SIGNIFICANT CHANGE UP (ref 80–100)
NRBC # BLD AUTO: 0 K/UL — SIGNIFICANT CHANGE UP (ref 0–0)
NRBC # FLD: 0 K/UL — SIGNIFICANT CHANGE UP (ref 0–0)
NRBC BLD AUTO-RTO: 0 /100 WBCS — SIGNIFICANT CHANGE UP (ref 0–0)
PLATELET # BLD AUTO: 205 K/UL — SIGNIFICANT CHANGE UP (ref 150–400)
PMV BLD: 10.2 FL — SIGNIFICANT CHANGE UP (ref 7–13)
POTASSIUM SERPL-MCNC: 3.7 MMOL/L — SIGNIFICANT CHANGE UP (ref 3.5–5.3)
POTASSIUM SERPL-SCNC: 3.7 MMOL/L — SIGNIFICANT CHANGE UP (ref 3.5–5.3)
RBC # BLD: 3.62 M/UL — LOW (ref 3.8–5.2)
RBC # FLD: 13.1 % — SIGNIFICANT CHANGE UP (ref 10.3–14.5)
SODIUM SERPL-SCNC: 138 MMOL/L — SIGNIFICANT CHANGE UP (ref 135–145)
WBC # BLD: 6.47 K/UL — SIGNIFICANT CHANGE UP (ref 3.8–10.5)
WBC # FLD AUTO: 6.47 K/UL — SIGNIFICANT CHANGE UP (ref 3.8–10.5)

## 2025-06-20 PROCEDURE — 97530 THERAPEUTIC ACTIVITIES: CPT

## 2025-06-20 PROCEDURE — 97110 THERAPEUTIC EXERCISES: CPT

## 2025-06-20 PROCEDURE — 84484 ASSAY OF TROPONIN QUANT: CPT

## 2025-06-20 PROCEDURE — 97162 PT EVAL MOD COMPLEX 30 MIN: CPT

## 2025-06-20 PROCEDURE — 81001 URINALYSIS AUTO W/SCOPE: CPT

## 2025-06-20 PROCEDURE — 87507 IADNA-DNA/RNA PROBE TQ 12-25: CPT

## 2025-06-20 PROCEDURE — 87086 URINE CULTURE/COLONY COUNT: CPT

## 2025-06-20 PROCEDURE — 80048 BASIC METABOLIC PNL TOTAL CA: CPT

## 2025-06-20 PROCEDURE — 80053 COMPREHEN METABOLIC PANEL: CPT

## 2025-06-20 PROCEDURE — 85025 COMPLETE CBC W/AUTO DIFF WBC: CPT

## 2025-06-20 PROCEDURE — 71045 X-RAY EXAM CHEST 1 VIEW: CPT

## 2025-06-20 PROCEDURE — 97165 OT EVAL LOW COMPLEX 30 MIN: CPT

## 2025-06-20 PROCEDURE — 93005 ELECTROCARDIOGRAM TRACING: CPT

## 2025-06-20 PROCEDURE — 87186 SC STD MICRODIL/AGAR DIL: CPT

## 2025-06-20 PROCEDURE — 93306 TTE W/DOPPLER COMPLETE: CPT

## 2025-06-20 PROCEDURE — 93356 MYOCRD STRAIN IMG SPCKL TRCK: CPT

## 2025-06-20 PROCEDURE — 96374 THER/PROPH/DIAG INJ IV PUSH: CPT

## 2025-06-20 PROCEDURE — 97116 GAIT TRAINING THERAPY: CPT

## 2025-06-20 PROCEDURE — 99285 EMERGENCY DEPT VISIT HI MDM: CPT

## 2025-06-20 PROCEDURE — 85027 COMPLETE CBC AUTOMATED: CPT

## 2025-06-20 PROCEDURE — 97760 ORTHOTIC MGMT&TRAING 1ST ENC: CPT

## 2025-06-20 PROCEDURE — 84443 ASSAY THYROID STIM HORMONE: CPT

## 2025-06-20 PROCEDURE — 87077 CULTURE AEROBIC IDENTIFY: CPT

## 2025-06-20 RX ORDER — CEPHALEXIN 250 MG/1
1 CAPSULE ORAL
Qty: 2 | Refills: 0
Start: 2025-06-20 | End: 2025-06-20

## 2025-06-20 RX ORDER — CEPHALEXIN 250 MG/1
500 CAPSULE ORAL EVERY 8 HOURS
Refills: 0 | Status: DISCONTINUED | OUTPATIENT
Start: 2025-06-20 | End: 2025-06-20

## 2025-06-20 RX ORDER — CALCIUM CARBONATE/VITAMIN D3 500MG-5MCG
1 TABLET ORAL
Qty: 0 | Refills: 0 | DISCHARGE
Start: 2025-06-20

## 2025-06-20 RX ADMIN — Medication 1 MILLIGRAM(S): at 00:38

## 2025-06-20 RX ADMIN — OLMESARTAN MEDOXOMIL 40 MILLIGRAM(S): 5 TABLET, FILM COATED ORAL at 00:30

## 2025-06-20 RX ADMIN — CARVEDILOL 25 MILLIGRAM(S): 3.12 TABLET, FILM COATED ORAL at 09:41

## 2025-06-20 RX ADMIN — Medication 500 MILLIGRAM(S): at 11:22

## 2025-06-20 RX ADMIN — Medication 100 MICROGRAM(S): at 08:15

## 2025-06-20 RX ADMIN — CEPHALEXIN 500 MILLIGRAM(S): 250 CAPSULE ORAL at 13:48

## 2025-06-20 RX ADMIN — Medication 25 GRAM(S): at 05:13

## 2025-06-20 RX ADMIN — Medication 40 MILLIGRAM(S): at 09:41

## 2025-06-20 RX ADMIN — Medication 1 TABLET(S): at 11:22

## 2025-06-20 RX ADMIN — BUPROPION HYDROBROMIDE 150 MILLIGRAM(S): 522 TABLET, EXTENDED RELEASE ORAL at 09:41

## 2025-06-20 NOTE — PROGRESS NOTE ADULT - SUBJECTIVE AND OBJECTIVE BOX
CARDIOLOGY FOLLOW UP - Dr. Calle    Patient Name: JOE URIOSTEGUI    DATE OF SERVICE: 25 @ 11:11    Patient seen and examined    CC no CP or SOB  +dizziness this AM when getting OOB      REVIEW OF SYSTEMS:  CONSTITUTIONAL: No fever, weight loss, or fatigue  RESPIRATORY: No cough, wheezing, chills or hemoptysis; No Shortness of Breath  CARDIOVASCULAR: No chest pain, palpitations, or passing out, +dizziness  GASTROINTESTINAL: No abdominal or epigastric pain. No nausea, vomiting, or hematemesis; No diarrhea or constipation. No melena or hematochezia.      PHYSICAL EXAM:  T(C): 36.4 (25 @ 04:28), Max: 36.7 (25 @ 19:41)  HR: 79 (25 @ 10:14) (60 - 82)  BP: 105/68 (25 @ 10:14) (105/68 - 135/78)  RR: 18 (25 @ 04:28) (18 - 18)  SpO2: 92% (25 @ 04:28) (92% - 97%)  Wt(kg): --  I&O's Summary    2025 07:01  -  2025 07:00  --------------------------------------------------------  IN: 680 mL / OUT: 0 mL / NET: 680 mL        Appearance: Normal	  Cardiovascular: Normal S1 S2,RRR, No JVD, No murmurs  Respiratory: Lungs clear to auscultation b/l	  Gastrointestinal:  Soft, Non-tender, + BS	  Extremities: Normal range of motion, No clubbing, cyanosis or edema      Home Medications:  ALPRAZolam 1 mg oral tablet: 1 tab(s) orally once a day (2025 12:51)  amitriptyline 25 mg oral tablet: 1 tab(s) orally once a day (2025 12:52)  ascorbic acid 500 mg oral tablet: 1 tab(s) orally once a day (2025 10:51)  buPROPion 100 mg/12 hours (SR) oral tablet, extended release: 1 tab(s) orally 2 times a day (2025 12:52)  carvedilol 25 mg oral tablet: 1 tab(s) orally 2 times a day (2025 12:52)  Estring 2 mg vaginal rin each intravaginally every 60 days (2025 12:52)  hydrALAZINE 50 mg oral tablet: 1 tab(s) orally 2 times a day (2025 12:52)  lovastatin 20 mg oral tablet: 1 tab(s) orally once a day (2025 12:52)  methenamine hippurate 1 g oral tablet: 1 tab(s) orally once a day (2025 12:52)  olmesartan 40 mg oral tablet: 1 tab(s) orally once a day (2025 12:52)  pantoprazole 40 mg oral delayed release tablet: 1 tab(s) orally once a day (before a meal) (2025 10:58)  Synthroid 100 mcg (0.1 mg) oral tablet: 1 tab(s) orally once a day (2025 12:52)      MEDICATIONS  (STANDING):  amitriptyline 25 milliGRAM(s) Oral at bedtime  ascorbic acid 500 milliGRAM(s) Oral daily  atorvastatin 10 milliGRAM(s) Oral at bedtime  buPROPion XL (24-Hour) . 150 milliGRAM(s) Oral daily  calcium carbonate 1250 mG  + Vitamin D (OsCal 500 + D) 1 Tablet(s) Oral daily  carvedilol 25 milliGRAM(s) Oral every 12 hours  cetirizine 10 milliGRAM(s) Oral daily  hydrALAZINE 25 milliGRAM(s) Oral every 12 hours  levothyroxine 100 MICROGram(s) Oral daily  olmesartan 40 milliGRAM(s) Oral daily  pantoprazole    Tablet 40 milliGRAM(s) Oral before breakfast  piperacillin/tazobactam IVPB.. 3.375 Gram(s) IV Intermittent every 8 hours      TELEMETRY: 	    ECG:  	  RADIOLOGY:   DIAGNOSTIC TESTING:  [ ] Echocardiogram:  [ ]  Catheterization:  [ ] Stress Test:    OTHER: 	    LABS:	 	    Troponin T, High Sensitivity Result: 15 ng/L [0 - 51] (06-14 @ 02:19)                          11.2   6.47  )-----------( 205      ( 2025 06:55 )             34.7         138  |  105  |  15  ----------------------------<  93  3.7   |  23  |  1.12    Ca    8.9      2025 06:51        Lipid Profile:   Hgb A1C:      BNP:     Creatinine: 1.12 mg/dL (25 @ 06:51)  Creatinine: 1.01 mg/dL (25 @ 11:41)      Hemoglobin: 11.2 g/dL (25 @ 06:55)  Hemoglobin: 11.6 g/dL (25 @ 11:40)  Hemoglobin: 13.0 g/dL (25 @ 11:18)

## 2025-06-20 NOTE — PROGRESS NOTE ADULT - SUBJECTIVE AND OBJECTIVE BOX
Patient is a 84y old  Female who presents with a chief complaint of     SUBJECTIVE / OVERNIGHT EVENTS: Comfortable without new complaints.   Review of Systems  chest pain no  palpitations no  sob no  nausea no  headache no    MEDICATIONS  (STANDING):  amitriptyline 25 milliGRAM(s) Oral at bedtime  ascorbic acid 500 milliGRAM(s) Oral daily  atorvastatin 10 milliGRAM(s) Oral at bedtime  buPROPion XL (24-Hour) . 150 milliGRAM(s) Oral daily  calcium carbonate 1250 mG  + Vitamin D (OsCal 500 + D) 1 Tablet(s) Oral daily  carvedilol 25 milliGRAM(s) Oral every 12 hours  cephalexin 500 milliGRAM(s) Oral every 8 hours  cetirizine 10 milliGRAM(s) Oral daily  hydrALAZINE 25 milliGRAM(s) Oral every 12 hours  levothyroxine 100 MICROGram(s) Oral daily  olmesartan 40 milliGRAM(s) Oral daily  pantoprazole    Tablet 40 milliGRAM(s) Oral before breakfast    MEDICATIONS  (PRN):  acetaminophen     Tablet .. 650 milliGRAM(s) Oral every 6 hours PRN Temp greater or equal to 38.5C (101.3F), Mild Pain (1 - 3)  ALPRAZolam 1 milliGRAM(s) Oral daily PRN anxiety      Vital Signs Last 24 Hrs  T(C): 36.4 (20 Jun 2025 10:14), Max: 36.7 (19 Jun 2025 19:41)  T(F): 97.6 (20 Jun 2025 10:14), Max: 98.1 (19 Jun 2025 19:41)  HR: 79 (20 Jun 2025 10:14) (60 - 82)  BP: 105/68 (20 Jun 2025 10:14) (105/68 - 135/78)  BP(mean): --  RR: 18 (20 Jun 2025 10:14) (18 - 18)  SpO2: 98% (20 Jun 2025 10:14) (92% - 98%)    Parameters below as of 20 Jun 2025 10:14  Patient On (Oxygen Delivery Method): room air        PHYSICAL EXAM:  GENERAL: NAD  HEAD:  Atraumatic, Normocephalic  EYES: EOMI, PERRLA, conjunctiva and sclera clear  NECK: Supple, No JVD  CHEST/LUNG: Clear to auscultation bilaterally; No wheeze  HEART: Regular rate and rhythm; No murmurs, rubs, or gallops  ABDOMEN: Soft, Nontender, Nondistended; Bowel sounds present  EXTREMITIES:  no edema L arm with chronic subluxation shoulder   PSYCH: AAOx3  NEUROLOGY: non-focal  SKIN: No rashes or lesions    LABS:                        11.2   6.47  )-----------( 205      ( 20 Jun 2025 06:55 )             34.7     06-20    138  |  105  |  15  ----------------------------<  93  3.7   |  23  |  1.12    Ca    8.9      20 Jun 2025 06:51            Urinalysis Basic - ( 20 Jun 2025 06:51 )    Color: x / Appearance: x / SG: x / pH: x  Gluc: 93 mg/dL / Ketone: x  / Bili: x / Urobili: x   Blood: x / Protein: x / Nitrite: x   Leuk Esterase: x / RBC: x / WBC x   Sq Epi: x / Non Sq Epi: x / Bacteria: x          RADIOLOGY & ADDITIONAL TESTS:    Imaging Personally Reviewed:    Consultant(s) Notes Reviewed:      Care Discussed with Consultants/Other Providers:

## 2025-06-20 NOTE — DISCHARGE NOTE NURSING/CASE MANAGEMENT/SOCIAL WORK - NSDCPEFALRISK_GEN_ALL_CORE
For information on Fall & Injury Prevention, visit: [email or web address] OR  [email or web address] OR  https://www.cdc.gov/steadi/patient.html

## 2025-06-20 NOTE — DISCHARGE NOTE NURSING/CASE MANAGEMENT/SOCIAL WORK - NSDCFUADDAPPT_GEN_ALL_CORE_FT
APPTS ARE READY TO BE MADE: [x] YES    Best Family or Patient Contact (if needed):    Additional Information about above appointments (if needed):    1: PCP  2: Cardiology  3: GYN/Urogynecology for pessary management    Other comments or requests:

## 2025-06-20 NOTE — PROGRESS NOTE ADULT - ASSESSMENT
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     #UTI  -abx per med  -Ucx noted  -tx per med, ID    #Dizziness   -negative orthostatic BP  +dizziness again this AM  -TTE shows normal LVSF with EF of 64%; no reg WMA; no sig valvular disease   -hydral decreased to 25mg bid.  -check repeat orthostatics     #HTN  -cont home meds  -trend bp   -decreased hydral to 25 mg BID    #MAT  -stable, cont BB    #Aortic/Mitral Valve Disease  -No overload on exam  -Recent echo with nml lv fxn, moderate MR  -repeat echo as above    dvt ppx

## 2025-06-20 NOTE — PROGRESS NOTE ADULT - PROVIDER SPECIALTY LIST ADULT
Cardiology
Infectious Disease
Internal Medicine
Cardiology
Cardiology
Infectious Disease
Internal Medicine
Internal Medicine
Cardiology
Internal Medicine

## 2025-06-20 NOTE — DISCHARGE NOTE NURSING/CASE MANAGEMENT/SOCIAL WORK - FINANCIAL ASSISTANCE
[facility] provides services at a reduced cost to those who are determined to be eligible through [facility]’s financial assistance program. Information regarding [facility]’s financial assistance program can be found by going to [email or web address] or by calling [phone].

## 2025-06-20 NOTE — PROGRESS NOTE ADULT - TIME BILLING
Agree with above ACP note.  cv stable   dizziness  rec to d/c hydral   cont current tx    dcp
Agree with above ACP note.  events noted  check orthostatics   check echo  if sbp remains < 130, persistent dizziness, decreased hydral to 25 bid
Agree with above ACP note.  cv stable  monitor BP  cont current tx  dec hydral to 25mg
Agree with above ACP note.  cv stable  neg orthostatics   check echo  if sbp remains < 130, persistent dizziness, decreased hydral to 25 bid
Agree with above ACP note.  events noted  neg orthostatics   check echo  if sbp remains < 130, persistent dizziness, decreased hydral to 25 bid

## 2025-06-20 NOTE — DISCHARGE NOTE NURSING/CASE MANAGEMENT/SOCIAL WORK - PATIENT PORTAL LINK FT
You can access the FollowMyHealth Patient Portal offered by [facility] by registering at the following website: [email or web address]. By joining [facility]’s FollowMyHealth portal, you will also be able to view your health information using other applications (apps) compatible with our system.

## 2025-06-20 NOTE — PROGRESS NOTE ADULT - ASSESSMENT
84 f with    UTI  - Zosyn   - ID follow    MAT  - BB  -  cardiology follow Dr Alva LEAL arm weakness/ subluxation chronic  - PT     Celiac disease  - gluten free diet     Hypothyroidism.   - Synthroid  - TSH    HLD (hyperlipidemia).   - stable     Hiatal hernia.   - fup with GI.    Primary hypertension.   - Coreg and Olmesartan     Bladder prolapse  - follow with Gyn Urogynecology OTP  - continue Pessary     R Iliac artery aneurism  - Follow as OTP     Depression  - continue Rx    Anxiety  - continue Rx    Diarrhea  - resolved    DVT prophylaxis  - PAS     DC home . Follow with PMD in 3-4 days.      d/w patient, ACP QA    Paulie Moore MD phone [phone]

## 2025-06-25 ENCOUNTER — APPOINTMENT (OUTPATIENT)
Dept: ENDOCRINOLOGY | Facility: CLINIC | Age: 85
End: 2025-06-25

## 2025-07-02 ENCOUNTER — APPOINTMENT (OUTPATIENT)
Dept: ENDOCRINOLOGY | Facility: CLINIC | Age: 85
End: 2025-07-02
Payer: MEDICARE

## 2025-07-02 VITALS
SYSTOLIC BLOOD PRESSURE: 192 MMHG | WEIGHT: 123.31 LBS | HEART RATE: 62 BPM | BODY MASS INDEX: 24.86 KG/M2 | DIASTOLIC BLOOD PRESSURE: 105 MMHG | HEIGHT: 59 IN | OXYGEN SATURATION: 97 %

## 2025-07-02 VITALS — DIASTOLIC BLOOD PRESSURE: 80 MMHG | SYSTOLIC BLOOD PRESSURE: 120 MMHG

## 2025-07-02 PROCEDURE — G2211 COMPLEX E/M VISIT ADD ON: CPT

## 2025-07-02 PROCEDURE — 99214 OFFICE O/P EST MOD 30 MIN: CPT

## 2025-07-02 RX ORDER — PANTOPRAZOLE 40 MG/1
40 TABLET, DELAYED RELEASE ORAL
Refills: 0 | Status: ACTIVE | COMMUNITY

## 2025-07-02 RX ORDER — METHENAMINE HIPPURATE 1 G/1
1 TABLET ORAL
Refills: 0 | Status: ACTIVE | COMMUNITY

## 2025-07-04 NOTE — ED ADULT NURSE NOTE - NS ED PATIENT SAFETY CONCERN
- Hx DVT, PE 2022 Platelets now over 50,000 so Heme rec'd resuming fondaparinux despite thrombocytopenia    PLAN  - resume home fondaparinux per heme recs  - continue home Cellcept No - Hx DVT, PE 2022 Platelets now over 50,000 so Heme rec'd resuming fondaparinux despite thrombocytopenia    PLAN  - continue home fondaparinux per heme recs  - continue home Cellcept

## 2025-07-24 ENCOUNTER — APPOINTMENT (OUTPATIENT)
Dept: VASCULAR SURGERY | Facility: CLINIC | Age: 85
End: 2025-07-24

## 2025-08-02 NOTE — CONSULT NOTE ADULT - CONSULT REASON
Patient walk in with symptomatic hypertension endorsing headache, blurred vision, and SOB since yesterday morning. Not currently on medication   HTN, dizziness     hx HTN, macular degeneration, OA, diverticulitis, gerd

## 2025-09-04 ENCOUNTER — APPOINTMENT (OUTPATIENT)
Dept: VASCULAR SURGERY | Facility: CLINIC | Age: 85
End: 2025-09-04
Payer: MEDICARE

## 2025-09-04 DIAGNOSIS — I72.3 ANEURYSM OF ILIAC ARTERY: ICD-10-CM

## 2025-09-04 PROCEDURE — 93978 VASCULAR STUDY: CPT

## 2025-09-04 PROCEDURE — 99214 OFFICE O/P EST MOD 30 MIN: CPT

## 2025-09-10 ENCOUNTER — APPOINTMENT (OUTPATIENT)
Dept: PULMONOLOGY | Facility: CLINIC | Age: 85
End: 2025-09-10

## (undated) DEVICE — BIOPSY FORCEP RADIAL JAW 4 STANDARD WITH NEEDLE

## (undated) DEVICE — DRAPE U 47X51" NON STERILE

## (undated) DEVICE — SUT QUILL MONODERM 2-0 3/8 CIRCLE 45CM

## (undated) DEVICE — PACK HIP PINNING

## (undated) DEVICE — GLV 8 PROTEXIS (WHITE)

## (undated) DEVICE — SUT PDO 2 1/2 CIRCLE 40MM NDL 45CM

## (undated) DEVICE — STRYKER FEMORAL CANAL BRUSH

## (undated) DEVICE — BALLOON US ENDO

## (undated) DEVICE — SUT POLYSORB 1 36" GS-21 UNDYED

## (undated) DEVICE — DRAPE 3/4 SHEET W REINFORCEMENT 56X77"

## (undated) DEVICE — TUBING SUCTION 20FT

## (undated) DEVICE — PRESSURIZER FEM CNL W/O HUB MED

## (undated) DEVICE — STAPLER SKIN VISI-STAT 35 WIDE

## (undated) DEVICE — SOL IRR POUR H2O 1000ML

## (undated) DEVICE — DRILL BIT STRYKER ORTHO TRAUMA 8MM

## (undated) DEVICE — DRILL TAP 6.5MM

## (undated) DEVICE — DRILL BIT STRYKER ORTHO TRAUMA 6.5MM

## (undated) DEVICE — POSITIONER FOAM ABDUCTION PILLOW MED (PINK)

## (undated) DEVICE — SOL IRR BAG NS 0.9% 3000ML

## (undated) DEVICE — SUT POLYSORB 2-0 30" GS-21 UNDYED

## (undated) DEVICE — SUT ETHIBOND EXCEL 2 30" OS-4

## (undated) DEVICE — Device

## (undated) DEVICE — STRYKER INTERPULSE HANDPIECE W IRR SUCTION TUBE

## (undated) DEVICE — DRSG WEBRIL 6"

## (undated) DEVICE — SUT TICRON 0 30" TIES

## (undated) DEVICE — NDL HYPO SAFE 22G X 1.5" (BLACK)

## (undated) DEVICE — ELCTR BOVIE PENCIL SMOKE EVACUATION

## (undated) DEVICE — DRILL BIT STRYKER ORTHO TRAUMA 4.9MM

## (undated) DEVICE — FOLEY TRAY 16FR 5CC LTX UMETER CLOSED

## (undated) DEVICE — DRAPE MAYO STAND 30"

## (undated) DEVICE — LAP PAD 18 X 18"

## (undated) DEVICE — SYR ALLIANCE II INFLATION 60ML

## (undated) DEVICE — DRAPE IOBAN 33" X 23"

## (undated) DEVICE — SOL INJ NS 0.9% 500ML 2 PORT

## (undated) DEVICE — TUBING IV SET GRAVITY 3Y 100" MACRO

## (undated) DEVICE — WARMING BLANKET UPPER ADULT

## (undated) DEVICE — DRAPE TOWEL BLUE 17" X 24"

## (undated) DEVICE — MEDICATION LABELS W MARKER

## (undated) DEVICE — SAW BLADE STRYKER SAGITTAL 25X1.27X90

## (undated) DEVICE — DRSG TAPE MICROFOAM 3"

## (undated) DEVICE — GLV 9 DURAPRENE

## (undated) DEVICE — DRILL BIT STRYKER ORTHO TRAUMA 5.6MM

## (undated) DEVICE — SUT MONOCRYL 3-0 18" PS-2 UNDYED

## (undated) DEVICE — SPECIMEN CONTAINER 100ML

## (undated) DEVICE — PACK TOTAL HIP (2 PACKS)

## (undated) DEVICE — CATH IV SAFE BC 22G X 1" (BLUE)

## (undated) DEVICE — BITE BLOCK ADULT 20 X 27MM (GREEN)

## (undated) DEVICE — PACK IV START WITH CHG

## (undated) DEVICE — TUBING SUCTION CONN 6FT STERILE

## (undated) DEVICE — GLV 6.5 PROTEXIS (WHITE)

## (undated) DEVICE — SENSOR O2 FINGER ADULT

## (undated) DEVICE — SOL IRR POUR H2O 250ML

## (undated) DEVICE — SUT POLYSORB 0 30" GS-21 UNDYED

## (undated) DEVICE — BLADE SCALPEL SAFETYLOCK #15

## (undated) DEVICE — DRAPE HIP W POUCHES 87X115X134"

## (undated) DEVICE — GLV 8.5 PROTEXIS (WHITE)

## (undated) DEVICE — SUCTION YANKAUER NO CONTROL VENT

## (undated) DEVICE — FOLEY HOLDER STATLOCK 2 WAY ADULT

## (undated) DEVICE — MARKING PEN W RULER

## (undated) DEVICE — POSITIONER FOAM EGG CRATE ULNAR 2PCS (PINK)

## (undated) DEVICE — CATH IV SAFE BC 20G X 1.16" (PINK)

## (undated) DEVICE — SYR LUER LOK 20CC

## (undated) DEVICE — TAPE SILK 3"

## (undated) DEVICE — DRSG ACE BANDAGE 6"

## (undated) DEVICE — GOWN TRIMAX LG

## (undated) DEVICE — GLV 7 PROTEXIS (WHITE)

## (undated) DEVICE — SOL IRR POUR NS 0.9% 500ML

## (undated) DEVICE — VENODYNE/SCD SLEEVE CALF LARGE

## (undated) DEVICE — COLONOSCOPE 2416901: Type: DURABLE MEDICAL EQUIPMENT

## (undated) DEVICE — GLV 7.5 PROTEXIS (WHITE)

## (undated) DEVICE — HOOD FLYTE STRYKER HELMET SHIELD

## (undated) DEVICE — DRSG AQUACEL 3.5 X 10"

## (undated) DEVICE — SAW BLADE STRYKER SAGITTAL 3 HOLE OSCILLATING